# Patient Record
Sex: MALE | Race: WHITE | Employment: OTHER | URBAN - METROPOLITAN AREA
[De-identification: names, ages, dates, MRNs, and addresses within clinical notes are randomized per-mention and may not be internally consistent; named-entity substitution may affect disease eponyms.]

---

## 2017-01-12 ENCOUNTER — TELEPHONE (OUTPATIENT)
Dept: FAMILY MEDICINE | Facility: CLINIC | Age: 67
End: 2017-01-12

## 2017-01-12 DIAGNOSIS — R60.0 PERIPHERAL EDEMA: Primary | ICD-10-CM

## 2017-01-12 NOTE — TELEPHONE ENCOUNTER
Thanks for update, can he come in for kidney function test in 1 week with the increased dose furosemide?    Teetee Barton, CNP

## 2017-01-12 NOTE — TELEPHONE ENCOUNTER
Left detailed message asking patient to please schedule non-fasting lab only appt.    IMMANUEL NelsonN, RN

## 2017-01-12 NOTE — TELEPHONE ENCOUNTER
Patient called and spoke with writer.    Per patient:  1. Was taking Furosemide 20 mg BID, then instructed by PCP to only take one tablet daily  2. Itching and swelling of BLE returned, so three to four days ago went back to Furosemide 20 mg BID dosing  3. Wanted PCP to be aware of this    Routing to PCP.    Teetee-Please review.    Thank you!  IMMANUEL NelsonN, RN

## 2017-01-16 DIAGNOSIS — R60.0 PERIPHERAL EDEMA: ICD-10-CM

## 2017-01-16 LAB
ANION GAP SERPL CALCULATED.3IONS-SCNC: 6 MMOL/L (ref 3–14)
BUN SERPL-MCNC: 10 MG/DL (ref 7–30)
CALCIUM SERPL-MCNC: 9.5 MG/DL (ref 8.5–10.1)
CHLORIDE SERPL-SCNC: 103 MMOL/L (ref 94–109)
CO2 SERPL-SCNC: 31 MMOL/L (ref 20–32)
CREAT SERPL-MCNC: 0.97 MG/DL (ref 0.66–1.25)
GFR SERPL CREATININE-BSD FRML MDRD: 77 ML/MIN/1.7M2
GLUCOSE SERPL-MCNC: 111 MG/DL (ref 70–99)
POTASSIUM SERPL-SCNC: 4.5 MMOL/L (ref 3.4–5.3)
SODIUM SERPL-SCNC: 140 MMOL/L (ref 133–144)

## 2017-01-16 PROCEDURE — 36415 COLL VENOUS BLD VENIPUNCTURE: CPT | Performed by: NURSE PRACTITIONER

## 2017-01-16 PROCEDURE — 80048 BASIC METABOLIC PNL TOTAL CA: CPT | Performed by: NURSE PRACTITIONER

## 2017-01-16 NOTE — TELEPHONE ENCOUNTER
Patient must have received and understood Mayra's voice mail because he is scheduled for lab only today for BMP as ordered by Teetee.    Kita Araujo RN

## 2017-01-16 NOTE — Clinical Note
[facility]   [address] [place]   [address]  [phone]    January 17, 2017      Fermín Josue  [address] [facility] [address]              Dear Angela Josue,    Kidney function looks good on increased dose furosemide.    Results for orders placed or performed in visit on 01/16/17   Basic metabolic panel   Result Value Ref Range    Sodium 140 133 - 144 mmol/L    Potassium 4.5 3.4 - 5.3 mmol/L    Chloride 103 94 - 109 mmol/L    Carbon Dioxide 31 20 - 32 mmol/L    Anion Gap 6 3 - 14 mmol/L    Glucose 111 (H) 70 - 99 mg/dL    Urea Nitrogen 10 7 - 30 mg/dL    Creatinine 0.97 0.66 - 1.25 mg/dL    GFR Estimate 77 >60 mL/min/1.7m2    GFR Estimate If Black >90   GFR Calc   >60 mL/min/1.7m2    Calcium 9.5 8.5 - 10.1 mg/dL           Sincerely,    Teetee Barton, QUOC/nr

## 2017-01-16 NOTE — PROGRESS NOTES
Quick Note:    Please send out result letter with the following note, thanks.    Dom,    Kidney function looks good on increased dose furosemide.    -Teetee Barton, QUOC  ______

## 2017-02-13 ENCOUNTER — DOCUMENTATION ONLY (OUTPATIENT)
Dept: VASCULAR SURGERY | Facility: CLINIC | Age: 67
End: 2017-02-13

## 2017-06-13 ENCOUNTER — OFFICE VISIT (OUTPATIENT)
Dept: CARDIOLOGY | Facility: CLINIC | Age: 67
End: 2017-06-13
Attending: INTERNAL MEDICINE
Payer: COMMERCIAL

## 2017-06-13 VITALS
WEIGHT: 233 LBS | DIASTOLIC BLOOD PRESSURE: 80 MMHG | BODY MASS INDEX: 33.36 KG/M2 | SYSTOLIC BLOOD PRESSURE: 120 MMHG | HEIGHT: 70 IN | HEART RATE: 47 BPM

## 2017-06-13 DIAGNOSIS — I25.10 CORONARY ARTERY DISEASE INVOLVING NATIVE CORONARY ARTERY OF NATIVE HEART WITHOUT ANGINA PECTORIS: ICD-10-CM

## 2017-06-13 DIAGNOSIS — I10 ESSENTIAL HYPERTENSION, BENIGN: ICD-10-CM

## 2017-06-13 DIAGNOSIS — E78.5 HYPERLIPIDEMIA, UNSPECIFIED HYPERLIPIDEMIA TYPE: ICD-10-CM

## 2017-06-13 DIAGNOSIS — I10 ESSENTIAL HYPERTENSION: Primary | ICD-10-CM

## 2017-06-13 LAB
ALT SERPL W P-5'-P-CCNC: 9 U/L (ref 5–30)
ANION GAP SERPL CALCULATED.3IONS-SCNC: 12.7 MMOL/L (ref 6–17)
BUN SERPL-MCNC: 12 MG/DL (ref 7–30)
CALCIUM SERPL-MCNC: 9.4 MG/DL (ref 8.5–10.5)
CHLORIDE SERPL-SCNC: 102 MMOL/L (ref 98–107)
CHOLEST SERPL-MCNC: 153 MG/DL
CO2 SERPL-SCNC: 28 MMOL/L (ref 23–29)
CREAT SERPL-MCNC: 1.08 MG/DL (ref 0.7–1.3)
GFR SERPL CREATININE-BSD FRML MDRD: 68 ML/MIN/1.7M2
GLUCOSE SERPL-MCNC: 114 MG/DL (ref 70–105)
HDLC SERPL-MCNC: 43 MG/DL
LDLC SERPL CALC-MCNC: 83 MG/DL
NONHDLC SERPL-MCNC: 110 MG/DL
POTASSIUM SERPL-SCNC: 4.7 MMOL/L (ref 3.5–5.1)
SODIUM SERPL-SCNC: 138 MMOL/L (ref 136–145)
TRIGL SERPL-MCNC: 134 MG/DL

## 2017-06-13 PROCEDURE — 80048 BASIC METABOLIC PNL TOTAL CA: CPT | Performed by: INTERNAL MEDICINE

## 2017-06-13 PROCEDURE — 84460 ALANINE AMINO (ALT) (SGPT): CPT | Performed by: INTERNAL MEDICINE

## 2017-06-13 PROCEDURE — 36415 COLL VENOUS BLD VENIPUNCTURE: CPT | Performed by: INTERNAL MEDICINE

## 2017-06-13 PROCEDURE — 99214 OFFICE O/P EST MOD 30 MIN: CPT | Performed by: INTERNAL MEDICINE

## 2017-06-13 PROCEDURE — 80061 LIPID PANEL: CPT | Performed by: INTERNAL MEDICINE

## 2017-06-13 RX ORDER — ATORVASTATIN CALCIUM 40 MG/1
40 TABLET, FILM COATED ORAL DAILY
Qty: 90 TABLET | Refills: 3 | Status: SHIPPED | OUTPATIENT
Start: 2017-06-13 | End: 2018-07-20

## 2017-06-13 NOTE — MR AVS SNAPSHOT
After Visit Summary   6/13/2017    Fermín Josue    MRN: [id number]           Patient Information     Date Of Birth          1950        Visit Information        Provider Department      6/13/2017 9:45 AM Christiano Toro MD [facility]        Today's Diagnoses     Essential hypertension    -  1    Coronary artery disease involving native coronary artery of native heart without angina pectoris        Hyperlipidemia, unspecified hyperlipidemia type           Follow-ups after your visit        Additional Services     Follow-Up with Cardiologist                 Your next 10 appointments already scheduled     Jul 27, 2017  7:30 AM CDT   LAB with [facility]   [facility] ([facility])    [address] [place], [place] [id number]  [facility] [address] [phone]           Patient must bring picture ID.  Patient should be prepared to give a urine specimen  Please do not eat 10-12 hours before your appointment if you are coming in fasting for labs on lipids, cholesterol, or glucose (sugar).  Pregnant women should follow their Care Team instructions. Water with medications is okay. Do not drink coffee or other fluids.   If you have concerns about taking  your medications, please ask at office or if scheduling via [employer], send a message by clicking on Secure Messaging, Message Your Care Team.              Future tests that were ordered for you today     Open Future Orders        Priority Expected Expires Ordered    Basic metabolic panel Routine 6/13/2018 7/18/2018 6/13/2017    Lipid Profile Routine 6/13/2018 7/18/2018 6/13/2017    ALT Routine 6/13/2018 7/18/2018 6/13/2017    Follow-Up with Cardiologist Routine 6/13/2018 10/26/2018 6/13/2017    Lipid Profile Routine 7/27/2017 6/13/2018 6/13/2017    ALT Routine 7/27/2017 6/13/2018 6/13/2017            Who to contact     If you have questions or need follow up  "information about today's clinic visit or your schedule please contact [facility] [place] directly at [phone].  Normal or non-critical lab and imaging results will be communicated to you by MyChart, letter or phone within 4 business days after the clinic has received the results. If you do not hear from us within 7 days, please contact the clinic through [employer] or phone. If you have a critical or abnormal lab result, we will notify you by phone as soon as possible.  Submit refill requests through [employer] or call your pharmacy and they will forward the refill request to us. Please allow 3 business days for your refill to be completed.          Additional Information About Your Visit        [employer] Information     [employer] lets you send messages to your doctor, view your test results, renew your prescriptions, schedule appointments and more. To sign up, go to [email or web address] . Click on \"Log in\" on the left side of the screen, which will take you to the Welcome page. Then click on \"Sign up Now\" on the right side of the page.     You will be asked to enter the access code listed below, as well as some personal information. Please follow the directions to create your username and password.     Your access code is: [email or web address]  Expires: 2017 10:04 AM     Your access code will  in 90 days. If you need help or a new code, please call your [facility] clinic or [phone].        Care EveryWhere ID     This is your Care EveryWhere ID. This could be used by other organizations to access your [facility] medical records  [id number]        Your Vitals Were     Pulse Height BMI (Body Mass Index)             47 1.778 m (5' 10\") 33.43 kg/m2          Blood Pressure from Last 3 Encounters:   17 120/80   16 120/76   16 120/70    Weight from Last 3 Encounters:   17 105.7 kg (233 lb)   16 102.7 kg (226 lb 8 oz)   16 101.6 kg (224 lb)            "   We Performed the Following     Follow-Up with Cardiologist          Today's Medication Changes          These changes are accurate as of: 6/13/17 10:04 AM.  If you have any questions, ask your nurse or doctor.               These medicines have changed or have updated prescriptions.        Dose/Directions    atorvastatin 40 MG tablet   Commonly known as:  LIPITOR   This may have changed:    - medication strength  - how much to take   Used for:  Hyperlipidemia, unspecified hyperlipidemia type   Changed by:  Christiano Toro MD        Dose:  40 mg   Take 1 tablet (40 mg) by mouth daily   Quantity:  90 tablet   Refills:  3            Where to get your medicines      These medications were sent to [id number] [place], [place] - [address]  [address], [place] [address]     Phone:  [phone]     atorvastatin 40 MG tablet                Primary Care Provider Office Phone # Fax #    Teetee LONNY Menchaca [facility] [phone]       [facility] [address]  [facility] [address]        Thank you!     Thank you for choosing [facility] [place]  for your care. Our goal is always to provide you with excellent care. Hearing back from our patients is one way we can continue to improve our services. Please take a few minutes to complete the written survey that you may receive in the mail after your visit with us. Thank you!             Your Updated Medication List - Protect others around you: Learn how to safely use, store and throw away your medicines at www.disposemymeds.org.          This list is accurate as of: 6/13/17 10:04 AM.  Always use your most recent med list.                   Brand Name Dispense Instructions for use    amLODIPine 2.5 MG tablet    NORVASC    90 tablet    Take 1 tablet (2.5 mg) by mouth daily       ASPIRIN PO      Take 81 mg by mouth       atenolol 50 MG tablet    TENORMIN    90 tablet    Take  1 tablet (50 mg) by mouth daily       atorvastatin 40 MG tablet    LIPITOR    90 tablet    Take 1 tablet (40 mg) by mouth daily       fluticasone 50 MCG/ACT spray    FLONASE    16 g    Spray 2 sprays into both nostrils daily       furosemide 20 MG tablet    LASIX    90 tablet    Take 1 tablet (20 mg) by mouth daily       losartan 50 MG tablet    COZAAR    90 tablet    Take 1 tablet (50 mg) by mouth daily

## 2017-06-13 NOTE — PROGRESS NOTES
HPI and Plan:   See dictation    Orders Placed This Encounter   Procedures     Lipid Profile     ALT     Basic metabolic panel     Lipid Profile     ALT     Follow-Up with Cardiologist       Orders Placed This Encounter   Medications     atorvastatin (LIPITOR) 40 MG tablet     Sig: Take 1 tablet (40 mg) by mouth daily     Dispense:  90 tablet     Refill:  3       Medications Discontinued During This Encounter   Medication Reason     atorvastatin (LIPITOR) 20 MG tablet Reorder         Encounter Diagnoses   Name Primary?     Coronary artery disease involving native coronary artery of native heart without angina pectoris      Hyperlipidemia, unspecified hyperlipidemia type      Essential hypertension Yes       CURRENT MEDICATIONS:  Current Outpatient Prescriptions   Medication Sig Dispense Refill     atorvastatin (LIPITOR) 40 MG tablet Take 1 tablet (40 mg) by mouth daily 90 tablet 3     furosemide (LASIX) 20 MG tablet Take 1 tablet (20 mg) by mouth daily 90 tablet 3     atenolol (TENORMIN) 50 MG tablet Take 1 tablet (50 mg) by mouth daily 90 tablet 3     amLODIPine (NORVASC) 2.5 MG tablet Take 1 tablet (2.5 mg) by mouth daily 90 tablet 3     losartan (COZAAR) 50 MG tablet Take 1 tablet (50 mg) by mouth daily 90 tablet 3     fluticasone (FLONASE) 50 MCG/ACT nasal spray Spray 2 sprays into both nostrils daily 16 g 0     ASPIRIN PO Take 81 mg by mouth       [DISCONTINUED] atorvastatin (LIPITOR) 20 MG tablet Take 1 tablet (20 mg) by mouth daily 90 tablet 3       ALLERGIES     Allergies   Allergen Reactions     No Known Drug Allergies        PAST MEDICAL HISTORY:  Past Medical History:   Diagnosis Date     CAD (coronary artery disease) 2/5/2015    3 stents     History of angina      History of skin graft     Right lower limb     Hyperlipidaemia      Hypertension goal BP (blood pressure) < 140/90 2/5/2015       PAST SURGICAL HISTORY:  Past Surgical History:   Procedure Laterality Date     HEART CATH, ANGIOPLASTY  11/2007     "mid to prox LAD drug-eluting stent x2; Occluded RCA with Collaterals     SURGICAL HISTORY OF -   2003    kidney stone laser and ureteric stenting       FAMILY HISTORY:  Family History   Problem Relation Age of Onset     CANCER Maternal Grandfather      CANCER Maternal Uncle      lung     Blood Disease Maternal Grandmother      leukemia       SOCIAL HISTORY:  Social History     Social History     Marital status:      Spouse name: N/A     Number of children: N/A     Years of education: N/A     Social History Main Topics     Smoking status: Former Smoker     Packs/day: 1.00     Years: 5.00     Types: Cigarettes     Smokeless tobacco: None     Alcohol use Yes      Comment: beer occasionally     Drug use: No     Sexual activity: Not Asked     Other Topics Concern     Caffeine Concern Yes     1 pop daily      Special Diet Yes     fruits      Exercise Yes     walking 1 hr daily      Social History Narrative       Review of Systems:  Skin:  Negative       Eyes:  Negative      ENT:  Negative      Respiratory:  Negative       Cardiovascular:    Positive for;edema angina on occ   Gastroenterology: Negative      Genitourinary:  Negative      Musculoskeletal:  Negative      Neurologic:  Negative      Psychiatric:  Positive for excessive stress    Heme/Lymph/Imm:  Negative      Endocrine:  Negative        Physical Exam:  Vitals: /80  Pulse (!) 47  Ht 1.778 m (5' 10\")  Wt 105.7 kg (233 lb)  BMI 33.43 kg/m2    Constitutional:  cooperative, alert and oriented, well developed, well nourished, in no acute distress overweight      Skin:  warm and dry to the touch, no apparent skin lesions or masses noted        Head:  normocephalic, no masses or lesions        Eyes:  pupils equal and round, conjunctivae and lids unremarkable, sclera white, no xanthalasma, EOMS intact, no nystagmus        ENT:  no pallor or cyanosis, dentition good        Neck:  carotid pulses are full and equal bilaterally, JVP normal, no carotid " bruit, no thyromegaly        Chest:  normal breath sounds, clear to auscultation, normal A-P diameter, normal symmetry, normal respiratory excursion, no use of accessory muscles          Cardiac: regular rhythm;normal S1 and S2;apical impulse not displaced;no murmurs, gallops or rubs detected     no presence of murmur            Abdomen:  abdomen soft, non-tender, BS normoactive, no mass, no HSM, no bruits        Vascular: pulses full and equal, no bruits auscultated;pulses full and equal                                        Extremities and Back:  no deformities, clubbing, cyanosis, erythema observed   bilateral LE edema;trace          Neurological:  affect appropriate, oriented to time, person and place;no gross motor deficits              CC  Christiano Toro MD   [facility]  [address]  [place] [address]

## 2017-06-13 NOTE — LETTER
6/13/2017    LONNY Arroyo CNP  [facility]   [address]  [facility] [address]    RE: Fermín Josue       Dear Colleague,    REFERRING PHYSICIAN:  LONNY Bowden, CNP       It is my pleasure to see your patient, Fermín Josue, who is a very pleasant 67-year-old gentleman with a history of coronary artery disease.  As you know, he had stenting to the left anterior descending artery in 2007 and has an occluded but collateralized right coronary artery.  He also has a history of hypertension and hyperlipidemia.  Since I last saw him, he has been doing relatively well.  He has had no change in his symptoms.  He will occasionally get discomfort in his chest if he has an emotional upset but he is a very active man normally and he can walk 2 miles without any discomfort in his chest.  His exercise has been limited because he is essentially the sole caregiver to his 87-year-old mother who has memory issues.  He tells me that his other siblings do not take care of his mother at all.  He also helps with his grandchildren.  Consequently, he has gained a significant amount of weight going from 224 pounds last year to 233 pounds this year.  Consequently, this may also be a factor in worsening lipid profile.  His LDL cholesterol has risen from 44 to 83.  He does tell me, however, that his statins were changed.  He was switched from rosuvastatin 20 mg to atorvastatin 20 mg.  This is effectively cutting the potency of his statin in half.  Rosuvastatin is twice more powerful than atorvastatin.  This is probably one of the major reasons why his LDL cholesterol has doubled.  His blood pressure is well-controlled at 120/80.  He does have sinus bradycardia at 47 beats per minute but he has no dizziness and no lightheadedness.  Blood pressure is excellent today at 120/80.     Outpatient Encounter Prescriptions as of 6/13/2017   Medication Sig Dispense Refill     atorvastatin (LIPITOR) 40 MG  tablet Take 1 tablet (40 mg) by mouth daily 90 tablet 3     furosemide (LASIX) 20 MG tablet Take 1 tablet (20 mg) by mouth daily 90 tablet 3     atenolol (TENORMIN) 50 MG tablet Take 1 tablet (50 mg) by mouth daily 90 tablet 3     amLODIPine (NORVASC) 2.5 MG tablet Take 1 tablet (2.5 mg) by mouth daily 90 tablet 3     losartan (COZAAR) 50 MG tablet Take 1 tablet (50 mg) by mouth daily 90 tablet 3     fluticasone (FLONASE) 50 MCG/ACT nasal spray Spray 2 sprays into both nostrils daily 16 g 0     ASPIRIN PO Take 81 mg by mouth       [DISCONTINUED] atorvastatin (LIPITOR) 20 MG tablet Take 1 tablet (20 mg) by mouth daily 90 tablet 3     No facility-administered encounter medications on file as of 6/13/2017.       IMPRESSION:   1.  Coronary artery disease.  The patient has very occasional episodes of chest discomfort.  These occur usually with emotion rather than exercise and the pattern has not changed since last year.   2.  Hyperlipidemia.  His LDL cholesterol has virtually doubled.  The most likely reason for this is that he was switched from rosuvastatin 20 mg to atorvastatin 20 mg for cost reasons and as I mentioned above this is effectively halving the dose of statin.   3.  Significant weight gain going from 224 pounds to 233 pounds in 1 year.  This is most likely due to less exercise because he has now become the sole caregiver for his mother.      PLAN:  We will increase the atorvastatin from 20 mg to 40 mg and recheck his lipids in 6 weeks' time with an ALT.  I will have the patient return to see me in 1 year's time with lipids and basic metabolic profile.  I will see the patient back again in 1 year's time but as always he has been told to contact me if he has any questions or any concerns.      It has been my pleasure to be involved in the care of this very nice patient.     Sincerely,    Christiano Toro MD    [facility]

## 2017-06-13 NOTE — PROGRESS NOTES
REFERRING PHYSICIAN:  LONNY Bowden, CNP       HISTORY OF PRESENT ILLNESS:  It is my pleasure to see your patient, Fermín Josue, who is a very pleasant 67-year-old gentleman with a history of coronary artery disease.  As you know, he had stenting to the left anterior descending artery in 2007 and has an occluded but collateralized right coronary artery.  He also has a history of hypertension and hyperlipidemia.  Since I last saw him, he has been doing relatively well.  He has had no change in his symptoms.  He will occasionally get discomfort in his chest if he has an emotional upset but he is a very active man normally and he can walk 2 miles without any discomfort in his chest.  His exercise has been limited because he is essentially the sole caregiver to his 87-year-old mother who has memory issues.  He tells me that his other siblings do not take care of his mother at all.  He also helps with his grandchildren.  Consequently, he has gained a significant amount of weight going from 224 pounds last year to 233 pounds this year.  Consequently, this may also be a factor in worsening lipid profile.  His LDL cholesterol has risen from 44 to 83.  He does tell me, however, that his statins were changed.  He was switched from rosuvastatin 20 mg to atorvastatin 20 mg.  This is effectively cutting the potency of his statin in half.  Rosuvastatin is twice more powerful than atorvastatin.  This is probably one of the major reasons why his LDL cholesterol has doubled.  His blood pressure is well-controlled at 120/80.  He does have sinus bradycardia at 47 beats per minute but he has no dizziness and no lightheadedness.  Blood pressure is excellent today at 120/80.      IMPRESSION:   1.  Coronary artery disease.  The patient has very occasional episodes of chest discomfort.  These occur usually with emotion rather than exercise and the pattern has not changed since last year.   2.  Hyperlipidemia.  His LDL cholesterol  has virtually doubled.  The most likely reason for this is that he was switched from rosuvastatin 20 mg to atorvastatin 20 mg for cost reasons and as I mentioned above this is effectively halving the dose of statin.   3.  Significant weight gain going from 224 pounds to 233 pounds in 1 year.  This is most likely due to less exercise because he has now become the sole caregiver for his mother.      PLAN:  We will increase the atorvastatin from 20 mg to 40 mg and recheck his lipids in 6 weeks' time with an ALT.  I will have the patient return to see me in 1 year's time with lipids and basic metabolic profile.  I will see the patient back again in 1 year's time but as always he has been told to contact me if he has any questions or any concerns.      It has been my pleasure to be involved in the care of this very nice patient.      Christiano Gifford MD      cc:   LONNY Bowden, [place] [address] [place] [address]         CHRISTIANO GIFFORD MD, [facility]             D: 2017 10:02   T: 2017 11:41   MT: XAVIER      Name:     XU BURCH   MRN:      [id number]        Account:      [id number]   :      1950           Service Date: 2017      Document: [id number]

## 2017-06-23 DIAGNOSIS — I25.10 CAD (CORONARY ARTERY DISEASE): ICD-10-CM

## 2017-06-23 RX ORDER — LOSARTAN POTASSIUM 50 MG/1
50 TABLET ORAL DAILY
Qty: 90 TABLET | Refills: 3 | Status: SHIPPED | OUTPATIENT
Start: 2017-06-23 | End: 2018-07-05

## 2017-07-10 DIAGNOSIS — I10 ESSENTIAL HYPERTENSION: ICD-10-CM

## 2017-07-10 RX ORDER — AMLODIPINE BESYLATE 2.5 MG/1
2.5 TABLET ORAL DAILY
Qty: 90 TABLET | Refills: 3 | Status: SHIPPED | OUTPATIENT
Start: 2017-07-10 | End: 2018-07-16

## 2017-07-27 DIAGNOSIS — I10 ESSENTIAL HYPERTENSION: ICD-10-CM

## 2017-07-27 DIAGNOSIS — I25.10 CORONARY ARTERY DISEASE INVOLVING NATIVE CORONARY ARTERY OF NATIVE HEART WITHOUT ANGINA PECTORIS: ICD-10-CM

## 2017-07-27 DIAGNOSIS — E78.5 HYPERLIPIDEMIA, UNSPECIFIED HYPERLIPIDEMIA TYPE: ICD-10-CM

## 2017-07-27 LAB
ALT SERPL W P-5'-P-CCNC: 8 U/L (ref 5–30)
CHOLEST SERPL-MCNC: 133 MG/DL
HDLC SERPL-MCNC: 40 MG/DL
LDLC SERPL CALC-MCNC: 70 MG/DL
NONHDLC SERPL-MCNC: 93 MG/DL
TRIGL SERPL-MCNC: 113 MG/DL

## 2017-07-27 PROCEDURE — 80061 LIPID PANEL: CPT | Performed by: INTERNAL MEDICINE

## 2017-07-27 PROCEDURE — 84460 ALANINE AMINO (ALT) (SGPT): CPT | Performed by: INTERNAL MEDICINE

## 2017-07-27 PROCEDURE — 36415 COLL VENOUS BLD VENIPUNCTURE: CPT | Performed by: INTERNAL MEDICINE

## 2017-08-17 ENCOUNTER — TELEPHONE (OUTPATIENT)
Dept: FAMILY MEDICINE | Facility: CLINIC | Age: 67
End: 2017-08-17

## 2017-08-17 NOTE — TELEPHONE ENCOUNTER
--I called [facility] and confirmed that they do not have any furosemide or Lasix because of a supplier problem.  --I called [facility] in our pharmacy to see if we have a supply still.  --[facility] does confirm there is a problem with supply of  furosemide.  --[facility] says [facility] pharmacy has a small supply of furosemide.  [facility] says [8/17/2017 12:05 PM] fps-pc-10-4:   [facility]- the 20mg we have for now, so short term solution.  estimated availability varies on a daily basis, so hard to say when it will be easy to get again.  talking about furosemide .  --I called patient and he will call our pharmacy and ask to pull his last refill order from [facility].      Last office visit : 9/29/16     Potassium   Date Value Ref Range Status   06/13/2017 4.7 3.5 - 5.1 mmol/L Final   ]  Creatinine   Date Value Ref Range Status   06/13/2017 1.08 0.70 - 1.30 mg/dL Final   ]  BP Readings from Last 3 Encounters:   06/13/17 120/80   09/29/16 120/76   06/02/16 120/70

## 2017-08-17 NOTE — TELEPHONE ENCOUNTER
Patient needs refill of Lasix.  [facility] [address] states they can't get any of this medication.  Only has 1 pill left (also has been skipping days).  Need recommendation. Please call today. OK to leave message on voicemail.

## 2017-11-15 DIAGNOSIS — R60.0 PERIPHERAL EDEMA: ICD-10-CM

## 2017-11-17 RX ORDER — FUROSEMIDE 20 MG
TABLET ORAL
Qty: 90 TABLET | Refills: 0 | Status: SHIPPED | OUTPATIENT
Start: 2017-11-17 | End: 2017-12-04

## 2017-11-17 NOTE — TELEPHONE ENCOUNTER
Patient calling to find out status of water pill refill.  Will be out this weekend, so needs today.  Please as soon as possible.  OK to leave message on voicemail.

## 2017-11-17 NOTE — TELEPHONE ENCOUNTER
9/29/16 was last ov with pcp.  6/13/17 pt was seen by cards.  Nothing noted in ov about med.  Routing to pcp.  RAKAN Paz

## 2017-11-27 DIAGNOSIS — I10 ESSENTIAL HYPERTENSION WITH GOAL BLOOD PRESSURE LESS THAN 140/90: ICD-10-CM

## 2017-11-29 RX ORDER — ATENOLOL 50 MG/1
TABLET ORAL
Qty: 30 TABLET | Refills: 0 | Status: SHIPPED | OUTPATIENT
Start: 2017-11-29 | End: 2017-12-04

## 2017-11-29 NOTE — TELEPHONE ENCOUNTER
One month refill as patient overdue for annual office visit.    Team coordinators-Please call patient to schedule office visit.    Thank you!  PAULETTE Cardona, RN      BP Readings from Last 3 Encounters:   06/13/17 120/80   09/29/16 120/76   06/02/16 120/70

## 2017-12-01 NOTE — PROGRESS NOTES
SUBJECTIVE:   Fermín Josue is a 67 year old male who presents to clinic today for the following health issues:      Hyperlipidemia Follow-Up      Rate your low fat/cholesterol diet?: not monitoring fat    Taking statin?  Yes, no muscle aches from statin    Other lipid medications/supplements?:  none    Hypertension Follow-up      Outpatient blood pressures are not being checked.    Low Salt Diet: not monitoring salt    Pt notes he needs his Lasix sent to [facility]      Patient  BP is well controlled, no side effects reported from medications. Follows annually with cardiology, history of CAD s/p stenting 2015.  Denies  dizziness or lightheadednesss, no  chest pain recently. SOB sometimes when exercising or tying shoe lace. No SOB at rest or with walking      Weight: avoid drinking soda and  can foods     Patient stated that edema to lower extremities improved with  Lasix, he was on two tablets and cardiologist decreased to one tablet.     Patient Active Problem List   Diagnosis     CARDIOVASCULAR SCREENING; LDL GOAL LESS THAN 100     CAD (coronary artery disease)     Hypertension goal BP (blood pressure) < 140/90     Hyperlipidemia     History of angina     Advance Care Planning     Past Surgical History:   Procedure Laterality Date     HEART CATH, ANGIOPLASTY  11/2007    mid to prox LAD drug-eluting stent x2; Occluded RCA with Collaterals     SURGICAL HISTORY OF -   2003    kidney stone laser and ureteric stenting       Social History   Substance Use Topics     Smoking status: Former Smoker     Packs/day: 1.00     Years: 5.00     Types: Cigarettes     Smokeless tobacco: Never Used     Alcohol use Yes      Comment: beer occasionally     Family History   Problem Relation Age of Onset     CANCER Maternal Grandfather      CANCER Maternal Uncle      lung     Blood Disease Maternal Grandmother      leukemia         Current Outpatient Prescriptions   Medication Sig Dispense Refill     atenolol  (TENORMIN) 50 MG tablet Take 1 tablet (50 mg) by mouth daily 90 tablet 3     furosemide (LASIX) 20 MG tablet Take 1 tablet (20 mg) by mouth daily 90 tablet 3     nitroGLYcerin (NITROSTAT) 0.4 MG sublingual tablet For chest pain place 1 tablet under the tongue every 5 minutes for 3 doses. If symptoms persist 5 minutes after 1st dose call 911. 9 tablet 0     amLODIPine (NORVASC) 2.5 MG tablet Take 1 tablet (2.5 mg) by mouth daily 90 tablet 3     losartan (COZAAR) 50 MG tablet Take 1 tablet (50 mg) by mouth daily 90 tablet 3     atorvastatin (LIPITOR) 40 MG tablet Take 1 tablet (40 mg) by mouth daily 90 tablet 3     ASPIRIN PO Take 81 mg by mouth       [DISCONTINUED] atenolol (TENORMIN) 50 MG tablet TAKE ONE TABLET BY MOUTH ONE TIME DAILY  30 tablet 0     [DISCONTINUED] furosemide (LASIX) 20 MG tablet TAKE ONE TABLET BY MOUTH EVERY DAY 90 tablet 0       ROS:  C: NEGATIVE for fever, chills, change in weight  R: as above  CV: NEGATIVE for chest pain, palpitations or peripheral edema  PSYCHIATRIC: NEGATIVE for changes in mood or affect      OBJECTIVE:                                                    /68 (BP Location: Left arm, Patient Position: Chair, Cuff Size: Adult Large)  Pulse 50  Temp 98.1  F (36.7  C) (Tympanic)  Resp 12  Wt 240 lb 8 oz (109.1 kg)  SpO2 97%  BMI 34.51 kg/m2   RESP: lungs clear to auscultation - no rales, rhonchi or wheezes, SOB when exercising and tying shoe lace   CV: regular rates and rhythm, normal S1 S2, no S3 or S4 and no murmur, click or rub peripheral edema and peripheral pulses strong  PSYCH: mentation appears normal, affect normal/bright       ASSESSMENT/PLAN:                                                    (I10) Hypertension goal BP (blood pressure) < 140/90  (primary encounter diagnosis)  Comment: controlled on Atenolol and Losartan /68  Plan: continue Atenolol 50 mg  and Losartan 50 mg            (I25.10) CAD (coronary artery disease)  Comment:  No angina  Plan:  continue Lipitor 40 mg and Aspirin 81 mg, refilled nitro              (E78.5) Hyperlipidemia, unspecified hyperlipidemia type  Comment: controlled last LDL 70 6/27/17  Plan: continue Lipitor 40mg      (R60.9) Peripheral edema  Comment: suspect due to vascular insufficiency normal echo 2015, normal GFR and creatinine 6/13/2017.  No symptoms of HF today.  Edema has worsened with stopping furosemide in the past.  Plan: consider compression stockings, furosemide (LASIX) 20 MG tablet.  discussed cautious use of furosemide due to wear and tear on kidneys and need for renal monitoring.                  See Patient Instructions    Teetee Hammer, [facility]    Patient Instructions   1.  Refilled furosemide, continue to try to limit salt, consider compression stockings.  2.  Refilled atenolol  3.  teatnus shot today

## 2017-12-04 ENCOUNTER — OFFICE VISIT (OUTPATIENT)
Dept: FAMILY MEDICINE | Facility: CLINIC | Age: 67
End: 2017-12-04
Payer: COMMERCIAL

## 2017-12-04 VITALS
WEIGHT: 240.5 LBS | OXYGEN SATURATION: 97 % | BODY MASS INDEX: 34.51 KG/M2 | TEMPERATURE: 98.1 F | HEART RATE: 50 BPM | RESPIRATION RATE: 12 BRPM | DIASTOLIC BLOOD PRESSURE: 68 MMHG | SYSTOLIC BLOOD PRESSURE: 126 MMHG

## 2017-12-04 DIAGNOSIS — I10 ESSENTIAL HYPERTENSION: ICD-10-CM

## 2017-12-04 DIAGNOSIS — R60.0 PERIPHERAL EDEMA: ICD-10-CM

## 2017-12-04 DIAGNOSIS — I10 HYPERTENSION GOAL BP (BLOOD PRESSURE) < 140/90: Primary | ICD-10-CM

## 2017-12-04 DIAGNOSIS — Z23 ENCOUNTER FOR IMMUNIZATION: ICD-10-CM

## 2017-12-04 DIAGNOSIS — E78.5 HYPERLIPIDEMIA, UNSPECIFIED HYPERLIPIDEMIA TYPE: ICD-10-CM

## 2017-12-04 DIAGNOSIS — I25.10 CORONARY ARTERY DISEASE INVOLVING NATIVE CORONARY ARTERY OF NATIVE HEART WITHOUT ANGINA PECTORIS: ICD-10-CM

## 2017-12-04 PROCEDURE — G0008 ADMIN INFLUENZA VIRUS VAC: HCPCS | Performed by: NURSE PRACTITIONER

## 2017-12-04 PROCEDURE — 99214 OFFICE O/P EST MOD 30 MIN: CPT | Mod: 25 | Performed by: NURSE PRACTITIONER

## 2017-12-04 PROCEDURE — 90715 TDAP VACCINE 7 YRS/> IM: CPT | Performed by: NURSE PRACTITIONER

## 2017-12-04 RX ORDER — FUROSEMIDE 20 MG
20 TABLET ORAL DAILY
Qty: 90 TABLET | Refills: 11 | Status: CANCELLED | OUTPATIENT
Start: 2017-12-04

## 2017-12-04 RX ORDER — FUROSEMIDE 20 MG
20 TABLET ORAL DAILY
Qty: 90 TABLET | Refills: 3 | Status: SHIPPED | OUTPATIENT
Start: 2017-12-04 | End: 2019-02-20

## 2017-12-04 RX ORDER — ATORVASTATIN CALCIUM 40 MG/1
40 TABLET, FILM COATED ORAL DAILY
Qty: 90 TABLET | Refills: 3 | Status: CANCELLED | OUTPATIENT
Start: 2017-12-04

## 2017-12-04 RX ORDER — LOSARTAN POTASSIUM 50 MG/1
50 TABLET ORAL DAILY
Qty: 90 TABLET | Refills: 3 | Status: CANCELLED | OUTPATIENT
Start: 2017-12-04

## 2017-12-04 RX ORDER — NITROGLYCERIN 0.4 MG/1
TABLET SUBLINGUAL
Qty: 9 TABLET | Refills: 0 | Status: SHIPPED | OUTPATIENT
Start: 2017-12-04 | End: 2019-07-29

## 2017-12-04 RX ORDER — ATENOLOL 50 MG/1
50 TABLET ORAL DAILY
Qty: 90 TABLET | Refills: 3 | Status: SHIPPED | OUTPATIENT
Start: 2017-12-04 | End: 2019-03-06

## 2017-12-04 RX ORDER — AMLODIPINE BESYLATE 2.5 MG/1
2.5 TABLET ORAL DAILY
Qty: 90 TABLET | Refills: 3 | Status: CANCELLED | OUTPATIENT
Start: 2017-12-04

## 2017-12-04 NOTE — NURSING NOTE
"Chief Complaint   Patient presents with     Hypertension     Lipids       Initial /68 (BP Location: Left arm, Patient Position: Chair, Cuff Size: Adult Large)  Pulse 50  Temp 98.1  F (36.7  C) (Tympanic)  Resp 12  Wt 240 lb 8 oz (109.1 kg)  SpO2 97%  BMI 34.51 kg/m2 Estimated body mass index is 34.51 kg/(m^2) as calculated from the following:    Height as of 6/13/17: 5' 10\" (1.778 m).    Weight as of this encounter: 240 lb 8 oz (109.1 kg).  Medication Reconciliation: complete     Lovely Rojas, CMA      "

## 2017-12-04 NOTE — NURSING NOTE
Screening Questionnaire for Adult Immunization    Are you sick today?   No   Do you have allergies to medications, food, a vaccine component or latex?   No   Have you ever had a serious reaction after receiving a vaccination?   No   Do you have a long-term health problem with heart disease, lung disease, asthma, kidney disease, metabolic disease (e.g. diabetes), anemia, or other blood disorder?   No   Do you have cancer, leukemia, HIV/AIDS, or any other immune system problem?   No   In the past 3 months, have you taken medications that affect  your immune system, such as prednisone, other steroids, or anticancer drugs; drugs for the treatment of rheumatoid arthritis, Crohn s disease, or psoriasis; or have you had radiation treatments?   No   Have you had a seizure, or a brain or other nervous system problem?   No   During the past year, have you received a transfusion of blood or blood     products, or been given immune (gamma) globulin or antiviral drug?   No   For women: Are you pregnant or is there a chance you could become        pregnant during the next month?   No   Have you received any vaccinations in the past 4 weeks?   No     Immunization questionnaire answers were all negative.        Per orders of Dr. Hammer, injection of TDap given by Asim Cruz. Patient instructed to remain in clinic for 15 minutes afterwards, and to report any adverse reaction to me immediately.    Per orders of Dr. Hammer, injection of TDap given by Asim Cruz. Patient instructed to remain in clinic for 15 minutes afterwards, and to report any adverse reaction to me immediately.         Screening performed by Asim Cruz on 12/4/2017 at 12:08 PM.

## 2017-12-04 NOTE — MR AVS SNAPSHOT
"              After Visit Summary   12/4/2017    Fermín Josue    MRN: [id number]           Patient Information     Date Of Birth          1950        Visit Information        Provider Department      12/4/2017 8:00 AM Teetee Hammer APRN CNP [facility]        Today's Diagnoses     Hypertension goal BP (blood pressure) < 140/90    -  1    Coronary artery disease involving native coronary artery of native heart without angina pectoris        Hyperlipidemia, unspecified hyperlipidemia type        Essential hypertension with goal blood pressure less than 140/90        Peripheral edema        Essential hypertension        CAD (coronary artery disease)          Care Instructions    1.  Refilled furosemide, continue to try to limit salt, consider compression stockings.  2.  Refilled atenolol  3.  teatnus shot today            Follow-ups after your visit        Who to contact     If you have questions or need follow up information about today's clinic visit or your schedule please contact [facility] directly at [phone].  Normal or non-critical lab and imaging results will be communicated to you by [employer], letter or phone within 4 business days after the clinic has received the results. If you do not hear from us within 7 days, please contact the clinic through [employer] or phone. If you have a critical or abnormal lab result, we will notify you by phone as soon as possible.  Submit refill requests through [employer] or call your pharmacy and they will forward the refill request to us. Please allow 3 business days for your refill to be completed.          Additional Information About Your Visit        [employer] Information     [employer] lets you send messages to your doctor, view your test results, renew your prescriptions, schedule appointments and more. To sign up, go to [email or web address] . Click on \"Log in\" on the left side of the screen, which will take you to the " "Welcome page. Then click on \"Sign up Now\" on the right side of the page.     You will be asked to enter the access code listed below, as well as some personal information. Please follow the directions to create your username and password.     Your access code is: [email or web address]  Expires: 3/4/2018  8:32 AM     Your access code will  in 90 days. If you need help or a new code, please call your [place] clinic or [phone].        Care EveryWhere ID     This is your Care EveryWhere ID. This could be used by other organizations to access your [place] medical records  [id number]        Your Vitals Were     Pulse Temperature Respirations Pulse Oximetry BMI (Body Mass Index)       50 98.1  F (36.7  C) (Tympanic) 12 97% 34.51 kg/m2        Blood Pressure from Last 3 Encounters:   17 126/68   17 120/80   16 120/76    Weight from Last 3 Encounters:   17 240 lb 8 oz (109.1 kg)   17 233 lb (105.7 kg)   16 226 lb 8 oz (102.7 kg)              Today, you had the following     No orders found for display         Today's Medication Changes          These changes are accurate as of: 17  8:32 AM.  If you have any questions, ask your nurse or doctor.               Start taking these medicines.        Dose/Directions    nitroGLYcerin 0.4 MG sublingual tablet   Commonly known as:  NITROSTAT   Used for:  Coronary artery disease involving native coronary artery of native heart without angina pectoris   Started by:  Teetee Hammer APRN CNP        For chest pain place 1 tablet under the tongue every 5 minutes for 3 doses. If symptoms persist 5 minutes after 1st dose call 911.   Quantity:  9 tablet   Refills:  0         These medicines have changed or have updated prescriptions.        Dose/Directions    atenolol 50 MG tablet   Commonly known as:  TENORMIN   This may have changed:  See the new instructions.   Used for:  Essential hypertension with goal blood pressure less than 140/90 "   Changed by:  Teetee Hammer APRN CNP        Dose:  50 mg   Take 1 tablet (50 mg) by mouth daily   Quantity:  90 tablet   Refills:  3       furosemide 20 MG tablet   Commonly known as:  LASIX   This may have changed:  See the new instructions.   Used for:  Peripheral edema   Changed by:  Teetee Hammer APRN CNP        Dose:  20 mg   Take 1 tablet (20 mg) by mouth daily   Quantity:  90 tablet   Refills:  3            Where to get your medicines      These medications were sent to [id number] [place], [place] - [address]  [address], [place] [address]     Phone:  [phone]     atenolol 50 MG tablet    furosemide 20 MG tablet    nitroGLYcerin 0.4 MG sublingual tablet                Primary Care Provider Office Phone # Fax #    LONNY Arroyo [phone] [address]  [facility] [address]        Equal Access to Services     RENETTA GONZALEZ AH: Hadii sarah gray hadasho Soomaali, waaxda luqadaha, qaybta kaalmada adeegyada, waxay poliin hayjohn germain . So [facility] [phone].    ATENCIÓN: Si habla español, tiene a sainz disposición servicios gratuitos de asistencia lingüística. Llame al [phone].    We comply with applicable federal civil rights laws and [place] laws. We do not discriminate on the basis of race, color, national origin, age, disability, sex, sexual orientation, or gender identity.            Thank you!     Thank you for choosing [facility]  for your care. Our goal is always to provide you with excellent care. Hearing back from our patients is one way we can continue to improve our services. Please take a few minutes to complete the written survey that you may receive in the mail after your visit with us. Thank you!             Your Updated Medication List - Protect others around you: Learn how to safely use, store and throw away your medicines at www.disposemymeds.org.          This list is  accurate as of: 12/4/17  8:32 AM.  Always use your most recent med list.                   Brand Name Dispense Instructions for use Diagnosis    amLODIPine 2.5 MG tablet    NORVASC    90 tablet    Take 1 tablet (2.5 mg) by mouth daily    Essential hypertension       ASPIRIN PO      Take 81 mg by mouth        atenolol 50 MG tablet    TENORMIN    90 tablet    Take 1 tablet (50 mg) by mouth daily    Essential hypertension with goal blood pressure less than 140/90       atorvastatin 40 MG tablet    LIPITOR    90 tablet    Take 1 tablet (40 mg) by mouth daily    Hyperlipidemia, unspecified hyperlipidemia type       furosemide 20 MG tablet    LASIX    90 tablet    Take 1 tablet (20 mg) by mouth daily    Peripheral edema       losartan 50 MG tablet    COZAAR    90 tablet    Take 1 tablet (50 mg) by mouth daily    CAD (coronary artery disease)       nitroGLYcerin 0.4 MG sublingual tablet    NITROSTAT    9 tablet    For chest pain place 1 tablet under the tongue every 5 minutes for 3 doses. If symptoms persist 5 minutes after 1st dose call 911.    Coronary artery disease involving native coronary artery of native heart without angina pectoris

## 2017-12-04 NOTE — PATIENT INSTRUCTIONS
1.  Refilled furosemide, continue to try to limit salt, consider compression stockings.  2.  Refilled atenolol  3.  teatnus shot today

## 2018-06-25 DIAGNOSIS — I10 ESSENTIAL HYPERTENSION: Primary | ICD-10-CM

## 2018-06-25 DIAGNOSIS — E78.5 HLD (HYPERLIPIDEMIA): ICD-10-CM

## 2018-06-27 ENCOUNTER — OFFICE VISIT (OUTPATIENT)
Dept: CARDIOLOGY | Facility: CLINIC | Age: 68
End: 2018-06-27
Attending: INTERNAL MEDICINE
Payer: COMMERCIAL

## 2018-06-27 VITALS
SYSTOLIC BLOOD PRESSURE: 132 MMHG | HEART RATE: 60 BPM | DIASTOLIC BLOOD PRESSURE: 84 MMHG | HEIGHT: 71 IN | WEIGHT: 236 LBS | BODY MASS INDEX: 33.04 KG/M2

## 2018-06-27 DIAGNOSIS — E78.5 HLD (HYPERLIPIDEMIA): ICD-10-CM

## 2018-06-27 DIAGNOSIS — I25.10 CORONARY ARTERY DISEASE INVOLVING NATIVE CORONARY ARTERY OF NATIVE HEART WITHOUT ANGINA PECTORIS: ICD-10-CM

## 2018-06-27 DIAGNOSIS — E78.5 HYPERLIPIDEMIA, UNSPECIFIED HYPERLIPIDEMIA TYPE: ICD-10-CM

## 2018-06-27 DIAGNOSIS — I10 ESSENTIAL HYPERTENSION: ICD-10-CM

## 2018-06-27 LAB
ALT SERPL W P-5'-P-CCNC: 19 U/L (ref 5–30)
ANION GAP SERPL CALCULATED.3IONS-SCNC: 15 MMOL/L (ref 6–17)
BUN SERPL-MCNC: 13 MG/DL (ref 7–30)
CALCIUM SERPL-MCNC: 10 MG/DL (ref 8.5–10.5)
CHLORIDE SERPL-SCNC: 103 MMOL/L (ref 98–107)
CHOLEST SERPL-MCNC: 132 MG/DL
CO2 SERPL-SCNC: 27 MMOL/L (ref 23–29)
CREAT SERPL-MCNC: 1.08 MG/DL (ref 0.7–1.3)
GFR SERPL CREATININE-BSD FRML MDRD: 68 ML/MIN/1.7M2
GLUCOSE SERPL-MCNC: 121 MG/DL (ref 70–105)
HDLC SERPL-MCNC: 41 MG/DL
LDLC SERPL CALC-MCNC: 65 MG/DL
NONHDLC SERPL-MCNC: 91 MG/DL
POTASSIUM SERPL-SCNC: 4 MMOL/L (ref 3.5–5.1)
SODIUM SERPL-SCNC: 141 MMOL/L (ref 136–145)
TRIGL SERPL-MCNC: 132 MG/DL

## 2018-06-27 PROCEDURE — 36415 COLL VENOUS BLD VENIPUNCTURE: CPT | Performed by: INTERNAL MEDICINE

## 2018-06-27 PROCEDURE — 84460 ALANINE AMINO (ALT) (SGPT): CPT | Performed by: INTERNAL MEDICINE

## 2018-06-27 PROCEDURE — 80061 LIPID PANEL: CPT | Performed by: INTERNAL MEDICINE

## 2018-06-27 PROCEDURE — 80048 BASIC METABOLIC PNL TOTAL CA: CPT | Performed by: INTERNAL MEDICINE

## 2018-06-27 PROCEDURE — 99214 OFFICE O/P EST MOD 30 MIN: CPT | Performed by: INTERNAL MEDICINE

## 2018-06-27 NOTE — MR AVS SNAPSHOT
After Visit Summary   6/27/2018    Fermín Josue    MRN: [id number]           Patient Information     Date Of Birth          1950        Visit Information        Provider Department      6/27/2018 9:45 AM Christiano Toro MD [facility]        Today's Diagnoses     Coronary artery disease involving native coronary artery of native heart without angina pectoris        Hyperlipidemia, unspecified hyperlipidemia type        Essential hypertension           Follow-ups after your visit        Additional Services     Follow-Up with Cardiologist                 Future tests that were ordered for you today     Open Future Orders        Priority Expected Expires Ordered    Basic metabolic panel Routine 6/27/2019 6/28/2019 6/27/2018    Lipid Profile Routine 6/27/2019 6/27/2019 6/27/2018    ALT Routine 6/27/2019 6/27/2019 6/27/2018    Follow-Up with Cardiologist Routine 6/27/2019 6/28/2019 6/27/2018            Who to contact     If you have questions or need follow up information about today's clinic visit or your schedule please contact [facility] directly at [phone].  Normal or non-critical lab and imaging results will be communicated to you by [employer], letter or phone within 4 business days after the clinic has received the results. If you do not hear from us within 7 days, please contact the clinic through [employer] or phone. If you have a critical or abnormal lab result, we will notify you by phone as soon as possible.  Submit refill requests through [employer] or call your pharmacy and they will forward the refill request to us. Please allow 3 business days for your refill to be completed.          Additional Information About Your Visit        MyChart Information     [employer] lets you send messages to your doctor, view your test results, renew your prescriptions, schedule appointments and more. To sign up, go to  [email or web address] . Click on \"Log in\" on the left side of the screen, which will take you to the Welcome page. Then click on \"Sign up Now\" on the right side of the page.     You will be asked to enter the access code listed below, as well as some personal information. Please follow the directions to create your username and password.     Your access code is: [email or web address]  Expires: 2018 10:05 AM     Your access code will  in 90 days. If you need help or a new code, please call your [place] clinic or [phone].        Care EveryWhere ID     This is your Care EveryWhere ID. This could be used by other organizations to access your [place] medical records  [id number]        Your Vitals Were     Pulse Height BMI (Body Mass Index)             60 1.803 m (5' 11\") 32.92 kg/m2          Blood Pressure from Last 3 Encounters:   18 132/84   17 126/68   17 120/80    Weight from Last 3 Encounters:   18 107 kg (236 lb)   17 109.1 kg (240 lb 8 oz)   17 105.7 kg (233 lb)              We Performed the Following     Follow-Up with Cardiologist        Primary Care Provider Office Phone # Fax #    Teetee LONNY Menchaca [facility] [phone]       [address]  [facility] [address]        Equal Access to Services     GRACE GONZALEZ : Hadii aad ku hadasho Soomaali, waaxda luqadaha, qaybta kaalmada adeegyada, daniel vilchis hayjohn germain . So [facility] [phone].    ATENCIÓN: Si habla español, tiene a sainz disposición servicios gratuitos de asistencia lingüística. Llame al [phone].    We comply with applicable federal civil rights laws and [place] laws. We do not discriminate on the basis of race, color, national origin, age, disability, sex, sexual orientation, or gender identity.            Thank you!     Thank you for choosing [facility]  for your care. Our goal is always to provide you with excellent care. " Hearing back from our patients is one way we can continue to improve our services. Please take a few minutes to complete the written survey that you may receive in the mail after your visit with us. Thank you!             Your Updated Medication List - Protect others around you: Learn how to safely use, store and throw away your medicines at www.disposemymeds.org.          This list is accurate as of 6/27/18 10:05 AM.  Always use your most recent med list.                   Brand Name Dispense Instructions for use Diagnosis    amLODIPine 2.5 MG tablet    NORVASC    90 tablet    Take 1 tablet (2.5 mg) by mouth daily    Essential hypertension       ASPIRIN PO      Take 81 mg by mouth        atenolol 50 MG tablet    TENORMIN    90 tablet    Take 1 tablet (50 mg) by mouth daily    Hypertension goal BP (blood pressure) < 140/90       atorvastatin 40 MG tablet    LIPITOR    90 tablet    Take 1 tablet (40 mg) by mouth daily    Hyperlipidemia, unspecified hyperlipidemia type       furosemide 20 MG tablet    LASIX    90 tablet    Take 1 tablet (20 mg) by mouth daily    Peripheral edema       losartan 50 MG tablet    COZAAR    90 tablet    Take 1 tablet (50 mg) by mouth daily    CAD (coronary artery disease)       nitroGLYcerin 0.4 MG sublingual tablet    NITROSTAT    9 tablet    For chest pain place 1 tablet under the tongue every 5 minutes for 3 doses. If symptoms persist 5 minutes after 1st dose call 911.    Coronary artery disease involving native coronary artery of native heart without angina pectoris

## 2018-06-27 NOTE — PROGRESS NOTES
HPI and Plan:   See dictation    Orders Placed This Encounter   Procedures     Basic metabolic panel     Lipid Profile     ALT     Follow-Up with Cardiologist       No orders of the defined types were placed in this encounter.      There are no discontinued medications.      Encounter Diagnoses   Name Primary?     Coronary artery disease involving native coronary artery of native heart without angina pectoris      Hyperlipidemia, unspecified hyperlipidemia type      Essential hypertension        CURRENT MEDICATIONS:  Current Outpatient Prescriptions   Medication Sig Dispense Refill     amLODIPine (NORVASC) 2.5 MG tablet Take 1 tablet (2.5 mg) by mouth daily 90 tablet 3     ASPIRIN PO Take 81 mg by mouth       atenolol (TENORMIN) 50 MG tablet Take 1 tablet (50 mg) by mouth daily 90 tablet 3     atorvastatin (LIPITOR) 40 MG tablet Take 1 tablet (40 mg) by mouth daily 90 tablet 3     furosemide (LASIX) 20 MG tablet Take 1 tablet (20 mg) by mouth daily 90 tablet 3     losartan (COZAAR) 50 MG tablet Take 1 tablet (50 mg) by mouth daily 90 tablet 3     nitroGLYcerin (NITROSTAT) 0.4 MG sublingual tablet For chest pain place 1 tablet under the tongue every 5 minutes for 3 doses. If symptoms persist 5 minutes after 1st dose call 911. 9 tablet 0       ALLERGIES     Allergies   Allergen Reactions     No Known Drug Allergies        PAST MEDICAL HISTORY:  Past Medical History:   Diagnosis Date     CAD (coronary artery disease) 2/5/2015    3 stents     History of angina      History of skin graft     Right lower limb     Hyperlipidaemia      Hypertension goal BP (blood pressure) < 140/90 2/5/2015       PAST SURGICAL HISTORY:  Past Surgical History:   Procedure Laterality Date     HEART CATH, ANGIOPLASTY  11/2007    mid to prox LAD drug-eluting stent x2; Occluded RCA with Collaterals     SURGICAL HISTORY OF -   2003    kidney stone laser and ureteric stenting       FAMILY HISTORY:  Family History   Problem Relation Age of Onset  "    Cancer Maternal Grandfather      Cancer Maternal Uncle      lung     Blood Disease Maternal Grandmother      leukemia     Hypertension Mother      Alzheimer Disease Father        SOCIAL HISTORY:  Social History     Social History     Marital status:      Spouse name: N/A     Number of children: N/A     Years of education: N/A     Social History Main Topics     Smoking status: Former Smoker     Packs/day: 1.50     Years: 44.00     Types: Cigarettes     Start date: 6/27/1963     Quit date: 6/27/2007     Smokeless tobacco: Never Used     Alcohol use Yes      Comment: beer - 12 pack month     Drug use: No     Sexual activity: Not Asked     Other Topics Concern     Caffeine Concern Yes     1 pop daily      Special Diet Yes     fruits      Exercise Yes     walking 1 hr daily      Parent/Sibling W/ Cabg, Mi Or Angioplasty Before 65f 55m? No     Social History Narrative       Review of Systems:  Skin:  Negative       Eyes:  Negative      ENT:  Negative      Respiratory:  Positive for dyspnea on exertion     Cardiovascular:  Negative      Gastroenterology: Negative      Genitourinary:  not assessed      Musculoskeletal:  Positive for back pain    Neurologic:  Negative      Psychiatric:  Negative      Heme/Lymph/Imm:  Negative      Endocrine:  Negative        Physical Exam:  Vitals: /84  Pulse 60  Ht 1.803 m (5' 11\")  Wt 107 kg (236 lb)  BMI 32.92 kg/m2    Constitutional:  cooperative, alert and oriented, well developed, well nourished, in no acute distress overweight      Skin:  warm and dry to the touch, no apparent skin lesions or masses noted          Head:  normocephalic, no masses or lesions        Eyes:  pupils equal and round, conjunctivae and lids unremarkable, sclera white, no xanthalasma, EOMS intact, no nystagmus        Lymph:      ENT:  no pallor or cyanosis, dentition good        Neck:  carotid pulses are full and equal bilaterally, JVP normal, no carotid bruit        Respiratory:  normal " breath sounds, clear to auscultation, normal A-P diameter, normal symmetry, normal respiratory excursion, no use of accessory muscles         Cardiac: regular rhythm;normal S1 and S2;apical impulse not displaced;no murmurs, gallops or rubs detected     no presence of murmur          pulses full and equal, no bruits auscultated;pulses full and equal                                        GI:  abdomen soft, non-tender, BS normoactive, no mass, no HSM, no bruits        Extremities and Muscular Skeletal:    erythema;stasis pigmentation bilateral LE edema;trace          Neurological:  no gross motor deficits;affect appropriate        Psych:  Alert and Oriented x 3        CC  Christiano Toro MD  [address]  [place] [address]

## 2018-06-27 NOTE — LETTER
6/27/2018    Teetee Lena Hammer, APRN CNP  [address]  [facility] [address]    RE: Fermín Josue       Dear Colleague,    I had the pleasure of seeing Fermín Josue in the [facility].    HPI and Plan:   See dictation    Orders Placed This Encounter   Procedures     Basic metabolic panel     Lipid Profile     ALT     Follow-Up with Cardiologist       No orders of the defined types were placed in this encounter.      There are no discontinued medications.      Encounter Diagnoses   Name Primary?     Coronary artery disease involving native coronary artery of native heart without angina pectoris      Hyperlipidemia, unspecified hyperlipidemia type      Essential hypertension        CURRENT MEDICATIONS:  Current Outpatient Prescriptions   Medication Sig Dispense Refill     amLODIPine (NORVASC) 2.5 MG tablet Take 1 tablet (2.5 mg) by mouth daily 90 tablet 3     ASPIRIN PO Take 81 mg by mouth       atenolol (TENORMIN) 50 MG tablet Take 1 tablet (50 mg) by mouth daily 90 tablet 3     atorvastatin (LIPITOR) 40 MG tablet Take 1 tablet (40 mg) by mouth daily 90 tablet 3     furosemide (LASIX) 20 MG tablet Take 1 tablet (20 mg) by mouth daily 90 tablet 3     losartan (COZAAR) 50 MG tablet Take 1 tablet (50 mg) by mouth daily 90 tablet 3     nitroGLYcerin (NITROSTAT) 0.4 MG sublingual tablet For chest pain place 1 tablet under the tongue every 5 minutes for 3 doses. If symptoms persist 5 minutes after 1st dose call 911. 9 tablet 0       ALLERGIES     Allergies   Allergen Reactions     No Known Drug Allergies        PAST MEDICAL HISTORY:  Past Medical History:   Diagnosis Date     CAD (coronary artery disease) 2/5/2015    3 stents     History of angina      History of skin graft     Right lower limb     Hyperlipidaemia      Hypertension goal BP (blood pressure) < 140/90 2/5/2015       PAST SURGICAL HISTORY:  Past Surgical History:   Procedure Laterality Date     HEART CATH,  "ANGIOPLASTY  11/2007    mid to prox LAD drug-eluting stent x2; Occluded RCA with Collaterals     SURGICAL HISTORY OF -   2003    kidney stone laser and ureteric stenting       FAMILY HISTORY:  Family History   Problem Relation Age of Onset     Cancer Maternal Grandfather      Cancer Maternal Uncle      lung     Blood Disease Maternal Grandmother      leukemia     Hypertension Mother      Alzheimer Disease Father        SOCIAL HISTORY:  Social History     Social History     Marital status:      Spouse name: N/A     Number of children: N/A     Years of education: N/A     Social History Main Topics     Smoking status: Former Smoker     Packs/day: 1.50     Years: 44.00     Types: Cigarettes     Start date: 6/27/1963     Quit date: 6/27/2007     Smokeless tobacco: Never Used     Alcohol use Yes      Comment: beer - 12 pack month     Drug use: No     Sexual activity: Not Asked     Other Topics Concern     Caffeine Concern Yes     1 pop daily      Special Diet Yes     fruits      Exercise Yes     walking 1 hr daily      Parent/Sibling W/ Cabg, Mi Or Angioplasty Before 65f 55m? No     Social History Narrative       Review of Systems:  Skin:  Negative       Eyes:  Negative      ENT:  Negative      Respiratory:  Positive for dyspnea on exertion     Cardiovascular:  Negative      Gastroenterology: Negative      Genitourinary:  not assessed      Musculoskeletal:  Positive for back pain    Neurologic:  Negative      Psychiatric:  Negative      Heme/Lymph/Imm:  Negative      Endocrine:  Negative        Physical Exam:  Vitals: /84  Pulse 60  Ht 1.803 m (5' 11\")  Wt 107 kg (236 lb)  BMI 32.92 kg/m2    Constitutional:  cooperative, alert and oriented, well developed, well nourished, in no acute distress overweight      Skin:  warm and dry to the touch, no apparent skin lesions or masses noted          Head:  normocephalic, no masses or lesions        Eyes:  pupils equal and round, conjunctivae and lids unremarkable, " sclera white, no xanthalasma, EOMS intact, no nystagmus        Lymph:      ENT:  no pallor or cyanosis, dentition good        Neck:  carotid pulses are full and equal bilaterally, JVP normal, no carotid bruit        Respiratory:  normal breath sounds, clear to auscultation, normal A-P diameter, normal symmetry, normal respiratory excursion, no use of accessory muscles         Cardiac: regular rhythm;normal S1 and S2;apical impulse not displaced;no murmurs, gallops or rubs detected     no presence of murmur          pulses full and equal, no bruits auscultated;pulses full and equal                                        GI:  abdomen soft, non-tender, BS normoactive, no mass, no HSM, no bruits        Extremities and Muscular Skeletal:    erythema;stasis pigmentation bilateral LE edema;trace          Neurological:  no gross motor deficits;affect appropriate        Psych:  Alert and Oriented x 3        CC  Christiano Toro MD  [address]  [place] [address]                Thank you for allowing me to participate in the care of your patient.      Sincerely,     Christiano Toro MD, MD     [facility]    cc:   Christiano Toro MD  [address]  [place] [address]

## 2018-06-27 NOTE — PROGRESS NOTES
Service Date: 06/27/2018      REFERRING PROVIDER:  Teetee Hammer NP.      HISTORY OF PRESENT ILLNESS:  It is my pleasure to see your patient, Fermín Josue, who is a pleasant 68-year-old gentleman with a known history of coronary artery disease.  He had stenting of the left anterior descending artery in 2007.  Coronary angiography at that time also showed that he had an occluded but well collateralized right coronary artery.  He has a history of hypertension and a history of hyperlipidemia.  Since I last saw him, he has done well.  He has no chest pains or chest pressure.  He cannot remember the last time he had angina pectoris.  In the past, if he really pushed himself or if he became emotional about some event, he would develop some discomfort in his chest.  That has not happened in the last year that he can remember.  His lipid profile today was excellent.  LDL was 65, HDL 41 and triglycerides 132 with a total cholesterol of 132.  Liver function tests were normal with an ALT of 19.  He is on high-intensity statin therapy, being on atorvastatin 40 mg per day.  His blood pressure is well controlled at 132/84.  On his basic metabolic profile, his glucose was mildly raised at 121.  This was a fasting glucose this morning.      IMPRESSION:   1.  Coronary artery disease.  The patient is asymptomatic with respect to coronary artery disease with no symptoms of angina pectoris in the last year.   2.  Essential hypertension.  His blood pressure is well controlled at 132/84.   3.  Hyperlipidemia.  The patient is taking high-intensity statin therapy and lipids are excellent, well within aggressive secondary prevention guidelines.   4.  Mildly raised fasting glucose at 121, as described above.   5.  History of varicose veins.      PLAN:  We will continue the patient on his present medications.  I will see him back again in 1 year's time and repeat his lipids and basic metabolic profile at that stage.  Finally, I did tell  him if he continues to have problems with varicose veins that we do have cardiologists who have a special interest in that area and that option is available to him.  As always, the patient has been told to contact us if he has any questions or any concerns.  It has been my pleasure to be involved in the care of this very nice patient.      Christiano Toro MD, FACC       cc:   Teetee Barton [place] [address]         CHRISTIANO JOHNSON MD, FACC             D: 2018   T: 2018   MT: JLUIS      Name:     XU BURCH   MRN:      [id number]        Account:      [id number]   :      1950           Service Date: 2018      Document: [id number]

## 2018-06-27 NOTE — LETTER
6/27/2018      Teetee Hammer, APRN CNP  [address]  [facility] [address]      RE: Fermín Josue       Dear Colleague,    I had the pleasure of seeing Fermín Josue in the [facility].    Service Date: 06/27/2018      REFERRING PROVIDER:  Teetee Hammer NP.      HISTORY OF PRESENT ILLNESS:  It is my pleasure to see your patient, Fermín Josue, who is a pleasant 68-year-old gentleman with a known history of coronary artery disease.  He had stenting of the left anterior descending artery in 2007.  Coronary angiography at that time also showed that he had an occluded but well collateralized right coronary artery.  He has a history of hypertension and a history of hyperlipidemia.  Since I last saw him, he has done well.  He has no chest pains or chest pressure.  He cannot remember the last time he had angina pectoris.  In the past, if he really pushed himself or if he became emotional about some event, he would develop some discomfort in his chest.  That has not happened in the last year that he can remember.  His lipid profile today was excellent.  LDL was 65, HDL 41 and triglycerides 132 with a total cholesterol of 132.  Liver function tests were normal with an ALT of 19.  He is on high-intensity statin therapy, being on atorvastatin 40 mg per day.  His blood pressure is well controlled at 132/84.  On his basic metabolic profile, his glucose was mildly raised at 121.  This was a fasting glucose this morning.      IMPRESSION:   1.  Coronary artery disease.  The patient is asymptomatic with respect to coronary artery disease with no symptoms of angina pectoris in the last year.   2.  Essential hypertension.  His blood pressure is well controlled at 132/84.   3.  Hyperlipidemia.  The patient is taking high-intensity statin therapy and lipids are excellent, well within aggressive secondary prevention guidelines.   4.  Mildly raised fasting glucose at 121, as described  above.   5.  History of varicose veins.      PLAN:  We will continue the patient on his present medications.  I will see him back again in 1 year's time and repeat his lipids and basic metabolic profile at that stage.  Finally, I did tell him if he continues to have problems with varicose veins that we do have cardiologists who have a special interest in that area and that option is available to him.  As always, the patient has been told to contact us if he has any questions or any concerns.  It has been my pleasure to be involved in the care of this very nice patient.      Christiano Toro MD, FACC       cc:   LONNY Mich[place] [address]         CHRISTIANO JOHNSON MD, FACC             D: 2018   T: 2018   MT: JLUIS      Name:     XU BURCH   MRN:      [id number]        Account:      [id number]   :      1950           Service Date: 2018      Document: [id number]         Outpatient Encounter Prescriptions as of 2018   Medication Sig Dispense Refill     amLODIPine (NORVASC) 2.5 MG tablet Take 1 tablet (2.5 mg) by mouth daily 90 tablet 3     ASPIRIN PO Take 81 mg by mouth       atenolol (TENORMIN) 50 MG tablet Take 1 tablet (50 mg) by mouth daily 90 tablet 3     atorvastatin (LIPITOR) 40 MG tablet Take 1 tablet (40 mg) by mouth daily 90 tablet 3     furosemide (LASIX) 20 MG tablet Take 1 tablet (20 mg) by mouth daily 90 tablet 3     losartan (COZAAR) 50 MG tablet Take 1 tablet (50 mg) by mouth daily 90 tablet 3     nitroGLYcerin (NITROSTAT) 0.4 MG sublingual tablet For chest pain place 1 tablet under the tongue every 5 minutes for 3 doses. If symptoms persist 5 minutes after 1st dose call 911. 9 tablet 0     No facility-administered encounter medications on file as of 2018.        Again, thank you for allowing me to participate in the care of your patient.      Sincerely,    Christiano Bai  MD Muna, MD     [facility]

## 2018-07-05 DIAGNOSIS — I25.10 CAD (CORONARY ARTERY DISEASE): ICD-10-CM

## 2018-07-05 RX ORDER — LOSARTAN POTASSIUM 50 MG/1
50 TABLET ORAL DAILY
Qty: 90 TABLET | Refills: 3 | Status: SHIPPED | OUTPATIENT
Start: 2018-07-05 | End: 2019-07-29

## 2018-07-16 DIAGNOSIS — I10 ESSENTIAL HYPERTENSION: ICD-10-CM

## 2018-07-16 RX ORDER — AMLODIPINE BESYLATE 2.5 MG/1
2.5 TABLET ORAL DAILY
Qty: 90 TABLET | Refills: 3 | Status: SHIPPED | OUTPATIENT
Start: 2018-07-16 | End: 2019-05-10

## 2018-07-20 DIAGNOSIS — E78.5 HYPERLIPIDEMIA, UNSPECIFIED HYPERLIPIDEMIA TYPE: ICD-10-CM

## 2018-07-20 RX ORDER — ATORVASTATIN CALCIUM 40 MG/1
40 TABLET, FILM COATED ORAL DAILY
Qty: 90 TABLET | Refills: 3 | Status: SHIPPED | OUTPATIENT
Start: 2018-07-20 | End: 2019-07-29

## 2018-09-17 ENCOUNTER — CARE COORDINATION (OUTPATIENT)
Dept: CARDIOLOGY | Facility: CLINIC | Age: 68
End: 2018-09-17

## 2018-09-17 NOTE — PROGRESS NOTES
Received call from pt stating that he has seen on the new the reports of increased risks with low dose ASA. Discussed with Dr. Toro, recommends, study showed no benefit for daily low dose ASA for pt who do not have history of CAD or stroke. Pts with CAD should continue ASA, benefits out weight risks. Pt advised to continue ASA 81 mg daily. LP[place]field RN

## 2018-10-15 ENCOUNTER — TELEPHONE (OUTPATIENT)
Dept: CARDIOLOGY | Facility: CLINIC | Age: 68
End: 2018-10-15

## 2018-10-15 NOTE — TELEPHONE ENCOUNTER
"Patient called to report that he has had a cough for \" a long time.\" Patient reported he has had a cough for many months that has not subsided even after he has quit smoking. Patient denies any SOB, CP, light headedness or dizziness. Patient advised he wasn't sure what was causing it, but wanted to let our office know. RN advised patient that he should contact his PMD's office to discuss persistant cough as they may want to do further workup to review pulmonary etiologies. Patient verbalized understanding and was in agreement with plan. RN encouraged patient to call our clinic with any further cardiac related questions.   "

## 2018-10-15 NOTE — TELEPHONE ENCOUNTER
"Patient returned phone call to clinic and left VM advising RN to call back to discuss symptoms related to a \"medication\" (patient did not leave med name or symptoms in ). RN returned patient's call and left VM advising patient to call back to discuss further and if patient needs to leave another VM (d/t RN being unable to live answer) to leave further details about medication and symptoms in VM.   "

## 2018-10-15 NOTE — TELEPHONE ENCOUNTER
Patient called and left  advising that he was having some side effects from a medication and would like to discuss with our office. RN returned patient's call and left  advising patient to call us back to discuss further.

## 2019-01-13 DIAGNOSIS — I10 HYPERTENSION GOAL BP (BLOOD PRESSURE) < 140/90: ICD-10-CM

## 2019-01-14 NOTE — TELEPHONE ENCOUNTER
"Requested Prescriptions   Pending Prescriptions Disp Refills     atenolol (TENORMIN) 50 MG tablet [Pharmacy Med Name: Atenolol Oral Tablet 50 MG]  Last Written Prescription Date:  12/4/17  Last Fill Quantity: 90 TABLET,  # refills: 3   Last office visit: 12/4/17 with prescribing provider:  TIFFANIE   Future Office Visit:     90 tablet 2     Sig: Take 1 tablet (50 mg) by mouth daily    Beta-Blockers Protocol Failed - 1/13/2019  5:24 AM       Failed - Recent (12 mo) or future (30 days) visit within the authorizing provider's specialty    Patient had office visit in the last 12 months or has a visit in the next 30 days with authorizing provider or within the authorizing provider's specialty.  See \"Patient Info\" tab in inbasket, or \"Choose Columns\" in Meds & Orders section of the refill encounter.             Passed - Blood pressure under 140/90 in past 12 months    BP Readings from Last 3 Encounters:   06/27/18 132/84   12/04/17 126/68   06/13/17 120/80                Passed - Patient is age 6 or older       Passed - Medication is active on med list          "

## 2019-01-14 NOTE — TELEPHONE ENCOUNTER
Routing refill request to provider for review/approval because:  FDA says there is currently an atenolol shortage do you want to try to send through the 30 day supply first or providers have been offering alternatives?      Thank you,  Jonathan Mullen RN

## 2019-01-15 RX ORDER — ATENOLOL 50 MG/1
50 TABLET ORAL DAILY
Qty: 30 TABLET | Refills: 0 | OUTPATIENT
Start: 2019-01-15

## 2019-01-15 RX ORDER — METOPROLOL SUCCINATE 50 MG/1
50 TABLET, EXTENDED RELEASE ORAL DAILY
Qty: 90 TABLET | Refills: 3 | Status: SHIPPED | OUTPATIENT
Start: 2019-01-15 | End: 2019-07-29

## 2019-01-15 NOTE — TELEPHONE ENCOUNTER
Gave pt this message about new med.    Pt questions-  1- how will he find out when the shortage is over? I thought he should ask his pharmacy?  2- will you want him to stay on the metoprolol for good?    RAKAN Paz

## 2019-01-15 NOTE — TELEPHONE ENCOUNTER
Yes recommend check with pharmacy regarding shortage.  We can switch back to atenolol once shortage is over or continue with metoprolol,, they are in the same med class so doesn't really matter which he is on.    Teetee Hammer, CNP

## 2019-01-16 NOTE — TELEPHONE ENCOUNTER
Phone call to patient     Advised to check with pharmacy on atenolol shortage and when this may be in stock     Discussed med class is the same and patient decides he would just rather stay on the metoprolol than     Writer advised if side effects or concerns to let clinic know - patient agrees to this plan     Ania Joe, Registered Nurse   [facility]

## 2019-02-20 DIAGNOSIS — R60.0 PERIPHERAL EDEMA: ICD-10-CM

## 2019-02-20 NOTE — TELEPHONE ENCOUNTER
"Requested Prescriptions   Pending Prescriptions Disp Refills     furosemide (LASIX) 20 MG tablet [Pharmacy Med Name: Furosemide Oral Tablet 20 MG] 90 tablet 2     Sig: TAKE ONE TABLET BY MOUTH ONE TIME DAILY    Diuretics (Including Combos) Protocol Failed - 2/20/2019  6:38 AM       Failed - Recent (12 mo) or future (30 days) visit within the authorizing provider's specialty    Patient had office visit in the last 12 months or has a visit in the next 30 days with authorizing provider or within the authorizing provider's specialty.  See \"Patient Info\" tab in inbasket, or \"Choose Columns\" in Meds & Orders section of the refill encounter.             Passed - Blood pressure under 140/90 in past 12 months    BP Readings from Last 3 Encounters:   06/27/18 132/84   12/04/17 126/68   06/13/17 120/80                Passed - Medication is active on med list       Passed - Patient is age 18 or older       Passed - Normal serum creatinine on file in past 12 months    Recent Labs   Lab Test 06/27/18  0841   CR 1.08             Passed - Normal serum potassium on file in past 12 months    Recent Labs   Lab Test 06/27/18  0841   POTASSIUM 4.0                   Passed - Normal serum sodium on file in past 12 months    Recent Labs   Lab Test 06/27/18  0841                 furosemide (LASIX) 20 MG tablet 90 tablet 3 12/4/2017       Last Written Prescription Date:  12/4/17  Last Fill Quantity: 90,  # refills: 3   Last office visit: 12/4/2017 with prescribing provider:  Dr. Hammer   Future Office Visit:  Unknown     "

## 2019-02-21 RX ORDER — FUROSEMIDE 20 MG
TABLET ORAL
Qty: 30 TABLET | Refills: 0 | Status: SHIPPED | OUTPATIENT
Start: 2019-02-21 | End: 2019-03-06

## 2019-03-05 NOTE — PROGRESS NOTES
"  SUBJECTIVE:   Fermín Josue is a 69 year old male who presents for Preventive Visit.    Are you in the first 12 months of your Medicare Part B coverage?  No    Physical Health:    In general, how would you rate your overall physical health? good    Outside of work, how many days during the week do you exercise? none    Outside of work, approximately how many minutes a day do you exercise?not applicable    If you drink alcohol do you typically have >3 drinks per day or >7 drinks per week? No    Do you usually eat at least 4 servings of fruit and vegetables a day, include whole grains & fiber and avoid regularly eating high fat or \"junk\" foods? Yes    Do you have any problems taking medications regularly?  No    Do you have any side effects from medications? frequent urination from wtaer pill    Needs assistance for the following daily activities: no assistance needed    Which of the following safety concerns are present in your home?  none identified     Hearing impairment: No    In the past 6 months, have you been bothered by leaking of urine? no    Mental Health:    In general, how would you rate your overall mental or emotional health? excellent  PHQ-2 Score:      Do you feel safe in your environment? Yes    Do you have a Health Care Directive? No: Advance care planning reviewed with patient; information given to patient to review.    Additional concerns to address?  YES, swelling of ankles    Fall risk:  Fallen 2 or more times in the past year?: No  Any fall with injury in the past year?: No    Cognitive Screenin) Repeat 3 items (Leader, Season, Table)    2) Clock draw: NORMAL  3) 3 item recall: Recalls 2 objects   Results: NORMAL clock, 1-2 items recalled: COGNITIVE IMPAIRMENT LESS LIKELY    Mini-CogTM Copyright SHAUN Jane. Licensed by the author for use in [facility]; reprinted with permission (kenny@.[facility]). All rights reserved.      Do you have sleep apnea, excessive snoring or daytime " drowsiness?: yes    Acute concerns: ankle swelling- on furosemide 20mg daily, dose lowered from 40mg by cardiology 2017.  Last echo  with EF 55-60%.  Ankle swelling is controlled with furosemide.  Causes itching of ankles.  Resolved with using moms steroid cream.      Sleep apnea- sleeps alone, when wife was alive she said he stopped breathing in sleep.  He wakes up 4 times at night to pee.  No daytime drowsiness.    Urinary frequency - worse when he takes water pill, he takes that in the morning.  Up 4 times a night.      Notes he worries about little things all the time, he is working on breaking this thought process.  Increased stress lately.  6yo grandson is nonverbal and doesn't eat, dx with autism.      Chronic Problems:   HTN- controlled on metoprolol and amlodipine    CAD- Follows annually with cardiology, history of CAD s/p stenting .  he is on bb, statin, and asa.  Has nitro available, has never needed it.  Reports rare angina when he is really stressed out, it resolves within a few minutes.      HLD- he is on statin    Labs current 2018, did have elevated glucose of 121    Preventative screening:  [place]: done 2015, several polyps resected, advised repeat in 5 years  Flu, zoster    Function: walks and does stretching for exercise.  Lives in home with daughter and grandkids.  He functions independently, shovels, mows the lawn.      Advanced directive: on file        Reviewed and updated as needed this visit by clinical staff  Tobacco  Allergies  Meds  Med Hx  Surg Hx  Fam Hx  Soc Hx        Reviewed and updated as needed this visit by Provider        Social History     Tobacco Use     Smoking status: Former Smoker     Packs/day: 1.50     Years: 44.00     Pack years: 66.00     Types: Cigarettes     Start date: 1963     Last attempt to quit: 2007     Years since quittin.6     Smokeless tobacco: Never Used   Substance Use Topics     Alcohol use: Yes     Comment: beer - 12 pack  "month                           Current providers sharing in care for this patient include:   Patient Care Team:  Teetee Hammer APRN CNP as PCP - General (Nurse Practitioner - Family)  Teetee Hammer APRN CNP as Assigned PCP    The following health maintenance items are reviewed in Epic and correct as of today:  Health Maintenance   Topic Date Due     ZOSTER IMMUNIZATION (1 of 2) 01/10/1999     PHQ-2 Q1 YR  09/28/2016     MEDICARE ANNUAL WELLNESS VISIT  09/29/2017     FALL RISK ASSESSMENT  09/29/2017     COLONOSCOPY Q5 YR  06/23/2020     ADVANCE DIRECTIVE PLANNING Q5 YRS  12/06/2021     LIPID SCREEN Q5 YR MALE (SYSTEM ASSIGNED)  06/27/2023     DTAP/TDAP/TD IMMUNIZATION (3 - Td) 12/04/2027     INFLUENZA VACCINE  Completed     PNEUMOCOCCAL IMMUNIZATION 65+ LOW/MEDIUM RISK  Completed     AORTIC ANEURYSM SCREENING (SYSTEM ASSIGNED)  Completed     HEPATITIS C SCREENING  Completed     IPV IMMUNIZATION  Aged Out     MENINGITIS IMMUNIZATION  Aged Out     Labs reviewed in EPIC      ROS:  Constitutional, HEENT, cardiovascular, pulmonary, gi and gu systems are negative, except as otherwise noted.    OBJECTIVE:   /87 (BP Location: Left arm, Patient Position: Chair, Cuff Size: Adult Large)   Pulse 73   Temp 97.8  F (36.6  C) (Oral)   Resp 16   Ht 1.803 m (5' 11\")   Wt 108.6 kg (239 lb 8 oz)   SpO2 97%   BMI 33.40 kg/m   Estimated body mass index is 33.4 kg/m  as calculated from the following:    Height as of this encounter: 1.803 m (5' 11\").    Weight as of this encounter: 108.6 kg (239 lb 8 oz).  EXAM:   GENERAL: healthy, alert and no distress  EYES: Eyes grossly normal to inspection, PERRL and conjunctivae and sclerae normal  HENT: ear canals and TM's normal, nose and mouth without ulcers or lesions  NECK: no adenopathy, no asymmetry, masses, or scars and thyroid normal to palpation  RESP: lungs clear to auscultation - no rales, rhonchi or wheezes  CV: regular rate and rhythm, normal S1 S2, no " S3 or S4, no murmur, click or rub, no peripheral edema and peripheral pulses strong  ABDOMEN: soft, nontender, no hepatosplenomegaly, no masses and bowel sounds normal  RECTAL: prostate garde II with no nodules or irregularities, smooth and firm to palpation  MS: no gross musculoskeletal defects noted, no edema  SKIN: no suspicious lesions or rashes  NEURO: Normal strength and tone, mentation intact and speech normal  PSYCH: mentation appears normal, affect normal/bright    Diagnostic Test Results:  none     ASSESSMENT / PLAN:   (Z00.00) Medicare annual wellness visit, subsequent  (primary encounter diagnosis)  Plan: he will check with insurance regarding shingles vaccine, due for colo next year    (I25.10) Coronary artery disease involving native coronary artery of native heart without angina pectoris  Comment: reports rare angina when he is very stressed, resolves with relaxation, has not needed nitro  Plan: reviewed nitroglycerin use if chest pain does not resolve within minutes.  Cont annual follow up with cardiology.  Cont asa, bb, and statin     (I10) Hypertension goal BP (blood pressure) < 140/90  Comment: blood pressure cnontrolled today and reports he has not taken any of his meds.  It is unclear to me why he is on 2 different bb atenolol and metoprolol  Plan: stop atenolol,, cont metoprolol and amlodipine.  Follow up in 1 month for recheck    (R35.0) Urinary frequency  Comment: prostate enlargement on exam  Plan: tamsulosin (FLOMAX) 0.4 MG capsule        Discussed risks and benefits of PSA screen and elected to defer it today.  Start flomax for frequency, reviewed side effects.  Follow up in 1 month for recheck     (R60.9) Peripheral edema  Comment: furosemide is effective.  Normal renal function 6/2018  Plan: furosemide (LASIX) 20 MG tablet        refilled    (F41.9) Anxiety  Comment: notes excessive worrying about little things  Plan: discussed CBT, not interested at this time.  Reviewed Jerry Zavala as  "a resource here    (L30.9) Dermatitis  Comment: using moms steroid cream to ankles which is helpful  Plan: triamcinolone (KENALOG) 0.1 % external cream            (G47.30) Sleep apnea, unspecified type  Comment:   Plan: discussed risks of heart failure and difficulty with wt loss, declines sleep study today     (E78.5) Hyperlipidemia, unspecified hyperlipidemia type  Comment:   Plan: cont statin    (R73.01) IFG (impaired fasting glucose)  Comment:   Plan: Hemoglobin A1c        Follow up A1C today        End of Life Planning:  Patient currently has an advanced directive: Yes.  Practitioner is supportive of decision.    COUNSELING:  Reviewed preventive health counseling, as reflected in patient instructions    BP Readings from Last 1 Encounters:   03/06/19 135/87     Estimated body mass index is 33.4 kg/m  as calculated from the following:    Height as of this encounter: 1.803 m (5' 11\").    Weight as of this encounter: 108.6 kg (239 lb 8 oz).      Weight management plan: Discussed healthy diet and exercise guidelines     reports that he quit smoking about 11 years ago. His smoking use included cigarettes. He started smoking about 55 years ago. He has a 66.00 pack-year smoking history. he has never used smokeless tobacco.      Appropriate preventive services were discussed with this patient, including applicable screening as appropriate for cardiovascular disease, diabetes, osteopenia/osteoporosis, and glaucoma.  As appropriate for age/gender, discussed screening for colorectal cancer, prostate cancer, breast cancer, and cervical cancer. Checklist reviewing preventive services available has been given to the patient.    Reviewed patients plan of care and provided an AVS. The Basic Care Plan (routine screening as documented in Health Maintenance) for Fermín meets the Care Plan requirement. This Care Plan has been established and reviewed with the Patient.    Counseling Resources:  ATP IV Guidelines  Pooled Cohorts " Equation Calculator  Breast Cancer Risk Calculator  FRAX Risk Assessment  ICSI Preventive Guidelines  Dietary Guidelines for Americans, 2010  USDA's MyPlate  ASA Prophylaxis  Lung CA Screening    LONNY Arroyo CNP  [facility]

## 2019-03-05 NOTE — PATIENT INSTRUCTIONS
1.  Check with insurance about new shingles shot  2.  Diabetes and prostate tests today  3.  Stop atenolol, this works similarly to the metoprolol and OI dont think you need both  4.  For urinary frequency start flomax once a day  5.  We could consider sleep test for possible slepe apnea  6.  Consider seeing Jerry Zavala our therapist here to address anxiety  7.  See me again in 1 month to see how the flomax is working and recheck blood pressure       Preventive Health Recommendations:     See your health care provider every year to    Review health changes.     Discuss preventive care.      Review your medicines if your doctor has prescribed any.    Talk with your health care provider about whether you should have a test to screen for prostate cancer (PSA).    Every 3 years, have a diabetes test (fasting glucose). If you are at risk for diabetes, you should have this test more often.    Every 5 years, have a cholesterol test. Have this test more often if you are at risk for high cholesterol or heart disease.     Every 10 years, have a colonoscopy. Or, have a yearly FIT test (stool test). These exams will check for colon cancer.    Talk to with your health care provider about screening for Abdominal Aortic Aneurysm if you have a family history of AAA or have a history of smoking.  Shots:     Get a flu shot each year.     Get a tetanus shot every 10 years.     Talk to your doctor about your pneumonia vaccines. There are now two you should receive - Pneumovax (PPSV 23) and Prevnar (PCV 13).    Talk to your pharmacist about a shingles vaccine.     Talk to your doctor about the hepatitis B vaccine.  Nutrition:     Eat at least 5 servings of fruits and vegetables each day.     Eat whole-grain bread, whole-wheat pasta and brown rice instead of white grains and rice.     Get adequate Calcium and Vitamin D.   Lifestyle    Exercise for at least 150 minutes a week (30 minutes a day, 5 days a week). This will help you control  your weight and prevent disease.     Limit alcohol to one drink per day.     No smoking.     Wear sunscreen to prevent skin cancer.     See your dentist every six months for an exam and cleaning.     See your eye doctor every 1 to 2 years to screen for conditions such as glaucoma, macular degeneration and cataracts.    Personalized Prevention Plan  You are due for the preventive services outlined below.  Your care team is available to assist you in scheduling these services.  If you have already completed any of these items, please share that information with your care team to update in your medical record.    Health Maintenance Due   Topic Date Due     Zoster (Shingles) Vaccine (1 of 2) 01/10/1999     Depression Assessment 2 - yearly  09/28/2016     Annual Wellness Visit  09/29/2017     FALL RISK ASSESSMENT  09/29/2017     Flu Vaccine (1) 09/01/2018

## 2019-03-06 ENCOUNTER — TELEPHONE (OUTPATIENT)
Dept: FAMILY MEDICINE | Facility: CLINIC | Age: 69
End: 2019-03-06

## 2019-03-06 ENCOUNTER — OFFICE VISIT (OUTPATIENT)
Dept: FAMILY MEDICINE | Facility: CLINIC | Age: 69
End: 2019-03-06
Payer: COMMERCIAL

## 2019-03-06 VITALS
DIASTOLIC BLOOD PRESSURE: 87 MMHG | SYSTOLIC BLOOD PRESSURE: 135 MMHG | TEMPERATURE: 97.8 F | BODY MASS INDEX: 33.53 KG/M2 | HEIGHT: 71 IN | RESPIRATION RATE: 16 BRPM | HEART RATE: 73 BPM | WEIGHT: 239.5 LBS | OXYGEN SATURATION: 97 %

## 2019-03-06 DIAGNOSIS — Z00.00 MEDICARE ANNUAL WELLNESS VISIT, SUBSEQUENT: Primary | ICD-10-CM

## 2019-03-06 DIAGNOSIS — I25.10 CORONARY ARTERY DISEASE INVOLVING NATIVE CORONARY ARTERY OF NATIVE HEART WITHOUT ANGINA PECTORIS: ICD-10-CM

## 2019-03-06 DIAGNOSIS — R73.01 IFG (IMPAIRED FASTING GLUCOSE): ICD-10-CM

## 2019-03-06 DIAGNOSIS — R60.0 PERIPHERAL EDEMA: ICD-10-CM

## 2019-03-06 DIAGNOSIS — I10 HYPERTENSION GOAL BP (BLOOD PRESSURE) < 140/90: ICD-10-CM

## 2019-03-06 DIAGNOSIS — L30.9 DERMATITIS: ICD-10-CM

## 2019-03-06 DIAGNOSIS — R35.0 URINARY FREQUENCY: ICD-10-CM

## 2019-03-06 DIAGNOSIS — G47.30 SLEEP APNEA, UNSPECIFIED TYPE: ICD-10-CM

## 2019-03-06 DIAGNOSIS — E78.5 HYPERLIPIDEMIA, UNSPECIFIED HYPERLIPIDEMIA TYPE: ICD-10-CM

## 2019-03-06 DIAGNOSIS — F41.9 ANXIETY: ICD-10-CM

## 2019-03-06 LAB — HBA1C MFR BLD: 6 % (ref 0–5.6)

## 2019-03-06 PROCEDURE — 36415 COLL VENOUS BLD VENIPUNCTURE: CPT | Performed by: NURSE PRACTITIONER

## 2019-03-06 PROCEDURE — G0438 PPPS, INITIAL VISIT: HCPCS | Performed by: NURSE PRACTITIONER

## 2019-03-06 PROCEDURE — 83036 HEMOGLOBIN GLYCOSYLATED A1C: CPT | Performed by: NURSE PRACTITIONER

## 2019-03-06 PROCEDURE — 99213 OFFICE O/P EST LOW 20 MIN: CPT | Mod: 25 | Performed by: NURSE PRACTITIONER

## 2019-03-06 RX ORDER — TAMSULOSIN HYDROCHLORIDE 0.4 MG/1
0.4 CAPSULE ORAL DAILY
Qty: 30 CAPSULE | Refills: 1 | Status: SHIPPED | OUTPATIENT
Start: 2019-03-06 | End: 2019-04-10

## 2019-03-06 RX ORDER — FUROSEMIDE 20 MG
20 TABLET ORAL DAILY
Qty: 90 TABLET | Refills: 3 | Status: SHIPPED | OUTPATIENT
Start: 2019-03-06 | End: 2019-10-15

## 2019-03-06 RX ORDER — TRIAMCINOLONE ACETONIDE 1 MG/G
CREAM TOPICAL 2 TIMES DAILY
Qty: 45 G | Refills: 1 | Status: SHIPPED | OUTPATIENT
Start: 2019-03-06 | End: 2020-11-10

## 2019-03-06 ASSESSMENT — MIFFLIN-ST. JEOR: SCORE: 1868.49

## 2019-03-06 NOTE — LETTER
March 8, 2019      Fermín Josue  [address] [place] [id number]        Dear ,    We are writing to inform you of your test results.    Your diabetes was test is elevated and shows that you have diabetes.  It is slightly elevated, not to the point where we need to consider medication.  We can discuss in more detail at our follow up visit.     Resulted Orders   Hemoglobin A1c   Result Value Ref Range    Hemoglobin A1C 6.0 (H) 0 - 5.6 %      Comment:      Normal <5.7% Prediabetes 5.7-6.4%  Diabetes 6.5% or higher - adopted from ADA   consensus guidelines.         If you have any questions or concerns, please call the clinic at the number listed above.       Sincerely,        LONNY Arroyo CNP/nr

## 2019-03-06 NOTE — TELEPHONE ENCOUNTER
Pt wanted to review his current meds.  Pt was just seen in clinic today.  He feels he stopped the Atenolol a long time ago.    Med rec completed with pt and his med bottles over the phone.    Offered this reassurance.  RAKAN Paz

## 2019-04-09 NOTE — PROGRESS NOTES
SUBJECTIVE:   Fermín Josue is a 69 year old male who presents to clinic today for the following   health issues:      Hypertension Follow-up      Outpatient blood pressures are not being checked.    Low Salt Diet: no added salt      Amount of exercise or physical activity: 6-7 days/week for an average of 30-45 minutes    Problems taking medications regularly: No    Medication side effects: see below    Diet: low salt      Medication Followup of Flomax    Taking Medication as prescribed: no, has only been taking once every 3 days    Side Effects:  Unable to ejaculate    Medication Helping Symptoms:  yes     Seen for routine physical 1mo ago, pt was noted to be on both atenolol and metoprol at that time, unclear why and was advised to stop atenolol.    Started on floax for urinary frequency, prostate enlargement noted on exam, PSA was deferred after discussion.      A1C was elevated at 6%.    Update today:    Pt looked at his medications at home, no atenolol present, pt thinks he has not been taking for a year. Metoprolol continued, BP well controlled at today's visit.     Pt stated he was unable to ejaculate while taking flomax, was taking only every 2-3 days due to this side effect. Thought maybe somewhat helpful with urinary frequency, but did not continue due to this bothersome side effect.     Discussed elevated A1C and being in pre-diabetic range. Was drinking 6 pack sugar pop a day, now has cut back to 1 a day this past month. Pt eats one meal a day in the evening, otherwise snacks during day such as yogurt, fruit, or chips. Does eat convince/fast foods for meals sometimes. Drinks fruit juice. Does not eat vegetables except corn. Enjoys fish, such as salmon and white fish. Used to eat a lot of toast, stopped this. Usually walks and swims in backyard pool during the summer, doesn't have a activity he enjoys in the winter.     Patient Active Problem List   Diagnosis     CARDIOVASCULAR SCREENING; LDL GOAL  LESS THAN 100     CAD (coronary artery disease)     Hypertension goal BP (blood pressure) < 140/90     Hyperlipidemia     History of angina     Advance Care Planning     Pre-diabetes     Past Surgical History:   Procedure Laterality Date     HEART CATH, ANGIOPLASTY  2007    mid to prox LAD drug-eluting stent x2; Occluded RCA with Collaterals     SURGICAL HISTORY OF -       kidney stone laser and ureteric stenting       Social History     Tobacco Use     Smoking status: Former Smoker     Packs/day: 1.50     Years: 44.00     Pack years: 66.00     Types: Cigarettes     Start date: 1963     Last attempt to quit: 2007     Years since quittin.7     Smokeless tobacco: Never Used   Substance Use Topics     Alcohol use: Yes     Comment: beer - 12 pack month     Family History   Problem Relation Age of Onset     Cancer Maternal Grandfather      Cancer Maternal Uncle         lung     Blood Disease Maternal Grandmother         leukemia     Hypertension Mother      Alzheimer Disease Father          Current Outpatient Medications   Medication Sig Dispense Refill     amLODIPine (NORVASC) 2.5 MG tablet Take 1 tablet (2.5 mg) by mouth daily 90 tablet 3     ASPIRIN PO Take 81 mg by mouth       atorvastatin (LIPITOR) 40 MG tablet Take 1 tablet (40 mg) by mouth daily 90 tablet 3     furosemide (LASIX) 20 MG tablet Take 1 tablet (20 mg) by mouth daily 90 tablet 3     losartan (COZAAR) 50 MG tablet Take 1 tablet (50 mg) by mouth daily 90 tablet 3     metoprolol succinate ER (TOPROL-XL) 50 MG 24 hr tablet Take 1 tablet (50 mg) by mouth daily 90 tablet 3     nitroGLYcerin (NITROSTAT) 0.4 MG sublingual tablet For chest pain place 1 tablet under the tongue every 5 minutes for 3 doses. If symptoms persist 5 minutes after 1st dose call 911. 9 tablet 0     triamcinolone (KENALOG) 0.1 % external cream Apply topically 2 times daily 45 g 1       ROS:  HPI as above, otherwise negative       OBJECTIVE:                                                     /60 (BP Location: Right arm, Patient Position: Chair, Cuff Size: Adult Large)   Pulse 50   Temp 97.4  F (36.3  C) (Oral)   Resp 20   Wt 107.5 kg (237 lb)   SpO2 99%   BMI 33.05 kg/m     GENERAL APPEARANCE: healthy, alert and no distress  RESP: lungs clear to auscultation - no rales, rhonchi or wheezes  CV: regular rates and rhythm, normal S1 S2, no S3 or S4 and no murmur, click or rub  PSYCH: mentation appears normal and affect normal/bright      ASSESSMENT/PLAN:                                                    (R73.03) Pre-diabetes  (primary encounter diagnosis)  Comment: A1C 6%, placing patient in prediabetic range. Would benefit from seeing a nutritionist.  Plan:  Weight loss goal of 10 pounds            Improve diet, decrease sugar and non nutritional foods, gave [employer] handout            Exercise 30 min a day            A1C and follow up in 6 months    (N40.1,  R35.0) Benign prostatic hyperplasia with urinary frequency  Comment: Experienced side effect of ejaculatory dysfunction, stop flowmax. Could consider oxybutynin or finasteride to control urinary frequency.      Plan: Pt not interested in starting another medication, instructed to follow up if urinary frequency becomes more bothersome    (I10) Hypertension goal BP (blood pressure) < 140/90  Comment: Well controlled at visit today.   Plan: Continue metoprolol.     See Patient Instructions    Teetee Hammer, [facility]    Patient Instructions   1.  Let me know if you want to start finasteride or oxybutynin for urinary frequency    2.  Great work on cutting back on pop, continue to work on 10lb wt loss.  Let me know if you want to see a nutritionist     3.  Lets follow up in 6 months to recheck your A1C

## 2019-04-10 ENCOUNTER — OFFICE VISIT (OUTPATIENT)
Dept: FAMILY MEDICINE | Facility: CLINIC | Age: 69
End: 2019-04-10
Payer: COMMERCIAL

## 2019-04-10 VITALS
WEIGHT: 237 LBS | BODY MASS INDEX: 33.05 KG/M2 | OXYGEN SATURATION: 99 % | HEART RATE: 50 BPM | RESPIRATION RATE: 20 BRPM | SYSTOLIC BLOOD PRESSURE: 112 MMHG | DIASTOLIC BLOOD PRESSURE: 60 MMHG | TEMPERATURE: 97.4 F

## 2019-04-10 DIAGNOSIS — I10 HYPERTENSION GOAL BP (BLOOD PRESSURE) < 140/90: ICD-10-CM

## 2019-04-10 DIAGNOSIS — N40.1 BENIGN PROSTATIC HYPERPLASIA WITH URINARY FREQUENCY: ICD-10-CM

## 2019-04-10 DIAGNOSIS — R35.0 BENIGN PROSTATIC HYPERPLASIA WITH URINARY FREQUENCY: ICD-10-CM

## 2019-04-10 DIAGNOSIS — R73.03 PRE-DIABETES: Primary | ICD-10-CM

## 2019-04-10 PROCEDURE — 99213 OFFICE O/P EST LOW 20 MIN: CPT | Performed by: NURSE PRACTITIONER

## 2019-04-10 NOTE — PATIENT INSTRUCTIONS
1.  Let me know if you want to start finasteride or oxybutynin for urinary frequency    2.  Great work on cutting back on pop, continue to work on 10lb wt loss.  Let me know if you want to see a nutritionist     3.  Lets follow up in 6 months to recheck your A1C

## 2019-05-10 DIAGNOSIS — I10 ESSENTIAL HYPERTENSION: ICD-10-CM

## 2019-05-10 RX ORDER — AMLODIPINE BESYLATE 2.5 MG/1
2.5 TABLET ORAL DAILY
Qty: 90 TABLET | Refills: 0 | Status: SHIPPED | OUTPATIENT
Start: 2019-05-10 | End: 2019-07-29

## 2019-07-26 DIAGNOSIS — I10 ESSENTIAL HYPERTENSION: ICD-10-CM

## 2019-07-26 DIAGNOSIS — E78.5 HYPERLIPIDEMIA, UNSPECIFIED HYPERLIPIDEMIA TYPE: ICD-10-CM

## 2019-07-26 LAB
ALT SERPL W P-5'-P-CCNC: 9 U/L (ref 5–30)
ANION GAP SERPL CALCULATED.3IONS-SCNC: 13.5 MMOL/L (ref 6–17)
BUN SERPL-MCNC: 12 MG/DL (ref 7–30)
CALCIUM SERPL-MCNC: 10 MG/DL (ref 8.5–10.5)
CHLORIDE SERPL-SCNC: 104 MMOL/L (ref 98–107)
CHOLEST SERPL-MCNC: 126 MG/DL
CO2 SERPL-SCNC: 26 MMOL/L (ref 23–29)
CREAT SERPL-MCNC: 1.2 MG/DL (ref 0.7–1.3)
GFR SERPL CREATININE-BSD FRML MDRD: 60 ML/MIN/{1.73_M2}
GLUCOSE SERPL-MCNC: 133 MG/DL (ref 70–105)
HDLC SERPL-MCNC: 39 MG/DL
LDLC SERPL CALC-MCNC: 64 MG/DL
NONHDLC SERPL-MCNC: 87 MG/DL
POTASSIUM SERPL-SCNC: 4.5 MMOL/L (ref 3.5–5.1)
SODIUM SERPL-SCNC: 139 MMOL/L (ref 136–145)
TRIGL SERPL-MCNC: 114 MG/DL

## 2019-07-26 PROCEDURE — 80061 LIPID PANEL: CPT | Performed by: INTERNAL MEDICINE

## 2019-07-26 PROCEDURE — 36415 COLL VENOUS BLD VENIPUNCTURE: CPT | Performed by: INTERNAL MEDICINE

## 2019-07-26 PROCEDURE — 80048 BASIC METABOLIC PNL TOTAL CA: CPT | Performed by: INTERNAL MEDICINE

## 2019-07-26 PROCEDURE — 84460 ALANINE AMINO (ALT) (SGPT): CPT | Performed by: INTERNAL MEDICINE

## 2019-07-29 ENCOUNTER — OFFICE VISIT (OUTPATIENT)
Dept: CARDIOLOGY | Facility: CLINIC | Age: 69
End: 2019-07-29
Payer: COMMERCIAL

## 2019-07-29 VITALS
DIASTOLIC BLOOD PRESSURE: 80 MMHG | HEIGHT: 71 IN | HEART RATE: 54 BPM | WEIGHT: 238 LBS | SYSTOLIC BLOOD PRESSURE: 120 MMHG | BODY MASS INDEX: 33.32 KG/M2

## 2019-07-29 DIAGNOSIS — R60.0 PERIPHERAL EDEMA: ICD-10-CM

## 2019-07-29 DIAGNOSIS — I10 HYPERTENSION GOAL BP (BLOOD PRESSURE) < 140/90: Primary | ICD-10-CM

## 2019-07-29 DIAGNOSIS — E78.5 HYPERLIPIDEMIA, UNSPECIFIED HYPERLIPIDEMIA TYPE: ICD-10-CM

## 2019-07-29 DIAGNOSIS — I10 ESSENTIAL HYPERTENSION: ICD-10-CM

## 2019-07-29 DIAGNOSIS — I25.10 CORONARY ARTERY DISEASE INVOLVING NATIVE CORONARY ARTERY OF NATIVE HEART WITHOUT ANGINA PECTORIS: ICD-10-CM

## 2019-07-29 PROCEDURE — 99214 OFFICE O/P EST MOD 30 MIN: CPT | Performed by: INTERNAL MEDICINE

## 2019-07-29 RX ORDER — LOSARTAN POTASSIUM 50 MG/1
50 TABLET ORAL DAILY
Qty: 90 TABLET | Refills: 3 | Status: SHIPPED | OUTPATIENT
Start: 2019-07-29 | End: 2020-05-26

## 2019-07-29 RX ORDER — AMLODIPINE BESYLATE 2.5 MG/1
2.5 TABLET ORAL DAILY
Qty: 90 TABLET | Refills: 3 | Status: SHIPPED | OUTPATIENT
Start: 2019-07-29 | End: 2020-05-26

## 2019-07-29 RX ORDER — METOPROLOL SUCCINATE 50 MG/1
50 TABLET, EXTENDED RELEASE ORAL DAILY
Qty: 90 TABLET | Refills: 3 | Status: SHIPPED | OUTPATIENT
Start: 2019-07-29 | End: 2020-05-26

## 2019-07-29 RX ORDER — NITROGLYCERIN 0.4 MG/1
TABLET SUBLINGUAL
Qty: 9 TABLET | Refills: 3 | Status: SHIPPED | OUTPATIENT
Start: 2019-07-29 | End: 2020-07-13

## 2019-07-29 RX ORDER — ATORVASTATIN CALCIUM 40 MG/1
40 TABLET, FILM COATED ORAL DAILY
Qty: 90 TABLET | Refills: 3 | Status: SHIPPED | OUTPATIENT
Start: 2019-07-29 | End: 2020-05-26

## 2019-07-29 ASSESSMENT — MIFFLIN-ST. JEOR: SCORE: 1861.69

## 2019-07-29 NOTE — LETTER
2019      LONNY Arroyo CNP  [address] [facility] [address]      RE: Fermín Josue       Dear Colleague,    I had the pleasure of seeing Fermín Josue in the [facility].    Service Date: 2019      TeeteeLONNY Alva, CNP   [place] [id number] [place], [place] [id number]      RE: Fermín Josue    MRN: [id number]   : 1950      Dear Ms. Harman Barton:       It is my pleasure to see your patient Fermín Josue.  As you know, this is a gentleman who in  had a stent to his left anterior descending artery.  He was noted to have an occluded right coronary artery with collateralization.  He also has a history of hypertension and mixed hyperlipidemia, but he also has a diagnosis of prediabetes.      Since I last saw him, he has been doing well.  He has no chest pains or chest pressure unless he gets very upset emotionally about something.  This is the same pattern that he had last year, and he has noticed no change, so at most he has stable angina pectoris.      His blood pressure is well controlled at 120/80.  His pulse rate is 54 beats per minute.  His lipid profile is reasonably good.  The total cholesterol is 126, HDL is 39, LDL is 64, and triglycerides are 114.  Looking at his basic metabolic profile, his renal function is borderline with a BUN of 12, a creatinine of 1.20 and a GFR of 60, which is borderline.  Sodium and potassium were normal, being 139 and 4.5, respectively.  His fasting glucose was raised at 133.  I do note that his hemoglobin A1c was raised in March at 6.0.      IMPRESSION:   1.  Coronary artery disease.  He appears to have stable angina pectoris, which is infrequent, and the pattern of the angina pectoris has not changed from a year ago.   2.  Reasonably good lipid profile with borderline HDL deficiency.   3.  Borderline renal function with a GFR of 60.   4.  Hyperglycemia with a  fasting glucose of 133.   5.  Normotensive.      PLAN:  I will continue the patient on his present medications.  I will see him back again in 1 year's time.  I advised him to try and lose weight and to exercise.  I have also asked him to discuss with you the raised glucose levels.  As always, he has been told to contact me if he has any questions or any concerns.      Sincerely,      Christiano Gifford MD, [facility]         CHRISTIANO GIFFORD MD, [facility]             D: 2019   T: 2019   MT: yasmany      Name:     XU BURCH   MRN:      [id number]        Account:      [id number]   :      1950           Service Date: 2019      Document: [id number]         Outpatient Encounter Medications as of 2019   Medication Sig Dispense Refill     amLODIPine (NORVASC) 2.5 MG tablet Take 1 tablet (2.5 mg) by mouth daily 90 tablet 3     ASPIRIN PO Take 81 mg by mouth       atorvastatin (LIPITOR) 40 MG tablet Take 1 tablet (40 mg) by mouth daily 90 tablet 3     furosemide (LASIX) 20 MG tablet Take 1 tablet (20 mg) by mouth daily 90 tablet 3     losartan (COZAAR) 50 MG tablet Take 1 tablet (50 mg) by mouth daily 90 tablet 3     metoprolol succinate ER (TOPROL-XL) 50 MG 24 hr tablet Take 1 tablet (50 mg) by mouth daily 90 tablet 3     nitroGLYcerin (NITROSTAT) 0.4 MG sublingual tablet For chest pain place 1 tablet under the tongue every 5 minutes for 3 doses. If symptoms persist 5 minutes after 1st dose call 911. 9 tablet 3     triamcinolone (KENALOG) 0.1 % external cream Apply topically 2 times daily 45 g 1     [DISCONTINUED] amLODIPine (NORVASC) 2.5 MG tablet Take 1 tablet (2.5 mg) by mouth daily 90 tablet 0     [DISCONTINUED] atorvastatin (LIPITOR) 40 MG tablet Take 1 tablet (40 mg) by mouth daily 90 tablet 3     [DISCONTINUED] losartan (COZAAR) 50 MG tablet Take 1 tablet (50 mg) by mouth daily 90 tablet 3     [DISCONTINUED] metoprolol succinate ER (TOPROL-XL) 50 MG 24 hr tablet Take 1 tablet (50 mg) by  mouth daily 90 tablet 3     [DISCONTINUED] nitroGLYcerin (NITROSTAT) 0.4 MG sublingual tablet For chest pain place 1 tablet under the tongue every 5 minutes for 3 doses. If symptoms persist 5 minutes after 1st dose call 911. 9 tablet 0     No facility-administered encounter medications on file as of 7/29/2019.        Again, thank you for allowing me to participate in the care of your patient.      Sincerely,    Christiano Toro MD, MD     [facility]

## 2019-07-29 NOTE — PROGRESS NOTES
Service Date: 2019      Teetee Barton, [place] [address]      RE: Fermín Josue    MRN: [id number]   : 1950      Dear Ms. Harman Barton:       It is my pleasure to see your patient Fermín Josue.  As you know, this is a gentleman who in  had a stent to his left anterior descending artery.  He was noted to have an occluded right coronary artery with collateralization.  He also has a history of hypertension and mixed hyperlipidemia, but he also has a diagnosis of prediabetes.      Since I last saw him, he has been doing well.  He has no chest pains or chest pressure unless he gets very upset emotionally about something.  This is the same pattern that he had last year, and he has noticed no change, so at most he has stable angina pectoris.      His blood pressure is well controlled at 120/80.  His pulse rate is 54 beats per minute.  His lipid profile is reasonably good.  The total cholesterol is 126, HDL is 39, LDL is 64, and triglycerides are 114.  Looking at his basic metabolic profile, his renal function is borderline with a BUN of 12, a creatinine of 1.20 and a GFR of 60, which is borderline.  Sodium and potassium were normal, being 139 and 4.5, respectively.  His fasting glucose was raised at 133.  I do note that his hemoglobin A1c was raised in March at 6.0.      IMPRESSION:   1.  Coronary artery disease.  He appears to have stable angina pectoris, which is infrequent, and the pattern of the angina pectoris has not changed from a year ago.   2.  Reasonably good lipid profile with borderline HDL deficiency.   3.  Borderline renal function with a GFR of 60.   4.  Hyperglycemia with a fasting glucose of 133.   5.  Normotensive.      PLAN:  I will continue the patient on his present medications.  I will see him back again in 1 year's time.  I advised him to try and lose weight and to exercise.  I have also asked him to  discuss with you the raised glucose levels.  As always, he has been told to contact me if he has any questions or any concerns.      Sincerely,      Christiano Gifford MD, FACC         CHRISTIANO GIFFORD MD, FACC             D: 2019   T: 2019   MT: yasmany      Name:     UX BURCH   MRN:      [id number]        Account:      [id number]   :      1950           Service Date: 2019      Document: [id number]

## 2019-07-29 NOTE — PROGRESS NOTES
HPI and Plan:   See dictation    Orders Placed This Encounter   Procedures     Basic metabolic panel     Lipid Profile     ALT     Follow-Up with Cardiologist       Orders Placed This Encounter   Medications     amLODIPine (NORVASC) 2.5 MG tablet     Sig: Take 1 tablet (2.5 mg) by mouth daily     Dispense:  90 tablet     Refill:  3     atorvastatin (LIPITOR) 40 MG tablet     Sig: Take 1 tablet (40 mg) by mouth daily     Dispense:  90 tablet     Refill:  3     losartan (COZAAR) 50 MG tablet     Sig: Take 1 tablet (50 mg) by mouth daily     Dispense:  90 tablet     Refill:  3     metoprolol succinate ER (TOPROL-XL) 50 MG 24 hr tablet     Sig: Take 1 tablet (50 mg) by mouth daily     Dispense:  90 tablet     Refill:  3     nitroGLYcerin (NITROSTAT) 0.4 MG sublingual tablet     Sig: For chest pain place 1 tablet under the tongue every 5 minutes for 3 doses. If symptoms persist 5 minutes after 1st dose call 911.     Dispense:  9 tablet     Refill:  3       Medications Discontinued During This Encounter   Medication Reason     amLODIPine (NORVASC) 2.5 MG tablet Reorder     atorvastatin (LIPITOR) 40 MG tablet Reorder     losartan (COZAAR) 50 MG tablet Reorder     metoprolol succinate ER (TOPROL-XL) 50 MG 24 hr tablet Reorder     nitroGLYcerin (NITROSTAT) 0.4 MG sublingual tablet Reorder         Encounter Diagnoses   Name Primary?     Hypertension goal BP (blood pressure) < 140/90 Yes     Essential hypertension      Hyperlipidemia, unspecified hyperlipidemia type      Coronary artery disease involving native coronary artery of native heart without angina pectoris      Peripheral edema        CURRENT MEDICATIONS:  Current Outpatient Medications   Medication Sig Dispense Refill     amLODIPine (NORVASC) 2.5 MG tablet Take 1 tablet (2.5 mg) by mouth daily 90 tablet 3     ASPIRIN PO Take 81 mg by mouth       atorvastatin (LIPITOR) 40 MG tablet Take 1 tablet (40 mg) by mouth daily 90 tablet 3     furosemide (LASIX) 20 MG tablet  Take 1 tablet (20 mg) by mouth daily 90 tablet 3     losartan (COZAAR) 50 MG tablet Take 1 tablet (50 mg) by mouth daily 90 tablet 3     metoprolol succinate ER (TOPROL-XL) 50 MG 24 hr tablet Take 1 tablet (50 mg) by mouth daily 90 tablet 3     nitroGLYcerin (NITROSTAT) 0.4 MG sublingual tablet For chest pain place 1 tablet under the tongue every 5 minutes for 3 doses. If symptoms persist 5 minutes after 1st dose call 911. 9 tablet 3     triamcinolone (KENALOG) 0.1 % external cream Apply topically 2 times daily 45 g 1       ALLERGIES     Allergies   Allergen Reactions     No Known Drug Allergies        PAST MEDICAL HISTORY:  Past Medical History:   Diagnosis Date     CAD (coronary artery disease) 2/5/2015    3 stents     History of angina      History of skin graft     Right lower limb     Hyperlipidaemia      Hypertension goal BP (blood pressure) < 140/90 2/5/2015       PAST SURGICAL HISTORY:  Past Surgical History:   Procedure Laterality Date     HEART CATH, ANGIOPLASTY  11/2007    mid to prox LAD drug-eluting stent x2; Occluded RCA with Collaterals     SURGICAL HISTORY OF -   2003    kidney stone laser and ureteric stenting       FAMILY HISTORY:  Family History   Problem Relation Age of Onset     Cancer Maternal Grandfather      Cancer Maternal Uncle         lung     Blood Disease Maternal Grandmother         leukemia     Hypertension Mother      Alzheimer Disease Father        SOCIAL HISTORY:  Social History     Socioeconomic History     Marital status:      Spouse name: None     Number of children: None     Years of education: None     Highest education level: None   Occupational History     None   Social Needs     Financial resource strain: None     Food insecurity:     Worry: None     Inability: None     Transportation needs:     Medical: None     Non-medical: None   Tobacco Use     Smoking status: Former Smoker     Packs/day: 1.50     Years: 44.00     Pack years: 66.00     Types: Cigarettes     Start  "date: 1963     Last attempt to quit: 2007     Years since quittin.0     Smokeless tobacco: Never Used   Substance and Sexual Activity     Alcohol use: Yes     Comment: beer - 12 pack month     Drug use: No     Sexual activity: None   Lifestyle     Physical activity:     Days per week: None     Minutes per session: None     Stress: None   Relationships     Social connections:     Talks on phone: None     Gets together: None     Attends Mandaeism service: None     Active member of club or organization: None     Attends meetings of clubs or organizations: None     Relationship status: None     Intimate partner violence:     Fear of current or ex partner: None     Emotionally abused: None     Physically abused: None     Forced sexual activity: None   Other Topics Concern      Service Not Asked     Blood Transfusions Not Asked     Caffeine Concern Yes     Comment: 1 pop daily      Occupational Exposure Not Asked     Hobby Hazards Not Asked     Sleep Concern Not Asked     Stress Concern Not Asked     Weight Concern Not Asked     Special Diet Yes     Comment: fruits      Back Care Not Asked     Exercise Yes     Comment: walking 1 hr daily      Bike Helmet Not Asked     Seat Belt Not Asked     Self-Exams Not Asked     Parent/sibling w/ CABG, MI or angioplasty before 65F 55M? No   Social History Narrative     None       Review of Systems:  Skin:  Negative       Eyes:  Negative      ENT:  Negative      Respiratory:  Positive for dyspnea on exertion     Cardiovascular:    Positive for(occasional chest pain with stress.)    Gastroenterology: Negative      Genitourinary:  not assessed      Musculoskeletal:  Negative      Neurologic:  Negative      Psychiatric:  Negative      Heme/Lymph/Imm:  Positive for allergies(soy sauce)    Endocrine:  Negative        Physical Exam:  Vitals: /80   Pulse 54   Ht 1.803 m (5' 11\")   Wt 108 kg (238 lb)   BMI 33.19 kg/m      Constitutional:  cooperative, alert and " oriented, well developed, well nourished, in no acute distress overweight      Skin:  warm and dry to the touch, no apparent skin lesions or masses noted          Head:  normocephalic, no masses or lesions        Eyes:  pupils equal and round, conjunctivae and lids unremarkable, sclera white, no xanthalasma, EOMS intact, no nystagmus        Lymph:      ENT:  no pallor or cyanosis, dentition good        Neck:  carotid pulses are full and equal bilaterally, JVP normal, no carotid bruit        Respiratory:  normal breath sounds, clear to auscultation, normal A-P diameter, normal symmetry, normal respiratory excursion, no use of accessory muscles         Cardiac: regular rhythm;normal S1 and S2;apical impulse not displaced;no murmurs, gallops or rubs detected     no presence of murmur          pulses full and equal, no bruits auscultated;pulses full and equal                                        GI:  abdomen soft, non-tender, BS normoactive, no mass, no HSM, no bruits        Extremities and Muscular Skeletal:  no deformities, clubbing, cyanosis, erythema observed erythema;stasis pigmentation bilateral LE edema;trace          Neurological:  no gross motor deficits;affect appropriate        Psych:  Alert and Oriented x 3        CC  LONNY Arroyo CNP  [address] [place] [address]

## 2019-07-29 NOTE — LETTER
7/29/2019    Teetee Lena Hammer, APRN CNP  [address]  [facility] [address]    RE: Fermín Josue       Dear Colleague,    I had the pleasure of seeing Fermín Josue in the [facility].    HPI and Plan:   See dictation    Orders Placed This Encounter   Procedures     Basic metabolic panel     Lipid Profile     ALT     Follow-Up with Cardiologist       Orders Placed This Encounter   Medications     amLODIPine (NORVASC) 2.5 MG tablet     Sig: Take 1 tablet (2.5 mg) by mouth daily     Dispense:  90 tablet     Refill:  3     atorvastatin (LIPITOR) 40 MG tablet     Sig: Take 1 tablet (40 mg) by mouth daily     Dispense:  90 tablet     Refill:  3     losartan (COZAAR) 50 MG tablet     Sig: Take 1 tablet (50 mg) by mouth daily     Dispense:  90 tablet     Refill:  3     metoprolol succinate ER (TOPROL-XL) 50 MG 24 hr tablet     Sig: Take 1 tablet (50 mg) by mouth daily     Dispense:  90 tablet     Refill:  3     nitroGLYcerin (NITROSTAT) 0.4 MG sublingual tablet     Sig: For chest pain place 1 tablet under the tongue every 5 minutes for 3 doses. If symptoms persist 5 minutes after 1st dose call 911.     Dispense:  9 tablet     Refill:  3       Medications Discontinued During This Encounter   Medication Reason     amLODIPine (NORVASC) 2.5 MG tablet Reorder     atorvastatin (LIPITOR) 40 MG tablet Reorder     losartan (COZAAR) 50 MG tablet Reorder     metoprolol succinate ER (TOPROL-XL) 50 MG 24 hr tablet Reorder     nitroGLYcerin (NITROSTAT) 0.4 MG sublingual tablet Reorder         Encounter Diagnoses   Name Primary?     Hypertension goal BP (blood pressure) < 140/90 Yes     Essential hypertension      Hyperlipidemia, unspecified hyperlipidemia type      Coronary artery disease involving native coronary artery of native heart without angina pectoris      Peripheral edema        CURRENT MEDICATIONS:  Current Outpatient Medications   Medication Sig Dispense Refill     amLODIPine  (NORVASC) 2.5 MG tablet Take 1 tablet (2.5 mg) by mouth daily 90 tablet 3     ASPIRIN PO Take 81 mg by mouth       atorvastatin (LIPITOR) 40 MG tablet Take 1 tablet (40 mg) by mouth daily 90 tablet 3     furosemide (LASIX) 20 MG tablet Take 1 tablet (20 mg) by mouth daily 90 tablet 3     losartan (COZAAR) 50 MG tablet Take 1 tablet (50 mg) by mouth daily 90 tablet 3     metoprolol succinate ER (TOPROL-XL) 50 MG 24 hr tablet Take 1 tablet (50 mg) by mouth daily 90 tablet 3     nitroGLYcerin (NITROSTAT) 0.4 MG sublingual tablet For chest pain place 1 tablet under the tongue every 5 minutes for 3 doses. If symptoms persist 5 minutes after 1st dose call 911. 9 tablet 3     triamcinolone (KENALOG) 0.1 % external cream Apply topically 2 times daily 45 g 1       ALLERGIES     Allergies   Allergen Reactions     No Known Drug Allergies        PAST MEDICAL HISTORY:  Past Medical History:   Diagnosis Date     CAD (coronary artery disease) 2/5/2015    3 stents     History of angina      History of skin graft     Right lower limb     Hyperlipidaemia      Hypertension goal BP (blood pressure) < 140/90 2/5/2015       PAST SURGICAL HISTORY:  Past Surgical History:   Procedure Laterality Date     HEART CATH, ANGIOPLASTY  11/2007    mid to prox LAD drug-eluting stent x2; Occluded RCA with Collaterals     SURGICAL HISTORY OF -   2003    kidney stone laser and ureteric stenting       FAMILY HISTORY:  Family History   Problem Relation Age of Onset     Cancer Maternal Grandfather      Cancer Maternal Uncle         lung     Blood Disease Maternal Grandmother         leukemia     Hypertension Mother      Alzheimer Disease Father        SOCIAL HISTORY:  Social History     Socioeconomic History     Marital status:      Spouse name: None     Number of children: None     Years of education: None     Highest education level: None   Occupational History     None   Social Needs     Financial resource strain: None     Food insecurity:      Worry: None     Inability: None     Transportation needs:     Medical: None     Non-medical: None   Tobacco Use     Smoking status: Former Smoker     Packs/day: 1.50     Years: 44.00     Pack years: 66.00     Types: Cigarettes     Start date: 1963     Last attempt to quit: 2007     Years since quittin.0     Smokeless tobacco: Never Used   Substance and Sexual Activity     Alcohol use: Yes     Comment: beer - 12 pack month     Drug use: No     Sexual activity: None   Lifestyle     Physical activity:     Days per week: None     Minutes per session: None     Stress: None   Relationships     Social connections:     Talks on phone: None     Gets together: None     Attends Muslim service: None     Active member of club or organization: None     Attends meetings of clubs or organizations: None     Relationship status: None     Intimate partner violence:     Fear of current or ex partner: None     Emotionally abused: None     Physically abused: None     Forced sexual activity: None   Other Topics Concern      Service Not Asked     Blood Transfusions Not Asked     Caffeine Concern Yes     Comment: 1 pop daily      Occupational Exposure Not Asked     Hobby Hazards Not Asked     Sleep Concern Not Asked     Stress Concern Not Asked     Weight Concern Not Asked     Special Diet Yes     Comment: fruits      Back Care Not Asked     Exercise Yes     Comment: walking 1 hr daily      Bike Helmet Not Asked     Seat Belt Not Asked     Self-Exams Not Asked     Parent/sibling w/ CABG, MI or angioplasty before 65F 55M? No   Social History Narrative     None       Review of Systems:  Skin:  Negative       Eyes:  Negative      ENT:  Negative      Respiratory:  Positive for dyspnea on exertion     Cardiovascular:    Positive for(occasional chest pain with stress.)    Gastroenterology: Negative      Genitourinary:  not assessed      Musculoskeletal:  Negative      Neurologic:  Negative      Psychiatric:  Negative     "  Heme/Lymph/Imm:  Positive for allergies(soy sauce)    Endocrine:  Negative        Physical Exam:  Vitals: /80   Pulse 54   Ht 1.803 m (5' 11\")   Wt 108 kg (238 lb)   BMI 33.19 kg/m       Constitutional:  cooperative, alert and oriented, well developed, well nourished, in no acute distress overweight      Skin:  warm and dry to the touch, no apparent skin lesions or masses noted          Head:  normocephalic, no masses or lesions        Eyes:  pupils equal and round, conjunctivae and lids unremarkable, sclera white, no xanthalasma, EOMS intact, no nystagmus        Lymph:      ENT:  no pallor or cyanosis, dentition good        Neck:  carotid pulses are full and equal bilaterally, JVP normal, no carotid bruit        Respiratory:  normal breath sounds, clear to auscultation, normal A-P diameter, normal symmetry, normal respiratory excursion, no use of accessory muscles         Cardiac: regular rhythm;normal S1 and S2;apical impulse not displaced;no murmurs, gallops or rubs detected     no presence of murmur          pulses full and equal, no bruits auscultated;pulses full and equal                                        GI:  abdomen soft, non-tender, BS normoactive, no mass, no HSM, no bruits        Extremities and Muscular Skeletal:  no deformities, clubbing, cyanosis, erythema observed erythema;stasis pigmentation bilateral LE edema;trace          Neurological:  no gross motor deficits;affect appropriate        Psych:  Alert and Oriented x 3        CC  LONNY Arroyo CNP  [address] [place] [address]                Thank you for allowing me to participate in the care of your patient.      Sincerely,     Christiano Toro MD, MD     [facility]    cc:   LONNY Arroyo CNP  [address] [place] [address]        "

## 2019-10-13 NOTE — PROGRESS NOTES
Subjective     Fermín Josue is a 69 year old male who presents to clinic today for the following health issues:     HPI     interval history:  Last seen in April, noted to be prediabetic with A1C 6%.  Advised on wt loss.    Follows with cardiology for HTN and CAD s/p stent .  Noted to have stable angina unchanged x 12 year, triggered by emotion.  Continued on metoprolol, losartan, furosemide, amlodipine, asa, and lipitor.  He is on furosemide 20mg daily for ankle edema.  He has nitro on hand.    Update today:  Trying to cut out bread, working on pop.  He was doing 6 pack a day, cut down to a couple a day.    Needs refill of furosemide, it is effective for lower extremity edema.      hasnt needed kenalog for itchy skin to legs.    Lives with daughter and 3 grandchildren ages 5, 10, and 20.  Reports his mood is good.        Patient Active Problem List   Diagnosis     CARDIOVASCULAR SCREENING; LDL GOAL LESS THAN 100     CAD (coronary artery disease)     Hypertension goal BP (blood pressure) < 140/90     Hyperlipidemia     History of angina     Advance Care Planning     Pre-diabetes     Past Surgical History:   Procedure Laterality Date     HEART CATH, ANGIOPLASTY  2007    mid to prox LAD drug-eluting stent x2; Occluded RCA with Collaterals     SURGICAL HISTORY OF -       kidney stone laser and ureteric stenting       Social History     Tobacco Use     Smoking status: Former Smoker     Packs/day: 1.50     Years: 44.00     Pack years: 66.00     Types: Cigarettes     Start date: 1963     Last attempt to quit: 2007     Years since quittin.3     Smokeless tobacco: Never Used   Substance Use Topics     Alcohol use: Yes     Comment: beer - 12 pack month     Family History   Problem Relation Age of Onset     Cancer Maternal Grandfather      Cancer Maternal Uncle         lung     Blood Disease Maternal Grandmother         leukemia     Hypertension Mother      Alzheimer Disease Father       "    Current Outpatient Medications   Medication Sig Dispense Refill     amLODIPine (NORVASC) 2.5 MG tablet Take 1 tablet (2.5 mg) by mouth daily 90 tablet 3     ASPIRIN PO Take 81 mg by mouth       atorvastatin (LIPITOR) 40 MG tablet Take 1 tablet (40 mg) by mouth daily 90 tablet 3     furosemide (LASIX) 20 MG tablet Take 1 tablet (20 mg) by mouth daily 90 tablet 3     losartan (COZAAR) 50 MG tablet Take 1 tablet (50 mg) by mouth daily 90 tablet 3     metoprolol succinate ER (TOPROL-XL) 50 MG 24 hr tablet Take 1 tablet (50 mg) by mouth daily 90 tablet 3     nitroGLYcerin (NITROSTAT) 0.4 MG sublingual tablet For chest pain place 1 tablet under the tongue every 5 minutes for 3 doses. If symptoms persist 5 minutes after 1st dose call 911. (Patient not taking: Reported on 10/15/2019) 9 tablet 3     triamcinolone (KENALOG) 0.1 % external cream Apply topically 2 times daily (Patient not taking: Reported on 10/15/2019) 45 g 1         Reviewed and updated as needed this visit by Provider         Review of Systems   ROS COMP: Constitutional, HEENT, cardiovascular, pulmonary, gi and gu systems are negative, except as otherwise noted.      Objective    /62   Pulse 70   Temp 98.1  F (36.7  C) (Oral)   Resp 14   Ht 1.778 m (5' 10\")   Wt 107 kg (236 lb)   BMI 33.86 kg/m    Body mass index is 33.86 kg/m .  Physical Exam   GENERAL APPEARANCE: healthy, alert and no distress  EYES: Eyes grossly normal to inspection and conjunctivae and sclerae normal  RESP: lungs clear to auscultation - no rales, rhonchi or wheezes  CV: regular rates and rhythm, normal S1 S2, no S3 or S4 and no murmur, click or rub  PSYCH: mentation appears normal and affect normal/bright       Diagnostic Test Results:  Results for orders placed or performed in visit on 10/15/19 (from the past 24 hour(s))   **A1C FUTURE anytime   Result Value Ref Range    Hemoglobin A1C 5.8 (H) 0 - 5.6 %           Assessment & Plan     (R73.03) Pre-diabetes  (primary " encounter diagnosis)  Comment: A1C improved with modest wt loss  Plan: **A1C FUTURE anytime        Discussed ongoing efforts for wt loss, specifically cutting pop and reducing carbs, focusing on vegetables and fiber, lean protein    (R60.9) Peripheral edema  Comment: furosemide is effective  Plan: furosemide (LASIX) 20 MG tablet        refilled    (I10) Hypertension goal BP (blood pressure) < 140/90  Comment: controlled  Plan: cont current plan    (I25.10) Coronary artery disease involving native coronary artery of native heart without angina pectoris  Comment: asymptomatic  Plan: annual follow up with cardiology          See Patient Instructions    Return in about 5 months (around 3/1/2020).    LONNY Arroyo [facility]

## 2019-10-15 ENCOUNTER — OFFICE VISIT (OUTPATIENT)
Dept: FAMILY MEDICINE | Facility: CLINIC | Age: 69
End: 2019-10-15
Payer: COMMERCIAL

## 2019-10-15 VITALS
TEMPERATURE: 98.1 F | RESPIRATION RATE: 14 BRPM | WEIGHT: 236 LBS | DIASTOLIC BLOOD PRESSURE: 62 MMHG | HEART RATE: 70 BPM | BODY MASS INDEX: 33.79 KG/M2 | HEIGHT: 70 IN | SYSTOLIC BLOOD PRESSURE: 112 MMHG

## 2019-10-15 DIAGNOSIS — R60.0 PERIPHERAL EDEMA: ICD-10-CM

## 2019-10-15 DIAGNOSIS — I10 HYPERTENSION GOAL BP (BLOOD PRESSURE) < 140/90: ICD-10-CM

## 2019-10-15 DIAGNOSIS — I25.10 CORONARY ARTERY DISEASE INVOLVING NATIVE CORONARY ARTERY OF NATIVE HEART WITHOUT ANGINA PECTORIS: ICD-10-CM

## 2019-10-15 DIAGNOSIS — R73.03 PRE-DIABETES: Primary | ICD-10-CM

## 2019-10-15 LAB — HBA1C MFR BLD: 5.8 % (ref 0–5.6)

## 2019-10-15 PROCEDURE — 99213 OFFICE O/P EST LOW 20 MIN: CPT | Performed by: NURSE PRACTITIONER

## 2019-10-15 PROCEDURE — 36415 COLL VENOUS BLD VENIPUNCTURE: CPT | Performed by: NURSE PRACTITIONER

## 2019-10-15 PROCEDURE — 83036 HEMOGLOBIN GLYCOSYLATED A1C: CPT | Performed by: NURSE PRACTITIONER

## 2019-10-15 RX ORDER — FUROSEMIDE 20 MG
20 TABLET ORAL DAILY
Qty: 90 TABLET | Refills: 3 | Status: SHIPPED | OUTPATIENT
Start: 2019-10-15 | End: 2020-07-13

## 2019-10-15 ASSESSMENT — MIFFLIN-ST. JEOR: SCORE: 1836.74

## 2019-10-15 NOTE — PATIENT INSTRUCTIONS
1.  A1C is improved at 5.8%.  Continue to work on modest weight loss.  Reducing carbs (pop, bread, pasta, rice) and focusing on lean proteins, fruits and vegetables.  Gentle exercise like walking 30min most days a week.    2.  Refilled furosemide     3.  You would be a candidate for shingles shot    4.  Follow up in march for routine physical

## 2020-05-26 ENCOUNTER — TELEPHONE (OUTPATIENT)
Dept: FAMILY MEDICINE | Facility: CLINIC | Age: 70
End: 2020-05-26

## 2020-05-26 DIAGNOSIS — E78.5 HYPERLIPIDEMIA, UNSPECIFIED HYPERLIPIDEMIA TYPE: ICD-10-CM

## 2020-05-26 DIAGNOSIS — I10 ESSENTIAL HYPERTENSION: ICD-10-CM

## 2020-05-26 DIAGNOSIS — I25.10 CORONARY ARTERY DISEASE INVOLVING NATIVE CORONARY ARTERY OF NATIVE HEART WITHOUT ANGINA PECTORIS: ICD-10-CM

## 2020-05-26 DIAGNOSIS — Z12.11 SPECIAL SCREENING FOR MALIGNANT NEOPLASMS, COLON: Primary | ICD-10-CM

## 2020-05-26 DIAGNOSIS — I10 HYPERTENSION GOAL BP (BLOOD PRESSURE) < 140/90: ICD-10-CM

## 2020-05-26 RX ORDER — AMLODIPINE BESYLATE 2.5 MG/1
2.5 TABLET ORAL DAILY
Qty: 90 TABLET | Refills: 0 | Status: SHIPPED | OUTPATIENT
Start: 2020-05-26 | End: 2020-07-13

## 2020-05-26 RX ORDER — METOPROLOL SUCCINATE 50 MG/1
50 TABLET, EXTENDED RELEASE ORAL DAILY
Qty: 90 TABLET | Refills: 0 | Status: SHIPPED | OUTPATIENT
Start: 2020-05-26 | End: 2020-07-13

## 2020-05-26 RX ORDER — LOSARTAN POTASSIUM 50 MG/1
50 TABLET ORAL DAILY
Qty: 90 TABLET | Refills: 0 | Status: SHIPPED | OUTPATIENT
Start: 2020-05-26 | End: 2020-07-13

## 2020-05-26 RX ORDER — ATORVASTATIN CALCIUM 40 MG/1
40 TABLET, FILM COATED ORAL DAILY
Qty: 90 TABLET | Refills: 0 | Status: SHIPPED | OUTPATIENT
Start: 2020-05-26 | End: 2020-07-13

## 2020-05-26 NOTE — TELEPHONE ENCOUNTER
Reason for Call: Request for an order or referral:    Order or referral being requested: Colonoscopy    Date needed: as soon as possible    Has the patient been seen by the PCP for this problem? YES    Additional comments[place]    Phone number Patient can be reached at:  Home number on file [phone] (home)    Best Time:  ASAP    Can we leave a detailed message on this number?  YES    Call taken on 5/26/2020 at 1:32 PM by CHAYA Maria  [place]   Patient Rep

## 2020-05-27 NOTE — TELEPHONE ENCOUNTER
Team Coordinators/MA-Please review scheduling number for colonoscopy with patient:    Kumar Argueta [phone]          Per review of 6/23/15 colonoscopy report, recommended repeat colonoscopy in 3-5 years.    Referral ordered.    Thank you!  CHARLY Beach, BSN, RN

## 2020-06-16 DIAGNOSIS — Z11.59 ENCOUNTER FOR SCREENING FOR OTHER VIRAL DISEASES: Primary | ICD-10-CM

## 2020-06-18 ENCOUNTER — TELEPHONE (OUTPATIENT)
Dept: CARDIOLOGY | Facility: CLINIC | Age: 70
End: 2020-06-18

## 2020-06-18 NOTE — TELEPHONE ENCOUNTER
patient called and advised this RN that his GI MD is ok with him continuing his ASA 81mg for his upcoming colonoscopy. Patient wanted to confirm we were ok with him doing this. RN advised patient that if his GI MD (who is doing procedure) is ok with him continuing to take the ASA 81mg we are just fine with that as well. Patient will call clinic with any further questions or concerns.         7/29/19 OV Dr. Toro  IMPRESSION:   1.  Coronary artery disease.  He appears to have stable angina pectoris, which is infrequent, and the pattern of the angina pectoris has not changed from a year ago.   2.  Reasonably good lipid profile with borderline HDL deficiency.   3.  Borderline renal function with a GFR of 60.   4.  Hyperglycemia with a fasting glucose of 133.   5.  Normotensive.      PLAN:  I will continue the patient on his present medications.  I will see him back again in 1 year's time.  I advised him to try and lose weight and to exercise.  I have also asked him to discuss with you the raised glucose levels.  As always, he has been told to contact me if he has any questions or any concerns.      Sincerely,      Christiano Gifford MD, FACC         CHRISTIANO GIFFORD MD, FACC

## 2020-07-09 ENCOUNTER — TELEPHONE (OUTPATIENT)
Dept: CARDIOLOGY | Facility: CLINIC | Age: 70
End: 2020-07-09

## 2020-07-09 NOTE — TELEPHONE ENCOUNTER

## 2020-07-10 ENCOUNTER — TELEPHONE (OUTPATIENT)
Dept: CARDIOLOGY | Facility: CLINIC | Age: 70
End: 2020-07-10

## 2020-07-10 DIAGNOSIS — I25.10 CORONARY ARTERY DISEASE INVOLVING NATIVE CORONARY ARTERY OF NATIVE HEART WITHOUT ANGINA PECTORIS: ICD-10-CM

## 2020-07-10 DIAGNOSIS — I10 ESSENTIAL HYPERTENSION: ICD-10-CM

## 2020-07-10 LAB
ALT SERPL W P-5'-P-CCNC: <5 U/L (ref 5–30)
ANION GAP SERPL CALCULATED.3IONS-SCNC: 10.4 MMOL/L (ref 6–17)
BUN SERPL-MCNC: 14 MG/DL (ref 7–30)
CALCIUM SERPL-MCNC: 9.6 MG/DL (ref 8.5–10.5)
CHLORIDE SERPL-SCNC: 102 MMOL/L (ref 98–107)
CHOLEST SERPL-MCNC: 109 MG/DL
CO2 SERPL-SCNC: 32 MMOL/L (ref 23–29)
CREAT SERPL-MCNC: 1.24 MG/DL (ref 0.7–1.3)
GFR SERPL CREATININE-BSD FRML MDRD: 57 ML/MIN/{1.73_M2}
GLUCOSE SERPL-MCNC: 119 MG/DL (ref 70–105)
HDLC SERPL-MCNC: 43 MG/DL
LDLC SERPL CALC-MCNC: 47 MG/DL
NONHDLC SERPL-MCNC: 66 MG/DL
POTASSIUM SERPL-SCNC: 3.4 MMOL/L (ref 3.5–5.1)
SODIUM SERPL-SCNC: 141 MMOL/L (ref 136–145)
TRIGL SERPL-MCNC: 95 MG/DL

## 2020-07-10 PROCEDURE — 80048 BASIC METABOLIC PNL TOTAL CA: CPT | Performed by: INTERNAL MEDICINE

## 2020-07-10 PROCEDURE — 84460 ALANINE AMINO (ALT) (SGPT): CPT | Performed by: INTERNAL MEDICINE

## 2020-07-10 PROCEDURE — 80061 LIPID PANEL: CPT | Performed by: INTERNAL MEDICINE

## 2020-07-10 PROCEDURE — 36415 COLL VENOUS BLD VENIPUNCTURE: CPT | Performed by: INTERNAL MEDICINE

## 2020-07-10 NOTE — TELEPHONE ENCOUNTER
"Called pt regarding BMP - K+ =3.4  Pt states he was taking two furosemide 20mg tablets for \"a while\" but has now decreased back to one tablet daily.  His PCP had recommended this change.  Pt does not take KCL tabs.    Encouraged bananas, potatoes.  Pt doesn't like tomatoes. Encouraged prunes or raisins - he is having colonoscopy next week and has COVID testing Monday.  Pt has telephone visit with DR. VANG Monday July 13th.  Pt doesn't have smart phone or computer and didn't feel he had time to come into clinic.  Will review with Dr. Toro  "

## 2020-07-13 ENCOUNTER — VIRTUAL VISIT (OUTPATIENT)
Dept: CARDIOLOGY | Facility: CLINIC | Age: 70
End: 2020-07-13
Attending: INTERNAL MEDICINE
Payer: COMMERCIAL

## 2020-07-13 DIAGNOSIS — I10 ESSENTIAL HYPERTENSION: ICD-10-CM

## 2020-07-13 DIAGNOSIS — E78.5 HYPERLIPIDEMIA, UNSPECIFIED HYPERLIPIDEMIA TYPE: ICD-10-CM

## 2020-07-13 DIAGNOSIS — R60.0 PERIPHERAL EDEMA: ICD-10-CM

## 2020-07-13 DIAGNOSIS — I25.10 CORONARY ARTERY DISEASE INVOLVING NATIVE CORONARY ARTERY OF NATIVE HEART WITHOUT ANGINA PECTORIS: ICD-10-CM

## 2020-07-13 DIAGNOSIS — Z11.59 ENCOUNTER FOR SCREENING FOR OTHER VIRAL DISEASES: ICD-10-CM

## 2020-07-13 DIAGNOSIS — E87.6 HYPOKALEMIA: Primary | ICD-10-CM

## 2020-07-13 DIAGNOSIS — I10 HYPERTENSION GOAL BP (BLOOD PRESSURE) < 140/90: ICD-10-CM

## 2020-07-13 LAB
SARS-COV-2 RNA SPEC QL NAA+PROBE: NOT DETECTED
SPECIMEN SOURCE: NORMAL

## 2020-07-13 PROCEDURE — U0003 INFECTIOUS AGENT DETECTION BY NUCLEIC ACID (DNA OR RNA); SEVERE ACUTE RESPIRATORY SYNDROME CORONAVIRUS 2 (SARS-COV-2) (CORONAVIRUS DISEASE [COVID-19]), AMPLIFIED PROBE TECHNIQUE, MAKING USE OF HIGH THROUGHPUT TECHNOLOGIES AS DESCRIBED BY CMS-2020-01-R: HCPCS | Performed by: INTERNAL MEDICINE

## 2020-07-13 PROCEDURE — 99213 OFFICE O/P EST LOW 20 MIN: CPT | Mod: 95 | Performed by: INTERNAL MEDICINE

## 2020-07-13 PROCEDURE — 99207 ZZC NO BILLABLE SERVICE THIS VISIT: CPT

## 2020-07-13 RX ORDER — LOSARTAN POTASSIUM 50 MG/1
50 TABLET ORAL DAILY
Qty: 90 TABLET | Refills: 3 | Status: SHIPPED | OUTPATIENT
Start: 2020-07-13 | End: 2021-07-20

## 2020-07-13 RX ORDER — NITROGLYCERIN 0.4 MG/1
TABLET SUBLINGUAL
Qty: 9 TABLET | Refills: 3 | Status: SHIPPED | OUTPATIENT
Start: 2020-07-13 | End: 2021-07-20

## 2020-07-13 RX ORDER — AMLODIPINE BESYLATE 2.5 MG/1
2.5 TABLET ORAL DAILY
Qty: 90 TABLET | Refills: 3 | Status: SHIPPED | OUTPATIENT
Start: 2020-07-13 | End: 2021-07-20

## 2020-07-13 RX ORDER — ATORVASTATIN CALCIUM 40 MG/1
40 TABLET, FILM COATED ORAL DAILY
Qty: 90 TABLET | Refills: 3 | Status: SHIPPED | OUTPATIENT
Start: 2020-07-13 | End: 2021-07-20

## 2020-07-13 RX ORDER — FUROSEMIDE 20 MG
20 TABLET ORAL DAILY
Qty: 90 TABLET | Refills: 3 | Status: SHIPPED | OUTPATIENT
Start: 2020-07-13 | End: 2021-07-20

## 2020-07-13 RX ORDER — METOPROLOL SUCCINATE 50 MG/1
50 TABLET, EXTENDED RELEASE ORAL DAILY
Qty: 90 TABLET | Refills: 3 | Status: SHIPPED | OUTPATIENT
Start: 2020-07-13 | End: 2021-07-20

## 2020-07-13 NOTE — PROGRESS NOTES
"Fermín Josue is a 70 year old male who is being evaluated via a billable telephone visit.      The patient has been notified of following:     \"This telephone visit will be conducted via a call between you and your physician/provider. We have found that certain health care needs can be provided without the need for a physical exam.  This service lets us provide the care you need with a short phone conversation.  If a prescription is necessary we can send it directly to your pharmacy.  If lab work is needed we can place an order for that and you can then stop by our lab to have the test done at a later time.    Telephone visits are billed at different rates depending on your insurance coverage. During this emergency period, for some insurers they may be billed the same as an in-person visit.  Please reach out to your insurance provider with any questions.    If during the course of the call the physician/provider feels a telephone visit is not appropriate, you will not be charged for this service.\"    Patient has given verbal consent for Telephone visit?  Yes    What phone number would you like to be contacted at? [phone]    How would you like to obtain your AVS? Mail a copy  Patient unable to assess vitals.  Review Of Systems  Skin: NEGATIVE  Eyes:Ears/Nose/Throat: NEGATIVE  Respiratory: NEGATIVE  Cardiovascular:Dizziness when mowing his lawn relates it to heat   Gastrointestinal: NEGATIVE  Genitourinary:NEGATIVE   Musculoskeletal: NEGATIVE  Neurologic: NEGATIVE  Psychiatric: NEGATIVE  Hematologic/Lymphatic/Immunologic: NEGATIVE  Endocrine:  NEGATIVE  Lissett Lima LPN    Length of telephone call : 20 minutes    Christiano Toro MD, FACC FRCPI      "

## 2020-07-13 NOTE — LETTER
7/13/2020    Teetee Hammer, APRN CNP  [address]  [facility] [address]    RE: Fermín Holmoff       Dear Colleague,    I had the pleasure of seeing Fermín Josue in the [facility].    Fermín Josue is a 70 year old male who is being evaluated via a billable telephone visit.      Service Date: 07/13/2020      TELEPHONE VISIT      HISTORY OF PRESENT ILLNESS:  It is my pleasure to speak to your patient, Fermín Josue, on a telephone visit due to the COVID-19 pandemic.        As you know, Mr. Josue is a 70-year-old man with a known history of coronary artery disease.  This is a patient who in 2007 had stenting of the left anterior descending artery.  He was noted to have a chronically occluded right coronary artery with collateralization.  He has a history of hypertension, mixed hyperlipidemia and prediabetes.        Since I last saw him, he has been doing well.  He has no chest pains, no chest pressure, no unusual shortness of breath.  In the past if he pushed himself, he would get some chest discomfort, but he does not seem to have that in the last year.        Last blood pressure check was in October, which is 9 months ago.  His blood pressure at that stage was 112/62.  He has lost a significant amount of weight, so I suspect his blood pressures in the same or better.        His lipid profile was excellent 3 days ago with an LDL of 47, HDL of 43, and triglycerides of 95 with a total cholesterol of 109.  His basic metabolic profile, however, showed that his potassium was borderline low at 3.4.  His BUN is 14, creatinine 1.24 and GFR 57 with a sodium of 141.  We have advised him to increase his citrus fruit intake, which will increase his potassium.  That would include any of the citrus fruits, such as oranges or orange juices.  Tomatoes as well would work, but he likes oranges and he said he will start using oranges and orange juice.  He admits that normally he  does eat a significant amount of fruit, but because of the COVID-19 pandemic, he goes to the store far less frequently and therefore he buys less fruit because they go off.      His ALT was less than 5, indicating no hepatic toxicity from the atorvastatin medication.        On 10/15, his hemoglobin A1c was borderline raised at 5.8.      IMPRESSION:   1.  Coronary artery disease.  The patient is asymptomatic with respect to coronary artery disease.  He has known stenting of the LAD and a chronically occluded right coronary artery which is collateralized.   2.  Excellent lipid profile, well within secondary prevention guidelines on high-intensity statin therapy without evidence of hepatic toxicity.   3.  Borderline hypokalemia with a potassium of 3.4.   4.  Normotensive in October, but no blood pressure recently.      PLAN:  As I mentioned above, we have asked him to increase his intake of citrus fruits or citrus fruit juices.  We will see the patient back again in 1 year's time.  We will repeat his lipids and basic metabolic profile at that stage.  As always, the patient has been told to contact us if he has any questions or any concerns.        Thank you for allowing me to participate in the care of your patient.    Sincerely,     Christiano Toro MD, MD     [facility]

## 2020-07-13 NOTE — PROGRESS NOTES
Service Date: 07/13/2020      TELEPHONE VISIT      HISTORY OF PRESENT ILLNESS:  It is my pleasure to speak to your patient, Fermín Josue, on a telephone visit due to the COVID-19 pandemic.        As you know, Mr. Josue is a 70-year-old man with a known history of coronary artery disease.  This is a patient who in 2007 had stenting of the left anterior descending artery.  He was noted to have a chronically occluded right coronary artery with collateralization.  He has a history of hypertension, mixed hyperlipidemia and prediabetes.        Since I last saw him, he has been doing well.  He has no chest pains, no chest pressure, no unusual shortness of breath.  In the past if he pushed himself, he would get some chest discomfort, but he does not seem to have that in the last year.        Last blood pressure check was in October, which is 9 months ago.  His blood pressure at that stage was 112/62.  He has lost a significant amount of weight, so I suspect his blood pressures in the same or better.        His lipid profile was excellent 3 days ago with an LDL of 47, HDL of 43, and triglycerides of 95 with a total cholesterol of 109.  His basic metabolic profile, however, showed that his potassium was borderline low at 3.4.  His BUN is 14, creatinine 1.24 and GFR 57 with a sodium of 141.  We have advised him to increase his citrus fruit intake, which will increase his potassium.  That would include any of the citrus fruits, such as oranges or orange juices.  Tomatoes as well would work, but he likes oranges and he said he will start using oranges and orange juice.  He admits that normally he does eat a significant amount of fruit, but because of the COVID-19 pandemic, he goes to the store far less frequently and therefore he buys less fruit because they go off.      His ALT was less than 5, indicating no hepatic toxicity from the atorvastatin medication.        On 10/15, his hemoglobin A1c was borderline raised at 5.8.       IMPRESSION:   1.  Coronary artery disease.  The patient is asymptomatic with respect to coronary artery disease.  He has known stenting of the LAD and a chronically occluded right coronary artery which is collateralized.   2.  Excellent lipid profile, well within secondary prevention guidelines on high-intensity statin therapy without evidence of hepatic toxicity.   3.  Borderline hypokalemia with a potassium of 3.4.   4.  Normotensive in October, but no blood pressure recently.      PLAN:  As I mentioned above, we have asked him to increase his intake of citrus fruits or citrus fruit juices.  We will see the patient back again in 1 year's time.  We will repeat his lipids and basic metabolic profile at that stage.  As always, the patient has been told to contact us if he has any questions or any concerns.      cc:      Teetee Hammer NP   [facility]   [address] [address]         CHRISTIANO JOHNSON MD, [facility]             D: 2020   T: 2020   MT: ELISE      Name:     XU BURCH   MRN:      [id number]        Account:      [id number]   :      1950           Service Date: 2020      Document: [id number]

## 2020-07-13 NOTE — TELEPHONE ENCOUNTER
Already gave recommendations to him during phone visit to eat citrus fruit , drink citrus juices tomatoes etc. Alice

## 2020-07-16 ENCOUNTER — HOSPITAL ENCOUNTER (OUTPATIENT)
Facility: CLINIC | Age: 70
Discharge: HOME | End: 2020-07-16
Attending: INTERNAL MEDICINE | Admitting: INTERNAL MEDICINE
Payer: COMMERCIAL

## 2020-07-16 VITALS
HEART RATE: 56 BPM | BODY MASS INDEX: 31.78 KG/M2 | SYSTOLIC BLOOD PRESSURE: 108 MMHG | DIASTOLIC BLOOD PRESSURE: 64 MMHG | RESPIRATION RATE: 26 BRPM | HEIGHT: 70 IN | WEIGHT: 222 LBS | TEMPERATURE: 97.8 F | OXYGEN SATURATION: 95 %

## 2020-07-16 LAB — COLONOSCOPY: NORMAL

## 2020-07-16 PROCEDURE — G0500 MOD SEDAT ENDO SERVICE >5YRS: HCPCS | Performed by: INTERNAL MEDICINE

## 2020-07-16 PROCEDURE — 25000128 H RX IP 250 OP 636: Performed by: INTERNAL MEDICINE

## 2020-07-16 PROCEDURE — 45378 DIAGNOSTIC COLONOSCOPY: CPT | Performed by: INTERNAL MEDICINE

## 2020-07-16 PROCEDURE — 40000104 ZZH STATISTIC MODERATE SEDATION < 10 MIN: Performed by: INTERNAL MEDICINE

## 2020-07-16 PROCEDURE — G0121 COLON CA SCRN NOT HI RSK IND: HCPCS | Performed by: INTERNAL MEDICINE

## 2020-07-16 RX ORDER — ONDANSETRON 2 MG/ML
4 INJECTION INTRAMUSCULAR; INTRAVENOUS
Status: DISCONTINUED | OUTPATIENT
Start: 2020-07-16 | End: 2020-07-16 | Stop reason: HOSPADM

## 2020-07-16 RX ORDER — ONDANSETRON 2 MG/ML
4 INJECTION INTRAMUSCULAR; INTRAVENOUS EVERY 6 HOURS PRN
Status: DISCONTINUED | OUTPATIENT
Start: 2020-07-16 | End: 2020-07-16 | Stop reason: HOSPADM

## 2020-07-16 RX ORDER — LIDOCAINE 40 MG/G
CREAM TOPICAL
Status: DISCONTINUED | OUTPATIENT
Start: 2020-07-16 | End: 2020-07-16 | Stop reason: HOSPADM

## 2020-07-16 RX ORDER — FLUMAZENIL 0.1 MG/ML
0.2 INJECTION, SOLUTION INTRAVENOUS
Status: DISCONTINUED | OUTPATIENT
Start: 2020-07-16 | End: 2020-07-16 | Stop reason: HOSPADM

## 2020-07-16 RX ORDER — NALOXONE HYDROCHLORIDE 0.4 MG/ML
.1-.4 INJECTION, SOLUTION INTRAMUSCULAR; INTRAVENOUS; SUBCUTANEOUS
Status: DISCONTINUED | OUTPATIENT
Start: 2020-07-16 | End: 2020-07-16 | Stop reason: HOSPADM

## 2020-07-16 RX ORDER — ONDANSETRON 4 MG/1
4 TABLET, ORALLY DISINTEGRATING ORAL EVERY 6 HOURS PRN
Status: DISCONTINUED | OUTPATIENT
Start: 2020-07-16 | End: 2020-07-16 | Stop reason: HOSPADM

## 2020-07-16 RX ORDER — FENTANYL CITRATE 50 UG/ML
INJECTION, SOLUTION INTRAMUSCULAR; INTRAVENOUS PRN
Status: DISCONTINUED | OUTPATIENT
Start: 2020-07-16 | End: 2020-07-16 | Stop reason: HOSPADM

## 2020-07-16 ASSESSMENT — MIFFLIN-ST. JEOR: SCORE: 1768.24

## 2020-11-07 DIAGNOSIS — L30.9 DERMATITIS: ICD-10-CM

## 2020-11-07 NOTE — LETTER
November 14, 2020      Fermín Josue  [address]  [facility] [address]        oJssue Fernandes,      We received a refill request for triamcinolone (KENALOG) 0.1 % external cream. We were able to approve the request and sent it to your pharmacy; however, you are due for a visit to receive further refills. Please call us at [phone] at your earliest convenience to schedule an appointment.       We greatly appreciate the opportunity to serve you and thank you for trusting us with your health care.        Your health care team at [facility]             Sincerely,        LONNY Arroyo CNP

## 2020-11-10 RX ORDER — TRIAMCINOLONE ACETONIDE 1 MG/G
CREAM TOPICAL
Qty: 15 G | Refills: 0 | Status: SHIPPED | OUTPATIENT
Start: 2020-11-10 | End: 2021-04-21

## 2020-11-14 NOTE — TELEPHONE ENCOUNTER
LM to call back and schedule a visit. Informed pt of approved med and sent to pharmacy. Pt has no active MyChart. Sent letter.      Shelley MONTEJO  [facility]

## 2020-11-23 NOTE — PROGRESS NOTES
"Fermín Josue is a 70 year old male who is being evaluated via a billable telephone visit.      The patient has been notified of following:     \"This telephone visit will be conducted via a call between you and your physician/provider. We have found that certain health care needs can be provided without the need for a physical exam.  This service lets us provide the care you need with a short phone conversation.  If a prescription is necessary we can send it directly to your pharmacy.  If lab work is needed we can place an order for that and you can then stop by our lab to have the test done at a later time.    Telephone visits are billed at different rates depending on your insurance coverage. During this emergency period, for some insurers they may be billed the same as an in-person visit.  Please reach out to your insurance provider with any questions.    If during the course of the call the physician/provider feels a telephone visit is not appropriate, you will not be charged for this service.\"    Patient has given verbal consent for Telephone visit?  Yes    What phone number would you like to be contacted at? [phone]    How would you like to obtain your AVS? MyChart    Subjective     Fermín Josue is a 70 year old male who presents via phone visit today for the following health issues:    HPI     New Patient/Transfer of Care  Medication Followup of triamcinolone     Taking Medication as prescribed: yes    Side Effects:  None    Medication Helping Symptoms:  yes       Visit scheduled today for refill of kenalog cream.  Hx of itching to ankles related to edema.  He is on furosemide for edema.  Kenalog cream has been effective for itching, he uses it infrequently.  Walking helps with itching.  Edema is at baseline.    Notes he had colonoscopy in July.    He is walking on treadmill at home 50min twice a day.  Notes he is 203lb.  Goal wt 175.  Quit pop and juice, drinking water.  Changed to whole wheat " bread.  Maybe feeling a little better with wt loss but not going out due to pandemic.  He always wears a mask when he is out.      Son in law is working daily and lives with him.  Still living with daughter and 2 grandchildren.  Has 5 daughters.    Mom  of covid 4-5 months ago, was in nursing home.  [place] is hard for him, wife  on [place].  With kids in the house he has to like pako.    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  He is enjoying this but the work is challenging.      No chest pain.    Has been eating bananas and cereal to help with potassium.      Other review of chronic disease:  Prediabetes-  A1C 1 year ago 5.8%    CAD/HTN-  Follows with cardiology for HTN and CAD s/p stent .  Noted to have stable angina unchanged x 12 year, triggered by emotion.  Continued on metoprolol, losartan, furosemide, amlodipine, asa, and lipitor.  He is on furosemide 20mg daily for ankle edema.  He has nitro on hand.    Hypokalemia- K noted to be 3.4 at cards visit 2020.  Was advised on diet changes.    To do:  Update labs (A1C)  [place]  Annual visit      Review of Systems   Constitutional, HEENT, cardiovascular, pulmonary, gi and gu systems are negative, except as otherwise noted.       Objective          Vitals:  No vitals were obtained today due to virtual visit.    healthy, alert and no distress  PSYCH: Alert and oriented times 3; coherent speech, normal   rate and volume, able to articulate logical thoughts, able   to abstract reason, no tangential thoughts, no hallucinations   or delusions  His affect is normal  RESP: No cough, no audible wheezing, able to talk in full sentences  Remainder of exam unable to be completed due to telephone visits    Component      Latest Ref Rng & Units 10/15/2019 7/10/2020   Sodium      136 - 145 mmol/L  141   Potassium      3.5 - 5.1 mmol/L  3.4 (L)   Chloride      98 - 107 mmol/L  102   Carbon Dioxide      23 - 29 mmol/L  32 (H)   Anion  Gap      6 - 17 mmol/L  10.4   Glucose      70 - 105 mg/dL  119 (H)   Urea Nitrogen      7 - 30 mg/dL  14   Creatinine      0.70 - 1.30 mg/dL  1.24   GFR Estimate      >60 mL/min/1.73:m2  57 (L)   GFR Estimate If Black      >60 mL/min/1.73:m2  70   Calcium      8.5 - 10.5 mg/dL  9.6   Cholesterol      <200 mg/dL  109   Triglycerides      <150 mg/dL  95   HDL Cholesterol      >39 mg/dL  43   LDL Cholesterol Calculated      <100 mg/dL  47   Non HDL Cholesterol      <130 mg/dL  66   Hemoglobin A1C      0 - 5.6 % 5.8 (H)            Assessment/Plan:    Assessment & Plan     (R73.03) Pre-diabetes  (primary encounter diagnosis)  Comment: notes 20lb wt loss since july  Plan: **A1C FUTURE anytime        Plan for lab only visit at  for A1C but will delay to feb given current covid surge    (E87.6) Hypokalemia  Comment: has made diet changes  Plan: **Basic metabolic panel FUTURE anytime        Recheck labs as above     (L30.9) Dermatitis  Comment: to ankles related to edema which is stable, notes rare use of steroid cream  Plan: previously refilled, he has 2 tubes which will last him some time    (I10) Hypertension goal BP (blood pressure) < 140/90  Comment: controlled  Plan: follows with cards    (I25.10) Coronary artery disease involving native coronary artery of native heart without angina pectoris  Comment: denies angina today  Plan: follows with cards annually.           Follow up with myself annually, lab visit in feb as above.    No follow-ups on file.    LONNY Arroyo [facility]    Phone call duration:  16 minutes

## 2020-11-24 ENCOUNTER — VIRTUAL VISIT (OUTPATIENT)
Dept: FAMILY MEDICINE | Facility: CLINIC | Age: 70
End: 2020-11-24
Payer: COMMERCIAL

## 2020-11-24 DIAGNOSIS — L30.9 DERMATITIS: ICD-10-CM

## 2020-11-24 DIAGNOSIS — I25.10 CORONARY ARTERY DISEASE INVOLVING NATIVE CORONARY ARTERY OF NATIVE HEART WITHOUT ANGINA PECTORIS: ICD-10-CM

## 2020-11-24 DIAGNOSIS — I10 HYPERTENSION GOAL BP (BLOOD PRESSURE) < 140/90: ICD-10-CM

## 2020-11-24 DIAGNOSIS — E87.6 HYPOKALEMIA: ICD-10-CM

## 2020-11-24 DIAGNOSIS — R73.03 PRE-DIABETES: Primary | ICD-10-CM

## 2020-11-24 PROCEDURE — 99214 OFFICE O/P EST MOD 30 MIN: CPT | Mod: 95 | Performed by: NURSE PRACTITIONER

## 2021-02-17 ENCOUNTER — NURSE TRIAGE (OUTPATIENT)
Dept: NURSING | Facility: CLINIC | Age: 71
End: 2021-02-17

## 2021-02-17 ENCOUNTER — HOSPITAL ENCOUNTER (INPATIENT)
Facility: CLINIC | Age: 71
LOS: 2 days | Discharge: HOME | DRG: 660 | End: 2021-02-19
Attending: EMERGENCY MEDICINE | Admitting: INTERNAL MEDICINE
Payer: COMMERCIAL

## 2021-02-17 ENCOUNTER — APPOINTMENT (OUTPATIENT)
Dept: CT IMAGING | Facility: CLINIC | Age: 71
DRG: 660 | End: 2021-02-17
Attending: EMERGENCY MEDICINE
Payer: COMMERCIAL

## 2021-02-17 DIAGNOSIS — N28.89 LEFT RENAL MASS: ICD-10-CM

## 2021-02-17 DIAGNOSIS — N20.0 KIDNEY STONE: ICD-10-CM

## 2021-02-17 DIAGNOSIS — N20.0 KIDNEY STONE: Primary | ICD-10-CM

## 2021-02-17 LAB
ALBUMIN SERPL-MCNC: 4.2 G/DL (ref 3.4–5)
ALBUMIN UR-MCNC: 10 MG/DL
ALP SERPL-CCNC: 93 U/L (ref 40–150)
ALT SERPL W P-5'-P-CCNC: 28 U/L (ref 0–70)
ANION GAP SERPL CALCULATED.3IONS-SCNC: 3 MMOL/L (ref 3–14)
APPEARANCE UR: CLEAR
AST SERPL W P-5'-P-CCNC: 22 U/L (ref 0–45)
BASOPHILS # BLD AUTO: 0.1 10E9/L (ref 0–0.2)
BASOPHILS NFR BLD AUTO: 0.4 %
BILIRUB SERPL-MCNC: 2.5 MG/DL (ref 0.2–1.3)
BILIRUB UR QL STRIP: NEGATIVE
BUN SERPL-MCNC: 27 MG/DL (ref 7–30)
CALCIUM SERPL-MCNC: 9.4 MG/DL (ref 8.5–10.1)
CHLORIDE SERPL-SCNC: 105 MMOL/L (ref 94–109)
CO2 SERPL-SCNC: 30 MMOL/L (ref 20–32)
COLOR UR AUTO: YELLOW
CREAT SERPL-MCNC: 1.29 MG/DL (ref 0.66–1.25)
DIFFERENTIAL METHOD BLD: ABNORMAL
EOSINOPHIL # BLD AUTO: 0.1 10E9/L (ref 0–0.7)
EOSINOPHIL NFR BLD AUTO: 1 %
ERYTHROCYTE [DISTWIDTH] IN BLOOD BY AUTOMATED COUNT: 12.5 % (ref 10–15)
GFR SERPL CREATININE-BSD FRML MDRD: 55 ML/MIN/{1.73_M2}
GLUCOSE SERPL-MCNC: 148 MG/DL (ref 70–99)
GLUCOSE UR STRIP-MCNC: NEGATIVE MG/DL
HCT VFR BLD AUTO: 44.8 % (ref 40–53)
HGB BLD-MCNC: 15 G/DL (ref 13.3–17.7)
HGB UR QL STRIP: ABNORMAL
IMM GRANULOCYTES # BLD: 0.1 10E9/L (ref 0–0.4)
IMM GRANULOCYTES NFR BLD: 0.6 %
KETONES UR STRIP-MCNC: NEGATIVE MG/DL
LABORATORY COMMENT REPORT: NORMAL
LACTATE BLD-SCNC: 0.9 MMOL/L (ref 0.7–2)
LEUKOCYTE ESTERASE UR QL STRIP: ABNORMAL
LYMPHOCYTES # BLD AUTO: 1.6 10E9/L (ref 0.8–5.3)
LYMPHOCYTES NFR BLD AUTO: 11.9 %
MCH RBC QN AUTO: 31.8 PG (ref 26.5–33)
MCHC RBC AUTO-ENTMCNC: 33.5 G/DL (ref 31.5–36.5)
MCV RBC AUTO: 95 FL (ref 78–100)
MONOCYTES # BLD AUTO: 1.1 10E9/L (ref 0–1.3)
MONOCYTES NFR BLD AUTO: 7.8 %
MUCOUS THREADS #/AREA URNS LPF: PRESENT /LPF
NEUTROPHILS # BLD AUTO: 10.7 10E9/L (ref 1.6–8.3)
NEUTROPHILS NFR BLD AUTO: 78.3 %
NITRATE UR QL: NEGATIVE
NRBC # BLD AUTO: 0 10*3/UL
NRBC BLD AUTO-RTO: 0 /100
PH UR STRIP: 6 PH (ref 5–7)
PLATELET # BLD AUTO: 180 10E9/L (ref 150–450)
POTASSIUM SERPL-SCNC: 4.2 MMOL/L (ref 3.4–5.3)
PROT SERPL-MCNC: 8.1 G/DL (ref 6.8–8.8)
RBC # BLD AUTO: 4.72 10E12/L (ref 4.4–5.9)
RBC #/AREA URNS AUTO: >182 /HPF (ref 0–2)
SARS-COV-2 RNA RESP QL NAA+PROBE: NEGATIVE
SODIUM SERPL-SCNC: 138 MMOL/L (ref 133–144)
SOURCE: ABNORMAL
SP GR UR STRIP: 1.02 (ref 1–1.03)
SPECIMEN SOURCE: NORMAL
SQUAMOUS #/AREA URNS AUTO: 2 /HPF (ref 0–1)
UROBILINOGEN UR STRIP-MCNC: 0 MG/DL (ref 0–2)
WBC # BLD AUTO: 13.7 10E9/L (ref 4–11)
WBC #/AREA URNS AUTO: 1 /HPF (ref 0–5)

## 2021-02-17 PROCEDURE — 36415 COLL VENOUS BLD VENIPUNCTURE: CPT | Performed by: INTERNAL MEDICINE

## 2021-02-17 PROCEDURE — 85025 COMPLETE CBC W/AUTO DIFF WBC: CPT | Performed by: EMERGENCY MEDICINE

## 2021-02-17 PROCEDURE — 80053 COMPREHEN METABOLIC PANEL: CPT | Performed by: EMERGENCY MEDICINE

## 2021-02-17 PROCEDURE — 250N000013 HC RX MED GY IP 250 OP 250 PS 637: Performed by: EMERGENCY MEDICINE

## 2021-02-17 PROCEDURE — 250N000013 HC RX MED GY IP 250 OP 250 PS 637: Performed by: INTERNAL MEDICINE

## 2021-02-17 PROCEDURE — 81001 URINALYSIS AUTO W/SCOPE: CPT | Performed by: EMERGENCY MEDICINE

## 2021-02-17 PROCEDURE — 258N000003 HC RX IP 258 OP 636: Performed by: INTERNAL MEDICINE

## 2021-02-17 PROCEDURE — 99285 EMERGENCY DEPT VISIT HI MDM: CPT | Mod: 25

## 2021-02-17 PROCEDURE — 96375 TX/PRO/DX INJ NEW DRUG ADDON: CPT

## 2021-02-17 PROCEDURE — 74176 CT ABD & PELVIS W/O CONTRAST: CPT

## 2021-02-17 PROCEDURE — 99222 1ST HOSP IP/OBS MODERATE 55: CPT | Mod: AI | Performed by: INTERNAL MEDICINE

## 2021-02-17 PROCEDURE — 87635 SARS-COV-2 COVID-19 AMP PRB: CPT | Performed by: EMERGENCY MEDICINE

## 2021-02-17 PROCEDURE — 96374 THER/PROPH/DIAG INJ IV PUSH: CPT

## 2021-02-17 PROCEDURE — C9803 HOPD COVID-19 SPEC COLLECT: HCPCS

## 2021-02-17 PROCEDURE — 120N000001 HC R&B MED SURG/OB

## 2021-02-17 PROCEDURE — 250N000011 HC RX IP 250 OP 636: Performed by: EMERGENCY MEDICINE

## 2021-02-17 PROCEDURE — 83605 ASSAY OF LACTIC ACID: CPT | Performed by: INTERNAL MEDICINE

## 2021-02-17 RX ORDER — HYDROMORPHONE HYDROCHLORIDE 1 MG/ML
0.2 INJECTION, SOLUTION INTRAMUSCULAR; INTRAVENOUS; SUBCUTANEOUS
Status: DISCONTINUED | OUTPATIENT
Start: 2021-02-17 | End: 2021-02-19 | Stop reason: HOSPADM

## 2021-02-17 RX ORDER — AMOXICILLIN 250 MG
1 CAPSULE ORAL 2 TIMES DAILY
Status: DISCONTINUED | OUTPATIENT
Start: 2021-02-17 | End: 2021-02-19 | Stop reason: HOSPADM

## 2021-02-17 RX ORDER — LOSARTAN POTASSIUM 50 MG/1
50 TABLET ORAL DAILY
Status: DISCONTINUED | OUTPATIENT
Start: 2021-02-18 | End: 2021-02-19 | Stop reason: HOSPADM

## 2021-02-17 RX ORDER — BISACODYL 10 MG
10 SUPPOSITORY, RECTAL RECTAL DAILY PRN
Status: DISCONTINUED | OUTPATIENT
Start: 2021-02-17 | End: 2021-02-19 | Stop reason: HOSPADM

## 2021-02-17 RX ORDER — TAMSULOSIN HYDROCHLORIDE 0.4 MG/1
0.4 CAPSULE ORAL ONCE
Status: DISCONTINUED | OUTPATIENT
Start: 2021-02-17 | End: 2021-02-17

## 2021-02-17 RX ORDER — NALOXONE HYDROCHLORIDE 0.4 MG/ML
0.4 INJECTION, SOLUTION INTRAMUSCULAR; INTRAVENOUS; SUBCUTANEOUS
Status: DISCONTINUED | OUTPATIENT
Start: 2021-02-17 | End: 2021-02-19 | Stop reason: HOSPADM

## 2021-02-17 RX ORDER — LIDOCAINE 40 MG/G
CREAM TOPICAL
Status: DISCONTINUED | OUTPATIENT
Start: 2021-02-17 | End: 2021-02-19 | Stop reason: HOSPADM

## 2021-02-17 RX ORDER — KETOROLAC TROMETHAMINE 15 MG/ML
15 INJECTION, SOLUTION INTRAMUSCULAR; INTRAVENOUS ONCE
Status: COMPLETED | OUTPATIENT
Start: 2021-02-17 | End: 2021-02-17

## 2021-02-17 RX ORDER — NITROGLYCERIN 0.4 MG/1
0.4 TABLET SUBLINGUAL EVERY 5 MIN PRN
Status: DISCONTINUED | OUTPATIENT
Start: 2021-02-17 | End: 2021-02-19 | Stop reason: HOSPADM

## 2021-02-17 RX ORDER — NALOXONE HYDROCHLORIDE 0.4 MG/ML
0.2 INJECTION, SOLUTION INTRAMUSCULAR; INTRAVENOUS; SUBCUTANEOUS
Status: DISCONTINUED | OUTPATIENT
Start: 2021-02-17 | End: 2021-02-19 | Stop reason: HOSPADM

## 2021-02-17 RX ORDER — HYDROMORPHONE HYDROCHLORIDE 1 MG/ML
0.5 INJECTION, SOLUTION INTRAMUSCULAR; INTRAVENOUS; SUBCUTANEOUS
Status: DISCONTINUED | OUTPATIENT
Start: 2021-02-17 | End: 2021-02-17 | Stop reason: DRUGHIGH

## 2021-02-17 RX ORDER — ONDANSETRON 2 MG/ML
4 INJECTION INTRAMUSCULAR; INTRAVENOUS EVERY 6 HOURS PRN
Status: DISCONTINUED | OUTPATIENT
Start: 2021-02-17 | End: 2021-02-19 | Stop reason: HOSPADM

## 2021-02-17 RX ORDER — AMOXICILLIN 250 MG
2 CAPSULE ORAL 2 TIMES DAILY
Status: DISCONTINUED | OUTPATIENT
Start: 2021-02-17 | End: 2021-02-19 | Stop reason: HOSPADM

## 2021-02-17 RX ORDER — TAMSULOSIN HYDROCHLORIDE 0.4 MG/1
0.4 CAPSULE ORAL DAILY
Status: DISCONTINUED | OUTPATIENT
Start: 2021-02-17 | End: 2021-02-19 | Stop reason: HOSPADM

## 2021-02-17 RX ORDER — ONDANSETRON 4 MG/1
4 TABLET, ORALLY DISINTEGRATING ORAL EVERY 6 HOURS PRN
Status: DISCONTINUED | OUTPATIENT
Start: 2021-02-17 | End: 2021-02-19 | Stop reason: HOSPADM

## 2021-02-17 RX ORDER — AMOXICILLIN 250 MG
2 CAPSULE ORAL 2 TIMES DAILY PRN
Status: DISCONTINUED | OUTPATIENT
Start: 2021-02-17 | End: 2021-02-19 | Stop reason: HOSPADM

## 2021-02-17 RX ORDER — METOPROLOL SUCCINATE 25 MG/1
50 TABLET, EXTENDED RELEASE ORAL DAILY
Status: DISCONTINUED | OUTPATIENT
Start: 2021-02-18 | End: 2021-02-18

## 2021-02-17 RX ORDER — KETOROLAC TROMETHAMINE 15 MG/ML
15 INJECTION, SOLUTION INTRAMUSCULAR; INTRAVENOUS EVERY 6 HOURS PRN
Status: DISCONTINUED | OUTPATIENT
Start: 2021-02-18 | End: 2021-02-19 | Stop reason: HOSPADM

## 2021-02-17 RX ORDER — POLYETHYLENE GLYCOL 3350 17 G/17G
17 POWDER, FOR SOLUTION ORAL DAILY PRN
Status: DISCONTINUED | OUTPATIENT
Start: 2021-02-17 | End: 2021-02-19 | Stop reason: HOSPADM

## 2021-02-17 RX ORDER — ACETAMINOPHEN 325 MG/1
650 TABLET ORAL EVERY 4 HOURS PRN
Status: DISCONTINUED | OUTPATIENT
Start: 2021-02-17 | End: 2021-02-19 | Stop reason: HOSPADM

## 2021-02-17 RX ORDER — OXYCODONE HYDROCHLORIDE 5 MG/1
5-10 TABLET ORAL
Status: DISCONTINUED | OUTPATIENT
Start: 2021-02-17 | End: 2021-02-19 | Stop reason: HOSPADM

## 2021-02-17 RX ORDER — AMOXICILLIN 250 MG
1 CAPSULE ORAL 2 TIMES DAILY PRN
Status: DISCONTINUED | OUTPATIENT
Start: 2021-02-17 | End: 2021-02-19 | Stop reason: HOSPADM

## 2021-02-17 RX ORDER — SODIUM CHLORIDE 9 MG/ML
INJECTION, SOLUTION INTRAVENOUS CONTINUOUS
Status: DISCONTINUED | OUTPATIENT
Start: 2021-02-17 | End: 2021-02-19 | Stop reason: HOSPADM

## 2021-02-17 RX ORDER — ATORVASTATIN CALCIUM 40 MG/1
40 TABLET, FILM COATED ORAL DAILY
Status: DISCONTINUED | OUTPATIENT
Start: 2021-02-18 | End: 2021-02-19 | Stop reason: HOSPADM

## 2021-02-17 RX ORDER — AMLODIPINE BESYLATE 2.5 MG/1
2.5 TABLET ORAL DAILY
Status: DISCONTINUED | OUTPATIENT
Start: 2021-02-18 | End: 2021-02-19 | Stop reason: HOSPADM

## 2021-02-17 RX ADMIN — TAMSULOSIN HYDROCHLORIDE 0.4 MG: 0.4 CAPSULE ORAL at 22:39

## 2021-02-17 RX ADMIN — OXYCODONE HYDROCHLORIDE 5 MG: 5 TABLET ORAL at 22:48

## 2021-02-17 RX ADMIN — DOCUSATE SODIUM 50 MG AND SENNOSIDES 8.6 MG 1 TABLET: 8.6; 5 TABLET, FILM COATED ORAL at 22:40

## 2021-02-17 RX ADMIN — HYDROMORPHONE HYDROCHLORIDE 0.5 MG: 1 INJECTION, SOLUTION INTRAMUSCULAR; INTRAVENOUS; SUBCUTANEOUS at 20:31

## 2021-02-17 RX ADMIN — TAMSULOSIN HYDROCHLORIDE 0.4 MG: 0.4 CAPSULE ORAL at 21:05

## 2021-02-17 RX ADMIN — SODIUM CHLORIDE: 9 INJECTION, SOLUTION INTRAVENOUS at 22:42

## 2021-02-17 RX ADMIN — KETOROLAC TROMETHAMINE 15 MG: 15 INJECTION, SOLUTION INTRAMUSCULAR; INTRAVENOUS at 20:45

## 2021-02-17 ASSESSMENT — ENCOUNTER SYMPTOMS
CONSTIPATION: 0
FLANK PAIN: 1
DYSURIA: 0
FEVER: 0
DIARRHEA: 0
NAUSEA: 0
HEMATURIA: 1
VOMITING: 0

## 2021-02-17 ASSESSMENT — MIFFLIN-ST. JEOR: SCORE: 1708.8

## 2021-02-17 NOTE — TELEPHONE ENCOUNTER
Triage Call:    -Patient in the last two day patient has had blood in his urine, small amount.  -Patient today is still having blood present in his urine  -Patient states he is having back pain earlier today which subsided after applying a heating pad  -Patient has a history of kidney stones.   -Patient states he started to have abdominal pain within the last 1-2 hours.  -Pain is moderate, but patient is concerned it will turn to being severe.   -Pain is currently 6-7/10.   -Patient is on water pills so has frequency baseline.     -Denies any back or flank pain currently  -Denies any fevers, blood in stool, vomiting, nausea, testicular or scrotal pain.   -Denies pure blood or large clots in urine  -Denies any chest pain or SOB  -Denies burning with urination  -Not on any blood thinners  -Denies any recent back or abdominal injury or genital injury  -Denies bladder feeling full or inability to urinate    Per protocol recommendations are for patient to see HCP within the next 4 hours. UCC is usually recommended, but patient is concerned pain might get worse and will need to go to ED anyway. RN advised that she will need to do secondary triage with on call provider to make final decision if UCC is appropriate ot if ED is recommended.     Dr. Chano Culver on call  -Paged at 5:51pm  -2nd page at 6:02pm   Call back at: 6:08pm  Recommendations/provider advise:  ER for further evaluation. Patient should go to ED now per provider recommendation. RN called patient back and informed him that he needs to be seen in ER now. Patient will have his wife drive him to ED. RN advised patient once seen in ER to call back with any new or worsening sx. Patient verbalized understanding and agrees with plan.       Ana King RN, BSN Nurse Triage Advisor 5:58 PM 2/17/2021     Additional Information    Negative: Shock suspected (e.g., cold/pale/clammy skin, too weak to stand, low BP, rapid pulse)    Negative: Sounds like a  "life-threatening emergency to the triager    Negative: Urinary catheter, questions about    Negative: Recent back or abdominal injury    Negative: Recent genital injury    Negative: [1] Unable to urinate (or only a few drops) > 4 hours AND [2] bladder feels very full (e.g., palpable bladder or strong urge to urinate)    Negative: Passing pure blood or large blood clots (i.e., size > a dime) (Exception: wilian or small strands)    Negative: Fever > 100.5 F (38.1 C)    Negative: Patient sounds very sick or weak to the triager    Negative: Taking Coumadin (warfarin) or other strong blood thinner, or known bleeding disorder (e.g., thrombocytopenia)    Negative: Known sickle cell disease    Negative: Side (flank) or back pain present     Pain in back was present earlier today, but currently no back pain    Negative: Pain or burning with passing urine    Negative: [1] Pink or red-colored urine and likely from food (beets, rhubarb, red food dye) AND [2] lasts > 24 hours after stopping food    Blood in urine  (Exception: could be normal menstrual bleeding)    Negative: Difficult to awaken or acting confused (e.g., disoriented, slurred speech)    Negative: Shock suspected (e.g., cold/pale/clammy skin, too weak to stand, low BP, rapid pulse)    Negative: Passed out (i.e., lost consciousness, collapsed and was not responding)    Negative: Sounds like a life-threatening emergency to the triager    Negative: Chest pain    Negative: Pain is mainly in upper abdomen  (if needed ask: \"is it mainly above the belly button?\")    Negative: Followed an abdomen (stomach) injury    Negative: [1] Vomiting AND [2] contains red blood or black (\"coffee ground\") material  (Exception: few red streaks in vomit that only happened once)    Negative: Blood in bowel movements  (Exception: Blood on surface of BM with constipation)    Negative: Black or tarry bowel movements  (Exception: chronic-unchanged  black-grey bowel movements AND is taking iron " pills or Pepto-bismol)    Negative: [1] Unable to urinate (or only a few drops) > 4 hours AND [2] bladder feels very full (e.g., palpable bladder or strong urge to urinate)    Negative: [1] SEVERE pain (e.g., excruciating) AND [2] present > 1 hour    Negative: [1] SEVERE pain AND [2] age > 60    Negative: [1] Pain in the scrotum or testicle AND [2] present > 1 hour    Negative: Patient sounds very sick or weak to the triager    [1] MILD-MODERATE pain AND [2] constant AND [3] present > 2 hours    Protocols used: URINE - BLOOD IN-A-AH, ABDOMINAL PAIN - MALE-A-AH    COVID 19 Nurse Triage Plan/Patient Instructions    Please be aware that novel coronavirus (COVID-19) may be circulating in the community. If you develop symptoms such as fever, cough, or SOB or if you have concerns about the presence of another infection including coronavirus (COVID-19), please contact your health care provider or visit [email or web address].     Disposition/Instructions    ED Visit recommended. Follow protocol based instructions.     Bring Your Own Device:  Please also bring your smart device(s) (smart phones, tablets, laptops) and their charging cables for your personal use and to communicate with your care team during your visit.    Thank you for taking steps to prevent the spread of this virus.  o Limit your contact with others.  o Wear a simple mask to cover your cough.  o Wash your hands well and often.    Resources    [facility]: About COVID-19: [email or web address]    CDC: What to Do If You're Sick: www.cdc.gov/coronavirus/2019-ncov/about/steps-when-sick.html    CDC: Ending Home Isolation: www.cdc.gov/coronavirus/2019-ncov/hcp/disposition-in-home-patients.html     CDC: Caring for Someone: www.cdc.gov/coronavirus/2019-ncov/if-you-are-sick/care-for-someone.html     [facility]: Interim Guidance for Hospital Discharge to Home: www.health.[facility]/diseases/coronavirus/hcp/hospdischarge.pdf    [facility]  trials (COVID-19 research studies): [email or web address]     Below are the COVID-19 hotlines at the [place] [facility] ([facility]). Interpreters are available.   o For health questions: Call 976-027-4779 or 1-427.613.1675 (7 a.m. to 7 p.m.)  o For questions about schools and childcare: Call 451-482-6276 or 1-472.211.8328 (7 a.m. to 7 p.m.)

## 2021-02-18 ENCOUNTER — APPOINTMENT (OUTPATIENT)
Dept: MRI IMAGING | Facility: CLINIC | Age: 71
DRG: 660 | End: 2021-02-18
Attending: INTERNAL MEDICINE
Payer: COMMERCIAL

## 2021-02-18 LAB
ANION GAP SERPL CALCULATED.3IONS-SCNC: 3 MMOL/L (ref 3–14)
BUN SERPL-MCNC: 30 MG/DL (ref 7–30)
CALCIUM SERPL-MCNC: 8.6 MG/DL (ref 8.5–10.1)
CHLORIDE SERPL-SCNC: 109 MMOL/L (ref 94–109)
CO2 SERPL-SCNC: 28 MMOL/L (ref 20–32)
CREAT SERPL-MCNC: 1.33 MG/DL (ref 0.66–1.25)
ERYTHROCYTE [DISTWIDTH] IN BLOOD BY AUTOMATED COUNT: 12.6 % (ref 10–15)
GFR SERPL CREATININE-BSD FRML MDRD: 53 ML/MIN/{1.73_M2}
GLUCOSE SERPL-MCNC: 99 MG/DL (ref 70–99)
HCT VFR BLD AUTO: 40.5 % (ref 40–53)
HGB BLD-MCNC: 13.2 G/DL (ref 13.3–17.7)
MCH RBC QN AUTO: 31.7 PG (ref 26.5–33)
MCHC RBC AUTO-ENTMCNC: 32.6 G/DL (ref 31.5–36.5)
MCV RBC AUTO: 97 FL (ref 78–100)
PLATELET # BLD AUTO: 127 10E9/L (ref 150–450)
POTASSIUM SERPL-SCNC: 4.2 MMOL/L (ref 3.4–5.3)
RBC # BLD AUTO: 4.16 10E12/L (ref 4.4–5.9)
SODIUM SERPL-SCNC: 140 MMOL/L (ref 133–144)
WBC # BLD AUTO: 6.7 10E9/L (ref 4–11)

## 2021-02-18 PROCEDURE — 258N000003 HC RX IP 258 OP 636: Performed by: INTERNAL MEDICINE

## 2021-02-18 PROCEDURE — 99232 SBSQ HOSP IP/OBS MODERATE 35: CPT | Performed by: INTERNAL MEDICINE

## 2021-02-18 PROCEDURE — 80048 BASIC METABOLIC PNL TOTAL CA: CPT | Performed by: INTERNAL MEDICINE

## 2021-02-18 PROCEDURE — 120N000001 HC R&B MED SURG/OB

## 2021-02-18 PROCEDURE — A9585 GADOBUTROL INJECTION: HCPCS | Performed by: INTERNAL MEDICINE

## 2021-02-18 PROCEDURE — 36415 COLL VENOUS BLD VENIPUNCTURE: CPT | Performed by: INTERNAL MEDICINE

## 2021-02-18 PROCEDURE — 85027 COMPLETE CBC AUTOMATED: CPT | Performed by: INTERNAL MEDICINE

## 2021-02-18 PROCEDURE — 255N000002 HC RX 255 OP 636: Performed by: INTERNAL MEDICINE

## 2021-02-18 PROCEDURE — 250N000011 HC RX IP 250 OP 636: Performed by: INTERNAL MEDICINE

## 2021-02-18 PROCEDURE — 74183 MRI ABD W/O CNTR FLWD CNTR: CPT

## 2021-02-18 PROCEDURE — 250N000013 HC RX MED GY IP 250 OP 250 PS 637: Performed by: INTERNAL MEDICINE

## 2021-02-18 RX ORDER — GADOBUTROL 604.72 MG/ML
9 INJECTION INTRAVENOUS ONCE
Status: COMPLETED | OUTPATIENT
Start: 2021-02-18 | End: 2021-02-18

## 2021-02-18 RX ORDER — METOPROLOL SUCCINATE 25 MG/1
25 TABLET, EXTENDED RELEASE ORAL DAILY
Status: DISCONTINUED | OUTPATIENT
Start: 2021-02-19 | End: 2021-02-19

## 2021-02-18 RX ADMIN — OXYCODONE HYDROCHLORIDE 5 MG: 5 TABLET ORAL at 06:28

## 2021-02-18 RX ADMIN — KETOROLAC TROMETHAMINE 15 MG: 15 INJECTION, SOLUTION INTRAMUSCULAR; INTRAVENOUS at 02:23

## 2021-02-18 RX ADMIN — OXYCODONE HYDROCHLORIDE 10 MG: 5 TABLET ORAL at 19:09

## 2021-02-18 RX ADMIN — DOCUSATE SODIUM 50 MG AND SENNOSIDES 8.6 MG 2 TABLET: 8.6; 5 TABLET, FILM COATED ORAL at 20:18

## 2021-02-18 RX ADMIN — OXYCODONE HYDROCHLORIDE 5 MG: 5 TABLET ORAL at 15:59

## 2021-02-18 RX ADMIN — AMLODIPINE BESYLATE 2.5 MG: 2.5 TABLET ORAL at 09:47

## 2021-02-18 RX ADMIN — SODIUM CHLORIDE: 9 INJECTION, SOLUTION INTRAVENOUS at 23:34

## 2021-02-18 RX ADMIN — KETOROLAC TROMETHAMINE 15 MG: 15 INJECTION, SOLUTION INTRAMUSCULAR; INTRAVENOUS at 23:43

## 2021-02-18 RX ADMIN — POLYETHYLENE GLYCOL 3350 17 G: 17 POWDER, FOR SOLUTION ORAL at 16:00

## 2021-02-18 RX ADMIN — GADOBUTROL 9 ML: 604.72 INJECTION INTRAVENOUS at 05:41

## 2021-02-18 RX ADMIN — KETOROLAC TROMETHAMINE 15 MG: 15 INJECTION, SOLUTION INTRAMUSCULAR; INTRAVENOUS at 10:01

## 2021-02-18 RX ADMIN — OXYCODONE HYDROCHLORIDE 10 MG: 5 TABLET ORAL at 22:43

## 2021-02-18 RX ADMIN — OXYCODONE HYDROCHLORIDE 5 MG: 5 TABLET ORAL at 13:05

## 2021-02-18 RX ADMIN — ATORVASTATIN CALCIUM 40 MG: 40 TABLET, FILM COATED ORAL at 09:47

## 2021-02-18 RX ADMIN — KETOROLAC TROMETHAMINE 15 MG: 15 INJECTION, SOLUTION INTRAMUSCULAR; INTRAVENOUS at 15:59

## 2021-02-18 RX ADMIN — DOCUSATE SODIUM 50 MG AND SENNOSIDES 8.6 MG 2 TABLET: 8.6; 5 TABLET, FILM COATED ORAL at 09:48

## 2021-02-18 RX ADMIN — LOSARTAN POTASSIUM 50 MG: 50 TABLET, FILM COATED ORAL at 09:46

## 2021-02-18 RX ADMIN — OXYCODONE HYDROCHLORIDE 5 MG: 5 TABLET ORAL at 02:20

## 2021-02-18 RX ADMIN — SODIUM CHLORIDE: 9 INJECTION, SOLUTION INTRAVENOUS at 15:37

## 2021-02-18 RX ADMIN — OXYCODONE HYDROCHLORIDE 5 MG: 5 TABLET ORAL at 09:46

## 2021-02-18 RX ADMIN — TAMSULOSIN HYDROCHLORIDE 0.4 MG: 0.4 CAPSULE ORAL at 09:46

## 2021-02-18 ASSESSMENT — ACTIVITIES OF DAILY LIVING (ADL)
ADLS_ACUITY_SCORE: 14

## 2021-02-18 NOTE — PLAN OF CARE
A&Ox4. VSS on RA. C/o left flank pain, oxy x2 and toradol x1 effective. Up ind in room. Voiding via urinal at bedside, straining urine, slight hematuria noted but no stones. PIV infusing NS @ 125. Discharge pending stone resolution, urology saw this AM, to OR 2/19 at 0730 for stone. Will f/up as op re mass.

## 2021-02-18 NOTE — PLAN OF CARE
A&Ox4. VSS on RA. C/o left flank pain, oxy x1 and toradol x1 effective. Up ind in room. Voiding via urinal at bedside, straining urine, slight hematuria noted but no stones. PIV infusing NS @ 125. Discharge pending stone resolution, urology to see this AM. MRI completed overnight. Slept between cares.

## 2021-02-18 NOTE — PROGRESS NOTES
[facility]    Medicine Progress Note - Hospitalist Service       Date of Admission:  2/17/2021  Assessment & Plan       Art Josue is a 71-year-old with remote history of kidney stones 15 years ago, now admitted with left-sided flank pain with a CT scan showing a 5 mm proximal left UPJ stone that appears to be obstructed, but no hydronephrosis, also with the incidental possible kidney mass versus complex cyst being admitted for further treatment.     Obstructing left UPJ 5 mm kidney stone  Presented with left flank pain and found to have a renal stone.  Has a history of them and has undergone ureteroscopy, kidney stone laser treatment and stenting.    - PRN analgesics  - Flomax 0.4 mg daily   - Urology consulted and appreciate their recommendations.  NPO at midnight for planned procedure tomorrow      Renal mass concerning for renal cell  Seen on n the CT scan without contrast there was noted an incidental solid-appearing mass or hyperdense cyst in the lower pole of the left kidney that is indeterminate.   * MRI Abdomen:  1.  Lobulated 3.9 x 5.8 x 7.1 cm complex mass involving the left lower renal pole. This mass demonstrates heterogeneous early arterial enhancement on subtracted images and would be most compatible with a renal cell carcinoma until proven otherwise. This   protrudes into the left lower renal pole hilum.  2.  No other definitive enhancing renal masses. Bilateral renal cysts.  3.  No lymphadenopathy. Renal veins patent. Adrenal glands negative.  4.  Small hepatic cyst.  - Will defer management to urology.  No urgent indication for surgery.  Will follow up in outpatient clinic in 1-2 weeks    H/o CAD  HTN   No issues currently   - PTA Norvasc, lipitor, Losartan and Toprol XL   - Holding PTA ASA        Diet: Regular Diet Adult  NPO per Anesthesia Guidelines for Procedure/Surgery Except for: Meds    DVT Prophylaxis: Pneumatic Compression Devices  [place] Catheter: not  present  Code Status: Full Code           Disposition Plan   Expected discharge: 1-2 days, recommended to prior living arrangement once urologic evaluation complete.  Entered: Mark Hernandez DO 02/18/2021, 2:49 PM       The patient's care was discussed with the Bedside Nurse, Care Coordinator/ and Patient.    Mark Hernandez DO  Hospitalist Service  [facility]  Contact information available via [facility] Paging/Directory    ______________________________________________________________________    Interval History   Patient seen and examined. No acute events over night.  Flank pain is now controlled. No fevers or chills.  No chest pain or SOB.      Data reviewed today: I reviewed all medications, new labs and imaging results over the last 24 hours. I personally reviewed MRI results as below     Physical Exam   Vital Signs: Temp: 95.7  F (35.4  C) Temp src: Oral BP: 115/59 Pulse: 50   Resp: 18 SpO2: 95 % O2 Device: None (Room air)    Weight: 210 lbs 0 oz  General Appearance: Resting comfortably.  NAD  Respiratory: Clear to auscultation.  No respiratory distress  Cardiovascular: Bradycardiac.  No obvious murmurs  GI: Bowel sounds noted.  Non-tender  Skin: No obvious rashes or cyanosis  Other: No edema.  No calf tenderness      Data   Recent Labs   Lab 02/18/21  0710 02/17/21  1848   WBC 6.7 13.7*   HGB 13.2* 15.0   MCV 97 95   * 180    138   POTASSIUM 4.2 4.2   CHLORIDE 109 105   CO2 28 30   BUN 30 27   CR 1.33* 1.29*   ANIONGAP 3 3   CANDICE 8.6 9.4   GLC 99 148*   ALBUMIN  --  4.2   PROTTOTAL  --  8.1   BILITOTAL  --  2.5*   ALKPHOS  --  93   ALT  --  28   AST  --  22     Recent Results (from the past 24 hour(s))   CT Abdomen Pelvis w/o Contrast    Narrative    CT ABDOMEN AND PELVIS WITHOUT CONTRAST 2/17/2021 8:40 PM    CLINICAL HISTORY: Flank pain, kidney stone suspected.    TECHNIQUE: CT scan of the abdomen and pelvis was performed without IV  contrast. Multiplanar  reformats were obtained. Dose reduction  techniques were used.    CONTRAST: None.    COMPARISON: None.    FINDINGS:   LOWER CHEST: Normal.    HEPATOBILIARY: Normal.    PANCREAS: Normal.    SPLEEN: Normal.    ADRENAL GLANDS: Normal.    KIDNEYS/BLADDER: Nonobstructing left renal collecting system stones  are present with left hydronephrosis and an obstructing UPJ stone  measuring 0.5 cm on image 84 of series 3. No right urinary tract  calculi or hydronephrosis. Bladder is unremarkable. Upper pole left  renal cyst measures water density consistent with a simple cyst. There  is a questionable lobulated mass in the lower pole of the left kidney  measuring 6.4 x 5.6 cm on series 4, image 54, best seen on the coronal  reformatted images.    BOWEL: No bowel obstruction, diverticulitis or appendicitis.    LYMPH NODES: No enlarged abdominal or pelvic lymph nodes.    VASCULATURE: Calcified plaque abdominal aorta without aneurysm.    PELVIC ORGANS: Prostate gland is normal in size with a few scattered  calcified areas. Rectum is unremarkable. Small fat-containing inguinal  hernias are present.    OTHER: None.    MUSCULOSKELETAL: Degenerative spine changes.      Impression    IMPRESSION:   1.  Obstructing left UPJ stone measures 0.5 cm. Nonobstructing stones  left renal collecting system are also noted. No right urinary tract  calculi or hydronephrosis.    2.  Solid-appearing mass or hyperdense cyst lower pole left kidney is  indeterminate. Follow-up nonemergent MRI of the kidneys could be  performed for further assessment.    3.  Upper pole left renal cyst. No further follow-up required.    JAVED ABDALLA MD   MR Abdomen w/o & w Contrast    Narrative    EXAM: MR ABDOMEN W/O and W CONTRAST  LOCATION: [facility]  DATE/TIME: 2/18/2021 5:24 AM    INDICATION: Indeterminant suspicious left renal mass on noncontrast CT 2/17/2021, MRI recommended for further characterization.    COMPARISON: CT renal stone protocol  2/17/2021.    TECHNIQUE: Routine MRI abdomen protocol including T1 in/out phase, diffusion, multiplane T2, and dynamic T1 with IV contrast.     CONTRAST: 9 mL Gadavist.    FINDINGS:  GENITOURINARY/ADRENAL GLANDS: Lobulated 3.9 x 5.8 x 7.1 cm complex mass involving the left lower renal pole. This complex mass demonstrates heterogeneous enhancement on early arterial phase imaging, including subtracted imaging. Findings would be most   compatible with a renal cell carcinoma until proven otherwise. The superior aspect of this mass protrudes into the left renal hilum. No other definitive enhancing renal masses. Small bilateral renal cysts. Moderate left-sided hydronephrosis and   perinephric edema extending down to the level of an obstructing proximal left ureteral calculus measuring 5 mm. Adrenal glands negative.    ADDITIONAL FINDINGS: Homogeneous enhancement to the liver. Small hepatic cyst. No biliary dilatation or calcified gallstones. Pancreas and spleen unremarkable. Normal caliber abdominal aorta. No lymphadenopathy. Renal veins patent. Colonic   diverticulosis.      Impression    IMPRESSION:  1.  Lobulated 3.9 x 5.8 x 7.1 cm complex mass involving the left lower renal pole. This mass demonstrates heterogeneous early arterial enhancement on subtracted images and would be most compatible with a renal cell carcinoma until proven otherwise. This   protrudes into the left lower renal pole hilum.    2.  No other definitive enhancing renal masses. Bilateral renal cysts.    3.  No lymphadenopathy. Renal veins patent. Adrenal glands negative.    4.  Small hepatic cyst.

## 2021-02-18 NOTE — ED NOTES
"[facility]  ED Nurse Handoff Report    ED Chief complaint: Flank Pain      ED Diagnosis:   Final diagnoses:   None       Code Status: Full Code    Allergies:   Allergies   Allergen Reactions     No Known Drug Allergies        Patient Story: Pt. States he started having back pain yesterday, and blood in the urine today.  Focused Assessment:  A/Ox4, Left lower quadrant pain that comes and goes.     Treatments and/or interventions provided:  IV, labs, pain meds  Patient's response to treatments and/or interventions: Tolerated well    To be done/followed up on inpatient unit:  Monitor    Does this patient have any cognitive concerns?: None    Activity level - Baseline/Home:  Independent  Activity Level - Current:   Independent    Patient's Preferred language: English   Needed?: No    Isolation: None  Infection: Not Applicable  Patient tested for COVID 19 prior to admission: Yes  Bariatric?: No    Vital Signs:   Vitals:    02/17/21 1838 02/17/21 2009   BP: (!) 140/76 (!) 165/82   Pulse: 59 60   Resp: 20    Temp: 98.3  F (36.8  C)    TempSrc: Oral    SpO2: 96% 100%   Weight: 95.3 kg (210 lb)    Height: 1.778 m (5' 10\")        Cardiac Rhythm:     Was the PSS-3 completed:   Yes  What interventions are required if any?               Family Comments: Wife at home, home schooling 2 kids  OBS brochure/video discussed/provided to patient/family: N/A              Name of person given brochure if not patient: NA              Relationship to patient: NA    For the majority of the shift this patient's behavior was Green.   Behavioral interventions performed were None    ED NURSE PHONE NUMBER: RN [id number]         "

## 2021-02-18 NOTE — CONSULTS
[facility]  Urology Consult Note  Name: Fermín Josue    MRN: [id number]  YOB: 1950    Age: 71 year old  Date of admission: 2/17/2021  Primary care provider: Teetee Hammer     Requesting Physician:  Dr. Altamirano  Reason for consult:  Proximal ureteral stone, left renal mass           History of Present Illness:   Fermín Josue is a 71 year old male, seen at the request of Dr. Mitchell, who presents with flank pain.  PMHx of HTN, HLD, CAD (s/p 3 stents), sleep apnea, kidney stones.    Dom reports onset of left flank pain yesterday afternoon that felt similar to the pain he had with kidney stone episode 15 years ago.  Per his report, he required laser lithotripsy at that time and has not had another stone episode since then (unable to find any record of this in our clinic's system or [facility]).  He presented to [facility] for evaluation in the ED.  He was found to be afebrile and vitally stable aside from hypertension.  Labs notable for Cr 1.29, WBC 13.7, UA with >182 RBC, 1 WBC, small leuk est, negative nitrite.  CT a/p revealed 5 mm proximal left ureteral stone with hydro, upper pole left renal cyst, and a lobulated mass in the lower pole of the left kidney.  MRI was recommended for further characterization of the left lower pole mass.  MRI revealed lobulated 3.9 x 5.8 x 7.1 cm complex mass involving the left lower renal pole that protrudes into the left lower renal pole hilum.  He was admitted for further management.    Pain was initially in the flank then this morning moved more to the left mid to lower abdomen.  When evaluated by urology this morning, he is currently pain-free, but just received pain medication prior to visit.  He had hematuria on a couple occasions 2-3 days ago, but denies hematuria currently.  He also denies fever, chills, nausea, vomiting, dysuria, urinary frequency or urgency.  No FHx of bladder, kidney, or prostate cancer.              Past  Medical History:     Past Medical History:   Diagnosis Date     CAD (coronary artery disease) 2015    3 stents     History of angina      History of skin graft     Right lower limb     Hyperlipidaemia      Hypertension goal BP (blood pressure) < 140/90 2015     Sleep apnea              Past Surgical History:     Past Surgical History:   Procedure Laterality Date     COLONOSCOPY N/A 2020    Procedure: COLONOSCOPY;  Surgeon: Wenceslao Marie MD;  Location:  GI     HEART CATH, ANGIOPLASTY  2007    mid to prox LAD drug-eluting stent x2; Occluded RCA with Collaterals     SKIN GRAFT, EACH ADDN 100SQCM       SURGICAL HISTORY OF -       kidney stone laser and ureteric stenting               Social History:     Social History     Tobacco Use     Smoking status: Former Smoker     Packs/day: 1.50     Years: 44.00     Pack years: 66.00     Types: Cigarettes     Start date: 1963     Quit date: 2007     Years since quittin.6     Smokeless tobacco: Never Used   Substance Use Topics     Alcohol use: Yes     Comment: beer - 12 pack month             Family History:     Family History   Problem Relation Age of Onset     Cancer Maternal Grandfather      Cancer Maternal Uncle         lung     Blood Disease Maternal Grandmother         leukemia     Hypertension Mother      Alzheimer Disease Father              Allergies:     Allergies   Allergen Reactions     No Known Drug Allergies              Medications:       amLODIPine  2.5 mg Oral Daily     atorvastatin  40 mg Oral Daily     losartan  50 mg Oral Daily     metoprolol succinate ER  50 mg Oral Daily     senna-docusate  1 tablet Oral BID    Or     senna-docusate  2 tablet Oral BID     tamsulosin  0.4 mg Oral Daily             Review of Systems:   A comprehensive greater than 10 system review of systems was carried out.  Pertinent positives and negatives are noted above.  Otherwise negative for contributory info.            Physical Exam:    "  Blood pressure 115/59, pulse 50, temperature 95.7  F (35.4  C), temperature source Oral, resp. rate 18, height 1.778 m (5' 10\"), weight 95.3 kg (210 lb), SpO2 95 %.    Intake/Output Summary (Last 24 hours) at 2/18/2021 1008  Last data filed at 2/18/2021 0922  Gross per 24 hour   Intake --   Output 250 ml   Net -250 ml     Exam:  General - Awake alert and oriented, appears stated age  Pulm - Non-labored breathing with normal respiratory effort  CVS - reg rate and rhythm, no peripheral edema  Abd - Obese, soft, non-tender, non-distended.  No guarding, rigidity or peritoneal signs.    - no CVAT bilat  Neuro - CN II-XII grossly intact  Musculoskeletal - extremities with no clubbing, cyanosis or edema  Psych - responsive, alert, cooperative; oriented x3; appropriate mood and affect  External/skin - inspection reveals no rashes, lesions or ulcers, normal coloring           Data Reviewed:     Recent Results (from the past 24 hour(s))   CT Abdomen Pelvis w/o Contrast    Narrative    CT ABDOMEN AND PELVIS WITHOUT CONTRAST 2/17/2021 8:40 PM    CLINICAL HISTORY: Flank pain, kidney stone suspected.    TECHNIQUE: CT scan of the abdomen and pelvis was performed without IV  contrast. Multiplanar reformats were obtained. Dose reduction  techniques were used.    CONTRAST: None.    COMPARISON: None.    FINDINGS:   LOWER CHEST: Normal.    HEPATOBILIARY: Normal.    PANCREAS: Normal.    SPLEEN: Normal.    ADRENAL GLANDS: Normal.    KIDNEYS/BLADDER: Nonobstructing left renal collecting system stones  are present with left hydronephrosis and an obstructing UPJ stone  measuring 0.5 cm on image 84 of series 3. No right urinary tract  calculi or hydronephrosis. Bladder is unremarkable. Upper pole left  renal cyst measures water density consistent with a simple cyst. There  is a questionable lobulated mass in the lower pole of the left kidney  measuring 6.4 x 5.6 cm on series 4, image 54, best seen on the coronal  reformatted " images.    BOWEL: No bowel obstruction, diverticulitis or appendicitis.    LYMPH NODES: No enlarged abdominal or pelvic lymph nodes.    VASCULATURE: Calcified plaque abdominal aorta without aneurysm.    PELVIC ORGANS: Prostate gland is normal in size with a few scattered  calcified areas. Rectum is unremarkable. Small fat-containing inguinal  hernias are present.    OTHER: None.    MUSCULOSKELETAL: Degenerative spine changes.      Impression    IMPRESSION:   1.  Obstructing left UPJ stone measures 0.5 cm. Nonobstructing stones  left renal collecting system are also noted. No right urinary tract  calculi or hydronephrosis.    2.  Solid-appearing mass or hyperdense cyst lower pole left kidney is  indeterminate. Follow-up nonemergent MRI of the kidneys could be  performed for further assessment.    3.  Upper pole left renal cyst. No further follow-up required.    JAVED ABDALLA MD   MR Abdomen w/o & w Contrast    Narrative    EXAM: MR ABDOMEN W/O and W CONTRAST  LOCATION: [facility]  DATE/TIME: 2/18/2021 5:24 AM    INDICATION: Indeterminant suspicious left renal mass on noncontrast CT 2/17/2021, MRI recommended for further characterization.    COMPARISON: CT renal stone protocol 2/17/2021.    TECHNIQUE: Routine MRI abdomen protocol including T1 in/out phase, diffusion, multiplane T2, and dynamic T1 with IV contrast.     CONTRAST: 9 mL Gadavist.    FINDINGS:  GENITOURINARY/ADRENAL GLANDS: Lobulated 3.9 x 5.8 x 7.1 cm complex mass involving the left lower renal pole. This complex mass demonstrates heterogeneous enhancement on early arterial phase imaging, including subtracted imaging. Findings would be most   compatible with a renal cell carcinoma until proven otherwise. The superior aspect of this mass protrudes into the left renal hilum. No other definitive enhancing renal masses. Small bilateral renal cysts. Moderate left-sided hydronephrosis and   perinephric edema extending down to the level of an  obstructing proximal left ureteral calculus measuring 5 mm. Adrenal glands negative.    ADDITIONAL FINDINGS: Homogeneous enhancement to the liver. Small hepatic cyst. No biliary dilatation or calcified gallstones. Pancreas and spleen unremarkable. Normal caliber abdominal aorta. No lymphadenopathy. Renal veins patent. Colonic   diverticulosis.      Impression    IMPRESSION:  1.  Lobulated 3.9 x 5.8 x 7.1 cm complex mass involving the left lower renal pole. This mass demonstrates heterogeneous early arterial enhancement on subtracted images and would be most compatible with a renal cell carcinoma until proven otherwise. This   protrudes into the left lower renal pole hilum.    2.  No other definitive enhancing renal masses. Bilateral renal cysts.    3.  No lymphadenopathy. Renal veins patent. Adrenal glands negative.    4.  Small hepatic cyst.           Recent Labs   Lab 02/18/21  0710 02/17/21  1848   WBC 6.7 13.7*   HGB 13.2* 15.0   HCT 40.5 44.8   MCV 97 95   * 180     Recent Labs   Lab 02/18/21  0710 02/17/21  1848    138   POTASSIUM 4.2 4.2   CHLORIDE 109 105   CO2 28 30   ANIONGAP 3 3   GLC 99 148*   BUN 30 27   CR 1.33* 1.29*   GFRESTIMATED 53* 55*   GFRESTBLACK 61 64   CANDICE 8.6 9.4     Recent Labs   Lab 02/17/21  1841   COLOR Yellow   APPEARANCE Clear   URINEGLC Negative   URINEBILI Negative   URINEKETONE Negative   SG 1.020   UBLD Large*   URINEPH 6.0   PROTEIN 10*   NITRITE Negative   LEUKEST Small*   RBCU >182*   WBCU 1         Assessment and Plan:   Fermín Josue is a 71 year old male who presented with left flank pain.  Remote history of kidney stones many years ago that required surgery.  Incidental left lower pole renal mass, consistent with likely RCC based on imaging.    Plan:  1. Obstructing left proximal ureteral stone, additional left intrarenal stones  a. Has required surgical intervention for stones in past  b. Strain urine  c. IVF, pain control per IM  d. Trend Cr  e. OK for  regular diet today.  NPO at midnight for procedure tomorrow morning  f. Discussed the options of conservative management of stones with pain medications, hydration and medical expulsive therapy versus surgical intervention.  We discussed the risks and possible complications of ureteroscopy, laser lithotripsy, stone extraction and stent placement.  It was discussed that stents can cause pain and irritative urinary symptoms and are typically removed with cystoscopy in the office after 1-2 weeks.  We discussed that sometimes stones require staged procedures under anesthesia if there are anatomic difficulties or concern for infection.  The pros and cons of each option were reviewed and all of the patient's questions were answered.   g. Will plan for cysto, left ureteroscopy, holmium laser lithotripsy, left ureteral stent placement with Dr. Lin in the OR tomorrow 2/19 at 0730.  2. Left renal mass  a. Incidental finding on CT abd/pelv for flank pain related to obstructing ureteral stone.  Subsequent MRI concerning for RCC.  b. Reviewed imaging findings and concern for RCC with patient.  Briefly discussed surgical intervention for a mass of this size that also involves the renal hilum.  c. No urgent indication for surgery, but should follow-up with Dr. Enrique or Dr. Abbott in outpatient clinic next 1-2 weeks to discuss surgery in greater detail.  AVS updated 2/18.       This plan has been discussed with Dr. Lin.    Addm: Discussed further with Dr. Enrique this afternoon.  Will still plan to proceed with stone intervention tomorrow with Dr. Lin then outpatient follow-up with Dr. Enrique to discuss nephrectomy for renal mass-- scheduled for Thursday 2/25 at 11 AM at [facility], [facility] Blankenship.    Lorenza Jensen PA-C  [facility]  Phone: [phone]

## 2021-02-18 NOTE — PROGRESS NOTES
UROLOGY    MRI images reviewed and the mass is highly suspicious for renal cell carcinoma. It is too large for a partial nephrectomy. He already has compromised renal function, but this could partially be to the kidney stone. A laparoscopic nephrectomy is indicated in this case.     If his pain can be controlled, he could be discharged with oral pain control. Ideally, he would have a nephrectomy rather than treating the stone now and taking out the kidney later.     Tyree Enrique MD

## 2021-02-18 NOTE — H&P
Admitted:     02/17/2021      PRIMARY CARE PROVIDER:  Teetee Hammer nurse practitioner.      PRIMARY CARDIOLOGIST:  Christiano Toro MD      CHIEF COMPLAINT:  Left flank pain.      HISTORY OF PRESENT ILLNESS:  Art Josue is a 69-year-old gentleman with the remote history of kidney stones, most notably 15 years ago when he needed lasering and removal, who has not had any kidney stone problem until today when he started having left flank plain in the afternoon.  He denied any nausea, vomiting, fevers or chills, but had sharp pain rating anywhere from 6-10.  He did have some hematuria about 2 days ago.  He denies any constipation or diarrhea.  The patient came in to [facility] for further assessment.      In the Emergency Department, the patient was seen by Dr. Darci Oconnor.  The patient was afebrile, hypertensive, normal oxygen saturation.  Bloodwork revealed normal electrolytes, BUN of 27, creatinine was 1.3 with calculated GFR of 55, which is his typical baseline.  LFTs were significant for a total bilirubin of 2.5, otherwise LFTs were normal.  Normal calcium.  Normal anion gap.  CBC showed elevation in white count of 13.7, hemoglobin 15, platelets of 180 with a left shift.  Urinalysis was significant for greater than 182 red cells, 1 white cell, 10 of protein.  The patient had a CT of the abdomen and pelvis without contrast.  This revealed an obstructing left UPJ stone measuring 0.5 cm, nonobstructing stone seen in the left renal collecting system.  No right urinary tract calculi or hydronephrosis.  There was a solid-appearing mass or hyperdense cyst in the lower pole of the left kidney, indeterminate, with the recommendation of a nonemergent MRI for followup.  The patient received Toradol and IV Dilaudid with improved pain.  The patient is being admitted as inpatient for severe renal colic and obstructing left UPJ stone.      PAST MEDICAL HISTORY:   1.  ASCVD with history of stents.   The patient is followed by Dr. Christiano Toro.   2.  Hyperlipidemia.   3.  Hypertension.   4.  Sleep apnea.   5.  Prediabetes.   6.  History of kidney stones.      PAST SURGICAL HISTORY:  Left heart catheterization, skin graft, kidney stone laser and ureteral stenting in the remote past.      FAMILY HISTORY:  Possible Alzheimer's and hypertension.      SOCIAL HISTORY:  Remote history of tobacco.  Occasional alcohol.  No IV drug use.  He is retired from the [employer] business.      ALLERGIES:  NO KNOWN DRUG ALLERGIES.      CURRENT MEDICATION LIST:  Unverified, includes:   1.  Amlodipine.   2.  Atorvastatin.   3.  Lasix.   4.  Losartan.   5.  Metoprolol.   6.  Sublingual nitroglycerin.     7.  Triamcinolone cream.   8.  Aspirin.      REVIEW OF SYSTEMS:  Ten points reviewed and as dictated in the history of present illness.      PHYSICAL EXAMINATION:   VITAL SIGNS:  Temperature 98.3, heart rate 59, respirations 20, blood pressure 140/76, sats are 96% on room air.   GENERAL:  The patient is a 71-year-old  gentleman who is medium-built, nontoxic appearing, pleasant, cooperative, in no distress.   HEENT:  Pupils equal.  Sclerae are anicteric.  Mucous membranes are moist.   NECK:  Veins are not elevated.   PULMONARY:  Lungs are clear to auscultation.   CARDIOVASCULAR:  S1, S2, regular rate and rhythm.   GASTROINTESTINAL:  Abdomen is soft, normoactive bowel sounds.   BACK:  Exam shows left flank tenderness.   MUSCULOSKELETAL:  Shows no edema.   NEUROLOGIC:  He is grossly nonfocal.  Cranial nerves grossly intact.   SKIN:  Warm, dry, well perfused.   PSYCHIATRIC:  Mood and affect normal.      LABORATORY DATA:  Laboratory studies as dictated in history of present illness.      ASSESSMENT:  Art Josue is a 71-year-old with remote history of kidney stones 15 years ago, now admitted with left-sided flank pain with a CT scan showing a 5 mm proximal left UPJ stone that appears to be obstructed, but no  hydronephrosis, also with the incidental possible kidney mass versus complex cyst being admitted for further treatment.      PLAN:   1.  Left UPJ 5 mm kidney stone.  The patient will be given aggressive IV fluid hydration.  The patient will be treated with oral oxycodone or IV Dilaudid and IV Toradol.  Patient I think probably saw [facility] 15 years ago when he was sent to [facility] for a ureteroscopy, kidney stone laser treatment and stenting.  We will obtain a consultation from [facility].   2.  Atherosclerotic cardiovascular disease.  The patient has not had any anginal pain or decompensation.  We will continue the patient on all of his cardiac medications, once verified, including aspirin, Lipitor, beta blockers and nitroglycerin as needed.   3.  Hypertension.  We will continue the patient on amlodipine and metoprolol.  His blood pressure initially was elevated but now is coming down.  We will also continue the patient on his Cozaar as well.   4.  Rule out kidney mass.  On the CT scan without contrast there was noted an incidental solid-appearing mass or hyperdense cyst in the lower pole of the left kidney that is indeterminate.  We will obtain an MRI while the patient is admitted to further characterize this.   5.  Deep venous thrombosis prophylaxis.  The patient will have compression boots.      CODE STATUS:  Full.         SHERLEY DWYER MD             D: 2021   T: 2021   MT: BRIGIDO      Name:     XU BURCH   MRN:      [id number]        Account:      [id number]   :      1950        Admitted:     2021                   Document: [id number]       cc: Christiano Gifford MD, [facility]       RENETTA MORENO NP

## 2021-02-18 NOTE — PROGRESS NOTES
[id number]. A&O. Up IND. VSS ex slightly hypertensive. Regular diet. C/O some left flank pain, given PRN oxycodone. Continent, straining urine, small amounts of blood. PIV infusing. Continue to monitor.

## 2021-02-18 NOTE — PROGRESS NOTES
RECEIVING UNIT ED HANDOFF REVIEW    ED Nurse Handoff Report was reviewed by: Vaishnavi Noble RN on February 17, 2021 at 9:48 PM

## 2021-02-18 NOTE — ED PROVIDER NOTES
"  History   Chief Complaint  Flank Pain    HPI  Fermín Josue is a 71 year old male with a remote history of kidney stones, notably 15 years ago, who presents for evaluation of sharp left flank pain that started this afternoon. He notes some hematuria starting 2 days ago; however, denies any other symptoms including nausea, vomiting, dysuria, fevers, constipation, or diarrhea. This pain drastically increased over the past 10 minutes. He has not taken any medications for his discomfort.     Review of Systems   Constitutional: Negative for fever.   Gastrointestinal: Negative for constipation, diarrhea, nausea and vomiting.   Genitourinary: Positive for flank pain and hematuria. Negative for dysuria.   All other systems reviewed and are negative.    Allergies  The patient has no known allergies.     Medications  Amlodipine  Aspirin  Lipitor  Lasix  Cozaar  Toprol  Nitrostat    Past Medical History  CAD  Angina  Hyperlipidemia  Hypertension  Sleep apnea  Pre-diabetes    Past Surgical History  Heart cath angioplasty  Skin graft  Kidney stone laser and ureteric stenting    Family History  Hypertension  Alzheimer's    Social History  Presents to the ED alone.     Physical Exam     Patient Vitals for the past 24 hrs:   BP Temp Temp src Pulse Resp SpO2 Height Weight   02/17/21 2009 (!) 165/82 -- -- 60 -- 100 % -- --   02/17/21 1838 (!) 140/76 98.3  F (36.8  C) Oral 59 20 96 % 1.778 m (5' 10\") 95.3 kg (210 lb)     Physical Exam  Nursing note and vitals reviewed.  Constitutional:  Oriented to person, place, and time. Cooperative.  Appears uncomfortable.  HENT:   Nose:    Nose normal.   Mouth/Throat:   Face mask is covering. Mucous membranes are normal.   Eyes:    Conjunctivae normal and EOM are normal.      Pupils are equal, round, and reactive to light.   Neck:    Trachea normal.   Cardiovascular:  Normal rate, regular rhythm, normal heart sounds and normal pulses. No murmur heard.  Pulmonary/Chest:  Effort normal and " breath sounds normal.   Abdominal:   Soft. Normal appearance and bowel sounds are normal.      There is no tenderness to palpation at this time.      There is no rebound and no CVA tenderness.   Musculoskeletal:  Extremities atraumatic x 4.   Lymphadenopathy:  No cervical adenopathy.   Neurological:   Alert and oriented to person, place, and time. Normal strength.      No cranial nerve deficit or sensory deficit. GCS eye subscore is 4. GCS verbal subscore is 5. GCS motor subscore is 6.   Skin:    Skin is intact. No rash noted.   Psychiatric:   Normal mood and affect.    Emergency Department Course   Imaging:  CT Abdomen/Pelvis without contrast:   1.  Obstructing left UPJ stone measures 0.5 cm. Nonobstructing stones left renal collecting system are also noted. No right urinary tract calculi or hydronephrosis.   2.  Solid-appearing mass or hyperdense cyst lower pole left kidney is indeterminate. Follow-up nonemergent MRI of the kidneys could be performed for further assessment.   3.  Upper pole left renal cyst. No further follow-up required.  as per radiology.    Laboratory:  CBC: WBC: 13.7, HGB: 15.0, PLT: 180  CMP: Glucose 148 (H), Creatinine 1.29 (H), GFR estimate 55 (L), bilirubin total 2.5 (H), o/w WNL    UA with Microscopic: blood large (A), protein albumin 10 (A), leukocyte esterase small (A), RBC >182 (H), squamous epithelial 2 (H), mucous present (A), o/w WNL    Emergency Department Course:  Reviewed:  I reviewed nursing notes, vitals and past medical history    Assessments:  2030 I physically examined the patient as documented above.    2107 I rechecked the patient.     Consults:   2116 I consulted with Dr. Altamirano, on call hospitalist, regarding the patient's history and presentation here in the emergency department.    Interventions:  2031 Dilaudid 0.5 mg IV  2045 Toradol 15 mg IV  Flomax 0.4 mg PO    Disposition:  The patient was admitted to the hospital under the care of Dr. Altamirano.     Impression & Plan    Medical Decision Making:  This is a 71-year-old male who came in with left flank pain.  His history and exam are very consistent with a likely kidney stone as a cause of his symptoms.  I also considered other potential causes such as a AAA, kidney infection, or intra-abdominal pathology such as diverticulitis, colitis, or bowel obstruction.  His work-up including his CT scan confirmed the diagnosis.  I also informed him of the other findings and specifically the renal mass and the other kidney stones.  He prefers to come into the hospital for pain control and likely removal of the kidney stone, which I think is reasonable given the size of the stone and its current location.  Therefore I subsequently spoke with Dr. Altamirano, who will be admitting him.    Diagnosis:    ICD-10-CM    1. 5 mm Left UPJ Kidney stone  N20.0 Asymptomatic SARS-CoV-2 COVID-19 Virus (Coronavirus) by PCR   2. Left intra-renal kidney stones  N20.0    3. Left renal mass  N28.89      Scribe Disclosure:  I, Hola Skaggs, am serving as a scribe at 8:16 PM on 2/17/2021 to document services personally performed by Darci Oconnor MD based on my observations and the provider's statements to me.      Darci Oconnor MD  02/17/21 7157

## 2021-02-18 NOTE — PHARMACY-ADMISSION MEDICATION HISTORY
Pharmacy Medication History  Admission medication history interview status for the 2/17/2021  admission is complete. See EPIC admission navigator for prior to admission medications     Location of Interview: Patient room  Medication history sources: Patient    Significant changes made to the medication list:  Triamcinolone to PRN    In the past week, patient estimated taking medication this percent of the time: greater than 90%        Medication reconciliation completed by provider prior to medication history? Yes    Time spent in this activity: 15 minutes    Prior to Admission medications    Medication Sig Last Dose Taking? Auth Provider   amLODIPine (NORVASC) 2.5 MG tablet Take 1 tablet (2.5 mg) by mouth daily 2/17/2021 at AM Yes Christiano Toro MD   ASPIRIN PO Take 81 mg by mouth 2/17/2021 at AM Yes Reported, Patient   atorvastatin (LIPITOR) 40 MG tablet Take 1 tablet (40 mg) by mouth daily 2/17/2021 at AM Yes Christiano Toro MD   furosemide (LASIX) 20 MG tablet Take 1 tablet (20 mg) by mouth daily 2/17/2021 at AM Yes Christiano Toro MD   losartan (COZAAR) 50 MG tablet Take 1 tablet (50 mg) by mouth daily 2/17/2021 at AM Yes Christiano Toro MD   metoprolol succinate ER (TOPROL-XL) 50 MG 24 hr tablet Take 1 tablet (50 mg) by mouth daily 2/17/2021 at AM Yes Christiano Toro MD   nitroGLYcerin (NITROSTAT) 0.4 MG sublingual tablet For chest pain place 1 tablet under the tongue every 5 minutes for 3 doses. If symptoms persist 5 minutes after 1st dose call 911.  Yes Christiano Toro MD   triamcinolone (KENALOG) 0.1 % external cream Apply topically to affected area 2 times daily  Patient taking differently: Apply topically 2 times daily as needed (itching)   at PRN Yes Caden Montes MD       The information provided in this note is only as accurate as the sources available at the time of update(s)

## 2021-02-19 ENCOUNTER — ANESTHESIA (OUTPATIENT)
Dept: SURGERY | Facility: CLINIC | Age: 71
DRG: 660 | End: 2021-02-19
Payer: COMMERCIAL

## 2021-02-19 ENCOUNTER — ANESTHESIA EVENT (OUTPATIENT)
Dept: SURGERY | Facility: CLINIC | Age: 71
DRG: 660 | End: 2021-02-19
Payer: COMMERCIAL

## 2021-02-19 ENCOUNTER — APPOINTMENT (OUTPATIENT)
Dept: GENERAL RADIOLOGY | Facility: CLINIC | Age: 71
DRG: 660 | End: 2021-02-19
Attending: INTERNAL MEDICINE
Payer: COMMERCIAL

## 2021-02-19 VITALS
BODY MASS INDEX: 30.06 KG/M2 | TEMPERATURE: 97.7 F | OXYGEN SATURATION: 92 % | RESPIRATION RATE: 16 BRPM | DIASTOLIC BLOOD PRESSURE: 64 MMHG | SYSTOLIC BLOOD PRESSURE: 125 MMHG | HEART RATE: 79 BPM | HEIGHT: 70 IN | WEIGHT: 210 LBS

## 2021-02-19 LAB
PLATELET # BLD AUTO: 107 10E9/L (ref 150–450)
POTASSIUM SERPL-SCNC: 4.1 MMOL/L (ref 3.4–5.3)

## 2021-02-19 PROCEDURE — 258N000001 HC RX 258: Performed by: UROLOGY

## 2021-02-19 PROCEDURE — 36415 COLL VENOUS BLD VENIPUNCTURE: CPT | Performed by: ANESTHESIOLOGY

## 2021-02-19 PROCEDURE — 999N000179 XR SURGERY CARM FLUORO LESS THAN 5 MIN W STILLS: Mod: TC

## 2021-02-19 PROCEDURE — 250N000013 HC RX MED GY IP 250 OP 250 PS 637: Performed by: PHYSICIAN ASSISTANT

## 2021-02-19 PROCEDURE — 250N000009 HC RX 250: Performed by: NURSE ANESTHETIST, CERTIFIED REGISTERED

## 2021-02-19 PROCEDURE — 258N000003 HC RX IP 258 OP 636: Performed by: ANESTHESIOLOGY

## 2021-02-19 PROCEDURE — 710N000009 HC RECOVERY PHASE 1, LEVEL 1, PER MIN: Performed by: UROLOGY

## 2021-02-19 PROCEDURE — 99239 HOSP IP/OBS DSCHRG MGMT >30: CPT | Performed by: INTERNAL MEDICINE

## 2021-02-19 PROCEDURE — 250N000011 HC RX IP 250 OP 636: Performed by: NURSE ANESTHETIST, CERTIFIED REGISTERED

## 2021-02-19 PROCEDURE — C1769 GUIDE WIRE: HCPCS | Performed by: UROLOGY

## 2021-02-19 PROCEDURE — C2617 STENT, NON-COR, TEM W/O DEL: HCPCS | Performed by: UROLOGY

## 2021-02-19 PROCEDURE — C1758 CATHETER, URETERAL: HCPCS | Performed by: UROLOGY

## 2021-02-19 PROCEDURE — 84132 ASSAY OF SERUM POTASSIUM: CPT | Performed by: ANESTHESIOLOGY

## 2021-02-19 PROCEDURE — 999N000141 HC STATISTIC PRE-PROCEDURE NURSING ASSESSMENT: Performed by: UROLOGY

## 2021-02-19 PROCEDURE — 250N000013 HC RX MED GY IP 250 OP 250 PS 637: Performed by: INTERNAL MEDICINE

## 2021-02-19 PROCEDURE — C1894 INTRO/SHEATH, NON-LASER: HCPCS | Performed by: UROLOGY

## 2021-02-19 PROCEDURE — 85049 AUTOMATED PLATELET COUNT: CPT | Performed by: ANESTHESIOLOGY

## 2021-02-19 PROCEDURE — 250N000011 HC RX IP 250 OP 636: Performed by: PHYSICIAN ASSISTANT

## 2021-02-19 PROCEDURE — 370N000017 HC ANESTHESIA TECHNICAL FEE, PER MIN: Performed by: UROLOGY

## 2021-02-19 PROCEDURE — 258N000003 HC RX IP 258 OP 636: Performed by: NURSE ANESTHETIST, CERTIFIED REGISTERED

## 2021-02-19 PROCEDURE — 0T778DZ DILATION OF LEFT URETER WITH INTRALUMINAL DEVICE, VIA NATURAL OR ARTIFICIAL OPENING ENDOSCOPIC: ICD-10-PCS | Performed by: UROLOGY

## 2021-02-19 PROCEDURE — 93010 ELECTROCARDIOGRAM REPORT: CPT | Performed by: INTERNAL MEDICINE

## 2021-02-19 PROCEDURE — 255N000002 HC RX 255 OP 636: Performed by: UROLOGY

## 2021-02-19 PROCEDURE — 258N000003 HC RX IP 258 OP 636: Performed by: INTERNAL MEDICINE

## 2021-02-19 PROCEDURE — 360N000075 HC SURGERY LEVEL 2, PER MIN: Performed by: UROLOGY

## 2021-02-19 PROCEDURE — 93005 ELECTROCARDIOGRAM TRACING: CPT

## 2021-02-19 PROCEDURE — 250N000009 HC RX 250: Performed by: UROLOGY

## 2021-02-19 PROCEDURE — 272N000001 HC OR GENERAL SUPPLY STERILE: Performed by: UROLOGY

## 2021-02-19 PROCEDURE — 250N000025 HC SEVOFLURANE, PER MIN: Performed by: UROLOGY

## 2021-02-19 DEVICE — STENT URETERAL POLARIS ULTRA 6FRX24CM M0061921320: Type: IMPLANTABLE DEVICE | Site: URETER | Status: FUNCTIONAL

## 2021-02-19 RX ORDER — EPHEDRINE SULFATE 50 MG/ML
INJECTION, SOLUTION INTRAMUSCULAR; INTRAVENOUS; SUBCUTANEOUS PRN
Status: DISCONTINUED | OUTPATIENT
Start: 2021-02-19 | End: 2021-02-19

## 2021-02-19 RX ORDER — METOPROLOL SUCCINATE 25 MG/1
25 TABLET, EXTENDED RELEASE ORAL DAILY
Status: DISCONTINUED | OUTPATIENT
Start: 2021-02-19 | End: 2021-02-19 | Stop reason: HOSPADM

## 2021-02-19 RX ORDER — FENTANYL CITRATE 50 UG/ML
INJECTION, SOLUTION INTRAMUSCULAR; INTRAVENOUS PRN
Status: DISCONTINUED | OUTPATIENT
Start: 2021-02-19 | End: 2021-02-19

## 2021-02-19 RX ORDER — NALOXONE HYDROCHLORIDE 0.4 MG/ML
0.4 INJECTION, SOLUTION INTRAMUSCULAR; INTRAVENOUS; SUBCUTANEOUS
Status: DISCONTINUED | OUTPATIENT
Start: 2021-02-19 | End: 2021-02-19

## 2021-02-19 RX ORDER — ACETAMINOPHEN 325 MG/1
650 TABLET ORAL EVERY 4 HOURS PRN
Start: 2021-02-19 | End: 2021-03-01

## 2021-02-19 RX ORDER — MAGNESIUM HYDROXIDE 1200 MG/15ML
LIQUID ORAL PRN
Status: DISCONTINUED | OUTPATIENT
Start: 2021-02-19 | End: 2021-02-19 | Stop reason: HOSPADM

## 2021-02-19 RX ORDER — CEFAZOLIN SODIUM 2 G/100ML
2 INJECTION, SOLUTION INTRAVENOUS SEE ADMIN INSTRUCTIONS
Status: DISCONTINUED | OUTPATIENT
Start: 2021-02-19 | End: 2021-02-19 | Stop reason: HOSPADM

## 2021-02-19 RX ORDER — LIDOCAINE HYDROCHLORIDE 20 MG/ML
INJECTION, SOLUTION INFILTRATION; PERINEURAL PRN
Status: DISCONTINUED | OUTPATIENT
Start: 2021-02-19 | End: 2021-02-19

## 2021-02-19 RX ORDER — ONDANSETRON 4 MG/1
4 TABLET, ORALLY DISINTEGRATING ORAL EVERY 30 MIN PRN
Status: DISCONTINUED | OUTPATIENT
Start: 2021-02-19 | End: 2021-02-19 | Stop reason: HOSPADM

## 2021-02-19 RX ORDER — DEXAMETHASONE SODIUM PHOSPHATE 4 MG/ML
INJECTION, SOLUTION INTRA-ARTICULAR; INTRALESIONAL; INTRAMUSCULAR; INTRAVENOUS; SOFT TISSUE PRN
Status: DISCONTINUED | OUTPATIENT
Start: 2021-02-19 | End: 2021-02-19

## 2021-02-19 RX ORDER — PHENAZOPYRIDINE HYDROCHLORIDE 100 MG/1
100 TABLET, FILM COATED ORAL
Qty: 9 TABLET | Refills: 0 | Status: SHIPPED | OUTPATIENT
Start: 2021-02-19 | End: 2021-02-22

## 2021-02-19 RX ORDER — CEFAZOLIN SODIUM 2 G/100ML
2 INJECTION, SOLUTION INTRAVENOUS
Status: DISCONTINUED | OUTPATIENT
Start: 2021-02-19 | End: 2021-02-19 | Stop reason: HOSPADM

## 2021-02-19 RX ORDER — TAMSULOSIN HYDROCHLORIDE 0.4 MG/1
0.4 CAPSULE ORAL DAILY
Qty: 30 CAPSULE | Refills: 0 | Status: SHIPPED | OUTPATIENT
Start: 2021-02-20 | End: 2023-02-12

## 2021-02-19 RX ORDER — PROPOFOL 10 MG/ML
INJECTION, EMULSION INTRAVENOUS PRN
Status: DISCONTINUED | OUTPATIENT
Start: 2021-02-19 | End: 2021-02-19

## 2021-02-19 RX ORDER — ONDANSETRON 2 MG/ML
INJECTION INTRAMUSCULAR; INTRAVENOUS PRN
Status: DISCONTINUED | OUTPATIENT
Start: 2021-02-19 | End: 2021-02-19

## 2021-02-19 RX ORDER — IOPAMIDOL 612 MG/ML
INJECTION, SOLUTION INTRAVASCULAR PRN
Status: DISCONTINUED | OUTPATIENT
Start: 2021-02-19 | End: 2021-02-19 | Stop reason: HOSPADM

## 2021-02-19 RX ORDER — NALOXONE HYDROCHLORIDE 0.4 MG/ML
0.2 INJECTION, SOLUTION INTRAMUSCULAR; INTRAVENOUS; SUBCUTANEOUS
Status: DISCONTINUED | OUTPATIENT
Start: 2021-02-19 | End: 2021-02-19

## 2021-02-19 RX ORDER — SODIUM CHLORIDE, SODIUM LACTATE, POTASSIUM CHLORIDE, CALCIUM CHLORIDE 600; 310; 30; 20 MG/100ML; MG/100ML; MG/100ML; MG/100ML
INJECTION, SOLUTION INTRAVENOUS CONTINUOUS
Status: DISCONTINUED | OUTPATIENT
Start: 2021-02-19 | End: 2021-02-19 | Stop reason: HOSPADM

## 2021-02-19 RX ORDER — PHENAZOPYRIDINE HYDROCHLORIDE 100 MG/1
100 TABLET, FILM COATED ORAL
Status: DISCONTINUED | OUTPATIENT
Start: 2021-02-19 | End: 2021-02-19 | Stop reason: HOSPADM

## 2021-02-19 RX ORDER — FENTANYL CITRATE 50 UG/ML
25-50 INJECTION, SOLUTION INTRAMUSCULAR; INTRAVENOUS
Status: DISCONTINUED | OUTPATIENT
Start: 2021-02-19 | End: 2021-02-19 | Stop reason: HOSPADM

## 2021-02-19 RX ORDER — ONDANSETRON 2 MG/ML
4 INJECTION INTRAMUSCULAR; INTRAVENOUS EVERY 30 MIN PRN
Status: DISCONTINUED | OUTPATIENT
Start: 2021-02-19 | End: 2021-02-19 | Stop reason: HOSPADM

## 2021-02-19 RX ADMIN — FENTANYL CITRATE 100 MCG: 50 INJECTION, SOLUTION INTRAMUSCULAR; INTRAVENOUS at 07:36

## 2021-02-19 RX ADMIN — Medication 10 MG: at 07:42

## 2021-02-19 RX ADMIN — TAMSULOSIN HYDROCHLORIDE 0.4 MG: 0.4 CAPSULE ORAL at 12:02

## 2021-02-19 RX ADMIN — Medication 5 MG: at 07:39

## 2021-02-19 RX ADMIN — SODIUM CHLORIDE, POTASSIUM CHLORIDE, SODIUM LACTATE AND CALCIUM CHLORIDE: 600; 310; 30; 20 INJECTION, SOLUTION INTRAVENOUS at 07:08

## 2021-02-19 RX ADMIN — DEXAMETHASONE SODIUM PHOSPHATE 4 MG: 4 INJECTION, SOLUTION INTRA-ARTICULAR; INTRALESIONAL; INTRAMUSCULAR; INTRAVENOUS; SOFT TISSUE at 07:48

## 2021-02-19 RX ADMIN — DOCUSATE SODIUM 50 MG AND SENNOSIDES 8.6 MG 1 TABLET: 8.6; 5 TABLET, FILM COATED ORAL at 12:03

## 2021-02-19 RX ADMIN — ATORVASTATIN CALCIUM 40 MG: 40 TABLET, FILM COATED ORAL at 12:01

## 2021-02-19 RX ADMIN — PHENYLEPHRINE HYDROCHLORIDE 100 MCG: 10 INJECTION INTRAVENOUS at 07:51

## 2021-02-19 RX ADMIN — ONDANSETRON 4 MG: 2 INJECTION INTRAMUSCULAR; INTRAVENOUS at 07:42

## 2021-02-19 RX ADMIN — PHENYLEPHRINE HYDROCHLORIDE 100 MCG: 10 INJECTION INTRAVENOUS at 07:55

## 2021-02-19 RX ADMIN — PROPOFOL 200 MG: 10 INJECTION, EMULSION INTRAVENOUS at 07:36

## 2021-02-19 RX ADMIN — PHENYLEPHRINE HYDROCHLORIDE 0.3 MCG/KG/MIN: 10 INJECTION INTRAVENOUS at 07:57

## 2021-02-19 RX ADMIN — PHENAZOPYRIDINE HYDROCHLORIDE 100 MG: 100 TABLET ORAL at 17:19

## 2021-02-19 RX ADMIN — CEFAZOLIN SODIUM 2 G: 2 INJECTION, SOLUTION INTRAVENOUS at 07:42

## 2021-02-19 RX ADMIN — SODIUM CHLORIDE: 9 INJECTION, SOLUTION INTRAVENOUS at 09:57

## 2021-02-19 RX ADMIN — METOPROLOL SUCCINATE 25 MG: 25 TABLET, EXTENDED RELEASE ORAL at 05:02

## 2021-02-19 RX ADMIN — PHENYLEPHRINE HYDROCHLORIDE 100 MCG: 10 INJECTION INTRAVENOUS at 07:42

## 2021-02-19 RX ADMIN — OXYCODONE HYDROCHLORIDE 10 MG: 5 TABLET ORAL at 03:16

## 2021-02-19 RX ADMIN — AMLODIPINE BESYLATE 2.5 MG: 2.5 TABLET ORAL at 05:02

## 2021-02-19 RX ADMIN — LOSARTAN POTASSIUM 50 MG: 50 TABLET, FILM COATED ORAL at 12:02

## 2021-02-19 RX ADMIN — LIDOCAINE HYDROCHLORIDE 100 MG: 20 INJECTION, SOLUTION INFILTRATION; PERINEURAL at 07:36

## 2021-02-19 RX ADMIN — Medication 10 MG: at 07:48

## 2021-02-19 ASSESSMENT — LIFESTYLE VARIABLES: TOBACCO_USE: 1

## 2021-02-19 ASSESSMENT — ACTIVITIES OF DAILY LIVING (ADL)
ADLS_ACUITY_SCORE: 14

## 2021-02-19 NOTE — ANESTHESIA PREPROCEDURE EVALUATION
Anesthesia Pre-Procedure Evaluation    Patient: Fermín Josue   MRN: [id number] : 1950        Preoperative Diagnosis: Ureteral stone [N20.1]   Procedure : Procedure(s):  CYSTOSCOPY LEFT URETEROSCOPY, HOLMIUM LASER LITHOTRIPSY LEFT STENT PLACEMENT     Past Medical History:   Diagnosis Date     CAD (coronary artery disease) 2015    3 stents     History of angina      History of skin graft     Right lower limb     Hyperlipidaemia      Hypertension goal BP (blood pressure) < 140/90 2015     Sleep apnea       Past Surgical History:   Procedure Laterality Date     COLONOSCOPY N/A 2020    Procedure: COLONOSCOPY;  Surgeon: Wenceslao Marie MD;  Location:  GI     HEART CATH, ANGIOPLASTY  2007    mid to prox LAD drug-eluting stent x2; Occluded RCA with Collaterals     SKIN GRAFT, EACH ADDN 100SQCM       SURGICAL HISTORY OF -       kidney stone laser and ureteric stenting      Allergies   Allergen Reactions     No Known Drug Allergies       Social History     Tobacco Use     Smoking status: Former Smoker     Packs/day: 1.50     Years: 44.00     Pack years: 66.00     Types: Cigarettes     Start date: 1963     Quit date: 2007     Years since quittin.6     Smokeless tobacco: Never Used   Substance Use Topics     Alcohol use: Yes     Comment: beer - 12 pack month      Wt Readings from Last 1 Encounters:   21 95.3 kg (210 lb)        Prior to Admission medications    Medication Sig Start Date End Date Taking? Authorizing Provider   amLODIPine (NORVASC) 2.5 MG tablet Take 1 tablet (2.5 mg) by mouth daily 20  Yes Christiano Toro MD   ASPIRIN PO Take 81 mg by mouth   Yes Reported, Patient   atorvastatin (LIPITOR) 40 MG tablet Take 1 tablet (40 mg) by mouth daily 20  Yes Christiano Toro MD   furosemide (LASIX) 20 MG tablet Take 1 tablet (20 mg) by mouth daily 20  Yes Christiano Toro MD   losartan (COZAAR) 50 MG tablet Take 1  tablet (50 mg) by mouth daily 7/13/20  Yes Christiano Toro MD   metoprolol succinate ER (TOPROL-XL) 50 MG 24 hr tablet Take 1 tablet (50 mg) by mouth daily 7/13/20  Yes Christiano Toro MD   nitroGLYcerin (NITROSTAT) 0.4 MG sublingual tablet For chest pain place 1 tablet under the tongue every 5 minutes for 3 doses. If symptoms persist 5 minutes after 1st dose call 911. 7/13/20  Yes Christiano Toro MD   triamcinolone (KENALOG) 0.1 % external cream Apply topically to affected area 2 times daily  Patient taking differently: Apply topically 2 times daily as needed (itching)  11/10/20  Yes Caden Montes MD     Current Facility-Administered Medications Ordered in Epic   Medication Dose Route Frequency Last Rate Last Admin     [Auto Hold] acetaminophen (TYLENOL) tablet 650 mg  650 mg Oral Q4H PRN         [Auto Hold] amLODIPine (NORVASC) tablet 2.5 mg  2.5 mg Oral Daily   2.5 mg at 02/19/21 0502     [Auto Hold] atorvastatin (LIPITOR) tablet 40 mg  40 mg Oral Daily   40 mg at 02/18/21 0947     [Auto Hold] bisacodyl (DULCOLAX) Suppository 10 mg  10 mg Rectal Daily PRN         ceFAZolin (ANCEF) intermittent infusion 2 g in 100 mL dextrose PRE-MIX  2 g Intravenous Pre-Op/Pre-procedure x 1 dose         ceFAZolin (ANCEF) intermittent infusion 2 g in 100 mL dextrose PRE-MIX  2 g Intravenous See Admin Instructions         [Auto Hold] HYDROmorphone (PF) (DILAUDID) injection 0.2 mg  0.2 mg Intravenous Q2H PRN         [Auto Hold] ketorolac (TORADOL) injection 15 mg  15 mg Intravenous Q6H PRN   15 mg at 02/18/21 2343     lactated ringers infusion   Intravenous Continuous         [Auto Hold] lidocaine (LMX4) cream   Topical Q1H PRN         lidocaine 1 % 0.1-1 mL  0.1-1 mL Other Q1H PRN         [Auto Hold] lidocaine 1 % 0.1-1 mL  0.1-1 mL Other Q1H PRN         [Auto Hold] losartan (COZAAR) tablet 50 mg  50 mg Oral Daily   Stopped at 02/19/21 0900     [Auto Hold] melatonin tablet 1 mg  1 mg  Oral At Bedtime PRN         [Auto Hold] metoprolol succinate ER (TOPROL-XL) 24 hr tablet 25 mg  25 mg Oral Daily   25 mg at 02/19/21 0502     [Auto Hold] naloxone (NARCAN) injection 0.2 mg  0.2 mg Intravenous Q2 Min PRN        Or     [Auto Hold] naloxone (NARCAN) injection 0.4 mg  0.4 mg Intravenous Q2 Min PRN        Or     [Auto Hold] naloxone (NARCAN) injection 0.2 mg  0.2 mg Intramuscular Q2 Min PRN        Or     [Auto Hold] naloxone (NARCAN) injection 0.4 mg  0.4 mg Intramuscular Q2 Min PRN         [Auto Hold] nitroGLYcerin (NITROSTAT) sublingual tablet 0.4 mg  0.4 mg Sublingual Q5 Min PRN         [Auto Hold] ondansetron (ZOFRAN-ODT) ODT tab 4 mg  4 mg Oral Q6H PRN        Or     [Auto Hold] ondansetron (ZOFRAN) injection 4 mg  4 mg Intravenous Q6H PRN         [Auto Hold] oxyCODONE (ROXICODONE) tablet 5-10 mg  5-10 mg Oral Q3H PRN   10 mg at 02/19/21 0316     [Auto Hold] polyethylene glycol (MIRALAX) Packet 17 g  17 g Oral Daily PRN   17 g at 02/18/21 1600     [Auto Hold] senna-docusate (SENOKOT-S/PERICOLACE) 8.6-50 MG per tablet 1 tablet  1 tablet Oral BID PRN        Or     [Auto Hold] senna-docusate (SENOKOT-S/PERICOLACE) 8.6-50 MG per tablet 2 tablet  2 tablet Oral BID PRN         [Auto Hold] senna-docusate (SENOKOT-S/PERICOLACE) 8.6-50 MG per tablet 1 tablet  1 tablet Oral BID   1 tablet at 02/17/21 2240    Or     [Auto Hold] senna-docusate (SENOKOT-S/PERICOLACE) 8.6-50 MG per tablet 2 tablet  2 tablet Oral BID   2 tablet at 02/18/21 2018     [Auto Hold] sodium chloride (PF) 0.9% PF flush 3 mL  3 mL Intracatheter Q8H PRN         [Auto Hold] sodium chloride (PF) 0.9% PF flush 3 mL  3 mL Intracatheter q1 min prn         sodium chloride 0.9% infusion   Intravenous Continuous 125 mL/hr at 02/18/21 2334 New Bag at 02/18/21 2334     [Auto Hold] tamsulosin (FLOMAX) capsule 0.4 mg  0.4 mg Oral Daily   0.4 mg at 02/18/21 0946     No current [facility]-ordered outpatient medications on file.       lactated ringers        sodium chloride 125 mL/hr at 02/18/21 0374     No results for input(s): ABO, RH in the last 95778 hours.  No results for input(s): HCG in the last 00749 hours.  Recent Results (from the past 744 hour(s))   CT Abdomen Pelvis w/o Contrast    Narrative    CT ABDOMEN AND PELVIS WITHOUT CONTRAST 2/17/2021 8:40 PM    CLINICAL HISTORY: Flank pain, kidney stone suspected.    TECHNIQUE: CT scan of the abdomen and pelvis was performed without IV  contrast. Multiplanar reformats were obtained. Dose reduction  techniques were used.    CONTRAST: None.    COMPARISON: None.    FINDINGS:   LOWER CHEST: Normal.    HEPATOBILIARY: Normal.    PANCREAS: Normal.    SPLEEN: Normal.    ADRENAL GLANDS: Normal.    KIDNEYS/BLADDER: Nonobstructing left renal collecting system stones  are present with left hydronephrosis and an obstructing UPJ stone  measuring 0.5 cm on image 84 of series 3. No right urinary tract  calculi or hydronephrosis. Bladder is unremarkable. Upper pole left  renal cyst measures water density consistent with a simple cyst. There  is a questionable lobulated mass in the lower pole of the left kidney  measuring 6.4 x 5.6 cm on series 4, image 54, best seen on the coronal  reformatted images.    BOWEL: No bowel obstruction, diverticulitis or appendicitis.    LYMPH NODES: No enlarged abdominal or pelvic lymph nodes.    VASCULATURE: Calcified plaque abdominal aorta without aneurysm.    PELVIC ORGANS: Prostate gland is normal in size with a few scattered  calcified areas. Rectum is unremarkable. Small fat-containing inguinal  hernias are present.    OTHER: None.    MUSCULOSKELETAL: Degenerative spine changes.      Impression    IMPRESSION:   1.  Obstructing left UPJ stone measures 0.5 cm. Nonobstructing stones  left renal collecting system are also noted. No right urinary tract  calculi or hydronephrosis.    2.  Solid-appearing mass or hyperdense cyst lower pole left kidney is  indeterminate. Follow-up nonemergent MRI of the  kidneys could be  performed for further assessment.    3.  Upper pole left renal cyst. No further follow-up required.    JAVED ABDALLA MD   MR Abdomen w/o & w Contrast    Narrative    EXAM: MR ABDOMEN W/O and W CONTRAST  LOCATION: [facility]  DATE/TIME: 2/18/2021 5:24 AM    INDICATION: Indeterminant suspicious left renal mass on noncontrast CT 2/17/2021, MRI recommended for further characterization.    COMPARISON: CT renal stone protocol 2/17/2021.    TECHNIQUE: Routine MRI abdomen protocol including T1 in/out phase, diffusion, multiplane T2, and dynamic T1 with IV contrast.     CONTRAST: 9 mL Gadavist.    FINDINGS:  GENITOURINARY/ADRENAL GLANDS: Lobulated 3.9 x 5.8 x 7.1 cm complex mass involving the left lower renal pole. This complex mass demonstrates heterogeneous enhancement on early arterial phase imaging, including subtracted imaging. Findings would be most   compatible with a renal cell carcinoma until proven otherwise. The superior aspect of this mass protrudes into the left renal hilum. No other definitive enhancing renal masses. Small bilateral renal cysts. Moderate left-sided hydronephrosis and   perinephric edema extending down to the level of an obstructing proximal left ureteral calculus measuring 5 mm. Adrenal glands negative.    ADDITIONAL FINDINGS: Homogeneous enhancement to the liver. Small hepatic cyst. No biliary dilatation or calcified gallstones. Pancreas and spleen unremarkable. Normal caliber abdominal aorta. No lymphadenopathy. Renal veins patent. Colonic   diverticulosis.      Impression    IMPRESSION:  1.  Lobulated 3.9 x 5.8 x 7.1 cm complex mass involving the left lower renal pole. This mass demonstrates heterogeneous early arterial enhancement on subtracted images and would be most compatible with a renal cell carcinoma until proven otherwise. This   protrudes into the left lower renal pole hilum.    2.  No other definitive enhancing renal masses. Bilateral renal  cysts.    3.  No lymphadenopathy. Renal veins patent. Adrenal glands negative.    4.  Small hepatic cyst.       Anesthesia Evaluation   Pt has had prior anesthetic.         ROS/MED HX  ENT/Pulmonary:  - neg pulmonary ROS   (+) sleep apnea, mild, doesn't use CPAP, tobacco use, Past use,     Neurologic:       Cardiovascular:     (+) Dyslipidemia hypertension--CAD --stent-    METS/Exercise Tolerance:     Hematologic:       Musculoskeletal:       GI/Hepatic:    (-) GERD   Renal/Genitourinary:     (+) Nephrolithiasis ,     Endo:       Psychiatric/Substance Use:       Infectious Disease:       Malignancy:       Other:            Physical Exam    Airway      Comment: Full beard    Mallampati: II    Neck ROM: full     Respiratory Devices and Support         Dental  no notable dental history     (+) caps      Cardiovascular   cardiovascular exam normal          Pulmonary   pulmonary exam normal                OUTSIDE LABS:  CBC:   Lab Results   Component Value Date    WBC 6.7 02/18/2021    WBC 13.7 (H) 02/17/2021    HGB 13.2 (L) 02/18/2021    HGB 15.0 02/17/2021    HCT 40.5 02/18/2021    HCT 44.8 02/17/2021     (L) 02/19/2021     (L) 02/18/2021     BMP:   Lab Results   Component Value Date     02/18/2021     02/17/2021    POTASSIUM 4.1 02/19/2021    POTASSIUM 4.2 02/18/2021    CHLORIDE 109 02/18/2021    CHLORIDE 105 02/17/2021    CO2 28 02/18/2021    CO2 30 02/17/2021    BUN 30 02/18/2021    BUN 27 02/17/2021    CR 1.33 (H) 02/18/2021    CR 1.29 (H) 02/17/2021    GLC 99 02/18/2021     (H) 02/17/2021     COAGS:   Lab Results   Component Value Date    PTT 30 11/21/2007    INR 0.98 11/21/2007     POC: No results found for: BGM, HCG, HCGS  HEPATIC:   Lab Results   Component Value Date    ALBUMIN 4.2 02/17/2021    PROTTOTAL 8.1 02/17/2021    ALT 28 02/17/2021    AST 22 02/17/2021    ALKPHOS 93 02/17/2021    BILITOTAL 2.5 (H) 02/17/2021     OTHER:   Lab Results   Component Value Date    A1C 5.8 (H)  10/15/2019    CANDICE 8.6 02/18/2021       Anesthesia Plan    ASA Status:  2   NPO Status:  NPO Appropriate    Anesthesia Type: General.     - Airway: LMA   Induction: Intravenous.   Maintenance: Balanced.        Consents    Anesthesia Plan(s) and associated risks, benefits, and realistic alternatives discussed. Questions answered and patient/representative(s) expressed understanding.     - Discussed with:  Patient         Postoperative Care    Pain management: IV analgesics.   PONV prophylaxis: Ondansetron (or other 5HT-3)     Comments:                Kevan Ashley MD

## 2021-02-19 NOTE — PLAN OF CARE
Stable post stent placement, voiding rust colored urine, no clots. Some pain in abdomen with void otherwise comfortable. Tolerating regular diet. Discharge home later today with family

## 2021-02-19 NOTE — ANESTHESIA POSTPROCEDURE EVALUATION
Patient: Fermín Josue    Procedure(s):  CYSTOSCOPY LEFT URETEROSCOPY,  LEFT STENT PLACEMENT, LEFT RETROGRADE  Cystoscopy, dilate ureter(s), combined    Diagnosis:Ureteral stone [N20.1]  Diagnosis Additional Information: No value filed.    Anesthesia Type:  General    Note:  Disposition: Inpatient   Postop Pain Control: Uneventful            Sign Out: Well controlled pain   PONV: No   Neuro/Psych: Uneventful            Sign Out: Acceptable/Baseline neuro status   Airway/Respiratory: Uneventful            Sign Out: Acceptable/Baseline resp. status   CV/Hemodynamics: Uneventful            Sign Out: Acceptable CV status   Other NRE: NONE   DID A NON-ROUTINE EVENT OCCUR? No         Last vitals:  Vitals:    02/19/21 0920 02/19/21 1015 02/19/21 1146   BP: 113/67 122/61 115/65   Pulse: 75 73 66   Resp: 18 16 16   Temp:   35.8  C (96.5  F)   SpO2: 95% 92% 91%       Last vitals prior to Anesthesia Care Transfer:  CRNA VITALS  2/19/2021 0745 - 2/19/2021 0845      2/19/2021             Resp Rate (set):  10          Electronically Signed By: Kevan Ashley MD  February 19, 2021  2:43 PM

## 2021-02-19 NOTE — BRIEF OP NOTE
[facility] Brief Operative Note    Pre-operative diagnosis: Ureteral stone [N20.1]   Post-operative diagnosis: Same - Left proximal ureteral stricture   Procedure: Procedure(s):  CYSTOSCOPY LEFT URETEROSCOPY,  LEFT STENT PLACEMENT, LEFT RETROGRADE  Cystoscopy, dilate ureter(s), combined   Surgeon: SHERLEY LINK MD   Assistant(s): None   Anesthesia: General endotracheal anesthesia   Estimated blood loss: Minimal   Total IV fluids: (See anesthesia record)   Blood transfusion: No transfusion was given during surgery   Total urine output: Not measured   Drains: None   Specimens: None   Implants: 6 Fr x 24 cm stent in Left ureter   Findings: Narrow (4 Fr) proximal ureteral stricture   Complications: None   Condition: Stable   Comments: See dictated operative report for full details

## 2021-02-19 NOTE — ANESTHESIA PROCEDURE NOTES
Airway   Date/Time: 2/19/2021 7:38 AM   Patient location during procedure: OR  Staff -   Other Anesthesia Staff: Rufus Pineda  Performed By: SRNA    Consent for Airway   Urgency: elective    Indications and Patient Condition  Indications for airway management: millicent-procedural  Induction type:intravenousMask difficulty assessment: 0 - not attempted    Final Airway Details  Final airway type: supraglottic airway    Endotracheal Airway Details   Secured with: pink tape    Post intubation assessment   Placement verified by: capnometry, equal breath sounds and chest rise   Number of attempts at approach: 1  Number of other approaches attempted: 0  Secured with:pink tape  Ease of procedure: easy  Dentition: Intact

## 2021-02-19 NOTE — PROGRESS NOTES
[facility]    Urology Brief Note     POD#0 left stent placement for 5mm left UPJ stone. Also has left lower pole renal mass suspicious for RCC. Having increased dysuria and mild hematuria today. Discussed stent morbidity in detail as well as follow up plan regarding stone and renal mass management. Has appt with Dr. Enrique next week to review images and discuss next steps. Will Rx pyridium for dysuria. OK to discharge today.     Dirk Ellison PA-C 2/19/2021 2:34 PM  [facility]  Mon-Fri, 7am - 4pm  Office: [phone]

## 2021-02-19 NOTE — PLAN OF CARE
A&Ox4. VSS on RA ex bradycardic. C/o left flank pain, Oxy x3 and Toradol x1, moderately effective. Up ind in room. Voiding via urinal at bedside, straining urine, no sediment or stones. C/o constipation, PRN Miralax and sched Senna. No BM this evening. PIV infusing NS @ 125. NPO at midnight, to OR 2/19 at 0730 for stone removal and stent. Lap nephrectomy to be completed as outpatient.

## 2021-02-19 NOTE — PROVIDER NOTIFICATION
Paged and spoke to ASHOK Elizabeth from Urology to clarify whether pt should continue taking his PTA aspirin or not after discharge. Per Dirk, okay for pt to continue taking aspirin.

## 2021-02-19 NOTE — OP NOTE
Procedure Date: 02/19/2021      PREOPERATIVE DIAGNOSIS:  Left ureteral/renal calculi.      POSTOPERATIVE DIAGNOSIS:  Left ureteral/renal calculi - proximal ureteral stricture.      PROCEDURE:  Cystoscopy, left retrograde pyelogram, dilation of left ureter, left ureteroscopy and left ureteral stent placement.      SURGEON:  Phil Lin MD      ANESTHESIA:  General.      ESTIMATED BLOOD LOSS:  Minimal, less than 5 mL.      SPECIMENS:  None.      COMPLICATIONS:  None.      FINDINGS:  A narrow (3-4 Belarusian) stricture in proximal ureter, roughly 5-6 cm below the ureteropelvic junction.      INDICATIONS:  The patient is a 71-year-old gentleman who has a history of kidney stones in the remote past (had prior surgery 15 years ago for kidney stone).  Presented to the hospital with severe left flank pain.  A CT scan was performed which showed a 5 mm stone in the proximal left ureter.  There is also a 5 mm and 3 mm stones in the mid pole tejal.  In addition, there appears to be a mass in the lower pole of the left kidney.  An MRI was performed, which confirmed a 5.8 x 7.1 cm mass involving the lower pole of the left kidney, which extends up into the renal hilum.  He was admitted for pain control and presents now to undergo treatment for his left ureteral/kidney stones with ureteroscopy and laser lithotripsy.  The risks and benefits were discussed with the patient.  Risks include bleeding, infection and stent irritation.      PROCEDURE:  The patient was brought to the operating room, placed in a supine position.  After undergoing general anesthetic, he was placed in a low lithotomy position.  His genitalia was prepped and draped in the usual sterile fashion.  A 21-Belarusian rigid cystoscope was inserted in the bladder under direct visualization.  There were no urethral lesions or strictures.  The prostate shows 1+ lateral enlargement.  Inspection of the bladder was normal trigone.  Both ureteral orifices were normal in appearance  and location.  The bladder was inspected with 30 and 70-degree lens.  There were no tumors seen in the bladder.      A 0.035 Sensor wire was passed up the left ureter with the aid of a 6-Danish ureteral catheter.  This was advanced up into the kidney under fluoroscopic guidance.  The cystoscope was removed.  A dual-lumen catheter was advanced up into the proximal ureter over the Sensor wire under fluoroscopic guidance.  An Amplatz SuperStiff wire was then passed through the dual lumen catheter up into the proximal ureter.  Dual-lumen catheter was removed.  An 11/13 Danish ureteral sheath was advanced over the SuperStiff wire into the proximal ureter under fluoroscopic guidance.  The SuperStiff wire then removed.  The digital flexible ureteroscope was advanced up the sheath up into the proximal ureter.  Advancing the scope up, a significant stricture was encountered.  This appears to be roughly 3-4 Danish.  The ureteroscope was removed.  The ureteral sheath was removed.  The ureter was then dilated using sequential dilators up from 6-Danish to 10-Danish.  A 12-Danish dilator would not pass beyond the stricture point, which appears to be 5-6 cm below the ureteropelvic junction.  The dual lumen catheter was then again used to place an Amplatz SuperStiff guidewire in the left ureter.  The dual-lumen catheter was removed.  The 11/13 Danish ureteral sheath was advanced over the SuperStiff wire into the proximal ureter.  The cystoscope was removed.  The digital flexible ureteroscope was advanced through the sheath up into the proximal ureter.  Again, the stricture was more patent.  Unfortunately, it is still not large enough to allow the scope to pass through.  Given the severity of his stricture, the decision was made to abort the procedure at this point.  Contrast was injected through the cystoscope up into the kidney to denote the renal pelvises.  The ureteroscope was removed.  There were no stones in the distal ureter.       Cystoscope was inserted in the bladder.  A 6 Bhutanese x 24 cm Polaris stent was then placed in the left ureter.  The stent could be seen curled in the renal pelvis and in the bladder.  The patient's bladder was emptied and cystoscope was removed.  He was put back in a supine position, awoken from anesthesia and transferred back to the recovery room in good condition.      PLAN:  We will need to leave the stent in place for a good 2-3 weeks to allow the stricture to dilate in order to allow for ureteroscopy.  I will discuss with Dr. Enriuqe about whether to proceed with ureteroscopy and stone removal versus removing the upper ureter with the nephrectomy in the future to treat his renal mass.         SHERLEY LINK MD             D: 2021   T: 2021   MT: MAYELA      Name:     XU BURCH   MRN:      [id number]        Account:        [id number]   :      1950           Procedure Date: 2021      Document: [id number]       cc: RENETTA MORENO NP       [facility]

## 2021-02-19 NOTE — PLAN OF CARE
A&Ox4. VSS on RA ex melany at times. C/o 5/10 left flank pain, oxy x1 and toradol x1 effective. Voiding via urinal, straining urine. Up ind in room. NPO since MN, plan for cysto and L ureteroscopy w/ stent today, OR time set for 0730. PIV infusing NS @ 125. Discharge pending, likely to have lap nephrectomy outpatient.

## 2021-02-19 NOTE — ANESTHESIA CARE TRANSFER NOTE
Patient: Fermín Josue    Procedure(s):  CYSTOSCOPY LEFT URETEROSCOPY,  LEFT STENT PLACEMENT, LEFT RETROGRADE  Cystoscopy, dilate ureter(s), combined    Diagnosis: Ureteral stone [N20.1]  Diagnosis Additional Information: No value filed.    Anesthesia Type:   General     Note:    Oropharynx: spontaneously breathing  Level of Consciousness: drowsy  Oxygen Supplementation: face mask  Level of Supplemental Oxygen (L/min / FiO2): 6  Independent Airway: airway patency satisfactory and stable  Dentition: dentition unchanged  Vital Signs Stable: post-procedure vital signs reviewed and stable  Report to RN Given: handoff report given  Patient transferred to: PACU  Comments: At end of procedure, spontaneous respirations, adequate tidal volumes, LMA removed atraumatically, oropharynx suctioned, airway patent after LMA removal. Oxygen via facemask at 6 liters per minute to PACU. Oxygen tubing connected to wall O2 in PACU, SpO2, NiBP, and EKG monitors and alarms on and functioning, Erasto Hugger warmer connected to patient gown, report on patient's clinical status given to PACU RN, RN questions answered.  Handoff Report: Identifed the Patient, Identified the Reponsible Provider, Reviewed the pertinent medical history, Discussed the surgical course, Reviewed Intra-OP anesthesia mangement and issues during anesthesia, Set expectations for post-procedure period and Allowed opportunity for questions and acknowledgement of understanding      Vitals: (Last set prior to Anesthesia Care Transfer)  CRNA VITALS  2/19/2021 0745 - 2/19/2021 0819      2/19/2021             Resp Rate (set):  10        Electronically Signed By: LONNY Quiñonez CRNA  February 19, 2021  8:19 AM

## 2021-02-20 NOTE — PLAN OF CARE
Pt A&Ox4; VSS; on RA; no c/o pain; tolerating PO; voiding. Up independently in room. Discharge order received; instructions reviewed with pt; all questions answered. A copy of AVS and discharge meds given to pt. All belongings returned. Pt discharged from floor via w/c accompanied by NA. Friend providing transportation to home.

## 2021-02-22 ENCOUNTER — TELEPHONE (OUTPATIENT)
Dept: FAMILY MEDICINE | Facility: CLINIC | Age: 71
End: 2021-02-22

## 2021-02-22 NOTE — TELEPHONE ENCOUNTER
"Teetee-Patient wanted to make you aware he will follow up with you once renal mass has been addressed.  Patient stated his potassium was checked while in hospital.  Patient aware you are not available until next week.      Thank you!  PAULETTE Cardona, RN  [facility]/TCU/ED for chronic condition Discharge Protocol    \"Hi, my name is Mayra Beach RN, a registered nurse, and I am calling from [facility].  I am calling to follow up and see how things are going for you after your recent emergency visit/hospital/TCU stay.\"    Tell me how you are doing now that you are home?\" Doing alright.  Pain is bearable and not taking opioid pain medication because it causes constipation.  Has Urology follow up visit on 2/25/21 and at that time will discuss approach for handling kidney mass and stone.  Has not passed kidney stone and thinks they will remove entire kidney.    Denied hematuria and fever.      Discharge Instructions    \"Let's review your discharge instructions.  What is/are the follow-up recommendations?  Pt. Response: Follow up with Urology and PCP    \"Has an appointment with your primary care provider been scheduled?\"   declined PCP follow up.  Plans to focus on kidney mass treatment/plan.    \"When you see the provider, I would recommend that you bring your medications with you.\"    Medications    \"Tell me what changed about your medicines when you discharged?\"    Changes to chronic meds?    0-1    \"What questions do you have about your medications?\"    None     New diagnoses of heart failure, COPD, diabetes, or MI?    No              Post Discharge Medication Reconciliation Status: discharge medications reconciled, continue medications without change.    Was MTM referral placed (*Make sure to put transitions as reason for referral)?   No    Call Summary    \"What questions or concerns do you have about your recent visit and your follow-up care?\"     none    \"If " "you have questions or things don't continue to improve, we encourage you contact us through the main clinic number (give number).  Even if the clinic is not open, triage nurses are available 24/7 to help you.     We would like you to know that our clinic has extended hours (provide information).  We also have urgent care (provide details on closest location and hours/contact info)\"      \"Thank you for your time and take care!\"             "

## 2021-02-23 ENCOUNTER — OFFICE VISIT (OUTPATIENT)
Dept: URGENT CARE | Facility: URGENT CARE | Age: 71
End: 2021-02-23
Payer: COMMERCIAL

## 2021-02-23 VITALS
HEIGHT: 70 IN | HEART RATE: 60 BPM | BODY MASS INDEX: 30.06 KG/M2 | SYSTOLIC BLOOD PRESSURE: 118 MMHG | RESPIRATION RATE: 16 BRPM | WEIGHT: 210 LBS | TEMPERATURE: 98 F | DIASTOLIC BLOOD PRESSURE: 80 MMHG

## 2021-02-23 DIAGNOSIS — H65.03 NON-RECURRENT ACUTE SEROUS OTITIS MEDIA OF BOTH EARS: Primary | ICD-10-CM

## 2021-02-23 LAB — INTERPRETATION ECG - MUSE: NORMAL

## 2021-02-23 PROCEDURE — 99213 OFFICE O/P EST LOW 20 MIN: CPT | Performed by: PHYSICIAN ASSISTANT

## 2021-02-23 ASSESSMENT — MIFFLIN-ST. JEOR: SCORE: 1708.8

## 2021-02-23 NOTE — PATIENT INSTRUCTIONS
DDx includes eustachian tube dysfunction, hearing loss, serous otitis media, among others. Referred to ENT for further evaluation as this has been chronic. Seek emergency care if you develop severe ear pain or fever over 103.

## 2021-02-23 NOTE — PROGRESS NOTES
URGENT CARE VISIT:    SUBJECTIVE:   Fermín Josue is a 71 year old male presenting with a chief complaint of bilateral plugged ears.  Onset was 2 months ago.   He denies the following symptoms: fever, chills, stuffy nose, cough - non-productive and sore throat  Course of illness is same.    Treatment measures tried include OTC ear drops with no relief of symptoms.  Predisposing factors include None.    PMH:   Past Medical History:   Diagnosis Date     CAD (coronary artery disease) 2/5/2015    3 stents     History of angina      History of skin graft     Right lower limb     Hyperlipidaemia      Hypertension goal BP (blood pressure) < 140/90 2/5/2015     Sleep apnea      Allergies: No known drug allergies   Medications:   Current Outpatient Medications   Medication Sig Dispense Refill     amLODIPine (NORVASC) 2.5 MG tablet Take 1 tablet (2.5 mg) by mouth daily 90 tablet 3     atorvastatin (LIPITOR) 40 MG tablet Take 1 tablet (40 mg) by mouth daily 90 tablet 3     furosemide (LASIX) 20 MG tablet Take 1 tablet (20 mg) by mouth daily 90 tablet 3     losartan (COZAAR) 50 MG tablet Take 1 tablet (50 mg) by mouth daily 90 tablet 3     metoprolol succinate ER (TOPROL-XL) 50 MG 24 hr tablet Take 1 tablet (50 mg) by mouth daily 90 tablet 3     acetaminophen (TYLENOL) 325 MG tablet Take 2 tablets (650 mg) by mouth every 4 hours as needed for mild pain       ASPIRIN PO Take 81 mg by mouth       nitroGLYcerin (NITROSTAT) 0.4 MG sublingual tablet For chest pain place 1 tablet under the tongue every 5 minutes for 3 doses. If symptoms persist 5 minutes after 1st dose call 911. 9 tablet 3     tamsulosin (FLOMAX) 0.4 MG capsule Take 1 capsule (0.4 mg) by mouth daily 30 capsule 0     triamcinolone (KENALOG) 0.1 % external cream Apply topically to affected area 2 times daily (Patient taking differently: Apply topically 2 times daily as needed (itching) ) 15 g 0     Social History:   Social History     Tobacco Use     Smoking  "status: Former Smoker     Packs/day: 1.50     Years: 44.00     Pack years: 66.00     Types: Cigarettes     Start date: 1963     Quit date: 2007     Years since quittin.6     Smokeless tobacco: Never Used   Substance Use Topics     Alcohol use: Yes     Comment: beer - 12 pack month       ROS:  Review of systems negative except as stated above.    OBJECTIVE:  /80   Pulse 60   Temp 98  F (36.7  C) (Oral)   Resp 16   Ht 1.778 m (5' 10\")   Wt 95.3 kg (210 lb)   BMI 30.13 kg/m    GENERAL APPEARANCE: healthy, alert and no distress  EYES: EOMI,  PERRL, conjunctiva clear  HENT: ear canals and TM's normal.  Hardly any visible cerumen.   NECK: supple, nontender, no lymphadenopathy  RESP: lungs clear to auscultation - no rales, rhonchi or wheezes  CV: regular rates and rhythm, normal S1 S2, no murmur noted  SKIN: no suspicious lesions or rashes    ASSESSMENT:    ICD-10-CM    1. Non-recurrent acute serous otitis media of both ears  H65.03 OTOLARYNGOLOGY REFERRAL       PLAN:  Patient Instructions   DDx includes eustachian tube dysfunction, hearing loss, serous otitis media, among others. Referred to ENT for further evaluation as this has been chronic. Seek emergency care if you develop severe ear pain or fever over 103.     Patient verbalized understanding and is agreeable to plan. The patient was discharged ambulatory and in stable condition.    YASH Lee...................  2021   1:39 PM   "

## 2021-02-24 ENCOUNTER — TRANSFERRED RECORDS (OUTPATIENT)
Dept: HEALTH INFORMATION MANAGEMENT | Facility: CLINIC | Age: 71
End: 2021-02-24

## 2021-02-25 ENCOUNTER — TRANSFERRED RECORDS (OUTPATIENT)
Dept: HEALTH INFORMATION MANAGEMENT | Facility: CLINIC | Age: 71
End: 2021-02-25

## 2021-02-26 ENCOUNTER — TELEPHONE (OUTPATIENT)
Dept: CARDIOLOGY | Facility: CLINIC | Age: 71
End: 2021-02-26

## 2021-02-26 DIAGNOSIS — I25.10 CORONARY ARTERY DISEASE INVOLVING NATIVE CORONARY ARTERY OF NATIVE HEART WITHOUT ANGINA PECTORIS: Primary | ICD-10-CM

## 2021-02-26 DIAGNOSIS — N28.89 LEFT RENAL MASS: ICD-10-CM

## 2021-02-26 NOTE — TELEPHONE ENCOUNTER
Received call from pt stating that he is scheduled for surgery to remove one of his kidneys at [facility] on 3/16/21. Pt was advised to hold his ASA 2 weeks prior & wants to know if Dr. Toro is okay with him holding it that long. Will message Dr. Toro to review. Farheen BLEDSOE

## 2021-03-01 ENCOUNTER — OFFICE VISIT (OUTPATIENT)
Dept: FAMILY MEDICINE | Facility: CLINIC | Age: 71
End: 2021-03-01
Payer: COMMERCIAL

## 2021-03-01 VITALS
BODY MASS INDEX: 15.35 KG/M2 | WEIGHT: 107 LBS | OXYGEN SATURATION: 100 % | HEART RATE: 48 BPM | DIASTOLIC BLOOD PRESSURE: 75 MMHG | RESPIRATION RATE: 18 BRPM | SYSTOLIC BLOOD PRESSURE: 132 MMHG | TEMPERATURE: 98.2 F

## 2021-03-01 DIAGNOSIS — N28.89 LEFT RENAL MASS: ICD-10-CM

## 2021-03-01 DIAGNOSIS — I25.10 CORONARY ARTERY DISEASE INVOLVING NATIVE CORONARY ARTERY OF NATIVE HEART WITHOUT ANGINA PECTORIS: ICD-10-CM

## 2021-03-01 DIAGNOSIS — N18.31 STAGE 3A CHRONIC KIDNEY DISEASE (H): ICD-10-CM

## 2021-03-01 DIAGNOSIS — Z01.818 PREOP GENERAL PHYSICAL EXAM: Primary | ICD-10-CM

## 2021-03-01 PROBLEM — N18.30 CHRONIC KIDNEY DISEASE, STAGE 3 (H): Status: ACTIVE | Noted: 2021-03-01

## 2021-03-01 LAB
ERYTHROCYTE [DISTWIDTH] IN BLOOD BY AUTOMATED COUNT: 12.8 % (ref 10–15)
HCT VFR BLD AUTO: 42.7 % (ref 40–53)
HGB BLD-MCNC: 14.2 G/DL (ref 13.3–17.7)
MCH RBC QN AUTO: 31.8 PG (ref 26.5–33)
MCHC RBC AUTO-ENTMCNC: 33.3 G/DL (ref 31.5–36.5)
MCV RBC AUTO: 96 FL (ref 78–100)
PLATELET # BLD AUTO: 176 10E9/L (ref 150–450)
RBC # BLD AUTO: 4.46 10E12/L (ref 4.4–5.9)
WBC # BLD AUTO: 7.4 10E9/L (ref 4–11)

## 2021-03-01 PROCEDURE — 36415 COLL VENOUS BLD VENIPUNCTURE: CPT | Performed by: FAMILY MEDICINE

## 2021-03-01 PROCEDURE — 99215 OFFICE O/P EST HI 40 MIN: CPT | Performed by: FAMILY MEDICINE

## 2021-03-01 PROCEDURE — 80048 BASIC METABOLIC PNL TOTAL CA: CPT | Performed by: FAMILY MEDICINE

## 2021-03-01 PROCEDURE — 85027 COMPLETE CBC AUTOMATED: CPT | Performed by: FAMILY MEDICINE

## 2021-03-01 NOTE — PATIENT INSTRUCTIONS

## 2021-03-01 NOTE — TELEPHONE ENCOUNTER
Called pt with recommendations from Dr. Toro. Pt states he had pre op physical today & they are requesting cardiac clearance. Pt scheduled 3/11/21 w/ Dr. Toro & will message re: doing any testing prior. Farheen BLEDSOE

## 2021-03-01 NOTE — PROGRESS NOTES
[facility] [address] [place] [address]  Phone: [phone]  Primary Provider: Teetee Hammer  Pre-op Performing Provider: TOMMIE MARSHALL      PREOPERATIVE EVALUATION:  Today's date: 3/1/2021    Fermín Josue is a 71 year old male who presents for a preoperative evaluation.    Surgical Information:  Surgery/Procedure: Nephrectomy, left  Surgery Location: [facility]   Surgeon: Dr. Tyree Enrique  Surgery Date: 3/16/21  Time of Surgery: tba  Where patient plans to recover: Other: overnight in hospital then home  Fax number for surgical facility: [phone]    Type of Anesthesia Anticipated: to be determined    Assessment & Plan     The proposed surgical procedure is considered INTERMEDIATE risk.    Preop general physical exam  Left renal mass  Given history of CAD s/p stenting and followed by cardiology. Last stress test was 5/2013. Will have patient follow-up with cardiology for and further cardiac work-up and cardiac clearance for surgery.  - Basic metabolic panel  (Ca, Cl, CO2, Creat, Gluc, K, Na, BUN)  - CBC with platelets    Coronary artery disease involving native coronary artery of native heart without angina pectoris    Stage 3a chronic kidney disease    Risks and Recommendations:  The patient has the following additional risks and recommendations for perioperative complications:  Cardiovascular:   - Patient to follow-up with his cardiologist for any additional work-up needed prior to surgery.     Medication Instructions:   - aspirin: Discontinue aspirin 7-10 days prior to procedure to reduce bleeding risk. It should be resumed postoperatively.    - ACE/ARB: May be continued on the day of surgery.    - Beta Blockers: Continue taking on the day of surgery.   - Calcium Channel Blockers: May be continued on the day of surgery.   - Diuretics: HOLD on the day of surgery.   - Statins: Continue taking on the day of surgery.    - ibuprofen  (Advil, Motrin): HOLD 1 day before surgery.     RECOMMENDATION:  Patient referred to cardiology for evaluation before surgery. Surgery approval pending completion of consultation.      44 minutes spent on the date of the encounter doing chart review, history and exam, documentation and further activities as noted above      Subjective    Patient with recent hospitalization for kidney stone s/p stenting and recent imaging of left renal mass concerning for renal cell carcinoma. Plans for nephrectomy, per patient. History of CAD s/p stenting, HTN, HLD. Follows with cardiology.    HPI related to upcoming procedure:   1. YES, hx of CAD and stent - Have you ever had a heart attack or stroke?  2. Yes - Have you ever had surgery on your heart or blood vessels, such as a stent, coronary (heart) bypass, or surgery on an artery in the head, neck, heart, or legs?  3. No - Do you have chest pain when you are physically active?  4. Yes - Do you have a history of heart failure?  5. No - Do you currently have a cold, bronchitis, or symptoms of other respiratory (head and chest) infections?  6. No - Do you have a cough, shortness of breath, or wheezing?  7. No - Do you or anyone in your family have a history of blood clots?  8. Yes, daughter with hx of blood disorder - Do you or anyone in your family have a serious bleeding problem, such as long-lasting bleeding after surgeries or cuts?  9. No - Have you ever had anemia or been told to take iron pills?  10. No - Have you had any abnormal blood loss such as black, tarry or bloody stools, or abnormal vaginal bleeding?  11. No - Have you ever had a blood transfusion?  12. Yes - Are you willing to have a blood transfusion if it is medically needed before, during, or after your surgery?  13. No - Have you or anyone in your family ever had problems with anesthesia (sedation for surgery)?  14. Yes - Do you have sleep apnea, excessive snoring, or daytime drowsiness?   15. No - Do you have  any artifical heart valves or other implanted medical devices, such as a pacemaker, defibrillator, or continuous glucose monitor?  16. No - Do you have any artifical joints?  17. No - Are you allergic to latex?  18. No - Is there any chance that you may be pregnant?    Health Care Directive:  Patient has a Health Care Directive on file      Preoperative Review of :   reviewed - no record of controlled substances prescribed.  {    Status of Chronic Conditions:  CAD - Patient has a longstanding history of moderate-severe CAD. Patient denies recent chest pain or NTG use, denies exercise induced dyspnea or PND. Last Stress test 5/2013, EKG 2/2021.     HYPERLIPIDEMIA - Patient has a long history of significant Hyperlipidemia requiring medication for treatment with recent good control. Patient reports no problems or side effects with the medication.     HYPERTENSION - Patient has longstanding history of HTN , currently denies any symptoms referable to elevated blood pressure. Specifically denies chest pain, palpitations, dyspnea, orthopnea, PND or peripheral edema. Blood pressure readings have been in normal range. Current medication regimen is as listed below. Patient denies any side effects of medication.       Review of Systems  CONSTITUTIONAL: NEGATIVE for fever, chills, change in weight  INTEGUMENTARY/SKIN: NEGATIVE for worrisome rashes, moles or lesions  EYES: NEGATIVE for vision changes or irritation  ENT/MOUTH: NEGATIVE for ear, mouth and throat problems  RESP: NEGATIVE for significant cough or SOB  CV: NEGATIVE for chest pain, palpitations or peripheral edema  GI: NEGATIVE for nausea, abdominal pain, heartburn, or change in bowel habits  : NEGATIVE for frequency, dysuria, or hematuria  MUSCULOSKELETAL: NEGATIVE for significant arthralgias or myalgia  NEURO: NEGATIVE for weakness, dizziness or paresthesias  ENDOCRINE: NEGATIVE for temperature intolerance, skin/hair changes  HEME: NEGATIVE for bleeding  problems  PSYCHIATRIC: NEGATIVE for changes in mood or affect    Patient Active Problem List    Diagnosis Date Noted     Chronic kidney disease, stage 3 03/01/2021     Priority: Medium     Kidney stone 02/17/2021     Priority: Medium     Left renal mass 02/17/2021     Priority: Medium     Pre-diabetes 04/10/2019     Priority: Medium     Advance Care Planning 09/28/2015     Priority: Medium     Advance Care Planning 12/6/2016: Receipt of ACP document:  Received: Health Care Directive which was witnessed or notarized on 9-30-16.  Document previously scanned on 10-3-16.  Validation form completed and sent to be scanned.  Code Status needs to be updated to reflect choices in most recent ACP document.  Confirmed/documented designated decision maker(s).  Added by Elizabeth Ellison RN Advance Care Planning Liaison with [employer]             Hyperlipidemia      Priority: Medium     Diagnosis updated by automated process. Provider to review and confirm.       History of angina      Priority: Medium     CAD (coronary artery disease) 02/05/2015     Priority: Medium     Hypertension goal BP (blood pressure) < 140/90 02/05/2015     Priority: Medium     CARDIOVASCULAR SCREENING; LDL GOAL LESS THAN 100 10/31/2010     Priority: Medium      Past Medical History:   Diagnosis Date     CAD (coronary artery disease) 2/5/2015    3 stents     History of angina      History of skin graft     Right lower limb     Hyperlipidaemia      Hypertension goal BP (blood pressure) < 140/90 2/5/2015     Sleep apnea      Past Surgical History:   Procedure Laterality Date     COLONOSCOPY N/A 7/16/2020    Procedure: COLONOSCOPY;  Surgeon: Wenceslao Marie MD;  Location:  GI     COMBINED CYSTOSCOPY, RETROGRADES, URETEROSCOPY, INSERT STENT Left 2/19/2021    Procedure: CYSTOSCOPY LEFT URETEROSCOPY,  LEFT STENT PLACEMENT, LEFT RETROGRADE;  Surgeon: Phil Lin MD;  Location:  OR     CYSTOSCOPY, DILATE URETER(S), COMBINED Left 2/19/2021     Procedure: Cystoscopy, dilate ureter(s), combined;  Surgeon: Phil Lin MD;  Location: [facility] OR     HEART CATH, ANGIOPLASTY  2007    mid to prox LAD drug-eluting stent x2; Occluded RCA with Collaterals     SKIN GRAFT, EACH ADDN 100SQCM       SURGICAL HISTORY OF -       kidney stone laser and ureteric stenting     Current Outpatient Medications   Medication Sig Dispense Refill     amLODIPine (NORVASC) 2.5 MG tablet Take 1 tablet (2.5 mg) by mouth daily 90 tablet 3     atorvastatin (LIPITOR) 40 MG tablet Take 1 tablet (40 mg) by mouth daily 90 tablet 3     furosemide (LASIX) 20 MG tablet Take 1 tablet (20 mg) by mouth daily 90 tablet 3     losartan (COZAAR) 50 MG tablet Take 1 tablet (50 mg) by mouth daily 90 tablet 3     metoprolol succinate ER (TOPROL-XL) 50 MG 24 hr tablet Take 1 tablet (50 mg) by mouth daily 90 tablet 3     ASPIRIN PO Take 81 mg by mouth       nitroGLYcerin (NITROSTAT) 0.4 MG sublingual tablet For chest pain place 1 tablet under the tongue every 5 minutes for 3 doses. If symptoms persist 5 minutes after 1st dose call 911. (Patient not taking: Reported on 3/1/2021) 9 tablet 3     tamsulosin (FLOMAX) 0.4 MG capsule Take 1 capsule (0.4 mg) by mouth daily (Patient not taking: Reported on 3/1/2021) 30 capsule 0     triamcinolone (KENALOG) 0.1 % external cream Apply topically to affected area 2 times daily (Patient not taking: No sig reported) 15 g 0       Allergies   Allergen Reactions     No Known Drug Allergies         Social History     Tobacco Use     Smoking status: Former Smoker     Packs/day: 1.50     Years: 44.00     Pack years: 66.00     Types: Cigarettes     Start date: 1963     Quit date: 2007     Years since quittin.6     Smokeless tobacco: Never Used   Substance Use Topics     Alcohol use: Yes     Comment: beer - 12 pack month     History   Drug Use No         Objective     /75 (BP Location: Right arm, Patient Position: Sitting, Cuff Size: Adult  Regular)   Pulse (!) 48   Temp 98.2  F (36.8  C) (Tympanic)   Resp 18   Wt 48.5 kg (107 lb)   SpO2 100%   BMI 15.35 kg/m      Physical Exam    GENERAL APPEARANCE: healthy, alert and no distress     EYES: EOMI,  PERRL     HENT: ear canals and TM's normal and nose and mouth without ulcers or lesions     NECK: no adenopathy, no asymmetry, masses, or scars and thyroid normal to palpation     RESP: lungs clear to auscultation - no rales, rhonchi or wheezes     CV: regular rates and rhythm, normal S1 S2, no S3 or S4 and no murmur, click or rub     ABDOMEN:  soft, nontender, no HSM or masses and bowel sounds normal     MS: extremities normal- no gross deformities noted, no evidence of inflammation in joints, FROM in all extremities.     SKIN: no suspicious lesions or rashes     NEURO: Normal strength and tone, sensory exam grossly normal, mentation intact and speech normal     PSYCH: mentation appears normal. and affect normal/bright     LYMPHATICS: No cervical adenopathy    Recent Labs   Lab Test 02/19/21  0443 02/18/21  0710 02/17/21  1848 10/15/19  1018 10/15/19  1018 03/06/19  1057 03/06/19  1057   HGB  --  13.2* 15.0  --   --   --   --    * 127* 180   < >  --   --   --    NA  --  140 138   < >  --    < >  --    POTASSIUM 4.1 4.2 4.2   < >  --    < >  --    CR  --  1.33* 1.29*   < >  --    < >  --    A1C  --   --   --   --  5.8*  --  6.0*    < > = values in this interval not displayed.        Diagnostics:  Labs pending at this time.  Results will be reviewed when available.   No EKG this visit, completed in the last 90 days.    Revised Cardiac Risk Index (RCRI):  The patient has the following serious cardiovascular risks for perioperative complications:   - Coronary Artery Disease (MI, positive stress test, angina, Qs on EKG) = 1 point     RCRI Interpretation: 1 point: Class II (low risk - 0.9% complication rate)         Signed Electronically by: Ry Ho DO  Copy of this evaluation report is provided  to requesting physician.    Preop [facility] Preop Guidelines    Revised Cardiac Risk Index

## 2021-03-02 LAB
ANION GAP SERPL CALCULATED.3IONS-SCNC: 3 MMOL/L (ref 3–14)
BUN SERPL-MCNC: 19 MG/DL (ref 7–30)
CALCIUM SERPL-MCNC: 9.8 MG/DL (ref 8.5–10.1)
CHLORIDE SERPL-SCNC: 108 MMOL/L (ref 94–109)
CO2 SERPL-SCNC: 31 MMOL/L (ref 20–32)
CREAT SERPL-MCNC: 0.99 MG/DL (ref 0.66–1.25)
GFR SERPL CREATININE-BSD FRML MDRD: 76 ML/MIN/{1.73_M2}
GLUCOSE SERPL-MCNC: 84 MG/DL (ref 70–99)
POTASSIUM SERPL-SCNC: 3.7 MMOL/L (ref 3.4–5.3)
SODIUM SERPL-SCNC: 142 MMOL/L (ref 133–144)

## 2021-03-02 NOTE — TELEPHONE ENCOUNTER
Called pt with recommendation from Dr. Toro. Order in chart & pt transferred to scheduling to arrange. Farheen BLEDSOE

## 2021-03-03 ENCOUNTER — NURSE TRIAGE (OUTPATIENT)
Dept: NURSING | Facility: CLINIC | Age: 71
End: 2021-03-03

## 2021-03-03 NOTE — TELEPHONE ENCOUNTER
Surgery on the 16th to have kidney removed.  Is delayed second shot acceptable? I told her it is acceptable but make sure they set up that follow up appointment to get that second shot. They say it's better to get it late than never. She has no further questions.  Raysa Jamison RN  [facility]    Reason for Disposition    COVID-19 vaccine, Frequently Asked Questions (FAQs)    Additional Information    Negative: [1] Difficulty breathing or swallowing AND [2] starts within 2 hours after injection    Negative: Sounds like a life-threatening emergency to the triager    Negative: [1] COVID-19 exposure AND [2] no symptoms    Negative: [1] Typical COVID-19 symptoms AND [2] symptoms that are NOT expected from vaccine (e.g., cough, difficulty breathing, loss of taste or smell, runny nose, sore throat)    Negative: [1] Typical COVID-19 symptoms AND [2] started > 3 days after getting vaccine    Negative: Fever > 104 F (40 C)    Negative: Sounds like a severe, unusual reaction to the triager    Negative: [1] Redness or red streak around the injection site AND [2] started > 48 hours after getting vaccine AND [3] fever    Negative: [1] Fever > 101 F (38.3 C) AND [2] age > 60 AND [3] started > 48 hours after getting vaccine    Negative: [1] Fever > 100.0 F (37.8 C) AND [2] bedridden (e.g., nursing home patient, CVA, chronic illness, recovering from surgery) AND [3] started > 48 hours after getting vaccine    Negative: [1] Fever > 100.0 F (37.8 C) AND [2] diabetes mellitus or weak immune system (e.g., HIV positive, cancer chemo, splenectomy, organ transplant, chronic steroids) AND [3] started > 48 hours after getting vaccine    Negative: [1] Redness or red streak around the injection site AND [2] started > 48 hours after getting vaccine AND [3] no fever  (Exception: red area < 1 inch or 2.5 cm wide)    Negative: [1] Pain, tenderness, or swelling at the injection site AND [2] over 3 days (72 hours) since vaccine AND  [3] getting worse    Negative: Fever > 100.0 F (37.8 C) present > 3 days (72 hours)    Negative: [1] Fever > 100.0 F (37.8 C) AND [2] healthcare worker    Negative: [1] Pain, tenderness, or swelling at the injection site AND [2] lasts > 7 days    Negative: [1] Requesting COVID-19 vaccine AND [2] healthcare worker (e.g., EMS first responders, doctors, nurses)    Negative: [1] Requesting COVID-19 vaccine AND [2] resident of a long-term care facility (e.g., nursing home)    Negative: [1] Requesting COVID-19 vaccine AND [2] vaccine available in the community for this patient group    Negative: COVID-19 vaccine, injection site reaction (e.g., pain, redness, swelling), question about    Negative: COVID-19 vaccine, systemic reactions (e.g., fatigue, fever, muscle aches), questions about    Protocols used: CORONAVIRUS (COVID-19) VACCINE QUESTIONS AND [id number] 1.3

## 2021-03-04 ENCOUNTER — IMMUNIZATION (OUTPATIENT)
Dept: NURSING | Facility: CLINIC | Age: 71
End: 2021-03-04
Payer: COMMERCIAL

## 2021-03-04 PROCEDURE — 91300 PR COVID VAC PFIZER DIL RECON 30 MCG/0.3 ML IM: CPT

## 2021-03-04 PROCEDURE — 0001A PR COVID VAC PFIZER DIL RECON 30 MCG/0.3 ML IM: CPT

## 2021-03-09 ENCOUNTER — HOSPITAL ENCOUNTER (OUTPATIENT)
Dept: CARDIOLOGY | Facility: CLINIC | Age: 71
Setting detail: NUCLEAR MEDICINE
End: 2021-03-09
Attending: INTERNAL MEDICINE
Payer: COMMERCIAL

## 2021-03-09 ENCOUNTER — HOSPITAL ENCOUNTER (OUTPATIENT)
Dept: CARDIOLOGY | Facility: CLINIC | Age: 71
End: 2021-03-09
Attending: INTERNAL MEDICINE
Payer: COMMERCIAL

## 2021-03-09 VITALS
WEIGHT: 212.8 LBS | DIASTOLIC BLOOD PRESSURE: 72 MMHG | HEIGHT: 69 IN | SYSTOLIC BLOOD PRESSURE: 122 MMHG | HEART RATE: 51 BPM | BODY MASS INDEX: 31.52 KG/M2 | OXYGEN SATURATION: 97 %

## 2021-03-09 DIAGNOSIS — I25.10 CORONARY ARTERY DISEASE INVOLVING NATIVE CORONARY ARTERY OF NATIVE HEART WITHOUT ANGINA PECTORIS: ICD-10-CM

## 2021-03-09 DIAGNOSIS — N28.89 LEFT RENAL MASS: ICD-10-CM

## 2021-03-09 LAB
CV BLOOD PRESSURE: 54 %
CV STRESS MAX HR HE: 75
NUC STRESS EJECTION FRACTION: 54 %
RATE PRESSURE PRODUCT: 7650
STRESS ECHO BASELINE DIASTOLIC HE: 72
STRESS ECHO BASELINE HR: 51
STRESS ECHO BASELINE SYSTOLIC BP: 122
STRESS ECHO CALCULATED PERCENT HR: 51 %
STRESS ECHO LAST STRESS DIASTOLIC BP: 62
STRESS ECHO LAST STRESS SYSTOLIC BP: 102
STRESS ECHO TARGET HR: 148
STRESS/REST PERFUSION RATIO: 1.14

## 2021-03-09 PROCEDURE — 78452 HT MUSCLE IMAGE SPECT MULT: CPT | Mod: 26 | Performed by: INTERNAL MEDICINE

## 2021-03-09 PROCEDURE — 343N000001 HC RX 343: Performed by: INTERNAL MEDICINE

## 2021-03-09 PROCEDURE — A9502 TC99M TETROFOSMIN: HCPCS | Performed by: INTERNAL MEDICINE

## 2021-03-09 PROCEDURE — 78452 HT MUSCLE IMAGE SPECT MULT: CPT

## 2021-03-09 PROCEDURE — 93018 CV STRESS TEST I&R ONLY: CPT | Performed by: INTERNAL MEDICINE

## 2021-03-09 PROCEDURE — 250N000011 HC RX IP 250 OP 636: Performed by: INTERNAL MEDICINE

## 2021-03-09 PROCEDURE — 93016 CV STRESS TEST SUPVJ ONLY: CPT | Performed by: INTERNAL MEDICINE

## 2021-03-09 RX ORDER — REGADENOSON 0.08 MG/ML
0.4 INJECTION, SOLUTION INTRAVENOUS ONCE
Status: COMPLETED | OUTPATIENT
Start: 2021-03-09 | End: 2021-03-09

## 2021-03-09 RX ORDER — ACYCLOVIR 200 MG/1
0-1 CAPSULE ORAL
Status: DISCONTINUED | OUTPATIENT
Start: 2021-03-09 | End: 2021-03-10 | Stop reason: HOSPADM

## 2021-03-09 RX ORDER — CAFFEINE CITRATE 20 MG/ML
60 SOLUTION INTRAVENOUS
Status: DISCONTINUED | OUTPATIENT
Start: 2021-03-09 | End: 2021-03-10 | Stop reason: HOSPADM

## 2021-03-09 RX ORDER — ALBUTEROL SULFATE 90 UG/1
2 AEROSOL, METERED RESPIRATORY (INHALATION) EVERY 5 MIN PRN
Status: DISCONTINUED | OUTPATIENT
Start: 2021-03-09 | End: 2021-03-10 | Stop reason: HOSPADM

## 2021-03-09 RX ORDER — AMINOPHYLLINE 25 MG/ML
50-100 INJECTION, SOLUTION INTRAVENOUS
Status: DISCONTINUED | OUTPATIENT
Start: 2021-03-09 | End: 2021-03-10 | Stop reason: HOSPADM

## 2021-03-09 RX ADMIN — TETROFOSMIN 9.91 MCI.: 1.38 INJECTION, POWDER, LYOPHILIZED, FOR SOLUTION INTRAVENOUS at 10:21

## 2021-03-09 RX ADMIN — TETROFOSMIN 3.44 MCI.: 1.38 INJECTION, POWDER, LYOPHILIZED, FOR SOLUTION INTRAVENOUS at 09:08

## 2021-03-09 RX ADMIN — REGADENOSON 0.4 MG: 0.08 INJECTION, SOLUTION INTRAVENOUS at 10:17

## 2021-03-09 ASSESSMENT — MIFFLIN-ST. JEOR: SCORE: 1705.63

## 2021-03-11 ENCOUNTER — OFFICE VISIT (OUTPATIENT)
Dept: CARDIOLOGY | Facility: CLINIC | Age: 71
End: 2021-03-11
Payer: COMMERCIAL

## 2021-03-11 VITALS
SYSTOLIC BLOOD PRESSURE: 130 MMHG | WEIGHT: 213 LBS | HEART RATE: 67 BPM | BODY MASS INDEX: 30.49 KG/M2 | HEIGHT: 70 IN | DIASTOLIC BLOOD PRESSURE: 84 MMHG

## 2021-03-11 DIAGNOSIS — E78.5 HYPERLIPIDEMIA, UNSPECIFIED HYPERLIPIDEMIA TYPE: ICD-10-CM

## 2021-03-11 DIAGNOSIS — I25.10 CORONARY ARTERY DISEASE INVOLVING NATIVE CORONARY ARTERY OF NATIVE HEART WITHOUT ANGINA PECTORIS: Primary | ICD-10-CM

## 2021-03-11 DIAGNOSIS — I10 HYPERTENSION GOAL BP (BLOOD PRESSURE) < 140/90: ICD-10-CM

## 2021-03-11 DIAGNOSIS — R60.0 PERIPHERAL EDEMA: ICD-10-CM

## 2021-03-11 PROCEDURE — 99214 OFFICE O/P EST MOD 30 MIN: CPT | Performed by: INTERNAL MEDICINE

## 2021-03-11 ASSESSMENT — MIFFLIN-ST. JEOR: SCORE: 1722.41

## 2021-03-11 NOTE — PROGRESS NOTES
Service Date: 03/11/2021      HISTORY OF PRESENT ILLNESS:  It was my pleasure to see your patient, Fermín Josue.  The patient is due to have a nephrectomy performed next week, I believe.        Mr. Josue is a 71-year-old patient with a past history of stenting of the left anterior descending artery in 2007 and has a chronically occluded right coronary artery with collateralization.  He has a history of stable angina pectoris.  He also has hypertension, mixed hyperlipidemia and prediabetes mellitus.      Since I last saw him, he has done well.  He has usually no chest pains or chest pressure.  Very occasionally if he really pushes himself, he may get some chest discomfort.  However, his pattern has been stable.  This man exercises on a treadmill 50 minutes twice a day every day.  He has no chest pains or chest pressure doing that.        As part of his workup prior to having surgery, we did perform a nuclear stress test 2 days ago.  The stress test showed that the patient has a medium-sized area of transmural infarction in the inferior and inferolateral segments of the left ventricle associated with mild millicent-infarct ischemia which is to be expected given his coronary anatomy showing an occluded but collateralized right coronary artery.  He has a small area of nontransmural infarction in the mid and distal portions of the anteroseptum.  The anterior wall and the lateral walls and apex are free of ischemia.  So the nuclear stress test would be regarded as being low risk for an intraoperative cardiac event.  His ejection fraction was normal at 54% on the nuclear stress testing and he has no evidence of transient ischemic dilatation.        The patient has no symptoms of congestive heart failure such as orthopnea, PND or ankle edema.  Also, the patient has no history of malignant dysrhythmias and he also has no history of severely obstructive valvular lesions.        Based upon the fact that he has no symptoms of  unstable angina pectoris, no symptoms of decompensated congestive heart failure, no symptoms of malignant arrhythmias and no history or physical findings of severely obstructive valvular lesions, he would be regarded as being a low risk for an intraoperative cardiac event and this combined with the nuclear stress test, which should also be regarded as being low risk, he should be able to proceed ahead with his nephrectomy without any special monitoring or further cardiac investigations.        His 12-lead electrocardiogram, which was performed approximately a month ago, shows first-degree AV block and otherwise a normal EKG.      IMPRESSION:   1.  The patient would be regarded as being a low risk for an intraoperative cardiac event for the reasons that I had mentioned above.   2.  Nuclear stress test showing prior inferior myocardial infarct and mild millicent-infarct ischemia consistent with his known occluded and collateralized right coronary artery.  Small area of septal ischemia as described above, probably related to a septal .   3.  The patient does not give a history of unstable angina pectoris.  He very infrequently will have an episode of chest discomfort, but from a cardiac point of view, he has done very well and he is quite an active man without frequent symptoms.   4.  Normotensive with a blood pressure today of 130/84.   5.  Excellent lipid profile on 07/10/2020 with an LDL of 47, HDL of 43, triglycerides of 95 and total cholesterol of 109.   6.  Normal basic metabolic profile on 03/01 with a BUN of 19, creatinine 0.99 and GFR 76.  Sodium 142, potassium of 3.7.      PLAN:  The patient can proceed ahead with his nephrectomy.  He would regard to be in the low risk for an intraoperative cardiac event for the reasons I mentioned above.      The patient should take his amlodipine, metoprolol and losartan on the morning of his procedure.  The aspirin should be restarted after the operation at the  discretion of the surgeon with respect to postop bleeding.        The patient already has a followup appointment for us in 2021.        If you have any questions or concerns, please feel free to call me.      cc:   Teetee Hammer NP   [facility]   [address] [place] [address]      Tyree Enrique MD   [facility]   [address] [place] [address], [address]   [place] [address]         CHRISTIANO JOHNSON MD, [facility]             D: 2021   T: 2021   MT: ELISE      Name:     XU BURCH   MRN:      [id number]        Account:      [id number]   :      1950           Service Date: 2021      Document: [id number]

## 2021-03-11 NOTE — LETTER
3/11/2021    Teeteeharshad Hammer, APRN CNP  [address]  [facility] [address]    RE: Fermín Josue       Dear Colleague,    I had the pleasure of seeing Fermín Josue in the [facility].    HPI and Plan:   See dictation    No orders of the defined types were placed in this encounter.      No orders of the defined types were placed in this encounter.      There are no discontinued medications.      Encounter Diagnoses   Name Primary?     Coronary artery disease involving native coronary artery of native heart without angina pectoris Yes     Hypertension goal BP (blood pressure) < 140/90      Peripheral edema      Hyperlipidemia, unspecified hyperlipidemia type        CURRENT MEDICATIONS:  Current Outpatient Medications   Medication Sig Dispense Refill     amLODIPine (NORVASC) 2.5 MG tablet Take 1 tablet (2.5 mg) by mouth daily 90 tablet 3     atorvastatin (LIPITOR) 40 MG tablet Take 1 tablet (40 mg) by mouth daily 90 tablet 3     furosemide (LASIX) 20 MG tablet Take 1 tablet (20 mg) by mouth daily 90 tablet 3     losartan (COZAAR) 50 MG tablet Take 1 tablet (50 mg) by mouth daily 90 tablet 3     metoprolol succinate ER (TOPROL-XL) 50 MG 24 hr tablet Take 1 tablet (50 mg) by mouth daily 90 tablet 3     nitroGLYcerin (NITROSTAT) 0.4 MG sublingual tablet For chest pain place 1 tablet under the tongue every 5 minutes for 3 doses. If symptoms persist 5 minutes after 1st dose call 911. 9 tablet 3     tamsulosin (FLOMAX) 0.4 MG capsule Take 1 capsule (0.4 mg) by mouth daily 30 capsule 0     ASPIRIN PO Take 81 mg by mouth       triamcinolone (KENALOG) 0.1 % external cream Apply topically to affected area 2 times daily (Patient not taking: No sig reported) 15 g 0       ALLERGIES     Allergies   Allergen Reactions     No Known Drug Allergies        PAST MEDICAL HISTORY:  Past Medical History:   Diagnosis Date     CAD (coronary artery disease) 2/5/2015    3  stents     History of angina      History of skin graft     Right lower limb     Hyperlipidaemia      Hypertension goal BP (blood pressure) < 140/90 2015     Sleep apnea        PAST SURGICAL HISTORY:  Past Surgical History:   Procedure Laterality Date     COLONOSCOPY N/A 2020    Procedure: COLONOSCOPY;  Surgeon: Wenceslao Marie MD;  Location:  GI     COMBINED CYSTOSCOPY, RETROGRADES, URETEROSCOPY, INSERT STENT Left 2021    Procedure: CYSTOSCOPY LEFT URETEROSCOPY,  LEFT STENT PLACEMENT, LEFT RETROGRADE;  Surgeon: Phil Lin MD;  Location:  OR     CYSTOSCOPY, DILATE URETER(S), COMBINED Left 2021    Procedure: Cystoscopy, dilate ureter(s), combined;  Surgeon: Phil Lin MD;  Location:  OR     HEART CATH, ANGIOPLASTY  2007    mid to prox LAD drug-eluting stent x2; Occluded RCA with Collaterals     SKIN GRAFT, EACH ADDN 100SQCM       SURGICAL HISTORY OF -       kidney stone laser and ureteric stenting       FAMILY HISTORY:  Family History   Problem Relation Age of Onset     Cancer Maternal Grandfather      Cancer Maternal Uncle         lung     Blood Disease Maternal Grandmother         leukemia     Hypertension Mother      Alzheimer Disease Father        SOCIAL HISTORY:  Social History     Socioeconomic History     Marital status:      Spouse name: None     Number of children: None     Years of education: None     Highest education level: None   Occupational History     None   Social Needs     Financial resource strain: None     Food insecurity     Worry: None     Inability: None     Transportation needs     Medical: None     Non-medical: None   Tobacco Use     Smoking status: Former Smoker     Packs/day: 1.50     Years: 44.00     Pack years: 66.00     Types: Cigarettes     Start date: 1963     Quit date: 2007     Years since quittin.7     Smokeless tobacco: Never Used   Substance and Sexual Activity     Alcohol use: Yes     Comment: beer -  "12 pack month     Drug use: No     Sexual activity: None   Lifestyle     Physical activity     Days per week: None     Minutes per session: None     Stress: None   Relationships     Social connections     Talks on phone: None     Gets together: None     Attends Yazidi service: None     Active member of club or organization: None     Attends meetings of clubs or organizations: None     Relationship status: None     Intimate partner violence     Fear of current or ex partner: None     Emotionally abused: None     Physically abused: None     Forced sexual activity: None   Other Topics Concern      Service Not Asked     Blood Transfusions Not Asked     Caffeine Concern Yes     Comment: 1 pop daily      Occupational Exposure Not Asked     Hobby Hazards Not Asked     Sleep Concern Not Asked     Stress Concern Not Asked     Weight Concern Not Asked     Special Diet Yes     Comment: fruits      Back Care Not Asked     Exercise Yes     Comment: walking 1 hr daily      Bike Helmet Not Asked     Seat Belt Not Asked     Self-Exams Not Asked     Parent/sibling w/ CABG, MI or angioplasty before 65F 55M? No   Social History Narrative     None       Review of Systems:  Skin:  Negative       Eyes:  Positive for glasses    ENT:  Negative      Respiratory:  Negative       Cardiovascular:  Negative      Gastroenterology: Negative      Genitourinary:  Positive for kidney stone    Musculoskeletal:  Negative      Neurologic:  Negative      Psychiatric:  Negative      Heme/Lymph/Imm:  Negative      Endocrine:  Negative        Physical Exam:  Vitals: /84   Pulse 67   Ht 1.778 m (5' 10\")   Wt 96.6 kg (213 lb)   BMI 30.56 kg/m      Constitutional:  cooperative, alert and oriented, well developed, well nourished, in no acute distress overweight      Skin:  warm and dry to the touch, no apparent skin lesions or masses noted          Head:  normocephalic, no masses or lesions        Eyes:  pupils equal and round, " conjunctivae and lids unremarkable, sclera white, no xanthalasma, EOMS intact, no nystagmus        Lymph:      ENT:  no pallor or cyanosis, dentition good        Neck:  carotid pulses are full and equal bilaterally, JVP normal, no carotid bruit        Respiratory:  normal breath sounds, clear to auscultation, normal A-P diameter, normal symmetry, normal respiratory excursion, no use of accessory muscles         Cardiac: regular rhythm;normal S1 and S2;apical impulse not displaced;no murmurs, gallops or rubs detected     no presence of murmur          pulses full and equal, no bruits auscultated;pulses full and equal                                        GI:    obese      Extremities and Muscular Skeletal:  no deformities, clubbing, cyanosis, erythema observed;no edema erythema;stasis pigmentation            Neurological:  no gross motor deficits;affect appropriate        Psych:  Alert and Oriented x 3      Thank you for allowing me to participate in the care of your patient.      Sincerely,     Christiano Toro MD, MD     [facility]    cc:   No referring provider defined for this encounter.

## 2021-03-11 NOTE — PROGRESS NOTES
HPI and Plan:   See dictation    No orders of the defined types were placed in this encounter.      No orders of the defined types were placed in this encounter.      There are no discontinued medications.      Encounter Diagnoses   Name Primary?     Coronary artery disease involving native coronary artery of native heart without angina pectoris Yes     Hypertension goal BP (blood pressure) < 140/90      Peripheral edema      Hyperlipidemia, unspecified hyperlipidemia type        CURRENT MEDICATIONS:  Current Outpatient Medications   Medication Sig Dispense Refill     amLODIPine (NORVASC) 2.5 MG tablet Take 1 tablet (2.5 mg) by mouth daily 90 tablet 3     atorvastatin (LIPITOR) 40 MG tablet Take 1 tablet (40 mg) by mouth daily 90 tablet 3     furosemide (LASIX) 20 MG tablet Take 1 tablet (20 mg) by mouth daily 90 tablet 3     losartan (COZAAR) 50 MG tablet Take 1 tablet (50 mg) by mouth daily 90 tablet 3     metoprolol succinate ER (TOPROL-XL) 50 MG 24 hr tablet Take 1 tablet (50 mg) by mouth daily 90 tablet 3     nitroGLYcerin (NITROSTAT) 0.4 MG sublingual tablet For chest pain place 1 tablet under the tongue every 5 minutes for 3 doses. If symptoms persist 5 minutes after 1st dose call 911. 9 tablet 3     tamsulosin (FLOMAX) 0.4 MG capsule Take 1 capsule (0.4 mg) by mouth daily 30 capsule 0     ASPIRIN PO Take 81 mg by mouth       triamcinolone (KENALOG) 0.1 % external cream Apply topically to affected area 2 times daily (Patient not taking: No sig reported) 15 g 0       ALLERGIES     Allergies   Allergen Reactions     No Known Drug Allergies        PAST MEDICAL HISTORY:  Past Medical History:   Diagnosis Date     CAD (coronary artery disease) 2/5/2015    3 stents     History of angina      History of skin graft     Right lower limb     Hyperlipidaemia      Hypertension goal BP (blood pressure) < 140/90 2/5/2015     Sleep apnea        PAST SURGICAL HISTORY:  Past Surgical History:   Procedure Laterality Date      COLONOSCOPY N/A 2020    Procedure: COLONOSCOPY;  Surgeon: Wenceslao Marie MD;  Location:  GI     COMBINED CYSTOSCOPY, RETROGRADES, URETEROSCOPY, INSERT STENT Left 2021    Procedure: CYSTOSCOPY LEFT URETEROSCOPY,  LEFT STENT PLACEMENT, LEFT RETROGRADE;  Surgeon: Phil Lin MD;  Location:  OR     CYSTOSCOPY, DILATE URETER(S), COMBINED Left 2021    Procedure: Cystoscopy, dilate ureter(s), combined;  Surgeon: Phil Lin MD;  Location:  OR     HEART CATH, ANGIOPLASTY  2007    mid to prox LAD drug-eluting stent x2; Occluded RCA with Collaterals     SKIN GRAFT, EACH ADDN 100SQCM       SURGICAL HISTORY OF -       kidney stone laser and ureteric stenting       FAMILY HISTORY:  Family History   Problem Relation Age of Onset     Cancer Maternal Grandfather      Cancer Maternal Uncle         lung     Blood Disease Maternal Grandmother         leukemia     Hypertension Mother      Alzheimer Disease Father        SOCIAL HISTORY:  Social History     Socioeconomic History     Marital status:      Spouse name: None     Number of children: None     Years of education: None     Highest education level: None   Occupational History     None   Social Needs     Financial resource strain: None     Food insecurity     Worry: None     Inability: None     Transportation needs     Medical: None     Non-medical: None   Tobacco Use     Smoking status: Former Smoker     Packs/day: 1.50     Years: 44.00     Pack years: 66.00     Types: Cigarettes     Start date: 1963     Quit date: 2007     Years since quittin.7     Smokeless tobacco: Never Used   Substance and Sexual Activity     Alcohol use: Yes     Comment: beer - 12 pack month     Drug use: No     Sexual activity: None   Lifestyle     Physical activity     Days per week: None     Minutes per session: None     Stress: None   Relationships     Social connections     Talks on phone: None     Gets together: None      "Attends Lutheran service: None     Active member of club or organization: None     Attends meetings of clubs or organizations: None     Relationship status: None     Intimate partner violence     Fear of current or ex partner: None     Emotionally abused: None     Physically abused: None     Forced sexual activity: None   Other Topics Concern      Service Not Asked     Blood Transfusions Not Asked     Caffeine Concern Yes     Comment: 1 pop daily      Occupational Exposure Not Asked     Hobby Hazards Not Asked     Sleep Concern Not Asked     Stress Concern Not Asked     Weight Concern Not Asked     Special Diet Yes     Comment: fruits      Back Care Not Asked     Exercise Yes     Comment: walking 1 hr daily      Bike Helmet Not Asked     Seat Belt Not Asked     Self-Exams Not Asked     Parent/sibling w/ CABG, MI or angioplasty before 65F 55M? No   Social History Narrative     None       Review of Systems:  Skin:  Negative       Eyes:  Positive for glasses    ENT:  Negative      Respiratory:  Negative       Cardiovascular:  Negative      Gastroenterology: Negative      Genitourinary:  Positive for kidney stone    Musculoskeletal:  Negative      Neurologic:  Negative      Psychiatric:  Negative      Heme/Lymph/Imm:  Negative      Endocrine:  Negative        Physical Exam:  Vitals: /84   Pulse 67   Ht 1.778 m (5' 10\")   Wt 96.6 kg (213 lb)   BMI 30.56 kg/m      Constitutional:  cooperative, alert and oriented, well developed, well nourished, in no acute distress overweight      Skin:  warm and dry to the touch, no apparent skin lesions or masses noted          Head:  normocephalic, no masses or lesions        Eyes:  pupils equal and round, conjunctivae and lids unremarkable, sclera white, no xanthalasma, EOMS intact, no nystagmus        Lymph:      ENT:  no pallor or cyanosis, dentition good        Neck:  carotid pulses are full and equal bilaterally, JVP normal, no carotid bruit        Respiratory:  " normal breath sounds, clear to auscultation, normal A-P diameter, normal symmetry, normal respiratory excursion, no use of accessory muscles         Cardiac: regular rhythm;normal S1 and S2;apical impulse not displaced;no murmurs, gallops or rubs detected     no presence of murmur          pulses full and equal, no bruits auscultated;pulses full and equal                                        GI:    obese      Extremities and Muscular Skeletal:  no deformities, clubbing, cyanosis, erythema observed;no edema erythema;stasis pigmentation            Neurological:  no gross motor deficits;affect appropriate        Psych:  Alert and Oriented x 3        CC  No referring provider defined for this encounter.

## 2021-03-24 ENCOUNTER — NURSE TRIAGE (OUTPATIENT)
Dept: NURSING | Facility: CLINIC | Age: 71
End: 2021-03-24

## 2021-03-24 NOTE — TELEPHONE ENCOUNTER
3/4/21 Pfizer - 1st shot. Documented on immunization record.    Teetee- please advise, since pt is in the hosp now.  RAKAN Paz

## 2021-03-24 NOTE — TELEPHONE ENCOUNTER
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  Today Meliton Rosenthal is requesting to speak with Teetee Smith as to when he should get the second vaccine.  Please phone Meliton Rosenthal daughter at [phone].    COVID 19 Nurse Triage Plan/Patient Instructions    Please be aware that novel coronavirus (COVID-19) may be circulating in the community. If you develop symptoms such as fever, cough, or SOB or if you have concerns about the presence of another infection including coronavirus (COVID-19), please contact your health care provider or visit [email or web address].     Disposition/Instructions    Home care recommended. Follow home care protocol based instructions.    Thank you for taking steps to prevent the spread of this virus.  o Limit your contact with others.  o Wear a simple mask to cover your cough.  o Wash your hands well and often.    Resources    [facility]: About COVID-19: [email or web address]    CDC: What to Do If You're Sick: www.cdc.gov/coronavirus/2019-ncov/about/steps-when-sick.html    CDC: Ending Home Isolation: www.cdc.gov/coronavirus/2019-ncov/hcp/disposition-in-home-patients.html     CDC: Caring for Someone: www.cdc.gov/coronavirus/2019-ncov/if-you-are-sick/care-for-someone.html     [facility]: Interim Guidance for Hospital Discharge to Home: www.health.Counts include 234 beds at the [facility].[place]/diseases/coronavirus/hcp/hospdischarge.pdf    [facility] clinical trials (COVID-19 research studies): clinicalaffairs.[facility]-clinical-trials     Below are the COVID-19 hotlines at the [facility] ([facility]). Interpreters are available.   o For health questions: Call 589-786-7264 or 1-486.774.1337 (7 a.m. to 7 p.m.)  o For questions about schools and childcare: Call 848-604-7048 or 1-741.192.3251 (7 a.m. to 7 p.m.)                       Reason for Disposition     COVID-19 vaccine, Frequently Asked Questions (FAQs)    Additional Information    Negative: COVID-19 vaccine, systemic reactions (e.g., fatigue, fever, muscle aches), questions about    Negative: COVID-19 vaccine, injection site reaction (e.g., pain, redness, swelling), question about    Negative: [1] Requesting COVID-19 vaccine AND [2] healthcare worker (e.g., EMS first responders, doctors, nurses)    Negative: [1] Requesting COVID-19 vaccine AND [2] resident of a long-term care facility (e.g., nursing home)    Negative: [1] Requesting COVID-19 vaccine AND [2] vaccine available in the community for this patient group    Negative: [1] Pain, tenderness, or swelling at the injection site AND [2] lasts > 7 days    Negative: [1] Fever > 100.0 F (37.8 C) AND [2] healthcare worker    Negative: [1] Redness or red streak around the injection site AND [2] started > 48 hours after getting vaccine AND [3] no fever  (Exception: red area < 1 inch or 2.5 cm wide)    Negative: [1] Pain, tenderness, or swelling at the injection site AND [2] over 3 days (72 hours) since vaccine AND [3] getting worse    Negative: Fever > 100.0 F (37.8 C) present > 3 days (72 hours)    Negative: [1] Redness or red streak around the injection site AND [2] started > 48 hours after getting vaccine AND [3] fever    Negative: [1] Fever > 101 F (38.3 C) AND [2] age > 60 AND [3] started > 48 hours after getting vaccine    Negative: [1] Fever > 100.0 F (37.8 C) AND [2] bedridden (e.g., nursing home patient, CVA, chronic illness, recovering from surgery) AND [3] started > 48 hours after getting vaccine    Negative: [1] Fever > 100.0 F (37.8 C) AND [2] diabetes mellitus or weak immune system (e.g., HIV positive, cancer chemo, splenectomy, organ transplant, chronic steroids) AND [3] started > 48 hours after getting vaccine    Negative: Fever > 104 F (40 C)    Negative: Sounds like a severe, unusual reaction to the triager    Negative: [1] Difficulty breathing or  swallowing AND [2] starts within 2 hours after injection    Negative: Sounds like a life-threatening emergency to the triager    Protocols used: CORONAVIRUS (COVID-19) VACCINE QUESTIONS AND [id number] 1.3

## 2021-03-24 NOTE — TELEPHONE ENCOUNTER
Yes I do think he should get 2nd vaccine as he is increased risk of serious illness related to covid with his age, hx active cancer, cardiac history, and weight.  I would run it by his doctor at the hospital but my opinion would be to get it to protect him from infection.    Teetee Hammer, CNP

## 2021-04-05 ENCOUNTER — PATIENT OUTREACH (OUTPATIENT)
Dept: CARE COORDINATION | Facility: CLINIC | Age: 71
End: 2021-04-05

## 2021-04-05 DIAGNOSIS — N28.89 LEFT RENAL MASS: Primary | ICD-10-CM

## 2021-04-05 NOTE — PROGRESS NOTES
Clinic Care Coordination Contact  Community Health Worker Initial Outreach    CHW Initial Information Gathering:  Referral Source: Other, specify  Preferred Hospital: [facility]  [phone]  Current living arrangement:: I live in a private home with family  Type of residence:: Private home - stairs  Community Resources: None  Supplies used at home:: None  Informal Support system:: Family  Transportation means:: Family       Patient accepts CC: pt declined CCC daughter and grand kids are currently helping a lot.

## 2021-04-09 ENCOUNTER — IMMUNIZATION (OUTPATIENT)
Dept: NURSING | Facility: CLINIC | Age: 71
End: 2021-04-09
Attending: INTERNAL MEDICINE
Payer: COMMERCIAL

## 2021-04-09 PROCEDURE — 0002A PR COVID VAC PFIZER DIL RECON 30 MCG/0.3 ML IM: CPT

## 2021-04-09 PROCEDURE — 91300 PR COVID VAC PFIZER DIL RECON 30 MCG/0.3 ML IM: CPT

## 2021-05-06 ENCOUNTER — TRANSFERRED RECORDS (OUTPATIENT)
Dept: HEALTH INFORMATION MANAGEMENT | Facility: CLINIC | Age: 71
End: 2021-05-06

## 2021-07-13 ENCOUNTER — LAB (OUTPATIENT)
Dept: LAB | Facility: CLINIC | Age: 71
End: 2021-07-13
Payer: COMMERCIAL

## 2021-07-13 DIAGNOSIS — I25.10 CORONARY ARTERY DISEASE INVOLVING NATIVE CORONARY ARTERY OF NATIVE HEART WITHOUT ANGINA PECTORIS: ICD-10-CM

## 2021-07-13 DIAGNOSIS — E78.5 HYPERLIPIDEMIA, UNSPECIFIED HYPERLIPIDEMIA TYPE: ICD-10-CM

## 2021-07-13 DIAGNOSIS — I10 ESSENTIAL HYPERTENSION: Primary | ICD-10-CM

## 2021-07-13 LAB
ALT SERPL W P-5'-P-CCNC: 20 U/L (ref 0–70)
ANION GAP SERPL CALCULATED.3IONS-SCNC: 4 MMOL/L (ref 3–14)
BUN SERPL-MCNC: 23 MG/DL (ref 7–30)
CALCIUM SERPL-MCNC: 8.7 MG/DL (ref 8.5–10.1)
CHLORIDE BLD-SCNC: 108 MMOL/L (ref 94–109)
CHOLEST SERPL-MCNC: 120 MG/DL
CO2 SERPL-SCNC: 28 MMOL/L (ref 20–32)
CREAT SERPL-MCNC: 1.42 MG/DL (ref 0.66–1.25)
FASTING STATUS PATIENT QL REPORTED: YES
GFR SERPL CREATININE-BSD FRML MDRD: 49 ML/MIN/1.73M2
GLUCOSE BLD-MCNC: 111 MG/DL (ref 70–99)
HDLC SERPL-MCNC: 44 MG/DL
LDLC SERPL CALC-MCNC: 48 MG/DL
NONHDLC SERPL-MCNC: 76 MG/DL
POTASSIUM BLD-SCNC: 4.7 MMOL/L (ref 3.4–5.3)
SODIUM SERPL-SCNC: 140 MMOL/L (ref 133–144)
TRIGL SERPL-MCNC: 141 MG/DL

## 2021-07-13 PROCEDURE — 84460 ALANINE AMINO (ALT) (SGPT): CPT | Performed by: INTERNAL MEDICINE

## 2021-07-13 PROCEDURE — 80048 BASIC METABOLIC PNL TOTAL CA: CPT | Performed by: INTERNAL MEDICINE

## 2021-07-13 PROCEDURE — 80061 LIPID PANEL: CPT | Performed by: INTERNAL MEDICINE

## 2021-07-13 PROCEDURE — 36415 COLL VENOUS BLD VENIPUNCTURE: CPT | Performed by: INTERNAL MEDICINE

## 2021-07-20 ENCOUNTER — OFFICE VISIT (OUTPATIENT)
Dept: CARDIOLOGY | Facility: CLINIC | Age: 71
End: 2021-07-20
Payer: COMMERCIAL

## 2021-07-20 VITALS
SYSTOLIC BLOOD PRESSURE: 129 MMHG | HEART RATE: 44 BPM | BODY MASS INDEX: 30.49 KG/M2 | WEIGHT: 213 LBS | DIASTOLIC BLOOD PRESSURE: 75 MMHG | HEIGHT: 70 IN

## 2021-07-20 DIAGNOSIS — N28.89 LEFT RENAL MASS: ICD-10-CM

## 2021-07-20 DIAGNOSIS — I10 ESSENTIAL HYPERTENSION: ICD-10-CM

## 2021-07-20 DIAGNOSIS — E78.5 HYPERLIPIDEMIA, UNSPECIFIED HYPERLIPIDEMIA TYPE: ICD-10-CM

## 2021-07-20 DIAGNOSIS — R60.0 PERIPHERAL EDEMA: ICD-10-CM

## 2021-07-20 DIAGNOSIS — I25.10 CORONARY ARTERY DISEASE INVOLVING NATIVE CORONARY ARTERY OF NATIVE HEART WITHOUT ANGINA PECTORIS: Primary | ICD-10-CM

## 2021-07-20 DIAGNOSIS — R00.1 BRADYCARDIA: ICD-10-CM

## 2021-07-20 DIAGNOSIS — I10 HYPERTENSION GOAL BP (BLOOD PRESSURE) < 140/90: ICD-10-CM

## 2021-07-20 PROCEDURE — 99214 OFFICE O/P EST MOD 30 MIN: CPT | Performed by: INTERNAL MEDICINE

## 2021-07-20 RX ORDER — METOPROLOL SUCCINATE 25 MG/1
25 TABLET, EXTENDED RELEASE ORAL DAILY
Qty: 90 TABLET | Refills: 3 | Status: SHIPPED | OUTPATIENT
Start: 2021-07-20 | End: 2022-05-17

## 2021-07-20 RX ORDER — AMLODIPINE BESYLATE 2.5 MG/1
2.5 TABLET ORAL DAILY
Qty: 90 TABLET | Refills: 3 | Status: SHIPPED | OUTPATIENT
Start: 2021-07-20 | End: 2022-05-17

## 2021-07-20 RX ORDER — ATORVASTATIN CALCIUM 40 MG/1
40 TABLET, FILM COATED ORAL DAILY
Qty: 90 TABLET | Refills: 3 | Status: SHIPPED | OUTPATIENT
Start: 2021-07-20 | End: 2022-05-17

## 2021-07-20 RX ORDER — FUROSEMIDE 20 MG
20 TABLET ORAL DAILY
Qty: 90 TABLET | Refills: 3 | Status: SHIPPED | OUTPATIENT
Start: 2021-07-20 | End: 2021-08-20

## 2021-07-20 RX ORDER — NITROGLYCERIN 0.4 MG/1
TABLET SUBLINGUAL
Qty: 9 TABLET | Refills: 3 | Status: SHIPPED | OUTPATIENT
Start: 2021-07-20 | End: 2024-07-26

## 2021-07-20 RX ORDER — LOSARTAN POTASSIUM 50 MG/1
50 TABLET ORAL DAILY
Qty: 90 TABLET | Refills: 3 | Status: SHIPPED | OUTPATIENT
Start: 2021-07-20 | End: 2022-05-17

## 2021-07-20 ASSESSMENT — MIFFLIN-ST. JEOR: SCORE: 1722.41

## 2021-07-20 NOTE — PROGRESS NOTES
Service Date: 07/20/2021    CARDIOLOGY FOLLOWUP VISIT     REFERRING HEALTHCARE PROVIDER:  Teetee Hammer NP    HISTORY OF PRESENT ILLNESS:  It is my pleasure to see your patient Fermín Josue in followup.  This is a 71-year-old patient with a past history of stenting of the left anterior descending artery in 2007 and has a chronically occluded right coronary artery with collateralization.  He has very infrequent stable angina pectoris.  He also has a history of hypertension, mixed hyperlipidemia, and he also has a diagnosis of prediabetes.  If you remember, the patient was diagnosed with renal cell carcinoma, and he underwent a left nephrectomy at [facility] on 03/16/2021.  By all accounts, he had quite a ivan postop course and was in the hospital for a month and a half.  None of his issues were cardiac, however. At present, he has no chest pains, no chest pressure and no unusual shortness of breath.  He is gradually getting back to his previous strength.  We did notice that his heart rate was on the slower side this morning at 44, and he has not taken his beta blocker yet this morning.  His blood pressure was normal at 129/75.  His lipid profile 1 week ago was excellent with an LDL of 48, HDL of 44, triglycerides of 141 and total cholesterol of 120.  Predictably, post nephrectomy, his creatinine has risen from 0.99-1.42 with a GFR dropping from 76 down to 49.  His BUN is normal at 23.  Sodium is 140, and potassium is 4.7.  His liver function tests are also normal, indicating no hepatic toxicity.    IMPRESSION:    1.  Coronary artery disease and status post stenting of the LAD.  The patient is asymptomatic with respect to coronary artery disease and has had no angina pectoris.  2.  Status post left nephrectomy for renal cell carcinoma.  3.  Significantly bradycardic with a heart rate of 44, even before he has taken his metoprolol this morning.  4.  Excellent lipid profile, well within secondary  prevention guidelines. No evidence of hepatic toxicity with an ALT of 20.    PLAN:  We will halve the dose of metoprolol succinate going from 50 mg down to 25 mg per day.  I wrote those instructions out for him so he is very clear on which medication that he is to halve.  He already has metoprolol 50 mg pills, so he will cut those in half, but the refill will be for the 25 mg pill, which does not need to be cut in half. We will see the patient back again in 6 months' time, but as always, he has been told to contact us if he has any questions or any concerns.    cc:  [place] [address]    Christiano Gifford MD, [facility]        D: 2021   T: 2021   MT: [email or web address]    Name:     XU BURCH  MRN:      [id number]        Account:      [id number]   :      1950           Service Date: 2021       Document: [id number]

## 2021-07-20 NOTE — PROGRESS NOTES
HPI and Plan:   See dictation    Orders Placed This Encounter   Procedures     Basic metabolic panel     Lipid Profile     ALT     Follow-Up with Cardiologist       Orders Placed This Encounter   Medications     amLODIPine (NORVASC) 2.5 MG tablet     Sig: Take 1 tablet (2.5 mg) by mouth daily     Dispense:  90 tablet     Refill:  3     atorvastatin (LIPITOR) 40 MG tablet     Sig: Take 1 tablet (40 mg) by mouth daily     Dispense:  90 tablet     Refill:  3     furosemide (LASIX) 20 MG tablet     Sig: Take 1 tablet (20 mg) by mouth daily     Dispense:  90 tablet     Refill:  3     losartan (COZAAR) 50 MG tablet     Sig: Take 1 tablet (50 mg) by mouth daily     Dispense:  90 tablet     Refill:  3     metoprolol succinate ER (TOPROL-XL) 25 MG 24 hr tablet     Sig: Take 1 tablet (25 mg) by mouth daily     Dispense:  90 tablet     Refill:  3     nitroGLYcerin (NITROSTAT) 0.4 MG sublingual tablet     Sig: For chest pain place 1 tablet under the tongue every 5 minutes for 3 doses. If symptoms persist 5 minutes after 1st dose call 911.     Dispense:  9 tablet     Refill:  3       Medications Discontinued During This Encounter   Medication Reason     amLODIPine (NORVASC) 2.5 MG tablet Reorder     atorvastatin (LIPITOR) 40 MG tablet Reorder     furosemide (LASIX) 20 MG tablet Reorder     losartan (COZAAR) 50 MG tablet Reorder     metoprolol succinate ER (TOPROL-XL) 50 MG 24 hr tablet      nitroGLYcerin (NITROSTAT) 0.4 MG sublingual tablet Reorder         Encounter Diagnoses   Name Primary?     Coronary artery disease involving native coronary artery of native heart without angina pectoris Yes     Hypertension goal BP (blood pressure) < 140/90      Peripheral edema      Hyperlipidemia, unspecified hyperlipidemia type      Left renal mass      Essential hypertension      Bradycardia        CURRENT MEDICATIONS:  Current Outpatient Medications   Medication Sig Dispense Refill     amLODIPine (NORVASC) 2.5 MG tablet Take 1 tablet  (2.5 mg) by mouth daily 90 tablet 3     ASPIRIN PO Take 81 mg by mouth       atorvastatin (LIPITOR) 40 MG tablet Take 1 tablet (40 mg) by mouth daily 90 tablet 3     furosemide (LASIX) 20 MG tablet Take 1 tablet (20 mg) by mouth daily 90 tablet 3     losartan (COZAAR) 50 MG tablet Take 1 tablet (50 mg) by mouth daily 90 tablet 3     metoprolol succinate ER (TOPROL-XL) 25 MG 24 hr tablet Take 1 tablet (25 mg) by mouth daily 90 tablet 3     nitroGLYcerin (NITROSTAT) 0.4 MG sublingual tablet For chest pain place 1 tablet under the tongue every 5 minutes for 3 doses. If symptoms persist 5 minutes after 1st dose call 911. 9 tablet 3     tamsulosin (FLOMAX) 0.4 MG capsule Take 1 capsule (0.4 mg) by mouth daily 30 capsule 0     triamcinolone (KENALOG) 0.1 % external cream Apply topically to affected area 2 times daily 15 g 0       ALLERGIES     Allergies   Allergen Reactions     No Known Drug Allergies        PAST MEDICAL HISTORY:  Past Medical History:   Diagnosis Date     CAD (coronary artery disease) 2/5/2015    3 stents     History of angina      History of skin graft     Right lower limb     Hyperlipidaemia      Hypertension goal BP (blood pressure) < 140/90 2/5/2015     Sleep apnea        PAST SURGICAL HISTORY:  Past Surgical History:   Procedure Laterality Date     COLONOSCOPY N/A 7/16/2020    Procedure: COLONOSCOPY;  Surgeon: Wenceslao Marie MD;  Location:  GI     COMBINED CYSTOSCOPY, RETROGRADES, URETEROSCOPY, INSERT STENT Left 2/19/2021    Procedure: CYSTOSCOPY LEFT URETEROSCOPY,  LEFT STENT PLACEMENT, LEFT RETROGRADE;  Surgeon: Phil Lin MD;  Location:  OR     CYSTOSCOPY, DILATE URETER(S), COMBINED Left 2/19/2021    Procedure: Cystoscopy, dilate ureter(s), combined;  Surgeon: Phil Lin MD;  Location:  OR     HEART CATH, ANGIOPLASTY  11/2007    mid to prox LAD drug-eluting stent x2; Occluded RCA with Collaterals     SKIN GRAFT, EACH ADDN 100SQCM       SURGICAL HISTORY OF -    2003    kidney stone laser and ureteric stenting       FAMILY HISTORY:  Family History   Problem Relation Age of Onset     Cancer Maternal Grandfather      Cancer Maternal Uncle         lung     Blood Disease Maternal Grandmother         leukemia     Hypertension Mother      Alzheimer Disease Father        SOCIAL HISTORY:  Social History     Socioeconomic History     Marital status:      Spouse name: None     Number of children: None     Years of education: None     Highest education level: None   Occupational History     None   Tobacco Use     Smoking status: Former Smoker     Packs/day: 1.50     Years: 44.00     Pack years: 66.00     Types: Cigarettes     Start date: 1963     Quit date: 2007     Years since quittin.0     Smokeless tobacco: Never Used   Substance and Sexual Activity     Alcohol use: Yes     Comment: beer - 12 pack month     Drug use: No     Sexual activity: None   Other Topics Concern      Service Not Asked     Blood Transfusions Not Asked     Caffeine Concern Yes     Comment: 1 pop daily      Occupational Exposure Not Asked     Hobby Hazards Not Asked     Sleep Concern Not Asked     Stress Concern Not Asked     Weight Concern Not Asked     Special Diet Yes     Comment: fruits      Back Care Not Asked     Exercise Yes     Comment: walking 1 hr daily      Bike Helmet Not Asked     Seat Belt Not Asked     Self-Exams Not Asked     Parent/sibling w/ CABG, MI or angioplasty before 65F 55M? No   Social History Narrative     None     Social Determinants of Health     Financial Resource Strain:      Difficulty of Paying Living Expenses:    Food Insecurity:      Worried About Running Out of Food in the Last Year:      Ran Out of Food in the Last Year:    Transportation Needs:      Lack of Transportation (Medical):      Lack of Transportation (Non-Medical):    Physical Activity:      Days of Exercise per Week:      Minutes of Exercise per Session:    Stress:      Feeling of  "Stress :    Social Connections:      Frequency of Communication with Friends and Family:      Frequency of Social Gatherings with Friends and Family:      Attends Bahai Services:      Active Member of Clubs or Organizations:      Attends Club or Organization Meetings:      Marital Status:    Intimate Partner Violence:      Fear of Current or Ex-Partner:      Emotionally Abused:      Physically Abused:      Sexually Abused:        Review of Systems:  Skin:  Negative       Eyes:  Positive for glasses    ENT:  Negative      Respiratory:  Negative       Cardiovascular:  Negative      Gastroenterology: Negative      Genitourinary:  Positive for kidney stone    Musculoskeletal:  Negative      Neurologic:  Negative      Psychiatric:  Negative      Heme/Lymph/Imm:  Negative      Endocrine:  Negative        Physical Exam:  Vitals: /75   Pulse (!) 44   Ht 1.778 m (5' 10\")   Wt 96.6 kg (213 lb)   BMI 30.56 kg/m      Constitutional:  cooperative, alert and oriented, well developed, well nourished, in no acute distress overweight      Skin:  warm and dry to the touch, no apparent skin lesions or masses noted          Head:  normocephalic, no masses or lesions        Eyes:  pupils equal and round, conjunctivae and lids unremarkable, sclera white, no xanthalasma, EOMS intact, no nystagmus        Lymph:      ENT:  no pallor or cyanosis, dentition good        Neck:  carotid pulses are full and equal bilaterally, JVP normal, no carotid bruit        Respiratory:  normal breath sounds, clear to auscultation, normal A-P diameter, normal symmetry, normal respiratory excursion, no use of accessory muscles         Cardiac: regular rhythm;normal S1 and S2;apical impulse not displaced;no murmurs, gallops or rubs detected     no presence of murmur          pulses full and equal, no bruits auscultated;pulses full and equal                                        GI:    obese      Extremities and Muscular Skeletal:  no deformities, " clubbing, cyanosis, erythema observed;no edema erythema;stasis pigmentation bilateral LE edema;trace          Neurological:  no gross motor deficits;affect appropriate        Psych:  Alert and Oriented x 3        CC  No referring provider defined for this encounter.

## 2021-07-20 NOTE — LETTER
7/20/2021    Teeteeana cristina Hammer, APRN CNP  [address]  [facility] [address]    RE: Fermín Josue       Dear Colleague,    I had the pleasure of seeing Fermín Josue in the [facility].    HPI and Plan:   See dictation    Orders Placed This Encounter   Procedures     Basic metabolic panel     Lipid Profile     ALT     Follow-Up with Cardiologist       Orders Placed This Encounter   Medications     amLODIPine (NORVASC) 2.5 MG tablet     Sig: Take 1 tablet (2.5 mg) by mouth daily     Dispense:  90 tablet     Refill:  3     atorvastatin (LIPITOR) 40 MG tablet     Sig: Take 1 tablet (40 mg) by mouth daily     Dispense:  90 tablet     Refill:  3     furosemide (LASIX) 20 MG tablet     Sig: Take 1 tablet (20 mg) by mouth daily     Dispense:  90 tablet     Refill:  3     losartan (COZAAR) 50 MG tablet     Sig: Take 1 tablet (50 mg) by mouth daily     Dispense:  90 tablet     Refill:  3     metoprolol succinate ER (TOPROL-XL) 25 MG 24 hr tablet     Sig: Take 1 tablet (25 mg) by mouth daily     Dispense:  90 tablet     Refill:  3     nitroGLYcerin (NITROSTAT) 0.4 MG sublingual tablet     Sig: For chest pain place 1 tablet under the tongue every 5 minutes for 3 doses. If symptoms persist 5 minutes after 1st dose call 911.     Dispense:  9 tablet     Refill:  3       Medications Discontinued During This Encounter   Medication Reason     amLODIPine (NORVASC) 2.5 MG tablet Reorder     atorvastatin (LIPITOR) 40 MG tablet Reorder     furosemide (LASIX) 20 MG tablet Reorder     losartan (COZAAR) 50 MG tablet Reorder     metoprolol succinate ER (TOPROL-XL) 50 MG 24 hr tablet      nitroGLYcerin (NITROSTAT) 0.4 MG sublingual tablet Reorder         Encounter Diagnoses   Name Primary?     Coronary artery disease involving native coronary artery of native heart without angina pectoris Yes     Hypertension goal BP (blood pressure) < 140/90      Peripheral edema       Hyperlipidemia, unspecified hyperlipidemia type      Left renal mass      Essential hypertension      Bradycardia        CURRENT MEDICATIONS:  Current Outpatient Medications   Medication Sig Dispense Refill     amLODIPine (NORVASC) 2.5 MG tablet Take 1 tablet (2.5 mg) by mouth daily 90 tablet 3     ASPIRIN PO Take 81 mg by mouth       atorvastatin (LIPITOR) 40 MG tablet Take 1 tablet (40 mg) by mouth daily 90 tablet 3     furosemide (LASIX) 20 MG tablet Take 1 tablet (20 mg) by mouth daily 90 tablet 3     losartan (COZAAR) 50 MG tablet Take 1 tablet (50 mg) by mouth daily 90 tablet 3     metoprolol succinate ER (TOPROL-XL) 25 MG 24 hr tablet Take 1 tablet (25 mg) by mouth daily 90 tablet 3     nitroGLYcerin (NITROSTAT) 0.4 MG sublingual tablet For chest pain place 1 tablet under the tongue every 5 minutes for 3 doses. If symptoms persist 5 minutes after 1st dose call 911. 9 tablet 3     tamsulosin (FLOMAX) 0.4 MG capsule Take 1 capsule (0.4 mg) by mouth daily 30 capsule 0     triamcinolone (KENALOG) 0.1 % external cream Apply topically to affected area 2 times daily 15 g 0       ALLERGIES     Allergies   Allergen Reactions     No Known Drug Allergies        PAST MEDICAL HISTORY:  Past Medical History:   Diagnosis Date     CAD (coronary artery disease) 2/5/2015    3 stents     History of angina      History of skin graft     Right lower limb     Hyperlipidaemia      Hypertension goal BP (blood pressure) < 140/90 2/5/2015     Sleep apnea        PAST SURGICAL HISTORY:  Past Surgical History:   Procedure Laterality Date     COLONOSCOPY N/A 7/16/2020    Procedure: COLONOSCOPY;  Surgeon: Wenceslao Marie MD;  Location:  GI     COMBINED CYSTOSCOPY, RETROGRADES, URETEROSCOPY, INSERT STENT Left 2/19/2021    Procedure: CYSTOSCOPY LEFT URETEROSCOPY,  LEFT STENT PLACEMENT, LEFT RETROGRADE;  Surgeon: Phil Lin MD;  Location:  OR     CYSTOSCOPY, DILATE URETER(S), COMBINED Left 2/19/2021    Procedure:  Cystoscopy, dilate ureter(s), combined;  Surgeon: Phil Lin MD;  Location: [facility] OR     HEART CATH, ANGIOPLASTY  2007    mid to prox LAD drug-eluting stent x2; Occluded RCA with Collaterals     SKIN GRAFT, EACH ADDN 100SQCM       SURGICAL HISTORY OF -       kidney stone laser and ureteric stenting       FAMILY HISTORY:  Family History   Problem Relation Age of Onset     Cancer Maternal Grandfather      Cancer Maternal Uncle         lung     Blood Disease Maternal Grandmother         leukemia     Hypertension Mother      Alzheimer Disease Father        SOCIAL HISTORY:  Social History     Socioeconomic History     Marital status:      Spouse name: None     Number of children: None     Years of education: None     Highest education level: None   Occupational History     None   Tobacco Use     Smoking status: Former Smoker     Packs/day: 1.50     Years: 44.00     Pack years: 66.00     Types: Cigarettes     Start date: 1963     Quit date: 2007     Years since quittin.0     Smokeless tobacco: Never Used   Substance and Sexual Activity     Alcohol use: Yes     Comment: beer - 12 pack month     Drug use: No     Sexual activity: None   Other Topics Concern      Service Not Asked     Blood Transfusions Not Asked     Caffeine Concern Yes     Comment: 1 pop daily      Occupational Exposure Not Asked     Hobby Hazards Not Asked     Sleep Concern Not Asked     Stress Concern Not Asked     Weight Concern Not Asked     Special Diet Yes     Comment: fruits      Back Care Not Asked     Exercise Yes     Comment: walking 1 hr daily      Bike Helmet Not Asked     Seat Belt Not Asked     Self-Exams Not Asked     Parent/sibling w/ CABG, MI or angioplasty before 65F 55M? No   Social History Narrative     None     Social Determinants of Health     Financial Resource Strain:      Difficulty of Paying Living Expenses:    Food Insecurity:      Worried About Running Out of Food in the Last Year:       "Ran Out of Food in the Last Year:    Transportation Needs:      Lack of Transportation (Medical):      Lack of Transportation (Non-Medical):    Physical Activity:      Days of Exercise per Week:      Minutes of Exercise per Session:    Stress:      Feeling of Stress :    Social Connections:      Frequency of Communication with Friends and Family:      Frequency of Social Gatherings with Friends and Family:      Attends Congregation Services:      Active Member of Clubs or Organizations:      Attends Club or Organization Meetings:      Marital Status:    Intimate Partner Violence:      Fear of Current or Ex-Partner:      Emotionally Abused:      Physically Abused:      Sexually Abused:        Review of Systems:  Skin:  Negative       Eyes:  Positive for glasses    ENT:  Negative      Respiratory:  Negative       Cardiovascular:  Negative      Gastroenterology: Negative      Genitourinary:  Positive for kidney stone    Musculoskeletal:  Negative      Neurologic:  Negative      Psychiatric:  Negative      Heme/Lymph/Imm:  Negative      Endocrine:  Negative        Physical Exam:  Vitals: /75   Pulse (!) 44   Ht 1.778 m (5' 10\")   Wt 96.6 kg (213 lb)   BMI 30.56 kg/m      Constitutional:  cooperative, alert and oriented, well developed, well nourished, in no acute distress overweight      Skin:  warm and dry to the touch, no apparent skin lesions or masses noted          Head:  normocephalic, no masses or lesions        Eyes:  pupils equal and round, conjunctivae and lids unremarkable, sclera white, no xanthalasma, EOMS intact, no nystagmus        Lymph:      ENT:  no pallor or cyanosis, dentition good        Neck:  carotid pulses are full and equal bilaterally, JVP normal, no carotid bruit        Respiratory:  normal breath sounds, clear to auscultation, normal A-P diameter, normal symmetry, normal respiratory excursion, no use of accessory muscles         Cardiac: regular rhythm;normal S1 and S2;apical impulse " not displaced;no murmurs, gallops or rubs detected     no presence of murmur          pulses full and equal, no bruits auscultated;pulses full and equal                                        GI:    obese      Extremities and Muscular Skeletal:  no deformities, clubbing, cyanosis, erythema observed;no edema erythema;stasis pigmentation bilateral LE edema;trace          Neurological:  no gross motor deficits;affect appropriate        Psych:  Alert and Oriented x 3        CC  No referring provider defined for this encounter.                  Thank you for allowing me to participate in the care of your patient.      Sincerely,     Christiano Toro MD, MD     [facility]  cc:   No referring provider defined for this encounter.

## 2021-08-19 NOTE — PROGRESS NOTES
"  SUBJECTIVE:   Fermín Josue is a 71 year old male who presents for Preventive Visit.    Patient has been advised of split billing requirements and indicates understanding: Yes   Are you in the first 12 months of your Medicare coverage?  No    Healthy Habits:     In general, how would you rate your overall health?  Good    Frequency of exercise:  6-7 days/week    Duration of exercise:  45-60 minutes    Do you usually eat at least 4 servings of fruit and vegetables a day, include whole grains    & fiber and avoid regularly eating high fat or \"junk\" foods?  No    Taking medications regularly:  Yes    Medication side effects:  None    Ability to successfully perform activities of daily living:  No assistance needed    Home Safety:  No safety concerns identified    Hearing Impairment:  Need to ask people to speak up or repeat themselves and difficulty understanding soft or whispered speech    In the past 6 months, have you been bothered by leaking of urine?  No    In general, how would you rate your overall mental or emotional health?  Excellent      PHQ-2 Total Score: 0    Additional concerns today:  Yes    Do you feel safe in your environment? Yes    Have you ever done Advance Care Planning? (For example, a Health Directive, POLST, or a discussion with a medical provider or your loved ones about your wishes): Yes, advance care planning is on file.      Fall risk  Fallen 2 or more times in the past year?: No  Any fall with injury in the past year?: No    Cognitive Screening   1) Repeat 3 items (Leader, Season, Table)    2) Clock draw: NORMAL  3) 3 item recall: Recalls 2 objects   Results: NORMAL clock, 1-2 items recalled: COGNITIVE IMPAIRMENT LESS LIKELY    Mini-CogTM Copyright SHAUN Jane. Licensed by the author for use in [facility]; reprinted with permission ([email or web address]). All rights reserved.        Reviewed and updated as needed this visit by clinical staff  Tobacco  Allergies  Meds  Problems  " Med Hx  Surg Hx  Fam Hx  Soc Hx          Reviewed and updated as needed this visit by Provider   Allergies  Meds  Problems  Med Hx  Surg Hx  Fam Hx         Social History     Tobacco Use     Smoking status: Former Smoker     Packs/day: 1.50     Years: 44.00     Pack years: 66.00     Types: Cigarettes     Start date: 1963     Quit date: 2007     Years since quittin.1     Smokeless tobacco: Never Used   Substance Use Topics     Alcohol use: Yes     Comment: beer - 12 pack month       Alcohol Use 2021   Prescreen: >3 drinks/day or >7 drinks/week? No   Prescreen: >3 drinks/day or >7 drinks/week? -     Current providers sharing in care for this patient include:   Patient Care Team:  Teetee Hammer APRN CNP as PCP - General (Nurse Practitioner - Family)  Christiano Toro MD as Assigned Heart and Vascular Provider  Teetee Hammer APRN CNP as Assigned PCP    The following health maintenance items are reviewed in Epic and correct as of today:  Health Maintenance Due   Topic Date Due     ZOSTER IMMUNIZATION (1 of 2) Never done     FALL RISK ASSESSMENT  2020     A1C  10/15/2020     BP Readings from Last 3 Encounters:   21 100/50   21 129/75   21 130/84    Wt Readings from Last 3 Encounters:   21 98.4 kg (217 lb)   21 96.6 kg (213 lb)   21 96.6 kg (213 lb)                  Patient Active Problem List   Diagnosis     CARDIOVASCULAR SCREENING; LDL GOAL LESS THAN 100     CAD (coronary artery disease)     Hypertension goal BP (blood pressure) < 140/90     Hyperlipidemia     History of angina     Advance Care Planning     Pre-diabetes     Kidney stone     Left renal mass     Chronic kidney disease, stage 3     BPH (benign prostatic hyperplasia)     Malignant tumor of kidney (H)     S/p nephrectomy     Obesity (BMI 30.0-34.9)     Dermatitis     Past Surgical History:   Procedure Laterality Date     COLONOSCOPY N/A 2020     Procedure: COLONOSCOPY;  Surgeon: Wenceslao Marie MD;  Location:  GI     COMBINED CYSTOSCOPY, RETROGRADES, URETEROSCOPY, INSERT STENT Left 2021    Procedure: CYSTOSCOPY LEFT URETEROSCOPY,  LEFT STENT PLACEMENT, LEFT RETROGRADE;  Surgeon: Phil Lin MD;  Location:  OR     CYSTOSCOPY, DILATE URETER(S), COMBINED Left 2021    Procedure: Cystoscopy, dilate ureter(s), combined;  Surgeon: Phil Lin MD;  Location:  OR     HEART CATH, ANGIOPLASTY  2007    mid to prox LAD drug-eluting stent x2; Occluded RCA with Collaterals     KIDNEY STONE SURGERY      laser and ureteric stenting     NEPHRECTOMY RT/LT Left 2021    [facility]     SKIN GRAFT, EACH ADDN 100SQCM         Social History     Tobacco Use     Smoking status: Former Smoker     Packs/day: 1.50     Years: 44.00     Pack years: 66.00     Types: Cigarettes     Start date: 1963     Quit date: 2007     Years since quittin.1     Smokeless tobacco: Never Used   Substance Use Topics     Alcohol use: Yes     Comment: beer - 12 pack month     Family History   Problem Relation Age of Onset     Hypertension Mother      Pneumonia Mother 91        covid related     Dementia Mother      Alzheimer Disease Father         d age 91     Blood Disease Maternal Grandmother         leukemia     Cancer Maternal Grandfather      Cancer Maternal Uncle         lung         Current Outpatient Medications   Medication Sig Dispense Refill     acetaminophen (TYLENOL) 500 MG tablet Take 1,000 mg by mouth       amLODIPine (NORVASC) 2.5 MG tablet Take 1 tablet (2.5 mg) by mouth daily 90 tablet 3     ASPIRIN PO Take 81 mg by mouth       atorvastatin (LIPITOR) 40 MG tablet Take 1 tablet (40 mg) by mouth daily 90 tablet 3     furosemide (LASIX) 20 MG tablet Take 1 tablet (20 mg) by mouth daily 90 tablet 3     losartan (COZAAR) 50 MG tablet Take 1 tablet (50 mg) by mouth daily 90 tablet 3     metoprolol succinate ER (TOPROL-XL) 25 MG 24 hr  tablet Take 1 tablet (25 mg) by mouth daily 90 tablet 3     nitroGLYcerin (NITROSTAT) 0.4 MG sublingual tablet For chest pain place 1 tablet under the tongue every 5 minutes for 3 doses. If symptoms persist 5 minutes after 1st dose call 911. 9 tablet 3     tamsulosin (FLOMAX) 0.4 MG capsule Take 1 capsule (0.4 mg) by mouth daily 30 capsule 0     triamcinolone (KENALOG) 0.1 % external cream Apply topically 2 times daily 15 g 3     Allergies   Allergen Reactions     No Known Drug Allergies      Recent Labs   Lab Test 07/13/21  0919 03/01/21  1422 02/18/21  0710 02/17/21  1848 07/10/20  0854 10/15/19  1018 07/26/19  0716 03/06/19  1057   A1C  --   --   --   --   --  5.8*  --  6.0*   LDL 48  --   --   --  47  --  64  --    HDL 44  --   --   --  43  --  39*  --    TRIG 141  --   --   --  95  --  114  --    ALT 20  --   --  28 <5*  --  9  --    CR 1.42* 0.99 1.33* 1.29* 1.24  --  1.20  --    GFRESTIMATED 49* 76 53* 55* 57*  --  60*  --    GFRESTBLACK  --  88 61 64 70  --  72  --    POTASSIUM 4.7 3.7 4.2 4.2 3.4*  --  4.5  --       Review of Systems   Constitutional: Negative for chills and fever.   HENT: Positive for hearing loss and sore throat. Negative for congestion and ear pain.    Eyes: Negative for pain and visual disturbance.   Respiratory: Negative for cough and shortness of breath.    Cardiovascular: Positive for peripheral edema. Negative for chest pain and palpitations.   Gastrointestinal: Positive for constipation and diarrhea. Negative for abdominal pain, heartburn, hematochezia and nausea.   Genitourinary: Positive for frequency and urgency. Negative for discharge, dysuria, genital sores, hematuria and impotence.   Musculoskeletal: Positive for joint swelling. Negative for arthralgias and myalgias.   Skin: Negative for rash.   Neurological: Negative for dizziness, weakness and paresthesias.   Psychiatric/Behavioral: Negative for mood changes. The patient is not nervous/anxious.      OBJECTIVE:   /50   " Pulse 52   Temp 98.1  F (36.7  C)   Resp 16   Ht 1.74 m (5' 8.5\")   Wt 98.4 kg (217 lb)   SpO2 97%   BMI 32.51 kg/m   Estimated body mass index is 32.51 kg/m  as calculated from the following:    Height as of this encounter: 1.74 m (5' 8.5\").    Weight as of this encounter: 98.4 kg (217 lb).  Physical Exam  GENERAL: healthy, alert and no distress  EYES: Eyes grossly normal to inspection, PERRL and conjunctivae and sclerae normal  NECK: no adenopathy, no asymmetry, masses, or scars and thyroid normal to palpation  RESP: lungs clear to auscultation - no rales, rhonchi or wheezes  CV: regular rate and rhythm, normal S1 S2, no S3 or S4, no murmur, click or rub, no peripheral edema and peripheral pulses strong  ABDOMEN: soft, nontender, no hepatosplenomegaly, no masses and bowel sounds normal  MS: no gross musculoskeletal defects noted, no edema  SKIN: no suspicious lesions or rashes  NEURO: Normal strength and tone, mentation intact and speech normal  BACK: no CVA tenderness, no paralumbar tenderness  PSYCH: mentation appears normal, affect normal/bright  LYMPH: normal ant/post cervical, supraclavicular nodes      ASSESSMENT / PLAN:   1. Encounter for Medicare annual wellness exam  Routine    2. Dermatitis  Refilled today  - triamcinolone (KENALOG) 0.1 % external cream; Apply topically 2 times daily  Dispense: 15 g; Refill: 3    3. Malignant neoplasm of left kidney (H)  New diagnosis this spring, s/p left nephrectomy, healing well, kidney function preserved, follow up with specialists as scheduled  GFR Estimate   Date Value Ref Range Status   07/13/2021 49 (L) >60 mL/min/1.73m2 Final     Comment:     As of July 11, 2021, eGFR is calculated by the CKD-EPI creatinine equation, without race adjustment. eGFR can be influenced by muscle mass, exercise, and diet. The reported eGFR is an estimation only and is only applicable if the renal function is stable.   03/01/2021 76 >60 mL/min/[1.73_m2] Final     Comment:     " "Non  GFR Calc  Starting 12/18/2018, serum creatinine based estimated GFR (eGFR) will be   calculated using the Chronic Kidney Disease Epidemiology Collaboration   (CKD-EPI) equation.     02/18/2021 53 (L) >60 mL/min/[1.73_m2] Final     Comment:     Non  GFR Calc  Starting 12/18/2018, serum creatinine based estimated GFR (eGFR) will be   calculated using the Chronic Kidney Disease Epidemiology Collaboration   (CKD-EPI) equation.     02/17/2021 55 (L) >60 mL/min/[1.73_m2] Final     Comment:     Non  GFR Calc  Starting 12/18/2018, serum creatinine based estimated GFR (eGFR) will be   calculated using the Chronic Kidney Disease Epidemiology Collaboration   (CKD-EPI) equation.             Patient has been advised of split billing requirements and indicates understanding: No  COUNSELING:  Reviewed preventive health counseling, as reflected in patient instructions    Estimated body mass index is 32.51 kg/m  as calculated from the following:    Height as of this encounter: 1.74 m (5' 8.5\").    Weight as of this encounter: 98.4 kg (217 lb).    Weight management plan: Discussed healthy diet and exercise guidelines    He reports that he quit smoking about 14 years ago. His smoking use included cigarettes. He started smoking about 58 years ago. He has a 66.00 pack-year smoking history. He has never used smokeless tobacco.      Appropriate preventive services were discussed with this patient, including applicable screening as appropriate for cardiovascular disease, diabetes, osteopenia/osteoporosis, and glaucoma.  As appropriate for age/gender, discussed screening for colorectal cancer, prostate cancer, breast cancer, and cervical cancer. Checklist reviewing preventive services available has been given to the patient.    Reviewed patients plan of care and provided an AVS. The Basic Care Plan (routine screening as documented in Health Maintenance) for Fermín meets the Care Plan " requirement. This Care Plan has been established and reviewed with the Patient.    Counseling Resources:  ATP IV Guidelines  Pooled Cohorts Equation Calculator  Breast Cancer Risk Calculator  Breast Cancer: Medication to Reduce Risk  FRAX Risk Assessment  ICSI Preventive Guidelines  Dietary Guidelines for Americans, 2010  USDA's MyPlate  ASA Prophylaxis  Lung CA Screening    Vernell Bucio MD  [facility]    Identified Health Risks:

## 2021-08-20 ENCOUNTER — OFFICE VISIT (OUTPATIENT)
Dept: FAMILY MEDICINE | Facility: CLINIC | Age: 71
End: 2021-08-20
Payer: COMMERCIAL

## 2021-08-20 VITALS
RESPIRATION RATE: 16 BRPM | TEMPERATURE: 98.1 F | HEIGHT: 69 IN | OXYGEN SATURATION: 97 % | DIASTOLIC BLOOD PRESSURE: 50 MMHG | BODY MASS INDEX: 32.14 KG/M2 | WEIGHT: 217 LBS | SYSTOLIC BLOOD PRESSURE: 100 MMHG | HEART RATE: 52 BPM

## 2021-08-20 DIAGNOSIS — L30.9 DERMATITIS: ICD-10-CM

## 2021-08-20 DIAGNOSIS — Z00.00 ENCOUNTER FOR MEDICARE ANNUAL WELLNESS EXAM: Primary | ICD-10-CM

## 2021-08-20 DIAGNOSIS — R60.0 PERIPHERAL EDEMA: ICD-10-CM

## 2021-08-20 DIAGNOSIS — C64.2 MALIGNANT NEOPLASM OF LEFT KIDNEY (H): ICD-10-CM

## 2021-08-20 DIAGNOSIS — E66.811 OBESITY (BMI 30.0-34.9): ICD-10-CM

## 2021-08-20 DIAGNOSIS — Z90.5 S/P NEPHRECTOMY: ICD-10-CM

## 2021-08-20 PROBLEM — C64.9 MALIGNANT TUMOR OF KIDNEY (H): Status: ACTIVE | Noted: 2021-05-06

## 2021-08-20 PROBLEM — N40.0 BPH (BENIGN PROSTATIC HYPERPLASIA): Status: ACTIVE | Noted: 2021-03-16

## 2021-08-20 PROCEDURE — 99397 PER PM REEVAL EST PAT 65+ YR: CPT | Performed by: FAMILY MEDICINE

## 2021-08-20 RX ORDER — ACETAMINOPHEN 500 MG
1000 TABLET ORAL
COMMUNITY
Start: 2021-04-03 | End: 2023-02-12

## 2021-08-20 RX ORDER — POTASSIUM CHLORIDE 1500 MG/1
TABLET, EXTENDED RELEASE ORAL
COMMUNITY
Start: 2021-04-03 | End: 2021-08-20

## 2021-08-20 RX ORDER — POLYETHYLENE GLYCOL 3350 17 G/17G
POWDER, FOR SOLUTION ORAL
COMMUNITY
Start: 2021-04-04 | End: 2021-08-20

## 2021-08-20 RX ORDER — TRIAMCINOLONE ACETONIDE 1 MG/G
CREAM TOPICAL 2 TIMES DAILY
Qty: 15 G | Refills: 3 | Status: SHIPPED | OUTPATIENT
Start: 2021-08-20 | End: 2022-08-31

## 2021-08-20 RX ORDER — AMOXICILLIN 250 MG
1-4 CAPSULE ORAL
COMMUNITY
Start: 2021-03-17 | End: 2021-08-20

## 2021-08-20 RX ORDER — FUROSEMIDE 20 MG
20 TABLET ORAL DAILY
Qty: 90 TABLET | Refills: 3 | Status: SHIPPED | OUTPATIENT
Start: 2021-08-20 | End: 2022-05-17

## 2021-08-20 ASSESSMENT — ENCOUNTER SYMPTOMS
HEMATURIA: 0
SHORTNESS OF BREATH: 0
PARESTHESIAS: 0
FREQUENCY: 1
FEVER: 0
SORE THROAT: 1
NAUSEA: 0
ABDOMINAL PAIN: 0
MYALGIAS: 0
NERVOUS/ANXIOUS: 0
ARTHRALGIAS: 0
JOINT SWELLING: 1
CHILLS: 0
WEAKNESS: 0
DYSURIA: 0
PALPITATIONS: 0
HEMATOCHEZIA: 0
DIZZINESS: 0
DIARRHEA: 1
COUGH: 0
EYE PAIN: 0
CONSTIPATION: 1
HEARTBURN: 0

## 2021-08-20 ASSESSMENT — ACTIVITIES OF DAILY LIVING (ADL): CURRENT_FUNCTION: NO ASSISTANCE NEEDED

## 2021-08-20 ASSESSMENT — MIFFLIN-ST. JEOR: SCORE: 1716.75

## 2021-08-20 NOTE — PATIENT INSTRUCTIONS
Flu shot: August 17th  You could have shingles vaccine September 3rd, second shingles November 3rd, and covid booster mid December.    Shingles vaccine  I strongly recommend getting the shingles vaccine (Shingrix) if you are over age 50, but please check with your insurance to see how much you might have to pay for this vaccine! If you are on most insurance plans including Medicare, it's covered if you get it at a pharmacy but not in a clinic.    This shot will prevent shingles, a painful skin rash that you are at risk for getting if you have ever had chicken pox. Sometimes the pain will last the rest of your life, even after the rash has healed, and there is not much that we can do to relieve the pain. The vaccine is 98% effective at preventing shingles.    Please consider getting this vaccine! You'll need #2 vaccines 2-4 months apart to be protected.       Patient Education   Personalized Prevention Plan  You are due for the preventive services outlined below.  Your care team is available to assist you in scheduling these services.  If you have already completed any of these items, please share that information with your care team to update in your medical record.  Health Maintenance Due   Topic Date Due     Zoster (Shingles) Vaccine (1 of 2) Never done     FALL RISK ASSESSMENT  03/06/2020     Preventive Health Recommendations  See your health care provider every year to    Review health changes.     Discuss preventive care.      Review your medicines if your doctor has prescribed any.    Talk with your health care provider about whether you should have a test to screen for prostate cancer (PSA).    Every 3 years, have a diabetes test (fasting glucose). If you are at risk for diabetes, you should have this test more often.    Every 5 years, have a cholesterol test. Have this test more often if you are at risk for high cholesterol or heart disease.     Every 10 years, have a colonoscopy. Or, have a yearly FIT test  (stool test). These exams will check for colon cancer.    Talk to with your health care provider about screening for Abdominal Aortic Aneurysm if you have a family history of AAA or have a history of smoking.    Shots:     Get a flu shot each year.     Get a tetanus shot every 10 years.     Talk to your doctor about your pneumonia vaccines. There are now two you should receive - Pneumovax (PPSV 23) and Prevnar (PCV 13).    Talk to your pharmacist about a shingles vaccine.     Talk to your doctor about the hepatitis B vaccine.    Nutrition:     Eat at least 5 servings of fruits and vegetables each day.     Eat whole-grain bread, whole-wheat pasta and brown rice instead of white grains and rice.     Get adequate Calcium and Vitamin D.     Lifestyle    Exercise for at least 150 minutes a week (30 minutes a day, 5 days a week). This will help you control your weight and prevent disease.     Limit alcohol to one drink per day.     No smoking.     Wear sunscreen to prevent skin cancer.     See your dentist every six months for an exam and cleaning.     See your eye doctor every 1 to 2 years to screen for conditions such as glaucoma, macular degeneration and cataracts.    Personalized Prevention Plan  You are due for the preventive services outlined below.  Your care team is available to assist you in scheduling these services.  If you have already completed any of these items, please share that information with your care team to update in your medical record.  Health Maintenance   Topic Date Due     ZOSTER IMMUNIZATION (1 of 2) Never done     FALL RISK ASSESSMENT  03/06/2020     INFLUENZA VACCINE (1) 09/01/2021     MEDICARE ANNUAL WELLNESS VISIT  08/20/2022     ANNUAL REVIEW OF HM ORDERS  08/20/2022     COLORECTAL CANCER SCREENING  07/16/2025     ADVANCE CARE PLANNING  08/20/2026     DTAP/TDAP/TD IMMUNIZATION (3 - Td or Tdap) 12/04/2027     HEPATITIS C SCREENING  Completed     PHQ-2  Completed     Pneumococcal Vaccine:  65+ Years  Completed     AORTIC ANEURYSM SCREENING (SYSTEM ASSIGNED)  Completed     COVID-19 Vaccine  Completed     IPV IMMUNIZATION  Aged Out     MENINGITIS IMMUNIZATION  Aged Out     HEPATITIS B IMMUNIZATION  Aged Out       Understanding USDA MyPlate  The USDA has guidelines to help you make healthy food choices. These are called MyPlate. MyPlate shows the food groups that make up healthy meals using the image of a place setting. Before you eat, think about the healthiest choices for what to put on your plate or in your cup or bowl. To learn more about building a healthy plate, visit www.choosemyplate.gov.    The food groups    Fruits. Any fruit or 100% fruit juice counts as part of the Fruit Group. Fruits may be fresh, canned, frozen, or dried, and may be whole, cut-up, or pureed. Make 1/2 of your plate fruits and vegetables.    Vegetables. Any vegetable or 100% vegetable juice counts as a member of the Vegetable Group. Vegetables may be fresh, frozen, canned, or dried. They can be served raw or cooked and may be whole, cut-up, or mashed. Make 1/2 of your plate fruits and vegetables.    Grains. All foods made from grains are part of the Grains Group. These include wheat, rice, oats, cornmeal, and barley. Grains are often used to make foods such as bread, pasta, oatmeal, cereal, tortillas, and grits. Grains should be no more than 1/4 of your plate. At least half of your grains should be whole grains.    Protein. This group includes meat, poultry, seafood, beans and peas, eggs, processed soy products (such as tofu), nuts (including nut butters), and seeds. Make protein choices no more than 1/4 of your plate. Meat and poultry choices should be lean or low fat.    Dairy. The Dairy Group includes all fluid milk products and foods made from milk that contain calcium, such as yogurt and cheese. (Foods that have little calcium, such as cream, butter, and cream cheese, are not part of this group.) Most dairy choices  should be low-fat or fat-free.    Oils. Oils aren't a food group, but they do contain essential nutrients. However it's important to watch your intake of oils. These are fats that are liquid at room temperature. They include canola, corn, olive, soybean, vegetable, and sunflower oil. Foods that are mainly oil include mayonnaise, certain salad dressings, and soft margarines. You likely already get your daily oil allowance from the foods you eat.  Things to limit  Eating healthy also means limiting these things in your diet:       Salt (sodium). Many processed foods have a lot of sodium. To keep sodium intake down, eat fresh vegetables, meats, poultry, and seafood when possible. Purchase low-sodium, reduced-sodium, or no-salt-added food products at the store. And don't add salt to your meals at home. Instead, season them with herbs and spices such as dill, oregano, cumin, and paprika. Or try adding flavor with lemon or lime zest and juice.    Saturated fat. Saturated fats are most often found in animal products such as beef, pork, and chicken. They are often solid at room temperature, such as butter. To reduce your saturated fat intake, choose leaner cuts of meat and poultry. And try healthier cooking methods such as grilling, broiling, roasting, or baking. For a simple lower-fat swap, use plain nonfat yogurt instead of mayonnaise when making potato salad or macaroni salad.    Added sugars. These are sugars added to foods. They are in foods such as ice cream, candy, soda, fruit drinks, sports drinks, energy drinks, cookies, pastries, jams, and syrups. Cut down on added sugars by sharing sweet treats with a family member or friend. You can also choose fruit for dessert, and drink water or other unsweetened beverages.     [employer] last reviewed this educational content on 6/1/2020 2000-2021 [employer]. All rights reserved. This information is not intended as a substitute for professional medical care.  Always follow your healthcare professional's instructions.          Signs of Hearing Loss      Hearing much better with one ear can be a sign of hearing loss.   Hearing loss is a problem shared by many people. In fact, it is one of the most common health problems, particularly as people age. Most people age 65 and older have some hearing loss. By age 80, almost everyone does. Hearing loss often occurs slowly over the years. So you may not realize your hearing has gotten worse.  Have your hearing checked  Call your healthcare provider if you:    Have to strain to hear normal conversation    Have to watch other people s faces very carefully to follow what they re saying    Need to ask people to repeat what they ve said    Often misunderstand what people are saying    Turn the volume of the television or radio up so high that others complain    Feel that people are mumbling when they re talking to you    Find that the effort to hear leaves you feeling tired and irritated    Notice, when using the phone, that you hear better with one ear than the other  [employer] last reviewed this educational content on 1/1/2020 2000-2021 [employer]. All rights reserved. This information is not intended as a substitute for professional medical care. Always follow your healthcare professional's instructions.

## 2021-08-20 NOTE — PROGRESS NOTES
"    The patient was counseled and encouraged to consider modifying their diet and eating habits. He was provided with information on recommended healthy diet options.  The patient was provided with written information regarding signs of hearing loss.  Answers for HPI/ROS submitted by the patient on 8/20/2021  In general, how would you rate your overall physical health?: good  Frequency of exercise:: 6-7 days/week  Do you usually eat at least 4 servings of fruit and vegetables a day, include whole grains & fiber, and avoid regularly eating high fat or \"junk\" foods? : No  Taking medications regularly:: Yes  Medication side effects:: None  Activities of Daily Living: no assistance needed  Home safety: no safety concerns identified  Hearing Impairment:: need to ask people to speak up or repeat themselves, difficulty understanding soft or whispered speech  In the past 6 months, have you been bothered by leaking of urine?: No  abdominal pain: No  Blood in stool: No  Blood in urine: No  chest pain: No  chills: No  congestion: No  constipation: Yes  cough: No  diarrhea: Yes  dizziness: No  ear pain: No  eye pain: No  nervous/anxious: No  fever: No  frequency: Yes  genital sores: No  hearing loss: Yes  heartburn: No  arthralgias: No  joint swelling: Yes  peripheral edema: Yes  mood changes: No  myalgias: No  nausea: No  dysuria: No  palpitations: No  Skin sensation changes: No  sore throat: Yes  urgency: Yes  rash: No  shortness of breath: No  visual disturbance: No  weakness: No  impotence: No  penile discharge: No  In general, how would you rate your overall mental or emotional health?: excellent  Additional concerns today:: Yes  Duration of exercise:: 45-60 minutes      "

## 2022-02-23 ENCOUNTER — VIRTUAL VISIT (OUTPATIENT)
Dept: CARDIOLOGY | Facility: CLINIC | Age: 72
End: 2022-02-23
Attending: INTERNAL MEDICINE
Payer: COMMERCIAL

## 2022-02-23 DIAGNOSIS — I25.10 CORONARY ARTERY DISEASE INVOLVING NATIVE CORONARY ARTERY OF NATIVE HEART WITHOUT ANGINA PECTORIS: Primary | ICD-10-CM

## 2022-02-23 DIAGNOSIS — E78.5 HYPERLIPIDEMIA, UNSPECIFIED HYPERLIPIDEMIA TYPE: ICD-10-CM

## 2022-02-23 DIAGNOSIS — I10 ESSENTIAL HYPERTENSION: ICD-10-CM

## 2022-02-23 DIAGNOSIS — N28.89 LEFT RENAL MASS: ICD-10-CM

## 2022-02-23 DIAGNOSIS — R00.1 BRADYCARDIA: ICD-10-CM

## 2022-02-23 DIAGNOSIS — I10 HYPERTENSION GOAL BP (BLOOD PRESSURE) < 140/90: ICD-10-CM

## 2022-02-23 DIAGNOSIS — R60.0 PERIPHERAL EDEMA: ICD-10-CM

## 2022-02-23 PROCEDURE — 99213 OFFICE O/P EST LOW 20 MIN: CPT | Mod: 95 | Performed by: INTERNAL MEDICINE

## 2022-02-23 NOTE — PROGRESS NOTES
Dom is a 72 year old who is being evaluated via a billable telephone visit.      What phone number would you like to be contacted at?[phone] would you like to obtain your AVS? Mail a copy  Phone call duration: 18 minutes  Review Of Systems  Skin: NEGATIVE  Eyes:Ears/Nose/Throat: NEGATIVE  Respiratory: no issues  Cardiovascular:no issues  Gastrointestinal: NEGATIVE  Genitourinary:nephrectomy   Musculoskeletal: NEGATIVE  Neurologic: NEGATIVE  Psychiatric: NEGATIVE  Hematologic/Lymphatic/Immunologic: NEGATIVE  Endocrine:  NEGATIVE  Vitals - Patient Reported  Systolic (Patient Reported):  (n/a)  Diastolic (Patient Reported):  (n/a)  Weight (Patient Reported): 104.3 kg (230 lb)  Pulse (Patient Reported):  (n/a)    Telephone number of patient: [phone]    Peggy Perrin LPN

## 2022-02-23 NOTE — PROGRESS NOTES
Service Date: 02/23/2022    REFERRING HEALTHCARE PROVIDER:  Teetee Hammer NP    HISTORY OF PRESENT ILLNESS:  It was my pleasure to speak to your patient, Fermín Josue, by telephone call due to the COVID-19 pandemic.  Mr. Josue is a pleasant 72-year-old patient with a past history of stenting of the left anterior descending artery in 2007.  He has a chronically occluded right coronary artery with collateralization.  This patient also has a history of hypertension and mixed hyperlipidemia.  This patient also had a nephrectomy on the left last year for renal cell carcinoma and has recovered well from that.  Since I last saw him, he is feeling well.  He has no chest pains, no chest pressure, no unusual shortness of breath.  He will get occasional ankle edema, which comes and goes.  He has no orthopnea or PND.  His last lipid profile was approximately 7 months ago, and at that time it was excellent with an LDL of 48, HDL of 44, triglycerides 141 and a total cholesterol of 120 with normal liver function tests with an ALT of 20.  He has not been able to check his blood pressure recently, but in August his blood pressure was actually on the lower side at 100/50 so we did reduce his dose of metoprolol succinate to 25 mg from 50 mg per day.  He is not complaining of any dizziness or lightheadedness.  He has gained some weight since the nephrectomy, going up from 217 pounds to 230 pounds.  He is leery about going out walking because of the cold weather and the ice and snow, but I have asked him when the weather gets better to try and exercise as much as he can and to reduce portion sizes and try and lose weight.    IMPRESSION:    1.  Coronary artery disease with prior stenting as described above.  The patient is asymptomatic with respect to coronary artery disease with no chest discomfort.  In fact, the patient told me that he shoveled a large amount of snow last night after the snowstorm without difficulty.  2.  History  of essential hypertension.  The patient has not had his blood pressure checked recently.  3.  Excellent lipid profile 7 months ago with normal liver function tests.    PLAN:  We will continue the patient on his good present medications.  I will have the patient return to see me in 1 year's time and we will repeat his lipids and basic metabolic profile at that stage.    It is my pleasure to be involved in the care of this very nice patient.    Christiano Toro MD, FACC    cc:  [place] [address]    Christiano Gifford MD, FACC        D: 2022   T: 2022   MT: alexey    Name:     XU BURCH  MRN:      [id number]        Account:      [id number]   :      1950           Service Date: 2022       Document: [id number]

## 2022-02-23 NOTE — LETTER
2/23/2022    Teeteeana cristina Hammer, APRN CNP  [address]  [facility] [address]    RE: Fermín Josue       Dear Colleague,     I had the pleasure of seeing Fermín Josue in the [facility].  Dom is a 72 year old who is being evaluated via a billable telephone visit.      What phone number would you like to be contacted at?[phone] would you like to obtain your AVS? Mail a copy  Phone call duration: 18 minutes  Review Of Systems  Skin: NEGATIVE  Eyes:Ears/Nose/Throat: NEGATIVE  Respiratory: no issues  Cardiovascular:no issues  Gastrointestinal: NEGATIVE  Genitourinary:nephrectomy   Musculoskeletal: NEGATIVE  Neurologic: NEGATIVE  Psychiatric: NEGATIVE  Hematologic/Lymphatic/Immunologic: NEGATIVE  Endocrine:  NEGATIVE  Vitals - Patient Reported  Systolic (Patient Reported):  (n/a)  Diastolic (Patient Reported):  (n/a)  Weight (Patient Reported): 104.3 kg (230 lb)  Pulse (Patient Reported):  (n/a)    Telephone number of patient: [phone]    Peggy Perrin LPN      Thank you for allowing me to participate in the care of your patient.      Sincerely,     Christiano Toro MD, MD     [facility]  cc:   Christiano Toro MD  [address] [facility] [address] [place] [id number]

## 2022-03-14 ENCOUNTER — TRANSFERRED RECORDS (OUTPATIENT)
Dept: HEALTH INFORMATION MANAGEMENT | Facility: CLINIC | Age: 72
End: 2022-03-14
Payer: COMMERCIAL

## 2022-05-17 DIAGNOSIS — E78.5 HYPERLIPIDEMIA, UNSPECIFIED HYPERLIPIDEMIA TYPE: ICD-10-CM

## 2022-05-17 DIAGNOSIS — I25.10 CORONARY ARTERY DISEASE INVOLVING NATIVE CORONARY ARTERY OF NATIVE HEART WITHOUT ANGINA PECTORIS: ICD-10-CM

## 2022-05-17 DIAGNOSIS — R60.0 PERIPHERAL EDEMA: ICD-10-CM

## 2022-05-17 DIAGNOSIS — I10 HYPERTENSION GOAL BP (BLOOD PRESSURE) < 140/90: ICD-10-CM

## 2022-05-17 DIAGNOSIS — I10 ESSENTIAL HYPERTENSION: ICD-10-CM

## 2022-05-17 RX ORDER — ATORVASTATIN CALCIUM 40 MG/1
40 TABLET, FILM COATED ORAL DAILY
Qty: 90 TABLET | Refills: 2 | Status: SHIPPED | OUTPATIENT
Start: 2022-05-17 | End: 2023-01-26

## 2022-05-17 RX ORDER — FUROSEMIDE 20 MG
20 TABLET ORAL DAILY
Qty: 90 TABLET | Refills: 2 | Status: SHIPPED | OUTPATIENT
Start: 2022-05-17 | End: 2023-01-26

## 2022-05-17 RX ORDER — LOSARTAN POTASSIUM 50 MG/1
50 TABLET ORAL DAILY
Qty: 90 TABLET | Refills: 2 | Status: SHIPPED | OUTPATIENT
Start: 2022-05-17 | End: 2024-04-12

## 2022-05-17 RX ORDER — METOPROLOL SUCCINATE 25 MG/1
25 TABLET, EXTENDED RELEASE ORAL DAILY
Qty: 90 TABLET | Refills: 2 | Status: SHIPPED | OUTPATIENT
Start: 2022-05-17 | End: 2023-01-26

## 2022-05-17 RX ORDER — AMLODIPINE BESYLATE 2.5 MG/1
2.5 TABLET ORAL DAILY
Qty: 90 TABLET | Refills: 2 | Status: SHIPPED | OUTPATIENT
Start: 2022-05-17 | End: 2024-01-23

## 2022-08-10 ENCOUNTER — OFFICE VISIT (OUTPATIENT)
Dept: URGENT CARE | Facility: URGENT CARE | Age: 72
End: 2022-08-10
Payer: COMMERCIAL

## 2022-08-10 VITALS
RESPIRATION RATE: 18 BRPM | HEIGHT: 70 IN | SYSTOLIC BLOOD PRESSURE: 120 MMHG | WEIGHT: 220 LBS | OXYGEN SATURATION: 97 % | HEART RATE: 55 BPM | DIASTOLIC BLOOD PRESSURE: 72 MMHG | BODY MASS INDEX: 31.5 KG/M2 | TEMPERATURE: 97.8 F

## 2022-08-10 DIAGNOSIS — M70.22 OLECRANON BURSITIS OF LEFT ELBOW: Primary | ICD-10-CM

## 2022-08-10 PROCEDURE — 99213 OFFICE O/P EST LOW 20 MIN: CPT | Performed by: STUDENT IN AN ORGANIZED HEALTH CARE EDUCATION/TRAINING PROGRAM

## 2022-08-10 RX ORDER — ACETAMINOPHEN 500 MG
1000 TABLET ORAL EVERY 6 HOURS PRN
Qty: 90 TABLET | Refills: 3 | Status: SHIPPED | OUTPATIENT
Start: 2022-08-10 | End: 2024-05-28

## 2022-08-10 NOTE — PROGRESS NOTES
SUBJECTIVE:  Fermín Josue is an 72 year old male who presents for     Left elbow  - painful if lifting shoulder to 90 deg. Painful if flexing  - 3-4 days   - difficulty holding phone, washing hair  - didn't bump it   - worse at night  - no fever or chills      PMH:   has a past medical history of CAD (coronary artery disease) (2/5/2015), History of angina, History of skin graft, Hyperlipidaemia, Hypertension goal BP (blood pressure) < 140/90 (2/5/2015), and Sleep apnea.  Patient Active Problem List   Diagnosis     CARDIOVASCULAR SCREENING; LDL GOAL LESS THAN 100     CAD (coronary artery disease)     Hypertension goal BP (blood pressure) < 140/90     Hyperlipidemia     History of angina     Advance Care Planning     Pre-diabetes     Kidney stone     Left renal mass     Chronic kidney disease, stage 3 (H)     BPH (benign prostatic hyperplasia)     Malignant tumor of kidney (H)     S/p nephrectomy     Obesity (BMI 30.0-34.9)     Dermatitis     Social History     Socioeconomic History     Marital status:      Spouse name: None     Number of children: None     Years of education: None     Highest education level: None   Tobacco Use     Smoking status: Former Smoker     Packs/day: 1.50     Years: 44.00     Pack years: 66.00     Types: Cigarettes     Start date: 6/27/1963     Quit date: 6/27/2007     Years since quitting: 15.1     Smokeless tobacco: Never Used   Substance and Sexual Activity     Alcohol use: Yes     Comment: beer - 12 pack month     Drug use: No   Other Topics Concern     Caffeine Concern Yes     Comment: 1 pop daily      Special Diet Yes     Comment: fruits      Exercise Yes     Comment: walking 1 hr daily      Parent/sibling w/ CABG, MI or angioplasty before 65F 55M? No     Family History   Problem Relation Age of Onset     Hypertension Mother      Pneumonia Mother 91        covid related     Dementia Mother      Alzheimer Disease Father         d age 91     Blood Disease Maternal Grandmother  "        leukemia     Cancer Maternal Grandfather      Cancer Maternal Uncle         lung       ALLERGIES:  No known drug allergies    Current Outpatient Medications   Medication     acetaminophen (TYLENOL) 500 MG tablet     amLODIPine (NORVASC) 2.5 MG tablet     ASPIRIN PO     atorvastatin (LIPITOR) 40 MG tablet     furosemide (LASIX) 20 MG tablet     losartan (COZAAR) 50 MG tablet     metoprolol succinate ER (TOPROL XL) 25 MG 24 hr tablet     nitroGLYcerin (NITROSTAT) 0.4 MG sublingual tablet     tamsulosin (FLOMAX) 0.4 MG capsule     triamcinolone (KENALOG) 0.1 % external cream     No current facility-administered medications for this visit.         ROS:  ROS is done and is negative for general/constitutional, eye, ENT, Respiratory, cardiovascular, GI, , Skin, musculoskeletal except as noted elsewhere.  All other review of systems negative except as noted elsewhere.    OBJECTIVE:  /72   Pulse 55   Temp 97.8  F (36.6  C) (Temporal)   Resp 18   Ht 1.778 m (5' 10\")   Wt 99.8 kg (220 lb)   SpO2 97%   BMI 31.57 kg/m    GENERAL APPEARANCE: Alert, in no acute distress.  EYES: Conjunctivae clear.  MUSCULOSKELETAL: Left posterior olecranon ttp. Left elbow Active flexion/extension decreased, passive flexion/extension WNL  NEURO: No gross deficits, CN 2-12 grossly intact.    RESULTS  No results found for any visits on 08/10/22.  No results found for this or any previous visit (from the past 48 hour(s)).    ASSESSMENT/PLAN:    Left Olecranon Bursitis  3-4 days now, with swelling and ttp over olecranon bursa. Has been leaning on elbow more lately while playing cards. No systemic symptoms suggestive of infection. No hx of gout and no recent med changes. If worsens or develops fever, etc., advised to go to ER.  - Tylenol, rest, ice/heat, compression sleeve  - Guidance/precautions give    PPE worn: Yes    Options for treatment and follow-up care were reviewed with the patient and/or guardian. Fermín Josue " and/or guardian engaged in the decision making process and verbalized understanding of the options discussed and agreed with the final plan.    See [facility] for orders, medications, letters, patient instructions    Boris Garcia DO, MBA

## 2022-08-27 DIAGNOSIS — L30.9 DERMATITIS: ICD-10-CM

## 2022-08-31 RX ORDER — TRIAMCINOLONE ACETONIDE 1 MG/G
CREAM TOPICAL 2 TIMES DAILY
Qty: 15 G | Refills: 0 | Status: SHIPPED | OUTPATIENT
Start: 2022-08-31 | End: 2023-01-26

## 2022-08-31 NOTE — TELEPHONE ENCOUNTER
Team Coordinators-Please contact patient to schedule medicare annual wellness exam.  Patient's last appt was 8/20/21.    Prescription approved per [facility] Refill Protocol.    Thank you!  IMMANUEL CardonaN, RN  [facility]

## 2022-09-19 ENCOUNTER — HOSPITAL ENCOUNTER (OUTPATIENT)
Dept: CT IMAGING | Facility: CLINIC | Age: 72
Discharge: HOME | End: 2022-09-19
Attending: UROLOGY | Admitting: UROLOGY
Payer: COMMERCIAL

## 2022-09-19 DIAGNOSIS — C64.9 MALIGNANT NEOPLASM OF UNSPECIFIED KIDNEY, EXCEPT RENAL PELVIS (H): ICD-10-CM

## 2022-09-19 LAB
CREAT BLD-MCNC: 1.5 MG/DL (ref 0.7–1.3)
GFR SERPL CREATININE-BSD FRML MDRD: 49 ML/MIN/1.73M2

## 2022-09-19 PROCEDURE — 82565 ASSAY OF CREATININE: CPT

## 2022-09-19 PROCEDURE — 74177 CT ABD & PELVIS W/CONTRAST: CPT

## 2022-09-19 PROCEDURE — 250N000011 HC RX IP 250 OP 636: Performed by: UROLOGY

## 2022-09-19 PROCEDURE — 250N000009 HC RX 250: Performed by: UROLOGY

## 2022-09-19 RX ORDER — IOPAMIDOL 755 MG/ML
108 INJECTION, SOLUTION INTRAVASCULAR ONCE
Status: COMPLETED | OUTPATIENT
Start: 2022-09-19 | End: 2022-09-19

## 2022-09-19 RX ADMIN — IOPAMIDOL 108 ML: 755 INJECTION, SOLUTION INTRAVENOUS at 11:38

## 2022-09-19 RX ADMIN — SODIUM CHLORIDE 71 ML: 9 INJECTION, SOLUTION INTRAVENOUS at 11:38

## 2022-09-26 ENCOUNTER — TRANSFERRED RECORDS (OUTPATIENT)
Dept: HEALTH INFORMATION MANAGEMENT | Facility: CLINIC | Age: 72
End: 2022-09-26

## 2022-12-16 ENCOUNTER — OFFICE VISIT (OUTPATIENT)
Dept: FAMILY MEDICINE | Facility: CLINIC | Age: 72
End: 2022-12-16
Payer: COMMERCIAL

## 2022-12-16 VITALS
WEIGHT: 222 LBS | BODY MASS INDEX: 31.78 KG/M2 | DIASTOLIC BLOOD PRESSURE: 77 MMHG | OXYGEN SATURATION: 98 % | SYSTOLIC BLOOD PRESSURE: 140 MMHG | TEMPERATURE: 97.3 F | RESPIRATION RATE: 16 BRPM | HEART RATE: 56 BPM | HEIGHT: 70 IN

## 2022-12-16 DIAGNOSIS — Z85.528 HISTORY OF MALIGNANT NEOPLASM OF KIDNEY: ICD-10-CM

## 2022-12-16 DIAGNOSIS — Z90.5 S/P NEPHRECTOMY: ICD-10-CM

## 2022-12-16 DIAGNOSIS — Z00.00 ENCOUNTER FOR MEDICARE ANNUAL WELLNESS EXAM: Primary | ICD-10-CM

## 2022-12-16 DIAGNOSIS — I25.10 CORONARY ARTERY DISEASE INVOLVING NATIVE CORONARY ARTERY OF NATIVE HEART WITHOUT ANGINA PECTORIS: ICD-10-CM

## 2022-12-16 DIAGNOSIS — Z13.6 CARDIOVASCULAR SCREENING; LDL GOAL LESS THAN 100: ICD-10-CM

## 2022-12-16 DIAGNOSIS — N18.31 STAGE 3A CHRONIC KIDNEY DISEASE (H): ICD-10-CM

## 2022-12-16 DIAGNOSIS — R73.03 PRE-DIABETES: ICD-10-CM

## 2022-12-16 PROBLEM — N28.89 LEFT RENAL MASS: Status: RESOLVED | Noted: 2021-02-17 | Resolved: 2022-12-16

## 2022-12-16 PROBLEM — C64.9 MALIGNANT TUMOR OF KIDNEY (H): Status: RESOLVED | Noted: 2021-05-06 | Resolved: 2022-12-16

## 2022-12-16 PROBLEM — N20.0 KIDNEY STONE: Status: RESOLVED | Noted: 2021-02-17 | Resolved: 2022-12-16

## 2022-12-16 LAB
ALBUMIN SERPL BCG-MCNC: 4.2 G/DL (ref 3.5–5.2)
ALP SERPL-CCNC: 94 U/L (ref 40–129)
ALT SERPL W P-5'-P-CCNC: 16 U/L (ref 10–50)
ANION GAP SERPL CALCULATED.3IONS-SCNC: 12 MMOL/L (ref 7–15)
AST SERPL W P-5'-P-CCNC: 21 U/L (ref 10–50)
BILIRUB SERPL-MCNC: 1.5 MG/DL
BUN SERPL-MCNC: 13.4 MG/DL (ref 8–23)
CALCIUM SERPL-MCNC: 9.2 MG/DL (ref 8.8–10.2)
CHLORIDE SERPL-SCNC: 104 MMOL/L (ref 98–107)
CHOLEST SERPL-MCNC: 117 MG/DL
CREAT SERPL-MCNC: 1.27 MG/DL (ref 0.67–1.17)
CREAT UR-MCNC: 194 MG/DL
DEPRECATED HCO3 PLAS-SCNC: 25 MMOL/L (ref 22–29)
GFR SERPL CREATININE-BSD FRML MDRD: 60 ML/MIN/1.73M2
GLUCOSE SERPL-MCNC: 107 MG/DL (ref 70–99)
HBA1C MFR BLD: 5.7 % (ref 0–5.6)
HDLC SERPL-MCNC: 36 MG/DL
HGB BLD-MCNC: 14.3 G/DL (ref 13.3–17.7)
LDLC SERPL CALC-MCNC: 55 MG/DL
MICROALBUMIN UR-MCNC: 16.5 MG/L
MICROALBUMIN/CREAT UR: 8.51 MG/G CR (ref 0–17)
NONHDLC SERPL-MCNC: 81 MG/DL
POTASSIUM SERPL-SCNC: 4.1 MMOL/L (ref 3.4–5.3)
PROT SERPL-MCNC: 7.4 G/DL (ref 6.4–8.3)
SODIUM SERPL-SCNC: 141 MMOL/L (ref 136–145)
TRIGL SERPL-MCNC: 129 MG/DL

## 2022-12-16 PROCEDURE — 99213 OFFICE O/P EST LOW 20 MIN: CPT | Mod: 25 | Performed by: FAMILY MEDICINE

## 2022-12-16 PROCEDURE — 36415 COLL VENOUS BLD VENIPUNCTURE: CPT | Performed by: FAMILY MEDICINE

## 2022-12-16 PROCEDURE — 83036 HEMOGLOBIN GLYCOSYLATED A1C: CPT | Performed by: FAMILY MEDICINE

## 2022-12-16 PROCEDURE — 80061 LIPID PANEL: CPT | Performed by: FAMILY MEDICINE

## 2022-12-16 PROCEDURE — 82043 UR ALBUMIN QUANTITATIVE: CPT | Performed by: FAMILY MEDICINE

## 2022-12-16 PROCEDURE — G0439 PPPS, SUBSEQ VISIT: HCPCS | Performed by: FAMILY MEDICINE

## 2022-12-16 PROCEDURE — 80053 COMPREHEN METABOLIC PANEL: CPT | Performed by: FAMILY MEDICINE

## 2022-12-16 PROCEDURE — 85018 HEMOGLOBIN: CPT | Performed by: FAMILY MEDICINE

## 2022-12-16 ASSESSMENT — ENCOUNTER SYMPTOMS
CONSTIPATION: 0
HEMATOCHEZIA: 0
FREQUENCY: 0
EYE PAIN: 0
PARESTHESIAS: 0
DIARRHEA: 0
HEADACHES: 0
PALPITATIONS: 0
HEARTBURN: 0
DIZZINESS: 0
CHILLS: 0
JOINT SWELLING: 1
ABDOMINAL PAIN: 0
SORE THROAT: 0
NAUSEA: 0
SHORTNESS OF BREATH: 0
ARTHRALGIAS: 0
DYSURIA: 0
MYALGIAS: 0
COUGH: 1
NERVOUS/ANXIOUS: 0
FEVER: 0
WEAKNESS: 0
HEMATURIA: 0

## 2022-12-16 ASSESSMENT — PAIN SCALES - GENERAL: PAINLEVEL: NO PAIN (0)

## 2022-12-16 ASSESSMENT — ACTIVITIES OF DAILY LIVING (ADL): CURRENT_FUNCTION: NO ASSISTANCE NEEDED

## 2022-12-16 NOTE — PATIENT INSTRUCTIONS
Patient Education   Personalized Prevention Plan  You are due for the preventive services outlined below.  Your care team is available to assist you in scheduling these services.  If you have already completed any of these items, please share that information with your care team to update in your medical record.  Health Maintenance Due   Topic Date Due     Kidney Microalbumin Urine Test  Never done     A1C Lab  10/15/2020     Hemoglobin  03/01/2022     Basic Metabolic Panel  07/13/2022     Cholesterol Lab  07/13/2022     ANNUAL REVIEW OF HM ORDERS  08/20/2022     Preventive Health Recommendations  See your health care provider every year to    Review health changes.     Discuss preventive care.      Review your medicines if your doctor has prescribed any.    Talk with your health care provider about whether you should have a test to screen for prostate cancer (PSA).    Every 3 years, have a diabetes test (fasting glucose). If you are at risk for diabetes, you should have this test more often.    Every 5 years, have a cholesterol test. Have this test more often if you are at risk for high cholesterol or heart disease.     Every 10 years, have a colonoscopy. Or, have a yearly FIT test (stool test). These exams will check for colon cancer.    Talk to with your health care provider about screening for Abdominal Aortic Aneurysm if you have a family history of AAA or have a history of smoking.    Shots:     Get a flu shot each year.     Get a tetanus shot every 10 years.     Talk to your doctor about your pneumonia vaccines. There are now two you should receive - Pneumovax (PPSV 23) and Prevnar (PCV 13).    Talk to your pharmacist about a shingles vaccine.     Talk to your doctor about the hepatitis B vaccine.    Nutrition:     Eat at least 5 servings of fruits and vegetables each day.     Eat whole-grain bread, whole-wheat pasta and brown rice instead of white grains and rice.     Get adequate Calcium and Vitamin D.      Lifestyle    Exercise for at least 150 minutes a week (30 minutes a day, 5 days a week). This will help you control your weight and prevent disease.     Limit alcohol to one drink per day.     No smoking.     Wear sunscreen to prevent skin cancer.     See your dentist every six months for an exam and cleaning.     See your eye doctor every 1 to 2 years to screen for conditions such as glaucoma, macular degeneration and cataracts.    Personalized Prevention Plan  You are due for the preventive services outlined below.  Your care team is available to assist you in scheduling these services.  If you have already completed any of these items, please share that information with your care team to update in your medical record.  Health Maintenance   Topic Date Due     MICROALBUMIN  Never done     A1C  10/15/2020     HEMOGLOBIN  03/01/2022     BMP  07/13/2022     LIPID  07/13/2022     ANNUAL REVIEW OF HM ORDERS  08/20/2022     MEDICARE ANNUAL WELLNESS VISIT  12/16/2023     FALL RISK ASSESSMENT  12/16/2023     COLORECTAL CANCER SCREENING  07/16/2025     ADVANCE CARE PLANNING  12/16/2027     DTAP/TDAP/TD IMMUNIZATION (4 - Td or Tdap) 09/07/2031     HEPATITIS C SCREENING  Completed     PHQ-2 (once per calendar year)  Completed     INFLUENZA VACCINE  Completed     Pneumococcal Vaccine: 65+ Years  Completed     URINALYSIS  Completed     ZOSTER IMMUNIZATION  Completed     AORTIC ANEURYSM SCREENING (SYSTEM ASSIGNED)  Completed     COVID-19 Vaccine  Completed     IPV IMMUNIZATION  Aged Out     MENINGITIS IMMUNIZATION  Aged Out     LUNG CANCER SCREENING  Discontinued     Your Health Risk Assessment indicates you feel you are not in good health    A healthy lifestyle helps keep the body fit and the mind alert. It helps protect you from disease, helps you fight disease, and helps prevent chronic disease (disease that doesn't go away) from getting worse. This is important as you get older and begin to notice twinges in muscles and  joints and a decline in the strength and stamina you once took for granted. A healthy lifestyle includes good healthcare, good nutrition, weight control, recreation, and regular exercise. Avoid harmful substances and do what you can to keep safe. Another part of a healthy lifestyle is stay mentally active and socially involved.    Good healthcare     Have a wellness visit every year.     If you have new symptoms, let us know right away. Don't wait until the next checkup.     Take medicines exactly as prescribed and keep your medicines in a safe place. Tell us if your medicine causes problems.   Healthy diet and weight control     Eat 3 or 4 small, nutritious, low-fat, high-fiber meals a day. Include a variety of fruits, vegetables, and whole-grain foods.     Make sure you get enough calcium in your diet. Calcium, vitamin D, and exercise help prevent osteoporosis (bone thinning).     If you live alone, try eating with others when you can. That way you get a good meal and have company while you eat it.     Try to keep a healthy weight. If you eat more calories than your body uses for energy, it will be stored as fat and you will gain weight.     Recreation   Recreation is not limited to sports and team events. It includes any activity that provides relaxation, interest, enjoyment, and exercise. Recreation provides an outlet for physical, mental, and social energy. It can give a sense of worth and achievement. It can help you stay healthy.    Mental Exercise and Social Involvement  Mental and emotional health is as important as physical health. Keep in touch with friends and family. Stay as active as possible. Continue to learn and challenge yourself.   Things you can do to stay mentally active are:    Learn something new, like a foreign language or musical instrument.     Play SCRABBLE or do crossword puzzles. If you cannot find people to play these games with you at home, you can play them with others on your  computer through the Internet.     Join a games club--anything from card games to chess or checkers or lawn bowling.     Start a new hobby.     Go back to school.     Volunteer.     Read.   Keep up with world events.    Understanding USDA MyPlate  The USDA has guidelines to help you make healthy food choices. These are called MyPlate. MyPlate shows the food groups that make up healthy meals using the image of a place setting. Before you eat, think about the healthiest choices for what to put on your plate or in your cup or bowl. To learn more about building a healthy plate, visit www.choosemyplate.gov.    The food groups    Fruits. Any fruit or 100% fruit juice counts as part of the Fruit Group. Fruits may be fresh, canned, frozen, or dried, and may be whole, cut-up, or pureed. Make 1/2 of your plate fruits and vegetables.    Vegetables. Any vegetable or 100% vegetable juice counts as a member of the Vegetable Group. Vegetables may be fresh, frozen, canned, or dried. They can be served raw or cooked and may be whole, cut-up, or mashed. Make 1/2 of your plate fruits and vegetables.    Grains. All foods made from grains are part of the Grains Group. These include wheat, rice, oats, cornmeal, and barley. Grains are often used to make foods such as bread, pasta, oatmeal, cereal, tortillas, and grits. Grains should be no more than 1/4 of your plate. At least half of your grains should be whole grains.    Protein. This group includes meat, poultry, seafood, beans and peas, eggs, processed soy products (such as tofu), nuts (including nut butters), and seeds. Make protein choices no more than 1/4 of your plate. Meat and poultry choices should be lean or low fat.    Dairy. The Dairy Group includes all fluid milk products and foods made from milk that contain calcium, such as yogurt and cheese. (Foods that have little calcium, such as cream, butter, and cream cheese, are not part of this group.) Most dairy choices should be  low-fat or fat-free.    Oils. Oils aren't a food group, but they do contain essential nutrients. However it's important to watch your intake of oils. These are fats that are liquid at room temperature. They include canola, corn, olive, soybean, vegetable, and sunflower oil. Foods that are mainly oil include mayonnaise, certain salad dressings, and soft margarines. You likely already get your daily oil allowance from the foods you eat.  Things to limit  Eating healthy also means limiting these things in your diet:       Salt (sodium). Many processed foods have a lot of sodium. To keep sodium intake down, eat fresh vegetables, meats, poultry, and seafood when possible. Purchase low-sodium, reduced-sodium, or no-salt-added food products at the store. And don't add salt to your meals at home. Instead, season them with herbs and spices such as dill, oregano, cumin, and paprika. Or try adding flavor with lemon or lime zest and juice.    Saturated fat. Saturated fats are most often found in animal products such as beef, pork, and chicken. They are often solid at room temperature, such as butter. To reduce your saturated fat intake, choose leaner cuts of meat and poultry. And try healthier cooking methods such as grilling, broiling, roasting, or baking. For a simple lower-fat swap, use plain nonfat yogurt instead of mayonnaise when making potato salad or macaroni salad.    Added sugars. These are sugars added to foods. They are in foods such as ice cream, candy, soda, fruit drinks, sports drinks, energy drinks, cookies, pastries, jams, and syrups. Cut down on added sugars by sharing sweet treats with a family member or friend. You can also choose fruit for dessert, and drink water or other unsweetened beverages.     [employer] last reviewed this educational content on 6/1/2020 2000-2021 [employer]. All rights reserved. This information is not intended as a substitute for professional medical care. Always  follow your healthcare professional's instructions.

## 2022-12-16 NOTE — PROGRESS NOTES
SUBJECTIVE:   Dom is a 72 year old who presents for Preventive Visit and chronic disease management.    Vascular Disease Follow-up      How often do you take nitroglycerin? Never    Do you take an aspirin every day? Yes    Chronic Kidney Disease Follow-up    Do you take any over the counter pain medicine?: Yes  What over the counter medicine are you taking for your pain?:  aspirin     How often do you take this medicine?:  daily    Impaired fasting glucose Follow-up      Patient is checking blood sugars: not at all    Diabetic concerns: None     Symptoms of hypoglycemia (low blood sugar): none     Paresthesias (numbness or burning in feet) or sores: No      Lab Results   Component Value Date    A1C 5.8 10/15/2019    A1C 6.0 03/06/2019       Wt Readings from Last 4 Encounters:   12/16/22 100.7 kg (222 lb)   08/10/22 99.8 kg (220 lb)   08/20/21 98.4 kg (217 lb)   07/20/21 96.6 kg (213 lb)            .Patient has been advised of split billing requirements and indicates understanding: Yes  Are you in the first 12 months of your Medicare coverage?  No     GFR Estimate   Date Value Ref Range Status   07/13/2021 49 (L) >60 mL/min/1.73m2 Final     Comment:     As of July 11, 2021, eGFR is calculated by the CKD-EPI creatinine equation, without race adjustment. eGFR can be influenced by muscle mass, exercise, and diet. The reported eGFR is an estimation only and is only applicable if the renal function is stable.   03/01/2021 76 >60 mL/min/[1.73_m2] Final     Comment:     Non  GFR Calc  Starting 12/18/2018, serum creatinine based estimated GFR (eGFR) will be   calculated using the Chronic Kidney Disease Epidemiology Collaboration   (CKD-EPI) equation.     02/18/2021 53 (L) >60 mL/min/[1.73_m2] Final     Comment:     Non  GFR Calc  Starting 12/18/2018, serum creatinine based estimated GFR (eGFR) will be   calculated using the Chronic Kidney Disease Epidemiology Collaboration   (CKD-EPI)  "equation.     02/17/2021 55 (L) >60 mL/min/[1.73_m2] Final     Comment:     Non  GFR Calc  Starting 12/18/2018, serum creatinine based estimated GFR (eGFR) will be   calculated using the Chronic Kidney Disease Epidemiology Collaboration   (CKD-EPI) equation.       GFR, ESTIMATED POCT   Date Value Ref Range Status   09/19/2022 49 (L) >60 mL/min/1.73m2 Final       Healthy Habits:     In general, how would you rate your overall health?  Fair    Frequency of exercise:  4-5 days/week    Duration of exercise:  45-60 minutes    Do you usually eat at least 4 servings of fruit and vegetables a day, include whole grains    & fiber and avoid regularly eating high fat or \"junk\" foods?  No    Taking medications regularly:  Yes    Medication side effects:  None    Ability to successfully perform activities of daily living:  No assistance needed    Home Safety:  No safety concerns identified    Hearing Impairment:  No hearing concerns    In the past 6 months, have you been bothered by leaking of urine?  No    In general, how would you rate your overall mental or emotional health?  Excellent      PHQ-2 Total Score: 0    Additional concerns today:  No      Have you ever done Advance Care Planning? (For example, a Health Directive, POLST, or a discussion with a medical provider or your loved ones about your wishes): Yes, advance care planning is on file.       Fall risk  Fallen 2 or more times in the past year?: No  Any fall with injury in the past year?: No    Cognitive Screening   1) Repeat 3 items (Leader, Season, Table)    2) Clock draw: NORMAL  3) 3 item recall: Recalls 3 objects  Results: NORMAL clock, 1-2 items recalled: COGNITIVE IMPAIRMENT LESS LIKELY    Mini-CogTM Copyright S Lucinda. Licensed by the author for use in [facility]; reprinted with permission ([email or web address]@.[email or web address]). All rights reserved.      Do you have sleep apnea, excessive snoring or daytime drowsiness?: yes    Reviewed and updated as " needed this visit by clinical staff   Tobacco  Allergies  Meds              Reviewed and updated as needed this visit by Provider                 Social History     Tobacco Use     Smoking status: Former     Packs/day: 1.50     Years: 44.00     Pack years: 66.00     Types: Cigarettes     Start date: 6/27/1963     Quit date: 6/27/2007     Years since quitting: 15.4     Smokeless tobacco: Never   Substance Use Topics     Alcohol use: Yes     Comment: beer - 12 pack month         Alcohol Use 12/16/2022   Prescreen: >3 drinks/day or >7 drinks/week? Not Applicable   Prescreen: >3 drinks/day or >7 drinks/week? -         Current providers sharing in care for this patient include:   Patient Care Team:  Vernell Bucio MD as PCP - General (Family Medicine)  Christiano Toro MD as Assigned Heart and Vascular Provider  Vernell Bucio MD as Assigned PCP    The following health maintenance items are reviewed in Epic and correct as of today:  Health Maintenance   Topic Date Due     MICROALBUMIN  Never done     A1C  10/15/2020     HEMOGLOBIN  03/01/2022     COVID-19 Vaccine (5 - Booster for Pfizer series) 05/24/2022     BMP  07/13/2022     LIPID  07/13/2022     MEDICARE ANNUAL WELLNESS VISIT  08/20/2022     ANNUAL REVIEW OF HM ORDERS  08/20/2022     FALL RISK ASSESSMENT  12/16/2023     COLORECTAL CANCER SCREENING  07/16/2025     ADVANCE CARE PLANNING  08/20/2026     DTAP/TDAP/TD IMMUNIZATION (4 - Td or Tdap) 09/07/2031     HEPATITIS C SCREENING  Completed     PHQ-2 (once per calendar year)  Completed     INFLUENZA VACCINE  Completed     Pneumococcal Vaccine: 65+ Years  Completed     URINALYSIS  Completed     ZOSTER IMMUNIZATION  Completed     AORTIC ANEURYSM SCREENING (SYSTEM ASSIGNED)  Completed     IPV IMMUNIZATION  Aged Out     MENINGITIS IMMUNIZATION  Aged Out     LUNG CANCER SCREENING  Discontinued     Patient Active Problem List   Diagnosis     CARDIOVASCULAR SCREENING; LDL GOAL LESS THAN 100      CAD (coronary artery disease)     Hypertension goal BP (blood pressure) < 140/90     Hyperlipidemia     History of angina     Advance Care Planning     Pre-diabetes     Kidney stone     Left renal mass     Chronic kidney disease, stage 3 (H)     BPH (benign prostatic hyperplasia)     S/p nephrectomy     Obesity (BMI 30.0-34.9)     Dermatitis     History of malignant neoplasm of kidney     Past Surgical History:   Procedure Laterality Date     COLONOSCOPY N/A 07/16/2020    Procedure: COLONOSCOPY;  Surgeon: Wenceslao Marie MD;  Location:  GI     COMBINED CYSTOSCOPY, RETROGRADES, URETEROSCOPY, INSERT STENT Left 02/19/2021    Procedure: CYSTOSCOPY LEFT URETEROSCOPY,  LEFT STENT PLACEMENT, LEFT RETROGRADE;  Surgeon: Phil Lin MD;  Location:  OR     CYSTOSCOPY, DILATE URETER(S), COMBINED Left 02/19/2021    Procedure: Cystoscopy, dilate ureter(s), combined;  Surgeon: Phil Lin MD;  Location:  OR     HEART CATH, ANGIOPLASTY  11/2007    mid to prox LAD drug-eluting stent x2; Occluded RCA with Collaterals     KIDNEY STONE SURGERY  2003    laser and ureteric stenting     NEPHRECTOMY RT/LT Left 04/2021    [facility]     SKIN GRAFT, EACH ADDN 100SQCM         Social History     Tobacco Use     Smoking status: Former     Packs/day: 1.50     Years: 44.00     Pack years: 66.00     Types: Cigarettes     Start date: 6/27/1963     Quit date: 6/27/2007     Years since quitting: 15.4     Smokeless tobacco: Never   Substance Use Topics     Alcohol use: Yes     Comment: beer - 12 pack month     Family History   Problem Relation Age of Onset     Hypertension Mother      Pneumonia Mother 91        covid related     Dementia Mother      Alzheimer Disease Father         d age 91     Blood Disease Maternal Grandmother         leukemia     Cancer Maternal Grandfather      Cancer Maternal Uncle         lung         Current Outpatient Medications   Medication Sig Dispense Refill     acetaminophen (TYLENOL) 500 MG  tablet Take 2 tablets (1,000 mg) by mouth every 6 hours as needed for mild pain 90 tablet 3     acetaminophen (TYLENOL) 500 MG tablet Take 1,000 mg by mouth       amLODIPine (NORVASC) 2.5 MG tablet Take 1 tablet (2.5 mg) by mouth daily 90 tablet 2     ASPIRIN PO Take 81 mg by mouth       atorvastatin (LIPITOR) 40 MG tablet Take 1 tablet (40 mg) by mouth daily 90 tablet 2     furosemide (LASIX) 20 MG tablet Take 1 tablet (20 mg) by mouth daily 90 tablet 2     losartan (COZAAR) 50 MG tablet Take 1 tablet (50 mg) by mouth daily 90 tablet 2     metoprolol succinate ER (TOPROL XL) 25 MG 24 hr tablet Take 1 tablet (25 mg) by mouth daily 90 tablet 2     nitroGLYcerin (NITROSTAT) 0.4 MG sublingual tablet For chest pain place 1 tablet under the tongue every 5 minutes for 3 doses. If symptoms persist 5 minutes after 1st dose call 911. 9 tablet 3     tamsulosin (FLOMAX) 0.4 MG capsule Take 1 capsule (0.4 mg) by mouth daily 30 capsule 0     triamcinolone (KENALOG) 0.1 % external cream Apply topically 2 times daily 15 g 0     Allergies   Allergen Reactions     No Known Drug Allergies      Recent Labs   Lab Test 09/19/22  1133 07/13/21  0919 03/01/21  1422 02/19/21  0443 02/18/21  0710 02/17/21  1848 07/10/20  0854 10/15/19  1018 07/26/19  0716 03/06/19  1057   A1C  --   --   --   --   --   --   --  5.8*  --  6.0*   LDL  --  48  --   --   --   --  47  --  64  --    HDL  --  44  --   --   --   --  43  --  39*  --    TRIG  --  141  --   --   --   --  95  --  114  --    ALT  --  20  --   --   --  28 <5*  --  9  --    CR 1.5* 1.42* 0.99  --  1.33* 1.29* 1.24  --  1.20  --    GFRESTIMATED 49* 49* 76  --  53* 55* 57*  --  60*  --    GFRESTBLACK  --   --  88  --  61 64 70  --  72  --    POTASSIUM  --  4.7 3.7   < > 4.2 4.2 3.4*  --  4.5  --     < > = values in this interval not displayed.      Review of Systems   Constitutional: Negative for chills and fever.   HENT: Positive for hearing loss. Negative for congestion, ear pain and sore  "throat.    Eyes: Negative for pain and visual disturbance.   Respiratory: Positive for cough. Negative for shortness of breath.    Cardiovascular: Positive for peripheral edema. Negative for chest pain and palpitations.   Gastrointestinal: Negative for abdominal pain, constipation, diarrhea, heartburn, hematochezia and nausea.   Genitourinary: Positive for urgency. Negative for dysuria, frequency, genital sores, hematuria, impotence and penile discharge.   Musculoskeletal: Positive for joint swelling. Negative for arthralgias and myalgias.   Skin: Negative for rash.   Neurological: Negative for dizziness, weakness, headaches and paresthesias.   Psychiatric/Behavioral: Negative for mood changes. The patient is not nervous/anxious.      OBJECTIVE:   BP (!) 140/77 (BP Location: Right arm, Patient Position: Sitting, Cuff Size: Adult Large)   Pulse 56   Temp 97.3  F (36.3  C) (Tympanic)   Resp 16   Ht 1.778 m (5' 10\")   Wt 100.7 kg (222 lb)   SpO2 98%   BMI 31.85 kg/m   Estimated body mass index is 31.85 kg/m  as calculated from the following:    Height as of this encounter: 1.778 m (5' 10\").    Weight as of this encounter: 100.7 kg (222 lb).  Physical Exam  GENERAL: healthy, alert and no distress  EYES: Eyes grossly normal to inspection, PERRL and conjunctivae and sclerae normal  HENT: ear canals and TM's normal, nose and mouth without ulcers or lesions  NECK: no adenopathy, no asymmetry, masses, or scars and thyroid normal to palpation  RESP: lungs clear to auscultation - no rales, rhonchi or wheezes  CV: regular rate and rhythm, normal S1 S2, no S3 or S4, no murmur, click or rub, no peripheral edema and peripheral pulses strong  ABDOMEN: soft, nontender, no hepatosplenomegaly, no masses and bowel sounds normal  MS: no gross musculoskeletal defects noted, no edema  SKIN: no suspicious lesions or rashes  NEURO: Normal strength and tone, mentation intact and speech normal  PSYCH: mentation appears normal, affect " "normal/bright    ASSESSMENT / PLAN:   (Z00.00) Encounter for Medicare annual wellness exam  (primary encounter diagnosis)  Routine, physical for 72 year old man    (N18.31) Stage 3a chronic kidney disease (H)  Comment: s/p nephrectomy for   (Z85.528) History of malignant neoplasm of kidney  Comment: GFR has been variable, recheck today  Plan: follow up with specialist as indicated    (R73.03) Pre-diabetes  Comment: recheck today  Plan: Will fu as indicated. Encourage healthy lifetyle.    (Z13.6) CARDIOVASCULAR SCREENING; LDL GOAL LESS THAN 100  Comment:   LDL Cholesterol Calculated   Date Value Ref Range Status   07/13/2021 48 <=100 mg/dL Final     Comment:     Age 0-19 years:  Desirable: 0-110 mg/dL   Borderline high: 110-129 mg/dL   High: >= 130 mg/dL    Age 20 years and older:  Desirable: <100mg/dL  Above desirable: 100-129 mg/dL   Borderline high: 130-159 mg/dL   High: 160-189 mg/dL   Very high: >= 190 mg/dL   07/10/2020 47 <100 mg/dL Final     Comment:     Desirable:       <100 mg/dl      Plan: At goal  The current medical regimen is effective;  continue present plan and medications.      (I25.10) Coronary artery disease involving native coronary artery of native heart without angina pectoris  Comment: stable, no acute symptoms   Plan: follow up with specialist as scheduled    Patient has been advised of split billing requirements and indicates understanding: Yes      COUNSELING:  Reviewed preventive health counseling, as reflected in patient instructions      BMI:   Estimated body mass index is 31.85 kg/m  as calculated from the following:    Height as of this encounter: 1.778 m (5' 10\").    Weight as of this encounter: 100.7 kg (222 lb).   Weight management plan: Discussed healthy diet and exercise guidelines      He reports that he quit smoking about 15 years ago. His smoking use included cigarettes. He started smoking about 59 years ago. He has a 66.00 pack-year smoking history. He has never used smokeless " tobacco.      Appropriate preventive services were discussed with this patient, including applicable screening as appropriate for cardiovascular disease, diabetes, osteopenia/osteoporosis, and glaucoma.  As appropriate for age/gender, discussed screening for colorectal cancer, prostate cancer, breast cancer, and cervical cancer. Checklist reviewing preventive services available has been given to the patient.    Reviewed patients plan of care and provided an AVS. The Intermediate Care Plan ( asthma action plan, low back pain action plan, and migraine action plan) for Fermín meets the Care Plan requirement. This Care Plan has been established and reviewed with the Patient.          Vernell Bucio MD  [facility]    Identified Health Risks:    The patient was provided with suggestions to help him develop a healthy physical lifestyle.  The patient was counseled and encouraged to consider modifying their diet and eating habits. He was provided with information on recommended healthy diet options.

## 2022-12-16 NOTE — LETTER
January 5, 2023      Dom Josue  [address]  [facility] [address]        Dear ,    Thank you very much for getting labs done! Everything looks normal/stable. Good job!    Sincerely,  Dr. Vernell Bucio MD  1/5/2023     Resulted Orders   Albumin Random Urine Quantitative with Creat Ratio   Result Value Ref Range    Albumin Urine mg/L 16.5 mg/L      Comment:      The reference ranges have not been established in urine albumin. The results should be integrated into the clinical context for interpretation.    Albumin Urine mg/g Cr 8.51 0.00 - 17.00 mg/g Cr      Comment:      Microalbuminuria is defined as an albumin:creatinine ratio of 17 to 299 for males and 25 to 299 for females. A ratio of albumin:creatinine of 300 or higher is indicative of overt proteinuria.  Due to biologic variability, positive results should be confirmed by a second, first-morning random or 24-hour timed urine specimen. If there is discrepancy, a third specimen is recommended. When 2 out of 3 results are in the microalbuminuria range, this is evidence for incipient nephropathy and warrants increased efforts at glucose control, blood pressure control, and institution of therapy with an angiotensin-converting-enzyme (ACE) inhibitor (if the patient can tolerate it).      Creatinine Urine mg/dL 194.0 mg/dL      Comment:      The reference ranges have not been established in urine creatinine. The results should be integrated into the clinical context for interpretation.   HEMOGLOBIN A1C   Result Value Ref Range    Hemoglobin A1C 5.7 (H) 0.0 - 5.6 %      Comment:      Normal <5.7%   Prediabetes 5.7-6.4%    Diabetes 6.5% or higher     Note: Adopted from ADA consensus guidelines.   Hemoglobin   Result Value Ref Range    Hemoglobin 14.3 13.3 - 17.7 g/dL   Lipid panel reflex to direct LDL Fasting   Result Value Ref Range    Cholesterol 117 <200 mg/dL    Triglycerides 129 <150 mg/dL    Direct Measure HDL 36 (L) >=40 mg/dL    LDL  Cholesterol Calculated 55 <=100 mg/dL    Non HDL Cholesterol 81 <130 mg/dL    Narrative    Cholesterol  Desirable:  <200 mg/dL    Triglycerides  Normal:  Less than 150 mg/dL  Borderline High:  150-199 mg/dL  High:  200-499 mg/dL  Very High:  Greater than or equal to 500 mg/dL    Direct Measure HDL  Female:  Greater than or equal to 50 mg/dL   Male:  Greater than or equal to 40 mg/dL    LDL Cholesterol  Desirable:  <100mg/dL  Above Desirable:  100-129 mg/dL   Borderline High:  130-159 mg/dL   High:  160-189 mg/dL   Very High:  >= 190 mg/dL    Non HDL Cholesterol  Desirable:  130 mg/dL  Above Desirable:  130-159 mg/dL  Borderline High:  160-189 mg/dL  High:  190-219 mg/dL  Very High:  Greater than or equal to 220 mg/dL   Comprehensive metabolic panel   Result Value Ref Range    Sodium 141 136 - 145 mmol/L    Potassium 4.1 3.4 - 5.3 mmol/L    Chloride 104 98 - 107 mmol/L    Carbon Dioxide (CO2) 25 22 - 29 mmol/L    Anion Gap 12 7 - 15 mmol/L    Urea Nitrogen 13.4 8.0 - 23.0 mg/dL    Creatinine 1.27 (H) 0.67 - 1.17 mg/dL    Calcium 9.2 8.8 - 10.2 mg/dL    Glucose 107 (H) 70 - 99 mg/dL    Alkaline Phosphatase 94 40 - 129 U/L    AST 21 10 - 50 U/L    ALT 16 10 - 50 U/L    Protein Total 7.4 6.4 - 8.3 g/dL    Albumin 4.2 3.5 - 5.2 g/dL    Bilirubin Total 1.5 (H) <=1.2 mg/dL    GFR Estimate 60 (L) >60 mL/min/1.73m2      Comment:      Effective December 21, 2021 eGFRcr in adults is calculated using the 2021 CKD-EPI creatinine equation which includes age and gender (Jayde et al., NEJM, DOI: 10.1056/PKVRzq2220601)       If you have any questions or concerns, please call the clinic at the number listed above.       Sincerely,      Vernell Bucio MD

## 2023-01-06 NOTE — RESULT ENCOUNTER NOTE
Thank you very much for getting labs done! Everything looks normal/stable. Good job!    Sincerely,  Dr. Vernell Bucio MD  1/5/2023

## 2023-01-26 DIAGNOSIS — E78.5 HYPERLIPIDEMIA, UNSPECIFIED HYPERLIPIDEMIA TYPE: ICD-10-CM

## 2023-01-26 DIAGNOSIS — I10 HYPERTENSION GOAL BP (BLOOD PRESSURE) < 140/90: ICD-10-CM

## 2023-01-26 DIAGNOSIS — R60.0 PERIPHERAL EDEMA: ICD-10-CM

## 2023-01-26 RX ORDER — FUROSEMIDE 20 MG
20 TABLET ORAL DAILY
Qty: 90 TABLET | Refills: 0 | Status: SHIPPED | OUTPATIENT
Start: 2023-01-26 | End: 2024-03-20

## 2023-01-26 RX ORDER — METOPROLOL SUCCINATE 25 MG/1
25 TABLET, EXTENDED RELEASE ORAL DAILY
Qty: 90 TABLET | Refills: 0 | Status: SHIPPED | OUTPATIENT
Start: 2023-01-26 | End: 2023-04-26

## 2023-01-26 RX ORDER — ATORVASTATIN CALCIUM 40 MG/1
40 TABLET, FILM COATED ORAL DAILY
Qty: 90 TABLET | Refills: 0 | Status: SHIPPED | OUTPATIENT
Start: 2023-01-26 | End: 2024-07-23

## 2023-02-12 ENCOUNTER — APPOINTMENT (OUTPATIENT)
Dept: CT IMAGING | Facility: CLINIC | Age: 73
End: 2023-02-12
Attending: EMERGENCY MEDICINE
Payer: COMMERCIAL

## 2023-02-12 ENCOUNTER — HOSPITAL ENCOUNTER (EMERGENCY)
Facility: CLINIC | Age: 73
Discharge: ANOTHER HOSPITAL | End: 2023-02-12
Attending: EMERGENCY MEDICINE | Admitting: EMERGENCY MEDICINE
Payer: COMMERCIAL

## 2023-02-12 VITALS
HEART RATE: 62 BPM | BODY MASS INDEX: 31.57 KG/M2 | RESPIRATION RATE: 20 BRPM | SYSTOLIC BLOOD PRESSURE: 123 MMHG | TEMPERATURE: 98 F | OXYGEN SATURATION: 98 % | DIASTOLIC BLOOD PRESSURE: 90 MMHG | WEIGHT: 220 LBS

## 2023-02-12 DIAGNOSIS — S22.009A CLOSED FRACTURE OF TRANSVERSE PROCESS OF THORACIC VERTEBRA, INITIAL ENCOUNTER (H): ICD-10-CM

## 2023-02-12 DIAGNOSIS — S32.009A CLOSED FRACTURE OF TRANSVERSE PROCESS OF LUMBAR VERTEBRA, INITIAL ENCOUNTER (H): ICD-10-CM

## 2023-02-12 DIAGNOSIS — S22.41XA CLOSED FRACTURE OF MULTIPLE RIBS OF RIGHT SIDE, INITIAL ENCOUNTER: Primary | ICD-10-CM
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
## 2023-02-12 DIAGNOSIS — M54.6 ACUTE MIDLINE THORACIC BACK PAIN: ICD-10-CM

## 2023-02-12 LAB
ANION GAP SERPL CALCULATED.3IONS-SCNC: 10 MMOL/L (ref 7–15)
BASOPHILS # BLD AUTO: 0 10E3/UL (ref 0–0.2)
BASOPHILS NFR BLD AUTO: 0 %
BUN SERPL-MCNC: 28.7 MG/DL (ref 8–23)
CALCIUM SERPL-MCNC: 9.7 MG/DL (ref 8.8–10.2)
CHLORIDE SERPL-SCNC: 101 MMOL/L (ref 98–107)
CREAT BLD-MCNC: 1.7 MG/DL (ref 0.7–1.3)
CREAT SERPL-MCNC: 1.57 MG/DL (ref 0.67–1.17)
DEPRECATED HCO3 PLAS-SCNC: 24 MMOL/L (ref 22–29)
EOSINOPHIL # BLD AUTO: 0.1 10E3/UL (ref 0–0.7)
EOSINOPHIL NFR BLD AUTO: 1 %
ERYTHROCYTE [DISTWIDTH] IN BLOOD BY AUTOMATED COUNT: 13.2 % (ref 10–15)
GFR SERPL CREATININE-BSD FRML MDRD: 42 ML/MIN/1.73M2
GFR SERPL CREATININE-BSD FRML MDRD: 46 ML/MIN/1.73M2
GLUCOSE SERPL-MCNC: 122 MG/DL (ref 70–99)
HCT VFR BLD AUTO: 45 % (ref 40–53)
HGB BLD-MCNC: 15 G/DL (ref 13.3–17.7)
IMM GRANULOCYTES # BLD: 0 10E3/UL
IMM GRANULOCYTES NFR BLD: 0 %
LYMPHOCYTES # BLD AUTO: 1.7 10E3/UL (ref 0.8–5.3)
LYMPHOCYTES NFR BLD AUTO: 15 %
MAGNESIUM SERPL-MCNC: 2.3 MG/DL (ref 1.7–2.3)
MCH RBC QN AUTO: 31.5 PG (ref 26.5–33)
MCHC RBC AUTO-ENTMCNC: 33.3 G/DL (ref 31.5–36.5)
MCV RBC AUTO: 95 FL (ref 78–100)
MONOCYTES # BLD AUTO: 0.7 10E3/UL (ref 0–1.3)
MONOCYTES NFR BLD AUTO: 6 %
NEUTROPHILS # BLD AUTO: 8.7 10E3/UL (ref 1.6–8.3)
NEUTROPHILS NFR BLD AUTO: 78 %
NRBC # BLD AUTO: 0 10E3/UL
NRBC BLD AUTO-RTO: 0 /100
PLATELET # BLD AUTO: 164 10E3/UL (ref 150–450)
POTASSIUM SERPL-SCNC: 4.7 MMOL/L (ref 3.4–5.3)
PTH-INTACT SERPL-MCNC: 114 PG/ML (ref 15–65)
RBC # BLD AUTO: 4.76 10E6/UL (ref 4.4–5.9)
SODIUM SERPL-SCNC: 135 MMOL/L (ref 136–145)
WBC # BLD AUTO: 11.2 10E3/UL (ref 4–11)

## 2023-02-12 PROCEDURE — 80048 BASIC METABOLIC PNL TOTAL CA: CPT | Performed by: EMERGENCY MEDICINE

## 2023-02-12 PROCEDURE — 99285 EMERGENCY DEPT VISIT HI MDM: CPT | Mod: 25

## 2023-02-12 PROCEDURE — 74177 CT ABD & PELVIS W/CONTRAST: CPT

## 2023-02-12 PROCEDURE — 82565 ASSAY OF CREATININE: CPT

## 2023-02-12 PROCEDURE — 70450 CT HEAD/BRAIN W/O DYE: CPT

## 2023-02-12 PROCEDURE — 36415 COLL VENOUS BLD VENIPUNCTURE: CPT | Performed by: EMERGENCY MEDICINE

## 2023-02-12 PROCEDURE — 99204 OFFICE O/P NEW MOD 45 MIN: CPT | Performed by: NURSE PRACTITIONER

## 2023-02-12 PROCEDURE — 96376 TX/PRO/DX INJ SAME DRUG ADON: CPT

## 2023-02-12 PROCEDURE — 96375 TX/PRO/DX INJ NEW DRUG ADDON: CPT

## 2023-02-12 PROCEDURE — 72131 CT LUMBAR SPINE W/O DYE: CPT

## 2023-02-12 PROCEDURE — 250N000013 HC RX MED GY IP 250 OP 250 PS 637: Performed by: SURGERY

## 2023-02-12 PROCEDURE — 250N000009 HC RX 250: Performed by: EMERGENCY MEDICINE

## 2023-02-12 PROCEDURE — 72128 CT CHEST SPINE W/O DYE: CPT

## 2023-02-12 PROCEDURE — 99204 OFFICE O/P NEW MOD 45 MIN: CPT | Performed by: SURGERY

## 2023-02-12 PROCEDURE — 83735 ASSAY OF MAGNESIUM: CPT | Performed by: EMERGENCY MEDICINE

## 2023-02-12 PROCEDURE — 85025 COMPLETE CBC W/AUTO DIFF WBC: CPT | Performed by: EMERGENCY MEDICINE

## 2023-02-12 PROCEDURE — 250N000011 HC RX IP 250 OP 636: Performed by: EMERGENCY MEDICINE

## 2023-02-12 PROCEDURE — 96374 THER/PROPH/DIAG INJ IV PUSH: CPT | Mod: 59

## 2023-02-12 PROCEDURE — 83970 ASSAY OF PARATHORMONE: CPT | Performed by: SURGERY

## 2023-02-12 PROCEDURE — 72125 CT NECK SPINE W/O DYE: CPT

## 2023-02-12 PROCEDURE — 82306 VITAMIN D 25 HYDROXY: CPT | Performed by: SURGERY

## 2023-02-12 RX ORDER — HYDROMORPHONE HYDROCHLORIDE 1 MG/ML
0.5 INJECTION, SOLUTION INTRAMUSCULAR; INTRAVENOUS; SUBCUTANEOUS
Status: DISCONTINUED | OUTPATIENT
Start: 2023-02-12 | End: 2023-02-12 | Stop reason: HOSPADM

## 2023-02-12 RX ORDER — HYDROMORPHONE HYDROCHLORIDE 1 MG/ML
0.5 INJECTION, SOLUTION INTRAMUSCULAR; INTRAVENOUS; SUBCUTANEOUS
Status: COMPLETED | OUTPATIENT
Start: 2023-02-12 | End: 2023-02-12

## 2023-02-12 RX ORDER — POLYETHYLENE GLYCOL 3350 17 G/17G
17 POWDER, FOR SOLUTION ORAL 2 TIMES DAILY
Status: DISCONTINUED | OUTPATIENT
Start: 2023-02-12 | End: 2023-02-12 | Stop reason: HOSPADM

## 2023-02-12 RX ORDER — OXYCODONE HYDROCHLORIDE 5 MG/1
5 TABLET ORAL EVERY 4 HOURS PRN
Status: DISCONTINUED | OUTPATIENT
Start: 2023-02-12 | End: 2023-02-12 | Stop reason: HOSPADM

## 2023-02-12 RX ORDER — CYCLOBENZAPRINE HCL 10 MG
10 TABLET ORAL ONCE
Status: DISCONTINUED | OUTPATIENT
Start: 2023-02-12 | End: 2023-02-12

## 2023-02-12 RX ORDER — ACETAMINOPHEN 325 MG/1
975 TABLET ORAL EVERY 8 HOURS
Status: DISCONTINUED | OUTPATIENT
Start: 2023-02-12 | End: 2023-02-12 | Stop reason: HOSPADM

## 2023-02-12 RX ORDER — DIAZEPAM 10 MG/2ML
2.5 INJECTION, SOLUTION INTRAMUSCULAR; INTRAVENOUS EVERY 30 MIN PRN
Status: COMPLETED | OUTPATIENT
Start: 2023-02-12 | End: 2023-02-12

## 2023-02-12 RX ORDER — AMOXICILLIN 250 MG
1 CAPSULE ORAL 2 TIMES DAILY
Status: DISCONTINUED | OUTPATIENT
Start: 2023-02-12 | End: 2023-02-12 | Stop reason: HOSPADM

## 2023-02-12 RX ORDER — METHOCARBAMOL 500 MG/1
500 TABLET, FILM COATED ORAL EVERY 6 HOURS PRN
Status: DISCONTINUED | OUTPATIENT
Start: 2023-02-12 | End: 2023-02-12

## 2023-02-12 RX ORDER — LIDOCAINE 4 G/G
1 PATCH TOPICAL EVERY 24 HOURS
Status: DISCONTINUED | OUTPATIENT
Start: 2023-02-12 | End: 2023-02-12 | Stop reason: HOSPADM

## 2023-02-12 RX ORDER — GABAPENTIN 100 MG/1
100 CAPSULE ORAL 3 TIMES DAILY
Status: DISCONTINUED | OUTPATIENT
Start: 2023-02-12 | End: 2023-02-12 | Stop reason: HOSPADM

## 2023-02-12 RX ORDER — ASPIRIN 81 MG/1
81 TABLET ORAL DAILY
COMMUNITY

## 2023-02-12 RX ORDER — IOPAMIDOL 755 MG/ML
111 INJECTION, SOLUTION INTRAVASCULAR ONCE
Status: COMPLETED | OUTPATIENT
Start: 2023-02-12 | End: 2023-02-12

## 2023-02-12 RX ORDER — METHOCARBAMOL 500 MG/1
500 TABLET, FILM COATED ORAL 4 TIMES DAILY
Status: DISCONTINUED | OUTPATIENT
Start: 2023-02-12 | End: 2023-02-12 | Stop reason: HOSPADM

## 2023-02-12 RX ADMIN — HYDROMORPHONE HYDROCHLORIDE 0.5 MG: 1 INJECTION, SOLUTION INTRAMUSCULAR; INTRAVENOUS; SUBCUTANEOUS at 11:52

## 2023-02-12 RX ADMIN — ACETAMINOPHEN 975 MG: 325 TABLET, FILM COATED ORAL at 13:37

## 2023-02-12 RX ADMIN — HYDROMORPHONE HYDROCHLORIDE 0.5 MG: 1 INJECTION, SOLUTION INTRAMUSCULAR; INTRAVENOUS; SUBCUTANEOUS at 09:21

## 2023-02-12 RX ADMIN — DIAZEPAM 2.5 MG: 5 INJECTION INTRAMUSCULAR; INTRAVENOUS at 13:35

## 2023-02-12 RX ADMIN — HYDROMORPHONE HYDROCHLORIDE 0.5 MG: 1 INJECTION, SOLUTION INTRAMUSCULAR; INTRAVENOUS; SUBCUTANEOUS at 10:09

## 2023-02-12 RX ADMIN — GABAPENTIN 100 MG: 100 CAPSULE ORAL at 13:37

## 2023-02-12 RX ADMIN — LIDOCAINE 1 PATCH: 560 PATCH PERCUTANEOUS; TOPICAL; TRANSDERMAL at 13:40

## 2023-02-12 RX ADMIN — SODIUM CHLORIDE 74 ML: 900 INJECTION INTRAVENOUS at 10:01

## 2023-02-12 RX ADMIN — DIAZEPAM 2.5 MG: 5 INJECTION INTRAMUSCULAR; INTRAVENOUS at 10:37

## 2023-02-12 RX ADMIN — IOPAMIDOL 111 ML: 755 INJECTION, SOLUTION INTRAVENOUS at 10:00

## 2023-02-12 RX ADMIN — METHOCARBAMOL 500 MG: 500 TABLET ORAL at 13:45

## 2023-02-12 RX ADMIN — OXYCODONE HYDROCHLORIDE 5 MG: 5 TABLET ORAL at 13:37

## 2023-02-12 ASSESSMENT — ACTIVITIES OF DAILY LIVING (ADL)
ADLS_ACUITY_SCORE: 35

## 2023-02-12 ASSESSMENT — ENCOUNTER SYMPTOMS: BACK PAIN: 1

## 2023-02-12 NOTE — ED PROVIDER NOTES
History   Chief Complaint:  Fall and Back Pain     The history is provided by the patient.      Fermín Josue is a 73 year old male with a history of CAD who presents with his son in law for back pain after a fall. Yesterday, patient fell flat on his back and was unable to lay down all night due to his upper and lower back pain spasms. He endorses right sided rib pain as well. He took pain medication at 0430 today. Denies head trauma, elbow, shoulder, or leg pain from fall. His daughter and son in law are visiting him at his home currently. He denies the use of a wheelchair or walker.     Independent Historian:   None - Patient Only    Review of External Notes:    Reviewed primary care note from 12/16/2022.  History of hyperlipidemia, hypertension, CAD, CKD, and obesity    ROS:  Review of Systems   Musculoskeletal: Positive for back pain.        (+) Right Rib Pain  (-) elbow, shoulder, or leg pain   All other systems reviewed and are negative.    Allergies:  Patient denies having any allergies.    Medications:    amLODIPine   aspirin  atorvastatin  furosemide  losartan  metoprolol succinate  nitroGLYcerin  tamsulosin     Past Medical History:    CAD   History of angina   Hyperlipidaemia   Hypertension  Kidney stone   Sleep apnea   Kidney stones  BPH   Prediabetes    Past Surgical History:   Colonoscopy  Combined cystoscopy, retrograde, ureteroscopy, insert stent  Cystoscopy, dilate ureters, combined  Heart cath, angioplasty  Kidney stone surgery  Nephrectomy, right/left  Skin graft     Family History:    Father: Alzheimer Disease   Mother: Dementia, Hypertension, Pneumonia     Social History:  Patient presents with his son in law.  Patient is a former cigarette smoker.  PCP: Vernell Bucio     Physical Exam     Patient Vitals for the past 24 hrs:   BP Temp Temp src Pulse Resp SpO2 Weight   02/12/23 1438 -- -- -- -- -- 98 % --   02/12/23 1437 -- -- -- -- -- 99 % --   02/12/23 1436 -- -- -- -- -- 99 % --    02/12/23 1435 -- -- -- -- -- 97 % --   02/12/23 1434 (!) 123/90 -- -- 62 -- -- --   02/12/23 1146 -- -- -- -- -- 100 % --   02/12/23 1144 133/65 -- -- 54 -- 97 % --   02/12/23 1143 -- -- -- -- -- 100 % --   02/12/23 1142 -- -- -- -- -- 100 % --   02/12/23 1015 -- -- -- -- -- 94 % --   02/12/23 1014 -- -- -- -- -- 96 % --   02/12/23 1013 -- -- -- -- -- 94 % --   02/12/23 0834 (!) 148/67 -- -- -- -- -- --   02/12/23 0832 -- 98  F (36.7  C) Temporal 56 20 100 % 99.8 kg (220 lb)      Physical Exam  General: Alert, appears well-developed and well-nourished. Cooperative.     In mild distress  HEENT:  Head:  Atraumatic  Ears:  External ears are normal  Mouth/Throat:  Oropharynx is without erythema or exudate and mucous membranes are moist.   Eyes:   Conjunctivae normal and EOM are normal. No scleral icterus.  CV:  Normal rate, regular rhythm, normal heart sounds and radial pulses are 2+ and symmetric.  No murmur.  Resp:  Breath sounds are clear bilaterally    Non-labored, no retractions or accessory muscle use  GI:  Abdomen is soft, no distension, no tenderness. No rebound or guarding.  No CVA tenderness bilaterally  MS:  Normal range of motion. No edema.    There is mild chest wall tenderness to the lower right chest wall.  No step-offs noted.  No crepitus.    There is diffuse lower midline T-spine tenderness and midline L-spine tenderness.  There is no midline cervical spine tenderness.  No step-offs detected.   Skin:  Warm and dry.  No rash or lesions noted.  Neuro: Alert.  Global weakness due to pain in back and chest wall.  GCS: 15  Psych:  Normal mood and affect.    Emergency Department Course     Imaging:  CT Head w/o Contrast   Final Result   IMPRESSION:   HEAD CT:   1.  No acute intracranial process.      CERVICAL SPINE CT:   1.  No CT evidence for acute fracture or post traumatic subluxation.      CT Cervical Spine w/o Contrast   Final Result   IMPRESSION:   HEAD CT:   1.  No acute intracranial process.       CERVICAL SPINE CT:   1.  No CT evidence for acute fracture or post traumatic subluxation.      Lumbar spine CT w/o contrast   Final Result   IMPRESSION:   THORACIC SPINE CT:   1.  Acute mildly displaced fracture involving the anterior bridging osteophyte at T10-T11 and the inferior T10 vertebral body. This fracture does extend into the T10-T11 intervertebral disc space which is widens anteriorly. Mildly displaced fracture of    the right T10 transverse process and nondisplaced fracture of the T10 spinous process. Question unstable fracture given the 3 column involvement.   2.  Large anterior bridging osteophytes from T2 to L1.    3.  Nondisplaced fractures of the right T9 and T11 transverse processes.    4.  Mildly displaced fracture of the posterior right 11th rib.      LUMBAR SPINE CT:   1.  Nondisplaced fracture of the right L1 transverse process.    2.  Moderate spinal canal narrowing at L4-L5.    3.  Mild spinal canal narrowing at L1-L2 and L2-L3.   4.  Severe right and moderate severe left neuroforaminal narrowing at L4-L5. Moderate bilateral neuroforaminal narrowing at L3-L4. Moderate to severe bilateral neuroforaminal narrowing at L5-S1.         CT Thoracic Spine w/o Contrast   Final Result   IMPRESSION:   THORACIC SPINE CT:   1.  Acute mildly displaced fracture involving the anterior bridging osteophyte at T10-T11 and the inferior T10 vertebral body. This fracture does extend into the T10-T11 intervertebral disc space which is widens anteriorly. Mildly displaced fracture of    the right T10 transverse process and nondisplaced fracture of the T10 spinous process. Question unstable fracture given the 3 column involvement.   2.  Large anterior bridging osteophytes from T2 to L1.    3.  Nondisplaced fractures of the right T9 and T11 transverse processes.    4.  Mildly displaced fracture of the posterior right 11th rib.      LUMBAR SPINE CT:   1.  Nondisplaced fracture of the right L1 transverse process.    2.   Moderate spinal canal narrowing at L4-L5.    3.  Mild spinal canal narrowing at L1-L2 and L2-L3.   4.  Severe right and moderate severe left neuroforaminal narrowing at L4-L5. Moderate bilateral neuroforaminal narrowing at L3-L4. Moderate to severe bilateral neuroforaminal narrowing at L5-S1.         CT Chest/Abdomen/Pelvis w Contrast   Final Result   IMPRESSION:   1.  Right 8-11 rib fractures.   2.  A stable 0.7 cm right upper lobe groundglass nodule.         Report per radiology    Laboratory:  Labs Ordered and Resulted from Time of ED Arrival to Time of ED Departure   BASIC METABOLIC PANEL - Abnormal       Result Value    Sodium 135 (*)     Potassium 4.7      Chloride 101      Carbon Dioxide (CO2) 24      Anion Gap 10      Urea Nitrogen 28.7 (*)     Creatinine 1.57 (*)     Calcium 9.7      Glucose 122 (*)     GFR Estimate 46 (*)    CBC WITH PLATELETS AND DIFFERENTIAL - Abnormal    WBC Count 11.2 (*)     RBC Count 4.76      Hemoglobin 15.0      Hematocrit 45.0      MCV 95      MCH 31.5      MCHC 33.3      RDW 13.2      Platelet Count 164      % Neutrophils 78      % Lymphocytes 15      % Monocytes 6      % Eosinophils 1      % Basophils 0      % Immature Granulocytes 0      NRBCs per 100 WBC 0      Absolute Neutrophils 8.7 (*)     Absolute Lymphocytes 1.7      Absolute Monocytes 0.7      Absolute Eosinophils 0.1      Absolute Basophils 0.0      Absolute Immature Granulocytes 0.0      Absolute NRBCs 0.0     ISTAT CREATININE POCT - Abnormal    Creatinine POCT 1.7 (*)     GFR, ESTIMATED POCT 42 (*)    PARATHYROID HORMONE INTACT - Abnormal    Parathyroid Hormone Intact 114 (*)    MAGNESIUM - Normal    Magnesium 2.3     VITAMIN D DEFICIENCY SCREENING   ISTAT CREATININE POCT     Emergency Department Course & Assessments:     Interventions:  Medications   acetaminophen (TYLENOL) tablet 975 mg (975 mg Oral Given 2/12/23 1337)   Lidocaine (LIDOCARE) 4 % Patch 1 patch (1 patch Transdermal Patch/Med Applied 2/12/23 1340)    lidocaine patch in PLACE (has no administration in time range)   gabapentin (NEURONTIN) capsule 100 mg (100 mg Oral Given 2/12/23 1337)   oxyCODONE IR (ROXICODONE) half-tab 2.5 mg ( Oral See Alternative 2/12/23 1337)     Or   oxyCODONE (ROXICODONE) tablet 5 mg (5 mg Oral Given 2/12/23 1337)   polyethylene glycol (MIRALAX) Packet 17 g (has no administration in time range)   senna-docusate (SENOKOT-S/PERICOLACE) 8.6-50 MG per tablet 1 tablet (has no administration in time range)   HYDROmorphone (PF) (DILAUDID) injection 0.5 mg (has no administration in time range)   methocarbamol (ROBAXIN) tablet 500 mg (500 mg Oral Given 2/12/23 1345)   HYDROmorphone (PF) (DILAUDID) injection 0.5 mg (0.5 mg Intravenous Given 2/12/23 1152)   Saline Flush - CT (74 mLs Intravenous Given 2/12/23 1001)   iopamidol (ISOVUE-370) solution 111 mL (111 mLs Intravenous Given 2/12/23 1000)   diazepam (VALIUM) injection 2.5 mg (2.5 mg Intravenous Given 2/12/23 1335)      Independent Interpretation (X-rays, CTs, rhythm strip):  I reviewed the scans and agree with Radiology.     Consultations/Discussion of Management or Tests:  ED Course as of 02/12/23 1523   Sun Feb 12, 2023   1134 I consulted with Dr. EMILIO Howe from Hospitalist who will hold on admission until specialty services are consulted and final T/L spine results.   1157 I consulted with Dr. Huitron from General Surgery.   1244 I consulted with Dr. Boyer from Hospitalist who wanted me to retouch with General Surgery.   1252 I consulted with Dr. Boyer who does not feel comfortable taking patient. I recommended them to talk to General Surgery.   1253 I consulted with Natalie Torres RN with Neurosurgery.   1254 I consulted with Dr. Huitron from General Surgery to retouch with Dr. Boyer.    1330 I consulted with Dr. Boyer who would not take him due to it being a trauma related patient. General Surgery is not willing to take him due to not needing surgery.   8463 I consulted with Dr. Gotti  from [facility] Emergency Department who wanted me to call [facility] Ortho.   1348 I consulted with [facility] Trauma, Dr. Silva, who recommended him to be sent to [facility] or [facility] or [place].  If no beds available at those facilities, could go to [facility], preferred over staying at [facility].    1359 I consulted with [facility] Emergency Department, closed to everything, but PEDS trauma.   1400 I consulted with the [facility] who told me that [facility] she called [facility], but they were too busy to  the call at the time   1417 I consulted with [facility], informed that [facility] is full.   1505 I consulted with [facility] trauma team who wants me to consult with [facility] Emergency Department due to not having any beds.   1509 I consulted with [facility] Emergency Department in regards to the patient's condition for potential transfer. [facility] accepted the patient for transfer.      Social Determinants of Health affecting care:   None    Assessments:  0838 I met with patient after reviewing notes, past charts, and vitals.  1128 I rechecked the patient and presented the findings.  1355 I rechecked the patient and presented the updates on transport.  1516 I rechecked the patient and informed him that he would now be transferring to [facility].    Disposition:  The patient was transferred via EMS to Dr. Jaramillo, ED at [facility] Emergency Department.    Impression & Plan      CMS Diagnoses: None     Medical Decision Making:  Patient is a 73-year-old male with past medical history of CAD, hypertension, hyperlipidemia, CKD stage III, and obesity who presents 1 day after a slip and fall on the ice yesterday.  Patient landed directly onto his mid thoracic back and is having pain in the back as well as the right side of his chest.  He denies hitting his head or neck during the fall yesterday.  He tried Tylenol with minimal improvement in his pain.  We did obtain CT images of the chest abdomen and pelvis with recons of the  thoracic and lumbar spines.  Unfortunately patient was found to have rib fractures of the right eighth, 9, 10, and 11th ribs.  There is no associated hemopneumothorax.  Additionally the T-spine images show a acute mildly displaced fracture involving the anterior bridging osteophyte at T10-T11 in the inferior T10 vertebral body.  This fracture does extend T10-T11 intervertebral disc space which widened anteriorly.  There is also a mildly displaced fracture of the right T10 transverse process and nondisplaced fracture of the T10 spinous process.  There is also question of unstable fracture given the 3 column involvement.  There are nondisplaced fractures of the right T9 and T11 transverse process.  Additionally a nondisplaced fracture of the right L1 transverse process.  CT imaging of the cervical spine shows no acute fracture posttraumatic subluxation.  CT imaging of head shows no acute intracranial process.  Ultimately patient is having quite a bit of pain and so has required multiple doses of IV pain medication and muscle relaxant medications while in the emergency department.  He has neurologically intact and I did speak with both our trauma surgery/general surgery service as well as neurosurgery.  They did place consultation notes.  Unfortunately given the complexity of the patient's above medical history and traumatic presentation with a possible unstable fracture in the T-spine there was recommendation for the patient to transfer to a higher level of trauma care.  I spoke briefly with the [facility] given the patient's complex presentation and they had recommended potential transfer to a level 1 trauma center although no current beds are available either at [facility].  I did speak with Dr. Patel of trauma at [facility] who recommended transfer to the emergency department as no beds are readily available for their inpatient services.  I did speak with  Dr. Jaramillo of emergency medicine at [facility] emergency department who agreed to transfer.  Patient will be transferred by EMS.  While awaiting EMS transport patient care signed out to my partner Dr. Crandall    Patient updated with need for transfer to [facility] at this time and agreed.    Diagnosis:    ICD-10-CM    1. Closed fracture of multiple ribs of right side, initial encounter  S22.41XA       2. Fall, initial encounter  W19.XXXA       3. Acute midline thoracic back pain  M54.6       4. Closed fracture of transverse process of thoracic vertebra, initial encounter (H)  S22.009A       5. Closed fracture of transverse process of lumbar vertebra, initial encounter (H)  S32.009A          Scribe Disclosure:  DEMETRIUS, Clevezion Peri, am serving as a scribe at 9:35 AM on 2/12/2023 to document services personally performed by Vic Howe MD based on my observations and the provider's statements to me.     2/12/2023   Vic Howe MD White, Scott, MD  02/12/23 1524

## 2023-02-12 NOTE — CONSULTS
[facility]    Neurosurgery Consultation     Date of Admission:  2/12/2023  Date of Consult (When I saw the patient): 02/12/23    Assessment & Plan   Fermín Josue is a 73 year old male who was admitted on 2/12/2023. I was asked to see the patient for unstable thoracic fracture.    Active Problems:    Multiple thoracic and lumbar transverse process fractures    Acute thoracic fracture    Assessment: Acute mildly displaced fracture involving the anterior bridging osteophyte at T10-T11 and the inferior T10 vertebral body. This fracture does extend into the T10-T11 intervertebral disc space which is widens anteriorly. Mildly displaced fracture of   the right T10 transverse process and nondisplaced fracture of the T10 spinous process. Question unstable fracture given the 3 column involvement    Plan:   -Strict Bedrest  -Thoracic MRI for further evaluation of fracture  -Agree with admission via hospitalist, according to ED MD, hospitalist service at  not accepting of patient, trauma surgery is also not accepting of patient for admission  -Final recommendations pending workup    I have discussed the following assessment and plan with Dr. Doll who is in agreement with initial plan and will follow up with further consultation recommendations.    Shabnam Blum CNP  [facility] Neurosurgery  [place] [address]    Tel [phone]    Code Status    Prior    Reason for Consult   Reason for consult: I was asked by Dr. Howe to evaluate this patient for thoracic fracture.    Primary Care Physician   Vernell Bucio    Chief Complaint   Fall resulting in multiple fractures    History is obtained from the patient, ED MD, and EMR.    History of Present Illness   Fermín Josue is a 73 year old male who presented to the ED after a fall on ice resulting in acute fracture of anterior bridging osteophyte at T10-11 and  inferor T10 vertebral body with extension into the T10-11 inververtebral disc space with fracture of the rigth T10 transverse process and spinous process. Nondisplaced fracture of T9, T11, and right L1 transverse process. Today he was seen in the ED. He reports significant right-sided low back pain after the fall. He denies any radicular leg pain, paresthesias or weakness. No head or neck pain. No radicular arm pain, paresthesias or weakness.     Past Medical History   I have reviewed this patient's medical history and updated it with pertinent information if needed.   Past Medical History:   Diagnosis Date     CAD (coronary artery disease) 2/5/2015    3 stents     History of angina      History of skin graft     Right lower limb     Hyperlipidaemia      Hypertension goal BP (blood pressure) < 140/90 2/5/2015     Kidney stone 2/17/2021     Sleep apnea        Past Surgical History   I have reviewed this patient's surgical history and updated it with pertinent information if needed.  Past Surgical History:   Procedure Laterality Date     COLONOSCOPY N/A 07/16/2020    Procedure: COLONOSCOPY;  Surgeon: Wenceslao Marie MD;  Location:  GI     COMBINED CYSTOSCOPY, RETROGRADES, URETEROSCOPY, INSERT STENT Left 02/19/2021    Procedure: CYSTOSCOPY LEFT URETEROSCOPY,  LEFT STENT PLACEMENT, LEFT RETROGRADE;  Surgeon: Phil Lin MD;  Location:  OR     CYSTOSCOPY, DILATE URETER(S), COMBINED Left 02/19/2021    Procedure: Cystoscopy, dilate ureter(s), combined;  Surgeon: Phil Lin MD;  Location:  OR     HEART CATH, ANGIOPLASTY  11/2007    mid to prox LAD drug-eluting stent x2; Occluded RCA with Collaterals     KIDNEY STONE SURGERY  2003    laser and ureteric stenting     NEPHRECTOMY RT/LT Left 04/2021    [facility]     SKIN GRAFT, EACH ADDN 100SQCM         Prior to Admission Medications   Prior to Admission Medications   Prescriptions Last Dose Informant Patient Reported? Taking?   acetaminophen  (TYLENOL) 500 MG tablet Not Taking Self No No   Sig: Take 2 tablets (1,000 mg) by mouth every 6 hours as needed for mild pain   Patient not taking: Reported on 2/12/2023   amLODIPine (NORVASC) 2.5 MG tablet 2/11/2023 at AM Self No Yes   Sig: Take 1 tablet (2.5 mg) by mouth daily   aspirin 81 MG EC tablet 2/11/2023 at AM Self Yes Yes   Sig: Take 81 mg by mouth daily   atorvastatin (LIPITOR) 40 MG tablet 2/11/2023 at AM Self No Yes   Sig: Take 1 tablet (40 mg) by mouth daily   furosemide (LASIX) 20 MG tablet 2/11/2023 at AM Self No Yes   Sig: Take 1 tablet (20 mg) by mouth daily   losartan (COZAAR) 50 MG tablet 2/11/2023 Self No Yes   Sig: Take 1 tablet (50 mg) by mouth daily   metoprolol succinate ER (TOPROL XL) 25 MG 24 hr tablet 2/11/2023 at AM Self No Yes   Sig: Take 1 tablet (25 mg) by mouth daily   nitroGLYcerin (NITROSTAT) 0.4 MG sublingual tablet PRN Self No Yes   Sig: For chest pain place 1 tablet under the tongue every 5 minutes for 3 doses. If symptoms persist 5 minutes after 1st dose call 911.   triamcinolone (KENALOG) 0.1 % external cream PRN Self No Yes   Sig: Apply topically to affected area 2 times daily. make appointment please      Facility-Administered Medications: None     Allergies   Allergies   Allergen Reactions     No Known Drug Allergies        Social History   I have reviewed this patient's social history and updated it with pertinent information if needed. Fermín Josue  reports that he quit smoking about 15 years ago. His smoking use included cigarettes. He started smoking about 59 years ago. He has a 66.00 pack-year smoking history. He has never used smokeless tobacco. He reports current alcohol use. He reports that he does not use drugs.    Family History   I have reviewed this patient's family history and updated it with pertinent information if needed.   Family History   Problem Relation Age of Onset     Hypertension Mother      Pneumonia Mother 91        covid related     Dementia  Mother      Alzheimer Disease Father         d age 91     Blood Disease Maternal Grandmother         leukemia     Cancer Maternal Grandfather      Cancer Maternal Uncle         lung       Review of Systems   10 point ROS negative except noted above.    Physical Exam   Temp: 98  F (36.7  C) Temp src: Temporal BP: 133/65 Pulse: 54   Resp: 20 SpO2: 100 % O2 Device: None (Room air)    Vital Signs with Ranges  Temp:  [98  F (36.7  C)] 98  F (36.7  C)  Pulse:  [54-56] 54  Resp:  [20] 20  BP: (133-148)/(65-67) 133/65  SpO2:  [94 %-100 %] 100 %  220 lbs 0 oz     , Blood pressure 133/65, pulse 54, temperature 98  F (36.7  C), temperature source Temporal, resp. rate 20, weight 99.8 kg (220 lb), SpO2 100 %.  220 lbs 0 oz  HEENT:  Normocephalic, atraumatic.  PERRLA.   Heart:  No peripheral edema  Lungs:  No SOB  Skin:  Warm and dry, good capillary refill.  Extremities:  Good radial and dorsalis pedis pulses bilaterally, no edema, cyanosis or clubbing.    NEUROLOGICAL EXAMINATION:   Mental status:  Alert and Oriented x 3, speech is fluent.  Motor:  Strength is 5/5 throughout the upper and lower extremities  Shoulder Abduction:  Right:  5/5   Left:  5/5  Biceps:                      Right:  5/5   Left:  5/5  Triceps:                     Right:  5/5   Left:  5/5  Wrist Extensors:       Right:  5/5   Left:  5/5  Wrist Flexors:           Right:  5/5   Left:  5/5  interosseus :            Right:  5/5   Left:  5/5   Hip Flexor:                Right: 5/5  Left:  5/5  Hip Adductor:             Right:  5/5  Left:  5/5  Hip Abductor:             Right:  5/5  Left:  5/5  Gastroc Soleus:        Right:  5/5  Left:  5/5  Tib/Ant:                      Right:  5/5  Left:  5/5   EHL:                     Right:  5/5  Left:  5/5  Sensation:  intact  Reflexes:   Negative Babinski.  Negative Clonus.      Cervical examination reveals good range of motion.  No tenderness to palpation of the cervical spine or paraspinous muscles bilaterally.      Thoracic and lumbar examination reveals tenderness of the spine and right>left paraspinous muscles.  Hip height is symmetrical. Negative SI joint, sciatic notch or greater trochanteric tenderness to palpation bilaterally.  Straight leg raise is negative bilaterally.     Data     CBC RESULTS:   Recent Labs   Lab Test 02/12/23 0918   WBC 11.2*   RBC 4.76   HGB 15.0   HCT 45.0   MCV 95   MCH 31.5   MCHC 33.3   RDW 13.2        Basic Metabolic Panel:  Lab Results   Component Value Date     02/12/2023     03/01/2021      Lab Results   Component Value Date    POTASSIUM 4.7 02/12/2023    POTASSIUM 4.7 07/13/2021    POTASSIUM 3.7 03/01/2021     Lab Results   Component Value Date    CHLORIDE 101 02/12/2023    CHLORIDE 108 07/13/2021    CHLORIDE 108 03/01/2021     Lab Results   Component Value Date    CANDICE 9.7 02/12/2023    CANDICE 9.8 03/01/2021     Lab Results   Component Value Date    CO2 24 02/12/2023    CO2 28 07/13/2021    CO2 31 03/01/2021     Lab Results   Component Value Date    BUN 28.7 02/12/2023    BUN 23 07/13/2021    BUN 19 03/01/2021     Lab Results   Component Value Date    CR 1.7 02/12/2023    CR 1.57 02/12/2023    CR 0.99 03/01/2021     Lab Results   Component Value Date     02/12/2023     07/13/2021    GLC 84 03/01/2021     INR:  Lab Results   Component Value Date    INR 0.98 11/21/2007

## 2023-02-12 NOTE — PHARMACY-ADMISSION MEDICATION HISTORY
Pharmacy Medication History  Admission medication history interview status for the 2/12/2023 admission is complete. See EPIC admission navigator for prior to admission medications.    Location of Interview: Patient room  Medication history sources: Patient and Surescripts    Significant changes made to the medication list:  - removed tamsulosin 0.4 mg once daily - no longer taking per pt    In the past week, patient estimated taking medication this percent of the time: greater than 90%    Medication Affordability:  Not including over the counter (OTC) medications, was there a time in the past 12 months when you did not take your medications as prescribed because of cost?: No    Additional medication history information:    - pt reports he took #5 Excedrin tablets prior to coming to hospital to manage his pain   - per pt, this is not something he normally takes/does    - pt carries an outdated medication list with him in his wallet    - on that list are donepezil, sertraline, and memantine (all of which patient reports no longer taking upon interview)    Medication reconciliation completed by provider prior to medication history? No    Time spent in this activity: 20 minutes    Prior to Admission medications    Medication Sig Last Dose Taking? Auth Provider Long Term End Date   amLODIPine (NORVASC) 2.5 MG tablet Take 1 tablet (2.5 mg) by mouth daily 2/11/2023 at AM Yes Christiano Toro MD Yes    aspirin 81 MG EC tablet Take 81 mg by mouth daily 2/11/2023 at AM Yes Unknown, Entered By History     atorvastatin (LIPITOR) 40 MG tablet Take 1 tablet (40 mg) by mouth daily 2/11/2023 at AM Yes Christiano Toro MD Yes    furosemide (LASIX) 20 MG tablet Take 1 tablet (20 mg) by mouth daily 2/11/2023 at AM Yes Christiano Toro MD Yes    losartan (COZAAR) 50 MG tablet Take 1 tablet (50 mg) by mouth daily 2/11/2023 Yes Christiano Toro MD Yes    metoprolol succinate ER (TOPROL XL) 25  MG 24 hr tablet Take 1 tablet (25 mg) by mouth daily 2/11/2023 at AM Yes Christiano Toro MD Yes    nitroGLYcerin (NITROSTAT) 0.4 MG sublingual tablet For chest pain place 1 tablet under the tongue every 5 minutes for 3 doses. If symptoms persist 5 minutes after 1st dose call 911. PRN Yes Christiano Toro MD Yes    triamcinolone (KENALOG) 0.1 % external cream Apply topically to affected area 2 times daily. make appointment please PRN Yes Vernell Bucio MD     acetaminophen (TYLENOL) 500 MG tablet Take 2 tablets (1,000 mg) by mouth every 6 hours as needed for mild pain  Patient not taking: Reported on 2/12/2023 Not Taking  Luis A Garcia MD         The information provided in this note is only as accurate as the sources available at the time of update(s)

## 2023-02-12 NOTE — ED TRIAGE NOTES
Pt presents after a slip and fall yesterday on the ice and reports falling onto his back. Pt denies hitting his head and denies LOC. Pt reports lots of back spasming and worse when trying to stand. Denies hx of surgery to the back but does report recent L kidney removal due to cancer.      Triage Assessment     Row Name 02/12/23 0832       Triage Assessment (Adult)    Airway WDL WDL       Respiratory WDL    Respiratory WDL WDL       Skin Circulation/Temperature WDL    Skin Circulation/Temperature WDL WDL       Cardiac WDL    Cardiac WDL WDL       Peripheral/Neurovascular WDL    Peripheral Neurovascular WDL WDL       Cognitive/Neuro/Behavioral WDL    Cognitive/Neuro/Behavioral WDL WDL

## 2023-02-12 NOTE — CONSULTS
[facility]   Trauma Surgical Consultation           Assessment and Plan:   Assessment:   73 year old male who presents after a fall on the ice with right rib fractures 8-11. CT thoracic and lumbar spine reads are pending. No additional imaging needed after trauma exam at this time.     Acute traumatic injuries by imaging: Right rib fractures 8-11.  Incidental findings: none (stable pulmonary nodule)      Plan:   Agree with admission to hospitalist service  Rib fracture protocol orders placed  XR in am  Bowel regimen  No indication for surgical intervention regarding rib fractures  Surgery will follow along for tertiary exam in am    Addendum: CT lumbar and thoracic spine reveal   - acute fx anterior bridging osteophyte at T10-11 and inferior T10 vertebral body with extension into the T10-11 intervertebral disc space with fx of right T10 TP and SP (3 column involvement)  - nondisplaced fx of T9 and T11 TP   - fx right L1 TP    - Neurosurgery was consulted by the ER and will evaluate pt shortly. Appreciate recommendations. Keep pt flat until evaluation by neurosurgery. Defer to Neurosurgery to update activity orders and spinal precautions after evaluation.     After discussion with our hospitalist service, Dr. Boyer is not comfortable admitting patient  and refuses this admission. I discussed this patient with our trauma director and this patient meets criteria for hospitalist admission with a ground level fall and isolated spinal fractures/rib fractures and does not warrant surgical admission but surgical and neurosurgical consultation. If our hospitalist service is unwilling to admit, I would recommend transfer to a higher level of care. I discussed this with Dr. Howe in the ER who will work on facilitating transfer.                     Chief Complaint:   fall     History is obtained from the patient.         History of Present Illness:   This patient is a 73 year old  male who presented to  the ED after falling on the ice yesterday. He reports he slipped and fell flat on his back. He did not hit his head. No LOC. He had immediate back pain after the fall. He was able to get into the house and sat up all night in severe pain. His family convinced him to present to the Er for evaluation today due to his severe pain. He reports a similar fall a few years back but was not evaluated at that time. He denies any other falls. Denies feeling light headed prior to fall. He has h/o renal cell carcinoma and had nephrectomy about 2 years ago at [facility]. He takes a baby aspirin daily. No other blood thinners. H/o CAD and stenting in the past. He quit smoking about 15 years ago and does not drink alcohol.                Past Medical History:    has a past medical history of CAD (coronary artery disease) (2/5/2015), History of angina, History of skin graft, Hyperlipidaemia, Hypertension goal BP (blood pressure) < 140/90 (2/5/2015), Kidney stone (2/17/2021), and Sleep apnea.          Past Surgical History:     Past Surgical History:   Procedure Laterality Date     COLONOSCOPY N/A 07/16/2020    Procedure: COLONOSCOPY;  Surgeon: Wenceslao Marie MD;  Location:  GI     COMBINED CYSTOSCOPY, RETROGRADES, URETEROSCOPY, INSERT STENT Left 02/19/2021    Procedure: CYSTOSCOPY LEFT URETEROSCOPY,  LEFT STENT PLACEMENT, LEFT RETROGRADE;  Surgeon: Phil Lin MD;  Location:  OR     CYSTOSCOPY, DILATE URETER(S), COMBINED Left 02/19/2021    Procedure: Cystoscopy, dilate ureter(s), combined;  Surgeon: Phil Lin MD;  Location:  OR     HEART CATH, ANGIOPLASTY  11/2007    mid to prox LAD drug-eluting stent x2; Occluded RCA with Collaterals     KIDNEY STONE SURGERY  2003    laser and ureteric stenting     NEPHRECTOMY RT/LT Left 04/2021    [facility]     SKIN GRAFT, EACH ADDN 100SQCM                 Social History:     Social History     Tobacco Use     Smoking status: Former     Packs/day: 1.50     Years: 44.00      Pack years: 66.00     Types: Cigarettes     Start date: 6/27/1963     Quit date: 6/27/2007     Years since quitting: 15.6     Smokeless tobacco: Never   Substance Use Topics     Alcohol use: Yes     Comment: beer - 12 pack month             Family History:   Reviewed       Allergies:     Allergies   Allergen Reactions     No Known Drug Allergies              Medications:   No current facility-administered medications on file prior to encounter.  amLODIPine (NORVASC) 2.5 MG tablet, Take 1 tablet (2.5 mg) by mouth daily  aspirin 81 MG EC tablet, Take 81 mg by mouth daily  atorvastatin (LIPITOR) 40 MG tablet, Take 1 tablet (40 mg) by mouth daily  furosemide (LASIX) 20 MG tablet, Take 1 tablet (20 mg) by mouth daily  losartan (COZAAR) 50 MG tablet, Take 1 tablet (50 mg) by mouth daily  metoprolol succinate ER (TOPROL XL) 25 MG 24 hr tablet, Take 1 tablet (25 mg) by mouth daily  nitroGLYcerin (NITROSTAT) 0.4 MG sublingual tablet, For chest pain place 1 tablet under the tongue every 5 minutes for 3 doses. If symptoms persist 5 minutes after 1st dose call 911.  triamcinolone (KENALOG) 0.1 % external cream, Apply topically to affected area 2 times daily. make appointment please  acetaminophen (TYLENOL) 500 MG tablet, Take 2 tablets (1,000 mg) by mouth every 6 hours as needed for mild pain (Patient not taking: Reported on 2/12/2023)        acetaminophen  975 mg Oral Q8H     gabapentin  100 mg Oral TID     lidocaine  1 patch Transdermal Q24H     lidocaine   Transdermal Q8H RAMSEY            Review of Systems:   The 10 point review of systems is negative other than noted in the HPI.           Physical Exam:   /65   Pulse 54   Temp 98  F (36.7  C) (Temporal)   Resp 20   Wt 99.8 kg (220 lb)   SpO2 100%   BMI 31.57 kg/m    General appearance: well-nourished, no apparent distress  HEENT: Pupils are equal and round.  Head is normocephalic and atraumatic.  Neck is without thyromegaly, masses, swelling, ecchymosis.  Not  tender to midline palpation of the cervical spine.  Chest: mildly labored breathing.  Heart with regular rate and rhythm.  No palpable swelling, masses, ecchymosis, no crepitus, no visible deformity.   Abdomen:  Mildly distended, soft, nontender to palpation.  No masses. Well healed midline scar  Back: pt with active back spasm and too much pain to turn to side at time of my visit.   Extremities: Strength intact x4  Neurologic: nonfocal, grossly intact times four extremities, alert and oriented times three.  Cranial nerves II through XII intact grossly.  Psychiatric: mood and affect are appropriate.  Skin: without jaundice, lesions, rashes.  No skin trauma identified.            Data:   WBC -   WBC   Date Value Ref Range Status   03/01/2021 7.4 4.0 - 11.0 10e9/L Final     WBC Count   Date Value Ref Range Status   02/12/2023 11.2 (H) 4.0 - 11.0 10e3/uL Final   ], HgB -   Hemoglobin   Date Value Ref Range Status   02/12/2023 15.0 13.3 - 17.7 g/dL Final   03/01/2021 14.2 13.3 - 17.7 g/dL Final   ]   Liver Function Studies -   Recent Labs   Lab Test 12/16/22  1122   PROTTOTAL 7.4   ALBUMIN 4.2   BILITOTAL 1.5*   ALKPHOS 94   AST 21   ALT 16         IMAGING:  Results for orders placed or performed during the hospital encounter of 02/12/23   CT Chest/Abdomen/Pelvis w Contrast    Narrative    EXAM: CT CHEST/ABDOMEN/PELVIS W CONTRAST  LOCATION: [facility]  DATE/TIME: 2/12/2023 10:46 AM    INDICATION: Slip on ice, fall on back, midline T spine pain, midline L spine pain, right chest wall tenderness.   Concern for T L spine fractures, rib fractures on right and or liver laceration hematoma.  COMPARISON: 3/7/2022 and 9/10/2021  TECHNIQUE: CT scan of the chest, abdomen, and pelvis was performed following injection of IV contrast. Multiplanar reformats were obtained. Dose reduction techniques were used.   CONTRAST: 111mL ISOVUE 370    FINDINGS: Mild motion artifact.    LUNGS AND PLEURA: Mild bibasilar  atelectasis. No pleural effusion or pneumothorax. A stable 0.7 cm groundglass nodule in the right lung apex, image 47:4. Calcified granuloma.    MEDIASTINUM/AXILLAE: No aortic injury. No lymphadenopathy.    CORONARY ARTERY CALCIFICATION: Severe.    HEPATOBILIARY: A subcentimeter hypodensity in the left lobe which is too small to characterize.    PANCREAS: Normal.    SPLEEN: Normal.    ADRENAL GLANDS: Stable elongated thickened left adrenal gland with adjacent suture material. Normal right adrenal gland.    KIDNEYS/BLADDER: Left nephrectomy. A few subcentimeter right renal hypodensities which are too small to characterize.    BOWEL: Diverticulosis of the colon. No acute inflammatory change. No obstruction. Normal appendix.    LYMPH NODES: A stable 1.4 cm low-density ovoid mass anterior to the left external iliac artery is most consistent with a benign process, image 1707:6. No lymphadenopathy.    VASCULATURE: No acute vascular abnormality.    PELVIC ORGANS: Normal.    MUSCULOSKELETAL: For details on the spine, please refer to those reports, dictated separately. Acute posterior right 8, 9, and 11 rib fractures. Acute fracture of the medial head of the right 10th rib.      Impression    IMPRESSION:  1.  Right 8-11 rib fractures.  2.  A stable 0.7 cm right upper lobe groundglass nodule.       This note was created using voice recognition software. Undetected word substitutions or other errors may have occurred.     Lissett Huitron MD    Time spent with the patient, reviewing the EMR, reviewing laboratory and imaging studies, more than 50% of which was counseling and coordinating care:  30 minutes.

## 2023-02-13 LAB — DEPRECATED CALCIDIOL+CALCIFEROL SERPL-MC: 21 UG/L (ref 20–75)

## 2023-03-10 ENCOUNTER — TELEPHONE (OUTPATIENT)
Dept: FAMILY MEDICINE | Facility: CLINIC | Age: 73
End: 2023-03-10

## 2023-03-10 NOTE — TELEPHONE ENCOUNTER
Order/Referral Request    Who is requesting: Eli from [facility]    Orders being requested: physical therapy    Reason service is needed/diagnosis: Eli from [facility] is calling because patient is discharging from transitional care and needs to start home care on 3/13. They verbal order for physical therapy    When are orders needed by: before 3/13/2023    Has this been discussed with Provider: No    Does patient have a preference on a Group/Provider/Facility? no    Does patient have an appointment scheduled?: No    Where to send orders: verbal    Okay to leave a detailed message?: Yes at Other phone number:  Eli at [phone]*

## 2023-04-25 DIAGNOSIS — I10 HYPERTENSION GOAL BP (BLOOD PRESSURE) < 140/90: ICD-10-CM

## 2023-04-25 DIAGNOSIS — E78.5 HYPERLIPIDEMIA, UNSPECIFIED HYPERLIPIDEMIA TYPE: Primary | ICD-10-CM

## 2023-04-25 DIAGNOSIS — I25.10 CORONARY ARTERY DISEASE INVOLVING NATIVE CORONARY ARTERY OF NATIVE HEART WITHOUT ANGINA PECTORIS: ICD-10-CM

## 2023-04-26 ENCOUNTER — HOSPITAL ENCOUNTER (OUTPATIENT)
Dept: CARDIOLOGY | Facility: CLINIC | Age: 73
Discharge: HOME | End: 2023-04-26
Attending: INTERNAL MEDICINE | Admitting: INTERNAL MEDICINE
Payer: COMMERCIAL

## 2023-04-26 ENCOUNTER — OFFICE VISIT (OUTPATIENT)
Dept: CARDIOLOGY | Facility: CLINIC | Age: 73
End: 2023-04-26
Payer: COMMERCIAL

## 2023-04-26 ENCOUNTER — LAB (OUTPATIENT)
Dept: LAB | Facility: CLINIC | Age: 73
End: 2023-04-26
Payer: COMMERCIAL

## 2023-04-26 VITALS
BODY MASS INDEX: 31.74 KG/M2 | OXYGEN SATURATION: 96 % | HEIGHT: 70 IN | HEART RATE: 65 BPM | DIASTOLIC BLOOD PRESSURE: 72 MMHG | WEIGHT: 221.7 LBS | SYSTOLIC BLOOD PRESSURE: 136 MMHG

## 2023-04-26 DIAGNOSIS — E78.5 HYPERLIPIDEMIA, UNSPECIFIED HYPERLIPIDEMIA TYPE: Primary | ICD-10-CM

## 2023-04-26 DIAGNOSIS — I25.10 CORONARY ARTERY DISEASE INVOLVING NATIVE CORONARY ARTERY OF NATIVE HEART WITHOUT ANGINA PECTORIS: ICD-10-CM

## 2023-04-26 DIAGNOSIS — I10 HYPERTENSION GOAL BP (BLOOD PRESSURE) < 140/90: ICD-10-CM

## 2023-04-26 DIAGNOSIS — R00.1 BRADYCARDIA: ICD-10-CM

## 2023-04-26 DIAGNOSIS — R07.9 ACUTE CHEST PAIN: ICD-10-CM

## 2023-04-26 DIAGNOSIS — E78.5 HYPERLIPIDEMIA, UNSPECIFIED HYPERLIPIDEMIA TYPE: ICD-10-CM

## 2023-04-26 LAB
ALT SERPL W P-5'-P-CCNC: 14 U/L (ref 10–50)
ANION GAP SERPL CALCULATED.3IONS-SCNC: 13 MMOL/L (ref 7–15)
BUN SERPL-MCNC: 17.5 MG/DL (ref 8–23)
CALCIUM SERPL-MCNC: 9.6 MG/DL (ref 8.8–10.2)
CHLORIDE SERPL-SCNC: 101 MMOL/L (ref 98–107)
CHOLEST SERPL-MCNC: 155 MG/DL
CREAT SERPL-MCNC: 1.26 MG/DL (ref 0.67–1.17)
DEPRECATED HCO3 PLAS-SCNC: 25 MMOL/L (ref 22–29)
GFR SERPL CREATININE-BSD FRML MDRD: 60 ML/MIN/1.73M2
GLUCOSE SERPL-MCNC: 114 MG/DL (ref 70–99)
HDLC SERPL-MCNC: 48 MG/DL
LDLC SERPL CALC-MCNC: 66 MG/DL
NONHDLC SERPL-MCNC: 107 MG/DL
POTASSIUM SERPL-SCNC: 4.2 MMOL/L (ref 3.4–5.3)
SODIUM SERPL-SCNC: 139 MMOL/L (ref 136–145)
TRIGL SERPL-MCNC: 203 MG/DL

## 2023-04-26 PROCEDURE — 80061 LIPID PANEL: CPT | Performed by: INTERNAL MEDICINE

## 2023-04-26 PROCEDURE — 99214 OFFICE O/P EST MOD 30 MIN: CPT | Performed by: INTERNAL MEDICINE

## 2023-04-26 PROCEDURE — 93244 EXT ECG>48HR<7D REV&INTERPJ: CPT | Performed by: INTERNAL MEDICINE

## 2023-04-26 PROCEDURE — 80048 BASIC METABOLIC PNL TOTAL CA: CPT | Performed by: INTERNAL MEDICINE

## 2023-04-26 PROCEDURE — 36415 COLL VENOUS BLD VENIPUNCTURE: CPT | Performed by: INTERNAL MEDICINE

## 2023-04-26 PROCEDURE — 93242 EXT ECG>48HR<7D RECORDING: CPT

## 2023-04-26 PROCEDURE — 84460 ALANINE AMINO (ALT) (SGPT): CPT | Performed by: INTERNAL MEDICINE

## 2023-04-26 RX ORDER — SENNOSIDES 8.6 MG
TABLET ORAL
COMMUNITY
Start: 2023-03-31 | End: 2023-07-14

## 2023-04-26 RX ORDER — METOPROLOL SUCCINATE 25 MG/1
25 TABLET, EXTENDED RELEASE ORAL DAILY
Qty: 90 TABLET | Refills: 3 | Status: SHIPPED | OUTPATIENT
Start: 2023-04-26 | End: 2024-04-12

## 2023-04-26 RX ORDER — HYDROXYZINE PAMOATE 25 MG/1
CAPSULE ORAL
COMMUNITY
Start: 2023-03-17 | End: 2023-07-14

## 2023-04-26 RX ORDER — GABAPENTIN 400 MG/1
CAPSULE ORAL
COMMUNITY
Start: 2023-03-10 | End: 2024-05-28

## 2023-04-26 NOTE — LETTER
4/26/2023    Vernell Bucio MD  [address] [facility] [address]  [place] [address]    RE: Fermín Josue       Dear Colleague,     I had the pleasure of seeing Fermín Josue in the [facility].  HPI and Plan:   See dictation          Orders Placed This Encounter   Procedures    Follow-Up with Cardiology    Leadless EKG Monitor 3 to 7 Days       Orders Placed This Encounter   Medications    sennosides (SENOKOT) 8.6 MG tablet     Sig: Take 2 Tablets by mouth two times a day. Indications: for constipation    gabapentin (NEURONTIN) 400 MG capsule     Sig: Take 1 Capsule (400 mg) by mouth three times a day. Indications: Neuropathic Pain    hydrOXYzine (VISTARIL) 25 MG capsule     Sig: Take 1 Capsule (25 mg) by mouth every 6 hours as needed for Anxiety or Pain.    metoprolol succinate ER (TOPROL XL) 25 MG 24 hr tablet     Sig: Take 1 tablet (25 mg) by mouth daily     Dispense:  90 tablet     Refill:  3       Medications Discontinued During This Encounter   Medication Reason    metoprolol succinate ER (TOPROL XL) 25 MG 24 hr tablet Reorder (No AVS / No eCancel)         Encounter Diagnoses   Name Primary?    Hyperlipidemia, unspecified hyperlipidemia type Yes    Hypertension goal BP (blood pressure) < 140/90     Coronary artery disease involving native coronary artery of native heart without angina pectoris     Acute chest pain     Bradycardia        CURRENT MEDICATIONS:  Current Outpatient Medications   Medication Sig Dispense Refill    acetaminophen (TYLENOL) 500 MG tablet Take 2 tablets (1,000 mg) by mouth every 6 hours as needed for mild pain 90 tablet 3    amLODIPine (NORVASC) 2.5 MG tablet Take 1 tablet (2.5 mg) by mouth daily 90 tablet 2    aspirin 81 MG EC tablet Take 81 mg by mouth daily      atorvastatin (LIPITOR) 40 MG tablet Take 1 tablet (40 mg) by mouth daily 90 tablet 0    furosemide (LASIX) 20 MG tablet Take 1 tablet (20 mg) by mouth daily 90 tablet 0    gabapentin (NEURONTIN)  400 MG capsule Take 1 Capsule (400 mg) by mouth three times a day. Indications: Neuropathic Pain      hydrOXYzine (VISTARIL) 25 MG capsule Take 1 Capsule (25 mg) by mouth every 6 hours as needed for Anxiety or Pain.      losartan (COZAAR) 50 MG tablet Take 1 tablet (50 mg) by mouth daily 90 tablet 2    metoprolol succinate ER (TOPROL XL) 25 MG 24 hr tablet Take 1 tablet (25 mg) by mouth daily 90 tablet 3    nitroGLYcerin (NITROSTAT) 0.4 MG sublingual tablet For chest pain place 1 tablet under the tongue every 5 minutes for 3 doses. If symptoms persist 5 minutes after 1st dose call 911. 9 tablet 3    sennosides (SENOKOT) 8.6 MG tablet Take 2 Tablets by mouth two times a day. Indications: for constipation      triamcinolone (KENALOG) 0.1 % external cream Apply topically to affected area 2 times daily. make appointment please 15 g 3       ALLERGIES     Allergies   Allergen Reactions    No Known Drug Allergy        PAST MEDICAL HISTORY:  Past Medical History:   Diagnosis Date    CAD (coronary artery disease) 2/5/2015    3 stents    History of angina     History of skin graft     Right lower limb    Hyperlipidaemia     Hypertension goal BP (blood pressure) < 140/90 2/5/2015    Kidney stone 2/17/2021    Sleep apnea        PAST SURGICAL HISTORY:  Past Surgical History:   Procedure Laterality Date    COLONOSCOPY N/A 07/16/2020    Procedure: COLONOSCOPY;  Surgeon: Wenceslao Marie MD;  Location:  GI    COMBINED CYSTOSCOPY, RETROGRADES, URETEROSCOPY, INSERT STENT Left 02/19/2021    Procedure: CYSTOSCOPY LEFT URETEROSCOPY,  LEFT STENT PLACEMENT, LEFT RETROGRADE;  Surgeon: Phil Lin MD;  Location:  OR    CYSTOSCOPY, DILATE URETER(S), COMBINED Left 02/19/2021    Procedure: Cystoscopy, dilate ureter(s), combined;  Surgeon: Phil Lin MD;  Location:  OR    HEART CATH, ANGIOPLASTY  11/2007    mid to prox LAD drug-eluting stent x2; Occluded RCA with Collaterals    KIDNEY STONE SURGERY  2003    laser  "and ureteric stenting    NEPHRECTOMY RT/LT Left 04/2021    [facility]    SKIN GRAFT, EACH ADDN 100SQCM         FAMILY HISTORY:  Family History   Problem Relation Age of Onset    Hypertension Mother     Pneumonia Mother 91        covid related    Dementia Mother     Alzheimer Disease Father         d age 91    Blood Disease Maternal Grandmother         leukemia    Cancer Maternal Grandfather     Cancer Maternal Uncle         lung       SOCIAL HISTORY:  Social History     Socioeconomic History    Marital status:      Spouse name: None    Number of children: None    Years of education: None    Highest education level: None   Tobacco Use    Smoking status: Former     Packs/day: 1.50     Years: 44.00     Pack years: 66.00     Types: Cigarettes     Start date: 6/27/1963     Quit date: 6/27/2007     Years since quitting: 15.8    Smokeless tobacco: Never   Vaping Use    Vaping status: Never Used   Substance and Sexual Activity    Alcohol use: Yes     Comment: beer - 12 pack month in summer    Drug use: No   Other Topics Concern    Caffeine Concern Yes     Comment: 1 pop daily     Special Diet Yes     Comment: fruits     Exercise Yes     Comment: walking 1 hr daily     Parent/sibling w/ CABG, MI or angioplasty before 65F 55M? No       Review of Systems:  Skin:          Eyes:         ENT:         Respiratory:          Cardiovascular:         Gastroenterology:        Genitourinary:         Musculoskeletal:         Neurologic:         Psychiatric:         Heme/Lymph/Imm:         Endocrine:           Physical Exam:  Vitals: /72   Pulse 65   Ht 1.778 m (5' 10\")   Wt 100.6 kg (221 lb 11.2 oz)   SpO2 96%   BMI 31.81 kg/m      Constitutional:  cooperative, alert and oriented, well developed, well nourished, in no acute distress overweight      Skin:  warm and dry to the touch, no apparent skin lesions or masses noted          Head:  normocephalic, no masses or lesions        Eyes:  pupils equal and round, conjunctivae " and lids unremarkable, sclera white, no xanthalasma, EOMS intact, no nystagmus        Lymph:      ENT:  no pallor or cyanosis, dentition good        Neck:  carotid pulses are full and equal bilaterally, JVP normal, no carotid bruit        Respiratory:  normal breath sounds, clear to auscultation, normal A-P diameter, normal symmetry, normal respiratory excursion, no use of accessory muscles         Cardiac: regular rhythm;normal S1 and S2;apical impulse not displaced;no murmurs, gallops or rubs detected     no presence of murmur          pulses full and equal, no bruits auscultated;pulses full and equal                                        GI:  abdomen soft, non-tender, BS normoactive, no mass, no HSM, no bruits obese      Extremities and Muscular Skeletal:  no deformities, clubbing, cyanosis, erythema observed;no edema erythema;stasis pigmentation            Neurological:  no gross motor deficits;affect appropriate        Psych:  Alert and Oriented x 3        CC  No referring provider defined for this encounter.                Service Date: 04/26/2023    REFERRING PHYSICIAN:  Dr. Vernell Bucio.    HISTORY OF PRESENT ILLNESS:  It was my pleasure to see your patient, Fermín Josue.  This is a patient with a past history of stenting of the left anterior descending artery in 2007 and he has a chronically occluded right coronary artery with good collateralization.  When I most recently saw him on video visit in 2022, he was doing well with no chest discomfort at all.  This patient also has a history of essential hypertension.  Since I last saw him, he had a very severe injury to his spine with T9 to T11 fractures and also fractures from rib 8 to rib 11.  He underwent extensive surgery to his back at [facility] and he was in hospital for approximately 5 weeks.  They did notice that he had some evidence of bradycardia, which we have noticed in the past and which he has tolerated well, and they stopped  his beta blocker medication.  Since stopping the beta blocker medication, he has noticed some chest discomfort.  The chest discomfort usually occurs at rest and not with exertion.  It is usually relieved by him essentially hugging himself and squeezing his chest with both of his arms.  When he walks, he does not get the discomfort.  The patient's last lipid profile was reasonably good.  The HDL was reduced at 36, but the LDL was excellent at 55 and his triglycerides are normal at 129 with a total cholesterol of 111.  He has had a nephrectomy in the past for kidney cancer, but his renal function is at borderline.  Today his BUN was 17.5, creatinine 1.26 and his GFR 60.  Electrolytes are normal with a sodium of 139 and potassium of 4.2.  Blood pressure was higher than previously at 136/72, heart rate 65 beats per minute.    IMPRESSION:    1.  Chest discomfort.  There are some atypical features to this.  It occurs at rest and it is relieved by squeezing of his chest, which may indicate that he has musculoskeletal discomfort, which is not surprising given the multiple fractures in his spine and ribs and he has been lying on his back for about 5 weeks.  However, this patient could also have ischemia and it does appear that since he has stopped the metoprolol that these pains have got worse.  2.  Excellent lipid profile in December.  3.  Normotensive but at the upper limits of normal.  4.  Severe spinal injury after falling on the ice and also rib fractures with extensive spinal surgery.    PLAN:  We will start the patient back on metoprolol 25 mg per day, but I will also have the patient wear a Zio Patch monitor for 7 days to ensure that he is not developing high-grade conduction system disease.  I will have the patient follow up with me in 3 months' time to see how he is doing.  If he is continuing to have chest discomfort, I will do a Lexiscan study.  I would not like to do a Lexiscan study now because of the back  surgery and lying down might be quite painful for him.    It is my pleasure to be involved in the care of this nice patient.  As always, he has been told to contact me if he has any questions or any concerns.    Christiano Toro MD, FACC    cc:  Vernell Bucio MD  [address], [address]  [place]  [address]    Christiano Gifford MD, FACC        D: 2023   T: 2023   MT:     Name:     XU BURCH  MRN:      [id number]        Account:      [id number]   :      1950           Service Date: 2023       Document: [id number]    Thank you for allowing me to participate in the care of your patient.      Sincerely,     Christiano Toro MD, MD     [facility]  cc:   No referring provider defined for this encounter.

## 2023-04-26 NOTE — PROGRESS NOTES
Service Date: 04/26/2023    REFERRING PHYSICIAN:  Dr. Vernell Bucio.    HISTORY OF PRESENT ILLNESS:  It was my pleasure to see your patient, Fermín Josue.  This is a patient with a past history of stenting of the left anterior descending artery in 2007 and he has a chronically occluded right coronary artery with good collateralization.  When I most recently saw him on video visit in 2022, he was doing well with no chest discomfort at all.  This patient also has a history of essential hypertension.  Since I last saw him, he had a very severe injury to his spine with T9 to T11 fractures and also fractures from rib 8 to rib 11.  He underwent extensive surgery to his back at [facility] and he was in hospital for approximately 5 weeks.  They did notice that he had some evidence of bradycardia, which we have noticed in the past and which he has tolerated well, and they stopped his beta blocker medication.  Since stopping the beta blocker medication, he has noticed some chest discomfort.  The chest discomfort usually occurs at rest and not with exertion.  It is usually relieved by him essentially hugging himself and squeezing his chest with both of his arms.  When he walks, he does not get the discomfort.  The patient's last lipid profile was reasonably good.  The HDL was reduced at 36, but the LDL was excellent at 55 and his triglycerides are normal at 129 with a total cholesterol of 111.  He has had a nephrectomy in the past for kidney cancer, but his renal function is at borderline.  Today his BUN was 17.5, creatinine 1.26 and his GFR 60.  Electrolytes are normal with a sodium of 139 and potassium of 4.2.  Blood pressure was higher than previously at 136/72, heart rate 65 beats per minute.    IMPRESSION:    1.  Chest discomfort.  There are some atypical features to this.  It occurs at rest and it is relieved by squeezing of his chest, which may indicate that he has musculoskeletal discomfort, which is  not surprising given the multiple fractures in his spine and ribs and he has been lying on his back for about 5 weeks.  However, this patient could also have ischemia and it does appear that since he has stopped the metoprolol that these pains have got worse.  2.  Excellent lipid profile in December.  3.  Normotensive but at the upper limits of normal.  4.  Severe spinal injury after falling on the ice and also rib fractures with extensive spinal surgery.    PLAN:  We will start the patient back on metoprolol 25 mg per day, but I will also have the patient wear a Zio Patch monitor for 7 days to ensure that he is not developing high-grade conduction system disease.  I will have the patient follow up with me in 3 months' time to see how he is doing.  If he is continuing to have chest discomfort, I will do a Lexiscan study.  I would not like to do a Lexiscan study now because of the back surgery and lying down might be quite painful for him.    It is my pleasure to be involved in the care of this nice patient.  As always, he has been told to contact me if he has any questions or any concerns.    Christiano Toro MD, FACC    cc:  Vernell Bucio MD  [address], [address]  [place]  [address]    Christiano Gifford MD, FACC        D: 2023   T: 2023   MT: alexey    Name:     XU BURCH  MRN:      [id number]        Account:      [id number]   :      1950           Service Date: 2023       Document: [id number]

## 2023-04-26 NOTE — PROGRESS NOTES
HPI and Plan:   See dictation          Orders Placed This Encounter   Procedures     Follow-Up with Cardiology     Leadless EKG Monitor 3 to 7 Days       Orders Placed This Encounter   Medications     sennosides (SENOKOT) 8.6 MG tablet     Sig: Take 2 Tablets by mouth two times a day. Indications: for constipation     gabapentin (NEURONTIN) 400 MG capsule     Sig: Take 1 Capsule (400 mg) by mouth three times a day. Indications: Neuropathic Pain     hydrOXYzine (VISTARIL) 25 MG capsule     Sig: Take 1 Capsule (25 mg) by mouth every 6 hours as needed for Anxiety or Pain.     metoprolol succinate ER (TOPROL XL) 25 MG 24 hr tablet     Sig: Take 1 tablet (25 mg) by mouth daily     Dispense:  90 tablet     Refill:  3       Medications Discontinued During This Encounter   Medication Reason     metoprolol succinate ER (TOPROL XL) 25 MG 24 hr tablet Reorder (No AVS / No eCancel)         Encounter Diagnoses   Name Primary?     Hyperlipidemia, unspecified hyperlipidemia type Yes     Hypertension goal BP (blood pressure) < 140/90      Coronary artery disease involving native coronary artery of native heart without angina pectoris      Acute chest pain      Bradycardia        CURRENT MEDICATIONS:  Current Outpatient Medications   Medication Sig Dispense Refill     acetaminophen (TYLENOL) 500 MG tablet Take 2 tablets (1,000 mg) by mouth every 6 hours as needed for mild pain 90 tablet 3     amLODIPine (NORVASC) 2.5 MG tablet Take 1 tablet (2.5 mg) by mouth daily 90 tablet 2     aspirin 81 MG EC tablet Take 81 mg by mouth daily       atorvastatin (LIPITOR) 40 MG tablet Take 1 tablet (40 mg) by mouth daily 90 tablet 0     furosemide (LASIX) 20 MG tablet Take 1 tablet (20 mg) by mouth daily 90 tablet 0     gabapentin (NEURONTIN) 400 MG capsule Take 1 Capsule (400 mg) by mouth three times a day. Indications: Neuropathic Pain       hydrOXYzine (VISTARIL) 25 MG capsule Take 1 Capsule (25 mg) by mouth every 6 hours as needed for Anxiety  or Pain.       losartan (COZAAR) 50 MG tablet Take 1 tablet (50 mg) by mouth daily 90 tablet 2     metoprolol succinate ER (TOPROL XL) 25 MG 24 hr tablet Take 1 tablet (25 mg) by mouth daily 90 tablet 3     nitroGLYcerin (NITROSTAT) 0.4 MG sublingual tablet For chest pain place 1 tablet under the tongue every 5 minutes for 3 doses. If symptoms persist 5 minutes after 1st dose call 911. 9 tablet 3     sennosides (SENOKOT) 8.6 MG tablet Take 2 Tablets by mouth two times a day. Indications: for constipation       triamcinolone (KENALOG) 0.1 % external cream Apply topically to affected area 2 times daily. make appointment please 15 g 3       ALLERGIES     Allergies   Allergen Reactions     No Known Drug Allergy        PAST MEDICAL HISTORY:  Past Medical History:   Diagnosis Date     CAD (coronary artery disease) 2/5/2015    3 stents     History of angina      History of skin graft     Right lower limb     Hyperlipidaemia      Hypertension goal BP (blood pressure) < 140/90 2/5/2015     Kidney stone 2/17/2021     Sleep apnea        PAST SURGICAL HISTORY:  Past Surgical History:   Procedure Laterality Date     COLONOSCOPY N/A 07/16/2020    Procedure: COLONOSCOPY;  Surgeon: Wenceslao Marie MD;  Location:  GI     COMBINED CYSTOSCOPY, RETROGRADES, URETEROSCOPY, INSERT STENT Left 02/19/2021    Procedure: CYSTOSCOPY LEFT URETEROSCOPY,  LEFT STENT PLACEMENT, LEFT RETROGRADE;  Surgeon: Phil Lin MD;  Location:  OR     CYSTOSCOPY, DILATE URETER(S), COMBINED Left 02/19/2021    Procedure: Cystoscopy, dilate ureter(s), combined;  Surgeon: Phil Lin MD;  Location:  OR     HEART CATH, ANGIOPLASTY  11/2007    mid to prox LAD drug-eluting stent x2; Occluded RCA with Collaterals     KIDNEY STONE SURGERY  2003    laser and ureteric stenting     NEPHRECTOMY RT/LT Left 04/2021    [facility]     SKIN GRAFT, EACH ADDN 100SQCM         FAMILY HISTORY:  Family History   Problem Relation Age of Onset     Hypertension  "Mother      Pneumonia Mother 91        covid related     Dementia Mother      Alzheimer Disease Father         d age 91     Blood Disease Maternal Grandmother         leukemia     Cancer Maternal Grandfather      Cancer Maternal Uncle         lung       SOCIAL HISTORY:  Social History     Socioeconomic History     Marital status:      Spouse name: None     Number of children: None     Years of education: None     Highest education level: None   Tobacco Use     Smoking status: Former     Packs/day: 1.50     Years: 44.00     Pack years: 66.00     Types: Cigarettes     Start date: 6/27/1963     Quit date: 6/27/2007     Years since quitting: 15.8     Smokeless tobacco: Never   Vaping Use     Vaping status: Never Used   Substance and Sexual Activity     Alcohol use: Yes     Comment: beer - 12 pack month in summer     Drug use: No   Other Topics Concern     Caffeine Concern Yes     Comment: 1 pop daily      Special Diet Yes     Comment: fruits      Exercise Yes     Comment: walking 1 hr daily      Parent/sibling w/ CABG, MI or angioplasty before 65F 55M? No       Review of Systems:  Skin:          Eyes:         ENT:         Respiratory:          Cardiovascular:         Gastroenterology:        Genitourinary:         Musculoskeletal:         Neurologic:         Psychiatric:         Heme/Lymph/Imm:         Endocrine:           Physical Exam:  Vitals: /72   Pulse 65   Ht 1.778 m (5' 10\")   Wt 100.6 kg (221 lb 11.2 oz)   SpO2 96%   BMI 31.81 kg/m      Constitutional:  cooperative, alert and oriented, well developed, well nourished, in no acute distress overweight      Skin:  warm and dry to the touch, no apparent skin lesions or masses noted          Head:  normocephalic, no masses or lesions        Eyes:  pupils equal and round, conjunctivae and lids unremarkable, sclera white, no xanthalasma, EOMS intact, no nystagmus        Lymph:      ENT:  no pallor or cyanosis, dentition good        Neck:  carotid " pulses are full and equal bilaterally, JVP normal, no carotid bruit        Respiratory:  normal breath sounds, clear to auscultation, normal A-P diameter, normal symmetry, normal respiratory excursion, no use of accessory muscles         Cardiac: regular rhythm;normal S1 and S2;apical impulse not displaced;no murmurs, gallops or rubs detected     no presence of murmur          pulses full and equal, no bruits auscultated;pulses full and equal                                        GI:  abdomen soft, non-tender, BS normoactive, no mass, no HSM, no bruits obese      Extremities and Muscular Skeletal:  no deformities, clubbing, cyanosis, erythema observed;no edema erythema;stasis pigmentation            Neurological:  no gross motor deficits;affect appropriate        Psych:  Alert and Oriented x 3        CC  No referring provider defined for this encounter.

## 2023-04-28 ENCOUNTER — TELEPHONE (OUTPATIENT)
Dept: CARDIOLOGY | Facility: CLINIC | Age: 73
End: 2023-04-28
Payer: COMMERCIAL

## 2023-07-14 ENCOUNTER — OFFICE VISIT (OUTPATIENT)
Dept: CARDIOLOGY | Facility: CLINIC | Age: 73
End: 2023-07-14
Attending: INTERNAL MEDICINE
Payer: COMMERCIAL

## 2023-07-14 VITALS
BODY MASS INDEX: 33.39 KG/M2 | WEIGHT: 233.2 LBS | DIASTOLIC BLOOD PRESSURE: 58 MMHG | SYSTOLIC BLOOD PRESSURE: 136 MMHG | OXYGEN SATURATION: 95 % | HEIGHT: 70 IN | HEART RATE: 48 BPM

## 2023-07-14 DIAGNOSIS — I25.10 CORONARY ARTERY DISEASE INVOLVING NATIVE CORONARY ARTERY OF NATIVE HEART WITHOUT ANGINA PECTORIS: ICD-10-CM

## 2023-07-14 DIAGNOSIS — R00.1 BRADYCARDIA: Primary | ICD-10-CM

## 2023-07-14 DIAGNOSIS — R07.9 ACUTE CHEST PAIN: ICD-10-CM

## 2023-07-14 DIAGNOSIS — I10 ESSENTIAL HYPERTENSION: ICD-10-CM

## 2023-07-14 PROCEDURE — 99214 OFFICE O/P EST MOD 30 MIN: CPT | Performed by: INTERNAL MEDICINE

## 2023-07-14 PROCEDURE — 93000 ELECTROCARDIOGRAM COMPLETE: CPT | Performed by: INTERNAL MEDICINE

## 2023-07-14 NOTE — LETTER
7/14/2023    Vernell Bucio MD  [address]  [place] [address]    RE: Fermín Josue       Dear Colleague,     I had the pleasure of seeing Fermín Josue in the [facility].  HPI and Plan:   See dictation          Orders Placed This Encounter   Procedures    Basic metabolic panel    Lipid Profile    ALT    Follow-Up with Cardiology    EKG 12-lead complete w/read - Clinics (performed today)    EKG 12-lead complete w/read - Clinics (to be scheduled)       No orders of the defined types were placed in this encounter.      Medications Discontinued During This Encounter   Medication Reason    sennosides (SENOKOT) 8.6 MG tablet Therapy completed (No AVS)    hydrOXYzine (VISTARIL) 25 MG capsule Therapy completed (No AVS)         Encounter Diagnoses   Name Primary?    Acute chest pain     Bradycardia Yes    Coronary artery disease involving native coronary artery of native heart without angina pectoris     Essential hypertension        CURRENT MEDICATIONS:  Current Outpatient Medications   Medication Sig Dispense Refill    acetaminophen (TYLENOL) 500 MG tablet Take 2 tablets (1,000 mg) by mouth every 6 hours as needed for mild pain 90 tablet 3    amLODIPine (NORVASC) 2.5 MG tablet Take 1 tablet (2.5 mg) by mouth daily 90 tablet 2    aspirin 81 MG EC tablet Take 81 mg by mouth daily      atorvastatin (LIPITOR) 40 MG tablet Take 1 tablet (40 mg) by mouth daily 90 tablet 0    furosemide (LASIX) 20 MG tablet Take 1 tablet (20 mg) by mouth daily 90 tablet 0    gabapentin (NEURONTIN) 400 MG capsule Take 1 Capsule (400 mg) by mouth three times a day. Indications: Neuropathic Pain      losartan (COZAAR) 50 MG tablet Take 1 tablet (50 mg) by mouth daily 90 tablet 2    metoprolol succinate ER (TOPROL XL) 25 MG 24 hr tablet Take 1 tablet (25 mg) by mouth daily 90 tablet 3    nitroGLYcerin (NITROSTAT) 0.4 MG sublingual tablet For chest pain place 1 tablet under the tongue every 5 minutes  for 3 doses. If symptoms persist 5 minutes after 1st dose call 911. 9 tablet 3    triamcinolone (KENALOG) 0.1 % external cream Apply topically to affected area 2 times daily. make appointment please 15 g 3       ALLERGIES     Allergies   Allergen Reactions    No Known Drug Allergy        PAST MEDICAL HISTORY:  Past Medical History:   Diagnosis Date    CAD (coronary artery disease) 2/5/2015    3 stents    History of angina     History of skin graft     Right lower limb    Hyperlipidaemia     Hypertension goal BP (blood pressure) < 140/90 2/5/2015    Kidney stone 2/17/2021    Sleep apnea        PAST SURGICAL HISTORY:  Past Surgical History:   Procedure Laterality Date    COLONOSCOPY N/A 07/16/2020    Procedure: COLONOSCOPY;  Surgeon: Wenceslao Marie MD;  Location:  GI    COMBINED CYSTOSCOPY, RETROGRADES, URETEROSCOPY, INSERT STENT Left 02/19/2021    Procedure: CYSTOSCOPY LEFT URETEROSCOPY,  LEFT STENT PLACEMENT, LEFT RETROGRADE;  Surgeon: Phil Lin MD;  Location:  OR    CYSTOSCOPY, DILATE URETER(S), COMBINED Left 02/19/2021    Procedure: Cystoscopy, dilate ureter(s), combined;  Surgeon: Phil Lin MD;  Location:  OR    HEART CATH, ANGIOPLASTY  11/2007    mid to prox LAD drug-eluting stent x2; Occluded RCA with Collaterals    KIDNEY STONE SURGERY  2003    laser and ureteric stenting    NEPHRECTOMY RT/LT Left 04/2021    [facility]    SKIN GRAFT, EACH ADDN 100SQCM         FAMILY HISTORY:  Family History   Problem Relation Age of Onset    Hypertension Mother     Pneumonia Mother 91        covid related    Dementia Mother     Alzheimer Disease Father         d age 91    Blood Disease Maternal Grandmother         leukemia    Cancer Maternal Grandfather     Cancer Maternal Uncle         lung       SOCIAL HISTORY:  Social History     Socioeconomic History    Marital status:      Spouse name: None    Number of children: None    Years of education: None    Highest education level: None  "  Tobacco Use    Smoking status: Former     Packs/day: 1.50     Years: 44.00     Pack years: 66.00     Types: Cigarettes     Start date: 1963     Quit date: 2007     Years since quittin.0    Smokeless tobacco: Never   Vaping Use    Vaping Use: Never used   Substance and Sexual Activity    Alcohol use: Yes     Comment: beer - 12 pack month in summer    Drug use: No   Other Topics Concern    Caffeine Concern Yes     Comment: 1 pop daily     Special Diet Yes     Comment: fruits     Exercise Yes     Comment: walking 1 hr daily     Parent/sibling w/ CABG, MI or angioplasty before 65F 55M? No       Review of Systems:  Skin:          Eyes:         ENT:         Respiratory:          Cardiovascular:         Gastroenterology:        Genitourinary:         Musculoskeletal:         Neurologic:         Psychiatric:         Heme/Lymph/Imm:         Endocrine:           Physical Exam:  Vitals: /58   Pulse (!) 48   Ht 1.778 m (5' 10\")   Wt 105.8 kg (233 lb 3.2 oz)   SpO2 95%   BMI 33.46 kg/m      Constitutional:  cooperative, alert and oriented, well developed, well nourished, in no acute distress overweight      Skin:  warm and dry to the touch, no apparent skin lesions or masses noted          Head:  normocephalic, no masses or lesions        Eyes:  pupils equal and round, conjunctivae and lids unremarkable, sclera white, no xanthalasma, EOMS intact, no nystagmus        Lymph:      ENT:  no pallor or cyanosis, dentition good        Neck:  carotid pulses are full and equal bilaterally, JVP normal, no carotid bruit        Respiratory:  normal breath sounds, clear to auscultation, normal A-P diameter, normal symmetry, normal respiratory excursion, no use of accessory muscles         Cardiac: regular rhythm;normal S1 and S2;apical impulse not displaced;no murmurs, gallops or rubs detected     no presence of murmur          pulses full and equal, no bruits auscultated;pulses full and equal                      "                   GI:    obese      Extremities and Muscular Skeletal:  no deformities, clubbing, cyanosis, erythema observed;no edema erythema;stasis pigmentation bilateral LE edema;trace          Neurological:  no gross motor deficits;affect appropriate        Psych:  Alert and Oriented x 3        CC  Christiano Toro MD  [address]  [place] [address]                Service Date: 07/14/2023    CARDIOLOGY FOLLOWUP VISIT     REFERRING PHYSICIAN:  Vernell Bucio MD     HISTORY OF PRESENT ILLNESS:  It is my pleasure to see your patient Fermín Josue in followup.  As you know, this is a patient who in the past had stenting of the left anterior descending artery in 2007 and has a known chronically occluded right coronary artery with collateralization.  If you remember, when I last saw him about 2 months ago, he had a severe injury to his spine with T9-T11 fractures and also fractures from rib 8-11, and he had extensive surgery on his back.  They stopped the beta-blocker medication because he was becoming somewhat bradycardic, and since that time, he has noticed exertional chest discomfort.  He was even getting some episodes at rest.  The discomfort was atypical.  We started the patient back on metoprolol 25 mg, which was half the dose that he was on previously, and because of the bradycardia, we had the patient wear an event monitor, which was a [employer] patch monitor.  That monitor was present for 7 days.  He did hit the trigger button twice, but he was in sinus rhythm with rates between 52-70 beats per minute at that time.  Sometimes his heart rates can get as low as 41 beats per minute, and these are sinus bradycardia.  He can reach maximum heart rates of 102 beats per minute.  No high-grade conduction system disease was noted.  This morning, his heart rate is on the lower side, but he took his metoprolol about maybe 2 hours ago, so we are getting peak dose effect from the metoprolol medication at  this time.  His heart rate was 45 beats per minute, sinus bradycardia with a 1st degree AV block.  However, he does not feel dizzy or lightheaded.  The important thing is that his symptoms have resolved entirely.  He has no chest pains or chest pressure.  He did have a nuclear stress test performed as well because of the chest discomfort, and the nuclear stress test did show that he has a medium-sized area of a transmural infarct in the inferior and inferolateral walls, and that is consistent with his occluded right coronary artery, and there is only a mild degree of periinfarct ischemia.  It is a small area of nontransmural infarction in the mid to distal anteroseptum.  There is mild ischemia present, so this would be regarded as being a low-risk nuclear stress test.  His blood pressure is well controlled today at 136/58.  His last lipid profile at the end of April showed an LDL of 66, HDL of 48 and mild hypertriglyceridemia of 203, and it would have been expected at that time that his triglycerides would go up, because he was completely immobilized after his back surgery.  His ALT is 14, indicating no hepatic toxicity.  Previously in February, his renal function was off with a creatinine of 1.57 and a BUN of 28.7.  He has one kidney, as you know.  However, at the end of April, his renal function had significantly improved, and his creatinine had dropped down to 1.26 with a normal GFR at 60, and BUN had dropped to 17.5.    IMPRESSION:    1.  Coronary artery disease and angina pectoris.  The symptoms have been eliminated with the reintroduction of metoprolol succinate at low dose.  Nuclear stress test as described above shows infarction of the inferior wall, which is consistent with his prior occlusion of his right coronary artery, and only a small amount of ischemia in the anterior septum.  2.  Bradycardia.  This is due to the beta-blocker medication, but the beta blocker has eliminated the symptoms of angina  pectoris, and the event monitor does not show any evidence of high-grade conduction system disease.  He does tend to have a lower heart rate early in the morning time when he takes the beta-blocker medication.  He is also asymptomatic with the bradycardia with no dizziness or lightheadedness.  3.  Lipid profile was good in April, but his triglycerides were raised because of immobility, most likely.    4.  Essential hypertension.  He is normotensive.    PLAN:  I will continue the patient on his present medications, but I have warned him about the symptoms of inappropriate bradycardia, such as dizziness, lightheadedness, syncope or near syncope.  If those symptoms should start to occur, he is to immediately contact us.  I will see the patient back again in 1 year's time, but he is to call us if he has any questions or any concerns.    Christiano Gifford MD     cc:  Vernell Bucio MD   [address] [address]    Christiano Gifford MD, [facility]        D: 2023   T: 2023   MT: yasmany    Name:     XU BURCH  MRN:      [id number]        Account:      [id number]   :      1950           Service Date: 2023       Document: [id number]     Thank you for allowing me to participate in the care of your patient.      Sincerely,     Christiano Toro MD, MD     [facility]  cc:   Christiano Toro MD  [address] [place] [id number]

## 2023-07-14 NOTE — PROGRESS NOTES
HPI and Plan:   See dictation          Orders Placed This Encounter   Procedures     Basic metabolic panel     Lipid Profile     ALT     Follow-Up with Cardiology     EKG 12-lead complete w/read - Clinics (performed today)     EKG 12-lead complete w/read - Clinics (to be scheduled)       No orders of the defined types were placed in this encounter.      Medications Discontinued During This Encounter   Medication Reason     sennosides (SENOKOT) 8.6 MG tablet Therapy completed (No AVS)     hydrOXYzine (VISTARIL) 25 MG capsule Therapy completed (No AVS)         Encounter Diagnoses   Name Primary?     Acute chest pain      Bradycardia Yes     Coronary artery disease involving native coronary artery of native heart without angina pectoris      Essential hypertension        CURRENT MEDICATIONS:  Current Outpatient Medications   Medication Sig Dispense Refill     acetaminophen (TYLENOL) 500 MG tablet Take 2 tablets (1,000 mg) by mouth every 6 hours as needed for mild pain 90 tablet 3     amLODIPine (NORVASC) 2.5 MG tablet Take 1 tablet (2.5 mg) by mouth daily 90 tablet 2     aspirin 81 MG EC tablet Take 81 mg by mouth daily       atorvastatin (LIPITOR) 40 MG tablet Take 1 tablet (40 mg) by mouth daily 90 tablet 0     furosemide (LASIX) 20 MG tablet Take 1 tablet (20 mg) by mouth daily 90 tablet 0     gabapentin (NEURONTIN) 400 MG capsule Take 1 Capsule (400 mg) by mouth three times a day. Indications: Neuropathic Pain       losartan (COZAAR) 50 MG tablet Take 1 tablet (50 mg) by mouth daily 90 tablet 2     metoprolol succinate ER (TOPROL XL) 25 MG 24 hr tablet Take 1 tablet (25 mg) by mouth daily 90 tablet 3     nitroGLYcerin (NITROSTAT) 0.4 MG sublingual tablet For chest pain place 1 tablet under the tongue every 5 minutes for 3 doses. If symptoms persist 5 minutes after 1st dose call 911. 9 tablet 3     triamcinolone (KENALOG) 0.1 % external cream Apply topically to affected area 2 times daily. make appointment please 15  g 3       ALLERGIES     Allergies   Allergen Reactions     No Known Drug Allergy        PAST MEDICAL HISTORY:  Past Medical History:   Diagnosis Date     CAD (coronary artery disease) 2/5/2015    3 stents     History of angina      History of skin graft     Right lower limb     Hyperlipidaemia      Hypertension goal BP (blood pressure) < 140/90 2/5/2015     Kidney stone 2/17/2021     Sleep apnea        PAST SURGICAL HISTORY:  Past Surgical History:   Procedure Laterality Date     COLONOSCOPY N/A 07/16/2020    Procedure: COLONOSCOPY;  Surgeon: Wenceslao Marie MD;  Location:  GI     COMBINED CYSTOSCOPY, RETROGRADES, URETEROSCOPY, INSERT STENT Left 02/19/2021    Procedure: CYSTOSCOPY LEFT URETEROSCOPY,  LEFT STENT PLACEMENT, LEFT RETROGRADE;  Surgeon: Phil Lin MD;  Location:  OR     CYSTOSCOPY, DILATE URETER(S), COMBINED Left 02/19/2021    Procedure: Cystoscopy, dilate ureter(s), combined;  Surgeon: Phil Lin MD;  Location:  OR     HEART CATH, ANGIOPLASTY  11/2007    mid to prox LAD drug-eluting stent x2; Occluded RCA with Collaterals     KIDNEY STONE SURGERY  2003    laser and ureteric stenting     NEPHRECTOMY RT/LT Left 04/2021    [facility]     SKIN GRAFT, EACH ADDN 100SQCM         FAMILY HISTORY:  Family History   Problem Relation Age of Onset     Hypertension Mother      Pneumonia Mother 91        covid related     Dementia Mother      Alzheimer Disease Father         d age 91     Blood Disease Maternal Grandmother         leukemia     Cancer Maternal Grandfather      Cancer Maternal Uncle         lung       SOCIAL HISTORY:  Social History     Socioeconomic History     Marital status:      Spouse name: None     Number of children: None     Years of education: None     Highest education level: None   Tobacco Use     Smoking status: Former     Packs/day: 1.50     Years: 44.00     Pack years: 66.00     Types: Cigarettes     Start date: 6/27/1963     Quit date: 6/27/2007      "Years since quittin.0     Smokeless tobacco: Never   Vaping Use     Vaping Use: Never used   Substance and Sexual Activity     Alcohol use: Yes     Comment: beer - 12 pack month in summer     Drug use: No   Other Topics Concern     Caffeine Concern Yes     Comment: 1 pop daily      Special Diet Yes     Comment: fruits      Exercise Yes     Comment: walking 1 hr daily      Parent/sibling w/ CABG, MI or angioplasty before 65F 55M? No       Review of Systems:  Skin:          Eyes:         ENT:         Respiratory:          Cardiovascular:         Gastroenterology:        Genitourinary:         Musculoskeletal:         Neurologic:         Psychiatric:         Heme/Lymph/Imm:         Endocrine:           Physical Exam:  Vitals: /58   Pulse (!) 48   Ht 1.778 m (5' 10\")   Wt 105.8 kg (233 lb 3.2 oz)   SpO2 95%   BMI 33.46 kg/m      Constitutional:  cooperative, alert and oriented, well developed, well nourished, in no acute distress overweight      Skin:  warm and dry to the touch, no apparent skin lesions or masses noted          Head:  normocephalic, no masses or lesions        Eyes:  pupils equal and round, conjunctivae and lids unremarkable, sclera white, no xanthalasma, EOMS intact, no nystagmus        Lymph:      ENT:  no pallor or cyanosis, dentition good        Neck:  carotid pulses are full and equal bilaterally, JVP normal, no carotid bruit        Respiratory:  normal breath sounds, clear to auscultation, normal A-P diameter, normal symmetry, normal respiratory excursion, no use of accessory muscles         Cardiac: regular rhythm;normal S1 and S2;apical impulse not displaced;no murmurs, gallops or rubs detected     no presence of murmur          pulses full and equal, no bruits auscultated;pulses full and equal                                        GI:    obese      Extremities and Muscular Skeletal:  no deformities, clubbing, cyanosis, erythema observed;no edema erythema;stasis pigmentation " bilateral LE edema;trace          Neurological:  no gross motor deficits;affect appropriate        Psych:  Alert and Oriented x 3        CC  Christiano Toro MD  [address]  [place] [id number]

## 2023-07-14 NOTE — PROGRESS NOTES
Service Date: 07/14/2023    CARDIOLOGY FOLLOWUP VISIT     REFERRING PHYSICIAN:  Vernell Bucio MD     HISTORY OF PRESENT ILLNESS:  It is my pleasure to see your patient Fermín Josue in followup.  As you know, this is a patient who in the past had stenting of the left anterior descending artery in 2007 and has a known chronically occluded right coronary artery with collateralization.  If you remember, when I last saw him about 2 months ago, he had a severe injury to his spine with T9-T11 fractures and also fractures from rib 8-11, and he had extensive surgery on his back.  They stopped the beta-blocker medication because he was becoming somewhat bradycardic, and since that time, he has noticed exertional chest discomfort.  He was even getting some episodes at rest.  The discomfort was atypical.  We started the patient back on metoprolol 25 mg, which was half the dose that he was on previously, and because of the bradycardia, we had the patient wear an event monitor, which was a [employer] patch monitor.  That monitor was present for 7 days.  He did hit the trigger button twice, but he was in sinus rhythm with rates between 52-70 beats per minute at that time.  Sometimes his heart rates can get as low as 41 beats per minute, and these are sinus bradycardia.  He can reach maximum heart rates of 102 beats per minute.  No high-grade conduction system disease was noted.  This morning, his heart rate is on the lower side, but he took his metoprolol about maybe 2 hours ago, so we are getting peak dose effect from the metoprolol medication at this time.  His heart rate was 45 beats per minute, sinus bradycardia with a 1st degree AV block.  However, he does not feel dizzy or lightheaded.  The important thing is that his symptoms have resolved entirely.  He has no chest pains or chest pressure.  He did have a nuclear stress test performed as well because of the chest discomfort, and the nuclear stress test did show that he has a  medium-sized area of a transmural infarct in the inferior and inferolateral walls, and that is consistent with his occluded right coronary artery, and there is only a mild degree of periinfarct ischemia.  It is a small area of nontransmural infarction in the mid to distal anteroseptum.  There is mild ischemia present, so this would be regarded as being a low-risk nuclear stress test.  His blood pressure is well controlled today at 136/58.  His last lipid profile at the end of April showed an LDL of 66, HDL of 48 and mild hypertriglyceridemia of 203, and it would have been expected at that time that his triglycerides would go up, because he was completely immobilized after his back surgery.  His ALT is 14, indicating no hepatic toxicity.  Previously in February, his renal function was off with a creatinine of 1.57 and a BUN of 28.7.  He has one kidney, as you know.  However, at the end of April, his renal function had significantly improved, and his creatinine had dropped down to 1.26 with a normal GFR at 60, and BUN had dropped to 17.5.    IMPRESSION:    1.  Coronary artery disease and angina pectoris.  The symptoms have been eliminated with the reintroduction of metoprolol succinate at low dose.  Nuclear stress test as described above shows infarction of the inferior wall, which is consistent with his prior occlusion of his right coronary artery, and only a small amount of ischemia in the anterior septum.  2.  Bradycardia.  This is due to the beta-blocker medication, but the beta blocker has eliminated the symptoms of angina pectoris, and the event monitor does not show any evidence of high-grade conduction system disease.  He does tend to have a lower heart rate early in the morning time when he takes the beta-blocker medication.  He is also asymptomatic with the bradycardia with no dizziness or lightheadedness.  3.  Lipid profile was good in April, but his triglycerides were raised because of immobility, most  likely.    4.  Essential hypertension.  He is normotensive.    PLAN:  I will continue the patient on his present medications, but I have warned him about the symptoms of inappropriate bradycardia, such as dizziness, lightheadedness, syncope or near syncope.  If those symptoms should start to occur, he is to immediately contact us.  I will see the patient back again in 1 year's time, but he is to call us if he has any questions or any concerns.    Christiano Gifford MD     cc:  [place] [address]    Christ[facility] Gifford MD, [facility]        D: 2023   T: 2023   MT: yasmany    Name:     XU BURCH  MRN:      [id number]        Account:      [id number]   :      1950           Service Date: 2023       Document: [id number]

## 2024-01-23 ENCOUNTER — TELEPHONE (OUTPATIENT)
Dept: CARDIOLOGY | Facility: CLINIC | Age: 74
End: 2024-01-23
Payer: COMMERCIAL

## 2024-01-23 DIAGNOSIS — I10 ESSENTIAL HYPERTENSION: ICD-10-CM

## 2024-01-23 RX ORDER — AMLODIPINE BESYLATE 2.5 MG/1
2.5 TABLET ORAL DAILY
Qty: 90 TABLET | Refills: 1 | Status: SHIPPED | OUTPATIENT
Start: 2024-01-23 | End: 2024-04-12

## 2024-01-23 NOTE — TELEPHONE ENCOUNTER
Health Call Center    Phone Message    May a detailed message be left on voicemail: yes     Reason for Call: Medication Refill Request    Has the patient contacted the pharmacy for the refill? Yes   Name of medication being requested: amLODIPine (NORVASC) 2.5 MG tablet   Provider who prescribed the medication: Dr Toro  Pharmacy:      [id number] [place], [place] - [address]      Date medication is needed: ASAP pt will be out of medication tomorrow       Action Taken: Other: Cardiology    Travel Screening: Not Applicable    Thank you!  Specialty Access Center

## 2024-03-20 ENCOUNTER — TELEPHONE (OUTPATIENT)
Dept: CARDIOLOGY | Facility: CLINIC | Age: 74
End: 2024-03-20
Payer: COMMERCIAL

## 2024-03-20 DIAGNOSIS — R60.0 PERIPHERAL EDEMA: ICD-10-CM

## 2024-03-20 RX ORDER — FUROSEMIDE 20 MG
20 TABLET ORAL DAILY
Qty: 90 TABLET | Refills: 1 | Status: SHIPPED | OUTPATIENT
Start: 2024-03-20 | End: 2024-07-26

## 2024-03-20 NOTE — TELEPHONE ENCOUNTER
Health Call Center    Phone Message    May a detailed message be left on voicemail: yes     Reason for Call: Medication Refill Request    Has the patient contacted the pharmacy for the refill? Yes   Name of medication being requested:   furosemide (LASIX) 20 MG tablet    Provider who prescribed the medication: Christiano Toro MD   Pharmacy:   [id number] [place], [place] - [address]     Date medication is needed: asap     Action Taken: Other: cardio    Travel Screening: Not Applicable    Thank you!  Specialty Access Center

## 2024-04-12 DIAGNOSIS — I10 ESSENTIAL HYPERTENSION: ICD-10-CM

## 2024-04-12 DIAGNOSIS — I10 HYPERTENSION GOAL BP (BLOOD PRESSURE) < 140/90: ICD-10-CM

## 2024-04-12 DIAGNOSIS — I25.10 CORONARY ARTERY DISEASE INVOLVING NATIVE CORONARY ARTERY OF NATIVE HEART WITHOUT ANGINA PECTORIS: ICD-10-CM

## 2024-04-12 RX ORDER — METOPROLOL SUCCINATE 25 MG/1
25 TABLET, EXTENDED RELEASE ORAL DAILY
Qty: 90 TABLET | Refills: 1 | Status: SHIPPED | OUTPATIENT
Start: 2024-04-12 | End: 2024-07-26

## 2024-04-12 RX ORDER — LOSARTAN POTASSIUM 50 MG/1
50 TABLET ORAL DAILY
Qty: 90 TABLET | Refills: 1 | Status: SHIPPED | OUTPATIENT
Start: 2024-04-12 | End: 2024-07-26

## 2024-04-12 RX ORDER — AMLODIPINE BESYLATE 2.5 MG/1
2.5 TABLET ORAL DAILY
Qty: 90 TABLET | Refills: 0 | Status: SHIPPED | OUTPATIENT
Start: 2024-04-12 | End: 2024-07-23

## 2024-04-12 NOTE — TELEPHONE ENCOUNTER
[facility] Cardiology Refill Guideline reviewed.  Medication meets criteria for refill. Patient should already have refills on file for lasix.

## 2024-04-29 ENCOUNTER — TELEPHONE (OUTPATIENT)
Dept: CARDIOLOGY | Facility: CLINIC | Age: 74
End: 2024-04-29
Payer: COMMERCIAL

## 2024-04-29 ENCOUNTER — NURSE TRIAGE (OUTPATIENT)
Dept: FAMILY MEDICINE | Facility: CLINIC | Age: 74
End: 2024-04-29
Payer: COMMERCIAL

## 2024-04-29 NOTE — TELEPHONE ENCOUNTER
[facility] Call Center    Phone Message    May a detailed message be left on voicemail: yes     Reason for Call: Other: patient is calling asking for some muscle relaxers as his angina is pretty bad as of now due to stress, please advise,thank you.     Action Taken: Other: cardiology    Travel Screening: Not Applicable  ,Thank you!  Specialty Access Center

## 2024-04-29 NOTE — TELEPHONE ENCOUNTER
New Medication Request    Contacts         Type Contact Phone/Fax    04/29/2024 09:45 AM CDT Phone (Incoming) Dom Josue (Self) [phone] (H)            What medication are you requesting?: muscle relaxer, but not flexar (doesn't work for pt)    Reason for medication request: had back surgery over a year ago, but at last appt they told him he has arthritis in his back. Having a hard time falling asleep and getting up in the morning because of pain in back. Muscle relaxer's seem to help. Contacted back surgeon and they told pt to call PCP to get a prescription    Have you taken this medication before?: Yes: after surgery    Controlled Substance Agreement on file:   CSA -- Patient Level:    CSA: None found at the patient level.         Patient offered an appointment? No    Preferred Pharmacy:       [id number] [place], [place] - [address]  [address] [place] [address]  Phone: [phone] Fax: [phone]        Okay to leave a detailed message?: Yes at Cell number on file:    Telephone Information:   Mobile [phone]

## 2024-04-30 ENCOUNTER — VIRTUAL VISIT (OUTPATIENT)
Dept: FAMILY MEDICINE | Facility: CLINIC | Age: 74
End: 2024-04-30
Payer: COMMERCIAL

## 2024-04-30 DIAGNOSIS — M62.830 BACK MUSCLE SPASM: Primary | ICD-10-CM

## 2024-04-30 PROBLEM — C64.9 MALIGNANT TUMOR OF KIDNEY (H): Status: ACTIVE | Noted: 2021-05-06

## 2024-04-30 PROCEDURE — 99441 PR PHYSICIAN TELEPHONE EVALUATION 5-10 MIN: CPT | Mod: 93 | Performed by: FAMILY MEDICINE

## 2024-04-30 RX ORDER — CYCLOBENZAPRINE HCL 10 MG
10 TABLET ORAL 3 TIMES DAILY PRN
Qty: 60 TABLET | Refills: 1 | Status: SHIPPED | OUTPATIENT
Start: 2024-04-30 | End: 2024-06-13

## 2024-04-30 ASSESSMENT — PATIENT HEALTH QUESTIONNAIRE - PHQ9
SUM OF ALL RESPONSES TO PHQ QUESTIONS 1-9: 10
SUM OF ALL RESPONSES TO PHQ QUESTIONS 1-9: 10
10. IF YOU CHECKED OFF ANY PROBLEMS, HOW DIFFICULT HAVE THESE PROBLEMS MADE IT FOR YOU TO DO YOUR WORK, TAKE CARE OF THINGS AT HOME, OR GET ALONG WITH OTHER PEOPLE: VERY DIFFICULT

## 2024-04-30 ASSESSMENT — ENCOUNTER SYMPTOMS: BACK PAIN: 1

## 2024-04-30 NOTE — TELEPHONE ENCOUNTER
Patient calling to follow up on medication request below.     Hx of back surgery at [facility] about 1 year ago, unable to provide details  Was using muscle relaxants (unsure of name/dose) but no longer has refills--requesting this from PCP  Contacted surgeon but they deferred him to PCP  He has not discussed this with PCP in the past  Currently using gabapentin but this is only minimally helpful  Pain is constant in lower back   Rated 9/10 and interfering with daily activities and sleep  Denies bowel/bladder incontinence or paresthesia/weakness to lower extremities  Denies trauma    Recommended same-day evaluation--patient unwilling to be seen in clinic as he is having difficulty moving around  He was agreeable to VV--scheduled with PCP today.     Future Appointments 4/30/2024 - 10/27/2024        Date Visit Type Length Department Provider     4/30/2024  9:00 AM OFFICE VISIT 30 min [facility] Bucio, Vernell Sullivan MD    Location Instructions:     [facility] is across from the [facility]. There is free parking on the surface lot on the south side of the building, with 2 additional parking levels below the surface lot.                   Mariaelena David RN BSN  [facility]    Reason for Disposition   SEVERE back pain (e.g., excruciating, unable to do any normal activities) and not improved after pain medicine and CARE ADVICE    Additional Information   Negative: Passed out (i.e., fainted, collapsed and was not responding)   Negative: Shock suspected (e.g., cold/pale/clammy skin, too weak to stand, low BP, rapid pulse)   Negative: Sounds like a life-threatening emergency to the triager   Negative: SEVERE back pain of sudden onset and age > 60 years   Negative: SEVERE abdominal pain (e.g., excruciating)   Negative: Abdominal pain and age > 60 years   Negative: Unable to urinate (or only a few drops) and bladder feels very full   Negative: Loss of bladder  or bowel control (urine or bowel incontinence; wetting self, leaking stool) of new-onset   Negative: Numbness (loss of sensation) in groin or rectal area   Negative: Pain radiates into groin, scrotum   Negative: Blood in urine (red, pink, or tea-colored)   Negative: Vomiting and pain over lower ribs of back (i.e., flank - kidney area)   Negative: Weakness of a leg or foot (e.g., unable to bear weight, dragging foot)   Negative: Patient sounds very sick or weak to the triager   Negative: Fever > 100.4 F (38.0 C) and flank pain   Negative: Pain or burning with passing urine (urination)    Protocols used: Back Pain-A-[place]

## 2024-04-30 NOTE — PROGRESS NOTES
Dom is a 74 year old who is being evaluated via a billable telephone visit.    What phone number would you like to be contacted at? [phone]   How would you like to obtain your AVS? Mail a copy  Originating Location (pt. Location): Home    Distant Location (provider location):  Off-site    Assessment & Plan     (M62.830) Back muscle spasm  (primary encounter diagnosis)  Comment: acute muscle spasm, use medication below as needed.  Plan: cyclobenzaprine (FLEXERIL) 10 MG tablet        Reminded to schedule in person Medicare Wellness visit with me when able.    Depression Screening Follow Up        4/30/2024     8:51 AM   PHQ   PHQ-9 Total Score 10   Q9: Thoughts of better off dead/self-harm past 2 weeks Not at all         Follow Up Actions Taken  Follow up recommended: with me next available        Subjective   Dom is a 74 year old, presenting for the following health issues:  Back Pain (Pt. Complain of back pain, had surgery over a year now. Stated that was advise to see pcp for symptoms. )      4/30/2024     8:52 AM   Additional Questions   Roomed by ROSEMARY Berger   Accompanied by self     Back Pain     History of Present Illness       Reason for visit:  Back pain    He eats 0-1 servings of fruits and vegetables daily.He consumes 2 sweetened beverage(s) daily.He exercises with enough effort to increase his heart rate 9 or less minutes per day.  He exercises with enough effort to increase his heart rate 3 or less days per week.   He is taking medications regularly.     Dom reports severe pain of his lower back that has gotten worse over the past few weeks, sometime so severe he can't get out of bed. Muscle relaxants have helped. See history below, last visit with neurosurgeon 9/19/23, was doing well at that time.    h/o fall 2/2023 resulting in multiple fractures including an unstable three column T10-11 DISH fracture, T9-11 and TP fractures of T9 and L1 on 2/11/23. He is now s/p T8-L1 minimally invasive  "fusion with cement augmentation on 2/13/23.     GFR Estimate   Date Value Ref Range Status   04/26/2023 60 (L) >60 mL/min/1.73m2 Final     Comment:     eGFR calculated using 2021 CKD-EPI equation.   02/12/2023 46 (L) >60 mL/min/1.73m2 Final     Comment:     eGFR calculated using 2021 CKD-EPI equation.   12/16/2022 60 (L) >60 mL/min/1.73m2 Final     Comment:     Effective December 21, 2021 eGFRcr in adults is calculated using the 2021 CKD-EPI creatinine equation which includes age and gender (Jayde et al., NEJM, DOI: 10.1056/HNZEjj4730336)   03/01/2021 76 >60 mL/min/[1.73_m2] Final     Comment:     Non  GFR Calc  Starting 12/18/2018, serum creatinine based estimated GFR (eGFR) will be   calculated using the Chronic Kidney Disease Epidemiology Collaboration   (CKD-EPI) equation.     02/18/2021 53 (L) >60 mL/min/[1.73_m2] Final     Comment:     Non  GFR Calc  Starting 12/18/2018, serum creatinine based estimated GFR (eGFR) will be   calculated using the Chronic Kidney Disease Epidemiology Collaboration   (CKD-EPI) equation.     02/17/2021 55 (L) >60 mL/min/[1.73_m2] Final     Comment:     Non  GFR Calc  Starting 12/18/2018, serum creatinine based estimated GFR (eGFR) will be   calculated using the Chronic Kidney Disease Epidemiology Collaboration   (CKD-EPI) equation.       GFR, ESTIMATED POCT   Date Value Ref Range Status   02/12/2023 42 (L) >60 mL/min/1.73m2 Final   09/19/2022 49 (L) >60 mL/min/1.73m2 Final           Review of Systems  Constitutional, HEENT, cardiovascular, pulmonary, GI, , musculoskeletal, neuro, skin, endocrine and psych systems are negative, except as otherwise noted.      Objective    Vitals - Patient Reported  Weight (Patient Reported): 102.1 kg (225 lb)  Height (Patient Reported): 172.7 cm (5' 8\")  BMI (Based on Pt Reported Ht/Wt): 34.21  Pain Score: Worst Pain (10)  Pain Loc: Low Back        Physical Exam   General: Alert and no distress " //Respiratory: No audible wheeze, cough, or shortness of breath // Psychiatric:  Appropriate affect, tone, and pace of words          Phone call duration: 12 minutes  Signed Electronically by: Vernell Bucio MD

## 2024-05-09 RX ORDER — GABAPENTIN 400 MG/1
CAPSULE ORAL
OUTPATIENT
Start: 2024-05-09

## 2024-05-28 ENCOUNTER — VIRTUAL VISIT (OUTPATIENT)
Dept: FAMILY MEDICINE | Facility: CLINIC | Age: 74
End: 2024-05-28
Payer: COMMERCIAL

## 2024-05-28 DIAGNOSIS — M62.830 BACK MUSCLE SPASM: Primary | ICD-10-CM

## 2024-05-28 DIAGNOSIS — N18.31 STAGE 3A CHRONIC KIDNEY DISEASE (H): ICD-10-CM

## 2024-05-28 PROBLEM — C64.9 MALIGNANT TUMOR OF KIDNEY (H): Status: RESOLVED | Noted: 2021-05-06 | Resolved: 2024-05-28

## 2024-05-28 PROCEDURE — 99442 PR PHYSICIAN TELEPHONE EVALUATION 11-20 MIN: CPT | Mod: 93 | Performed by: FAMILY MEDICINE

## 2024-05-28 RX ORDER — GABAPENTIN 400 MG/1
400 CAPSULE ORAL 3 TIMES DAILY
Qty: 90 CAPSULE | Refills: 3 | Status: SHIPPED | OUTPATIENT
Start: 2024-05-28 | End: 2024-09-24

## 2024-05-28 RX ORDER — ACETAMINOPHEN 500 MG
1000 TABLET ORAL 2 TIMES DAILY PRN
Qty: 90 TABLET | Refills: 3 | Status: SHIPPED | OUTPATIENT
Start: 2024-05-28

## 2024-05-28 NOTE — PROGRESS NOTES
Dom is a 74 year old who is being evaluated via a billable telephone visit.    What phone number would you like to be contacted at? [phone]   How would you like to obtain your AVS? Mail a copy  Originating Location (pt. Location): Home    Distant Location (provider location):  Off-site    Assessment & Plan       (M62.830) Back muscle spasm  Medication management complicated by  (N18.31) Stage 3a chronic kidney disease (H)  S/p left nephrectomy for renal cancer 2021    Plan: gabapentin (NEURONTIN) 400 MG capsule        Increase to tid, and discussed max dose of over the counter medication, recommended using scheduled acetaminophen      Subjective   Dom is a 74 year old, presenting for the following health issues:  Low back pain  He has significant low back pain after fall 5 days ago, right sided buttock pain that radiates up to mid back , aggravated by movement, especially getting up from bed or from seated. Right leg can feel as if it's going to sleep sometimes, but most of the pain is lower back.  Heat has helped a little.     Prior hx of back surgery, s/p screws/bface placed one year ago  NSGY 02/13/2023:  --T8-L1 minimally invasive posterolateral instrumentation with cement augmentation     Recheck Medication      5/28/2024     8:38 AM   Additional Questions   Roomed by Ana María ZAMORA     History of Present Illness       Reason for visit:  Med check and refill          Review of Systems  Constitutional, neuro, ENT, endocrine, pulmonary, cardiac, gastrointestinal, genitourinary, musculoskeletal, integument and psychiatric systems are negative, except as otherwise noted.      Objective    Vitals - Patient Reported  Pain Score: Worst Pain (10)  Pain Loc: Low Back        Physical Exam   General: Alert and no distress //Respiratory: No audible wheeze, cough, or shortness of breath // Psychiatric:  Appropriate affect, tone, and pace of words        Phone call duration: 18 minutes  Signed Electronically by: Vernell  Laurie Bucio MD

## 2024-06-01 ENCOUNTER — APPOINTMENT (OUTPATIENT)
Dept: CT IMAGING | Facility: CLINIC | Age: 74
End: 2024-06-01
Payer: COMMERCIAL

## 2024-06-01 ENCOUNTER — HOSPITAL ENCOUNTER (EMERGENCY)
Facility: CLINIC | Age: 74
Discharge: HOME | End: 2024-06-01
Attending: SOCIAL WORKER | Admitting: SOCIAL WORKER
Payer: COMMERCIAL

## 2024-06-01 VITALS
HEIGHT: 68 IN | OXYGEN SATURATION: 96 % | BODY MASS INDEX: 34.1 KG/M2 | WEIGHT: 225 LBS | DIASTOLIC BLOOD PRESSURE: 79 MMHG | TEMPERATURE: 97.8 F | RESPIRATION RATE: 18 BRPM | HEART RATE: 68 BPM | SYSTOLIC BLOOD PRESSURE: 132 MMHG

## 2024-06-01 DIAGNOSIS — E78.2 MIXED HYPERLIPIDEMIA: ICD-10-CM

## 2024-06-01 DIAGNOSIS — R73.03 PRE-DIABETES: ICD-10-CM

## 2024-06-01 DIAGNOSIS — S39.012A LOW BACK STRAIN, INITIAL ENCOUNTER: ICD-10-CM

## 2024-06-01 DIAGNOSIS — R91.1 PULMONARY NODULE: ICD-10-CM

## 2024-06-01 DIAGNOSIS — N39.0 UTI (URINARY TRACT INFECTION) WITH PYURIA: ICD-10-CM

## 2024-06-01 LAB
ACANTHOCYTES BLD QL SMEAR: NORMAL
ALBUMIN SERPL BCG-MCNC: 4.1 G/DL (ref 3.5–5.2)
ALBUMIN UR-MCNC: 20 MG/DL
ALP SERPL-CCNC: 133 U/L (ref 40–150)
ALT SERPL W P-5'-P-CCNC: 24 U/L (ref 0–70)
ANION GAP SERPL CALCULATED.3IONS-SCNC: 9 MMOL/L (ref 7–15)
APPEARANCE UR: ABNORMAL
AST SERPL W P-5'-P-CCNC: 37 U/L (ref 0–45)
AUER BODIES BLD QL SMEAR: NORMAL
BASO STIPL BLD QL SMEAR: NORMAL
BASOPHILS # BLD AUTO: 0 10E3/UL (ref 0–0.2)
BASOPHILS NFR BLD AUTO: 0 %
BILIRUB SERPL-MCNC: 1.4 MG/DL
BILIRUB UR QL STRIP: NEGATIVE
BITE CELLS BLD QL SMEAR: NORMAL
BLISTER CELLS BLD QL SMEAR: NORMAL
BUN SERPL-MCNC: 12.9 MG/DL (ref 8–23)
BURR CELLS BLD QL SMEAR: NORMAL
CALCIUM SERPL-MCNC: 9 MG/DL (ref 8.8–10.2)
CHLORIDE SERPL-SCNC: 99 MMOL/L (ref 98–107)
COLOR UR AUTO: ABNORMAL
CREAT SERPL-MCNC: 1.32 MG/DL (ref 0.67–1.17)
DACRYOCYTES BLD QL SMEAR: NORMAL
DEPRECATED HCO3 PLAS-SCNC: 27 MMOL/L (ref 22–29)
EGFRCR SERPLBLD CKD-EPI 2021: 56 ML/MIN/1.73M2
ELLIPTOCYTES BLD QL SMEAR: NORMAL
EOSINOPHIL # BLD AUTO: 0.1 10E3/UL (ref 0–0.7)
EOSINOPHIL NFR BLD AUTO: 1 %
ERYTHROCYTE [DISTWIDTH] IN BLOOD BY AUTOMATED COUNT: 13.2 % (ref 10–15)
FRAGMENTS BLD QL SMEAR: NORMAL
GLUCOSE SERPL-MCNC: 111 MG/DL (ref 70–99)
GLUCOSE UR STRIP-MCNC: NEGATIVE MG/DL
HCT VFR BLD AUTO: 43.7 % (ref 40–53)
HGB BLD-MCNC: 14.3 G/DL (ref 13.3–17.7)
HGB C CRYSTALS: NORMAL
HGB UR QL STRIP: NEGATIVE
HOWELL-JOLLY BOD BLD QL SMEAR: NORMAL
IMM GRANULOCYTES # BLD: 0 10E3/UL
IMM GRANULOCYTES NFR BLD: 0 %
KETONES UR STRIP-MCNC: NEGATIVE MG/DL
LEUKOCYTE ESTERASE UR QL STRIP: ABNORMAL
LYMPHOCYTES # BLD AUTO: 1.1 10E3/UL (ref 0.8–5.3)
LYMPHOCYTES NFR BLD AUTO: 16 %
MCH RBC QN AUTO: 31.5 PG (ref 26.5–33)
MCHC RBC AUTO-ENTMCNC: 32.7 G/DL (ref 31.5–36.5)
MCV RBC AUTO: 96 FL (ref 78–100)
MONOCYTES # BLD AUTO: 0.3 10E3/UL (ref 0–1.3)
MONOCYTES NFR BLD AUTO: 5 %
MUCOUS THREADS #/AREA URNS LPF: PRESENT /LPF
NEUTROPHILS # BLD AUTO: 5.4 10E3/UL (ref 1.6–8.3)
NEUTROPHILS NFR BLD AUTO: 78 %
NEUTS HYPERSEG BLD QL SMEAR: NORMAL
NITRATE UR QL: NEGATIVE
NRBC # BLD AUTO: 0 10E3/UL
NRBC BLD AUTO-RTO: 0 /100
PH UR STRIP: 7 [PH] (ref 5–7)
PLAT MORPH BLD: NORMAL
PLATELET # BLD AUTO: 160 10E3/UL (ref 150–450)
POLYCHROMASIA BLD QL SMEAR: NORMAL
POTASSIUM SERPL-SCNC: 5.5 MMOL/L (ref 3.4–5.3)
PROT SERPL-MCNC: 7.5 G/DL (ref 6.4–8.3)
RBC # BLD AUTO: 4.54 10E6/UL (ref 4.4–5.9)
RBC AGGLUT BLD QL: NORMAL
RBC MORPH BLD: NORMAL
RBC URINE: 0 /HPF
ROULEAUX BLD QL SMEAR: NORMAL
SICKLE CELLS BLD QL SMEAR: NORMAL
SMUDGE CELLS BLD QL SMEAR: NORMAL
SODIUM SERPL-SCNC: 135 MMOL/L (ref 135–145)
SP GR UR STRIP: 1.02 (ref 1–1.03)
SPHEROCYTES BLD QL SMEAR: NORMAL
SQUAMOUS EPITHELIAL: 1 /HPF
STOMATOCYTES BLD QL SMEAR: NORMAL
TARGETS BLD QL SMEAR: NORMAL
TOXIC GRANULES BLD QL SMEAR: NORMAL
UROBILINOGEN UR STRIP-MCNC: NORMAL MG/DL
VARIANT LYMPHS BLD QL SMEAR: NORMAL
WBC # BLD AUTO: 6.9 10E3/UL (ref 4–11)
WBC URINE: 32 /HPF

## 2024-06-01 PROCEDURE — 87086 URINE CULTURE/COLONY COUNT: CPT

## 2024-06-01 PROCEDURE — 250N000009 HC RX 250: Performed by: SOCIAL WORKER

## 2024-06-01 PROCEDURE — 250N000013 HC RX MED GY IP 250 OP 250 PS 637

## 2024-06-01 PROCEDURE — 80053 COMPREHEN METABOLIC PANEL: CPT

## 2024-06-01 PROCEDURE — 99285 EMERGENCY DEPT VISIT HI MDM: CPT | Mod: 25

## 2024-06-01 PROCEDURE — 81001 URINALYSIS AUTO W/SCOPE: CPT

## 2024-06-01 PROCEDURE — 36415 COLL VENOUS BLD VENIPUNCTURE: CPT

## 2024-06-01 PROCEDURE — 85025 COMPLETE CBC W/AUTO DIFF WBC: CPT

## 2024-06-01 PROCEDURE — 74177 CT ABD & PELVIS W/CONTRAST: CPT

## 2024-06-01 PROCEDURE — 80061 LIPID PANEL: CPT

## 2024-06-01 PROCEDURE — 72128 CT CHEST SPINE W/O DYE: CPT

## 2024-06-01 PROCEDURE — 72131 CT LUMBAR SPINE W/O DYE: CPT

## 2024-06-01 PROCEDURE — 250N000011 HC RX IP 250 OP 636: Performed by: SOCIAL WORKER

## 2024-06-01 PROCEDURE — 83036 HEMOGLOBIN GLYCOSYLATED A1C: CPT

## 2024-06-01 RX ORDER — LIDOCAINE 4 G/G
1 PATCH TOPICAL ONCE
Status: DISCONTINUED | OUTPATIENT
Start: 2024-06-01 | End: 2024-06-01 | Stop reason: HOSPADM

## 2024-06-01 RX ORDER — LIDOCAINE 4 G/G
1 PATCH TOPICAL EVERY 24 HOURS
Qty: 5 PATCH | Refills: 0 | Status: SHIPPED | OUTPATIENT
Start: 2024-06-01 | End: 2024-06-06

## 2024-06-01 RX ORDER — METHOCARBAMOL 500 MG/1
500 TABLET, FILM COATED ORAL ONCE
Status: COMPLETED | OUTPATIENT
Start: 2024-06-01 | End: 2024-06-01

## 2024-06-01 RX ORDER — IOPAMIDOL 755 MG/ML
113 INJECTION, SOLUTION INTRAVASCULAR ONCE
Status: COMPLETED | OUTPATIENT
Start: 2024-06-01 | End: 2024-06-01

## 2024-06-01 RX ORDER — METHOCARBAMOL 500 MG/1
500 TABLET, FILM COATED ORAL 4 TIMES DAILY PRN
Qty: 20 TABLET | Refills: 0 | Status: SHIPPED | OUTPATIENT
Start: 2024-06-01 | End: 2024-06-06

## 2024-06-01 RX ORDER — ACETAMINOPHEN 325 MG/1
975 TABLET ORAL ONCE
Status: COMPLETED | OUTPATIENT
Start: 2024-06-01 | End: 2024-06-01

## 2024-06-01 RX ORDER — DIAZEPAM 5 MG
5 TABLET ORAL ONCE
Status: COMPLETED | OUTPATIENT
Start: 2024-06-01 | End: 2024-06-01

## 2024-06-01 RX ORDER — CEFADROXIL 500 MG/1
500 CAPSULE ORAL 2 TIMES DAILY
Qty: 14 CAPSULE | Refills: 0 | Status: SHIPPED | OUTPATIENT
Start: 2024-06-01 | End: 2024-06-08

## 2024-06-01 RX ADMIN — METHOCARBAMOL 500 MG: 500 TABLET ORAL at 15:19

## 2024-06-01 RX ADMIN — SODIUM CHLORIDE 72 ML: 9 INJECTION, SOLUTION INTRAVENOUS at 14:24

## 2024-06-01 RX ADMIN — IOPAMIDOL 113 ML: 755 INJECTION, SOLUTION INTRAVENOUS at 14:23

## 2024-06-01 RX ADMIN — LIDOCAINE 1 PATCH: 4 PATCH TOPICAL at 12:45

## 2024-06-01 RX ADMIN — ACETAMINOPHEN 975 MG: 325 TABLET, FILM COATED ORAL at 15:19

## 2024-06-01 RX ADMIN — DIAZEPAM 5 MG: 5 TABLET ORAL at 16:32

## 2024-06-01 ASSESSMENT — COLUMBIA-SUICIDE SEVERITY RATING SCALE - C-SSRS
2. HAVE YOU ACTUALLY HAD ANY THOUGHTS OF KILLING YOURSELF IN THE PAST MONTH?: NO
1. IN THE PAST MONTH, HAVE YOU WISHED YOU WERE DEAD OR WISHED YOU COULD GO TO SLEEP AND NOT WAKE UP?: NO
6. HAVE YOU EVER DONE ANYTHING, STARTED TO DO ANYTHING, OR PREPARED TO DO ANYTHING TO END YOUR LIFE?: NO

## 2024-06-01 ASSESSMENT — ACTIVITIES OF DAILY LIVING (ADL)
ADLS_ACUITY_SCORE: 36

## 2024-06-01 NOTE — LETTER
June 4, 2024    Dom Josue  [address]  [facility] [address]    Dear ,    Thank you very much for getting lab work done!     Your lab test to check for diabetes, HgA1C, also called glycosylated hemoglobin, which measures the level of sugar in your blood over the past few months, is slightly higher than normal but less than 6.5. This is good news, it means you don't have diabetes right now!  However, it does mean that you're at risk for developing diabetes in the future.    To lower blood sugars,  reduce carbohydrates especially highly processed foods such as sugar in your diet by reducing portion size or avoiding sweets, juice, alcohol, white bread, crackers, white pasta, white rice, potatoes and cereal.     You can also reduce your blood sugars by increasing your activity level. Exercise for at least 30 min 5 times per week, and lift weights 2-3 times per week to build muscle mass and improve your metabolism.    If you have any questions, please contact the clinic or schedule an appointment with me, thank you!    Sincerely,  Dr. Vernell Bucio MD  6/4/2024   Resulted Orders   Hemoglobin A1c   Result Value Ref Range    Hemoglobin A1C 5.8 (H) <5.7 %      Comment:      Normal <5.7%   Prediabetes 5.7-6.4%    Diabetes 6.5% or higher     Note: Adopted from ADA consensus guidelines.

## 2024-06-01 NOTE — ED PROVIDER NOTES
"ED ATTENDING PHYSICIAN NOTE:   I evaluated this patient in conjunction with Leonard Amaya PA-C  I have participated in the care of the patient and personally performed key elements of the history, exam, and medical decision making.      HPI:   Fermín Josue is a 74 year old male with a history of hypertension, CKD, CAD, left nephrectomy, prior T9-T11 fx s/p fixation, L1 fracture in 2023, who reports to the ED for evaluation of back pain. Chief concern is injury to his remaining kidney. The patient reports tripping and falling, catching himself with his hands and hitting his face 3 days ago with no loss of consciousness. States since he as had right sided lower back/flank pain. Patients states pain increases with movement, and was unable to get out of bed today due to pain this morning. Shares relief from pain when lying down, but takes time to subside. Took muscle relaxer with no relief. Reports new dysuria this morning. No fever, nausea or vomiting.  Denies urinary or bowel incontinence, numbness in underwear region, nausea or vomiting. No blood in urine, new numbness/weakness of legs. Patients shares history of kidney stones and a stent. Reports he has intermittent chest pain not different from his previous angina. Of note, he is taking routinely his regular medications.        Independent Historian:   None    Review of External Notes:   I reviewed the visit from 5/2/24: \"Low back pain  He has significant low back pain after fall 5 days ago, right sided buttock pain that radiates up to mid back , aggravated by movement, especially getting up from bed or from seated. Right leg can feel as if it's going to sleep sometimes, but most of the pain is lower back.  Heat has helped a little. \"     EXAM:   General: Overall stable and nontoxic appearing, laying in bed; painful for patient to turn onto his side   HEENT: No obvious signs of trauma over the face where patient indicated he struck his head, no tenderness to " palpation over the orbits, head grossly; extraocular movements intact  Neuro: Alert, conversant; no facial droop, DF/PF intact and equal bilaterally; sensation to light touch intact grossly over blt LE   CV: Regular rate, regular rhythm, radial and DP pulses equal  Respiratory: No signs of respiratory distress, lungs clear to auscultation bilaterally   Abdomen: Soft, without rigidity or rebound throughout    No overt CVA tenderness  MSK: No large hematoma or ecchymoses over area of tenderness; no C spine tenderness, T spine tenderness; some tenderness over L spine; painful area is right lower flank just above the ischial spine. No tenderness to palpation over wrists, hands knees bilaterally and no obvious signs of trauma over these    Independent Interpretation (X-rays, CTs, rhythm strip):  None    Consultations/Discussion of Management or Tests:  None     Social Determinants of Health affecting care:   None     MEDICAL DECISION MAKING/ASSESSMENT AND PLAN:    74-year-old male with above medical history who presents to the emergency department with a chief complaint of right-sided lower back/flank pain and new dysuria.  Stable and nontoxic on examination.  Patient has been able to ambulate today, he just has difficulty transitioning from lying down to standing.  He denies any red flag symptoms for back pain and did not feel that MRI emergently was warranted at this time.  He did have urinalysis potentially concerning for urinary tract infection and with his dysuria, we elected to treat this.  No clinical signs of pyelonephritis. No abdominal aortic aneurysm seen on the CT scan.  Patient's pain improved with lidocaine patches and analgesia here in the emergency department.  Suspect that this is musculoskeletal in nature.  He also mentions chest pain intermittently however it is not different from his previous anginal pain, doubt ACS as etiology for his back pain given the pain is worsened by movement and relieved when  he is still.  Patient also found to have an incidental pulmonary nodule which was discussed with him.  Will prescribe him analgesics and counseled pulse outpatient follow-up.  Patient following with neurosurgery for his back and can schedule follow-up with that.  Strict return precautions were discussed with patient and his daughter.  Discharged in stable condition.     DIAGNOSIS:     ICD-10-CM    1. UTI (urinary tract infection) with pyuria  N39.0       2. Low back strain, initial encounter  S39.012A       3. Pulmonary nodule  R91.1                DISPOSITION:        Scribe Disclosure:  I, Morelia Casanova, am serving as a scribe at 1:42 PM on 6/1/2024 to document services personally performed by Hailey Huitron MD based on my observations and the provider's statements to me.   6/1/2024  [facility] EMERGENCY DEPT      Hailey Huitron MD  06/01/24 0638

## 2024-06-01 NOTE — DISCHARGE INSTRUCTIONS
Thank you for coming to [facility] emergency department.  You have a UTI, please take the antibiotic cefadroxil as prescribed until it is finished.  You also have a muscle strain in your low back, I have prescribed a muscle relaxer called Robaxin.  You should also take Tylenol 1000 mg 3 times per day for the next 5 days, do not take more than 4000 mg a day.  We also found a nodule in your right lung, as we discussed you should follow-up with your primary care doctor.  Please return to the emergency department if you experience fever or chills, worsening back pain, or for any other concerns.    Discharge Instructions  Urinary Tract Infection  You or your child have been diagnosed with a urinary tract infection, or UTI. The urinary tract includes the kidneys (which make urine/pee), ureters (the tubes that carry urine/pee from the kidneys to the bladder), the bladder (which stores urine/pee), and urethra (the tube that carries urine/pee out of the bladder). Urinary tract infections occur when bacteria travel up the urethra into the bladder (bladder infection) and, in some cases, from there into the kidneys (kidney infection).  Generally, every Emergency Department visit should have a follow-up clinic visit with either a primary or a specialty clinic/provider. Please follow-up as instructed by your emergency provider today.  Return to the Emergency Department if:  You or your child have severe back pain.  You or your child are vomiting (throwing up) so that you cannot take your medicine.  You or your child have a new fever (had not previously had a fever) over 101 F.  You or your child have confusion or are very weak, or feel very ill.  Your child seems much more ill, will not wake up, will not respond right, or is crying for a long time and will not calm down.  You or your child are showing signs of dehydration. These signs may include decreased urination (pee), dry mouth/gums/tongue, or decreased  activity.    Follow-up with your provider:   Children under 24 months need to be seen by their regular provider within one week after a diagnosis of a UTI. It may be necessary to do some more tests to look at the child s kidney or bladder.  You should begin to feel better within 24 - 48 hours of starting your antibiotic; follow-up with your regular clinic/doctor/provider if this is not the case.    Treatment:   You will be treated with an antibiotic to kill the bacteria. We have to make an educated guess, based on what we know about common bacteria and antibiotics, as to which antibiotic will work for your infection. We will be correct most times but there will be some cases where the antibiotic chosen is not correct (see urine cultures below).  Take a pain medication such as acetaminophen (Tylenol ) or ibuprofen (Advil , Motrin , Nuprin ).  Phenazopyridine (Pyridium , Uristat ) is a prescription medication that numbs the bladder to reduce the burning pain of some UTIs.  The same medication is available in a non-prescription version (Azo-Standard , Urodol ). This medication will change the color of the urine and tears (usually blue or orange). If you wear contacts, do not wear them while taking this medication as they may be stained by the medication.    Urine Cultures:  If indicated, a urine culture may have been performed today. This test generally takes 24-48 hours to complete so the results are not known at this time. The results can confirm that an infection is present but also determine which antibiotic is effective for the specific bacteria that is causing the infection. If your urine culture shows that the antibiotic you were given today will not work to treat your infection, we will attempt to contact you to make arrangements to change the antibiotic. If the culture confirms that the antibiotic is effective for your infection, you will not be contacted. We often recommend follow-up with your regular  "physician/provider on the culture results regardless of this process.    Antibiotic Warning:   If you have been placed on antibiotics - watch for signs of allergic reaction.  These include rash, lip swelling, difficulty breathing, wheezing, and dizziness.  If you develop any of these symptoms, stop the antibiotic immediately and go to an emergency room or urgent care for evaluation.    Probiotics: If you have been given an antibiotic, you may want to also take a probiotic pill or eat yogurt with live cultures. Probiotics have \"good bacteria\" to help your intestines stay healthy. Studies have shown that probiotics help prevent diarrhea and other intestine problems (including C. diff infection) when you take antibiotics. You can buy these without a prescription in the pharmacy section of the store.   If you were given a prescription for medicine here today, be sure to read all of the information (including the package insert) that comes with your prescription.  This will include important information about the medicine, its side effects, and any warnings that you need to know about.  The pharmacist who fills the prescription can provide more information and answer questions you may have about the medicine.  If you have questions or concerns that the pharmacist cannot address, please call or return to the Emergency Department.   Remember that you can always come back to the Emergency Department if you are not able to see your regular provider in the amount of time listed above, if you get any new symptoms, or if there is anything that worries you.     Discharge Instructions  Back Pain  You were seen today for back pain. Back pain can have many causes, but most will get better without surgery or other specific treatment. Sometimes there is a herniated ( slipped ) disc. We do not usually do MRI scans to look for these right away, since most herniated discs will get better on their own with time.  Today, we did not find " any evidence that your back pain was caused by a serious condition. However, sometimes symptoms develop over time and cannot be found during an emergency visit, so it is very important that you follow up with your primary provider.  Generally, every Emergency Department visit should have a follow-up clinic visit with either a primary or a specialty clinic/provider. Please follow-up as instructed by your emergency provider today.    Return to the Emergency Department if:  You develop a fever with your back pain.   You have weakness or change in sensation in one or both legs.  You lose control of your bowels or bladder, or cannot empty your bladder (cannot pee).  Your pain gets much worse.     Follow-up with your provider:  Unless your pain has completely gone away, please make an appointment with your provider within one week. Most of the routine care for back pain is available in a clinic and not the Emergency Department. You may need further management of your back pain, such as more pain medication, imaging such as an X-ray or MRI, or physical therapy.    What can I do to help myself?  Remain Active -- People are often afraid that they will hurt their back further or delay recovery by remaining active, but this is one of the best things you can do for your back. In fact, staying in bed for a long time to rest is not recommended. Studies have shown that people with low back pain recover faster when they remain active. Movement helps to bring blood flow to the muscles and relieve muscle spasms as well as preventing loss of muscle strength.  Heat -- Using a heating pad can help with low back pain during the first few weeks. Do not sleep with a heating pad, as you can be burned.   Pain medications - You may take a pain medication such as Tylenol  (acetaminophen), Advil , Motrin  (ibuprofen) or Aleve  (naproxen).  If you were given a prescription for medicine here today, be sure to read all of the information  (including the package insert) that comes with your prescription.  This will include important information about the medicine, its side effects, and any warnings that you need to know about.  The pharmacist who fills the prescription can provide more information and answer questions you may have about the medicine.  If you have questions or concerns that the pharmacist cannot address, please call or return to the Emergency Department.   Remember that you can always come back to the Emergency Department if you are not able to see your regular provider in the amount of time listed above, if you get any new symptoms, or if there is anything that worries you.

## 2024-06-01 NOTE — LETTER
June 11, 2024    Dom Josue  [address]  [facility] [id number]    Dear ,    Thank you very much for getting labs done!  Your lab test to check for diabetes, HgA1C, also called glycosylated hemoglobin, which measures the level of sugar in your blood over the past few months, is slightly higher than normal but less than 6.5. This is good news, it means you don't have diabetes right now!  However, it does mean that you're at risk for developing diabetes in the future.     To lower blood sugars,  reduce carbohydrates especially highly processed foods such as sugar in your diet by reducing portion size or avoiding sweets, juice, alcohol, white bread, crackers, white pasta, white rice, potatoes and cereal.     You can also reduce your blood sugars by increasing your activity level. Exercise for at least 30 min 5 times per week, and lift weights 2-3 times per week to build muscle mass and improve your metabolism. This will also reduce your triglycerides and improve your HDL cholesterol.    Thank you for getting your cholesterol checked! Your total cholesterol  Desired or goal levels are:  CHOLESTEROL: Desirable is less than 200.  HDL (Good Cholesterol): Desirable is greater than 40 in men and greater than 50 in women.  LDL (Bad Cholesterol): Desirable is less than 130  TRIGLYCERIDES: Desirable is less than 150.    As you may know, abnormal cholesterol is one factor that increases your risk for heart disease and stroke. You can improve your cholesterol by controlling the amount and type of fat you eat and by increasing your daily activity level.  Here are some ways to improve your nutrition (adapted from the [employer] handout):  Eat less fat (especially butter, Crisco and other saturated fats)  Buy lean cuts of meat; reduce your portions of red meat or substitute poultry or fish, or avoid meat altogether.  Use skim milk and low-fat dairy products  Eat no more than 4 egg  yolks per week  Avoid fried or fast foods that are high in fat  Eat more fruits and vegetables, trying to make your plate of food at least half non-starchy vegetables.    If you have any questions, please contact the clinic or schedule an appointment with me, thank you!    Sincerely,  Dr. Vernell Bucio MD  6/11/2024      Resulted Orders   Hemoglobin A1c   Result Value Ref Range    Hemoglobin A1C 5.8 (H) <5.7 %      Comment:      Normal <5.7%   Prediabetes 5.7-6.4%    Diabetes 6.5% or higher     Note: Adopted from ADA consensus guidelines.   Lipid panel reflex to direct LDL Fasting   Result Value Ref Range    Cholesterol 123 <200 mg/dL    Triglycerides 161 (H) <150 mg/dL    Direct Measure HDL 33 (L) >=40 mg/dL    LDL Cholesterol Calculated 58 <=100 mg/dL    Non HDL Cholesterol 90 <130 mg/dL    Narrative    Cholesterol  Desirable:  <200 mg/dL    Triglycerides  Normal:  Less than 150 mg/dL  Borderline High:  150-199 mg/dL  High:  200-499 mg/dL  Very High:  Greater than or equal to 500 mg/dL    Direct Measure HDL  Female:  Greater than or equal to 50 mg/dL   Male:  Greater than or equal to 40 mg/dL    LDL Cholesterol  Desirable:  <100mg/dL  Above Desirable:  100-129 mg/dL   Borderline High:  130-159 mg/dL   High:  160-189 mg/dL   Very High:  >= 190 mg/dL    Non HDL Cholesterol  Desirable:  130 mg/dL  Above Desirable:  130-159 mg/dL  Borderline High:  160-189 mg/dL  High:  190-219 mg/dL  Very High:  Greater than or equal to 220 mg/dL

## 2024-06-01 NOTE — ED TRIAGE NOTES
Pt reports mechanical fall forward onto knees and hit head 3 days ago while ambulating outside- landed on dirt. Pt has tried muscle relaxers and gabapentin without relief. Pt denies LOC or dizziness- pt has history of back surgery with hardware and sciatic. Pt states pain is all on right side, starts above him and goes into shoulder, sciatic pain worse since fall, pt also reports intermittent chest pain since fall     Triage Assessment (Adult)       Row Name 06/01/24 1028          Triage Assessment    Airway WDL WDL        Respiratory WDL    Rhythm/Pattern, Respiratory unlabored;pattern regular;depth regular        Cardiac WDL    Cardiac WDL --  intermittent chest pain        [place] Coma Scale    Best Eye Response 4-->(E4) spontaneous     Best Motor Response 6-->(M6) obeys commands     Best Verbal Response 5-->(V5) oriented     [place] Coma Scale Score 15

## 2024-06-01 NOTE — ED NOTES
Pt reports severe lower back pain when ambulating, uses a cane and transfers from wheelchair to bed very slowly, needs help lifting his legs into the bed. He reports 6/10 back pain while lying in bed, and says the pain slowly improves while lying down and not moving. Daughter supportive at bedside. Call light in reach.

## 2024-06-01 NOTE — ED PROVIDER NOTES
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  Patient states he fell 3 days ago which preceded the onset of the pain.  States he tripped and fell while walking outside, fell onto dirt.  He describes the fall as falling forward onto his knees and then onto his hands and eventually onto his face.  He denies loss of consciousness, he was able to stand up and walk immediately after the fall.  He has been complaining of pain on the right side of his low back since the fall.  He has been seen by virtual providers which recommended scheduled acetaminophen and prescribed cyclobenzaprine and increased dose for his gabapentin.  Patient states that none of these measures have helped his pain.  Patient states he is also been having burning with urination and hesitation for the last 1 day.  He denies paresthesias, headache and dizziness, fever and chills, chest pain and shortness of breath, abdominal pain, nausea, vomiting, and diarrhea.    Independent Historian  None    Review of External Notes  5/28/2024 reviewed virtual visit note for back muscle spasms following a fall, Neurontin was increased to 400 mg 3 times daily and recommended scheduled use of acetaminophen.  4/30/2024 reviewed virtual visit notes for back muscle spasms, was prescribed cyclobenzaprine  3/31/2023 reviewed neurosurgery office visit note status post closed unstable burst fracture of 10 thoracic vertebra, fusion of spine  2/12/2023 hospital admission note for fall with closed fracture of 10th vertebra  Past Medical History   Medical History and Problem List  Past Medical History:   Diagnosis Date    CAD (coronary artery disease) 2/5/2015    History of angina     History of skin graft   "   Hyperlipidaemia     Hypertension goal BP (blood pressure) < 140/90 2/5/2015    Kidney stone 2/17/2021    Sleep apnea        Medications  cefadroxil (DURICEF) 500 MG capsule  Lidocaine (LIDOCARE) 4 % Patch  methocarbamol (ROBAXIN) 500 MG tablet  acetaminophen (TYLENOL) 500 MG tablet  amLODIPine (NORVASC) 2.5 MG tablet  aspirin 81 MG EC tablet  atorvastatin (LIPITOR) 40 MG tablet  cyclobenzaprine (FLEXERIL) 10 MG tablet  furosemide (LASIX) 20 MG tablet  gabapentin (NEURONTIN) 400 MG capsule  losartan (COZAAR) 50 MG tablet  metoprolol succinate ER (TOPROL XL) 25 MG 24 hr tablet  nitroGLYcerin (NITROSTAT) 0.4 MG sublingual tablet  triamcinolone (KENALOG) 0.1 % external cream        Surgical History   Past Surgical History:   Procedure Laterality Date    COLONOSCOPY N/A 07/16/2020    Procedure: COLONOSCOPY;  Surgeon: Wenceslao Marie MD;  Location:  GI    COMBINED CYSTOSCOPY, RETROGRADES, URETEROSCOPY, INSERT STENT Left 02/19/2021    Procedure: CYSTOSCOPY LEFT URETEROSCOPY,  LEFT STENT PLACEMENT, LEFT RETROGRADE;  Surgeon: Phil Lin MD;  Location:  OR    CYSTOSCOPY, DILATE URETER(S), COMBINED Left 02/19/2021    Procedure: Cystoscopy, dilate ureter(s), combined;  Surgeon: Phil Lin MD;  Location:  OR    HEART CATH, ANGIOPLASTY  11/2007    mid to prox LAD drug-eluting stent x2; Occluded RCA with Collaterals    KIDNEY STONE SURGERY  2003    laser and ureteric stenting    LUMBAR SPINE SURGERY N/A 02/13/2023    T8-L1 minimally invasive posterolateral instrumentation with cement augmentation    NEPHRECTOMY RT/LT Left 04/2021    [facility]    SKIN GRAFT, EACH ADDN 100SQCM       Physical Exam   Patient Vitals for the past 24 hrs:   BP Temp Pulse Resp SpO2 Height Weight   06/01/24 1519 132/79 -- 68 18 -- -- --   06/01/24 1518 -- -- -- -- 96 % -- --   06/01/24 1117 122/84 -- 71 20 95 % -- --   06/01/24 1026 126/77 97.8  F (36.6  C) 74 20 97 % 1.727 m (5' 8\") 102.1 kg (225 lb)     Physical " Exam  Physical Exam:  GENERAL: Warm, dry, alert, no increased work of breathing, appears mildly uncomfortable while laying back on gurney, not toxic appearing.  HEENT: PERRL, no scleral icterus, clear conjunctiva, oropharynx clear  NECK: No JVD, supple without lymphadenopathy.  No stiffness or restricted range of motion  HEART: Regular rate and rhythm, no murmur or rubs  LUNGS: CTAB, moving air well.  No crackles or wheezes are heard.  ABD: Soft, nontender, nondistended, no guarding, with good bowel sounds heard.  BACK: No CVAT, no obvious deformities, no rashes or lesions, no tenderness to palpation bilateral lumbar areas.  EXTREMITIES: Moves all extremities without difficulty, no calf tenderness or peripheral edema, bilateral.  5/5 strength to the DF, PF, EHL and FHL motor functions; sensation intact to the DP, SP, T, S and S distributions.  2+ DP and PT pulses bilaterally.  SKIN: Warm and dry without rash or lesions.  NEUROLOGICAL: No focal deficits.  CN II-XII intact.  PSYCH: Appropriate mood and affect.       Diagnostics   Lab Results   Labs Ordered and Resulted from Time of ED Arrival to Time of ED Departure   ROUTINE UA WITH MICROSCOPIC REFLEX TO CULTURE - Abnormal       Result Value    Color Urine Light Yellow      Appearance Urine Slightly Cloudy (*)     Glucose Urine Negative      Bilirubin Urine Negative      Ketones Urine Negative      Specific Gravity Urine 1.019      Blood Urine Negative      pH Urine 7.0      Protein Albumin Urine 20 (*)     Urobilinogen Urine Normal      Nitrite Urine Negative      Leukocyte Esterase Urine Moderate (*)     Mucus Urine Present (*)     RBC Urine 0      WBC Urine 32 (*)     Squamous Epithelials Urine 1     COMPREHENSIVE METABOLIC PANEL - Abnormal    Sodium 135      Potassium 5.5 (*)     Carbon Dioxide (CO2) 27      Anion Gap 9      Urea Nitrogen 12.9      Creatinine 1.32 (*)     GFR Estimate 56 (*)     Calcium 9.0      Chloride 99      Glucose 111 (*)     Alkaline  Phosphatase 133      AST 37      ALT 24      Protein Total 7.5      Albumin 4.1      Bilirubin Total 1.4 (*)    CBC WITH PLATELETS AND DIFFERENTIAL    WBC Count 6.9      RBC Count 4.54      Hemoglobin 14.3      Hematocrit 43.7      MCV 96      MCH 31.5      MCHC 32.7      RDW 13.2      Platelet Count 160      % Neutrophils 78      % Lymphocytes 16      % Monocytes 5      % Eosinophils 1      % Basophils 0      % Immature Granulocytes 0      NRBCs per 100 WBC 0      Absolute Neutrophils 5.4      Absolute Lymphocytes 1.1      Absolute Monocytes 0.3      Absolute Eosinophils 0.1      Absolute Basophils 0.0      Absolute Immature Granulocytes 0.0      Absolute NRBCs 0.0     RBC AND PLATELET MORPHOLOGY    Platelet Assessment        Value: Automated Count Confirmed. Platelet morphology is normal.    Acanthocytes        Amilcar Rods        Basophilic Stippling        Bite Cells        Blister Cells        Kalin Cells        Elliptocytes        Hgb C Crystals        Mtz-Jolly Bodies        Hypersegmented Neutrophils        Polychromasia        RBC agglutination        RBC Fragments        Reactive Lymphocytes        Rouleaux        Sickle Cells        Smudge Cells        Spherocytes        Stomatocytes        Target Cells        Teardrop Cells        Toxic Neutrophils        RBC Morphology Confirmed RBC Indices     URINE CULTURE       Imaging  CT Abdomen Pelvis w Contrast   Final Result   IMPRESSION:    1.  No acute process demonstrated.      CT Lumbar Spine w/o Contrast   Final Result   IMPRESSION:   THORACIC SPINE CT:   1.  No fracture or posttraumatic subluxation.   2.  No high-grade spinal canal or neural foraminal stenosis.   3.  There is 1.1 groundglass nodule at the right lung apex, increased in reported size from 2023. Consider PET/CT or biopsy.      LUMBAR SPINE CT:   1.  No fracture or posttraumatic subluxation.   2.  Moderate to severe neural foraminal stenosis at L4-5 and L5-S1. No high-grade spinal canal  stenosis.         CT Thoracic Spine w/o Contrast   Final Result   IMPRESSION:   THORACIC SPINE CT:   1.  No fracture or posttraumatic subluxation.   2.  No high-grade spinal canal or neural foraminal stenosis.   3.  There is 1.1 groundglass nodule at the right lung apex, increased in reported size from 2023. Consider PET/CT or biopsy.      LUMBAR SPINE CT:   1.  No fracture or posttraumatic subluxation.   2.  Moderate to severe neural foraminal stenosis at L4-5 and L5-S1. No high-grade spinal canal stenosis.                 Independent Interpretation  None  ED Course    Medications Administered  Medications   Lidocaine (LIDOCARE) 4 % Patch 1 patch (1 patch Transdermal $Patch/Med Applied 6/1/24 1245)   diazepam (VALIUM) tablet 5 mg (has no administration in time range)   iopamidol (ISOVUE-370) solution 113 mL (113 mLs Intravenous $Given 6/1/24 1423)   Saline (72 mLs Intravenous $Given 6/1/24 1424)   methocarbamol (ROBAXIN) tablet 500 mg (500 mg Oral $Given 6/1/24 1519)   acetaminophen (TYLENOL) tablet 975 mg (975 mg Oral $Given 6/1/24 1519)       Procedures  Procedures     Discussion of Management  Staffed with Dr. Robin Aguillon    Social Determinants of Health adding to complexity of care  None    ED Course  ED Course as of 06/01/24 1621   Sat Jun 01, 2024   1145 I evaluated and examined the patient.   1330 I obtained history and examined the patient as noted above.    1500 I reevaluated the patient, lidocaine patch provided a small amount of pain relief however pain did return.   1540 I spoke with our clinical pharmacist regarding antibiotic choice for this acute cystitis given patient's history of nephrectomy and CKD, she recommends cefadroxil 500 mg twice daily for 7 days.   1541 I spoke with the radiologist, Dr. Burns, of [facility] regarding the CT abdomen pelvis for what looks like cysts in the right kidney.  He states these are subcentimeter cysts that are not consistent with malignancy.  Also states  there is no hydronephrosis.   1550 I reevaluated the patient, he reports no relief of back pain after receiving Tylenol and Robaxin.     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  Differential includes but is not limited to acute cystitis, pyelonephritis, ureterolithiasis, musculoskeletal injury, and malignancy among many others.  Vital signs were reassuring at presentation, he was afebrile, normotensive, and not hypoxic.  Physical exam was significant for tenderness to palpation over the right flank and right low back although patient did have difficulty localizing the pain.    Although pain did occur after his fall, workup was broad given patient's history of left nephrectomy due to malignancy and prior back surgery with instrumentation..  There is no leukocytosis nor anemia patient's creatinine is elevated and GFR decreased however these are consistent with previous findings.  There was a mild hyperkalemia however the lab sample was hemolyzed and most likely not accurate otherwise there were no other metabolic derangements.  Lidocaine patch was placed on the patient's back which she states did help relieve his pain.  Also obtained a CT abdomen pelvis to assess for renal malignancy or ureterolithiasis and CT of thoracic and lumbar spine due to previous surgeries.  CTs of his spine were unremarkable for fractures, subluxations, or other lesions.  There was an incidental finding on the CT of his thoracic spine for a pulmonary nodule at the apex of the right lung.  CT of the abdomen pelvis was unremarkable for acute findings.  Patient's clinical presentation today is consistent with both an acute cystitis and a muscular strain of the low back.  Pain was difficult to control in the  ED, he initially responded well to to a lidocaine patch however pain soon returned.  Pain was once again treated with Robaxin and Tylenol with minimal relief.  Patient stated he he responds well to Valium for muscular and sciatic pain.  He was given 1 dose of Valium in the emergency department to which he responded well with pain nearly resolved.    I discussed all findings with the patient including the acute cystitis, the incidental finding of the pulmonary nodule, and his CT results of his spine.  I recommended a course of antibiotics for the acute cystitis as well as follow-up with his urology clinic, [facility], and follow-up with his PCP regarding the pulmonary nodule.  I also advised him to talk to his PCP about a redraw for his labs to reassess his potassium level.  I also recommended treatment for his back pain of Tylenol, Robaxin, and ice.  He requested Valium however I advised him long-term use of benzodiazepines are not indicated for this type of back pain.  I also advised him to discontinue the use of Flexeril while he is taking the Robaxin.  He states he understands and agrees with all the plans as described above.  The patient was discharged in stable condition in the care of his daughter.  All nurses notes and vital signs were reviewed.    Disposition  The patient was discharged.     ICD-10 Codes:    ICD-10-CM    1. UTI (urinary tract infection) with pyuria  N39.0       2. Low back strain, initial encounter  S39.012A       3. Pulmonary nodule  R91.1            Discharge Medications  New Prescriptions    CEFADROXIL (DURICEF) 500 MG CAPSULE    Take 1 capsule (500 mg) by mouth 2 times daily for 7 days    LIDOCAINE (LIDOCARE) 4 % PATCH    Place 1 patch onto the skin every 24 hours for 5 days To prevent lidocaine toxicity, patient should be patch free for 12 hrs daily.    METHOCARBAMOL (ROBAXIN) 500 MG TABLET    Take 1 tablet (500 mg) by mouth 4 times daily as needed for muscle spasms         Leonard  YASH Amaya John, PA-C  06/01/24 3928

## 2024-06-02 LAB — BACTERIA UR CULT: NORMAL

## 2024-06-03 ENCOUNTER — TELEPHONE (OUTPATIENT)
Dept: FAMILY MEDICINE | Facility: CLINIC | Age: 74
End: 2024-06-03
Payer: COMMERCIAL

## 2024-06-03 NOTE — TELEPHONE ENCOUNTER
Transitions of Care Outreach  Chief Complaint   Patient presents with    Hospital F/U     Transitions of Care Assessment    Discharge Assessment  How are your symptoms? (Red Flag symptoms escalate to triage hotline per guidelines): Worsening  Do you know how to contact your clinic care team if you have future questions or changes to your health status? : Yes  Does the patient have their discharge instructions? : Yes  Does the patient have questions regarding their discharge instructions? : No  Were you started on any new medications or were there changes to any of your previous medications? : Yes  Does the patient have all of their medications?: Yes  Do you have questions regarding any of your medications? : No  Do you have all of your needed medical supplies or equipment (DME)?  (i.e. oxygen tank, CPAP, cane, etc.):  (n/a)    Follow up Plan          Future Appointments   Date Time Provider Department Center   6/4/2024  1:00 PM Vernell Bucio MD [facility]   7/25/2024  9:00 AM [facility]   7/26/2024 11:00 AM Cheryl Puente APRN CNP [facility]   8/1/2024  9:10 AM Vernell Bucio MD [facility]       Outpatient Plan as outlined on AVS reviewed with patient.    For any urgent concerns, please contact our 24 hour nurse triage line: [phone] ([phone])       Germania Downing RN  [facility]

## 2024-06-03 NOTE — TELEPHONE ENCOUNTER
ED / Discharge Outreach Protocol    Patient Contact    Attempt # 1    Was call answered?  No.  Left message on voicemail with information to call me back.    IMMANUEL NavasN, RN (she/her)  [facility] RN

## 2024-06-04 LAB — HBA1C MFR BLD: 5.8 %

## 2024-06-04 NOTE — RESULT ENCOUNTER NOTE
Thank you very much for getting labs done!     Your lab test to check for diabetes, HgA1C, also called glycosylated hemoglobin, which measures the level of sugar in your blood over the past few months, is slightly higher than normal but less than 6.5. This is good news, it means you don't have diabetes right now!  However, it does mean that you're at risk for developing diabetes in the future.    To lower blood sugars,  reduce carbohydrates especially highly processed foods such as sugar in your diet by reducing portion size or avoiding sweets, juice, alcohol, white bread, crackers, white pasta, white rice, potatoes and cereal.     You can also reduce your blood sugars by increasing your activity level. Exercise for at least 30 min 5 times per week, and lift weights 2-3 times per week to build muscle mass and improve your metabolism.    If you have any questions, please contact the clinic or schedule an appointment with me, thank you!    Sincerely,  Dr. Vernell Bucio MD  6/4/2024

## 2024-06-05 LAB
CHOLEST SERPL-MCNC: 123 MG/DL
HDLC SERPL-MCNC: 33 MG/DL
LDLC SERPL CALC-MCNC: 58 MG/DL
NONHDLC SERPL-MCNC: 90 MG/DL
TRIGL SERPL-MCNC: 161 MG/DL

## 2024-06-11 NOTE — RESULT ENCOUNTER NOTE
Thank you very much for getting labs done!    Your lab test to check for diabetes, HgA1C, also called glycosylated hemoglobin, which measures the level of sugar in your blood over the past few months, is slightly higher than normal but less than 6.5. This is good news, it means you don't have diabetes right now!  However, it does mean that you're at risk for developing diabetes in the future.    To lower blood sugars,  reduce carbohydrates especially highly processed foods such as sugar in your diet by reducing portion size or avoiding sweets, juice, alcohol, white bread, crackers, white pasta, white rice, potatoes and cereal.    You can also reduce your blood sugars by increasing your activity level. Exercise for at least 30 min 5 times per week, and lift weights 2-3 times per week to build muscle mass and improve your metabolism. This will also reduce your triglycerides and improve your HDL cholesterol.    Thank you for getting your cholesterol checked! Your total cholesterol  Desired or goal levels are:  CHOLESTEROL: Desirable is less than 200.  HDL (Good Cholesterol): Desirable is greater than 40 in men and greater than 50 in women.  LDL (Bad Cholesterol): Desirable is less than 130  TRIGLYCERIDES: Desirable is less than 150.    As you may know, abnormal cholesterol is one factor that increases your risk for heart disease and stroke. You can improve your cholesterol by controlling the amount and type of fat you eat and by increasing your daily activity level.    Here are some ways to improve your nutrition (adapted from the [employer] handout):  Eat less fat (especially butter, Crisco and other saturated fats)  Buy lean cuts of meat; reduce your portions of red meat or substitute poultry or fish, or avoid meat altogether.  Use skim milk and low-fat dairy products  Eat no more than 4 egg yolks per week  Avoid fried or fast foods that are high in fat  Eat more fruits and vegetables, trying  to make your plate of food at least half non-starchy vegetables.    If you have any questions, please contact the clinic or schedule an appointment with me, thank you!    Sincerely,  Dr. Vernell Bucio MD  6/11/2024

## 2024-06-13 DIAGNOSIS — M62.830 BACK MUSCLE SPASM: ICD-10-CM

## 2024-06-13 RX ORDER — CYCLOBENZAPRINE HCL 10 MG
10 TABLET ORAL 3 TIMES DAILY PRN
Qty: 60 TABLET | Refills: 0 | Status: SHIPPED | OUTPATIENT
Start: 2024-06-13 | End: 2024-07-09

## 2024-07-04 DIAGNOSIS — M62.830 BACK MUSCLE SPASM: ICD-10-CM

## 2024-07-09 RX ORDER — CYCLOBENZAPRINE HCL 10 MG
10 TABLET ORAL 3 TIMES DAILY PRN
Qty: 60 TABLET | Refills: 0 | Status: SHIPPED | OUTPATIENT
Start: 2024-07-09 | End: 2024-07-30

## 2024-07-11 DIAGNOSIS — L30.9 DERMATITIS: ICD-10-CM

## 2024-07-11 RX ORDER — TRIAMCINOLONE ACETONIDE 1 MG/G
CREAM TOPICAL
Qty: 15 G | Refills: 3 | Status: SHIPPED | OUTPATIENT
Start: 2024-07-11

## 2024-07-19 DIAGNOSIS — E78.5 HYPERLIPIDEMIA WITH TARGET LDL LESS THAN 70: ICD-10-CM

## 2024-07-19 DIAGNOSIS — E78.5 HYPERLIPIDEMIA, UNSPECIFIED HYPERLIPIDEMIA TYPE: ICD-10-CM

## 2024-07-19 DIAGNOSIS — I25.10 CORONARY ARTERY DISEASE INVOLVING NATIVE CORONARY ARTERY OF NATIVE HEART WITHOUT ANGINA PECTORIS: ICD-10-CM

## 2024-07-19 DIAGNOSIS — I10 HYPERTENSION GOAL BP (BLOOD PRESSURE) < 140/90: ICD-10-CM

## 2024-07-19 DIAGNOSIS — I10 ESSENTIAL HYPERTENSION: Primary | ICD-10-CM

## 2024-07-23 DIAGNOSIS — I10 ESSENTIAL HYPERTENSION: ICD-10-CM

## 2024-07-23 DIAGNOSIS — E78.5 HYPERLIPIDEMIA, UNSPECIFIED HYPERLIPIDEMIA TYPE: ICD-10-CM

## 2024-07-23 RX ORDER — ATORVASTATIN CALCIUM 40 MG/1
40 TABLET, FILM COATED ORAL DAILY
Qty: 90 TABLET | Refills: 0 | Status: SHIPPED | OUTPATIENT
Start: 2024-07-23 | End: 2024-07-26

## 2024-07-23 RX ORDER — AMLODIPINE BESYLATE 2.5 MG/1
2.5 TABLET ORAL DAILY
Qty: 90 TABLET | Refills: 0 | Status: SHIPPED | OUTPATIENT
Start: 2024-07-23 | End: 2024-07-26

## 2024-07-25 ENCOUNTER — LAB (OUTPATIENT)
Dept: LAB | Facility: CLINIC | Age: 74
End: 2024-07-25
Payer: COMMERCIAL

## 2024-07-25 DIAGNOSIS — E78.5 HYPERLIPIDEMIA WITH TARGET LDL LESS THAN 70: ICD-10-CM

## 2024-07-25 DIAGNOSIS — I10 HYPERTENSION GOAL BP (BLOOD PRESSURE) < 140/90: ICD-10-CM

## 2024-07-25 DIAGNOSIS — I25.10 CORONARY ARTERY DISEASE INVOLVING NATIVE CORONARY ARTERY OF NATIVE HEART WITHOUT ANGINA PECTORIS: ICD-10-CM

## 2024-07-25 DIAGNOSIS — I10 ESSENTIAL HYPERTENSION: ICD-10-CM

## 2024-07-25 LAB
ALT SERPL W P-5'-P-CCNC: 22 U/L (ref 0–70)
ANION GAP SERPL CALCULATED.3IONS-SCNC: 11 MMOL/L (ref 7–15)
BUN SERPL-MCNC: 12.2 MG/DL (ref 8–23)
CALCIUM SERPL-MCNC: 9.5 MG/DL (ref 8.8–10.4)
CHLORIDE SERPL-SCNC: 99 MMOL/L (ref 98–107)
CHOLEST SERPL-MCNC: 129 MG/DL
CREAT SERPL-MCNC: 1.44 MG/DL (ref 0.67–1.17)
EGFRCR SERPLBLD CKD-EPI 2021: 51 ML/MIN/1.73M2
FASTING STATUS PATIENT QL REPORTED: YES
GLUCOSE SERPL-MCNC: 126 MG/DL (ref 70–99)
HCO3 SERPL-SCNC: 28 MMOL/L (ref 22–29)
HDLC SERPL-MCNC: 36 MG/DL
LDLC SERPL CALC-MCNC: 54 MG/DL
NONHDLC SERPL-MCNC: 93 MG/DL
POTASSIUM SERPL-SCNC: 4.3 MMOL/L (ref 3.4–5.3)
SODIUM SERPL-SCNC: 138 MMOL/L (ref 135–145)
TRIGL SERPL-MCNC: 194 MG/DL

## 2024-07-25 PROCEDURE — 80048 BASIC METABOLIC PNL TOTAL CA: CPT | Performed by: INTERNAL MEDICINE

## 2024-07-25 PROCEDURE — 84460 ALANINE AMINO (ALT) (SGPT): CPT | Performed by: INTERNAL MEDICINE

## 2024-07-25 PROCEDURE — 80061 LIPID PANEL: CPT | Performed by: INTERNAL MEDICINE

## 2024-07-25 PROCEDURE — 36415 COLL VENOUS BLD VENIPUNCTURE: CPT | Performed by: INTERNAL MEDICINE

## 2024-07-26 ENCOUNTER — OFFICE VISIT (OUTPATIENT)
Dept: CARDIOLOGY | Facility: CLINIC | Age: 74
End: 2024-07-26
Payer: COMMERCIAL

## 2024-07-26 VITALS
HEIGHT: 68 IN | BODY MASS INDEX: 34.65 KG/M2 | SYSTOLIC BLOOD PRESSURE: 132 MMHG | DIASTOLIC BLOOD PRESSURE: 80 MMHG | HEART RATE: 60 BPM | WEIGHT: 228.6 LBS

## 2024-07-26 DIAGNOSIS — I25.10 CORONARY ARTERY DISEASE INVOLVING NATIVE CORONARY ARTERY OF NATIVE HEART WITHOUT ANGINA PECTORIS: Primary | ICD-10-CM

## 2024-07-26 DIAGNOSIS — E78.5 HYPERLIPIDEMIA, UNSPECIFIED HYPERLIPIDEMIA TYPE: ICD-10-CM

## 2024-07-26 DIAGNOSIS — I10 ESSENTIAL HYPERTENSION: ICD-10-CM

## 2024-07-26 DIAGNOSIS — R60.0 PERIPHERAL EDEMA: ICD-10-CM

## 2024-07-26 DIAGNOSIS — I10 HYPERTENSION GOAL BP (BLOOD PRESSURE) < 140/90: ICD-10-CM

## 2024-07-26 PROCEDURE — 99214 OFFICE O/P EST MOD 30 MIN: CPT | Performed by: NURSE PRACTITIONER

## 2024-07-26 PROCEDURE — 93000 ELECTROCARDIOGRAM COMPLETE: CPT | Performed by: NURSE PRACTITIONER

## 2024-07-26 RX ORDER — ATORVASTATIN CALCIUM 40 MG/1
40 TABLET, FILM COATED ORAL DAILY
Qty: 90 TABLET | Refills: 4 | Status: SHIPPED | OUTPATIENT
Start: 2024-07-26

## 2024-07-26 RX ORDER — LOSARTAN POTASSIUM 50 MG/1
50 TABLET ORAL DAILY
Qty: 90 TABLET | Refills: 4 | Status: SHIPPED | OUTPATIENT
Start: 2024-07-26

## 2024-07-26 RX ORDER — METOPROLOL SUCCINATE 25 MG/1
25 TABLET, EXTENDED RELEASE ORAL DAILY
Qty: 90 TABLET | Refills: 4 | Status: SHIPPED | OUTPATIENT
Start: 2024-07-26

## 2024-07-26 RX ORDER — FUROSEMIDE 20 MG
20 TABLET ORAL DAILY
Qty: 90 TABLET | Refills: 4 | Status: SHIPPED | OUTPATIENT
Start: 2024-07-26

## 2024-07-26 RX ORDER — NITROGLYCERIN 0.4 MG/1
TABLET SUBLINGUAL
Qty: 9 TABLET | Refills: 3 | Status: SHIPPED | OUTPATIENT
Start: 2024-07-26

## 2024-07-26 RX ORDER — AMLODIPINE BESYLATE 2.5 MG/1
2.5 TABLET ORAL DAILY
Qty: 90 TABLET | Refills: 4 | Status: SHIPPED | OUTPATIENT
Start: 2024-07-26

## 2024-07-26 NOTE — PROGRESS NOTES
~Cardiology Clinic Visit~    Primary Cardiologist: Dr. Toro  Reason for visit: Annual follow up    History of Present Illness    Fermín Josue is a very pleasant 74 year old male with a past medical history notable for coronary artery disease with coronary stenting, essential hypertension, bradycardia.    In summary, patient is had past stenting of the LAD back in 2007, and also has known chronically occluded RCA with collateralization.  In May 2023, patient unfortunately had a severe spine injury with T9/T11 fractures, as well as fractures from ribs 8 through 11.  He had extensive surgery to his back in this regard.  During that time, his beta-blocker was stopped because of bradycardia.    Following surgery, patient noticed exertional chest discomfort.  He was experiencing discomfort at rest, and the pattern was somewhat atypical.  He was resumed back on metoprolol 25 mg (half of his prior dose), and recommended a Zio patch heart monitor.    Zio patch monitor capture data for 7 days.  Patient triggered events correlated with sinus rhythm with rates between 52-70.  Low heart rate noted to be 41 bpm and sinus bradycardia.  Maximum heart rate recorded at 102.  There was no evidence of high-grade AV block or pauses.  He reports no associated dizziness or lightheadedness.    A nuclear stress test was performed because of the chest discomfort showing that he had a medium size area of transmural infarct in the inferior and inferolateral walls, consistent with his occluded RCA.  There was a mild degree of millicent-infarct ischemia noted.  And a small area of nontransmural infarct in the mid to distal anteroseptum noted.    When he saw Dr. Adarsh Gifford in August 2023, his symptoms of chest discomfort have resolved entirely.  His medications were continued without any adjustments at that time.  He was counseled on signs and symptoms of inappropriate bradycardia.    Interval 07/26/24    Patient has no  "cardiac concerns today.  Feels well in that regard.  States his only issues currently are related to long standing lower back pain from prior injury and surgery.  This limits his functional abilities.  Use to be able to walk more, now only walks about half a block or so due to pain.  He is clear this is back related pain and non-cardiac.  He needs prescription refills.    /80   Pulse 60   Ht 1.727 m (5' 8\")   Wt 103.7 kg (228 lb 9.6 oz)   BMI 34.76 kg/m    __________________________________________________________________         Assessment and Impression:     1.  Coronary artery disease.  -No recurrent angina; no NTG use.  -Nuclear stress test showing infarction of the inferior wall, which is consistent with his prior occlusion of his right coronary artery, and only a small amount of ischemia in the anterior septum.  -On ASA, atorvastatin, Toprol XL    2.  Bradycardia.   -Improved on EKG.  -No dizziness or lightheadedness.  -Stable on Toprol XL 25 mg daily.    3.  Stable lipid profile, on statin.  4.  Essential hypertension, stable         Recommendations and Plan:     No changes to plan of care today.  EKG reviewed and stable compared to prior.  Refills sent to patient's preferred pharmacy.  Follow up with Dr. Toro in 1-year for annual visit.  __________________________________________________________________    Thank you for the opportunity to participate in this pleasant patient's care.    We would be happy to see this patient sooner for any concerns in the meantime.    LONNY Le, CNP   Nurse Practitioner  [facility]    Today's clinic visit entailed:  The following tests were independently interpreted by me as noted in my documentation: EKG, labs  Prescription drug management  The level of medical decision making during this visit was of moderate complexity.  Review of the result(s) of each unique test - cardiac testing, cardiac imaging, labs  Care everywhere " reviewed for additional records to facilitate comprehensive patient care.    Orders this Visit:  Orders Placed This Encounter   Procedures    Follow-Up with Cardiology    EKG 12-lead complete w/read - Clinics (performed today)     Orders Placed This Encounter   Medications    atorvastatin (LIPITOR) 40 MG tablet     Sig: Take 1 tablet (40 mg) by mouth daily     Dispense:  90 tablet     Refill:  4    furosemide (LASIX) 20 MG tablet     Sig: Take 1 tablet (20 mg) by mouth daily     Dispense:  90 tablet     Refill:  4    metoprolol succinate ER (TOPROL XL) 25 MG 24 hr tablet     Sig: Take 1 tablet (25 mg) by mouth daily     Dispense:  90 tablet     Refill:  4    nitroGLYcerin (NITROSTAT) 0.4 MG sublingual tablet     Sig: For chest pain place 1 tablet under the tongue every 5 minutes for 3 doses. If symptoms persist 5 minutes after 1st dose call 911.     Dispense:  9 tablet     Refill:  3    amLODIPine (NORVASC) 2.5 MG tablet     Sig: Take 1 tablet (2.5 mg) by mouth daily     Dispense:  90 tablet     Refill:  4    losartan (COZAAR) 50 MG tablet     Sig: Take 1 tablet (50 mg) by mouth daily     Dispense:  90 tablet     Refill:  4     Medications Discontinued During This Encounter   Medication Reason    nitroGLYcerin (NITROSTAT) 0.4 MG sublingual tablet Reorder (No AVS)    furosemide (LASIX) 20 MG tablet Reorder (No AVS)    losartan (COZAAR) 50 MG tablet Reorder (No AVS)    metoprolol succinate ER (TOPROL XL) 25 MG 24 hr tablet Reorder (No AVS)    amLODIPine (NORVASC) 2.5 MG tablet Reorder (No AVS)    atorvastatin (LIPITOR) 40 MG tablet Reorder (No AVS)     Encounter Diagnoses   Name Primary?    Hyperlipidemia, unspecified hyperlipidemia type     Peripheral edema     Hypertension goal BP (blood pressure) < 140/90     Coronary artery disease involving native coronary artery of native heart without angina pectoris Yes    Essential hypertension      CURRENT MEDICATIONS:  Current Outpatient Medications   Medication Sig  "Dispense Refill    acetaminophen (TYLENOL) 500 MG tablet Take 2 tablets (1,000 mg) by mouth 2 times daily as needed for mild pain 90 tablet 3    amLODIPine (NORVASC) 2.5 MG tablet Take 1 tablet (2.5 mg) by mouth daily 90 tablet 4    aspirin 81 MG EC tablet Take 81 mg by mouth daily      atorvastatin (LIPITOR) 40 MG tablet Take 1 tablet (40 mg) by mouth daily 90 tablet 4    cyclobenzaprine (FLEXERIL) 10 MG tablet Take 1 tablet (10 mg) by mouth 3 times daily as needed for muscle spasms 60 tablet 0    furosemide (LASIX) 20 MG tablet Take 1 tablet (20 mg) by mouth daily 90 tablet 4    gabapentin (NEURONTIN) 400 MG capsule Take 1 capsule (400 mg) by mouth 3 times daily (Patient taking differently: Take 400 mg by mouth daily) 90 capsule 3    losartan (COZAAR) 50 MG tablet Take 1 tablet (50 mg) by mouth daily 90 tablet 4    metoprolol succinate ER (TOPROL XL) 25 MG 24 hr tablet Take 1 tablet (25 mg) by mouth daily 90 tablet 4    nitroGLYcerin (NITROSTAT) 0.4 MG sublingual tablet For chest pain place 1 tablet under the tongue every 5 minutes for 3 doses. If symptoms persist 5 minutes after 1st dose call 911. 9 tablet 3    triamcinolone (KENALOG) 0.1 % external cream Apply topically to affected area 2 times daily. 15 g 3     ALLERGIES     Allergies   Allergen Reactions    Soy Allergy Swelling    No Known Drug Allergy      PAST MEDICAL, SURGICAL, FAMILY, SOCIAL HISTORY:  History was reviewed and updated as needed, see medical record.    Review of Systems:  A 10-point Review Of Systems is otherwise normal except for that which is noted in the HPI and interval summary.    Physical Exam:    Vitals: /80   Pulse 60   Ht 1.727 m (5' 8\")   Wt 103.7 kg (228 lb 9.6 oz)   BMI 34.76 kg/m    Constitutional: Appears stated age, well nourished, NAD.  Neck: Supple. Carotid pulses full and equal.   Respiratory: Non-labored. Lungs CTAB.  Cardiovascular: RRR, normal S1 and S2. No M/G/R.  No edema.  GI: Soft, semi-rounded, " non-distended, non-tender.  Skin: Warm and dry.   Musculoskeletal/Extremities: Symmetrical movement.  Neurologic: No gross focal deficits. Alert, awake.  Psychiatric: Affect appropriate. Mentation normal.    Recent Lab Results:  LIPID RESULTS:  Lab Results   Component Value Date    CHOL 129 07/25/2024    CHOL 109 07/10/2020    HDL 36 (L) 07/25/2024    HDL 43 07/10/2020    LDL 54 07/25/2024    LDL 47 07/10/2020    TRIG 194 (H) 07/25/2024    TRIG 95 07/10/2020    CHOLHDLRATIO 2.4 05/27/2015     LIVER ENZYME RESULTS:  Lab Results   Component Value Date    AST 37 06/01/2024    AST 22 02/17/2021    ALT 22 07/25/2024    ALT 28 02/17/2021     CBC RESULTS:  Lab Results   Component Value Date    WBC 6.9 06/01/2024    WBC 7.4 03/01/2021    RBC 4.54 06/01/2024    RBC 4.46 03/01/2021    HGB 14.3 06/01/2024    HGB 14.2 03/01/2021    HCT 43.7 06/01/2024    HCT 42.7 03/01/2021    MCV 96 06/01/2024    MCV 96 03/01/2021    MCH 31.5 06/01/2024    MCH 31.8 03/01/2021    MCHC 32.7 06/01/2024    MCHC 33.3 03/01/2021    RDW 13.2 06/01/2024    RDW 12.8 03/01/2021     06/01/2024     03/01/2021     BMP RESULTS:  Lab Results   Component Value Date     07/25/2024     03/01/2021    POTASSIUM 4.3 07/25/2024    POTASSIUM 4.7 07/13/2021    POTASSIUM 3.7 03/01/2021    CHLORIDE 99 07/25/2024    CHLORIDE 108 07/13/2021    CHLORIDE 108 03/01/2021    CO2 28 07/25/2024    CO2 28 07/13/2021    CO2 31 03/01/2021    ANIONGAP 11 07/25/2024    ANIONGAP 4 07/13/2021    ANIONGAP 3 03/01/2021     (H) 07/25/2024     (H) 07/13/2021    GLC 84 03/01/2021    BUN 12.2 07/25/2024    BUN 23 07/13/2021    BUN 19 03/01/2021    CR 1.44 (H) 07/25/2024    CR 0.99 03/01/2021    GFRESTIMATED 51 (L) 07/25/2024    GFRESTIMATED 42 (L) 02/12/2023    GFRESTIMATED 76 03/01/2021    GFRESTBLACK 88 03/01/2021    CANDICE 9.5 07/25/2024    CANDICE 9.8 03/01/2021      A1C RESULTS:  Lab Results   Component Value Date    A1C 5.8 (H) 06/01/2024    A1C 5.8  (H) 10/15/2019     INR RESULTS:  Lab Results   Component Value Date    INR 0.98 11/21/2007

## 2024-07-26 NOTE — LETTER
7/26/2024    Vernell Bucio MD  [address]  [place] [address]    RE: Fermín SHER Liat       Dear Colleague,     I had the pleasure of seeing Fermín Josue in the [facility].              ~Cardiology Clinic Visit~    Primary Cardiologist: Dr. Toro  Reason for visit: Annual follow up    History of Present Illness    Fermín Josue is a very pleasant 74 year old male with a past medical history notable for coronary artery disease with coronary stenting, essential hypertension, bradycardia.    In summary, patient is had past stenting of the LAD back in 2007, and also has known chronically occluded RCA with collateralization.  In May 2023, patient unfortunately had a severe spine injury with T9/T11 fractures, as well as fractures from ribs 8 through 11.  He had extensive surgery to his back in this regard.  During that time, his beta-blocker was stopped because of bradycardia.    Following surgery, patient noticed exertional chest discomfort.  He was experiencing discomfort at rest, and the pattern was somewhat atypical.  He was resumed back on metoprolol 25 mg (half of his prior dose), and recommended a Zio patch heart monitor.    Zio patch monitor capture data for 7 days.  Patient triggered events correlated with sinus rhythm with rates between 52-70.  Low heart rate noted to be 41 bpm and sinus bradycardia.  Maximum heart rate recorded at 102.  There was no evidence of high-grade AV block or pauses.  He reports no associated dizziness or lightheadedness.    A nuclear stress test was performed because of the chest discomfort showing that he had a medium size area of transmural infarct in the inferior and inferolateral walls, consistent with his occluded RCA.  There was a mild degree of millicent-infarct ischemia noted.  And a small area of nontransmural infarct in the mid to distal anteroseptum noted.    When he saw Dr. Adarsh Gifford in August 2023, his symptoms of  "chest discomfort have resolved entirely.  His medications were continued without any adjustments at that time.  He was counseled on signs and symptoms of inappropriate bradycardia.    Interval 07/26/24    Patient has no cardiac concerns today.  Feels well in that regard.  States his only issues currently are related to long standing lower back pain from prior injury and surgery.  This limits his functional abilities.  Use to be able to walk more, now only walks about half a block or so due to pain.  He is clear this is back related pain and non-cardiac.  He needs prescription refills.    /80   Pulse 60   Ht 1.727 m (5' 8\")   Wt 103.7 kg (228 lb 9.6 oz)   BMI 34.76 kg/m    __________________________________________________________________         Assessment and Impression:     1.  Coronary artery disease.  -No recurrent angina; no NTG use.  -Nuclear stress test showing infarction of the inferior wall, which is consistent with his prior occlusion of his right coronary artery, and only a small amount of ischemia in the anterior septum.  -On ASA, atorvastatin, Toprol XL    2.  Bradycardia.   -Improved on EKG.  -No dizziness or lightheadedness.  -Stable on Toprol XL 25 mg daily.    3.  Stable lipid profile, on statin.  4.  Essential hypertension, stable         Recommendations and Plan:     No changes to plan of care today.  EKG reviewed and stable compared to prior.  Refills sent to patient's preferred pharmacy.  Follow up with Dr. Toro in 1-year for annual visit.  __________________________________________________________________    Thank you for the opportunity to participate in this pleasant patient's care.    We would be happy to see this patient sooner for any concerns in the meantime.    LONNY Le, CNP   Nurse Practitioner  [facility]    Today's clinic visit entailed:  The following tests were independently interpreted by me as noted in my documentation: EKG, " labs  Prescription drug management  The level of medical decision making during this visit was of moderate complexity.  Review of the result(s) of each unique test - cardiac testing, cardiac imaging, labs  Care everywhere reviewed for additional records to facilitate comprehensive patient care.    Orders this Visit:  Orders Placed This Encounter   Procedures    Follow-Up with Cardiology    EKG 12-lead complete w/read - Clinics (performed today)     Orders Placed This Encounter   Medications    atorvastatin (LIPITOR) 40 MG tablet     Sig: Take 1 tablet (40 mg) by mouth daily     Dispense:  90 tablet     Refill:  4    furosemide (LASIX) 20 MG tablet     Sig: Take 1 tablet (20 mg) by mouth daily     Dispense:  90 tablet     Refill:  4    metoprolol succinate ER (TOPROL XL) 25 MG 24 hr tablet     Sig: Take 1 tablet (25 mg) by mouth daily     Dispense:  90 tablet     Refill:  4    nitroGLYcerin (NITROSTAT) 0.4 MG sublingual tablet     Sig: For chest pain place 1 tablet under the tongue every 5 minutes for 3 doses. If symptoms persist 5 minutes after 1st dose call 911.     Dispense:  9 tablet     Refill:  3    amLODIPine (NORVASC) 2.5 MG tablet     Sig: Take 1 tablet (2.5 mg) by mouth daily     Dispense:  90 tablet     Refill:  4    losartan (COZAAR) 50 MG tablet     Sig: Take 1 tablet (50 mg) by mouth daily     Dispense:  90 tablet     Refill:  4     Medications Discontinued During This Encounter   Medication Reason    nitroGLYcerin (NITROSTAT) 0.4 MG sublingual tablet Reorder (No AVS)    furosemide (LASIX) 20 MG tablet Reorder (No AVS)    losartan (COZAAR) 50 MG tablet Reorder (No AVS)    metoprolol succinate ER (TOPROL XL) 25 MG 24 hr tablet Reorder (No AVS)    amLODIPine (NORVASC) 2.5 MG tablet Reorder (No AVS)    atorvastatin (LIPITOR) 40 MG tablet Reorder (No AVS)     Encounter Diagnoses   Name Primary?    Hyperlipidemia, unspecified hyperlipidemia type     Peripheral edema     Hypertension goal BP (blood pressure)  "< 140/90     Coronary artery disease involving native coronary artery of native heart without angina pectoris Yes    Essential hypertension      CURRENT MEDICATIONS:  Current Outpatient Medications   Medication Sig Dispense Refill    acetaminophen (TYLENOL) 500 MG tablet Take 2 tablets (1,000 mg) by mouth 2 times daily as needed for mild pain 90 tablet 3    amLODIPine (NORVASC) 2.5 MG tablet Take 1 tablet (2.5 mg) by mouth daily 90 tablet 4    aspirin 81 MG EC tablet Take 81 mg by mouth daily      atorvastatin (LIPITOR) 40 MG tablet Take 1 tablet (40 mg) by mouth daily 90 tablet 4    cyclobenzaprine (FLEXERIL) 10 MG tablet Take 1 tablet (10 mg) by mouth 3 times daily as needed for muscle spasms 60 tablet 0    furosemide (LASIX) 20 MG tablet Take 1 tablet (20 mg) by mouth daily 90 tablet 4    gabapentin (NEURONTIN) 400 MG capsule Take 1 capsule (400 mg) by mouth 3 times daily (Patient taking differently: Take 400 mg by mouth daily) 90 capsule 3    losartan (COZAAR) 50 MG tablet Take 1 tablet (50 mg) by mouth daily 90 tablet 4    metoprolol succinate ER (TOPROL XL) 25 MG 24 hr tablet Take 1 tablet (25 mg) by mouth daily 90 tablet 4    nitroGLYcerin (NITROSTAT) 0.4 MG sublingual tablet For chest pain place 1 tablet under the tongue every 5 minutes for 3 doses. If symptoms persist 5 minutes after 1st dose call 911. 9 tablet 3    triamcinolone (KENALOG) 0.1 % external cream Apply topically to affected area 2 times daily. 15 g 3     ALLERGIES     Allergies   Allergen Reactions    Soy Allergy Swelling    No Known Drug Allergy      PAST MEDICAL, SURGICAL, FAMILY, SOCIAL HISTORY:  History was reviewed and updated as needed, see medical record.    Review of Systems:  A 10-point Review Of Systems is otherwise normal except for that which is noted in the HPI and interval summary.    Physical Exam:    Vitals: /80   Pulse 60   Ht 1.727 m (5' 8\")   Wt 103.7 kg (228 lb 9.6 oz)   BMI 34.76 kg/m    Constitutional: Appears " stated age, well nourished, NAD.  Neck: Supple. Carotid pulses full and equal.   Respiratory: Non-labored. Lungs CTAB.  Cardiovascular: RRR, normal S1 and S2. No M/G/R.  No edema.  GI: Soft, semi-rounded, non-distended, non-tender.  Skin: Warm and dry.   Musculoskeletal/Extremities: Symmetrical movement.  Neurologic: No gross focal deficits. Alert, awake.  Psychiatric: Affect appropriate. Mentation normal.    Recent Lab Results:  LIPID RESULTS:  Lab Results   Component Value Date    CHOL 129 07/25/2024    CHOL 109 07/10/2020    HDL 36 (L) 07/25/2024    HDL 43 07/10/2020    LDL 54 07/25/2024    LDL 47 07/10/2020    TRIG 194 (H) 07/25/2024    TRIG 95 07/10/2020    CHOLHDLRATIO 2.4 05/27/2015     LIVER ENZYME RESULTS:  Lab Results   Component Value Date    AST 37 06/01/2024    AST 22 02/17/2021    ALT 22 07/25/2024    ALT 28 02/17/2021     CBC RESULTS:  Lab Results   Component Value Date    WBC 6.9 06/01/2024    WBC 7.4 03/01/2021    RBC 4.54 06/01/2024    RBC 4.46 03/01/2021    HGB 14.3 06/01/2024    HGB 14.2 03/01/2021    HCT 43.7 06/01/2024    HCT 42.7 03/01/2021    MCV 96 06/01/2024    MCV 96 03/01/2021    MCH 31.5 06/01/2024    MCH 31.8 03/01/2021    MCHC 32.7 06/01/2024    MCHC 33.3 03/01/2021    RDW 13.2 06/01/2024    RDW 12.8 03/01/2021     06/01/2024     03/01/2021     BMP RESULTS:  Lab Results   Component Value Date     07/25/2024     03/01/2021    POTASSIUM 4.3 07/25/2024    POTASSIUM 4.7 07/13/2021    POTASSIUM 3.7 03/01/2021    CHLORIDE 99 07/25/2024    CHLORIDE 108 07/13/2021    CHLORIDE 108 03/01/2021    CO2 28 07/25/2024    CO2 28 07/13/2021    CO2 31 03/01/2021    ANIONGAP 11 07/25/2024    ANIONGAP 4 07/13/2021    ANIONGAP 3 03/01/2021     (H) 07/25/2024     (H) 07/13/2021    GLC 84 03/01/2021    BUN 12.2 07/25/2024    BUN 23 07/13/2021    BUN 19 03/01/2021    CR 1.44 (H) 07/25/2024    CR 0.99 03/01/2021    GFRESTIMATED 51 (L) 07/25/2024    GFRESTIMATED 42 (L)  02/12/2023    GFRESTIMATED 76 03/01/2021    GFRESTBLACK 88 03/01/2021    CANDICE 9.5 07/25/2024    CANDICE 9.8 03/01/2021      A1C RESULTS:  Lab Results   Component Value Date    A1C 5.8 (H) 06/01/2024    A1C 5.8 (H) 10/15/2019     INR RESULTS:  Lab Results   Component Value Date    INR 0.98 11/21/2007                     Thank you for allowing me to participate in the care of your patient.      Sincerely,     LONNY Le [facility]  cc:   Vernell Bucio MD  [address] [place] [address]

## 2024-07-27 DIAGNOSIS — M62.830 BACK MUSCLE SPASM: ICD-10-CM

## 2024-07-30 RX ORDER — CYCLOBENZAPRINE HCL 10 MG
10 TABLET ORAL 3 TIMES DAILY PRN
Qty: 60 TABLET | Refills: 0 | Status: SHIPPED | OUTPATIENT
Start: 2024-07-30 | End: 2024-09-26

## 2024-08-16 DIAGNOSIS — M62.830 BACK MUSCLE SPASM: ICD-10-CM

## 2024-08-16 RX ORDER — CYCLOBENZAPRINE HCL 10 MG
10 TABLET ORAL 3 TIMES DAILY PRN
Qty: 30 TABLET | Refills: 0 | OUTPATIENT
Start: 2024-08-16

## 2024-08-16 NOTE — TELEPHONE ENCOUNTER
Attempt #1. No answer. Left message on patient's voicemail to call back and speak with a triage nurse.     Of note patient was last seen in person in December 2022. Appears that they have already received bridge refills.  has already advised he needs a visit for more.  returns to office on 8/2224.    Vidhya Reed RN  [facility]

## 2024-08-16 NOTE — TELEPHONE ENCOUNTER
Needs to schedule visit per Dr. Bucio last comment in late July for medicare annual wellness to get more refills. She will review upon her return and can decide then if additional refills are appropriate.     Desmond Liu PA-C  [facility]

## 2024-08-17 ENCOUNTER — TELEPHONE (OUTPATIENT)
Dept: FAMILY MEDICINE | Facility: CLINIC | Age: 74
End: 2024-08-17
Payer: COMMERCIAL

## 2024-08-17 NOTE — TELEPHONE ENCOUNTER
General Call    Contacts       Contact Date/Time Type Contact Phone/Fax    08/17/2024 10:04 AM CDT Phone (Incoming) Dom Josue (Self) [phone] (H)          Reason for Call:  Returning call from the clinic from yesterday    What are your questions or concerns:  Returning call from the clinic about bridge refill. RAKAN Kee said pt needs an appt with provider to get bridge refill but pt is scheduled with PCP for 9/26/2024. However, states he should be good on meds until then. Says he gets most of them from his heart doctor but he should not need any refills until he is seen in clinic with Dr. Bucio.     Date of last appointment with provider: 06/04/2024    Okay to leave a detailed message?: Yes at Home number on file [phone] (home)

## 2024-09-24 DIAGNOSIS — M62.830 BACK MUSCLE SPASM: ICD-10-CM

## 2024-09-24 RX ORDER — GABAPENTIN 400 MG/1
400 CAPSULE ORAL 3 TIMES DAILY
Qty: 90 CAPSULE | Refills: 0 | Status: SHIPPED | OUTPATIENT
Start: 2024-09-24

## 2024-09-26 ENCOUNTER — OFFICE VISIT (OUTPATIENT)
Dept: FAMILY MEDICINE | Facility: CLINIC | Age: 74
End: 2024-09-26
Payer: COMMERCIAL

## 2024-09-26 VITALS
HEIGHT: 68 IN | HEART RATE: 57 BPM | BODY MASS INDEX: 34.71 KG/M2 | OXYGEN SATURATION: 97 % | SYSTOLIC BLOOD PRESSURE: 132 MMHG | TEMPERATURE: 97.2 F | RESPIRATION RATE: 20 BRPM | WEIGHT: 229 LBS | DIASTOLIC BLOOD PRESSURE: 84 MMHG

## 2024-09-26 DIAGNOSIS — Z90.5 S/P NEPHRECTOMY: ICD-10-CM

## 2024-09-26 DIAGNOSIS — Z00.00 ENCOUNTER FOR MEDICARE ANNUAL WELLNESS EXAM: Primary | ICD-10-CM

## 2024-09-26 DIAGNOSIS — Z95.5 HISTORY OF HEART ARTERY STENT: ICD-10-CM

## 2024-09-26 DIAGNOSIS — R73.03 PRE-DIABETES: ICD-10-CM

## 2024-09-26 DIAGNOSIS — Z85.528 HISTORY OF MALIGNANT NEOPLASM OF KIDNEY: ICD-10-CM

## 2024-09-26 DIAGNOSIS — I10 HYPERTENSION GOAL BP (BLOOD PRESSURE) < 140/90: ICD-10-CM

## 2024-09-26 DIAGNOSIS — N18.31 CKD STAGE 3A, GFR 45-59 ML/MIN (H): ICD-10-CM

## 2024-09-26 PROCEDURE — 90662 IIV NO PRSV INCREASED AG IM: CPT | Performed by: FAMILY MEDICINE

## 2024-09-26 PROCEDURE — 91320 SARSCV2 VAC 30MCG TRS-SUC IM: CPT | Performed by: FAMILY MEDICINE

## 2024-09-26 PROCEDURE — G0439 PPPS, SUBSEQ VISIT: HCPCS | Performed by: FAMILY MEDICINE

## 2024-09-26 PROCEDURE — 90480 ADMN SARSCOV2 VAC 1/ONLY CMP: CPT | Performed by: FAMILY MEDICINE

## 2024-09-26 PROCEDURE — G0008 ADMIN INFLUENZA VIRUS VAC: HCPCS | Performed by: FAMILY MEDICINE

## 2024-09-26 PROCEDURE — 99213 OFFICE O/P EST LOW 20 MIN: CPT | Mod: 25 | Performed by: FAMILY MEDICINE

## 2024-09-26 ASSESSMENT — PAIN SCALES - GENERAL: PAINLEVEL: NO PAIN (0)

## 2024-09-26 NOTE — PROGRESS NOTES
"Preventive Care Visit  [facility]  Vernell Bucio MD, Family Medicine  Sep 26, 2024      Assessment & Plan     (Z00.00) Encounter for Medicare annual wellness exam  (primary encounter diagnosis)      (Z85.528) History of malignant neoplasm of kidney  (Z90.5) S/p nephrectomy  (N18.31) CKD stage 3a, GFR 45-59 ml/min (H)  Comment: stable, monitor renal function annually    (R73.03) Pre-diabetes  stable    (I10) Hypertension goal BP (blood pressure) < 140/90  Comment: At goal  The current medical regimen is effective;  continue present plan and medications.      (Z95.5) History of heart artery stent  No current symptoms, follow up with cardiologist annually as scheduled    Patient has been advised of split billing requirements and indicates understanding: Yes        BMI  Estimated body mass index is 34.82 kg/m  as calculated from the following:    Height as of this encounter: 1.727 m (5' 8\").    Weight as of this encounter: 103.9 kg (229 lb).   Weight management plan: see AVS for lifestyle recommendations    Counseling  Appropriate preventive services were addressed with this patient via screening, questionnaire, or discussion as appropriate for fall prevention, nutrition, physical activity, Tobacco-use cessation, social engagement, weight loss and cognition.  Checklist reviewing preventive services available has been given to the patient.  Reviewed patient's diet, addressing concerns and/or questions.   He is at risk for lack of exercise and has been provided with information to increase physical activity for the benefit of his well-being.   The patient was provided with written information regarding signs of hearing loss.     Cheikh Fernandes is a 74 year old, presenting for the following: he is accompanied by one of his daughters Tia today.    Medicare Visit (UTI follow up - only have one kidney/Has a possible ear infection on the right, mainly have pain at night.)        " 9/26/2024    11:03 AM   Additional Questions   Roomed by Ana María ZAMORA   Accompanied by Daughter, Marilyn     Health Care Directive  Patient has a Health Care Directive on file  Advance care planning document is on file and is current.    HPI  Impaired fasting glucose Follow-up    Patient is checking blood sugars: not at all  Diabetic concerns: None   Symptoms of hypoglycemia (low blood sugar): none   Paresthesias (numbness or burning in feet) or sores: No    Lab Results   Component Value Date    A1C 5.8 06/01/2024    A1C 5.7 12/16/2022    A1C 5.8 10/15/2019    A1C 6.0 03/06/2019        Hyperlipidemia Follow-Up    Are you regularly taking any medication or supplement to lower your cholesterol?   Yes- atorvastatin qam  Are you having muscle aches or other side effects that you think could be caused by your cholesterol lowering medication?  No  He has a history of CAD with stents placed 2007, follows up with cardiologist annually    Hypertension Follow-up    Do you check your blood pressure regularly outside of the clinic? No   Are you following a low salt diet? No  Are your blood pressures ever more than 140 on the top number (systolic) OR more   than 90 on the bottom number (diastolic), for example 140/90? No    Chronic Kidney Disease Follow-up    Do you take any over the counter pain medicine?: Yes  What over the counter medicine are you taking for your pain?:  doesn't take nsaids  GFR Estimate   Date Value Ref Range Status   07/25/2024 51 (L) >60 mL/min/1.73m2 Final     Comment:     eGFR calculated using 2021 CKD-EPI equation.   03/01/2021 76 >60 mL/min/[1.73_m2] Final     Comment:     Non  GFR Calc  Starting 12/18/2018, serum creatinine based estimated GFR (eGFR) will be   calculated using the Chronic Kidney Disease Epidemiology Collaboration   (CKD-EPI) equation.       GFR, ESTIMATED POCT   Date Value Ref Range Status   02/12/2023 42 (L) >60 mL/min/1.73m2 Final            9/26/2024   General Health   How  would you rate your overall physical health? (!) POOR   Feel stress (tense, anxious, or unable to sleep) Not at all            9/26/2024   Nutrition   Diet: Regular (no restrictions)            9/26/2024   Exercise   Days per week of moderate/strenous exercise 1 day      (!) EXERCISE CONCERN      9/26/2024   Social Factors   Frequency of gathering with friends or relatives More than three times a week   Worry food won't last until get money to buy more Patient declined   Food not last or not have enough money for food? Patient declined   Do you have housing? (Housing is defined as stable permanent housing and does not include staying ouside in a car, in a tent, in an abandoned building, in an overnight shelter, or couch-surfing.) Patient declined   Are you worried about losing your housing? Patient declined   Lack of transportation? Patient declined   Unable to get utilities (heat,electricity)? Patient declined            9/26/2024   Fall Risk   Fallen 2 or more times in the past year? Yes   Trouble with walking or balance? Yes   Gait Speed Test (Document in seconds) 6.54   Gait Speed Test Interpretation Greater than 5.01 seconds - ABNORMAL             9/26/2024   Activities of Daily Living- Home Safety   Needs help with the following daily activites None of the above   Safety concerns in the home None of the above            9/26/2024   Dental   Dentist two times every year? Yes            9/26/2024   Hearing Screening   Hearing concerns? (!) TROUBLE UNDERSTANDING SOFT OR WHISPERED SPEECH.            9/26/2024   Driving Risk Screening   Patient/family members have concerns about driving No            9/26/2024   General Alertness/Fatigue Screening   Have you been more tired than usual lately? No            9/26/2024   Urinary Incontinence Screening   Bothered by leaking urine in past 6 months No            9/26/2024   TB Screening   Were you born outside of the [place]? No            Today's PHQ-2 Score:       9/26/2024     11:02 AM   PHQ-2 (  [employer])   Q1: Little interest or pleasure in doing things 0   Q2: Feeling down, depressed or hopeless 0   PHQ-2 Score 0   Q1: Little interest or pleasure in doing things Not at all   Q2: Feeling down, depressed or hopeless Not at all   PHQ-2 Score 0           2024   Substance Use   Alcohol more than 3/day or more than 7/wk No   Do you have a current opioid prescription? No   How severe/bad is pain from 1 to 10? 0/10 (No Pain)   Do you use any other substances recreationally? No        Social History     Tobacco Use    Smoking status: Former     Current packs/day: 0.00     Average packs/day: 1.5 packs/day for 44.0 years (66.0 ttl pk-yrs)     Types: Cigarettes     Start date: 1963     Quit date: 2007     Years since quittin.2    Smokeless tobacco: Never   Vaping Use    Vaping status: Never Used   Substance Use Topics    Alcohol use: Yes     Comment: rare    Drug use: No       ASCVD Risk   The ASCVD Risk score (Ubaldo DK, et al., 2019) failed to calculate for the following reasons:    The valid total cholesterol range is 130 to 320 mg/dL    Reviewed and updated as needed this visit by Provider   Tobacco     Med Hx  Surg Hx  Fam Hx  Soc Hx Sexual Activity          Past Medical History:   Diagnosis Date    CAD (coronary artery disease) 2015    3 stents    History of angina     History of skin graft     Right lower limb    Hypertension goal BP (blood pressure) < 140/90 2015    Kidney stone 2021    Malignant neoplasm of kidney excluding renal pelvis, left (H)     Sleep apnea      Past Surgical History:   Procedure Laterality Date    COLONOSCOPY N/A 2020    Procedure: COLONOSCOPY;  Surgeon: Wenceslao Marie MD;  Location:  GI    COMBINED CYSTOSCOPY, RETROGRADES, URETEROSCOPY, INSERT STENT Left 2021    Procedure: CYSTOSCOPY LEFT URETEROSCOPY,  LEFT STENT PLACEMENT, LEFT RETROGRADE;  Surgeon: Phil Lin MD;   Location: [facility] OR    CYSTOSCOPY, DILATE URETER(S), COMBINED Left 02/19/2021    Procedure: Cystoscopy, dilate ureter(s), combined;  Surgeon: Phil Lin MD;  Location: [facility] OR    HEART CATH, ANGIOPLASTY  11/2007    mid to prox LAD drug-eluting stent x2; Occluded RCA with Collaterals    KIDNEY STONE SURGERY  2003    laser and ureteric stenting    LUMBAR SPINE SURGERY N/A 02/13/2023    T8-L1 minimally invasive posterolateral instrumentation with cement augmentation    NEPHRECTOMY RT/LT Left 04/2021    [facility]    SKIN GRAFT, EACH ADDN 100SQCM       Current providers sharing in care for this patient include:  Patient Care Team:  Vernell Bucio MD as PCP - General (Family Medicine)  Christiano Toro MD as Assigned Heart and Vascular Provider  Vernell Bucio MD as Assigned PCP    The following health maintenance items are reviewed in Epic and correct as of today:  Health Maintenance   Topic Date Due    MICROALBUMIN  12/16/2023    INFLUENZA VACCINE (1) 09/01/2024    COVID-19 Vaccine (7 - 2024-25 season) 09/01/2024    A1C  06/01/2025    HEMOGLOBIN  06/01/2025    ANNUAL REVIEW OF HM ORDERS  06/04/2025    COLORECTAL CANCER SCREENING  07/16/2025    BMP  07/25/2025    LIPID  07/25/2025    MEDICARE ANNUAL WELLNESS VISIT  09/26/2025    FALL RISK ASSESSMENT  09/26/2025    GLUCOSE  07/25/2027    ADVANCE CARE PLANNING  09/26/2029    DTAP/TDAP/TD IMMUNIZATION (4 - Td or Tdap) 09/07/2031    HEPATITIS C SCREENING  Completed    PHQ-2 (once per calendar year)  Completed    Pneumococcal Vaccine: 65+ Years  Completed    URINALYSIS  Completed    ZOSTER IMMUNIZATION  Completed    RSV VACCINE  Completed    AORTIC ANEURYSM SCREENING (SYSTEM ASSIGNED)  Completed    HPV IMMUNIZATION  Aged Out    MENINGITIS IMMUNIZATION  Aged Out    RSV MONOCLONAL ANTIBODY  Aged Out    LUNG CANCER SCREENING  Discontinued         Review of Systems  Constitutional, HEENT, cardiovascular, pulmonary, gi and gu systems are negative,  "except as otherwise noted.     Objective    Exam  /84   Pulse 57   Temp 97.2  F (36.2  C) (Temporal)   Resp 20   Ht 1.727 m (5' 8\")   Wt 103.9 kg (229 lb)   SpO2 97%   BMI 34.82 kg/m     Estimated body mass index is 34.82 kg/m  as calculated from the following:    Height as of this encounter: 1.727 m (5' 8\").    Weight as of this encounter: 103.9 kg (229 lb).    Physical Exam  GENERAL: alert and no distress  NECK: no adenopathy, no asymmetry, masses, or scars  RESP: lungs clear to auscultation - no rales, rhonchi or wheezes  CV: regular rate and rhythm, normal S1 S2, no S3 or S4, no murmur, click or rub, no peripheral edema  ABDOMEN: soft, nontender, no hepatosplenomegaly, no masses and bowel sounds normal  MS: no gross musculoskeletal defects noted, no edema  SKIN: no suspicious lesions or rashes  NEURO: Normal strength and tone, mentation intact and speech normal        9/26/2024   Mini Cog   Clock Draw Score 2 Normal   3 Item Recall 2 objects recalled   Mini Cog Total Score 4                 Signed Electronically by: Vernell Bucio MD    "

## 2024-09-26 NOTE — PATIENT INSTRUCTIONS
Patient Education   Preventive Care Advice   This is general advice given by our system to help you stay healthy. However, your care team may have specific advice just for you. Please talk to your care team about your preventive care needs.  Nutrition  Eat 5 or more servings of fruits and vegetables each day.  Try wheat bread, brown rice and whole grain pasta (instead of white bread, rice, and pasta).  Get enough calcium and vitamin D. Check the label on foods and aim for 100% of the RDA (recommended daily allowance).  Lifestyle  Exercise at least 150 minutes each week  (30 minutes a day, 5 days a week).  Do muscle strengthening activities 2 days a week. These help control your weight and prevent disease.  No smoking.  Wear sunscreen to prevent skin cancer.  Have a dental exam and cleaning every 6 months.  Yearly exams  See your health care team every year to talk about:  Any changes in your health.  Any medicines your care team has prescribed.  Preventive care, family planning, and ways to prevent chronic diseases.  Shots (vaccines)   HPV shots (up to age 26), if you've never had them before.  Hepatitis B shots (up to age 59), if you've never had them before.  COVID-19 shot: Get this shot when it's due.  Flu shot: Get a flu shot every year.  Tetanus shot: Get a tetanus shot every 10 years.  Pneumococcal, hepatitis A, and RSV shots: Ask your care team if you need these based on your risk.  Shingles shot (for age 50 and up)  General health tests  Diabetes screening:  Starting at age 35, Get screened for diabetes at least every 3 years.  If you are younger than age 35, ask your care team if you should be screened for diabetes.  Cholesterol test: At age 39, start having a cholesterol test every 5 years, or more often if advised.  Bone density scan (DEXA): At age 50, ask your care team if you should have this scan for osteoporosis (brittle bones).  Hepatitis C: Get tested at least once in your life.  STIs (sexually  transmitted infections)  Before age 24: Ask your care team if you should be screened for STIs.  After age 24: Get screened for STIs if you're at risk. You are at risk for STIs (including HIV) if:  You are sexually active with more than one person.  You don't use condoms every time.  You or a partner was diagnosed with a sexually transmitted infection.  If you are at risk for HIV, ask about PrEP medicine to prevent HIV.  Get tested for HIV at least once in your life, whether you are at risk for HIV or not.  Cancer screening tests  Cervical cancer screening: If you have a cervix, begin getting regular cervical cancer screening tests starting at age 21.  Breast cancer scan (mammogram): If you've ever had breasts, begin having regular mammograms starting at age 40. This is a scan to check for breast cancer.  Colon cancer screening: It is important to start screening for colon cancer at age 45.  Have a colonoscopy test every 10 years (or more often if you're at risk) Or, ask your provider about stool tests like a FIT test every year or Cologuard test every 3 years.  To learn more about your testing options, visit:   .  For help making a decision, visit:   https://bit.ly/iu18272.  Prostate cancer screening test: If you have a prostate, ask your care team if a prostate cancer screening test (PSA) at age 55 is right for you.  Lung cancer screening: If you are a current or former smoker ages 50 to 80, ask your care team if ongoing lung cancer screenings are right for you.  For informational purposes only. Not to replace the advice of your health care provider. Copyright   2023 [facility]. All rights reserved. Clinically reviewed by the [facility]. [employer] [id number] - REV 01/24.  Preventing Falls: Care Instructions  Injuries and health problems such as trouble walking or poor eyesight can increase your risk of falling. So can some medicines. But there are things you can do to help  "prevent falls. You can exercise to get stronger. You can also arrange your home to make it safer.    Talk to your doctor about the medicines you take. Ask if any of them increase the risk of falls and whether they can be changed or stopped.   Try to exercise regularly. It can help improve your strength and balance. This can help lower your risk of falling.     Practice fall safety and prevention.    Wear low-heeled shoes that fit well and give your feet good support. Talk to your doctor if you have foot problems that make this hard.  Carry a cellphone or wear a medical alert device that you can use to call for help.  Use stepladders instead of chairs to reach high objects. Don't climb if you're at risk for falls. Ask for help, if needed.  Wear the correct eyeglasses, if you need them.    Make your home safer.    Remove rugs, cords, clutter, and furniture from walkways.  Keep your house well lit. Use night-lights in hallways and bathrooms.  Install and use sturdy handrails on stairways.  Wear nonskid footwear, even inside. Don't walk barefoot or in socks without shoes.    Be safe outside.    Use handrails, curb cuts, and ramps whenever possible.  Keep your hands free by using a shoulder bag or backpack.  Try to walk in well-lit areas. Watch out for uneven ground, changes in pavement, and debris.  Be careful in the winter. Walk on the grass or gravel when sidewalks are slippery. Use de-icer on steps and walkways. Add non-slip devices to shoes.    Put grab bars and nonskid mats in your shower or tub and near the toilet. Try to use a shower chair or bath bench when bathing.   Get into a tub or shower by putting in your weaker leg first. Get out with your strong side first. Have a phone or medical alert device in the bathroom with you.   Where can you learn more?  Go to https://www.[employer].net/patiented  Enter G117 in the search box to learn more about \"Preventing Falls: Care Instructions.\"  Current as of: July 17, " 2023               Content Version: 14.0    2558-9658 [email or web address].   Care instructions adapted under license by your healthcare professional. If you have questions about a medical condition or this instruction, always ask your healthcare professional. [email or web address] disclaims any warranty or liability for your use of this information.      Hearing Loss: Care Instructions  Overview     Hearing loss is a sudden or slow decrease in how well you hear. It can range from slight to profound. Permanent hearing loss can occur with aging. It also can happen when you are exposed long-term to loud noise. Examples include listening to loud music, riding motorcycles, or being around other loud machines.  Hearing loss can affect your work and home life. It can make you feel lonely or depressed. You may feel that you have lost your independence. But hearing aids and other devices can help you hear better and feel connected to others.  Follow-up care is a key part of your treatment and safety. Be sure to make and go to all appointments, and call your doctor if you are having problems. It's also a good idea to know your test results and keep a list of the medicines you take.  How can you care for yourself at home?  Avoid loud noises whenever possible. This helps keep your hearing from getting worse.  Always wear hearing protection around loud noises.  Wear a hearing aid as directed.  A professional can help you pick a hearing aid that will work best for you.  You can also get hearing aids over the counter for mild to moderate hearing loss.  Have hearing tests as your doctor suggests. They can show whether your hearing has changed. Your hearing aid may need to be adjusted.  Use other devices as needed. These may include:  Telephone amplifiers and hearing aids that can connect to a television, stereo, radio, or microphone.  Devices that use lights or vibrations. These alert you to the doorbell, a ringing  "telephone, or a baby monitor.  Television closed-captioning. This shows the words at the bottom of the screen. Most new TVs can do this.  TTY (text telephone). This lets you type messages back and forth on the telephone instead of talking or listening. These devices are also called TDD. When messages are typed on the keyboard, they are sent over the phone line to a receiving TTY. The message is shown on a monitor.  Use text messaging, social media, and email if it is hard for you to communicate by telephone.  Try to learn a listening technique called speechreading. It is not lipreading. You pay attention to people's gestures, expressions, posture, and tone of voice. These clues can help you understand what a person is saying. Face the person you are talking to, and have them face you. Make sure the lighting is good. You need to see the other person's face clearly.  Think about counseling if you need help to adjust to your hearing loss.  When should you call for help?  Watch closely for changes in your health, and be sure to contact your doctor if:    You think your hearing is getting worse.     You have new symptoms, such as dizziness or nausea.   Where can you learn more?  Go to https://www.[employer].net/patiented  Enter R798 in the search box to learn more about \"Hearing Loss: Care Instructions.\"  Current as of: September 27, 2023               Content Version: 14.0    2827-6452 [employer].   Care instructions adapted under license by your healthcare professional. If you have questions about a medical condition or this instruction, always ask your healthcare professional. [employer], [employer] disclaims any warranty or liability for your use of this information.         "

## 2024-10-15 ENCOUNTER — VIRTUAL VISIT (OUTPATIENT)
Dept: FAMILY MEDICINE | Facility: CLINIC | Age: 74
End: 2024-10-15
Payer: COMMERCIAL

## 2024-10-15 ENCOUNTER — TELEPHONE (OUTPATIENT)
Dept: FAMILY MEDICINE | Facility: CLINIC | Age: 74
End: 2024-10-15

## 2024-10-15 DIAGNOSIS — Z90.5 S/P NEPHRECTOMY: ICD-10-CM

## 2024-10-15 DIAGNOSIS — R35.0 BENIGN PROSTATIC HYPERPLASIA WITH URINARY FREQUENCY: ICD-10-CM

## 2024-10-15 DIAGNOSIS — N40.1 BENIGN PROSTATIC HYPERPLASIA WITH URINARY FREQUENCY: ICD-10-CM

## 2024-10-15 DIAGNOSIS — N39.0 ACUTE UTI (URINARY TRACT INFECTION): Primary | ICD-10-CM

## 2024-10-15 DIAGNOSIS — R31.0 GROSS HEMATURIA: ICD-10-CM

## 2024-10-15 PROCEDURE — 99442 PR PHYSICIAN TELEPHONE EVALUATION 11-20 MIN: CPT | Mod: 93 | Performed by: FAMILY MEDICINE

## 2024-10-15 RX ORDER — NITROFURANTOIN 25; 75 MG/1; MG/1
100 CAPSULE ORAL 2 TIMES DAILY
Qty: 10 CAPSULE | Refills: 0 | Status: SHIPPED | OUTPATIENT
Start: 2024-10-15 | End: 2024-10-20

## 2024-10-15 NOTE — PATIENT INSTRUCTIONS
Please follow up with your urologist!    Urinary Tract Infections (UTI) in Men: Care Instructions  Overview     A urinary tract infection, or UTI, is a term for an infection anywhere between the kidneys and the urethra. (The urethra is the tube that carries urine from the bladder to outside the body.) Most UTIs are bladder infections. They often cause pain or burning when you urinate.  UTIs are caused by bacteria. This means they can be cured with antibiotics. Be sure to complete your treatment so that the infection does not get worse.  Follow-up care is a key part of your treatment and safety. Be sure to make and go to all appointments, and call your doctor if you are having problems. It's also a good idea to know your test results and keep a list of the medicines you take.  How can you care for yourself at home?  Take your antibiotics as prescribed. Do not stop taking them just because you feel better. You need to take the full course of antibiotics.  Take your medicines exactly as prescribed. Your doctor may have prescribed a medicine, such as phenazopyridine (Pyridium), to help relieve pain when you urinate. This turns your urine orange. You may stop taking it when your symptoms get better. But be sure to take all of your antibiotics, which treat the infection.  Drink extra water for the next day or two. This will help make the urine less concentrated and help wash out the bacteria causing the infection. (If you have kidney, heart, or liver disease and have to limit your fluids, talk with your doctor before you increase your fluid intake.)  Avoid drinks that are carbonated or have caffeine. They can irritate the bladder.  Urinate often. Try to empty your bladder each time.  To relieve pain, take a hot bath or lay a heating pad (set on low) over your lower belly or genital area. Never go to sleep with a heating pad in place.  To help prevent UTIs  Drink plenty of fluids. If you have kidney, heart, or liver  disease and have to limit fluids, talk with your doctor before you increase the amount of fluids you drink.  Urinate when you have the urge. Do not hold your urine for a long time. Urinate before you go to sleep.  Keep your penis clean.  Catheter care  If you have a drainage tube (catheter) in place, the following steps will help you care for it.  Always wash your hands before and after touching your catheter.  Check the area around the urethra for inflammation or signs of infection. Signs of infection include irritated, swollen, red, or tender skin, or pus around the catheter.  Clean the area around the catheter with soap and water two times a day. Dry with a clean towel afterward.  Do not apply powder or lotion to the skin around the catheter.  To empty the urine collection bag   Wash your hands with soap and water.  Without touching the drain spout, remove the spout from its sleeve at the bottom of the collection bag. Open the valve on the spout.  Let the urine flow out of the bag and into the toilet or a container. Do not let the tubing or drain spout touch anything.  After you empty the bag, clean the end of the drain spout with tissue and water. Close the valve and put the drain spout back into its sleeve at the bottom of the collection bag.  Wash your hands with soap and water.  When should you call for help?   Call your doctor now or seek immediate medical care if:    Symptoms such as a fever, chills, nausea, or vomiting get worse or happen for the first time.     You have new pain in your back just below your rib cage. This is called flank pain.     There is new blood or pus in your urine.     You are not able to take or keep down your antibiotics.   Watch closely for changes in your health, and be sure to contact your doctor if:    You are not getting better after taking an antibiotic for 2 days.     Your symptoms go away but then come back.   Where can you learn more?  Go to  "https://www.[employer].net/patiented  Enter S351 in the search box to learn more about \"Urinary Tract Infections (UTI) in Men: Care Instructions.\"  Current as of: November 15, 2023  Content Version: 14.2 2024 [facility] [employer] [facility].   Care instructions adapted under license by your healthcare professional. If you have questions about a medical condition or this instruction, always ask your healthcare professional. [employer] disclaims any warranty or liability for your use of this information.    "

## 2024-10-15 NOTE — TELEPHONE ENCOUNTER
PT had an annual visit with PCP 9/26/24.  PT talked about finishing an antibiotic for a UTI recently.    PT having blood in urine again. Wants additional antibiotic.  PT has one kidney.    Antibiotic was briefly mentioned at last OV.  Rec making a virtual visit today for this concern.  PT made appt.  RAKAN Paz

## 2024-10-15 NOTE — PROGRESS NOTES
"Dom is a 74 year old who is being evaluated via a billable telephone visit.    What phone number would you like to be contacted at? [phone]   How would you like to obtain your AVS? Mail a copy  Originating Location (pt. Location): Home    Distant Location (provider location):  On-site    Assessment & Plan     (N39.0) Acute UTI (urinary tract infection)  (primary encounter diagnosis)  Complicated by history of nephrectomy for kidney cancer, hx of BPH, and history of nephrolithiasis.  I strongly recommend getting ua done today. I will start abx given hx and risk factors. Strongly recommended follow up with urologist asap to evaluate for other causes of symptoms including bladder stones or masses, he understood and agreed.    Plan: UA Macroscopic with reflex to Microscopic and         Culture, nitroFURantoin         macrocrystal-monohydrate (MACROBID) 100 MG         capsule          (R31.0) Gross hematuria  With history of  (N40.1,  R35.0) Benign prostatic hyperplasia with urinary frequency  And history of left kidney cancer  (Z90.5) S/p nephrectomy  Comment: see above      Subjective   Dom is a 74 year old, presenting for the following health issues:  No chief complaint on file.        9/26/2024    11:03 AM   Additional Questions   Roomed by Ana María ZAMORA   Accompanied by Daughter, Marilyn     History of Present Illness       Reason for visit:  UTI and blood n urine   He is taking medications regularly.     Dom is a 74 year old male with history of kidney stones, s/p left nephrectomy 2021 due to kidney cancer, recently treated for UTI with antibiotics at an outside clinic(patient believes his cardiologist Balta), with new onset hematuria and difficulty urinating today. He denies fevers, chills, nausea, vomiting, low back pain. He does't feel that this is a kidney stone. He reports that he has BPH, and takes a water pill \"that makes me pee all the time\".     I note that he takes lasix/furosemide 20 mg " daily.      Review of Systems  Constitutional, HEENT, cardiovascular, pulmonary, gi and gu systems are negative, except as otherwise noted.      Objective           Vitals:  No vitals were obtained today due to virtual visit.    Physical Exam   General: Alert and no distress //Respiratory: No audible wheeze, cough, or shortness of breath // Psychiatric:  Appropriate affect, tone, and pace of words        Phone call duration: 14 minutes  Signed Electronically by: Vernell Bucio MD

## 2024-10-24 ENCOUNTER — HOSPITAL ENCOUNTER (OUTPATIENT)
Dept: CT IMAGING | Facility: CLINIC | Age: 74
Discharge: HOME | End: 2024-10-24
Attending: UROLOGY | Admitting: UROLOGY
Payer: COMMERCIAL

## 2024-10-24 DIAGNOSIS — C64.9 MALIGNANT NEOPLASM OF KIDNEY (H): ICD-10-CM

## 2024-10-24 LAB
CREAT BLD-MCNC: 1.4 MG/DL (ref 0.7–1.3)
EGFRCR SERPLBLD CKD-EPI 2021: 52 ML/MIN/1.73M2

## 2024-10-24 PROCEDURE — 82565 ASSAY OF CREATININE: CPT

## 2024-10-24 PROCEDURE — 250N000009 HC RX 250: Performed by: UROLOGY

## 2024-10-24 PROCEDURE — 250N000011 HC RX IP 250 OP 636: Performed by: UROLOGY

## 2024-10-24 PROCEDURE — 74177 CT ABD & PELVIS W/CONTRAST: CPT

## 2024-10-24 RX ORDER — IOPAMIDOL 755 MG/ML
112 INJECTION, SOLUTION INTRAVASCULAR ONCE
Status: COMPLETED | OUTPATIENT
Start: 2024-10-24 | End: 2024-10-24

## 2024-10-24 RX ADMIN — IOPAMIDOL 112 ML: 755 INJECTION, SOLUTION INTRAVENOUS at 11:48

## 2024-10-24 RX ADMIN — SODIUM CHLORIDE 72 ML: 9 INJECTION, SOLUTION INTRAVENOUS at 11:49

## 2024-10-25 DIAGNOSIS — M62.830 BACK MUSCLE SPASM: ICD-10-CM

## 2024-10-25 RX ORDER — GABAPENTIN 400 MG/1
400 CAPSULE ORAL 3 TIMES DAILY
Qty: 90 CAPSULE | Refills: 11 | Status: SHIPPED | OUTPATIENT
Start: 2024-10-25

## 2024-11-05 NOTE — TELEPHONE ENCOUNTER
Do thyroid lab   Will message with results   For excess cortisol  -collect your urine for 24 hours: wake up in the morning and urinate in the toilet as normally done for the first urine of the day. Then all other urine for that day PLUS the first urine of the next day goes into the jug and then return to lab     Today's office note & most recent EKG faxed to [phone] as requested. Farheen BLEDSOE

## 2024-11-14 ENCOUNTER — TRANSFERRED RECORDS (OUTPATIENT)
Dept: HEALTH INFORMATION MANAGEMENT | Facility: CLINIC | Age: 74
End: 2024-11-14
Payer: COMMERCIAL

## 2024-12-02 ENCOUNTER — TELEPHONE (OUTPATIENT)
Dept: FAMILY MEDICINE | Facility: CLINIC | Age: 74
End: 2024-12-02
Payer: COMMERCIAL

## 2024-12-02 NOTE — TELEPHONE ENCOUNTER
General Call    Contacts       Contact Date/Time Type Contact Phone/Fax    12/02/2024 03:59 PM CST Phone (Incoming) Dom Josue (Self) [phone] (H)          Reason for Call:  Requesting renewal on medication used last time he had blood in urine    What are your questions or concerns:  Was advised he would need re-evaluation to obtain more medication, but patient states he was never evaluated last time.   [facility] [place]    Date of last appointment with provider: N/A    Okay to leave a detailed message?: Yes at Home number on file [phone] (home)

## 2024-12-04 NOTE — TELEPHONE ENCOUNTER
Clinic RN-Please triage.    Thank you!  IMMANUEL CardonaN, RN-[facility]

## 2024-12-04 NOTE — TELEPHONE ENCOUNTER
Writer called and left message on patient's voicemail to call back and speak with a triage nurse.    TRIAGE:   Blood in urine.     Violetta Prabhakar, IMMANUELN RN  [facility]

## 2024-12-09 NOTE — TELEPHONE ENCOUNTER
Patient called back:    - He stated he does not have any blood in his urine.   - it hurts when he first start to urinate but does not hurt once he urinates. This usually happens in the morning and goes away throughout the day.   - no fever or other symptoms.  - last time, he called and was given antibiotic over the phone and it went away.    Offered patient an appointment but patient refused. He is asking if PCP can just send him something again. If not, then he'll schedule an appointment.      Bhanu Sheehan Cem Say, BSN RN  [facility]

## 2024-12-10 NOTE — TELEPHONE ENCOUNTER
Left detailed voicemail with PCP recommendation. Will try one more outreach attempt later today to ensure patient is aware of POC advisement.    CHARLY Hunt, BSN, PHN, AMB-BC (she/her)  [facility] RN

## 2024-12-10 NOTE — TELEPHONE ENCOUNTER
"I'd done a virtual visit with him 10/15/24 and prescribed abx for symptoms, but had asked him to get a UA done, which he did not complete.    He has a significant urologic history. He sees an outside urologist, I can see the encounter but not the visit note. See below.    He needs to contact his urologist office, I recommend in person visit and labs to assess his current symptoms.     Sincerely,  Dr. Vernell Bucio MD  12/10/2024      10/17/2024 text/html HPI Notes: Follow up for kidney cancer, T3a manged with a nephrectomy in 2021. 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 He had a bad fall on the ice in 2023 and had a prolonged recovery. He had a UTI which recurred but now he's clear. This was associated with hematuria. 10/17/24: UA positive for blood. He is still taking antibiotics for his UTI. Tyree Enrique MD  [address] [place] [address],[facility] [address], [address], [facility] 10/17/2024 10:36:57     02/25/2021 text/html HPI Notes: he patient is a 71-year old gentleman who has a history of kidney stones in the remote past (had prior surgery 15 years ago for kidney stone). He had a sten place in by Dr. Plascencia on 2/18/21. MRI was done a a renal mass was found. He is having some pain from the stent. His original imaging was done for kidney stones. He has been passing some blood clots. He has a stent in place. Stone still present above a stricture. He's tolerating the stent moderately well. I did personally reviewed the CT images and the mass is quite large. Renal vein looks fine.     No components found for: \"URINE\"    "

## 2024-12-10 NOTE — TELEPHONE ENCOUNTER
Spoke with patient and relayed POC recommendations from clinician. Patient in agreement with this plan.    CHARLY Hunt BSN, PHN, AMB-BC (she/her)  [facility] RN

## 2024-12-11 ENCOUNTER — MEDICAL CORRESPONDENCE (OUTPATIENT)
Dept: HEALTH INFORMATION MANAGEMENT | Facility: CLINIC | Age: 74
End: 2024-12-11
Payer: COMMERCIAL

## 2024-12-11 ENCOUNTER — TRANSFERRED RECORDS (OUTPATIENT)
Dept: HEALTH INFORMATION MANAGEMENT | Facility: CLINIC | Age: 74
End: 2024-12-11
Payer: COMMERCIAL

## 2024-12-12 ENCOUNTER — TRANSCRIBE ORDERS (OUTPATIENT)
Dept: OTHER | Age: 74
End: 2024-12-12

## 2024-12-12 DIAGNOSIS — R91.1 LUNG NODULE, SOLITARY: Primary | ICD-10-CM

## 2025-01-06 ENCOUNTER — TRANSFERRED RECORDS (OUTPATIENT)
Dept: HEALTH INFORMATION MANAGEMENT | Facility: CLINIC | Age: 75
End: 2025-01-06
Payer: COMMERCIAL

## 2025-01-14 ENCOUNTER — OFFICE VISIT (OUTPATIENT)
Dept: FAMILY MEDICINE | Facility: CLINIC | Age: 75
End: 2025-01-14
Payer: COMMERCIAL

## 2025-01-14 VITALS
TEMPERATURE: 97 F | WEIGHT: 232.2 LBS | HEART RATE: 59 BPM | OXYGEN SATURATION: 97 % | HEIGHT: 69 IN | SYSTOLIC BLOOD PRESSURE: 140 MMHG | DIASTOLIC BLOOD PRESSURE: 70 MMHG | BODY MASS INDEX: 34.39 KG/M2

## 2025-01-14 DIAGNOSIS — Z01.818 PRE-OPERATIVE GENERAL PHYSICAL EXAMINATION: Primary | ICD-10-CM

## 2025-01-14 DIAGNOSIS — N18.31 CKD STAGE 3A, GFR 45-59 ML/MIN (H): ICD-10-CM

## 2025-01-14 DIAGNOSIS — Z95.5 HISTORY OF HEART ARTERY STENT: ICD-10-CM

## 2025-01-14 DIAGNOSIS — I10 HYPERTENSION GOAL BP (BLOOD PRESSURE) < 140/90: ICD-10-CM

## 2025-01-14 DIAGNOSIS — Z90.5 S/P NEPHRECTOMY: ICD-10-CM

## 2025-01-14 DIAGNOSIS — E66.811 OBESITY (BMI 30.0-34.9): ICD-10-CM

## 2025-01-14 DIAGNOSIS — R91.1 SOLITARY PULMONARY NODULE: ICD-10-CM

## 2025-01-14 DIAGNOSIS — I25.10 CORONARY ARTERY DISEASE INVOLVING NATIVE CORONARY ARTERY OF NATIVE HEART WITHOUT ANGINA PECTORIS: ICD-10-CM

## 2025-01-14 DIAGNOSIS — Z86.69 HISTORY OF OBSTRUCTIVE SLEEP APNEA: ICD-10-CM

## 2025-01-14 DIAGNOSIS — R73.03 PRE-DIABETES: ICD-10-CM

## 2025-01-14 DIAGNOSIS — N40.0 BENIGN PROSTATIC HYPERPLASIA, UNSPECIFIED WHETHER LOWER URINARY TRACT SYMPTOMS PRESENT: ICD-10-CM

## 2025-01-14 DIAGNOSIS — Z87.442 HISTORY OF KIDNEY STONES: ICD-10-CM

## 2025-01-14 DIAGNOSIS — E78.5 HYPERLIPIDEMIA, UNSPECIFIED HYPERLIPIDEMIA TYPE: ICD-10-CM

## 2025-01-14 DIAGNOSIS — Z85.528 HISTORY OF MALIGNANT NEOPLASM OF KIDNEY: ICD-10-CM

## 2025-01-14 LAB
ALBUMIN SERPL BCG-MCNC: 4.2 G/DL (ref 3.5–5.2)
ALP SERPL-CCNC: 121 U/L (ref 40–150)
ALT SERPL W P-5'-P-CCNC: 26 U/L (ref 0–70)
ANION GAP SERPL CALCULATED.3IONS-SCNC: 9 MMOL/L (ref 7–15)
AST SERPL W P-5'-P-CCNC: 34 U/L (ref 0–45)
BASOPHILS # BLD AUTO: 0 10E3/UL (ref 0–0.2)
BASOPHILS NFR BLD AUTO: 0 %
BILIRUB SERPL-MCNC: 1.7 MG/DL
BUN SERPL-MCNC: 15.2 MG/DL (ref 8–23)
CALCIUM SERPL-MCNC: 9.5 MG/DL (ref 8.8–10.4)
CHLORIDE SERPL-SCNC: 104 MMOL/L (ref 98–107)
CREAT SERPL-MCNC: 1.43 MG/DL (ref 0.67–1.17)
EGFRCR SERPLBLD CKD-EPI 2021: 51 ML/MIN/1.73M2
EOSINOPHIL # BLD AUTO: 0.1 10E3/UL (ref 0–0.7)
EOSINOPHIL NFR BLD AUTO: 1 %
ERYTHROCYTE [DISTWIDTH] IN BLOOD BY AUTOMATED COUNT: 13.1 % (ref 10–15)
EST. AVERAGE GLUCOSE BLD GHB EST-MCNC: 120 MG/DL
GLUCOSE SERPL-MCNC: 126 MG/DL (ref 70–99)
HBA1C MFR BLD: 5.8 % (ref 0–5.6)
HCO3 SERPL-SCNC: 28 MMOL/L (ref 22–29)
HCT VFR BLD AUTO: 48.4 % (ref 40–53)
HGB BLD-MCNC: 15.6 G/DL (ref 13.3–17.7)
IMM GRANULOCYTES # BLD: 0 10E3/UL
IMM GRANULOCYTES NFR BLD: 0 %
LYMPHOCYTES # BLD AUTO: 1.4 10E3/UL (ref 0.8–5.3)
LYMPHOCYTES NFR BLD AUTO: 18 %
MCH RBC QN AUTO: 31.5 PG (ref 26.5–33)
MCHC RBC AUTO-ENTMCNC: 32.2 G/DL (ref 31.5–36.5)
MCV RBC AUTO: 98 FL (ref 78–100)
MONOCYTES # BLD AUTO: 0.6 10E3/UL (ref 0–1.3)
MONOCYTES NFR BLD AUTO: 7 %
NEUTROPHILS # BLD AUTO: 5.8 10E3/UL (ref 1.6–8.3)
NEUTROPHILS NFR BLD AUTO: 73 %
PLATELET # BLD AUTO: 183 10E3/UL (ref 150–450)
POTASSIUM SERPL-SCNC: 4.4 MMOL/L (ref 3.4–5.3)
PROT SERPL-MCNC: 7.5 G/DL (ref 6.4–8.3)
RBC # BLD AUTO: 4.95 10E6/UL (ref 4.4–5.9)
SODIUM SERPL-SCNC: 141 MMOL/L (ref 135–145)
WBC # BLD AUTO: 7.9 10E3/UL (ref 4–11)

## 2025-01-14 ASSESSMENT — PAIN SCALES - GENERAL: PAINLEVEL_OUTOF10: NO PAIN (0)

## 2025-01-14 NOTE — LETTER
January 15, 2025      Dom Josue  [address]  [facility] [address]        Dear ,    We are writing to inform you of your test results.    Results within acceptable limits.    -Normal red blood cell (hgb) levels, normal white blood cell count and normal platelet levels.   Hemoglobin A1c indicates prediabetes with hemoglobin A1c of 5.8. Stable to prior Encouraged to lower carbohydrate intake.       Resulted Orders   Hemoglobin A1c   Result Value Ref Range    Estimated Average Glucose 120 (H) <117 mg/dL    Hemoglobin A1C 5.8 (H) 0.0 - 5.6 %      Comment:      Normal <5.7%   Prediabetes 5.7-6.4%    Diabetes 6.5% or higher     Note: Adopted from ADA consensus guidelines.   CBC with platelets and differential   Result Value Ref Range    WBC Count 7.9 4.0 - 11.0 10e3/uL    RBC Count 4.95 4.40 - 5.90 10e6/uL    Hemoglobin 15.6 13.3 - 17.7 g/dL    Hematocrit 48.4 40.0 - 53.0 %    MCV 98 78 - 100 fL    MCH 31.5 26.5 - 33.0 pg    MCHC 32.2 31.5 - 36.5 g/dL    RDW 13.1 10.0 - 15.0 %    Platelet Count 183 150 - 450 10e3/uL    % Neutrophils 73 %    % Lymphocytes 18 %    % Monocytes 7 %    % Eosinophils 1 %    % Basophils 0 %    % Immature Granulocytes 0 %    Absolute Neutrophils 5.8 1.6 - 8.3 10e3/uL    Absolute Lymphocytes 1.4 0.8 - 5.3 10e3/uL    Absolute Monocytes 0.6 0.0 - 1.3 10e3/uL    Absolute Eosinophils 0.1 0.0 - 0.7 10e3/uL    Absolute Basophils 0.0 0.0 - 0.2 10e3/uL    Absolute Immature Granulocytes 0.0 <=0.4 10e3/uL       If you have any questions or concerns, please call the clinic at the number listed above.       Sincerely,      Lorelei Villarreal MD - PC    Electronically signed

## 2025-01-14 NOTE — PATIENT INSTRUCTIONS
Labs and diagnostics today   If stable will clear for surgery otherwise may need additional work up   Take only metoprolol & amlodipine am of surgery with small sip of water   Statin after surgery   Hold aspirin, antiinflammatories like motrin aleve etc, fish oil and glucosamine preferable 5 to 7 days prior to surgery  Will fax /s end note to surgeon once completed     How to Take Your Medication Before Surgery  Preoperative Medication Instructions   Antiplatelet or Anticoagulation Medication Instructions   - aspirin: Discontinue aspirin 7 days prior to procedure to reduce bleeding risk. It should be resumed postoperatively.     Additional Medication Instructions   - Herbal medications and vitamins: DO NOT TAKE 14 days prior to surgery.   - ACE/ARB/ARNI (lisinopril, enalapril, losartan, valsartan, olmesartan, sacubritril/valsartan) : DO NOT TAKE on day of surgery (minimum 11 hours for general anesthesia).   - Beta Blockers (atenolol, metoprolol, propranolol) : Continue taking on the day of surgery.   - Calcium Channel Blockers (amlodipine, diltiazem, felodipine): May be continued on the day of surgery.   - Diuretics (furosemide, hydrochlorothiazide, chlorothalidone): DO NOT TAKE on the day of surgery.   - Statins (atorvastatin, simvastatin, pravastatin) : Continue taking on the day of surgery.        Patient Education   Preparing for Your Surgery  For Adults  Getting started  In most cases, a nurse will call to review your health history and instructions. They will give you an arrival time based on your scheduled surgery time. Please be ready to share:  Your doctor's clinic name and phone number  Your medical, surgical, and anesthesia history  A list of allergies and sensitivities  A list of medicines, including herbal treatments and over-the-counter drugs  Whether the patient has a legal guardian (ask how to send us the papers in advance)  Note: You may not receive a call if you were seen at our PAC (Preoperative  Assessment Center).  Please tell us if you're pregnant--or if there's any chance you might be pregnant. Some surgeries may injure a fetus (unborn baby), so they require a pregnancy test. Surgeries that are safe for a fetus don't always need a test, and you can choose whether to have one.   Preparing for surgery  Within 10 to 30 days of surgery: Have a pre-op exam (sometimes called an H&P, or History and Physical). This can be done at a clinic or pre-operative center.  If you're having a , you may not need this exam. Talk to your care team.  At your pre-op exam, talk to your care team about all medicines you take. (This includes CBD oil and any drugs, such as THC, marijuana, and other forms of cannabis.) If you need to stop any medicine before surgery, ask when to start taking it again.  This is for your safety. Many medicines and drugs can make you bleed too much during surgery. Some change how well surgery (anesthesia) drugs work.  Call your insurance company to let them know you're having surgery. (If you don't have insurance, call [phone].)  Call your clinic if there's any change in your health. This includes a scrape or scratch near the surgery site, or any signs of a cold (sore throat, runny nose, cough, rash, fever).  Eating and drinking guidelines  For your safety: Unless your surgeon tells you otherwise, follow the guidelines below.  Eat and drink as normal until 8 hours before you arrive for surgery. After that, no food or milk. You can spit out gum when you arrive.  Drink clear liquids until 2 hours before you arrive. These are liquids you can see through, like water, Gatorade, and Propel Water. They also include plain black coffee and tea (no cream or milk).  No alcohol for 24 hours before you arrive. The night before surgery, stop any drinks that contain THC.  If your care team tells you to take medicine on the morning of surgery, it's okay to take it with a sip of water. No other medicines  or drugs are allowed (including CBD oil)--follow your care team's instructions.  If you have questions the day of surgery, call your hospital or surgery center.   Preventing infection  Shower or bathe the night before and the morning of surgery. Follow the instructions your clinic gave you. (If no instructions, use regular soap.)  Don't shave or clip hair near your surgery site. We'll remove the hair if needed.  Don't smoke or vape the morning of surgery. No chewing tobacco for 6 hours before you arrive. A nicotine patch is okay. You may spit out nicotine gum when you arrive.  For some surgeries, the surgeon will tell you to fully quit smoking and nicotine.  We will make every effort to keep you safe from infection. We will:  Clean our hands often with soap and water (or an alcohol-based hand rub).  Clean the skin at your surgery site with a special soap that kills germs.  Give you a special gown to keep you warm. (Cold raises the risk of infection.)  Wear hair covers, masks, gowns, and gloves during surgery.  Give antibiotic medicine, if prescribed. Not all surgeries need this medicine.  What to bring on the day of surgery  Photo ID and insurance card  Copy of your health care directive, if you have one  Glasses and hearing aids (bring cases)  You can't wear contacts during surgery  Inhaler and eye drops, if you use them (tell us about these when you arrive)  CPAP machine or breathing device, if you use them  A few personal items, if spending the night  If you have . . .  A pacemaker, ICD (cardiac defibrillator), or other implant: Bring the ID card.  An implanted stimulator: Bring the remote control.  A legal guardian: Bring a copy of the certified (court-stamped) guardianship papers.  Please remove any jewelry, including body piercings. Leave jewelry and other valuables at home.  If you're going home the day of surgery  You must have a responsible adult drive you home. They should stay with you overnight as  well.  If you don't have someone to stay with you, and you aren't safe to go home alone, we may keep you overnight. Insurance often won't pay for this.  After surgery  If it's hard to control your pain or you need more pain medicine, please call your surgeon's office.  Questions?   If you have any questions for your care team, list them here:   ____________________________________________________________________________________________________________________________________________________________________________________________________________________________________________________________  For informational purposes only. Not to replace the advice of your health care provider. Copyright   2003, 2019 [facility]. All rights reserved. Clinically reviewed by Adolph Kessler MD. [employer] [id number] - REV 08/24.

## 2025-01-14 NOTE — PROGRESS NOTES
The below note was dictated using voice recognition. Although reviewed after completion, some word and grammatical error may remain .       Preoperative Evaluation  [facility]  [address]  [address]  [place] [address]  Phone: [phone]  Fax: [phone]  Primary Provider: Vernell Bucio MD  Pre-op Performing Provider: Lorelei Villarreal MD  Jan 14, 2025 1/14/2025   Surgical Information   What procedure is being done? RIGHT VIDEO ASSISTED THORACOSCOPY WEDGE RESECTION, RIGHT UPPER LOBE LUNG NODULE   Facility or Hospital where procedure/surgery will be performed: [facility]   Who is doing the procedure / surgery? Anmol Carrillo MD   Date of surgery / procedure: 1/24/2025   Time of surgery / procedure: TBA   Where do you plan to recover after surgery? at home with family     Fax number for surgical facility: Note does not need to be faxed, will be available electronically in Epic.    Assessment & Plan     The proposed surgical procedure is considered INTERMEDIATE risk.    Pre-operative general physical examination  Solitary pulmonary nodule- CBC with platelets and differential; Future  75-year-old gentleman, former smoker, obesity BMI 34, history of obstructive sleep apnea, history of CAD with TRISH stent x 2 to LAD and occluded RCA with collaterals in 2007 stable asymptomatic on beta-blocker ARB and statin not requiring nitro under care of cardiology yearly, history of hypertension on amlodipine losartan Lasix and metoprolol, hyperlipidemia on statin, prediabetes, CKD 3, BPH, history of renal cancer s/p nephrectomy in 2021, history of kidney stone s/p lithotripsy in 2003, noted as a 3 cm right UPJ junction stone on CT imaging done in the fall 2024 appears asymptomatic, resolved hematuria, history of right lower leg skin graft, prior T8-L1 microdiscectomy fusion lumbar spine in 2023, prior colonoscopy, on gabapentin, history of dermatitis on  Kenalog as needed, allergy to soy, under care of PCP Dr. Bucio, seen by PCP virtually in October 2024 for hematuria advised to see his urologist seen by [facility] in the fall CT showed no mets, right UPJ 3 cm stone and an enlarging right upper lobe lung nodule.  Was referred to [facility] seen by them in December for the 1.4 cm nodule suspicious for adenocarcinoma PET scan ordered records not available PFT's done December 27, 2024 showed mild restrictive lung disease.    Here today for preop for upcoming right lung VAT wedge resection right upper lobe nodule removal on 1/24/2025.  New to this provider.  Labs and diagnostics today   If stable will clear for surgery otherwise may need additional work up   Discussed EKG at visit normal sinus bradycardia no acute ischemic changes unchanged from prior  Had a Normal red blood cell (Hgb) levels, normal white blood cell count and normal platelet levels.  Hemoglobin A1c indicates prediabetes with hemoglobin A1c of 5.8. Stable to prior Encouraged to lower carbohydrate intake.    CMP is not back.  Nuclear stress test in 2021 showed infarction of the inferior wall, which was consistent with his prior occlusion of his right coronary artery, and only a small amount of ischemia in the anterior septum. On ASA, atorvastatin, Toprol XL beta-blocker, ARB calcium channel blocker. Seen by cards 7/2024 & reported No recurrent or recent angina; no NTG use long time.. Hx of Bradycardia, No dizziness or lightheadedness. Stable on Toprol XL 25 mg daily. Stable lipid profile, on statin.  Essential hypertension, usually stable   Currently BP high as didn't take any of his meds this morning.  Initially 158/80 recheck 140/70.  Asymptomatic. Hesitant to return to check blood pressure meds on medication.  I feel he is medically optimized for upcoming surgery.  Advised to take only metoprolol & amlodipine am of surgery with small sip of water   Statin to be resumed after surgery    Hold aspirin, antiinflammatories like motrin aleve etc, fish oil and glucosamine preferable 5 to 7 days prior to surgery stool otherwise by surgeon or cardiologist.  To be monitored closely for hypoxemia perioperatively given possibility of untreated obstructive sleep apnea  Will fax /send note to surgeon once completed   Encouraged to follow-up with PCP Dr. Bucio for routine care and continue routine care with cardiology and with [facility], [facility] and [facility] surgeon.  - CBC with platelets and differential; Future  - Comprehensive metabolic panel (BMP + Alb, Alk Phos, ALT, AST, Total. Bili, TP); Future  - Hemoglobin A1c; Future  - EKG 12-lead complete w/read - Clinics  - CBC with platelets and differential  - Comprehensive metabolic panel (BMP + Alb, Alk Phos, ALT, AST, Total. Bili, TP)  - Hemoglobin A1c    Coronary artery disease involving native coronary artery of native heart without angina pectoris  History of heart artery stent  Currently asymptomatic no history of angina or nitro use in a long time.  Medically optimized on aspirin beta-blocker calcium channel blocker ARB and statin.  - EKG 12-lead complete w/read - Clinics    Hypertension goal BP (blood pressure) < 140/90  Essential hypertension, usually stable. Currently BP high as didn't take any of his meds this morning as the Lasix makes him urinate. BP initially 158/80 recheck 140/70.  Asymptomatic. Hesitant to return to check blood pressure meds on medication. I feel he is medically optimized for upcoming surgery. Advised to take only metoprolol & amlodipine am of surgery with small sip of water   - Comprehensive metabolic panel (BMP + Alb, Alk Phos, ALT, AST, Total. Bili, TP); Future  - EKG 12-lead complete w/read - Clinics  - Comprehensive metabolic panel (BMP + Alb, Alk Phos, ALT, AST, Total. Bili, TP)    Obesity (BMI 30.0-34.9)  Complicates risk of perioperative morbidity and mortality.  - CBC with platelets and differential;  Future  - CBC with platelets and differential    Hyperlipidemia, unspecified hyperlipidemia type  Stable on a statin medication  - EKG 12-lead complete w/read - Clinics    Pre-diabetes  HBA1c unchanged at 5.8 encouraged a low-carb diet follow-up with PCP  - Comprehensive metabolic panel (BMP + Alb, Alk Phos, ALT, AST, Total. Bili, TP); Future  - Hemoglobin A1c; Future  - Comprehensive metabolic panel (BMP + Alb, Alk Phos, ALT, AST, Total. Bili, TP)  - Hemoglobin A1c    History of obstructive sleep apnea  Possible undiagnosed sleep apnea given neck circumference body habitus risk factors.    CKD stage 3a, GFR 45-59 ml/min (H)  Has only had 1 kidney s/p nephrectomy for kidney cancer.  Has a unobstructed 3 cm kidney stone noted right PU J appears asymptomatic resolved episode of hematuria in the fall may have been a passing of a stone.  Under care of [facility] and to continue care with them we will see what CMP looks like today.  If stable to prior may proceed with surgery  - Comprehensive metabolic panel (BMP + Alb, Alk Phos, ALT, AST, Total. Bili, TP); Future  - Comprehensive metabolic panel (BMP + Alb, Alk Phos, ALT, AST, Total. Bili, TP)    History of malignant neoplasm of kidney  S/p nephrectomy  History of kidney stones  Benign prostatic hyperplasia, unspecified whether lower urinary tract symptoms present  Continue care with his [facility] urologist  Monitor for perioperative urinary retention, avoid acute kidney injury by staying hydrated    Possible Sleep Apnea:        1/14/2025    12:23 PM   STOP-Bang Total Score   Total Score 6   Risk Stratification 5 - 8: High Risk for JAMEE           - No identified additional risk factors other than previously addressed    Preoperative Medication Instructions  Antiplatelet or Anticoagulation Medication Instructions   - aspirin: Discontinue aspirin 7 days prior to procedure to reduce bleeding risk. It should be resumed postoperatively.     Additional Medication  Instructions   - Herbal medications and vitamins: DO NOT TAKE 14 days prior to surgery.   - ACE/ARB/ARNI (lisinopril, enalapril, losartan, valsartan, olmesartan, sacubritril/valsartan) : DO NOT TAKE on day of surgery (minimum 11 hours for general anesthesia).   - Beta Blockers (atenolol, metoprolol, propranolol) : Continue taking on the day of surgery.   - Calcium Channel Blockers (amlodipine, diltiazem, felodipine): May be continued on the day of surgery.   - Diuretics (furosemide, hydrochlorothiazide, chlorthalidone): DO NOT TAKE on the day of surgery.   - Statins (atorvastatin, simvastatin, pravastatin) : Continue taking on the day of surgery.     Recommendation  Approval given to proceed with proposed procedure, without further diagnostic evaluation as long as CMP stable as well          Subjective   Dom is a 75 year old, presenting for the following:  Pre-Op Exam          1/14/2025     9:49 AM   Additional Questions   Roomed by Jorge Luis LOPEZ related to upcoming procedure: here for preop for surgery          1/14/2025   Pre-Op Questionnaire   Have you ever had a heart attack or stroke? (!) YES , in 2007, stents in place no symptoms lately, no angina in a while, never used nitro    Have you ever had surgery on your heart or blood vessels, such as a stent placement, a coronary artery bypass, or surgery on an artery in your head, neck, heart, or legs? (!) YES hx of TRISH x 2 LAD in 2007   Do you have chest pain with activity? Denies any chest pain with activity in a long time   Do you have a history of heart failure? (!) YES ? Hx, last echo 2015 ef 55 %, stress tests in 2021 no new findings per cards,    Do you currently have a cold, bronchitis or symptoms of other infection? No   Do you have a cough, shortness of breath, or wheezing? No   Do you or anyone in your family have previous history of blood clots? No   Do you or does anyone in your family have a serious bleeding problem such as prolonged bleeding  following surgeries or cuts? No   Have you ever had problems with anemia or been told to take iron pills? No   Have you had any abnormal blood loss such as black, tarry or bloody stools? No   Have you ever had a blood transfusion? Unknown if had one   Have you ever had a transfusion reaction? No   Are you willing to have a blood transfusion if it is medically needed before, during, or after your surgery? Yes   Have you or any of your relatives ever had problems with anesthesia? No   Do you have sleep apnea, excessive snoring or daytime drowsiness? (!) YES, dx with ? JAMEE, not using a CPAP    Do you have a CPAP machine? (!) NO    Do you have any artifical heart valves or other implanted medical devices like a pacemaker, defibrillator, or continuous glucose monitor? No   Do you have artificial joints? No   Are you allergic to latex? No     Health Care Directive  Patient has a Health Care Directive on file    Preoperative Review of    reviewed - controlled substances reflected in medication list.      75-year-old gentleman, former smoker, obesity BMI 34, history of obstructive sleep apnea, history of CAD with TRISH stent x 2 to LAD and occluded RCA with collaterals in 2007 stable asymptomatic on beta-blocker ARB and statin not requiring nitro under care of cardiology yearly, history of hypertension on amlodipine losartan Lasix and metoprolol, hyperlipidemia on statin, prediabetes, CKD 3, BPH, history of renal cancer s/p nephrectomy in 2021, history of kidney stone s/p lithotripsy in 2003, noted as a 3 cm right UPJ junction stone on CT imaging done in the fall 2024 appears asymptomatic, resolved hematuria, history of right lower leg skin graft, prior T8-L1 microdiscectomy fusion lumbar spine in 2023, prior colonoscopy, on gabapentin, history of dermatitis on Kenalog as needed, allergy to soy, under care of PCP Dr. Bucio, seen by PCP virtually in October 2024 for hematuria advised to see his urologist seen by  [facility] in the fall CT showed no mets, right UPJ 3 cm stone and an enlarging right upper lobe lung nodule.  Was referred to [facility] seen by them in December for the 1.4 cm nodule suspicious for adenocarcinoma PET scan ordered records not available PFT's done December 27, 2024 showed mild restrictive lung disease.  [facility]  notes getting gabapentin 400 mg number 90 mL last on 12/28/2024.    Here today for preop for upcoming right lung VAT wedge resection right upper lobe nodule removal on 1/24/2025.  New to this provider.    Coronary artery disease. - had past stenting of the LAD back in 2007, and also has known chronically occluded RCA with collateralization.  In May 2023, patient unfortunately had a severe spine injury with T9/T11 fractures, as well as fractures from ribs 8 through 11.  He had extensive surgery to his back in this regard in 2023.  During that time, his beta-blocker was stopped because of bradycardia.  Following surgery, patient noticed exertional chest discomfort.  He was experiencing discomfort at rest, and the pattern was somewhat atypical.  He was resumed back on metoprolol 25 mg (half of his prior dose), and recommended a Zio patch heart monitor. Zio patch monitor capture data for 7 days.  Patient triggered events correlated with sinus rhythm with rates between 52-70.  Low heart rate noted to be 41 bpm and sinus bradycardia.  Maximum heart rate recorded at 102.  There was no evidence of high-grade AV block or pauses.  He reported no associated dizziness or lightheadedness. A nuclear stress test done 2023 was performed because of the chest discomfort showing that he had a medium size area of transmural infarct in the inferior and inferolateral walls, consistent with his occluded RCA.  There was a mild degree of millicent-infarct ischemia noted.  And a small area of nontransmural infarct in the mid to distal anteroseptum noted. Nuclear stress test showing infarction of the  inferior wall, which is consistent with his prior occlusion of his right coronary artery, and only a small amount of ischemia in the anterior septum. On ASA, atorvastatin, Toprol XL. When he saw Dr. Adarsh Gifford cardiologist  in August 2023, his symptoms of chest discomfort have resolved entirely.  His medications were continued without any adjustments at that time. Seen by cards 7/2024 & reported No recurrent angina; no NTG use.. Hx of Bradycardia, No dizziness or lightheadedness. Stable on Toprol XL 25 mg daily. Stable lipid profile, on statin.  Essential hypertension, stable  & advised No changes to plan of care,  to follow up with Dr. Toro in 1-year for annual visit.   Currently BP high as didn't take any of his meds.  Initially 158/80 recheck 140/70.  Asymptomatic  Was normal at cards office few months ago. Hesitant to come back . EKG done before visit shows ns bradycardia no ischemia,     Status of Chronic Conditions:  See problem list for active medical problems.  Problems all longstanding and stable, except as noted/documented.  See ROS for pertinent symptoms related to these conditions.    Patient Active Problem List    Diagnosis Date Noted    Solitary pulmonary nodule 01/14/2025     Priority: Medium    History of heart artery stent 09/26/2024     Priority: Medium    History of malignant neoplasm of kidney 12/16/2022     Priority: Medium    S/p nephrectomy 08/20/2021     Priority: Medium    Obesity (BMI 30.0-34.9) 08/20/2021     Priority: Medium    Dermatitis 08/20/2021     Priority: Medium    BPH (benign prostatic hyperplasia) 03/16/2021     Priority: Medium    CKD stage 3a, GFR 45-59 ml/min (H) 03/01/2021     Priority: Medium    Pre-diabetes 04/10/2019     Priority: Medium    Hyperlipidemia      Priority: Medium     Diagnosis updated by automated process. Provider to review and confirm.      CAD (coronary artery disease) 02/05/2015     Priority: Medium    Hypertension goal BP (blood pressure) <  140/90 02/05/2015     Priority: Medium      Past Medical History:   Diagnosis Date    CAD (coronary artery disease) 02/05/2015    3 stents    History of angina     History of skin graft     Right lower limb    Hypertension goal BP (blood pressure) < 140/90 02/05/2015    Kidney stone 02/17/2021    Malignant neoplasm of kidney excluding renal pelvis, left (H) 2021    Sleep apnea      Past Surgical History:   Procedure Laterality Date    COLONOSCOPY N/A 07/16/2020    Procedure: COLONOSCOPY;  Surgeon: Wenceslao Marie MD;  Location:  GI    COMBINED CYSTOSCOPY, RETROGRADES, URETEROSCOPY, INSERT STENT Left 02/19/2021    Procedure: CYSTOSCOPY LEFT URETEROSCOPY,  LEFT STENT PLACEMENT, LEFT RETROGRADE;  Surgeon: Phil Lin MD;  Location:  OR    CYSTOSCOPY, DILATE URETER(S), COMBINED Left 02/19/2021    Procedure: Cystoscopy, dilate ureter(s), combined;  Surgeon: Phil Lin MD;  Location:  OR    HEART CATH, ANGIOPLASTY  11/2007    mid to prox LAD drug-eluting stent x2; Occluded RCA with Collaterals    KIDNEY STONE SURGERY  2003    laser and ureteric stenting    LUMBAR SPINE SURGERY N/A 02/13/2023    T8-L1 minimally invasive posterolateral instrumentation with cement augmentation    NEPHRECTOMY RT/LT Left 04/2021    [facility]    SKIN GRAFT, EACH ADDN [id number]       Current Outpatient Medications   Medication Sig Dispense Refill    acetaminophen (TYLENOL) 500 MG tablet Take 2 tablets (1,000 mg) by mouth 2 times daily as needed for mild pain 90 tablet 3    amLODIPine (NORVASC) 2.5 MG tablet Take 1 tablet (2.5 mg) by mouth daily 90 tablet 4    aspirin 81 MG EC tablet Take 81 mg by mouth daily      atorvastatin (LIPITOR) 40 MG tablet Take 1 tablet (40 mg) by mouth daily 90 tablet 4    furosemide (LASIX) 20 MG tablet Take 1 tablet (20 mg) by mouth daily 90 tablet 4    gabapentin (NEURONTIN) 400 MG capsule Take 1 capsule (400 mg) by mouth 3 times daily 90 capsule 11    losartan (COZAAR) 50 MG tablet Take 1  "tablet (50 mg) by mouth daily 90 tablet 4    metoprolol succinate ER (TOPROL XL) 25 MG 24 hr tablet Take 1 tablet (25 mg) by mouth daily 90 tablet 4    triamcinolone (KENALOG) 0.1 % external cream Apply topically to affected area 2 times daily. 15 g 3    nitroGLYcerin (NITROSTAT) 0.4 MG sublingual tablet For chest pain place 1 tablet under the tongue every 5 minutes for 3 doses. If symptoms persist 5 minutes after 1st dose call 911. (Patient not taking: Reported on 2025) 9 tablet 3       Allergies   Allergen Reactions    Soy Allergy (Do Not Select) Nausea and Vomiting        Social History     Tobacco Use    Smoking status: Former     Current packs/day: 0.00     Average packs/day: 1.5 packs/day for 44.0 years (66.0 ttl pk-yrs)     Types: Cigarettes     Start date: 1963     Quit date: 2007     Years since quittin.5    Smokeless tobacco: Never   Substance Use Topics    Alcohol use: Yes     Comment: rare     Family History   Problem Relation Age of Onset    Hypertension Mother     Pneumonia Mother 91        covid related    Dementia Mother     Alzheimer Disease Father         d age 91    Leukemia Maternal Grandmother     Cancer Maternal Grandfather     Cancer Maternal Uncle         lung     History   Drug Use No             Review of Systems  Constitutional, HEENT, cardiovascular, pulmonary, GI, , musculoskeletal, neuro, skin, endocrine and psych systems are negative, except as otherwise noted.    Objective    BP (!) 140/70 (BP Location: Right arm, Patient Position: Sitting)   Pulse 59   Temp 97  F (36.1  C) (Temporal)   Ht 1.74 m (5' 8.5\")   Wt 105.3 kg (232 lb 3.2 oz)   SpO2 97%   BMI 34.79 kg/m     Estimated body mass index is 34.79 kg/m  as calculated from the following:    Height as of this encounter: 1.74 m (5' 8.5\").    Weight as of this encounter: 105.3 kg (232 lb 3.2 oz).  Physical Exam  GENERAL: alert and no distress  EYES: Eyes grossly normal to inspection, PERRL and conjunctivae " and sclerae normal  HENT: ear canals and TM's normal, nose and mouth without ulcers or lesions  NECK: no adenopathy, no asymmetry, masses, or scars  RESP: lungs clear to auscultation - no rales, rhonchi or wheezes  CV: regular rate and rhythm, normal S1 S2, no S3 or S4, no murmur, click or rub, no peripheral edema  ABDOMEN: soft, nontender, no hepatosplenomegaly, no masses and bowel sounds normal, diastasis recti, anterior abdominal midline scar,  MS: no gross musculoskeletal defects noted, no edema, midline low back scar, using a cane to walk  SKIN: no suspicious lesions or rashes, pallor  NEURO: Normal strength and tone, mentation intact and speech normal  PSYCH: mentation appears normal, affect normal/bright, fatigued, judgement and insight intact, and appearance well groomed  Room smells of tobacco, reports no smoking  Recent Labs   Lab Test 10/24/24  1146 07/25/24  0845 06/01/24  1235   HGB  --   --  14.3   PLT  --   --  160   NA  --  138 135   POTASSIUM  --  4.3 5.5*   CR 1.4* 1.44* 1.32*   A1C  --   --  5.8*        Diagnostics  Recent Results (from the past 720 hours)   General PFT Lab (Please always keep checked)    Collection Time: 12/27/24  9:54 AM   Result Value Ref Range    FVC-Pred 3.46 L    FVC-Pre 2.21 L    FVC-%Pred-Pre 63 %    FEV1-Pre 1.87 L    FEV1-%Pred-Pre 71 %    FEV1FVC-Pred 76 %    FEV1FVC-Pre 85 %    FEFMax-Pred 7.28 L/sec    FEFMax-Pre 6.57 L/sec    FEFMax-%Pred-Pre 90 %    FEF2575-Pred 1.98 L/sec    FEF2575-Pre 1.92 L/sec    KXH6557-%Pred-Pre 97 %    FEF2575-Post 2.12 L/sec    VWO9117-%Pred-Post 107 %    ExpTime-Pre 2.67 sec    FIFMax-Pre 5.73 L/sec    VC-Pred 3.97 L    VC-Pre 2.26 L    VC-%Pred-Pre 56 %    IC-Pred 2.85 L    IC-Pre 2.23 L    IC-%Pred-Pre 78 %    ERV-Pred 1.12 L    ERV-Pre 0.03 L    ERV-%Pred-Pre 2 %    FEV1FEV6-Pred 77 %    FEV1FEV6-Pre 85 %    FRCPleth-Pred 3.55 L    FRCPleth-Pre 2.88 L    FRCPleth-%Pred-Pre 81 %    RVPleth-Pred 2.70 L    RVPleth-Pre 2.85 L     RVPleth-%Pred-Pre 105 %    TLCPleth-Pred 6.72 L    TLCPleth-Pre 5.11 L    TLCPleth-%Pred-Pre 76 %    DLCOunc-Pred 23.54 ml/min/mmHg    DLCOunc-Pre 20.06 ml/min/mmHg    DLCOunc-%Pred-Pre 85 %    VA-Pre 3.51 L    VA-%Pred-Pre 59 %    FEV1SVC-Pred 66 %    FEV1SVC-Pre 83 %   Hemoglobin A1c    Collection Time: 01/14/25 10:56 AM   Result Value Ref Range    Estimated Average Glucose 120 (H) <117 mg/dL    Hemoglobin A1C 5.8 (H) 0.0 - 5.6 %   CBC with platelets and differential    Collection Time: 01/14/25 10:56 AM   Result Value Ref Range    WBC Count 7.9 4.0 - 11.0 10e3/uL    RBC Count 4.95 4.40 - 5.90 10e6/uL    Hemoglobin 15.6 13.3 - 17.7 g/dL    Hematocrit 48.4 40.0 - 53.0 %    MCV 98 78 - 100 fL    MCH 31.5 26.5 - 33.0 pg    MCHC 32.2 31.5 - 36.5 g/dL    RDW 13.1 10.0 - 15.0 %    Platelet Count 183 150 - 450 10e3/uL    % Neutrophils 73 %    % Lymphocytes 18 %    % Monocytes 7 %    % Eosinophils 1 %    % Basophils 0 %    % Immature Granulocytes 0 %    Absolute Neutrophils 5.8 1.6 - 8.3 10e3/uL    Absolute Lymphocytes 1.4 0.8 - 5.3 10e3/uL    Absolute Monocytes 0.6 0.0 - 1.3 10e3/uL    Absolute Eosinophils 0.1 0.0 - 0.7 10e3/uL    Absolute Basophils 0.0 0.0 - 0.2 10e3/uL    Absolute Immature Granulocytes 0.0 <=0.4 10e3/uL      EKG: sinus bradycardia, normal axis, normal intervals, no acute ST/T changes c/w ischemia, no LVH by voltage criteria, unchanged from previous tracings    Revised Cardiac Risk Index (RCRI)  The patient has the following serious cardiovascular risks for perioperative complications:   - High risk surgery (>5% cardiac complication risk) = 1 point     RCRI Interpretation: 1 point: Class II (low risk - 0.9% complication rate)         Signed Electronically by: Lorelei Villarreal MD

## 2025-01-14 NOTE — PROGRESS NOTES
Preoperative Evaluation  [address]  [address]  [place] [address]  Phone: [phone]  Fax: [phone]  Primary Provider: Vernell Bucio MD  Pre-op Performing Provider: Lorelei Villarreal MD  Jan 14, 2025   {Provider  Link to PREOP SmartSet  REQUIRED to apply standard patient instructions and medication directions to the AVS :683422}  {ROOMER review and update patient entered surgical information if needed :274494}  { After Pre-op is completed, use lists to pull documentation into note Link to complete Pre-Op    :809470}  {Pull Surgical Information into note after flowsheet completed:163584}  Fax number for surgical facility: {:730460}    {Provider Charting Preference for Preop :698645}    Cheikh Fernandes is a 75 year old, presenting for the following:  Pre-Op Exam          1/14/2025     9:49 AM   Additional Questions   Roomed by Jorge Luis LOPEZ related to upcoming procedure: ***  {Pull Pre-Op Questionnaire into note after flowsheet completed:807926}  Health Care Directive  Patient has a Health Care Directive on file      Preoperative Review of   {Mnpmpreport:206899}  {Review MNPMP for all patients per [facility] MNPMP Profile:268009}    {Chronic problem details (Optional) :437978}    Patient Active Problem List    Diagnosis Date Noted     Solitary pulmonary nodule 01/14/2025     Priority: Medium     History of heart artery stent 09/26/2024     Priority: Medium     History of malignant neoplasm of kidney 12/16/2022     Priority: Medium     S/p nephrectomy 08/20/2021     Priority: Medium     Obesity (BMI 30.0-34.9) 08/20/2021     Priority: Medium     Dermatitis 08/20/2021     Priority: Medium     BPH (benign prostatic hyperplasia) 03/16/2021     Priority: Medium     CKD stage 3a, GFR 45-59 ml/min (H) 03/01/2021     Priority: Medium     Pre-diabetes 04/10/2019     Priority: Medium     Hyperlipidemia      Priority: Medium     Diagnosis updated by automated  process. Provider to review and confirm.       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, left (H) 2021     Sleep apnea      Past Surgical History:   Procedure Laterality Date     COLONOSCOPY N/A 07/16/2020    Procedure: COLONOSCOPY;  Surgeon: Wenceslao Marie MD;  Location:  GI     COMBINED CYSTOSCOPY, RETROGRADES, URETEROSCOPY, INSERT STENT Left 02/19/2021    Procedure: CYSTOSCOPY LEFT URETEROSCOPY,  LEFT STENT PLACEMENT, LEFT RETROGRADE;  Surgeon: Phil Lin MD;  Location:  OR     CYSTOSCOPY, DILATE URETER(S), COMBINED Left 02/19/2021    Procedure: Cystoscopy, dilate ureter(s), combined;  Surgeon: Phil Lin MD;  Location:  OR     HEART CATH, ANGIOPLASTY  11/2007    mid to prox LAD drug-eluting stent x2; Occluded RCA with Collaterals     KIDNEY STONE SURGERY  2003    laser and ureteric stenting     LUMBAR SPINE SURGERY N/A 02/13/2023    T8-L1 minimally invasive posterolateral instrumentation with cement augmentation     NEPHRECTOMY RT/LT Left 04/2021    [facility]     SKIN GRAFT, EACH ADDN 100SQCM       Current Outpatient Medications   Medication Sig Dispense Refill     acetaminophen (TYLENOL) 500 MG tablet Take 2 tablets (1,000 mg) by mouth 2 times daily as needed for mild pain 90 tablet 3     amLODIPine (NORVASC) 2.5 MG tablet Take 1 tablet (2.5 mg) by mouth daily 90 tablet 4     aspirin 81 MG EC tablet Take 81 mg by mouth daily       atorvastatin (LIPITOR) 40 MG tablet Take 1 tablet (40 mg) by mouth daily 90 tablet 4      "furosemide (LASIX) 20 MG tablet Take 1 tablet (20 mg) by mouth daily 90 tablet 4     gabapentin (NEURONTIN) 400 MG capsule Take 1 capsule (400 mg) by mouth 3 times daily 90 capsule 11     losartan (COZAAR) 50 MG tablet Take 1 tablet (50 mg) by mouth daily 90 tablet 4     metoprolol succinate ER (TOPROL XL) 25 MG 24 hr tablet Take 1 tablet (25 mg) by mouth daily 90 tablet 4     nitroGLYcerin (NITROSTAT) 0.4 MG sublingual tablet For chest pain place 1 tablet under the tongue every 5 minutes for 3 doses. If symptoms persist 5 minutes after 1st dose call 911. 9 tablet 3     triamcinolone (KENALOG) 0.1 % external cream Apply topically to affected area 2 times daily. 15 g 3       Allergies   Allergen Reactions     Soy Allergy (Do Not Select) Nausea and Vomiting        Social History     Tobacco Use     Smoking status: Former     Current packs/day: 0.00     Average packs/day: 1.5 packs/day for 44.0 years (66.0 ttl pk-yrs)     Types: Cigarettes     Start date: 1963     Quit date: 2007     Years since quittin.5     Smokeless tobacco: Never   Substance Use Topics     Alcohol use: Yes     Comment: rare     {FAMILY HISTORY (Optional):028856783}  History   Drug Use No           {ROS Picklists (Optional):248077}    Objective    There were no vitals taken for this visit.   Estimated body mass index is 34.82 kg/m  as calculated from the following:    Height as of 24: 1.727 m (5' 8\").    Weight as of 24: 103.9 kg (229 lb).  Physical Exam  {Exam List :215608}    Recent Labs   Lab Test 10/24/24  1146 24  0845 24  1235   HGB  --   --  14.3   PLT  --   --  160   NA  --  138 135   POTASSIUM  --  4.3 5.5*   CR 1.4* 1.44* 1.32*   A1C  --   --  5.8*        Diagnostics  {LABS:331428}   {EK}    Revised Cardiac Risk Index (RCRI)  The patient has the following serious cardiovascular risks for perioperative complications:  {PREOP REVISED CARDIAC RISK INDEX (RCRI) :292138}     RCRI Interpretation: " {REVISED CARDIAC RISK INTERPRETATION :536150}         Signed Electronically by: Lorelei Villarreal MD  A copy of this evaluation report is provided to the requesting physician.    {Provider Resources  [facility] Guidelines  Revised Cardiac Risk Index :455081}   {Email feedback regarding this note to [email or web address]   :076011}

## 2025-01-14 NOTE — RESULT ENCOUNTER NOTE
Results within acceptable limits.    -Normal red blood cell (hgb) levels, normal white blood cell count and normal platelet levels.  Hemoglobin A1c indicates prediabetes with hemoglobin A1c of 5.8. Stable to prior Encouraged to lower carbohydrate intake.

## 2025-01-15 ENCOUNTER — TELEPHONE (OUTPATIENT)
Dept: FAMILY MEDICINE | Facility: CLINIC | Age: 75
End: 2025-01-15
Payer: COMMERCIAL

## 2025-01-15 NOTE — TELEPHONE ENCOUNTER
Attempt #1 - Left message for patient to call back and ask to speak with an available triage nurse.    ----- Message from Romney Paul sent at 1/14/2025  6:20 PM CST -----  Kidney function stable has chronic kidney disease stage 3  Rest of electrolytes normal  Total bilirubin mildly elevated, slightly up from 7 months ago . Please discuss with your PCP and specialists about this       CHARLY Hunt, BSN, PHN, AMB-BC (she/her)  [facility] RN

## 2025-01-15 NOTE — RESULT ENCOUNTER NOTE
Kidney function stable has chronic kidney disease stage 3  Rest of electrolytes normal  Total bilirubin mildly elevated, slightly up from 7 months ago . Please discuss with your PCP and specialists about this

## 2025-01-16 NOTE — TELEPHONE ENCOUNTER
Writer called and spoke with patient. Relayed message with results.   Patient scheduled the end of March with Dr. Bucio for follow-up: diabetes, bilirubin, stage 3 kidney disease.     IMMANUEL BirchN RN  [facility]

## 2025-01-21 NOTE — PROGRESS NOTES
PTA medications updated by Medication Scribe prior to surgery via phone call with patient (last doses completed by Nurse)     Medication history sources: Patient, Surescripts, and H&P  In the past week, patient estimated taking medication this percent of the time: Greater than 90%      Significant changes made to the medication list:  None      Additional medication history information:   None    Medication reconciliation completed by provider prior to medication history? No    Time spent in this activity: 25 minutes    The information provided in this note is only as accurate as the sources available at the time of update(s)      Prior to Admission medications    Medication Sig Last Dose Taking? Auth Provider Long Term End Date   acetaminophen (TYLENOL) 500 MG tablet Take 2 tablets (1,000 mg) by mouth 2 times daily as needed for mild pain Unknown Yes Vernell Bucio MD     amLODIPine (NORVASC) 2.5 MG tablet Take 1 tablet (2.5 mg) by mouth daily Morning Yes Cheryl Puente APRN CNP Yes    aspirin 81 MG EC tablet Take 81 mg by mouth daily 1/21/2025 Morning Yes Unknown, Entered By History     atorvastatin (LIPITOR) 40 MG tablet Take 1 tablet (40 mg) by mouth daily Morning Yes Cheryl Puente APRN CNP Yes    furosemide (LASIX) 20 MG tablet Take 1 tablet (20 mg) by mouth daily Morning Yes Cheryl Puente APRN CNP Yes    gabapentin (NEURONTIN) 400 MG capsule Take 1 capsule (400 mg) by mouth 3 times daily Morning Yes Vernell Bucio MD Yes    losartan (COZAAR) 50 MG tablet Take 1 tablet (50 mg) by mouth daily Morning Yes Cheryl Puente APRN CNP Yes    metoprolol succinate ER (TOPROL XL) 25 MG 24 hr tablet Take 1 tablet (25 mg) by mouth daily Morning Yes Cheryl Puente APRN CNP Yes    nitroGLYcerin (NITROSTAT) 0.4 MG sublingual tablet For chest pain place 1 tablet under the tongue every 5 minutes for 3 doses. If symptoms persist 5 minutes after 1st dose call 911. Unknown Yes  Cheryl Puente, APRN CNP Yes    triamcinolone (KENALOG) 0.1 % external cream Apply topically to affected area 2 times daily. Unknown Yes Vernell Bucio MD         Medication history completed by: Hailey Brown LPN

## 2025-01-23 NOTE — PROGRESS NOTES
PTA medications updated by Medication Scribe prior to surgery via phone call with patient (last doses completed by Nurse)     Medication history sources: Patient, Surescripts, and H&P  In the past week, patient estimated taking medication this percent of the time: Greater than 90%      Significant changes made to the medication list:  None      Additional medication history information:   Patient was advised to bring: Kenalog    Medication reconciliation completed by provider prior to medication history? No    Time spent in this activity: 15 minutes    The information provided in this note is only as accurate as the sources available at the time of update(s)      Prior to Admission medications    Medication Sig Last Dose Taking? Auth Provider Long Term End Date   acetaminophen (TYLENOL) 500 MG tablet Take 2 tablets (1,000 mg) by mouth 2 times daily as needed for mild pain Unknown Yes Vernell Bucio MD     amLODIPine (NORVASC) 2.5 MG tablet Take 1 tablet (2.5 mg) by mouth daily Morning Yes Cheryl Puente APRN CNP Yes    aspirin 81 MG EC tablet Take 81 mg by mouth daily 1/21/2025 Morning Yes Unknown, Entered By History     atorvastatin (LIPITOR) 40 MG tablet Take 1 tablet (40 mg) by mouth daily Morning Yes Cheryl Puente APRN CNP Yes    furosemide (LASIX) 20 MG tablet Take 1 tablet (20 mg) by mouth daily Morning Yes Cheryl Puente APRN CNP Yes    gabapentin (NEURONTIN) 400 MG capsule Take 1 capsule (400 mg) by mouth 3 times daily Morning Yes Vernell Bucio MD Yes    losartan (COZAAR) 50 MG tablet Take 1 tablet (50 mg) by mouth daily Morning Yes Cheryl Puente APRN CNP Yes    metoprolol succinate ER (TOPROL XL) 25 MG 24 hr tablet Take 1 tablet (25 mg) by mouth daily Morning Yes Cheryl Puente APRN CNP Yes    nitroGLYcerin (NITROSTAT) 0.4 MG sublingual tablet For chest pain place 1 tablet under the tongue every 5 minutes for 3 doses. If symptoms persist 5 minutes after 1st  dose call 911. Unknown Yes Cheryl Puente APRN CNP Yes    triamcinolone (KENALOG) 0.1 % external cream Apply topically to affected area 2 times daily. Unknown Yes Vernell Bucio MD         Medication history completed by: Trae Baltazar

## 2025-01-27 ENCOUNTER — ANESTHESIA EVENT (OUTPATIENT)
Dept: SURGERY | Facility: CLINIC | Age: 75
DRG: 165 | End: 2025-01-27
Payer: COMMERCIAL

## 2025-01-28 ENCOUNTER — ANESTHESIA (OUTPATIENT)
Dept: SURGERY | Facility: CLINIC | Age: 75
DRG: 165 | End: 2025-01-28
Payer: COMMERCIAL

## 2025-01-28 ENCOUNTER — HOSPITAL ENCOUNTER (INPATIENT)
Facility: CLINIC | Age: 75
LOS: 1 days | Discharge: HOME | DRG: 165 | End: 2025-01-29
Attending: THORACIC SURGERY (CARDIOTHORACIC VASCULAR SURGERY) | Admitting: THORACIC SURGERY (CARDIOTHORACIC VASCULAR SURGERY)
Payer: COMMERCIAL

## 2025-01-28 ENCOUNTER — APPOINTMENT (OUTPATIENT)
Dept: GENERAL RADIOLOGY | Facility: CLINIC | Age: 75
DRG: 165 | End: 2025-01-28
Attending: THORACIC SURGERY (CARDIOTHORACIC VASCULAR SURGERY)
Payer: COMMERCIAL

## 2025-01-28 DIAGNOSIS — R91.1 PULMONARY NODULE: ICD-10-CM

## 2025-01-28 DIAGNOSIS — K59.03 DRUG-INDUCED CONSTIPATION: ICD-10-CM

## 2025-01-28 DIAGNOSIS — G89.18 ACUTE POST-OPERATIVE PAIN: ICD-10-CM

## 2025-01-28 DIAGNOSIS — R91.1 NODULE OF UPPER LOBE OF RIGHT LUNG: Primary | ICD-10-CM

## 2025-01-28 LAB
ABO + RH BLD: NORMAL
ANION GAP SERPL CALCULATED.3IONS-SCNC: 13 MMOL/L (ref 7–15)
BLD GP AB SCN SERPL QL: NEGATIVE
BUN SERPL-MCNC: 13.5 MG/DL (ref 8–23)
CALCIUM SERPL-MCNC: 9.1 MG/DL (ref 8.8–10.4)
CHLORIDE SERPL-SCNC: 101 MMOL/L (ref 98–107)
CREAT SERPL-MCNC: 1.26 MG/DL (ref 0.67–1.17)
CREAT SERPL-MCNC: 1.32 MG/DL (ref 0.67–1.17)
EGFRCR SERPLBLD CKD-EPI 2021: 56 ML/MIN/1.73M2
EGFRCR SERPLBLD CKD-EPI 2021: 59 ML/MIN/1.73M2
ERYTHROCYTE [DISTWIDTH] IN BLOOD BY AUTOMATED COUNT: 12.5 % (ref 10–15)
GLUCOSE BLDC GLUCOMTR-MCNC: 107 MG/DL (ref 70–99)
GLUCOSE SERPL-MCNC: 127 MG/DL (ref 70–99)
HCO3 SERPL-SCNC: 25 MMOL/L (ref 22–29)
HCT VFR BLD AUTO: 43 % (ref 40–53)
HGB BLD-MCNC: 15.1 G/DL (ref 13.3–17.7)
INR PPP: 1.06 (ref 0.85–1.15)
MCH RBC QN AUTO: 32.4 PG (ref 26.5–33)
MCHC RBC AUTO-ENTMCNC: 35.1 G/DL (ref 31.5–36.5)
MCV RBC AUTO: 92 FL (ref 78–100)
PLATELET # BLD AUTO: 180 10E3/UL (ref 150–450)
POTASSIUM SERPL-SCNC: 3.6 MMOL/L (ref 3.4–5.3)
RBC # BLD AUTO: 4.66 10E6/UL (ref 4.4–5.9)
SODIUM SERPL-SCNC: 139 MMOL/L (ref 135–145)
SPECIMEN EXP DATE BLD: NORMAL
WBC # BLD AUTO: 7.1 10E3/UL (ref 4–11)

## 2025-01-28 PROCEDURE — 360N000077 HC SURGERY LEVEL 4, PER MIN: Performed by: THORACIC SURGERY (CARDIOTHORACIC VASCULAR SURGERY)

## 2025-01-28 PROCEDURE — 36415 COLL VENOUS BLD VENIPUNCTURE: CPT | Performed by: THORACIC SURGERY (CARDIOTHORACIC VASCULAR SURGERY)

## 2025-01-28 PROCEDURE — 120N000001 HC R&B MED SURG/OB

## 2025-01-28 PROCEDURE — 258N000003 HC RX IP 258 OP 636: Performed by: NURSE ANESTHETIST, CERTIFIED REGISTERED

## 2025-01-28 PROCEDURE — 250N000011 HC RX IP 250 OP 636: Performed by: THORACIC SURGERY (CARDIOTHORACIC VASCULAR SURGERY)

## 2025-01-28 PROCEDURE — 88331 PATH CONSLTJ SURG 1 BLK 1SPC: CPT | Mod: TC | Performed by: THORACIC SURGERY (CARDIOTHORACIC VASCULAR SURGERY)

## 2025-01-28 PROCEDURE — 88360 TUMOR IMMUNOHISTOCHEM/MANUAL: CPT | Mod: TC | Performed by: THORACIC SURGERY (CARDIOTHORACIC VASCULAR SURGERY)

## 2025-01-28 PROCEDURE — 272N000001 HC OR GENERAL SUPPLY STERILE: Performed by: THORACIC SURGERY (CARDIOTHORACIC VASCULAR SURGERY)

## 2025-01-28 PROCEDURE — 80048 BASIC METABOLIC PNL TOTAL CA: CPT | Performed by: THORACIC SURGERY (CARDIOTHORACIC VASCULAR SURGERY)

## 2025-01-28 PROCEDURE — 85048 AUTOMATED LEUKOCYTE COUNT: CPT | Performed by: THORACIC SURGERY (CARDIOTHORACIC VASCULAR SURGERY)

## 2025-01-28 PROCEDURE — 999N000141 HC STATISTIC PRE-PROCEDURE NURSING ASSESSMENT: Performed by: THORACIC SURGERY (CARDIOTHORACIC VASCULAR SURGERY)

## 2025-01-28 PROCEDURE — 85014 HEMATOCRIT: CPT | Performed by: THORACIC SURGERY (CARDIOTHORACIC VASCULAR SURGERY)

## 2025-01-28 PROCEDURE — 82565 ASSAY OF CREATININE: CPT | Performed by: PHYSICIAN ASSISTANT

## 2025-01-28 PROCEDURE — 258N000003 HC RX IP 258 OP 636: Performed by: ANESTHESIOLOGY

## 2025-01-28 PROCEDURE — 36415 COLL VENOUS BLD VENIPUNCTURE: CPT | Performed by: PHYSICIAN ASSISTANT

## 2025-01-28 PROCEDURE — 250N000011 HC RX IP 250 OP 636: Performed by: NURSE ANESTHETIST, CERTIFIED REGISTERED

## 2025-01-28 PROCEDURE — 0BBC4ZZ EXCISION OF RIGHT UPPER LUNG LOBE, PERCUTANEOUS ENDOSCOPIC APPROACH: ICD-10-PCS | Performed by: THORACIC SURGERY (CARDIOTHORACIC VASCULAR SURGERY)

## 2025-01-28 PROCEDURE — 370N000017 HC ANESTHESIA TECHNICAL FEE, PER MIN: Performed by: THORACIC SURGERY (CARDIOTHORACIC VASCULAR SURGERY)

## 2025-01-28 PROCEDURE — 250N000009 HC RX 250: Performed by: THORACIC SURGERY (CARDIOTHORACIC VASCULAR SURGERY)

## 2025-01-28 PROCEDURE — 82565 ASSAY OF CREATININE: CPT | Performed by: THORACIC SURGERY (CARDIOTHORACIC VASCULAR SURGERY)

## 2025-01-28 PROCEDURE — 258N000003 HC RX IP 258 OP 636: Performed by: PHYSICIAN ASSISTANT

## 2025-01-28 PROCEDURE — 86900 BLOOD TYPING SEROLOGIC ABO: CPT | Performed by: THORACIC SURGERY (CARDIOTHORACIC VASCULAR SURGERY)

## 2025-01-28 PROCEDURE — 250N000013 HC RX MED GY IP 250 OP 250 PS 637: Performed by: PHYSICIAN ASSISTANT

## 2025-01-28 PROCEDURE — 0BBC4ZX EXCISION OF RIGHT UPPER LUNG LOBE, PERCUTANEOUS ENDOSCOPIC APPROACH, DIAGNOSTIC: ICD-10-PCS | Performed by: THORACIC SURGERY (CARDIOTHORACIC VASCULAR SURGERY)

## 2025-01-28 PROCEDURE — 85610 PROTHROMBIN TIME: CPT | Performed by: THORACIC SURGERY (CARDIOTHORACIC VASCULAR SURGERY)

## 2025-01-28 PROCEDURE — 250N000009 HC RX 250: Performed by: NURSE ANESTHETIST, CERTIFIED REGISTERED

## 2025-01-28 PROCEDURE — 250N000025 HC SEVOFLURANE, PER MIN: Performed by: THORACIC SURGERY (CARDIOTHORACIC VASCULAR SURGERY)

## 2025-01-28 PROCEDURE — 710N000009 HC RECOVERY PHASE 1, LEVEL 1, PER MIN: Performed by: THORACIC SURGERY (CARDIOTHORACIC VASCULAR SURGERY)

## 2025-01-28 PROCEDURE — 88360 TUMOR IMMUNOHISTOCHEM/MANUAL: CPT | Mod: 26 | Performed by: PATHOLOGY

## 2025-01-28 PROCEDURE — 88331 PATH CONSLTJ SURG 1 BLK 1SPC: CPT | Mod: 26 | Performed by: PATHOLOGY

## 2025-01-28 PROCEDURE — 250N000011 HC RX IP 250 OP 636: Performed by: ANESTHESIOLOGY

## 2025-01-28 PROCEDURE — 86850 RBC ANTIBODY SCREEN: CPT | Performed by: THORACIC SURGERY (CARDIOTHORACIC VASCULAR SURGERY)

## 2025-01-28 PROCEDURE — 999N000065 XR CHEST PORT 1 VIEW

## 2025-01-28 PROCEDURE — 88307 TISSUE EXAM BY PATHOLOGIST: CPT | Mod: 26 | Performed by: PATHOLOGY

## 2025-01-28 RX ORDER — MAGNESIUM HYDROXIDE 1200 MG/15ML
LIQUID ORAL PRN
Status: DISCONTINUED | OUTPATIENT
Start: 2025-01-28 | End: 2025-01-28 | Stop reason: HOSPADM

## 2025-01-28 RX ORDER — GINSENG 100 MG
CAPSULE ORAL
Status: DISCONTINUED | OUTPATIENT
Start: 2025-01-28 | End: 2025-01-29 | Stop reason: HOSPADM

## 2025-01-28 RX ORDER — METOPROLOL SUCCINATE 25 MG/1
25 TABLET, EXTENDED RELEASE ORAL DAILY
Status: DISCONTINUED | OUTPATIENT
Start: 2025-01-29 | End: 2025-01-29 | Stop reason: HOSPADM

## 2025-01-28 RX ORDER — FENTANYL CITRATE 0.05 MG/ML
50 INJECTION, SOLUTION INTRAMUSCULAR; INTRAVENOUS EVERY 5 MIN PRN
Status: DISCONTINUED | OUTPATIENT
Start: 2025-01-28 | End: 2025-01-28 | Stop reason: HOSPADM

## 2025-01-28 RX ORDER — NALOXONE HYDROCHLORIDE 0.4 MG/ML
0.1 INJECTION, SOLUTION INTRAMUSCULAR; INTRAVENOUS; SUBCUTANEOUS
Status: DISCONTINUED | OUTPATIENT
Start: 2025-01-28 | End: 2025-01-28 | Stop reason: HOSPADM

## 2025-01-28 RX ORDER — SODIUM CHLORIDE, SODIUM LACTATE, POTASSIUM CHLORIDE, CALCIUM CHLORIDE 600; 310; 30; 20 MG/100ML; MG/100ML; MG/100ML; MG/100ML
INJECTION, SOLUTION INTRAVENOUS CONTINUOUS
Status: DISCONTINUED | OUTPATIENT
Start: 2025-01-28 | End: 2025-01-28 | Stop reason: HOSPADM

## 2025-01-28 RX ORDER — HYDROMORPHONE HCL IN WATER/PF 6 MG/30 ML
0.2 PATIENT CONTROLLED ANALGESIA SYRINGE INTRAVENOUS EVERY 5 MIN PRN
Status: DISCONTINUED | OUTPATIENT
Start: 2025-01-28 | End: 2025-01-28 | Stop reason: HOSPADM

## 2025-01-28 RX ORDER — DIPHENHYDRAMINE HCL 12.5MG/5ML
12.5 LIQUID (ML) ORAL EVERY 6 HOURS PRN
Status: DISCONTINUED | OUTPATIENT
Start: 2025-01-28 | End: 2025-01-29 | Stop reason: HOSPADM

## 2025-01-28 RX ORDER — HYDROMORPHONE HCL IN WATER/PF 6 MG/30 ML
0.4 PATIENT CONTROLLED ANALGESIA SYRINGE INTRAVENOUS
Status: DISCONTINUED | OUTPATIENT
Start: 2025-01-28 | End: 2025-01-29 | Stop reason: HOSPADM

## 2025-01-28 RX ORDER — ATORVASTATIN CALCIUM 40 MG/1
40 TABLET, FILM COATED ORAL DAILY
Status: DISCONTINUED | OUTPATIENT
Start: 2025-01-29 | End: 2025-01-29 | Stop reason: HOSPADM

## 2025-01-28 RX ORDER — DEXAMETHASONE SODIUM PHOSPHATE 4 MG/ML
4 INJECTION, SOLUTION INTRA-ARTICULAR; INTRALESIONAL; INTRAMUSCULAR; INTRAVENOUS; SOFT TISSUE
Status: DISCONTINUED | OUTPATIENT
Start: 2025-01-28 | End: 2025-01-28 | Stop reason: HOSPADM

## 2025-01-28 RX ORDER — HYDROMORPHONE HCL IN WATER/PF 6 MG/30 ML
0.2 PATIENT CONTROLLED ANALGESIA SYRINGE INTRAVENOUS
Status: DISCONTINUED | OUTPATIENT
Start: 2025-01-28 | End: 2025-01-29 | Stop reason: HOSPADM

## 2025-01-28 RX ORDER — ENOXAPARIN SODIUM 100 MG/ML
40 INJECTION SUBCUTANEOUS EVERY 24 HOURS
Status: DISCONTINUED | OUTPATIENT
Start: 2025-01-29 | End: 2025-01-29 | Stop reason: HOSPADM

## 2025-01-28 RX ORDER — HYDROMORPHONE HYDROCHLORIDE 2 MG/1
2 TABLET ORAL EVERY 4 HOURS PRN
Status: DISCONTINUED | OUTPATIENT
Start: 2025-01-28 | End: 2025-01-29 | Stop reason: HOSPADM

## 2025-01-28 RX ORDER — NALOXONE HYDROCHLORIDE 0.4 MG/ML
0.2 INJECTION, SOLUTION INTRAMUSCULAR; INTRAVENOUS; SUBCUTANEOUS
Status: DISCONTINUED | OUTPATIENT
Start: 2025-01-28 | End: 2025-01-29 | Stop reason: HOSPADM

## 2025-01-28 RX ORDER — HYDROMORPHONE HYDROCHLORIDE 2 MG/1
4 TABLET ORAL EVERY 4 HOURS PRN
Status: DISCONTINUED | OUTPATIENT
Start: 2025-01-28 | End: 2025-01-29 | Stop reason: HOSPADM

## 2025-01-28 RX ORDER — POLYETHYLENE GLYCOL 3350 17 G/17G
17 POWDER, FOR SOLUTION ORAL DAILY
Status: DISCONTINUED | OUTPATIENT
Start: 2025-01-29 | End: 2025-01-29 | Stop reason: HOSPADM

## 2025-01-28 RX ORDER — LIDOCAINE 40 MG/G
CREAM TOPICAL
Status: DISCONTINUED | OUTPATIENT
Start: 2025-01-28 | End: 2025-01-29 | Stop reason: HOSPADM

## 2025-01-28 RX ORDER — FAMOTIDINE 20 MG/1
20 TABLET, FILM COATED ORAL DAILY
Status: DISCONTINUED | OUTPATIENT
Start: 2025-01-28 | End: 2025-01-29 | Stop reason: HOSPADM

## 2025-01-28 RX ORDER — ASPIRIN 81 MG/1
81 TABLET ORAL DAILY
Status: DISCONTINUED | OUTPATIENT
Start: 2025-01-29 | End: 2025-01-29 | Stop reason: HOSPADM

## 2025-01-28 RX ORDER — PROPOFOL 10 MG/ML
INJECTION, EMULSION INTRAVENOUS PRN
Status: DISCONTINUED | OUTPATIENT
Start: 2025-01-28 | End: 2025-01-28

## 2025-01-28 RX ORDER — NALOXONE HYDROCHLORIDE 0.4 MG/ML
0.4 INJECTION, SOLUTION INTRAMUSCULAR; INTRAVENOUS; SUBCUTANEOUS
Status: DISCONTINUED | OUTPATIENT
Start: 2025-01-28 | End: 2025-01-29 | Stop reason: HOSPADM

## 2025-01-28 RX ORDER — CEFAZOLIN SODIUM/WATER 2 G/20 ML
2 SYRINGE (ML) INTRAVENOUS SEE ADMIN INSTRUCTIONS
Status: DISCONTINUED | OUTPATIENT
Start: 2025-01-28 | End: 2025-01-28 | Stop reason: HOSPADM

## 2025-01-28 RX ORDER — AMOXICILLIN 250 MG
1 CAPSULE ORAL 2 TIMES DAILY
Status: DISCONTINUED | OUTPATIENT
Start: 2025-01-28 | End: 2025-01-29 | Stop reason: HOSPADM

## 2025-01-28 RX ORDER — ONDANSETRON 2 MG/ML
INJECTION INTRAMUSCULAR; INTRAVENOUS PRN
Status: DISCONTINUED | OUTPATIENT
Start: 2025-01-28 | End: 2025-01-28

## 2025-01-28 RX ORDER — SODIUM CHLORIDE 9 MG/ML
INJECTION, SOLUTION INTRAVENOUS CONTINUOUS
Status: DISCONTINUED | OUTPATIENT
Start: 2025-01-28 | End: 2025-01-29 | Stop reason: HOSPADM

## 2025-01-28 RX ORDER — LIDOCAINE HYDROCHLORIDE 20 MG/ML
INJECTION, SOLUTION INFILTRATION; PERINEURAL PRN
Status: DISCONTINUED | OUTPATIENT
Start: 2025-01-28 | End: 2025-01-28

## 2025-01-28 RX ORDER — CALCIUM CARBONATE 500 MG/1
500 TABLET, CHEWABLE ORAL 4 TIMES DAILY PRN
Status: DISCONTINUED | OUTPATIENT
Start: 2025-01-28 | End: 2025-01-29 | Stop reason: HOSPADM

## 2025-01-28 RX ORDER — FENTANYL CITRATE 0.05 MG/ML
25 INJECTION, SOLUTION INTRAMUSCULAR; INTRAVENOUS EVERY 5 MIN PRN
Status: DISCONTINUED | OUTPATIENT
Start: 2025-01-28 | End: 2025-01-28 | Stop reason: HOSPADM

## 2025-01-28 RX ORDER — DIPHENHYDRAMINE HYDROCHLORIDE 50 MG/ML
12.5 INJECTION INTRAMUSCULAR; INTRAVENOUS EVERY 6 HOURS PRN
Status: DISCONTINUED | OUTPATIENT
Start: 2025-01-28 | End: 2025-01-29 | Stop reason: HOSPADM

## 2025-01-28 RX ORDER — ONDANSETRON 4 MG/1
4 TABLET, ORALLY DISINTEGRATING ORAL EVERY 30 MIN PRN
Status: DISCONTINUED | OUTPATIENT
Start: 2025-01-28 | End: 2025-01-28 | Stop reason: HOSPADM

## 2025-01-28 RX ORDER — HYDROMORPHONE HCL IN WATER/PF 6 MG/30 ML
0.4 PATIENT CONTROLLED ANALGESIA SYRINGE INTRAVENOUS EVERY 5 MIN PRN
Status: DISCONTINUED | OUTPATIENT
Start: 2025-01-28 | End: 2025-01-28 | Stop reason: HOSPADM

## 2025-01-28 RX ORDER — NITROGLYCERIN 0.4 MG/1
0.4 TABLET SUBLINGUAL EVERY 5 MIN PRN
Status: DISCONTINUED | OUTPATIENT
Start: 2025-01-28 | End: 2025-01-29 | Stop reason: HOSPADM

## 2025-01-28 RX ORDER — ONDANSETRON 2 MG/ML
4 INJECTION INTRAMUSCULAR; INTRAVENOUS EVERY 30 MIN PRN
Status: DISCONTINUED | OUTPATIENT
Start: 2025-01-28 | End: 2025-01-28 | Stop reason: HOSPADM

## 2025-01-28 RX ORDER — FENTANYL CITRATE 50 UG/ML
INJECTION, SOLUTION INTRAMUSCULAR; INTRAVENOUS PRN
Status: DISCONTINUED | OUTPATIENT
Start: 2025-01-28 | End: 2025-01-28

## 2025-01-28 RX ORDER — SODIUM CHLORIDE, SODIUM LACTATE, POTASSIUM CHLORIDE, CALCIUM CHLORIDE 600; 310; 30; 20 MG/100ML; MG/100ML; MG/100ML; MG/100ML
INJECTION, SOLUTION INTRAVENOUS CONTINUOUS PRN
Status: DISCONTINUED | OUTPATIENT
Start: 2025-01-28 | End: 2025-01-28

## 2025-01-28 RX ORDER — ONDANSETRON 2 MG/ML
4 INJECTION INTRAMUSCULAR; INTRAVENOUS EVERY 6 HOURS PRN
Status: DISCONTINUED | OUTPATIENT
Start: 2025-01-28 | End: 2025-01-29 | Stop reason: HOSPADM

## 2025-01-28 RX ORDER — CEFAZOLIN SODIUM/WATER 2 G/20 ML
2 SYRINGE (ML) INTRAVENOUS
Status: COMPLETED | OUTPATIENT
Start: 2025-01-28 | End: 2025-01-28

## 2025-01-28 RX ORDER — DEXAMETHASONE SODIUM PHOSPHATE 4 MG/ML
INJECTION, SOLUTION INTRA-ARTICULAR; INTRALESIONAL; INTRAMUSCULAR; INTRAVENOUS; SOFT TISSUE PRN
Status: DISCONTINUED | OUTPATIENT
Start: 2025-01-28 | End: 2025-01-28

## 2025-01-28 RX ORDER — ACETAMINOPHEN 500 MG
1000 TABLET ORAL EVERY 8 HOURS
Status: DISCONTINUED | OUTPATIENT
Start: 2025-01-28 | End: 2025-01-29 | Stop reason: HOSPADM

## 2025-01-28 RX ORDER — ONDANSETRON 4 MG/1
4 TABLET, ORALLY DISINTEGRATING ORAL EVERY 6 HOURS PRN
Status: DISCONTINUED | OUTPATIENT
Start: 2025-01-28 | End: 2025-01-29 | Stop reason: HOSPADM

## 2025-01-28 RX ORDER — PROCHLORPERAZINE MALEATE 5 MG/1
5 TABLET ORAL EVERY 6 HOURS PRN
Status: DISCONTINUED | OUTPATIENT
Start: 2025-01-28 | End: 2025-01-29 | Stop reason: HOSPADM

## 2025-01-28 RX ADMIN — HYDROMORPHONE HYDROCHLORIDE 0.4 MG: 0.2 INJECTION, SOLUTION INTRAMUSCULAR; INTRAVENOUS; SUBCUTANEOUS at 13:53

## 2025-01-28 RX ADMIN — ACETAMINOPHEN 1000 MG: 500 TABLET, FILM COATED ORAL at 19:57

## 2025-01-28 RX ADMIN — Medication 2 G: at 11:40

## 2025-01-28 RX ADMIN — SODIUM CHLORIDE, POTASSIUM CHLORIDE, SODIUM LACTATE AND CALCIUM CHLORIDE: 600; 310; 30; 20 INJECTION, SOLUTION INTRAVENOUS at 10:38

## 2025-01-28 RX ADMIN — FENTANYL CITRATE 50 MCG: 50 INJECTION, SOLUTION INTRAMUSCULAR; INTRAVENOUS at 13:36

## 2025-01-28 RX ADMIN — PHENYLEPHRINE HYDROCHLORIDE 0.4 MCG/KG/MIN: 10 INJECTION INTRAVENOUS at 12:10

## 2025-01-28 RX ADMIN — SODIUM CHLORIDE, POTASSIUM CHLORIDE, SODIUM LACTATE AND CALCIUM CHLORIDE: 600; 310; 30; 20 INJECTION, SOLUTION INTRAVENOUS at 11:35

## 2025-01-28 RX ADMIN — Medication 200 MG: at 13:06

## 2025-01-28 RX ADMIN — LIDOCAINE HYDROCHLORIDE 100 MG: 20 INJECTION, SOLUTION INFILTRATION; PERINEURAL at 11:43

## 2025-01-28 RX ADMIN — FENTANYL CITRATE 50 MCG: 50 INJECTION INTRAMUSCULAR; INTRAVENOUS at 11:43

## 2025-01-28 RX ADMIN — FENTANYL CITRATE 50 MCG: 50 INJECTION, SOLUTION INTRAMUSCULAR; INTRAVENOUS at 13:41

## 2025-01-28 RX ADMIN — SODIUM CHLORIDE: 9 INJECTION, SOLUTION INTRAVENOUS at 17:15

## 2025-01-28 RX ADMIN — HYDROMORPHONE HYDROCHLORIDE 2 MG: 2 TABLET ORAL at 23:42

## 2025-01-28 RX ADMIN — DEXAMETHASONE SODIUM PHOSPHATE 4 MG: 4 INJECTION, SOLUTION INTRA-ARTICULAR; INTRALESIONAL; INTRAMUSCULAR; INTRAVENOUS; SOFT TISSUE at 12:11

## 2025-01-28 RX ADMIN — HYDROMORPHONE HYDROCHLORIDE 0.4 MG: 0.2 INJECTION, SOLUTION INTRAMUSCULAR; INTRAVENOUS; SUBCUTANEOUS at 13:47

## 2025-01-28 RX ADMIN — ROCURONIUM BROMIDE 50 MG: 50 INJECTION, SOLUTION INTRAVENOUS at 11:43

## 2025-01-28 RX ADMIN — PROPOFOL 200 MG: 10 INJECTION, EMULSION INTRAVENOUS at 11:43

## 2025-01-28 RX ADMIN — SENNOSIDES AND DOCUSATE SODIUM 1 TABLET: 50; 8.6 TABLET ORAL at 20:00

## 2025-01-28 RX ADMIN — HYDROMORPHONE HYDROCHLORIDE 0.2 MG: 0.2 INJECTION, SOLUTION INTRAMUSCULAR; INTRAVENOUS; SUBCUTANEOUS at 14:26

## 2025-01-28 RX ADMIN — HYDROMORPHONE HYDROCHLORIDE 0.5 MG: 1 INJECTION, SOLUTION INTRAMUSCULAR; INTRAVENOUS; SUBCUTANEOUS at 12:37

## 2025-01-28 RX ADMIN — HYDROMORPHONE HYDROCHLORIDE 0.4 MG: 0.2 INJECTION, SOLUTION INTRAMUSCULAR; INTRAVENOUS; SUBCUTANEOUS at 14:09

## 2025-01-28 RX ADMIN — FENTANYL CITRATE 50 MCG: 50 INJECTION INTRAMUSCULAR; INTRAVENOUS at 12:19

## 2025-01-28 RX ADMIN — ONDANSETRON 4 MG: 2 INJECTION INTRAMUSCULAR; INTRAVENOUS at 12:53

## 2025-01-28 RX ADMIN — HYDROMORPHONE HYDROCHLORIDE 0.4 MG: 0.2 INJECTION, SOLUTION INTRAMUSCULAR; INTRAVENOUS; SUBCUTANEOUS at 13:59

## 2025-01-28 RX ADMIN — HYDROMORPHONE HYDROCHLORIDE 0.4 MG: 0.2 INJECTION, SOLUTION INTRAMUSCULAR; INTRAVENOUS; SUBCUTANEOUS at 14:15

## 2025-01-28 RX ADMIN — FAMOTIDINE 20 MG: 20 TABLET, FILM COATED ORAL at 17:25

## 2025-01-28 ASSESSMENT — ACTIVITIES OF DAILY LIVING (ADL)
ADLS_ACUITY_SCORE: 22
ADLS_ACUITY_SCORE: 25
ADLS_ACUITY_SCORE: 19
ADLS_ACUITY_SCORE: 25
ADLS_ACUITY_SCORE: 25
ADLS_ACUITY_SCORE: 19
ADLS_ACUITY_SCORE: 19
ADLS_ACUITY_SCORE: 25
ADLS_ACUITY_SCORE: 46
ADLS_ACUITY_SCORE: 25
ADLS_ACUITY_SCORE: 19
ADLS_ACUITY_SCORE: 25
ADLS_ACUITY_SCORE: 19
ADLS_ACUITY_SCORE: 19

## 2025-01-28 ASSESSMENT — COPD QUESTIONNAIRES: COPD: 0

## 2025-01-28 ASSESSMENT — LIFESTYLE VARIABLES: TOBACCO_USE: 0

## 2025-01-28 NOTE — DISCHARGE INSTRUCTIONS
"[facility]   Discharge Instructions and Follow-Up Care for Dr. Carrillo' Thoracoscopy Patients:    You already have a post-op chest x-ray and post-op appointment scheduled as follows:  Go to [facility] ([address] [place] [address], [address], [place] [address]) on Wednesday, February 5th check in at 3:05 PM for a post-op chest x-ray.  Then go upstairs to the [facility] office in [address] at 3:45 PM the same day for your post-op appointment. You will see both Viktoriya Omalley PA-C and Dr. Carrillo for your post-op appointment.     If you need to call to schedule your post-op chest x-ray and appointment: Niharika [phone]    Patient care:  Call Dr. Carrillo' office [phone] if you experience:   *Severe chills or fever of 101 F or kurt on two occasions   *Severely increased incisional pain that cannot be relieved with rest or pain medications   *Presence of unusual incisional or chest tube site drainage that is odorous, green or yellow in color, charles bright red blood or if your incision is warm/red/swollen   *Coughing up bright red blood or greenish-yellow secretions   *Inability to urinate or have a bowel movement   *New pain or swelling in your legs    In an emergency, call 115 or have someone drive you to an Emergency Department    Pain Relief:  Typically, patients can take Tylenol up to 1000 mg every 6-8 hours. You may also have been prescribed a narcotic pain medicine. Most people get good pain relief by \"staggering\" these medications. Many patients keep a log of when they take the medications including the date, time, medication name and dose. Lastly, it is important to take these medications with food.    No driving while on narcotic pain medicines.    Activity:  No activity restrictions    Wound Care:   *Please look at your incisions daily and keep them clean while they heal   *Do not apply creams, salves such as Bacitracin, or ointments on the incisions " while they heal   *Steri strips (thin white pieces of tape) will be present on the incision(s) and will peel off as your incision heals-- otherwise, they will be removed at your post-op appointment   *Remove the dressing covering your chest tube site 48 hours after your discharge from the hospital. You may then shower. Wash the incision and chest tube sites daily with soap and water. No bathing/immersing incisions under water for two weeks or until the chest tube sites are completely healed.   *After your shower, pat the chest tube sites dry and place a dry gauze dressing (and tape) over the sites. It is normal to have some drainage from the sites for a few days. Do not be alarmed if a large amount of fluid drains (should be pink or yellow) either spontaneously or with coughing or exertion. Should this happen, just place a larger, thicker gauze dressing over the chest tube sites. In about a week, drainage should stop and a scab will form. Once drainage stops, you can stop covering the sites. Call our office if the drainage is milky or green in color or foul-smelling.    Respiratory Care:  Utilize the incentive spirometer and flutter valve/acapella (if you received one) several times in a row every few hours while awake for a few weeks after discharging home from the hospital.    Activity:  It may take a few weeks to regain your normal energy level/stamina. It is important during your recovery to get regular physical activity such as walking each day, climbing stairs as tolerated, and avoiding prolonged daytime naps    Return to Work:  Time away from work will depend on your specific situation. In general, you will need between 1-4 weeks to fully recover from surgery. Specific dates for retuning to work can be discussed at your post-op appointment.

## 2025-01-28 NOTE — ANESTHESIA PROCEDURE NOTES
Airway       Patient location during procedure: OR       Procedure Start/Stop Times: 1/28/2025 11:47 AM  Staff -        CRNA: Ally Copeland APRN CRNA       Performed By: CRNA  Consent for Airway        Urgency: elective  Indications and Patient Condition       Indications for airway management: millicent-procedural       Induction type:intravenous       Mask difficulty assessment: 3 - difficult mask (inadequate, unstable, or two providers) +/- NMBA (two providers d/t beard)    Final Airway Details       Final airway type: endotracheal airway       Successful airway: ETT - double lumen left  Endotracheal Airway Details        Cuffed: yes       Successful intubation technique: video laryngoscopy       VL Blade Size: Glidescope 4       Grade View of Cords: 1       Adjucts: stylet       Position: Center       Measured from: lips       Bite block used: None       ETT Double lumen (fr): 39    Post intubation assessment        Placement verified by: capnometry and equal breath sounds        Number of attempts at approach: 1       Number of other approaches attempted: 0       Secured with: tape       Ease of procedure: easy       Dentition: Intact and Unchanged    Medication(s) Administered   Medication Administration Time: 1/28/2025 11:47 AM

## 2025-01-28 NOTE — ANESTHESIA POSTPROCEDURE EVALUATION
Patient: Fermín Josue    Procedure: Procedure(s):  RIGHT VIDEO ASSISTED THORACOSCOPY WEDGE RESECTION, RIGHT UPPER LOBE LUNG NODULE       Anesthesia Type:  General    Note:  Disposition: Inpatient   Postop Pain Control: Uneventful            Sign Out: Well controlled pain   PONV:    Neuro/Psych: Uneventful            Sign Out: Acceptable/Baseline neuro status   Airway/Respiratory: Uneventful            Sign Out: Acceptable/Baseline resp. status   CV/Hemodynamics: Uneventful            Sign Out: Acceptable CV status   Other NRE: NONE   DID A NON-ROUTINE EVENT OCCUR? No           Last vitals:  Vitals Value Taken Time   /72 01/28/25 1515   Temp 36.2  C (97.16  F) 01/28/25 1556   Pulse 74 01/28/25 1523   Resp 33 01/28/25 1523   SpO2 97 % 01/28/25 1527   Vitals shown include unfiled device data.    Electronically Signed By: Vamshi Negrete MD  January 28, 2025  3:57 PM

## 2025-01-28 NOTE — ANESTHESIA PREPROCEDURE EVALUATION
Anesthesia Pre-Procedure Evaluation    Patient: Fermín Josue   MRN: [id number] : 1950        Procedure : Procedure(s):  RIGHT VIDEO ASSISTED THORACOSCOPY WEDGE RESECTION, RIGHT UPPER LOBE LUNG NODULE          Past Medical History:   Diagnosis Date    CAD (coronary artery disease) 2015    3 stents    History of angina     History of skin graft     Right lower limb    Hypertension goal BP (blood pressure) < 140/90 2015    Kidney stone 2021    Malignant neoplasm of kidney excluding renal pelvis, left (H)     Sleep apnea       Past Surgical History:   Procedure Laterality Date    COLONOSCOPY N/A 2020    Procedure: COLONOSCOPY;  Surgeon: Wenceslao Marie MD;  Location:  GI    COMBINED CYSTOSCOPY, RETROGRADES, URETEROSCOPY, INSERT STENT Left 2021    Procedure: CYSTOSCOPY LEFT URETEROSCOPY,  LEFT STENT PLACEMENT, LEFT RETROGRADE;  Surgeon: Phil Lin MD;  Location:  OR    CYSTOSCOPY, DILATE URETER(S), COMBINED Left 2021    Procedure: Cystoscopy, dilate ureter(s), combined;  Surgeon: Phil Lin MD;  Location:  OR    HEART CATH, ANGIOPLASTY  2007    mid to prox LAD drug-eluting stent x2; Occluded RCA with Collaterals    KIDNEY STONE SURGERY      laser and ureteric stenting    LUMBAR SPINE SURGERY N/A 2023    T8-L1 minimally invasive posterolateral instrumentation with cement augmentation    NEPHRECTOMY RT/LT Left 2021    [facility]    SKIN GRAFT, EACH ADDN 100SQCM        Allergies   Allergen Reactions    Soy Allergy (Do Not Select) Nausea and Vomiting      Social History     Tobacco Use    Smoking status: Former     Current packs/day: 0.00     Average packs/day: 1.5 packs/day for 44.0 years (66.0 ttl pk-yrs)     Types: Cigarettes     Start date: 1963     Quit date: 2007     Years since quittin.6    Smokeless tobacco: Never   Substance Use Topics    Alcohol use: Yes     Comment: rare      Wt Readings from Last 1  Encounters:   01/28/25 102.6 kg (226 lb 3.2 oz)        Anesthesia Evaluation            ROS/MED HX  ENT/Pulmonary:     (+) sleep apnea, doesn't use CPAP,                                   (-) tobacco use, asthma and COPD   Neurologic:  - neg neurologic ROS     Cardiovascular:     (+) Dyslipidemia hypertension- -  CAD -  - stent- 2 Drug Eluting Stent.                               Previous cardiac testing   Echo: Date: Results:    Stress Test:  Date: 2021 Results:  Result Text        The nuclear stress test is abnormal.     There is a medium sized area of transmural infarction in the inferior and inferolateral segments of the left ventricle associated with mild millicent-infarct ischemia.     There is a small area of nontransmural infarction in the mid to distal anteroseptal segments of the left ventricle associated with mild millicent-infarct ischemia.     LV cavity size normal.     Stress to rest cavity ratio is 1.14.  TID is absent.     Left ventricular function is normal.     The left ventricular ejection fraction at rest is 54%.  The left ventricular ejection fraction at stress is 54%.     Prior images were unavailable for comparison review.    ECG Reviewed:  Date: Results:    Cath:  Date: Results:   (-) angina and angina   METS/Exercise Tolerance:     Hematologic:       Musculoskeletal:       GI/Hepatic:       Renal/Genitourinary: Comment: H/O nephrectomy for Ca    (+) renal disease, type: CRI,            Endo: Comment: Pre DM      Psychiatric/Substance Use:  - neg psychiatric ROS     Infectious Disease:       Malignancy:   (+) Malignancy, History of Other.Other CA Kidney status post.    Other:            Physical Exam    Airway      Comment: Full beard    Mallampati: II    Neck ROM: full     Respiratory Devices and Support         Dental       (+) caps      Cardiovascular   cardiovascular exam normal          Pulmonary   pulmonary exam normal                OUTSIDE LABS:  CBC:   Lab Results   Component Value Date    WBC  "7.9 01/14/2025    WBC 6.9 06/01/2024    HGB 15.6 01/14/2025    HGB 14.3 06/01/2024    HCT 48.4 01/14/2025    HCT 43.7 06/01/2024     01/14/2025     06/01/2024     BMP:   Lab Results   Component Value Date     01/14/2025     07/25/2024    POTASSIUM 4.4 01/14/2025    POTASSIUM 4.3 07/25/2024    CHLORIDE 104 01/14/2025    CHLORIDE 99 07/25/2024    CO2 28 01/14/2025    CO2 28 07/25/2024    BUN 15.2 01/14/2025    BUN 12.2 07/25/2024    CR 1.43 (H) 01/14/2025    CR 1.4 (H) 10/24/2024     (H) 01/14/2025     (H) 07/25/2024     COAGS:   Lab Results   Component Value Date    PTT 30 11/21/2007    INR 0.98 11/21/2007     POC: No results found for: \"BGM\", \"HCG\", \"HCGS\"  HEPATIC:   Lab Results   Component Value Date    ALBUMIN 4.2 01/14/2025    PROTTOTAL 7.5 01/14/2025    ALT 26 01/14/2025    AST 34 01/14/2025    ALKPHOS 121 01/14/2025    BILITOTAL 1.7 (H) 01/14/2025     OTHER:   Lab Results   Component Value Date    A1C 5.8 (H) 01/14/2025    CANDICE 9.5 01/14/2025    MAG 2.3 02/12/2023       Anesthesia Plan    ASA Status:  3    NPO Status:  NPO Appropriate    Anesthesia Type: General.     - Airway: ETT   Induction: Intravenous, Propofol.   Maintenance: Balanced.   Techniques and Equipment:     - Airway: Double lumen ETT       Consents    Anesthesia Plan(s) and associated risks, benefits, and realistic alternatives discussed. Questions answered and patient/representative(s) expressed understanding.     - Discussed:     - Discussed with:  Patient      - Extended Intubation/Ventilatory Support Discussed: No.      - Patient is DNR/DNI Status: No     Use of blood products discussed: No .     Postoperative Care    Pain management: IV analgesics, Multi-modal analgesia.   PONV prophylaxis: Ondansetron (or other 5HT-3), Dexamethasone or Solumedrol     Comments:               Vamshi Negrete MD    I have reviewed the pertinent notes and labs in the chart from the past 30 days and (re)examined the " "patient.  Any updates or changes from those notes are reflected in this note.    Clinically Significant Risk Factors Present on Admission                 # Drug Induced Platelet Defect: home medication list includes an antiplatelet medication   # Hypertension: Noted on problem list           # Obesity: Estimated body mass index is 34.39 kg/m  as calculated from the following:    Height as of this encounter: 1.727 m (5' 8\").    Weight as of this encounter: 102.6 kg (226 lb 3.2 oz).                "

## 2025-01-28 NOTE — ANESTHESIA CARE TRANSFER NOTE
Patient: Fermín Josue    Procedure: Procedure(s):  RIGHT VIDEO ASSISTED THORACOSCOPY WEDGE RESECTION, RIGHT UPPER LOBE LUNG NODULE       Diagnosis: Nodule of upper lobe of right lung [R91.1]  Diagnosis Additional Information: No value filed.    Anesthesia Type:   General     Note:    Oropharynx: oropharynx clear of all foreign objects and spontaneously breathing  Level of Consciousness: awake  Oxygen Supplementation: face mask  Level of Supplemental Oxygen (L/min / FiO2): 6  Independent Airway: airway patency satisfactory and stable  Dentition: dentition unchanged  Vital Signs Stable: post-procedure vital signs reviewed and stable  Report to RN Given: handoff report given  Patient transferred to: PACU    Handoff Report: Identifed the Patient, Identified the Reponsible Provider, Reviewed the pertinent medical history, Discussed the surgical course, Reviewed Intra-OP anesthesia mangement and issues during anesthesia, Set expectations for post-procedure period and Allowed opportunity for questions and acknowledgement of understanding      Vitals:  Vitals Value Taken Time   /87 01/28/25 1319   Temp 35.9  C (96.62  F) 01/28/25 1322   Pulse 67 01/28/25 1323   Resp 11 01/28/25 1323   SpO2 99 % 01/28/25 1323   Vitals shown include unfiled device data.    Electronically Signed By: LONNY Marroquin CRNA  January 28, 2025  1:24 PM

## 2025-01-28 NOTE — OP NOTE
PROCEDURE DATE: 0128/2025    SURGEON: Anmol Carrillo MD    FIRST ASSISTANT: Nish Omalley PA-C     PREOPERATIVE DIAGNOSIS: Right upper lobe lung nodule    POSTOPERATIVE DIAGNOSIS: Same    PROCEDURES:  Right video-assisted thoracoscopy and wedge resection of right upper lobe lung nodule    ANESTHESIA: General with double-lumen endotracheal tube      INDICATIONS FOR PROCEDURE: 75-year-old gentleman was found to have a suspicious enlarging nodule towards the apex of the right upper lobe lung.  Options of diagnostic procedure and treatment discussed elected to proceed with surgical resection.  Pulmonary function tests are normal     DESCRIPTION OF PROCEDURE: The patient was brought to the operating room and placed in the supine position.  Under general anesthesia with a double-lumen endotracheal tube, the patient was placed in a left lateral decubitus position.  Right chest was prepared and draped in usual fashion using ChloraPrep.  Ventilation of the right lung was discontinued.  3 thoracoscopic incisions were made in usual fashion.  The lung was examined the nodule was clearly identified.  Using multiple application of Obetz 60 gold stapling device, a generous wedge resection was done with excellent margin.  The staple line was hemostatic.  The specimen was placed in a catch bag and retrieved to the anterior incision and submitted for frozen section.  Hemostasis was verified and was excellent.  Through a separate stab wound a 28 straight chest was placed and sutured skin #2 silk suture.  30 cc of Marcaine 0.5% without epinephrine was injected as intercostal blocks.  The 3 thoracoscopic incisions were closure fashion.    ESTIMATED BLOOD LOSS: Minimal    COUNTS: Needle and sponge count is correct    Viktoriya Omalley PA-C  was the first assistant during the procedure. Her role as first assistant during the procedure was essential and necessary to accomplishing the steps described in the procedure above, providing  exposure, retraction and handling of the scope.     Anmol Carrillo MD

## 2025-01-29 ENCOUNTER — APPOINTMENT (OUTPATIENT)
Dept: GENERAL RADIOLOGY | Facility: CLINIC | Age: 75
DRG: 165 | End: 2025-01-29
Attending: PHYSICIAN ASSISTANT
Payer: COMMERCIAL

## 2025-01-29 VITALS
TEMPERATURE: 98 F | OXYGEN SATURATION: 92 % | DIASTOLIC BLOOD PRESSURE: 74 MMHG | WEIGHT: 226.2 LBS | SYSTOLIC BLOOD PRESSURE: 131 MMHG | HEART RATE: 62 BPM | RESPIRATION RATE: 18 BRPM | HEIGHT: 68 IN | BODY MASS INDEX: 34.28 KG/M2

## 2025-01-29 LAB — GLUCOSE BLDC GLUCOMTR-MCNC: 120 MG/DL (ref 70–99)

## 2025-01-29 PROCEDURE — 250N000013 HC RX MED GY IP 250 OP 250 PS 637: Performed by: PHYSICIAN ASSISTANT

## 2025-01-29 PROCEDURE — 71045 X-RAY EXAM CHEST 1 VIEW: CPT

## 2025-01-29 PROCEDURE — 258N000003 HC RX IP 258 OP 636: Performed by: PHYSICIAN ASSISTANT

## 2025-01-29 RX ORDER — AMOXICILLIN 250 MG
1 CAPSULE ORAL 2 TIMES DAILY PRN
Qty: 20 TABLET | Refills: 0 | Status: SHIPPED | OUTPATIENT
Start: 2025-01-29

## 2025-01-29 RX ORDER — LOSARTAN POTASSIUM 50 MG/1
50 TABLET ORAL DAILY
Status: DISCONTINUED | OUTPATIENT
Start: 2025-01-29 | End: 2025-01-29 | Stop reason: HOSPADM

## 2025-01-29 RX ORDER — HYDROMORPHONE HYDROCHLORIDE 2 MG/1
2 TABLET ORAL EVERY 4 HOURS PRN
Qty: 15 TABLET | Refills: 0 | Status: SHIPPED | OUTPATIENT
Start: 2025-01-29

## 2025-01-29 RX ADMIN — ATORVASTATIN CALCIUM 40 MG: 40 TABLET, FILM COATED ORAL at 08:40

## 2025-01-29 RX ADMIN — GABAPENTIN 400 MG: 300 CAPSULE ORAL at 08:40

## 2025-01-29 RX ADMIN — FAMOTIDINE 20 MG: 20 TABLET, FILM COATED ORAL at 08:41

## 2025-01-29 RX ADMIN — SODIUM CHLORIDE: 9 INJECTION, SOLUTION INTRAVENOUS at 06:36

## 2025-01-29 RX ADMIN — ASPIRIN 81 MG: 81 TABLET, COATED ORAL at 08:41

## 2025-01-29 RX ADMIN — ACETAMINOPHEN 1000 MG: 500 TABLET, FILM COATED ORAL at 04:11

## 2025-01-29 RX ADMIN — LOSARTAN POTASSIUM 50 MG: 50 TABLET, FILM COATED ORAL at 08:40

## 2025-01-29 RX ADMIN — METOPROLOL SUCCINATE 25 MG: 25 TABLET, EXTENDED RELEASE ORAL at 08:44

## 2025-01-29 ASSESSMENT — ACTIVITIES OF DAILY LIVING (ADL)
ADLS_ACUITY_SCORE: 24

## 2025-01-29 NOTE — PLAN OF CARE
Goal Outcome Evaluation:    Orientations: A&O x4  Vitals: VSS, weaned to RA when awake, 1 L NC when sleeping  Pain:c/o pain r/t chest tube, scheduled tylenol and PRN dilaudid x1  Lines/Drains: PIV x1 infusing NS at 75 ml/hr  CT x1, -20 suction, no air leak or crepitus (clamped at 0000 and placed back to suction after AM CXR)  Skin/Wounds: R VATS incisions x3 with steri strips/bandaids  GI/: AUOP, No BM, passing gas, poor appetite  Ambulation/Assist: SBA  Sleep Quality: fair

## 2025-01-29 NOTE — PLAN OF CARE
Goal Outcome Evaluation:    [place] [facility] [place] around 1630    Orientations: A/O x4, forgetful at times  Vitals/Pain: VSS on 2L via NC. LS are dim. Infrequent nonproductive cough. Pt declining pain interventions other than rest  Tele: N/A  Lines/Drains: PIV x1 infusing NS @ 75ml/hr. CT 1:1 to -20 suction, no air leak or crepitus, dressing CDI.  Skin/Wounds: R VATS sites x3, steri strips and band aids in place, WDL. CT tube site, WDL.   GI/: Adequate UOP via urinal. No BM during shift.   Labs: Abnormal/Trends, Electrolyte Replacement- Chest Xray in am.   Ambulation/Assist: SBA, up in chair for dinner.   Diet: Full liquid diet, tolerating well   Plan: Plan to clamp chest tube at midnight, chest xray in am.

## 2025-01-29 NOTE — DISCHARGE SUMMARY
[facility]    Discharge Summary  [facility]  [address], [address]  [place] [address]  [phone]  [email or web address]    Date of Admission:  1/28/2025  Date of Discharge:  1/29/2025  Discharging Provider: Evelyn Omalley PA-C      Discharge Diagnoses  Right upper lobe lung nodule    Procedure/Surgery Information   Procedure: Procedure(s):  RIGHT VIDEO ASSISTED THORACOSCOPY WEDGE RESECTION, RIGHT UPPER LOBE LUNG NODULE   Surgeon(s): Surgeons and Role:     * Anmol Carrillo MD - Primary     * Evelyn Omalley PA-C - Assisting   Specimens: ID Type Source Tests Collected by Time Destination   1 : Right Upper Lung Lobe Nodule Tissue Lung, Upper Lobe, Right SURGICAL PATHOLOGY EXAM Anmol Carrillo MD 1/28/2025 12:39 PM           Hospital Course   As you are aware, we had the pleasure of caring for your patient, Fermín Josue here at [facility].  He is 75-year-old gentleman was found to have a suspicious enlarging nodule towards the apex of the right upper lobe lung. Options of diagnostic procedure and treatment discussed elected to proceed with surgical resection. Pulmonary function tests are normal     On 1/28/2025, Dr. Anmol Carrillo performed:  Right video-assisted thoracoscopy and wedge resection of right upper lobe lung nodule     Final Surgical Pathology Revealed:  ***    His post-operative course was unremarkable.    Fermín Josue has otherwise recovered sufficiently to be discharged to home today, Wednesday, January 29th, on POD#1 for further convalescence.  His incisions are healing well with no signs or symptoms of infection.  His bowels have moved sufficiently and he is tolerating diet and activity, ambulating and transferring independently.  He is currently afebrile with stable vital signs as below. BP (!) 141/74 (BP Location: Left arm)   Pulse 69   Temp 98  F (36.7  C) (Oral)   Resp 16   Ht 1.727 m  "(5' 8\")   Wt 102.6 kg (226 lb 3.2 oz)   SpO2 97%   BMI 34.39 kg/m   on room air.  Post-operative pain control: PO Tylenol 1000 mg every 6-8 hours and dilaudid 2 mg every 4-6 hours as needed  Medications discontinued or adjusted during this hospitalization : No change   Antibiotics prescribed at discharge: None prescribed   Imaging study follow up needs:   -CXR at post op as scheduled  Significant Findings: N/A    Below, you will find a full discharge medication list and instructions.  Mr. Josue has been instructed to to follow-up with us in our [facility] in 1-2 weeks with a Chest X-ray prior to that appointment.  We thank you for allowing us to participate in the care of Mr. Josue here at [facility].  Please feel free to contact our office at [phone] with any questions or concerns or if we can be of any further assistance in the care of this patient.    Sincerely,    Dr. Anmol Carrillo MD    D/C Summary Prepared by: Viktoriya Omalley PA-C    Discharge Disposition   Discharged to home   Condition at discharge: Stable    Primary Care Physician   Vernell Bucio    Consultations This Hospital Stay   None    Time Spent on this Encounter   I have spent less than 30 minutes on this discharge.    Discharge Orders      Reason for your hospital stay    You were in the hospital for lung surgery with Dr. Carrillo     Activity    Your activity upon discharge: as tolerated.     Diet    Follow this diet upon discharge: Regular diet     Hospital Follow-up with Existing Primary Care Provider (PCP)    Please see details below            Discharge Instructions:  1) Remove chest tube dressing in two days and then it is Ok to shower.  Please wash both incision and chest tube site daily with soap and water. You may cover the chest tube site daily with dry gauze and tape or a bandaid if it continues to drain. Once it stops draining please leave it open to the air to finish healing..  2) Steri-strips " can be removed in 1 week or they will fall off when they are ready.  3) Continue daily use of your Incentive Spirometer, set of 10x in a row, every 1-2 hours while you are awake during the day.  4) No driving while on narcotic pain medications.    Follow-Up Care:1) Follow up with Sofie Spencer PA-C or Viktoriya Omalley PA-C (Thoracic Surgery) in 7-10 days at the [facility] [place] office.  You will need to go to [facility] for a chest X-ray prior to that appointment. These appointments have already been arranged for you and are detailed in your discharge summary paperwork. Please call yolande Bundyr, at [phone] with any scheduling questions.    2) Follow up with Primary Care Provider, Vernell Bucio within 1 month of discharge for routine post-surgical care.  Please call [phone] to arrange this appointment.     Discharge Medications   Current Discharge Medication List        START taking these medications    Details   HYDROmorphone (DILAUDID) 2 MG tablet Take 1 tablet (2 mg) by mouth every 4 hours as needed for moderate pain.  Qty: 15 tablet, Refills: 0    Associated Diagnoses: Acute post-operative pain      senna-docusate (SENOKOT-S/PERICOLACE) 8.6-50 MG tablet Take 1 tablet by mouth 2 times daily as needed for constipation.  Qty: 20 tablet, Refills: 0    Associated Diagnoses: Drug-induced constipation           CONTINUE these medications which have NOT CHANGED    Details   acetaminophen (TYLENOL) 500 MG tablet Take 2 tablets (1,000 mg) by mouth 2 times daily as needed for mild pain  Qty: 90 tablet, Refills: 3      amLODIPine (NORVASC) 2.5 MG tablet Take 1 tablet (2.5 mg) by mouth daily  Qty: 90 tablet, Refills: 4    Associated Diagnoses: Essential hypertension      aspirin 81 MG EC tablet Take 81 mg by mouth daily      atorvastatin (LIPITOR) 40 MG tablet Take 1 tablet (40 mg) by mouth daily  Qty: 90 tablet, Refills: 4    Associated Diagnoses: Hyperlipidemia,  unspecified hyperlipidemia type      furosemide (LASIX) 20 MG tablet Take 1 tablet (20 mg) by mouth daily  Qty: 90 tablet, Refills: 4    Associated Diagnoses: Peripheral edema      gabapentin (NEURONTIN) 400 MG capsule Take 1 capsule (400 mg) by mouth 3 times daily  Qty: 90 capsule, Refills: 11    Associated Diagnoses: Back muscle spasm      losartan (COZAAR) 50 MG tablet Take 1 tablet (50 mg) by mouth daily  Qty: 90 tablet, Refills: 4    Associated Diagnoses: Hypertension goal BP (blood pressure) < 140/90; Coronary artery disease involving native coronary artery of native heart without angina pectoris      metoprolol succinate ER (TOPROL XL) 25 MG 24 hr tablet Take 1 tablet (25 mg) by mouth daily  Qty: 90 tablet, Refills: 4    Associated Diagnoses: Hypertension goal BP (blood pressure) < 140/90      nitroGLYcerin (NITROSTAT) 0.4 MG sublingual tablet For chest pain place 1 tablet under the tongue every 5 minutes for 3 doses. If symptoms persist 5 minutes after 1st dose call 911.  Qty: 9 tablet, Refills: 3    Associated Diagnoses: Coronary artery disease involving native coronary artery of native heart without angina pectoris      triamcinolone (KENALOG) 0.1 % external cream Apply topically to affected area 2 times daily.  Qty: 15 g, Refills: 3    Associated Diagnoses: Dermatitis                   Viktoriya Omalley PA-C with Dr. [facility] Thoracic Surgery  Office: [phone]  Cell: [phone]

## 2025-01-29 NOTE — PLAN OF CARE
Goal Outcome Evaluation:    [id number]    A&O x 4. VSS on RA. LS are dim. Pain managed w/ scheduled tylenol. Voiding adequately. No BM during shift. PIV removed for discharge. Independent in room.     Pt discharging to home. All discharge teaching completed w/ patient and daughters at bedside. Pt left floor via wheel chair at 1130 with all home belongings and discharge medications.

## 2025-01-29 NOTE — PROGRESS NOTES
"THORACIC SURGERY POD#1    S: Doing well. Pain from chest tube with deep breaths, but denies shortness of breath. No nausea.    O: BP (!) 141/74 (BP Location: Left arm)   Pulse 69   Temp 98  F (36.7  C) (Oral)   Resp 16   Ht 1.727 m (5' 8\")   Wt 102.6 kg (226 lb 3.2 oz)   SpO2 97%   BMI 34.39 kg/m    Gen: Sitting up in bed, alert  Resp: Removed O2 and SpO2 remained at 94% on room air.   Incisions: Removed bandaids, steri-strips in place  CT: No air leak with cough. Serous output in tubing.    CXR: No ptx    Plan:  - Removed chest tube, occlusive dressing applied.  - Discussed wound cares  - Will call with final pathology  - Post op CXR and appt on 2/5  - Home today    Viktoriya Omalley PA-C with Dr. Anmol [facility] Thoracic Surgery  Office: [phone]  Cell: [phone]    "

## 2025-01-30 ENCOUNTER — PATIENT OUTREACH (OUTPATIENT)
Dept: FAMILY MEDICINE | Facility: CLINIC | Age: 75
End: 2025-01-30

## 2025-01-30 ENCOUNTER — PATIENT OUTREACH (OUTPATIENT)
Dept: CARE COORDINATION | Facility: CLINIC | Age: 75
End: 2025-01-30
Payer: COMMERCIAL

## 2025-01-30 LAB
PATH REPORT.COMMENTS IMP SPEC: ABNORMAL
PATH REPORT.COMMENTS IMP SPEC: YES
PATH REPORT.FINAL DX SPEC: ABNORMAL
PATH REPORT.GROSS SPEC: ABNORMAL
PATH REPORT.INTRAOP OBS SPEC DOC: ABNORMAL
PATH REPORT.MICROSCOPIC SPEC OTHER STN: ABNORMAL
PATH REPORT.RELEVANT HX SPEC: ABNORMAL
PATHOLOGY SYNOPTIC REPORT: ABNORMAL
PHOTO IMAGE: ABNORMAL

## 2025-01-30 PROCEDURE — G0452 MOLECULAR PATHOLOGY INTERPR: HCPCS | Mod: 26 | Performed by: PATHOLOGY

## 2025-01-30 PROCEDURE — 81456 SO/HL 51/>GSAP RNA ALYS: CPT | Performed by: THORACIC SURGERY (CARDIOTHORACIC VASCULAR SURGERY)

## 2025-01-30 PROCEDURE — 81455 SO/HL 51/>GSAP DNA/DNA&RNA: CPT | Performed by: THORACIC SURGERY (CARDIOTHORACIC VASCULAR SURGERY)

## 2025-01-30 NOTE — TELEPHONE ENCOUNTER
Left message to call back and ask to speak with an available nurse.  CHARLY Holbrook, BSN, PHN, RN-[facility]  [phone]

## 2025-01-30 NOTE — PROGRESS NOTES
Clinical Product Navigator RN reviewed chart; patient on payer product coverage.  Review results:   CPN Initial Information Gathering  Referral Source: Health Plan    Patient identified by their health plan for RN Clinical Product Navigator review.  Patient was inpatient at [facility] 1/28/25-1/29/25 for right upper lobe lung nodule.  Patient has an open post-hospital follow-up call with his clinic nurses. Note patient is also followed by [facility].  No additional action for RN Clinical Product Navigator identified at this time.    Melissa Behl BSN, RN, PHN, [facility]  RN Clinical Product Navigator  [phone]

## 2025-02-05 ENCOUNTER — TRANSFERRED RECORDS (OUTPATIENT)
Dept: HEALTH INFORMATION MANAGEMENT | Facility: CLINIC | Age: 75
End: 2025-02-05

## 2025-02-05 ENCOUNTER — ANCILLARY PROCEDURE (OUTPATIENT)
Dept: GENERAL RADIOLOGY | Facility: CLINIC | Age: 75
End: 2025-02-05
Attending: THORACIC SURGERY (CARDIOTHORACIC VASCULAR SURGERY)
Payer: COMMERCIAL

## 2025-02-05 PROCEDURE — 71046 X-RAY EXAM CHEST 2 VIEWS: CPT | Mod: TC | Performed by: RADIOLOGY

## 2025-03-13 ENCOUNTER — APPOINTMENT (OUTPATIENT)
Dept: CARDIOLOGY | Facility: CLINIC | Age: 75
DRG: 233 | End: 2025-03-13
Attending: PHYSICIAN ASSISTANT
Payer: COMMERCIAL

## 2025-03-13 ENCOUNTER — HOSPITAL ENCOUNTER (OUTPATIENT)
Facility: CLINIC | Age: 75
Setting detail: OBSERVATION
DRG: 233 | End: 2025-03-13
Attending: EMERGENCY MEDICINE | Admitting: INTERNAL MEDICINE
Payer: COMMERCIAL

## 2025-03-13 ENCOUNTER — APPOINTMENT (OUTPATIENT)
Dept: GENERAL RADIOLOGY | Facility: CLINIC | Age: 75
DRG: 233 | End: 2025-03-13
Attending: EMERGENCY MEDICINE
Payer: COMMERCIAL

## 2025-03-13 DIAGNOSIS — R60.0 PERIPHERAL EDEMA: ICD-10-CM

## 2025-03-13 DIAGNOSIS — I25.110 CORONARY ARTERY DISEASE INVOLVING NATIVE CORONARY ARTERY OF NATIVE HEART WITH UNSTABLE ANGINA PECTORIS (H): ICD-10-CM

## 2025-03-13 DIAGNOSIS — I25.10 CORONARY ARTERY DISEASE INVOLVING NATIVE CORONARY ARTERY OF NATIVE HEART WITHOUT ANGINA PECTORIS: Primary | ICD-10-CM

## 2025-03-13 DIAGNOSIS — Z95.1 S/P CABG (CORONARY ARTERY BYPASS GRAFT): ICD-10-CM

## 2025-03-13 DIAGNOSIS — R07.9 CHEST PAIN, UNSPECIFIED TYPE: ICD-10-CM

## 2025-03-13 LAB
ALBUMIN SERPL BCG-MCNC: 3.8 G/DL (ref 3.5–5.2)
ALBUMIN UR-MCNC: 20 MG/DL
ALP SERPL-CCNC: 117 U/L (ref 40–150)
ALT SERPL W P-5'-P-CCNC: 21 U/L (ref 0–70)
ANION GAP SERPL CALCULATED.3IONS-SCNC: 12 MMOL/L (ref 7–15)
APPEARANCE UR: CLEAR
AST SERPL W P-5'-P-CCNC: 31 U/L (ref 0–45)
ATRIAL RATE - MUSE: 64 BPM
BASOPHILS # BLD AUTO: 0 10E3/UL (ref 0–0.2)
BASOPHILS NFR BLD AUTO: 0 %
BILIRUB DIRECT SERPL-MCNC: 0.38 MG/DL (ref 0–0.3)
BILIRUB SERPL-MCNC: 1.6 MG/DL
BILIRUB UR QL STRIP: NEGATIVE
BUN SERPL-MCNC: 13.7 MG/DL (ref 8–23)
CALCIUM SERPL-MCNC: 9.5 MG/DL (ref 8.8–10.4)
CHLORIDE SERPL-SCNC: 98 MMOL/L (ref 98–107)
COLOR UR AUTO: YELLOW
CREAT SERPL-MCNC: 1.48 MG/DL (ref 0.67–1.17)
CRP SERPL-MCNC: <3 MG/L
D DIMER PPP FEU-MCNC: 0.28 UG/ML FEU (ref 0–0.5)
DIASTOLIC BLOOD PRESSURE - MUSE: NORMAL MMHG
EGFRCR SERPLBLD CKD-EPI 2021: 49 ML/MIN/1.73M2
EOSINOPHIL # BLD AUTO: 0.1 10E3/UL (ref 0–0.7)
EOSINOPHIL NFR BLD AUTO: 1 %
ERYTHROCYTE [DISTWIDTH] IN BLOOD BY AUTOMATED COUNT: 12.7 % (ref 10–15)
ERYTHROCYTE [SEDIMENTATION RATE] IN BLOOD BY WESTERGREN METHOD: 27 MM/HR (ref 0–20)
FLUAV RNA SPEC QL NAA+PROBE: NEGATIVE
FLUBV RNA RESP QL NAA+PROBE: NEGATIVE
GLUCOSE BLDC GLUCOMTR-MCNC: 115 MG/DL (ref 70–99)
GLUCOSE SERPL-MCNC: 130 MG/DL (ref 70–99)
GLUCOSE UR STRIP-MCNC: NEGATIVE MG/DL
HCO3 SERPL-SCNC: 27 MMOL/L (ref 22–29)
HCT VFR BLD AUTO: 43.1 % (ref 40–53)
HGB BLD-MCNC: 15.2 G/DL (ref 13.3–17.7)
HGB UR QL STRIP: NEGATIVE
IMM GRANULOCYTES # BLD: 0 10E3/UL
IMM GRANULOCYTES NFR BLD: 0 %
INTERPRETATION ECG - MUSE: NORMAL
KETONES UR STRIP-MCNC: NEGATIVE MG/DL
LACTATE SERPL-SCNC: 1.4 MMOL/L (ref 0.7–2)
LEUKOCYTE ESTERASE UR QL STRIP: ABNORMAL
LVEF ECHO: NORMAL
LYMPHOCYTES # BLD AUTO: 1.4 10E3/UL (ref 0.8–5.3)
LYMPHOCYTES NFR BLD AUTO: 16 %
MAGNESIUM SERPL-MCNC: 1.7 MG/DL (ref 1.7–2.3)
MCH RBC QN AUTO: 31.9 PG (ref 26.5–33)
MCHC RBC AUTO-ENTMCNC: 35.3 G/DL (ref 31.5–36.5)
MCV RBC AUTO: 91 FL (ref 78–100)
MONOCYTES # BLD AUTO: 0.5 10E3/UL (ref 0–1.3)
MONOCYTES NFR BLD AUTO: 5 %
MUCOUS THREADS #/AREA URNS LPF: PRESENT /LPF
NEUTROPHILS # BLD AUTO: 6.8 10E3/UL (ref 1.6–8.3)
NEUTROPHILS NFR BLD AUTO: 78 %
NITRATE UR QL: NEGATIVE
NRBC # BLD AUTO: 0 10E3/UL
NRBC BLD AUTO-RTO: 0 /100
P AXIS - MUSE: 71 DEGREES
PH UR STRIP: 5.5 [PH] (ref 5–7)
PLATELET # BLD AUTO: 172 10E3/UL (ref 150–450)
POTASSIUM SERPL-SCNC: 3.8 MMOL/L (ref 3.4–5.3)
PR INTERVAL - MUSE: 230 MS
PROT SERPL-MCNC: 7.2 G/DL (ref 6.4–8.3)
QRS DURATION - MUSE: 84 MS
QT - MUSE: 432 MS
QTC - MUSE: 445 MS
R AXIS - MUSE: 6 DEGREES
RBC # BLD AUTO: 4.76 10E6/UL (ref 4.4–5.9)
RBC URINE: 2 /HPF
RSV RNA SPEC NAA+PROBE: NEGATIVE
SARS-COV-2 RNA RESP QL NAA+PROBE: NEGATIVE
SODIUM SERPL-SCNC: 137 MMOL/L (ref 135–145)
SP GR UR STRIP: 1.02 (ref 1–1.03)
SQUAMOUS EPITHELIAL: 1 /HPF
SYSTOLIC BLOOD PRESSURE - MUSE: NORMAL MMHG
T AXIS - MUSE: 39 DEGREES
TROPONIN T SERPL HS-MCNC: 22 NG/L
TROPONIN T SERPL HS-MCNC: 23 NG/L
TROPONIN T SERPL HS-MCNC: 24 NG/L
UROBILINOGEN UR STRIP-MCNC: NORMAL MG/DL
VENTRICULAR RATE- MUSE: 64 BPM
WBC # BLD AUTO: 8.8 10E3/UL (ref 4–11)
WBC URINE: 27 /HPF

## 2025-03-13 PROCEDURE — 93005 ELECTROCARDIOGRAM TRACING: CPT

## 2025-03-13 PROCEDURE — 85652 RBC SED RATE AUTOMATED: CPT | Performed by: PHYSICIAN ASSISTANT

## 2025-03-13 PROCEDURE — 93010 ELECTROCARDIOGRAM REPORT: CPT | Performed by: INTERNAL MEDICINE

## 2025-03-13 PROCEDURE — 84484 ASSAY OF TROPONIN QUANT: CPT | Performed by: PHYSICIAN ASSISTANT

## 2025-03-13 PROCEDURE — 83735 ASSAY OF MAGNESIUM: CPT | Performed by: EMERGENCY MEDICINE

## 2025-03-13 PROCEDURE — 36415 COLL VENOUS BLD VENIPUNCTURE: CPT | Performed by: NURSE PRACTITIONER

## 2025-03-13 PROCEDURE — 255N000002 HC RX 255 OP 636: Performed by: PHYSICIAN ASSISTANT

## 2025-03-13 PROCEDURE — 93306 TTE W/DOPPLER COMPLETE: CPT | Mod: 26 | Performed by: INTERNAL MEDICINE

## 2025-03-13 PROCEDURE — 82248 BILIRUBIN DIRECT: CPT | Performed by: NURSE PRACTITIONER

## 2025-03-13 PROCEDURE — 83605 ASSAY OF LACTIC ACID: CPT | Performed by: NURSE PRACTITIONER

## 2025-03-13 PROCEDURE — 87088 URINE BACTERIA CULTURE: CPT | Performed by: PHYSICIAN ASSISTANT

## 2025-03-13 PROCEDURE — 99291 CRITICAL CARE FIRST HOUR: CPT | Performed by: NURSE PRACTITIONER

## 2025-03-13 PROCEDURE — 87637 SARSCOV2&INF A&B&RSV AMP PRB: CPT | Performed by: EMERGENCY MEDICINE

## 2025-03-13 PROCEDURE — 99223 1ST HOSP IP/OBS HIGH 75: CPT | Mod: 25 | Performed by: PHYSICIAN ASSISTANT

## 2025-03-13 PROCEDURE — 71046 X-RAY EXAM CHEST 2 VIEWS: CPT

## 2025-03-13 PROCEDURE — 80048 BASIC METABOLIC PNL TOTAL CA: CPT | Performed by: EMERGENCY MEDICINE

## 2025-03-13 PROCEDURE — 96375 TX/PRO/DX INJ NEW DRUG ADDON: CPT

## 2025-03-13 PROCEDURE — 250N000009 HC RX 250: Performed by: EMERGENCY MEDICINE

## 2025-03-13 PROCEDURE — 999N000157 HC STATISTIC RCP TIME EA 10 MIN

## 2025-03-13 PROCEDURE — 36415 COLL VENOUS BLD VENIPUNCTURE: CPT | Performed by: PHYSICIAN ASSISTANT

## 2025-03-13 PROCEDURE — 250N000011 HC RX IP 250 OP 636: Performed by: EMERGENCY MEDICINE

## 2025-03-13 PROCEDURE — 84132 ASSAY OF SERUM POTASSIUM: CPT | Performed by: EMERGENCY MEDICINE

## 2025-03-13 PROCEDURE — G0378 HOSPITAL OBSERVATION PER HR: HCPCS

## 2025-03-13 PROCEDURE — 250N000013 HC RX MED GY IP 250 OP 250 PS 637: Performed by: EMERGENCY MEDICINE

## 2025-03-13 PROCEDURE — 82565 ASSAY OF CREATININE: CPT | Performed by: EMERGENCY MEDICINE

## 2025-03-13 PROCEDURE — 87186 SC STD MICRODIL/AGAR DIL: CPT | Performed by: PHYSICIAN ASSISTANT

## 2025-03-13 PROCEDURE — 250N000013 HC RX MED GY IP 250 OP 250 PS 637: Performed by: NURSE PRACTITIONER

## 2025-03-13 PROCEDURE — 36415 COLL VENOUS BLD VENIPUNCTURE: CPT | Performed by: EMERGENCY MEDICINE

## 2025-03-13 PROCEDURE — 85004 AUTOMATED DIFF WBC COUNT: CPT | Performed by: EMERGENCY MEDICINE

## 2025-03-13 PROCEDURE — 250N000011 HC RX IP 250 OP 636: Performed by: INTERNAL MEDICINE

## 2025-03-13 PROCEDURE — 250N000011 HC RX IP 250 OP 636: Performed by: PHYSICIAN ASSISTANT

## 2025-03-13 PROCEDURE — 999N000208 ECHOCARDIOGRAM COMPLETE

## 2025-03-13 PROCEDURE — 81001 URINALYSIS AUTO W/SCOPE: CPT | Performed by: PHYSICIAN ASSISTANT

## 2025-03-13 PROCEDURE — 82962 GLUCOSE BLOOD TEST: CPT

## 2025-03-13 PROCEDURE — 86140 C-REACTIVE PROTEIN: CPT | Performed by: PHYSICIAN ASSISTANT

## 2025-03-13 PROCEDURE — 96374 THER/PROPH/DIAG INJ IV PUSH: CPT

## 2025-03-13 PROCEDURE — 74018 RADEX ABDOMEN 1 VIEW: CPT

## 2025-03-13 PROCEDURE — 85379 FIBRIN DEGRADATION QUANT: CPT | Performed by: EMERGENCY MEDICINE

## 2025-03-13 PROCEDURE — 99291 CRITICAL CARE FIRST HOUR: CPT | Mod: 25

## 2025-03-13 PROCEDURE — 84484 ASSAY OF TROPONIN QUANT: CPT | Performed by: EMERGENCY MEDICINE

## 2025-03-13 PROCEDURE — 250N000013 HC RX MED GY IP 250 OP 250 PS 637: Performed by: PHYSICIAN ASSISTANT

## 2025-03-13 RX ORDER — LIDOCAINE HYDROCHLORIDE 20 MG/ML
10 SOLUTION OROPHARYNGEAL ONCE
Status: COMPLETED | OUTPATIENT
Start: 2025-03-13 | End: 2025-03-13

## 2025-03-13 RX ORDER — ONDANSETRON 2 MG/ML
INJECTION INTRAMUSCULAR; INTRAVENOUS
Status: COMPLETED
Start: 2025-03-13 | End: 2025-03-13

## 2025-03-13 RX ORDER — ASPIRIN 81 MG/1
243 TABLET, CHEWABLE ORAL ONCE
Status: COMPLETED | OUTPATIENT
Start: 2025-03-13 | End: 2025-03-13

## 2025-03-13 RX ORDER — AMLODIPINE BESYLATE 2.5 MG/1
2.5 TABLET ORAL DAILY
Status: DISCONTINUED | OUTPATIENT
Start: 2025-03-13 | End: 2025-03-15

## 2025-03-13 RX ORDER — FUROSEMIDE 20 MG/1
20 TABLET ORAL DAILY
Status: DISCONTINUED | OUTPATIENT
Start: 2025-03-13 | End: 2025-03-15

## 2025-03-13 RX ORDER — ACETAMINOPHEN 650 MG/1
650 SUPPOSITORY RECTAL EVERY 4 HOURS PRN
Status: DISCONTINUED | OUTPATIENT
Start: 2025-03-13 | End: 2025-03-15

## 2025-03-13 RX ORDER — ASPIRIN 81 MG/1
81 TABLET ORAL DAILY
Status: DISCONTINUED | OUTPATIENT
Start: 2025-03-14 | End: 2025-03-15

## 2025-03-13 RX ORDER — NALOXONE HYDROCHLORIDE 0.4 MG/ML
0.4 INJECTION, SOLUTION INTRAMUSCULAR; INTRAVENOUS; SUBCUTANEOUS
Status: DISCONTINUED | OUTPATIENT
Start: 2025-03-13 | End: 2025-03-15

## 2025-03-13 RX ORDER — MAGNESIUM HYDROXIDE/ALUMINUM HYDROXICE/SIMETHICONE 120; 1200; 1200 MG/30ML; MG/30ML; MG/30ML
30 SUSPENSION ORAL EVERY 4 HOURS PRN
Status: DISCONTINUED | OUTPATIENT
Start: 2025-03-13 | End: 2025-03-15

## 2025-03-13 RX ORDER — OXYCODONE HYDROCHLORIDE 5 MG/1
5 TABLET ORAL EVERY 4 HOURS PRN
Status: DISCONTINUED | OUTPATIENT
Start: 2025-03-13 | End: 2025-03-15

## 2025-03-13 RX ORDER — MORPHINE SULFATE 4 MG/ML
4 INJECTION, SOLUTION INTRAMUSCULAR; INTRAVENOUS ONCE
Status: COMPLETED | OUTPATIENT
Start: 2025-03-13 | End: 2025-03-13

## 2025-03-13 RX ORDER — METOPROLOL SUCCINATE 25 MG/1
25 TABLET, EXTENDED RELEASE ORAL DAILY
Status: DISCONTINUED | OUTPATIENT
Start: 2025-03-13 | End: 2025-03-15

## 2025-03-13 RX ORDER — ATORVASTATIN CALCIUM 40 MG/1
40 TABLET, FILM COATED ORAL DAILY
Status: DISCONTINUED | OUTPATIENT
Start: 2025-03-13 | End: 2025-03-25 | Stop reason: HOSPADM

## 2025-03-13 RX ORDER — NALOXONE HYDROCHLORIDE 0.4 MG/ML
0.2 INJECTION, SOLUTION INTRAMUSCULAR; INTRAVENOUS; SUBCUTANEOUS
Status: DISCONTINUED | OUTPATIENT
Start: 2025-03-13 | End: 2025-03-15

## 2025-03-13 RX ORDER — ONDANSETRON 4 MG/1
4 TABLET, ORALLY DISINTEGRATING ORAL EVERY 6 HOURS PRN
Status: DISCONTINUED | OUTPATIENT
Start: 2025-03-13 | End: 2025-03-15

## 2025-03-13 RX ORDER — NITROGLYCERIN 0.4 MG/1
0.4 TABLET SUBLINGUAL EVERY 5 MIN PRN
Status: DISCONTINUED | OUTPATIENT
Start: 2025-03-13 | End: 2025-03-15

## 2025-03-13 RX ORDER — AMOXICILLIN 250 MG
2 CAPSULE ORAL 2 TIMES DAILY
Status: DISCONTINUED | OUTPATIENT
Start: 2025-03-13 | End: 2025-03-15

## 2025-03-13 RX ORDER — POLYETHYLENE GLYCOL 3350 17 G/17G
17 POWDER, FOR SOLUTION ORAL 3 TIMES DAILY
Status: DISCONTINUED | OUTPATIENT
Start: 2025-03-13 | End: 2025-03-15

## 2025-03-13 RX ORDER — MAGNESIUM HYDROXIDE/ALUMINUM HYDROXICE/SIMETHICONE 120; 1200; 1200 MG/30ML; MG/30ML; MG/30ML
15 SUSPENSION ORAL ONCE
Status: COMPLETED | OUTPATIENT
Start: 2025-03-13 | End: 2025-03-13

## 2025-03-13 RX ORDER — LIDOCAINE 40 MG/G
CREAM TOPICAL
Status: DISCONTINUED | OUTPATIENT
Start: 2025-03-13 | End: 2025-03-15

## 2025-03-13 RX ORDER — ACETAMINOPHEN 325 MG/1
650 TABLET ORAL EVERY 4 HOURS PRN
Status: DISCONTINUED | OUTPATIENT
Start: 2025-03-13 | End: 2025-03-15

## 2025-03-13 RX ORDER — NITROGLYCERIN 0.4 MG/1
0.4 TABLET SUBLINGUAL EVERY 5 MIN PRN
Status: COMPLETED | OUTPATIENT
Start: 2025-03-13 | End: 2025-03-13

## 2025-03-13 RX ORDER — POLYETHYLENE GLYCOL 3350 17 G/17G
17 POWDER, FOR SOLUTION ORAL DAILY
Status: DISCONTINUED | OUTPATIENT
Start: 2025-03-13 | End: 2025-03-13

## 2025-03-13 RX ORDER — LOSARTAN POTASSIUM 50 MG/1
50 TABLET ORAL DAILY
Status: DISCONTINUED | OUTPATIENT
Start: 2025-03-13 | End: 2025-03-15

## 2025-03-13 RX ORDER — HYDROMORPHONE HCL IN WATER/PF 6 MG/30 ML
0.2 PATIENT CONTROLLED ANALGESIA SYRINGE INTRAVENOUS EVERY 4 HOURS PRN
Status: DISCONTINUED | OUTPATIENT
Start: 2025-03-13 | End: 2025-03-15

## 2025-03-13 RX ORDER — BISACODYL 10 MG
10 SUPPOSITORY, RECTAL RECTAL DAILY PRN
Status: DISCONTINUED | OUTPATIENT
Start: 2025-03-13 | End: 2025-03-15

## 2025-03-13 RX ORDER — ONDANSETRON 2 MG/ML
4 INJECTION INTRAMUSCULAR; INTRAVENOUS EVERY 6 HOURS PRN
Status: DISCONTINUED | OUTPATIENT
Start: 2025-03-13 | End: 2025-03-15

## 2025-03-13 RX ORDER — ACETAMINOPHEN 500 MG
1000 TABLET ORAL 2 TIMES DAILY PRN
Status: DISCONTINUED | OUTPATIENT
Start: 2025-03-13 | End: 2025-03-13 | Stop reason: ALTCHOICE

## 2025-03-13 RX ADMIN — SENNOSIDES AND DOCUSATE SODIUM 2 TABLET: 50; 8.6 TABLET ORAL at 19:50

## 2025-03-13 RX ADMIN — PANTOPRAZOLE SODIUM 40 MG: 40 INJECTION, POWDER, FOR SOLUTION INTRAVENOUS at 17:36

## 2025-03-13 RX ADMIN — ATORVASTATIN CALCIUM 40 MG: 40 TABLET, FILM COATED ORAL at 17:35

## 2025-03-13 RX ADMIN — ALUMINUM HYDROXIDE, MAGNESIUM HYDROXIDE, AND SIMETHICONE 30 ML: 200; 200; 20 SUSPENSION ORAL at 21:40

## 2025-03-13 RX ADMIN — FUROSEMIDE 20 MG: 20 TABLET ORAL at 17:35

## 2025-03-13 RX ADMIN — ONDANSETRON 4 MG: 2 INJECTION INTRAMUSCULAR; INTRAVENOUS at 16:20

## 2025-03-13 RX ADMIN — ONDANSETRON 4 MG: 2 INJECTION, SOLUTION INTRAMUSCULAR; INTRAVENOUS at 16:20

## 2025-03-13 RX ADMIN — ASPIRIN 243 MG: 81 TABLET, CHEWABLE ORAL at 11:07

## 2025-03-13 RX ADMIN — NITROGLYCERIN 0.4 MG: 0.4 TABLET SUBLINGUAL at 15:21

## 2025-03-13 RX ADMIN — OXYCODONE HYDROCHLORIDE 5 MG: 5 TABLET ORAL at 21:40

## 2025-03-13 RX ADMIN — PERFLUTREN 10 ML: 6.52 INJECTION, SUSPENSION INTRAVENOUS at 16:42

## 2025-03-13 RX ADMIN — HYDROMORPHONE HYDROCHLORIDE 0.2 MG: 0.2 INJECTION, SOLUTION INTRAMUSCULAR; INTRAVENOUS; SUBCUTANEOUS at 15:12

## 2025-03-13 RX ADMIN — NITROGLYCERIN 0.4 MG: 0.4 TABLET SUBLINGUAL at 15:11

## 2025-03-13 RX ADMIN — FAMOTIDINE 20 MG: 10 INJECTION, SOLUTION INTRAVENOUS at 11:37

## 2025-03-13 RX ADMIN — MORPHINE SULFATE 4 MG: 4 INJECTION, SOLUTION INTRAMUSCULAR; INTRAVENOUS at 12:15

## 2025-03-13 RX ADMIN — NITROGLYCERIN 0.4 MG: 0.4 TABLET SUBLINGUAL at 11:19

## 2025-03-13 RX ADMIN — LIDOCAINE HYDROCHLORIDE 10 ML: 20 SOLUTION ORAL at 11:34

## 2025-03-13 RX ADMIN — POLYETHYLENE GLYCOL 3350 17 G: 17 POWDER, FOR SOLUTION ORAL at 19:50

## 2025-03-13 RX ADMIN — ALUMINUM HYDROXIDE, MAGNESIUM HYDROXIDE, AND SIMETHICONE 30 ML: 200; 200; 20 SUSPENSION ORAL at 15:11

## 2025-03-13 RX ADMIN — NITROGLYCERIN 0.4 MG: 0.4 TABLET SUBLINGUAL at 15:31

## 2025-03-13 RX ADMIN — LOSARTAN POTASSIUM 50 MG: 50 TABLET, FILM COATED ORAL at 17:36

## 2025-03-13 RX ADMIN — AMLODIPINE BESYLATE 2.5 MG: 2.5 TABLET ORAL at 17:35

## 2025-03-13 RX ADMIN — NITROGLYCERIN 0.4 MG: 0.4 TABLET SUBLINGUAL at 11:05

## 2025-03-13 RX ADMIN — ALUMINUM HYDROXIDE, MAGNESIUM HYDROXIDE, AND SIMETHICONE 15 ML: 200; 200; 20 SUSPENSION ORAL at 11:34

## 2025-03-13 RX ADMIN — NITROGLYCERIN 0.4 MG: 0.4 TABLET SUBLINGUAL at 11:12

## 2025-03-13 ASSESSMENT — ACTIVITIES OF DAILY LIVING (ADL)
ADLS_ACUITY_SCORE: 49
ADLS_ACUITY_SCORE: 47
ADLS_ACUITY_SCORE: 45
ADLS_ACUITY_SCORE: 49
ADLS_ACUITY_SCORE: 47
ADLS_ACUITY_SCORE: 47
ADLS_ACUITY_SCORE: 51
ADLS_ACUITY_SCORE: 49
ADLS_ACUITY_SCORE: 45
ADLS_ACUITY_SCORE: 51
ADLS_ACUITY_SCORE: 49
ADLS_ACUITY_SCORE: 45

## 2025-03-13 ASSESSMENT — COLUMBIA-SUICIDE SEVERITY RATING SCALE - C-SSRS
1. IN THE PAST MONTH, HAVE YOU WISHED YOU WERE DEAD OR WISHED YOU COULD GO TO SLEEP AND NOT WAKE UP?: NO
1. IN THE PAST MONTH, HAVE YOU WISHED YOU WERE DEAD OR WISHED YOU COULD GO TO SLEEP AND NOT WAKE UP?: NO
6. HAVE YOU EVER DONE ANYTHING, STARTED TO DO ANYTHING, OR PREPARED TO DO ANYTHING TO END YOUR LIFE?: NO
6. HAVE YOU EVER DONE ANYTHING, STARTED TO DO ANYTHING, OR PREPARED TO DO ANYTHING TO END YOUR LIFE?: NO
2. HAVE YOU ACTUALLY HAD ANY THOUGHTS OF KILLING YOURSELF IN THE PAST MONTH?: NO
2. HAVE YOU ACTUALLY HAD ANY THOUGHTS OF KILLING YOURSELF IN THE PAST MONTH?: NO

## 2025-03-13 NOTE — PHARMACY-ADMISSION MEDICATION HISTORY
Pharmacist Admission Medication History    Admission medication history is complete. The information provided in this note is only as accurate as the sources available at the time of the update.    Information Source(s): Patient, Family member, and CareEverywhere/SureScripts via in-person    Pertinent Information:   -RN handoff note reports pt took a baby aspirin today. Patient states he did not but that staff here gave him aspirin.  -nursing notes also reports patient had/has been on antibiotics. No recent fills in Surescripts. Patient states he is not currently on antibiotics, but was previously at some point.     Changes made to PTA medication list:  Added: None  Deleted: hydromorphone  Changed: gabapentin (400 mg TID --> 400 mg TID PRN), triamcinolone (BID --> BID PRN)    Allergies reviewed with patient and updates made in EHR: no    Medication History Completed By: Ashlie Gifford RPH 3/13/2025 1:41 PM    PTA Med List   Medication Sig Last Dose/Taking    acetaminophen (TYLENOL) 500 MG tablet Take 2 tablets (1,000 mg) by mouth 2 times daily as needed for mild pain Taking As Needed    amLODIPine (NORVASC) 2.5 MG tablet Take 1 tablet (2.5 mg) by mouth daily 3/12/2025    aspirin 81 MG EC tablet Take 81 mg by mouth daily 3/12/2025    atorvastatin (LIPITOR) 40 MG tablet Take 1 tablet (40 mg) by mouth daily 3/12/2025    furosemide (LASIX) 20 MG tablet Take 1 tablet (20 mg) by mouth daily 3/12/2025    gabapentin (NEURONTIN) 400 MG capsule Take 1 capsule (400 mg) by mouth 3 times daily (Patient taking differently: Take 400 mg by mouth 3 times daily as needed.) Taking Differently    losartan (COZAAR) 50 MG tablet Take 1 tablet (50 mg) by mouth daily 3/12/2025    metoprolol succinate ER (TOPROL XL) 25 MG 24 hr tablet Take 1 tablet (25 mg) by mouth daily 3/12/2025    nitroGLYcerin (NITROSTAT) 0.4 MG sublingual tablet For chest pain place 1 tablet under the tongue every 5 minutes for 3 doses. If symptoms persist 5 minutes  after 1st dose call 911. Taking    senna-docusate (SENOKOT-S/PERICOLACE) 8.6-50 MG tablet Take 1 tablet by mouth 2 times daily as needed for constipation. Taking As Needed    triamcinolone (KENALOG) 0.1 % external cream Apply topically to affected area 2 times daily. (Patient taking differently: 2 times daily as needed. Apply topically to affected area 2 times daily.) Taking Differently

## 2025-03-13 NOTE — ED NOTES
[facility]  ED Nurse Handoff Report    ED Chief complaint: Chest Pain      ED Diagnosis:   Final diagnoses:   Chest pain, unspecified type       Code Status: Full Code    Allergies:   Allergies   Allergen Reactions    Soy Allergy (Obsolete) Nausea and Vomiting       Patient Story:  JESSICA Josue is a 75 year old male with a history of hypertension, CAD who presents with chest pain. 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 Since then he has had worsening, central chest pain along with nausea. He did take a baby aspirin today, and Tylenol. He also endorses dysuria, has been on antibiotics and was supposed to see urology today. He denies any recent travel, sick contacts, blood clots. He denies any cough, sore throat, fever, leg swelling.    Focused Assessment:    A/Ox4. Chest pain 5-10/10. RA. Dyspenic with exertion. Normotensive. HR 70-90's. Afebrile. Ambulatory. Constipated.     Labs Ordered and Resulted from Time of ED Arrival to Time of ED Departure   BASIC METABOLIC PANEL - Abnormal       Result Value    Sodium 137      Potassium 3.8      Chloride 98      Carbon Dioxide (CO2) 27      Anion Gap 12      Urea Nitrogen 13.7      Creatinine 1.48 (*)     GFR Estimate 49 (*)     Calcium 9.5      Glucose 130 (*)    TROPONIN T, HIGH SENSITIVITY - Abnormal    Troponin T, High Sensitivity 24 (*)    D DIMER QUANTITATIVE - Normal    D-Dimer Quantitative 0.28     MAGNESIUM - Normal    Magnesium 1.7     INFLUENZA A/B, RSV AND SARS-COV2 PCR - Normal    Influenza A PCR Negative      Influenza B PCR Negative      RSV PCR Negative      SARS CoV2 PCR Negative     CBC WITH PLATELETS AND DIFFERENTIAL    WBC Count 8.8      RBC Count 4.76      Hemoglobin 15.2      Hematocrit 43.1      MCV 91      MCH 31.9      MCHC 35.3      RDW 12.7      Platelet Count 172      % Neutrophils 78      % Lymphocytes 16      % Monocytes 5      % Eosinophils 1      % Basophils 0      % Immature  Granulocytes 0      NRBCs per 100 WBC 0      Absolute Neutrophils 6.8      Absolute Lymphocytes 1.4      Absolute Monocytes 0.5      Absolute Eosinophils 0.1      Absolute Basophils 0.0      Absolute Immature Granulocytes 0.0      Absolute NRBCs 0.0     TROPONIN T, HIGH SENSITIVITY       XR Chest 2 Views   Preliminary Result   IMPRESSION: No acute pulmonary abnormality.      Abdomen XR 1 vw   Final Result   IMPRESSION: No distended air-filled loops of small bowel. An air-filled loop of colon over the lower midabdomen is indeterminate. No intraperitoneal free air. There is fusion hardware in the spine.            Treatments and/or interventions provided:    Medications   aspirin (ASA) chewable tablet 243 mg (243 mg Oral $Given 3/13/25 1107)   nitroGLYcerin (NITROSTAT) sublingual tablet 0.4 mg (0.4 mg Sublingual $Given 3/13/25 1119)   famotidine (PEPCID) injection 20 mg (20 mg Intravenous $Given 3/13/25 1137)   alum & mag hydroxide-simethicone (MAALOX) suspension 15 mL (15 mLs Oral $Given 3/13/25 1134)   lidocaine (viscous) (XYLOCAINE) 2 % solution 10 mL (10 mLs Mouth/Throat $Given 3/13/25 1134)   morphine (PF) injection 4 mg (4 mg Intravenous $Given 3/13/25 1215)       Patient's response to treatments and/or interventions:  Remains stable    To be done/followed up on inpatient unit:   See any in-patient orders    Does this patient have any cognitive concerns?:  n/a    Activity level - Baseline/Home:    Independent    Activity Level - Current:    Stand with Assist    Patient's Preferred language: English     Needed?: No    Isolation: None  Infection: Not Applicable  Patient tested for COVID 19 prior to admission: NO    Bariatric?: Yes    Vital Signs:   Vitals:    03/13/25 1115 03/13/25 1120 03/13/25 1130 03/13/25 1145   BP: 116/78 116/78 124/78 135/83   Pulse: 68 68 68 54   Resp:  24     Temp:       TempSrc:       SpO2:  96%         Cardiac Rhythm:     Was the PSS-3 completed:   Yes  What interventions are  required if any?                 Family Comments: at bedside      For the majority of the shift this patient's behavior was Green.  Behavioral interventions performed were calm environment.    ED NURSE PHONE NUMBER: [phone]

## 2025-03-13 NOTE — CODE/RAPID RESPONSE
"[facility]    RRT Note  3/13/2025   Time Called: 3:39 PM    RRT called for: Chest pain    HPI:    Fermín Josue is a 75 year old male w/ PMH of recently diagnosed non-small cell lung cancer with recent nodule removal, BPH, history of CAD with prior stenting, CKD stage IIIa, hypertension, hyperlipidemia, obesity class I, prediabetes, history of nephrectomy, who was registered observation for chest pain, rule out ACS.    RRT activated for chest pain after walking from stretcher in hallway to bed in room.  Pt developed 6/10 chest tightness as well as a \"queasy stomach.\"  He reports the pain started centrally in his chest and then radiated to the right.  There is no left-sided chest pain.  There is no associated dyspnea but he is nauseous.  The pain is not reproducible no pleuritic component to it.  No associated diaphoresis.  He reports it is not as bad as what prompted his initial ED visit but it is similar.  RN staff report here received Dilaudid at 3:10 PM.  He reports that of the medications he received in the emergency department morphine was the most helpful.    On my arrival patient is lying in bed without acute distress /79, repeat 116/85, height 64, SpO2 variable 93-95% on room air at times dips down to 88 with deep breathing increases to 97% on room air.  No significant findings on exam, chest pain is not reproducible per se.  He has multiple (4) well-approximated surgical incisions in his right posterior axillary line and back.  Abdomen is soft no right upper quadrant or epigastric tenderness    I personally reviewed the radiographs the chest and abdomen, former of which showed decreased air entry in the right lower lobe though that is the surgical side    Assessment & Plan   Chest pain: similar to reason for admit though not as intense at time of ED presentation  Differential diagnosis:  Considered PE but D dimer neg; ACS/ unstable angina remains a concern in his prior " history of CAD though troponins flat thus far and pt is pending stress test; pain precipitated by exertion; considered pntx (not seen on cxr from 12pm 03/13/25).  Atelectasis also possible as it is GI process including constipation, dyspepsia and gas or biliary process given R sided pain; pneumonia/CAP considered but pt is well appearing, no leukocytosis and viral studies neg thus far.  Also considered MSK but pain is not at site of recent surgery, no significant activity following his recent lung surgery    INTERVENTIONS:  -stat EKG, no acute ischemic changes and unchanged from earlier 03/13/25   -agree w/ changing TTE to stat  -add on hepatic profile  -add on dulcolax suppository  -incentive spirometry 10x q1h while awake  -Continuous pulse oximetry  -holding off on SL NTG, per pt, prior doses not very helpful and BP only 126 systolic    Abd discomfort/ nausea could be related to primary complaint of chest discomfort versus constipation vs effects of recent pain medications  -zofran 4 mg every 6 hours as needed versus now  -increased bowel regimen as above w/ dulcolax, increased miralax to bid  -LFTs--> significant for hyperbilirubinemia albeit mild and no other elevation of aminotransferases or alkaline phosphatase  - Repeat LFTs in the a.m.  -check LA though abd exam benign      Last 24H PRN:     alum & mag hydroxide-simethicone (MAALOX) suspension 30 mL, 30 mL at 03/13/25 1511    HYDROmorphone (DILAUDID) injection 0.2 mg, 0.2 mg at 03/13/25 1512    nitroGLYcerin (NITROSTAT) sublingual tablet 0.4 mg, 0.4 mg at 03/13/25 1531      Working diagnosis: Agree with ongoing chest pain evaluation and management currently described in admitting ZAIRE's note.    At the end of the RRT TTE tech has arrived to start study    disposition new current level of care    Discussed with and defer further cares to hospitalist admitting ZAIRE Shabnam Alfredo.  Will hold off on AC for now, pending repeat troponin    Code Status: No CPR-  Do NOT Intubate    Physical Exam   Vital Signs with Ranges:  Temp:  [97.3  F (36.3  C)-97.9  F (36.6  C)] 97.9  F (36.6  C)  Pulse:  [54-68] 67  Resp:  [13-24] 13  BP: (116-157)/(77-88) 126/77  SpO2:  [96 %-100 %] 100 %  No intake/output data recorded.    Constitutional: vs as above and/or per EMR  General:  adult pt lying in bed at times uncomfortable appearing but without acute distress   GCS:   Motor 6=Obeys commands   Verbal 5=Oriented   Eye Opening 4=Spontaneous   Total: 15     Neuro: +follows commands wiggle toes and show 2 fingers bilat, face symmetric, tongue midline, speech fluent  Eyes pupils equal round 3mm briskly reactive bilat, sclera nonicteric, noninjected  Head, ENT & mouth: NC/AT,  mouth moist oral mucosa  Neck: supple  CV S1S2 murmurs, bradycardic to sinus rhythm  resp: CTAB upper  gi:normoactive bowel sounds, soft, nontender, nondisteded no pain on palpation of right upper quadrant or epigastrium  Ext: no edema or mottling  Skin: no rashes on exposed skin  Musculoskeletal no bony joint deformities      Data     EKG:  Interpreted by LONNY Ku CNP  Time reviewed: 351pm  Symptoms at time of EKG: chest pain   Rhythm: normal sinus   Rate: Normal 64  Axis: Normal  Ectopy: none  Conduction: 1st degree AV block  ST Segments/ T Waves: No acute ischemic changes  Q Waves: none  Comparison to prior: Unchanged from 03/13/25 0900     Clinical Impression: NSR, normal axis, no acute ischemic changes, first degree AV block  Not significant change from prior      IMAGING: (X-ray/CT/MRI)   Recent Results (from the past 24 hours)   Abdomen XR 1 vw    Narrative    EXAM: XR ABDOMEN 1 VIEW  LOCATION: [facility]  DATE: 3/13/2025    INDICATION: constipation  COMPARISON: 01/03/2025      Impression    IMPRESSION: No distended air-filled loops of small bowel. An air-filled loop of colon over the lower midabdomen is indeterminate. No intraperitoneal free air. There is fusion hardware in  the spine.   XR Chest 2 Views    Narrative    EXAM: XR CHEST 2 VIEWS  LOCATION: [facility]  DATE: 3/13/2025    INDICATION: Chest pain  COMPARISON: Chest x-rays, most recently 2/5/2025. CT dated 10/24/2024.    FINDINGS: The cardiomediastinal silhouette and pulmonary vasculature are within normal limits. Aortic calcification. Stable postoperative changes in the right lung with right apical suture line. The lungs are otherwise clear. No pleural effusion or   pneumothorax. Partially visualized thoracolumbar spinal fusion.      Impression    IMPRESSION: No acute pulmonary abnormality.       CBC with Diff:  Recent Labs   Lab Test 03/13/25  1106 01/28/25  1039   WBC 8.8 7.1   HGB 15.2 15.1   MCV 91 92    180   INR  --  1.06        Lactic Acid:    pending    Comprehensive Metabolic Panel:  Recent Labs   Lab 03/13/25  1106      POTASSIUM 3.8   CHLORIDE 98   CO2 27   ANIONGAP 12   *   BUN 13.7   CR 1.48*   GFRESTIMATED 49*   CANDICE 9.5   MAG 1.7       Time Spent on this Encounter   I spent 50 minutes of critical care time on the unit/floor managing the care of Fermín Josue. Upon evaluation, this patient had a high probability of imminent or life-threatening deterioration due to chest pain, which required my direct attention, intervention, and personal management including review of ekg 100% of my time was spent at the bedside counseling the patient and/or coordinating care regarding services listed in this note.    LONNY Ku [facility]  Hospitalist Service  [facility]  Securely message with [employer] (more info)  Text page via [employer]

## 2025-03-13 NOTE — H&P
[facility]    History and Physical - Hospitalist Service       Date of Admission:  3/13/2025    Assessment & Plan   Fermín Josue is a 75 year old male with past medical history significant for recently diagnosed non-small cell lung cancer with recent nodule removal, BPH, history of CAD with prior stenting, CKD stage IIIa, hypertension, hyperlipidemia, obesity class I, prediabetes, history of nephrectomy, who was admitted for chest pain rule out.     Chest pain, rule out acute coronary syndrome  Coronary artery disease status post TRISH x2 to LAD, occluded RCA with collaterals 2007  Hypertension  Hyperlipidemia  Presented with 1.5 week intermittent but recurrent midsternal throbbing chest pain, feeling like his heart is coming through his chest with associated nausea, no palpitations or vomiting.  No increase in exercise intolerance or SOB.  No trauma or falls.  Pain is positional and worse with laying down, relieved with sitting up.  Not reproducible on exam. No history of pericarditis.  EKG nonischemic. Hx CAD with TRISH x2 to LAD.  Last stress test March 2021 was abnormal but consistent with prior lesions.  Note, had a VATS and wedge resection with Dr. Carrillo in January and had recovered from that.  Workup essentially unremarkable.  Initial troponin T is 24 and delta 22. CRP neg, ESR is 27.  Respiratory panel negative. CXR without acute abnormality. Cr at/near baseline. Nitroglycerin given x 3 with minimal improvement. Given Maalox, aspirin, Pepcid, viscous lidocaine without much improvement, pain did improve with morphine.   Risk stratification: cardiac history, gender, age, smoking history, hyperlipidemia, prediabetes, obesity  DDx is broad including cardiac etiologies such as coronary artery disease, pericarditis, GERD, costochondritis, musculoskeletal, esophageal spasms, among others.  - Registered to observation   - Telemetry  - Troponins flat, no need to keep trending  - Continue  aspirin, given in the ED  - Continue PTA BB, ARB with hold parameters  - Continue PTA statin  - Repeat EKG and nitroglycerin sublingual available for recurrent chest pain  - PRN IV labetalol/hydralazine for SBP >180  - ECHO   - Nuclear stress test 3/14/25  - NPO after midnight for stress test  - Trial PPI    Constipation  Also notes constipation though had small bowel movement day of admission. AXR - No distended air-filled loops of small bowel. An air-filled loop of colon over the lower midabdomen is indeterminate. No intraperitoneal free air.  -Bowel regimen Senna S 2 tabs BID + miralax daily    Dysuria  Recent UTI   Reports follows with Urology, prior kidney stones. Recently treated for UTI, has been off abx for awhile. Now feels like he is urinating more frequently and concerned of recurrent UTI.  -Check UA/UC    Chronic stable diagnoses and other pertinent medical history: Appropriate PTA medications will be resumed. Nonessential medications will be held if patient is observation status.     Non-small cell lung cancer s/p VATS and wedge resection of RUL lung nodule 1/28/25  Following with [facility] oncology and recently had a lung nodule resected with Dr. Carrillo and tay pathology dx invasive adenocarcinoma with negative surgical margines. Plan is surveillance with CT in 3 months and then every 6 months x5 years.  -Follow up with [facility] as outpatient    Chronic kidney disease, stage   History of renal cancer s/p nephrectomy 2021  Nephrolithiasis s/p lithotripsy 2003  BPH  Baseline Cr 1.3-15. On admission, 1.48.  - Avoid nephrotoxins - contrast, NSAIDs  - Renally dose medications as able/needed  - Monitor    T8-L1 microdiscectomy fusion lumber spine 2023  Soy allergy  Obesity class I   Former smoker  Marijuana use, weekends  Possible undiagnosed obstructive sleep apnea per PCP notes  Prediabetes     Observation Goals: List all goals to be met before discharge home: , - Serial troponins and stress  test complete., - Seen and cleared by consultant if applicable, - Adequate pain control on oral analgesia, - Vital signs normal or at patient baseline, - Safe disposition plan has been identified, - Nurse to notify provider when observation goals have been met and patient is ready for discharge.  Diet: Combination Diet Low Saturated Fat Na <2400mg Diet, No Caffeine Diet  NPO for Medical/Clinical Reasons Except for: Meds, NPO p [place]  DVT Prophylaxis: Low Risk/Ambulatory with no VTE prophylaxis indicated and Ambulate every shift  [place] Catheter: Not present  Lines: None     Cardiac Monitoring: ACTIVE order. Indication: Chest pain/ ACS rule out (24 hours)  Code Status: No CPR- Do NOT IntubateDNR/DNI, d/w patient and daughter on admission, >5 minute discussion, patient is clear DNR/DNI    Clinically Significant Risk Factors Present on Admission                 # Drug Induced Platelet Defect: home medication list includes an antiplatelet medication   # Hypertension: Noted on problem list                      Disposition Plan     Medically Ready for Discharge: Anticipated Tomorrow         The patient's care was discussed with the Attending Physician, Dr. Yoon, Patient, Patient's Family, and ED Physician .    Shabnam Alfredo PA-C  Hospitalist Service  [facility]  Securely message with [employer] (more info)  Text page via [employer] Paging/Directory     ______________________________________________________________________    Chief Complaint   Chest pain    History is obtained from the patient, electronic health record, emergency department physician, and patient's daughter    History of Present Illness   Fermín Josue is a 75 year old male with past medical history significant for recently diagnosed non-small cell lung cancer with recent nodule removal, BPH, history of CAD with prior stenting, CKD stage IIIa, hypertension, hyperlipidemia, obesity class I, prediabetes, history of nephrectomy,  who presented to the emergency department with a week and a half of intermittent persistent midsternal chest pain.  Patient describes the pain as throbbing, feeling like his heart is coming through his chest.  He reports some associated nausea, no palpitations or vomiting.  No increase in exercise intolerance or shortness of breath.  No trauma or falls.  Pain is positional and worse with laying down, relieved with sitting up.  Is not producible on exam.  Also notes constipation though had small bowel movement day of admission.  Nitroglycerin given x 3 with minimal improvement.  Pain improved with morphine.  No history of pericarditis.  EKG nonischemic.  Patient with history of coronary artery disease and stenting to the LAD.  Last stress test March 2021 was abnormal but consistent with prior lesions.  Note, had a VATS and wedge resection with Dr. Carrillo in January and had recovered from that.    In the ED the patient was hemodynamically stable.  Workup in the ED essentially unremarkable.  Initial troponin T is 24 and delta is 22.  CRP negative, ESR is 27.  Respiratory panel negative.  Chest x-ray without acute abnormality.  Abdominal x-ray due to patient complaint of constipation shows air-filled loop of colon over the mid abdomen, lower is indeterminant.  No distended air-filled loops of small bowel.  Cr at/near baseline.  Given Maalox, aspirin, Pepcid, viscous lidocaine without much improvement, pain did improve with 4 mg of morphine.  Patient reports he would not be able to run on a treadmill test but is agreeable to nuclear stress test if this is recommended.      Past Medical History    Past Medical History:   Diagnosis Date    CAD (coronary artery disease) 02/05/2015    3 stents    History of angina     History of skin graft     Right lower limb    Hypertension goal BP (blood pressure) < 140/90 02/05/2015    Kidney stone 02/17/2021    Malignant neoplasm of kidney excluding renal pelvis, left (H) 2021    Sleep  apnea        Past Surgical History   Past Surgical History:   Procedure Laterality Date    COLONOSCOPY N/A 07/16/2020    Procedure: COLONOSCOPY;  Surgeon: Wenceslao Marie MD;  Location:  GI    COMBINED CYSTOSCOPY, RETROGRADES, URETEROSCOPY, INSERT STENT Left 02/19/2021    Procedure: CYSTOSCOPY LEFT URETEROSCOPY,  LEFT STENT PLACEMENT, LEFT RETROGRADE;  Surgeon: Phil Lin MD;  Location:  OR    CYSTOSCOPY, DILATE URETER(S), COMBINED Left 02/19/2021    Procedure: Cystoscopy, dilate ureter(s), combined;  Surgeon: Phil Lin MD;  Location:  OR    HEART CATH, ANGIOPLASTY  11/2007    mid to prox LAD drug-eluting stent x2; Occluded RCA with Collaterals    KIDNEY STONE SURGERY  2003    laser and ureteric stenting    LUMBAR SPINE SURGERY N/A 02/13/2023    T8-L1 minimally invasive posterolateral instrumentation with cement augmentation    NEPHRECTOMY RT/LT Left 04/2021    [facility]    SKIN GRAFT, EACH ADDN 100SQCM      THORACOSCOPIC WEDGE RESECTION LUNG Right 1/28/2025    Procedure: RIGHT VIDEO ASSISTED THORACOSCOPY WEDGE RESECTION, RIGHT UPPER LOBE LUNG NODULE;  Surgeon: Anmol Carrillo MD;  Location:  OR       Prior to Admission Medications   Prior to Admission Medications   Prescriptions Last Dose Informant Patient Reported? Taking?   acetaminophen (TYLENOL) 500 MG tablet  Self No Yes   Sig: Take 2 tablets (1,000 mg) by mouth 2 times daily as needed for mild pain   amLODIPine (NORVASC) 2.5 MG tablet 3/12/2025 Self No Yes   Sig: Take 1 tablet (2.5 mg) by mouth daily   aspirin 81 MG EC tablet 3/12/2025 Self Yes Yes   Sig: Take 81 mg by mouth daily   atorvastatin (LIPITOR) 40 MG tablet 3/12/2025 Self No Yes   Sig: Take 1 tablet (40 mg) by mouth daily   furosemide (LASIX) 20 MG tablet 3/12/2025 Self No Yes   Sig: Take 1 tablet (20 mg) by mouth daily   gabapentin (NEURONTIN) 400 MG capsule  Self No Yes   Sig: Take 1 capsule (400 mg) by mouth 3 times daily   Patient taking differently:  Take 400 mg by mouth 3 times daily as needed.   losartan (COZAAR) 50 MG tablet 3/12/2025 Self No Yes   Sig: Take 1 tablet (50 mg) by mouth daily   metoprolol succinate ER (TOPROL XL) 25 MG 24 hr tablet 3/12/2025 Self No Yes   Sig: Take 1 tablet (25 mg) by mouth daily   nitroGLYcerin (NITROSTAT) 0.4 MG sublingual tablet  Self No Yes   Sig: For chest pain place 1 tablet under the tongue every 5 minutes for 3 doses. If symptoms persist 5 minutes after 1st dose call 911.   senna-docusate (SENOKOT-S/PERICOLACE) 8.6-50 MG tablet   No Yes   Sig: Take 1 tablet by mouth 2 times daily as needed for constipation.   triamcinolone (KENALOG) 0.1 % external cream  Self No Yes   Sig: Apply topically to affected area 2 times daily.   Patient taking differently: 2 times daily as needed. Apply topically to affected area 2 times daily.      Facility-Administered Medications: None        Review of Systems    The 10 point Review of Systems is negative other than noted in the HPI or here.     Social History   I have reviewed this patient's social history and updated it with pertinent information if needed.  Social History     Tobacco Use    Smoking status: Former     Current packs/day: 0.00     Average packs/day: 1.5 packs/day for 44.0 years (66.0 ttl pk-yrs)     Types: Cigarettes     Start date: 1963     Quit date: 2007     Years since quittin.7    Smokeless tobacco: Never   Vaping Use    Vaping status: Never Used   Substance Use Topics    Alcohol use: Yes     Comment: rare    Drug use: Yes     Types: Marijuana         Allergies   Allergies   Allergen Reactions    Soy Allergy (Obsolete) Nausea and Vomiting        Physical Exam   Vital Signs: Temp: 97.9  F (36.6  C) Temp src: Oral BP: (!) 157/80 Pulse: 54   Resp: 13 SpO2: 100 % O2 Device: None (Room air)    Weight: 221 lbs 5.47 oz    Physical Exam    General: Awake, alert, very pleasant gentleman who appears stated age. Looks comfortable sitting up in bed. No acute  distress.  HEENT: Normocephalic, atraumatic. Extraocular movements intact.  Respiratory: Clear to auscultation bilaterally, no rales, wheezing, or rhonchi.  Cardiovascular: Regular rate and rhythm, +S1 and S2, no murmur auscultated. No peripheral edema.  No tenderness to palpation of area of concern of chest pain.  Gastrointestinal: Soft, non-tender, non-distended. Bowel sounds present.  Skin: Warm, dry. No obvious rashes or lesions on exposed skin. Dorsalis pedis pulses palpable bilaterally.  Musculoskeletal: No joint swelling, erythema or tenderness. Moves all extremities equally.  Neurologic: AAO x3. .  Psychiatric: Appropriate mood and affect. No obvious anxiety or depression.      Medical Decision Making       >75 MINUTES SPENT BY ME on the date of service doing chart review, history, exam, documentation & further activities per the note.      Data     I have personally reviewed the following data over the past 24 hrs:    8.8  \   15.2   / 172     137 98 13.7 /  130 (H)   3.8 27 1.48 (H) \     Trop: 22 BNP: N/A     Procal: N/A CRP: <3.00 Lactic Acid: N/A       INR:  N/A PTT:  N/A   D-dimer:  0.28 Fibrinogen:  N/A       Imaging results reviewed over the past 24 hrs:   Recent Results (from the past 24 hours)   Abdomen XR 1 vw    Narrative    EXAM: XR ABDOMEN 1 VIEW  LOCATION: [facility]  DATE: 3/13/2025    INDICATION: constipation  COMPARISON: 01/03/2025      Impression    IMPRESSION: No distended air-filled loops of small bowel. An air-filled loop of colon over the lower midabdomen is indeterminate. No intraperitoneal free air. There is fusion hardware in the spine.   XR Chest 2 Views    Narrative    EXAM: XR CHEST 2 VIEWS  LOCATION: [facility]  DATE: 3/13/2025    INDICATION: Chest pain  COMPARISON: Chest x-rays, most recently 2/5/2025. CT dated 10/24/2024.    FINDINGS: The cardiomediastinal silhouette and pulmonary vasculature are within normal limits. Aortic  calcification. Stable postoperative changes in the right lung with right apical suture line. The lungs are otherwise clear. No pleural effusion or   pneumothorax. Partially visualized thoracolumbar spinal fusion.      Impression    IMPRESSION: No acute pulmonary abnormality.

## 2025-03-13 NOTE — ED PROVIDER NOTES
Emergency Department Note      History of Present Illness     Chief Complaint   Chest Pain      HPI   Fermín Josue is a 75 year old male with a history of hypertension, CAD who presents with chest pain. 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 Since then he has had worsening, central chest pain along with nausea. He did take a baby aspirin today, and Tylenol. He also endorses dysuria, has been on antibiotics and was supposed to see urology today. He denies any recent travel, sick contacts, blood clots. He denies any cough, sore throat, fever, leg swelling.     Independent Historian   None    Review of External Notes   None    Past Medical History     Medical History and Problem List   CAD  Angina   Hypertension   Kidney stones   Malignant neoplasm of kidney excluding renal pelvis, left   JAMEE  Hyperlipidemia   BPH  History of heart artery stent   Solitary pulmonary nodule   CKD    Medications   Aspirin   Lipitor   Lasix   Neurontin   Dilaudid   Cozaar   Toprol   Nitrostat     Surgical History   Nephrectomy   Heart cath, angioplasty   Kidney stone   Lumbar spine surgery   Skin graft   Thoracoscopic wedge resection lung     Physical Exam     Patient Vitals for the past 24 hrs:   BP Temp Temp src Pulse Resp SpO2   03/13/25 1145 135/83 -- -- 54 -- --   03/13/25 1130 124/78 -- -- 68 -- --   03/13/25 1120 116/78 -- -- 68 24 96 %   03/13/25 1115 116/78 -- -- 68 -- --   03/13/25 1007 137/88 97.3  F (36.3  C) Temporal 58 16 100 %     Physical Exam  General: Alert, appears well-developed and well-nourished. Cooperative.     In mild distress  HEENT:  Head:  Atraumatic  Ears:  External ears are normal  Mouth/Throat:  Oropharynx is without erythema or exudate and mucous membranes are moist.   Eyes:   Conjunctivae normal and EOM are normal. No scleral icterus.  CV:  Normal rate, regular rhythm, normal heart sounds and radial pulses are 2+ and symmetric.   Resp:  Breath sounds are clear  bilaterally    Non-labored, no retractions or accessory muscle use  GI:  Abdomen is soft, no distension, no tenderness. No rebound or guarding.  No CVA tenderness bilaterally  MS:  Normal range of motion. No edema.    There are scars to the right posterior and mid axillary chest wall from a recent thoracic procedure for nodule removal.  Patient has no tenderness overlying the surgical scars.  No reproducible tenderness to the middle sternum.  No crepitus or step-offs.    Normal strength in all 4 extremities.     Back atraumatic.    No midline cervical, thoracic, or lumbar tenderness  Skin:  Warm and dry.  No rash or lesions noted.  Neuro:   Alert. Normal strength.  GCS: 15  Psych: Normal mood and affect.    Diagnostics     Lab Results   Labs Ordered and Resulted from Time of ED Arrival to Time of ED Departure   BASIC METABOLIC PANEL - Abnormal       Result Value    Sodium 137      Potassium 3.8      Chloride 98      Carbon Dioxide (CO2) 27      Anion Gap 12      Urea Nitrogen 13.7      Creatinine 1.48 (*)     GFR Estimate 49 (*)     Calcium 9.5      Glucose 130 (*)    TROPONIN T, HIGH SENSITIVITY - Abnormal    Troponin T, High Sensitivity 24 (*)    D DIMER QUANTITATIVE - Normal    D-Dimer Quantitative 0.28     MAGNESIUM - Normal    Magnesium 1.7     INFLUENZA A/B, RSV AND SARS-COV2 PCR - Normal    Influenza A PCR Negative      Influenza B PCR Negative      RSV PCR Negative      SARS CoV2 PCR Negative     CBC WITH PLATELETS AND DIFFERENTIAL    WBC Count 8.8      RBC Count 4.76      Hemoglobin 15.2      Hematocrit 43.1      MCV 91      MCH 31.9      MCHC 35.3      RDW 12.7      Platelet Count 172      % Neutrophils 78      % Lymphocytes 16      % Monocytes 5      % Eosinophils 1      % Basophils 0      % Immature Granulocytes 0      NRBCs per 100 WBC 0      Absolute Neutrophils 6.8      Absolute Lymphocytes 1.4      Absolute Monocytes 0.5      Absolute Eosinophils 0.1      Absolute Basophils 0.0      Absolute Immature  Granulocytes 0.0      Absolute NRBCs 0.0     CRP INFLAMMATION   ERYTHROCYTE SEDIMENTATION RATE AUTO   TROPONIN T, HIGH SENSITIVITY       Imaging   XR Chest 2 Views   Final Result   IMPRESSION: No acute pulmonary abnormality.      Abdomen XR 1 vw   Final Result   IMPRESSION: No distended air-filled loops of small bowel. An air-filled loop of colon over the lower midabdomen is indeterminate. No intraperitoneal free air. There is fusion hardware in the spine.          EKG   ECG taken at 0955, ECG read at 1040  Sinus bradycardia with 1st degree AV block   Left axis deviation   Abnormal ECG   No significant changes as compared to prior, dated 1/14/25.  Rate 56 bpm. SD interval 224 ms. QRS duration 76 ms. QT/QTc 432/416 ms. P-R-T axes 71 -37 1.    Independent Interpretation   CXR: No pneumothorax or infiltrate.    ED Course      Medications Administered   Medications   aspirin (ASA) chewable tablet 243 mg (243 mg Oral $Given 3/13/25 1107)   nitroGLYcerin (NITROSTAT) sublingual tablet 0.4 mg (0.4 mg Sublingual $Given 3/13/25 1119)   famotidine (PEPCID) injection 20 mg (20 mg Intravenous $Given 3/13/25 1137)   alum & mag hydroxide-simethicone (MAALOX) suspension 15 mL (15 mLs Oral $Given 3/13/25 1134)   lidocaine (viscous) (XYLOCAINE) 2 % solution 10 mL (10 mLs Mouth/Throat $Given 3/13/25 1134)   morphine (PF) injection 4 mg (4 mg Intravenous $Given 3/13/25 1215)       Procedures   Procedures     Discussion of Management   Admitting HospitalistCar    ED Course   ED Course as of 03/13/25 1358   u Mar 13, 2025   1035 I initially assessed the patient and obtained the above history and physical exam.    1245 I spoke with Shabnam Alfredo PA-C on behalf of Dr. Yoon who agreed to observation admission.     Additional Documentation  None    Medical Decision Making / Diagnosis     CMS Diagnoses: None    MIPS       None    [facility]   Fermín Josue is a 75 year old male who presents for evaluation of midsternal chest  pain for several days.    A broad differential was of course considered.  Certainly ischemia is in differential. I doubt pulmonary embolism, aortic dissection, pneumonia or pneumothorax.  Other etiologies of chest pain considered in this patient included chest wall source, esophageal spasm or GI source, pleuritis, referred pain, etc.      My suspicion of unstable angina at this point is very low and given risk/benefit ratio would not start heparin. Given the nature and timing of the patient's symptoms as well as risk factors and concern whether his symptoms may be angina in nature, I will admit the patient  to the hospital for further workup and treatment  The patient agrees to be admitted and all questions were answered.      He is pain free at this time after given medications while in ED.  I spoke with Padmini Alfredo PA-C on behalf of Dr. Yoon who agreed to admission.        Disposition   The patient was admitted to the hospital.     Diagnosis     ICD-10-CM    1. Chest pain, unspecified type  R07.9            Discharge Medications   New Prescriptions    No medications on file     Scribe Disclosure:  I, Jamie Rhodes, am serving as a scribe at 10:42 AM on 3/13/2025 to document services personally performed by Vic Howe MD based on my observations and the provider's statements to me.        Vic Howe MD  03/13/25 [id number]

## 2025-03-13 NOTE — Clinical Note
There were no immediate complications during the procedure. Take 1 tablet nightly until headache cycle breaks. 30 tablet 1    azelastine (ASTELIN) 0.1 % nasal spray 1-2 spray in each nostril twice daily as needed. 30 mL 12    busPIRone (BUSPAR) 5 MG tablet TAKE ONE TABLET BY MOUTH TWO TO THREE TIMES A DAY WITH FOOD OR EVERY NIGHT AT BEDTIME TO MINIMIZE DIZZINESS 60 tablet 5    escitalopram (LEXAPRO) 20 MG tablet TAKE ONE TABLET BY MOUTH DAILY 30 tablet 5    topiramate (TOPAMAX) 50 MG tablet TAKE ONE TABLET BY MOUTH TWICE A DAY 60 tablet 5    methylphenidate (CONCERTA) 36 MG extended release tablet Take 1 tablet by mouth daily for 30 days. Do not fill before 6/15/2021 30 tablet 0    ibuprofen (IBU) 800 MG tablet TAKE ONE TABLET BY MOUTH THREE TIMES A DAY AS NEEDED FOR MIGRAINE UP TO 2 DAYS PER WEEK USE 24 tablet 5    fluticasone (FLONASE) 50 MCG/ACT nasal spray 1 spray by Nasal route daily      cetirizine (ZYRTEC) 10 MG tablet Take 1 tablet by mouth daily 30 tablet 11         Review of Systems    Patient-Reported Vitals 6/15/2021   Patient-Reported Weight 315lb   Patient-Reported Height 5'8        Physical Exam  Constitutional:       Appearance: Normal appearance. Eyes:      General: No scleral icterus. Pulmonary:      Effort: Pulmonary effort is normal. No respiratory distress. Skin:     Coloration: Skin is not pale. Neurological:      General: No focal deficit present. Mental Status: She is alert and oriented to person, place, and time. Psychiatric:         Mood and Affect: Mood normal.         Behavior: Behavior normal.       She is walking to car and getting in car ready to drive to work. Alva Velez was evaluated through a synchronous (real-time) audio-video encounter. The patient (or guardian if applicable) is aware that this is a billable service. Verbal consent to proceed has been obtained within the past 12 months.  The visit was conducted pursuant to the emergency declaration under the [address] Emergencies Act, [address] [place] [employer] waiver authority and the Coronavirus Preparedness and Response Supplemental Appropriations Act. Patient identification was verified, and a caregiver was present when appropriate. The patient was located in a state where the provider was credentialed to provide care. An electronic signature was used to authenticate this note.     --Evelyn Apley, MD

## 2025-03-13 NOTE — PROGRESS NOTES
Observation goals  PRIOR TO DISCHARGE        Comments: List all goals to be met before discharge home:  - Serial troponins and stress test complete. met  - Seen and cleared by consultant if applicable N/A  - Adequate pain control on oral analgesia not met  - Vital signs normal or at patient baseline met  - Safe disposition plan has been identified met  - Nurse to notify provider when observation goals have been met and patient is ready for discharge.

## 2025-03-13 NOTE — ED TRIAGE NOTES
Pt had cancerous nodule removed from left lung 3 weeks ago and has had ongoing midsternal chest pain ever since, states pain is worse, has nausea like feeling. Pt is pale in triage, hx stents. Pt also reports symptoms of UTI, had been on abx and supposed to see urology today for refil.

## 2025-03-14 ENCOUNTER — ANESTHESIA EVENT (OUTPATIENT)
Dept: SURGERY | Facility: CLINIC | Age: 75
End: 2025-03-14
Payer: COMMERCIAL

## 2025-03-14 ENCOUNTER — APPOINTMENT (OUTPATIENT)
Dept: NUCLEAR MEDICINE | Facility: CLINIC | Age: 75
DRG: 233 | End: 2025-03-14
Attending: PHYSICIAN ASSISTANT
Payer: COMMERCIAL

## 2025-03-14 ENCOUNTER — APPOINTMENT (OUTPATIENT)
Dept: ULTRASOUND IMAGING | Facility: CLINIC | Age: 75
DRG: 233 | End: 2025-03-14
Attending: NURSE PRACTITIONER
Payer: COMMERCIAL

## 2025-03-14 ENCOUNTER — APPOINTMENT (OUTPATIENT)
Dept: ULTRASOUND IMAGING | Facility: CLINIC | Age: 75
DRG: 233 | End: 2025-03-14
Attending: PHYSICIAN ASSISTANT
Payer: COMMERCIAL

## 2025-03-14 VITALS
HEART RATE: 61 BPM | TEMPERATURE: 97.9 F | BODY MASS INDEX: 33.65 KG/M2 | DIASTOLIC BLOOD PRESSURE: 75 MMHG | RESPIRATION RATE: 20 BRPM | OXYGEN SATURATION: 93 % | WEIGHT: 221.34 LBS | SYSTOLIC BLOOD PRESSURE: 129 MMHG

## 2025-03-14 PROBLEM — I25.10 CORONARY ARTERY DISEASE INVOLVING NATIVE CORONARY ARTERY OF NATIVE HEART WITHOUT ANGINA PECTORIS: Status: ACTIVE | Noted: 2025-03-14

## 2025-03-14 LAB
ABO + RH BLD: NORMAL
ACT BLD: 166 SECONDS (ref 74–150)
ACT BLD: 215 SECONDS (ref 74–150)
ALBUMIN SERPL BCG-MCNC: 3.7 G/DL (ref 3.5–5.2)
ALP SERPL-CCNC: 112 U/L (ref 40–150)
ALT SERPL W P-5'-P-CCNC: 22 U/L (ref 0–70)
ANION GAP SERPL CALCULATED.3IONS-SCNC: 11 MMOL/L (ref 7–15)
AST SERPL W P-5'-P-CCNC: 30 U/L (ref 0–45)
ATRIAL RATE - MUSE: 50 BPM
ATRIAL RATE - MUSE: 52 BPM
ATRIAL RATE - MUSE: 53 BPM
ATRIAL RATE - MUSE: 56 BPM
BILIRUB DIRECT SERPL-MCNC: 0.36 MG/DL (ref 0–0.3)
BILIRUB SERPL-MCNC: 1.3 MG/DL
BLD GP AB SCN SERPL QL: NEGATIVE
BUN SERPL-MCNC: 15.9 MG/DL (ref 8–23)
CALCIUM SERPL-MCNC: 8.8 MG/DL (ref 8.8–10.4)
CATH EF ESTIMATED: 53 %
CHLORIDE SERPL-SCNC: 100 MMOL/L (ref 98–107)
CREAT SERPL-MCNC: 1.4 MG/DL (ref 0.67–1.17)
DIASTOLIC BLOOD PRESSURE - MUSE: NORMAL MMHG
EGFRCR SERPLBLD CKD-EPI 2021: 52 ML/MIN/1.73M2
ERYTHROCYTE [DISTWIDTH] IN BLOOD BY AUTOMATED COUNT: 12.7 % (ref 10–15)
GLUCOSE SERPL-MCNC: 115 MG/DL (ref 70–99)
HCO3 SERPL-SCNC: 26 MMOL/L (ref 22–29)
HCT VFR BLD AUTO: 41.9 % (ref 40–53)
HGB BLD-MCNC: 14.1 G/DL (ref 13.3–17.7)
INTERPRETATION ECG - MUSE: NORMAL
ISTAT ACT REQUEST ONLY: NORMAL
MCH RBC QN AUTO: 31.4 PG (ref 26.5–33)
MCHC RBC AUTO-ENTMCNC: 33.7 G/DL (ref 31.5–36.5)
MCV RBC AUTO: 93 FL (ref 78–100)
P AXIS - MUSE: 57 DEGREES
P AXIS - MUSE: 59 DEGREES
P AXIS - MUSE: 71 DEGREES
P AXIS - MUSE: 88 DEGREES
PLATELET # BLD AUTO: 138 10E3/UL (ref 150–450)
POTASSIUM SERPL-SCNC: 3.7 MMOL/L (ref 3.4–5.3)
POTASSIUM SERPL-SCNC: 3.7 MMOL/L (ref 3.4–5.3)
PR INTERVAL - MUSE: 224 MS
PR INTERVAL - MUSE: 248 MS
PR INTERVAL - MUSE: 250 MS
PR INTERVAL - MUSE: 270 MS
PROT SERPL-MCNC: 7 G/DL (ref 6.4–8.3)
QRS DURATION - MUSE: 76 MS
QRS DURATION - MUSE: 82 MS
QRS DURATION - MUSE: 86 MS
QRS DURATION - MUSE: 90 MS
QT - MUSE: 432 MS
QT - MUSE: 448 MS
QT - MUSE: 468 MS
QT - MUSE: 472 MS
QTC - MUSE: 416 MS
QTC - MUSE: 416 MS
QTC - MUSE: 426 MS
QTC - MUSE: 442 MS
R AXIS - MUSE: -37 DEGREES
R AXIS - MUSE: -37 DEGREES
R AXIS - MUSE: -4 DEGREES
R AXIS - MUSE: 0 DEGREES
RBC # BLD AUTO: 4.49 10E6/UL (ref 4.4–5.9)
SODIUM SERPL-SCNC: 137 MMOL/L (ref 135–145)
SPECIMEN EXP DATE BLD: NORMAL
SYSTOLIC BLOOD PRESSURE - MUSE: NORMAL MMHG
T AXIS - MUSE: -2 DEGREES
T AXIS - MUSE: 1 DEGREES
T AXIS - MUSE: 22 DEGREES
T AXIS - MUSE: 30 DEGREES
TROPONIN T SERPL HS-MCNC: 26 NG/L
TROPONIN T SERPL HS-MCNC: 27 NG/L
TROPONIN T SERPL HS-MCNC: 30 NG/L
VENTRICULAR RATE- MUSE: 50 BPM
VENTRICULAR RATE- MUSE: 52 BPM
VENTRICULAR RATE- MUSE: 53 BPM
VENTRICULAR RATE- MUSE: 56 BPM
WBC # BLD AUTO: 6.3 10E3/UL (ref 4–11)

## 2025-03-14 PROCEDURE — 36415 COLL VENOUS BLD VENIPUNCTURE: CPT | Performed by: NURSE PRACTITIONER

## 2025-03-14 PROCEDURE — 99152 MOD SED SAME PHYS/QHP 5/>YRS: CPT | Mod: GC | Performed by: INTERNAL MEDICINE

## 2025-03-14 PROCEDURE — 93010 ELECTROCARDIOGRAM REPORT: CPT | Performed by: INTERNAL MEDICINE

## 2025-03-14 PROCEDURE — 250N000011 HC RX IP 250 OP 636: Performed by: PHYSICIAN ASSISTANT

## 2025-03-14 PROCEDURE — 99153 MOD SED SAME PHYS/QHP EA: CPT | Performed by: INTERNAL MEDICINE

## 2025-03-14 PROCEDURE — 258N000003 HC RX IP 258 OP 636: Performed by: INTERNAL MEDICINE

## 2025-03-14 PROCEDURE — G0378 HOSPITAL OBSERVATION PER HR: HCPCS

## 2025-03-14 PROCEDURE — 93880 EXTRACRANIAL BILAT STUDY: CPT

## 2025-03-14 PROCEDURE — 85027 COMPLETE CBC AUTOMATED: CPT | Performed by: NURSE PRACTITIONER

## 2025-03-14 PROCEDURE — 250N000011 HC RX IP 250 OP 636: Performed by: INTERNAL MEDICINE

## 2025-03-14 PROCEDURE — 93971 EXTREMITY STUDY: CPT | Mod: LT

## 2025-03-14 PROCEDURE — 99207 NM MPI SINGLE REST ONLY: CPT | Mod: 26 | Performed by: INTERNAL MEDICINE

## 2025-03-14 PROCEDURE — 250N000011 HC RX IP 250 OP 636: Performed by: NURSE PRACTITIONER

## 2025-03-14 PROCEDURE — 85347 COAGULATION TIME ACTIVATED: CPT

## 2025-03-14 PROCEDURE — 78451 HT MUSCLE IMAGE SPECT SING: CPT

## 2025-03-14 PROCEDURE — 93005 ELECTROCARDIOGRAM TRACING: CPT

## 2025-03-14 PROCEDURE — 96376 TX/PRO/DX INJ SAME DRUG ADON: CPT

## 2025-03-14 PROCEDURE — 250N000013 HC RX MED GY IP 250 OP 250 PS 637: Performed by: INTERNAL MEDICINE

## 2025-03-14 PROCEDURE — 84484 ASSAY OF TROPONIN QUANT: CPT

## 2025-03-14 PROCEDURE — 93458 L HRT ARTERY/VENTRICLE ANGIO: CPT | Mod: 26 | Performed by: INTERNAL MEDICINE

## 2025-03-14 PROCEDURE — 99233 SBSQ HOSP IP/OBS HIGH 50: CPT | Performed by: NURSE PRACTITIONER

## 2025-03-14 PROCEDURE — A9500 TC99M SESTAMIBI: HCPCS | Performed by: PHYSICIAN ASSISTANT

## 2025-03-14 PROCEDURE — 84484 ASSAY OF TROPONIN QUANT: CPT | Performed by: NURSE PRACTITIONER

## 2025-03-14 PROCEDURE — 82310 ASSAY OF CALCIUM: CPT | Performed by: NURSE PRACTITIONER

## 2025-03-14 PROCEDURE — 250N000013 HC RX MED GY IP 250 OP 250 PS 637: Performed by: PHYSICIAN ASSISTANT

## 2025-03-14 PROCEDURE — B2151ZZ FLUOROSCOPY OF LEFT HEART USING LOW OSMOLAR CONTRAST: ICD-10-PCS | Performed by: INTERNAL MEDICINE

## 2025-03-14 PROCEDURE — 99291 CRITICAL CARE FIRST HOUR: CPT | Mod: 25 | Performed by: INTERNAL MEDICINE

## 2025-03-14 PROCEDURE — 86901 BLOOD TYPING SEROLOGIC RH(D): CPT | Performed by: SURGERY

## 2025-03-14 PROCEDURE — 4A023N7 MEASUREMENT OF CARDIAC SAMPLING AND PRESSURE, LEFT HEART, PERCUTANEOUS APPROACH: ICD-10-PCS | Performed by: INTERNAL MEDICINE

## 2025-03-14 PROCEDURE — B2111ZZ FLUOROSCOPY OF MULTIPLE CORONARY ARTERIES USING LOW OSMOLAR CONTRAST: ICD-10-PCS | Performed by: INTERNAL MEDICINE

## 2025-03-14 PROCEDURE — 82248 BILIRUBIN DIRECT: CPT | Performed by: NURSE PRACTITIONER

## 2025-03-14 PROCEDURE — 343N000001 HC RX 343 MED OP 636: Performed by: PHYSICIAN ASSISTANT

## 2025-03-14 PROCEDURE — 272N000001 HC OR GENERAL SUPPLY STERILE: Performed by: INTERNAL MEDICINE

## 2025-03-14 PROCEDURE — 250N000011 HC RX IP 250 OP 636: Performed by: STUDENT IN AN ORGANIZED HEALTH CARE EDUCATION/TRAINING PROGRAM

## 2025-03-14 PROCEDURE — 82374 ASSAY BLOOD CARBON DIOXIDE: CPT | Performed by: NURSE PRACTITIONER

## 2025-03-14 PROCEDURE — 210N000001 HC R&B IMCU HEART CARE

## 2025-03-14 PROCEDURE — C1769 GUIDE WIRE: HCPCS | Performed by: INTERNAL MEDICINE

## 2025-03-14 PROCEDURE — 86923 COMPATIBILITY TEST ELECTRIC: CPT | Performed by: SURGERY

## 2025-03-14 PROCEDURE — 93458 L HRT ARTERY/VENTRICLE ANGIO: CPT | Performed by: INTERNAL MEDICINE

## 2025-03-14 PROCEDURE — 99152 MOD SED SAME PHYS/QHP 5/>YRS: CPT | Performed by: INTERNAL MEDICINE

## 2025-03-14 PROCEDURE — 99418 PROLNG IP/OBS E/M EA 15 MIN: CPT | Performed by: NURSE PRACTITIONER

## 2025-03-14 PROCEDURE — 76705 ECHO EXAM OF ABDOMEN: CPT

## 2025-03-14 PROCEDURE — 80076 HEPATIC FUNCTION PANEL: CPT | Performed by: NURSE PRACTITIONER

## 2025-03-14 PROCEDURE — 99223 1ST HOSP IP/OBS HIGH 75: CPT | Mod: 57 | Performed by: PHYSICIAN ASSISTANT

## 2025-03-14 PROCEDURE — 84132 ASSAY OF SERUM POTASSIUM: CPT | Performed by: NURSE PRACTITIONER

## 2025-03-14 RX ORDER — POTASSIUM CHLORIDE 1500 MG/1
20 TABLET, EXTENDED RELEASE ORAL
Status: COMPLETED | OUTPATIENT
Start: 2025-03-14 | End: 2025-03-14

## 2025-03-14 RX ORDER — ONDANSETRON 2 MG/ML
INJECTION INTRAMUSCULAR; INTRAVENOUS
Status: DISCONTINUED | OUTPATIENT
Start: 2025-03-14 | End: 2025-03-14 | Stop reason: HOSPADM

## 2025-03-14 RX ORDER — OXYCODONE HYDROCHLORIDE 5 MG/1
10 TABLET ORAL EVERY 4 HOURS PRN
Status: DISCONTINUED | OUTPATIENT
Start: 2025-03-14 | End: 2025-03-15

## 2025-03-14 RX ORDER — ATROPINE SULFATE 0.1 MG/ML
0.5 INJECTION INTRAVENOUS
Status: ACTIVE | OUTPATIENT
Start: 2025-03-14 | End: 2025-03-14

## 2025-03-14 RX ORDER — ASPIRIN 325 MG
325 TABLET ORAL ONCE
Status: COMPLETED | OUTPATIENT
Start: 2025-03-14 | End: 2025-03-14

## 2025-03-14 RX ORDER — SODIUM CHLORIDE 9 MG/ML
75 INJECTION, SOLUTION INTRAVENOUS CONTINUOUS
Status: ACTIVE | OUTPATIENT
Start: 2025-03-14 | End: 2025-03-14

## 2025-03-14 RX ORDER — MORPHINE SULFATE 2 MG/ML
2 INJECTION, SOLUTION INTRAMUSCULAR; INTRAVENOUS
Status: DISCONTINUED | OUTPATIENT
Start: 2025-03-14 | End: 2025-03-15

## 2025-03-14 RX ORDER — CEFTRIAXONE 2 G/1
2 INJECTION, POWDER, FOR SOLUTION INTRAMUSCULAR; INTRAVENOUS EVERY 24 HOURS
Status: DISCONTINUED | OUTPATIENT
Start: 2025-03-14 | End: 2025-03-15

## 2025-03-14 RX ORDER — OXYCODONE HYDROCHLORIDE 5 MG/1
5 TABLET ORAL EVERY 4 HOURS PRN
Status: DISCONTINUED | OUTPATIENT
Start: 2025-03-14 | End: 2025-03-15

## 2025-03-14 RX ORDER — SODIUM CHLORIDE 9 MG/ML
INJECTION, SOLUTION INTRAVENOUS CONTINUOUS
Status: DISCONTINUED | OUTPATIENT
Start: 2025-03-14 | End: 2025-03-14 | Stop reason: HOSPADM

## 2025-03-14 RX ORDER — NALOXONE HYDROCHLORIDE 0.4 MG/ML
0.4 INJECTION, SOLUTION INTRAMUSCULAR; INTRAVENOUS; SUBCUTANEOUS
Status: DISCONTINUED | OUTPATIENT
Start: 2025-03-14 | End: 2025-03-14

## 2025-03-14 RX ORDER — NALOXONE HYDROCHLORIDE 0.4 MG/ML
0.2 INJECTION, SOLUTION INTRAMUSCULAR; INTRAVENOUS; SUBCUTANEOUS
Status: DISCONTINUED | OUTPATIENT
Start: 2025-03-14 | End: 2025-03-14

## 2025-03-14 RX ORDER — ACETAMINOPHEN 325 MG/1
650 TABLET ORAL EVERY 4 HOURS PRN
Status: DISCONTINUED | OUTPATIENT
Start: 2025-03-14 | End: 2025-03-14

## 2025-03-14 RX ORDER — LIDOCAINE 40 MG/G
CREAM TOPICAL
Status: CANCELLED | OUTPATIENT
Start: 2025-03-14

## 2025-03-14 RX ORDER — FENTANYL CITRATE 50 UG/ML
INJECTION, SOLUTION INTRAMUSCULAR; INTRAVENOUS
Status: DISCONTINUED | OUTPATIENT
Start: 2025-03-14 | End: 2025-03-14 | Stop reason: HOSPADM

## 2025-03-14 RX ORDER — LORAZEPAM 0.5 MG/1
0.5 TABLET ORAL
Status: CANCELLED | OUTPATIENT
Start: 2025-03-14

## 2025-03-14 RX ORDER — FENTANYL CITRATE 50 UG/ML
25 INJECTION, SOLUTION INTRAMUSCULAR; INTRAVENOUS
Status: DISCONTINUED | OUTPATIENT
Start: 2025-03-14 | End: 2025-03-15

## 2025-03-14 RX ORDER — FLUMAZENIL 0.1 MG/ML
0.2 INJECTION, SOLUTION INTRAVENOUS
Status: ACTIVE | OUTPATIENT
Start: 2025-03-14 | End: 2025-03-14

## 2025-03-14 RX ORDER — IOPAMIDOL 755 MG/ML
INJECTION, SOLUTION INTRAVASCULAR
Status: DISCONTINUED | OUTPATIENT
Start: 2025-03-14 | End: 2025-03-14 | Stop reason: HOSPADM

## 2025-03-14 RX ORDER — ASPIRIN 81 MG/1
243 TABLET, CHEWABLE ORAL ONCE
Status: COMPLETED | OUTPATIENT
Start: 2025-03-14 | End: 2025-03-14

## 2025-03-14 RX ORDER — LORAZEPAM 2 MG/ML
0.5 INJECTION INTRAMUSCULAR
Status: CANCELLED | OUTPATIENT
Start: 2025-03-14

## 2025-03-14 RX ORDER — HEPARIN SODIUM 10000 [USP'U]/100ML
0-5000 INJECTION, SOLUTION INTRAVENOUS CONTINUOUS
Status: DISCONTINUED | OUTPATIENT
Start: 2025-03-14 | End: 2025-03-15

## 2025-03-14 RX ADMIN — LOSARTAN POTASSIUM 50 MG: 50 TABLET, FILM COATED ORAL at 08:00

## 2025-03-14 RX ADMIN — ASPIRIN 81 MG: 81 TABLET, COATED ORAL at 08:00

## 2025-03-14 RX ADMIN — ACETAMINOPHEN 650 MG: 325 TABLET, FILM COATED ORAL at 08:00

## 2025-03-14 RX ADMIN — ATORVASTATIN CALCIUM 40 MG: 40 TABLET, FILM COATED ORAL at 08:00

## 2025-03-14 RX ADMIN — Medication 11.6 MILLICURIE: at 07:20

## 2025-03-14 RX ADMIN — NITROGLYCERIN 0.4 MG: 0.4 TABLET SUBLINGUAL at 18:06

## 2025-03-14 RX ADMIN — HEPARIN SODIUM 1200 UNITS/HR: 10000 INJECTION, SOLUTION INTRAVENOUS at 09:19

## 2025-03-14 RX ADMIN — MORPHINE SULFATE 2 MG: 2 INJECTION, SOLUTION INTRAMUSCULAR; INTRAVENOUS at 11:44

## 2025-03-14 RX ADMIN — ACETAMINOPHEN 650 MG: 325 TABLET, FILM COATED ORAL at 03:29

## 2025-03-14 RX ADMIN — CEFTRIAXONE SODIUM 2 G: 2 INJECTION, POWDER, FOR SOLUTION INTRAMUSCULAR; INTRAVENOUS at 03:28

## 2025-03-14 RX ADMIN — NITROGLYCERIN 0.4 MG: 0.4 TABLET SUBLINGUAL at 15:03

## 2025-03-14 RX ADMIN — SENNOSIDES AND DOCUSATE SODIUM 2 TABLET: 50; 8.6 TABLET ORAL at 21:09

## 2025-03-14 RX ADMIN — AMLODIPINE BESYLATE 2.5 MG: 2.5 TABLET ORAL at 08:00

## 2025-03-14 RX ADMIN — OXYCODONE HYDROCHLORIDE 2.5 MG: 5 TABLET ORAL at 03:29

## 2025-03-14 RX ADMIN — HYDROMORPHONE HYDROCHLORIDE 0.2 MG: 0.2 INJECTION, SOLUTION INTRAMUSCULAR; INTRAVENOUS; SUBCUTANEOUS at 22:46

## 2025-03-14 RX ADMIN — MORPHINE SULFATE 2 MG: 2 INJECTION, SOLUTION INTRAMUSCULAR; INTRAVENOUS at 08:39

## 2025-03-14 RX ADMIN — ASPIRIN 81 MG CHEWABLE TABLET 243 MG: 81 TABLET CHEWABLE at 12:35

## 2025-03-14 RX ADMIN — HYDROMORPHONE HYDROCHLORIDE 0.2 MG: 0.2 INJECTION, SOLUTION INTRAMUSCULAR; INTRAVENOUS; SUBCUTANEOUS at 18:50

## 2025-03-14 RX ADMIN — SODIUM CHLORIDE: 0.9 INJECTION, SOLUTION INTRAVENOUS at 12:33

## 2025-03-14 RX ADMIN — POTASSIUM CHLORIDE 20 MEQ: 1500 TABLET, EXTENDED RELEASE ORAL at 12:39

## 2025-03-14 RX ADMIN — MORPHINE SULFATE 2 MG: 2 INJECTION, SOLUTION INTRAMUSCULAR; INTRAVENOUS at 18:13

## 2025-03-14 RX ADMIN — FUROSEMIDE 20 MG: 20 TABLET ORAL at 08:00

## 2025-03-14 RX ADMIN — MORPHINE SULFATE 2 MG: 2 INJECTION, SOLUTION INTRAMUSCULAR; INTRAVENOUS at 21:09

## 2025-03-14 RX ADMIN — PANTOPRAZOLE SODIUM 40 MG: 40 INJECTION, POWDER, FOR SOLUTION INTRAVENOUS at 18:05

## 2025-03-14 RX ADMIN — NITROGLYCERIN 0.4 MG: 0.4 TABLET SUBLINGUAL at 18:36

## 2025-03-14 RX ADMIN — SENNOSIDES AND DOCUSATE SODIUM 2 TABLET: 50; 8.6 TABLET ORAL at 08:00

## 2025-03-14 ASSESSMENT — ACTIVITIES OF DAILY LIVING (ADL)
ADLS_ACUITY_SCORE: 47
ADLS_ACUITY_SCORE: 51
ADLS_ACUITY_SCORE: 47
ADLS_ACUITY_SCORE: 47
ADLS_ACUITY_SCORE: 51
ADLS_ACUITY_SCORE: 47
ADLS_ACUITY_SCORE: 51
ADLS_ACUITY_SCORE: 51
ADLS_ACUITY_SCORE: 47
ADLS_ACUITY_SCORE: 51
ADLS_ACUITY_SCORE: 51
ADLS_ACUITY_SCORE: 47
ADLS_ACUITY_SCORE: 51
ADLS_ACUITY_SCORE: 47
ADLS_ACUITY_SCORE: 51
ADLS_ACUITY_SCORE: 47
ADLS_ACUITY_SCORE: 51
ADLS_ACUITY_SCORE: 51

## 2025-03-14 ASSESSMENT — LIFESTYLE VARIABLES: TOBACCO_USE: 1

## 2025-03-14 NOTE — PLAN OF CARE
PRIMARY Concern: chest pain, abd pain  SAFETY RISK Concerns (fall risk, behaviors, etc.): fall risk      Aggression Tool Color: green  Isolation/Type: none  Tests/Procedures for NEXT shift: NPO 0000: stress test in AM  Consults? (Pending/following, signed-off?) none  Where is patient from? (Home, TCU, etc.): home w/ daughter  Other Important info for NEXT shift: RRT called for chest tightness without relief: see note. ECHO complete  Anticipated DC date & active delays: pending clinical workup  _____________________________________________________________________________    SUMMARY NOTE:   Orientation/Cognitive: AO  Observation Goals (Met/ Not Met): not met  Mobility Level/Assist Equipment: SBA+GB  Antibiotics & Plan (IV/po, length of tx left): none  Pain Management: PRN dilaudid, oxy  Complete Pain Reassessment: Y Due next: next shift  Tele/VS/O2: VSS on RA ex melany. Tele: SR/SB 1st deg AVB  ABNL Lab/BG: see chart  Diet: 2gNa, no caffeine; NPO 0000  Bowel/Bladder: continent  Skin Concerns: none  Drains/Devices: tele, PIV SL, cont pulse ox  Patient Stated Goal for Today: feel better              Observation goals  PRIOR TO DISCHARGE        Comments: List all goals to be met before discharge home:  - Serial troponins and stress test complete. met  - Seen and cleared by consultant if applicable N/A  - Adequate pain control on oral analgesia not met  - Vital signs normal or at patient baseline met  - Safe disposition plan has been identified met    - Nurse to notify provider when observation goals have been met and patient is ready for discharge.

## 2025-03-14 NOTE — PROVIDER NOTIFICATION
MD Notification    Notified Person: MD    Notified Person Name: Dr. Mattson    Notification Date/Time: 3/14 @ 0155    Notification Interaction: Vocera    Purpose of Notification: UA results, UC pending. Thanks      Orders Received:    Comments:

## 2025-03-14 NOTE — PLAN OF CARE
Goal Outcome Evaluation:  PRIMARY Concern: chest pain, abd pain  SAFETY RISK Concerns (fall risk, behaviors, etc.): fall risk      Aggression Tool Color: green  Isolation/Type: none  Tests/Procedures for NEXT shift: NPO 0000: stress test in AM  Consults? (Pending/following, signed-off?) none  Where is patient from? (Home, TCU, etc.): home w/ daughter  Other Important info for NEXT shift: RRT called for chest tightness without relief: see note. ECHO complete  Anticipated DC date & active delays: pending clinical workup  _____________________________________________________________________________  SUMMARY NOTE:   Orientation/Cognitive: AO  Observation Goals (Met/ Not Met): not met  Mobility Level/Assist Equipment: SBA+GB  Antibiotics & Plan (IV/po, length of tx left): none  Pain Management: PRN dilaudid, oxy  Complete Pain Reassessment: Y Due next: next shift  Tele/VS/O2: VSS on RA ex melany. Tele: SR/SB 1st deg AVB  ABNL Lab/BG: see chart  Diet: 2gNa, no caffeine; NPO 0000  Bowel/Bladder: continent  Skin Concerns: none  Drains/Devices: tele, PIV SL, cont pulse ox  Patient Stated Goal for Today: feel better

## 2025-03-14 NOTE — PLAN OF CARE
BP stable, SB in the 50's. Angio showed MVD, CABG 3/15 0730. R groin arterial sheath removed at 1735, bedrest complete at 2145. Needs carotid and LE US after bedrest. Heparin stopped in angio is to be resumed at 2100 this evening, and then held again on call to OR. Continued chest tightness 6/10 that was not pain per patient, and was not concerning for cardiology. At 1815 pt stated that the tightness had worsened into pain 8/10. No EKG changes. Treated with SL nitroglycerin without effect, morphine was given and pain started to slowly decline. Through the pain episode pt is talking and joking with family. Pt understands to call staff for worsening pain or if pain is distressing to him.

## 2025-03-14 NOTE — ANESTHESIA PREPROCEDURE EVALUATION
Anesthesia Pre-Procedure Evaluation    Patient: Fermín Josue   MRN: [id number] : 1950        Procedure : Procedure(s):  CORONARY ARTERY BYPASS GRAFT          Past Medical History:   Diagnosis Date    CAD (coronary artery disease) 2015    3 stents    History of angina     History of skin graft     Right lower limb    Hypertension goal BP (blood pressure) < 140/90 2015    Kidney stone 2021    Malignant neoplasm of kidney excluding renal pelvis, left (H)     Sleep apnea       Past Surgical History:   Procedure Laterality Date    COLONOSCOPY N/A 2020    Procedure: COLONOSCOPY;  Surgeon: Wenceslao Marie MD;  Location:  GI    COMBINED CYSTOSCOPY, RETROGRADES, URETEROSCOPY, INSERT STENT Left 2021    Procedure: CYSTOSCOPY LEFT URETEROSCOPY,  LEFT STENT PLACEMENT, LEFT RETROGRADE;  Surgeon: Phil Lin MD;  Location:  OR    CYSTOSCOPY, DILATE URETER(S), COMBINED Left 2021    Procedure: Cystoscopy, dilate ureter(s), combined;  Surgeon: Phil Lin MD;  Location:  OR    HEART CATH, ANGIOPLASTY  2007    mid to prox LAD drug-eluting stent x2; Occluded RCA with Collaterals    KIDNEY STONE SURGERY      laser and ureteric stenting    LUMBAR SPINE SURGERY N/A 2023    T8-L1 minimally invasive posterolateral instrumentation with cement augmentation    NEPHRECTOMY RT/LT Left 2021    [facility]    SKIN GRAFT, EACH ADDN 100SQCM      THORACOSCOPIC WEDGE RESECTION LUNG Right 2025    Procedure: RIGHT VIDEO ASSISTED THORACOSCOPY WEDGE RESECTION, RIGHT UPPER LOBE LUNG NODULE;  Surgeon: Anmol Carrillo MD;  Location:  OR      Allergies   Allergen Reactions    Soy Allergy (Obsolete) Nausea and Vomiting      Social History     Tobacco Use    Smoking status: Former     Current packs/day: 0.00     Average packs/day: 1.5 packs/day for 44.0 years (66.0 ttl pk-yrs)     Types: Cigarettes     Start date: 1963     Quit date: 2007      Years since quittin.7    Smokeless tobacco: Never   Substance Use Topics    Alcohol use: Yes     Comment: rare      Wt Readings from Last 1 Encounters:   25 100.4 kg (221 lb 5.5 oz)        EKG  Sinus bradycardia with 1st degree A-V block   Low voltage QRS   Borderline ECG   When compared with ECG of 14-Mar-2025 08:14,   No significant change was found     Echo  Interpretation Summary     1. Normal left ventricular size and systolic function. Estimated ejection  fraction is 60%.  2. No regional wall motion abnormality; suboptimal endocardial border  definition reduces diagnostic accuracy.  3. Grade 1 left ventricular diastolic dysfunction consistent with normal left  ventricular filling pressure.  4. Normal right ventricular size and systolic function.  5. No significant valve disease.  6. Mild aortic sinus of Valsalva dilatation (4.3 cm). Normal ascending aorta  dimension.  7. No significant change compared to prior exam from 2015.  ______________________________________________________________________________  Left Ventricle  The left ventricle is normal in size. Moderate concentrically increased left  ventricular wall thickness. The visual ejection fraction is estimated at 60%.  Grade I or early diastolic dysfunction. No regional wall motion abnormalities  noted.     Right Ventricle  The right ventricle is normal in size and function.     Atria  Normal left atrial size. Right atrial size is normal. Atrial septum not well  visualized.     Mitral Valve  The mitral valve leaflets are mildly thickened. There is trace mitral  regurgitation.     Tricuspid Valve  The tricuspid valve is not well visualized, but is grossly normal. There is  trace tricuspid regurgitation.     Aortic Valve  There is mild trileaflet aortic sclerosis. No aortic regurgitation is present.  No aortic stenosis is present.     Pulmonic Valve  The pulmonic valve is normal in structure and function.     Vessels  The aortic Sinus(es) of  Valsalva are mildly dilated. Normal size ascending  aorta.     Pericardium  There is no pericardial effusion.     Rhythm  Sinus rhythm was noted.    Cardiac cath    Comments/Patient Narrative    74yo M hx of CKD, CAD LAD stent known  in RCA presents for coronary angiogram due to chest pain UA.     Pre Procedure Diagnosis    ACS <= 24 hoursnew onset angina <= 2 months    Post Procedure Diagnosis    LM disease      Conclusion         Mid RCA to Dist RCA lesion is 100% stenosed.    Ost LM lesion is 50% stenosed.    Left ventricular filling pressures are normal.     - There are severe ostial LM disease and RCA .         Plan     Follow bedrest per protocol   Continued medical management and lifestyle modifications for cardiovascular risk factor optimizations.   Return to the primary inpatient team for further evaluation and managmenet        - CTS consult for CABG evaluation.     Recommendations    General Recommendations:  - Patient given specific instructions regarding care of arteriotomy site, activity restrictions, signs and symptoms of cardiac or vascular complications and to seek immediate medical evaluation should they occur.   - Will review study to consider further options.       Medications:  - Continue high dose statin therapy indefinitely.   - Risk factor management for atherosclerosis.       Surgical Therapy:   - Will obtain cardiovascular surgery consultation regarding coronary artery bypass grafting.     Coronary Findings    Diagnostic  Dominance: Right  Left Main   Ost LM lesion is 50% stenosed. Culprit lesion.      Left Anterior Descending   The vessel is large.      First Diagonal Branch   There is mild diffuse disease throughout the vessel.      Left Circumflex   The vessel is small.      First Obtuse Marginal Branch   There is mild diffuse disease throughout the vessel.      Right Coronary Artery   The vessel is moderate in size.   Mid RCA to Dist RCA lesion is 100% stenosed. The lesion is  chronic total occlusion.      Right Posterior Descending Artery   The vessel is large. There is mild diffuse disease throughout the vessel.   Collaterals   RPDA filled by collaterals from Dist LAD.            Intervention       Anesthesia Evaluation   Pt has had prior anesthetic.     History of anesthetic complications       ROS/MED HX  ENT/Pulmonary: Comment: 1/28/25 - s/p wedge resection for non-small cell lung cancer    (+) sleep apnea, doesn't use CPAP,              tobacco use, Past use,                    (-) recent URI   Neurologic:    (-) no seizures, no CVA and migraines   Cardiovascular: Comment: Dilated ascending aorta - 4.3 cm by TTE    Heparin gtt  SL NTG give at 1503    Chronic diuretic     Chest pain this morning    (+) Dyslipidemia hypertension- -  CAD (multivessel disease - including LM disease) angina-change in symptoms.  - stent (2 stents to LAD, one stent to RCA)- 3                                  (-) CHF, orthopnea/PND and arrhythmias   METS/Exercise Tolerance:     Hematologic: Comments: Thrombocytopenia       Musculoskeletal:  - neg musculoskeletal ROS     GI/Hepatic:  - neg GI/hepatic ROS   (+)             liver disease (fatty liver by CT scan),    (-) GERD   Renal/Genitourinary: Comment: Recent UTI  Baseline creatinine 1.3 - 1.5    (+) renal disease (s/p nephrectomy for renal cell carcinoma), type: CRI,     Nephrolithiasis , BPH,      Endo: Comment: Pre diabetic     (+)               Obesity,    (-) Type II DM and thyroid disease   Psychiatric/Substance Use:  - neg psychiatric ROS   (+)     Recreational drug usage: Cannabis.    Infectious Disease:  - neg infectious disease ROS     Malignancy:  - neg malignancy ROS     Other:            Physical Exam    Airway  airway exam normal      Mallampati: III   TM distance: > 3 FB   Neck ROM: full   Mouth opening: > 3 cm    Respiratory Devices and Support         Dental     Comment: Bottom dentures    (+) Minor Abnormalities - some fillings, tiny  "chips and Removable bridges or other hardware      Cardiovascular   cardiovascular exam normal       Rhythm and rate: regular and normal     Pulmonary   pulmonary exam normal        breath sounds clear to auscultation           OUTSIDE LABS:  CBC:   Lab Results   Component Value Date    WBC 6.3 03/14/2025    WBC 8.8 03/13/2025    HGB 14.1 03/14/2025    HGB 15.2 03/13/2025    HCT 41.9 03/14/2025    HCT 43.1 03/13/2025     (L) 03/14/2025     03/13/2025     BMP:   Lab Results   Component Value Date     03/14/2025     03/13/2025    POTASSIUM 3.7 03/14/2025    POTASSIUM 3.7 03/14/2025    CHLORIDE 100 03/14/2025    CHLORIDE 98 03/13/2025    CO2 26 03/14/2025    CO2 27 03/13/2025    BUN 15.9 03/14/2025    BUN 13.7 03/13/2025    CR 1.40 (H) 03/14/2025    CR 1.48 (H) 03/13/2025     (H) 03/14/2025     (H) 03/13/2025     COAGS:   Lab Results   Component Value Date    PTT 30 11/21/2007    INR 1.06 01/28/2025     POC: No results found for: \"BGM\", \"HCG\", \"HCGS\"  HEPATIC:   Lab Results   Component Value Date    ALBUMIN 3.7 03/14/2025    PROTTOTAL 7.0 03/14/2025    ALT 22 03/14/2025    AST 30 03/14/2025    ALKPHOS 112 03/14/2025    BILITOTAL 1.3 (H) 03/14/2025     OTHER:   Lab Results   Component Value Date    LACT 1.4 03/13/2025    A1C 5.8 (H) 01/14/2025    CANDICE 8.8 03/14/2025    MAG 1.7 03/13/2025    SED 27 (H) 03/13/2025       Anesthesia Plan    ASA Status:  4    NPO Status:  NPO Appropriate    Anesthesia Type: General.     - Airway: ETT   Induction: Propofol.   Maintenance: Balanced.   Techniques and Equipment:     - Airway: Video-Laryngoscope     - Lines/Monitors: Arterial Line, CVP, Central Line, LASHAY            LASHAY Absolute Contra-indication: NONE            LASHAY Relative Contra-indication: NONE     Consents    Anesthesia Plan(s) and associated risks, benefits, and realistic alternatives discussed. Questions answered and patient/representative(s) expressed understanding.     - " Discussed:     - Discussed with:  Patient            Postoperative Care    Pain management: IV analgesics.   PONV prophylaxis: Ondansetron (or other 5HT-3)     Comments:               Keron Contreras MD    I have reviewed the pertinent notes and labs in the chart from the past 30 days and (re)examined the patient.  Any updates or changes from those notes are reflected in this note.    Clinically Significant Risk Factors Present on Admission                 # Drug Induced Platelet Defect: home medication list includes an antiplatelet medication   # Hypertension: Noted on problem list

## 2025-03-14 NOTE — PROGRESS NOTES
[facility]    Medicine Progress Note - Hospitalist Service    Date of Admission:  3/13/2025    Assessment & Plan   Fermín Josue is a 75 year old male with past medical history significant for recently diagnosed non-small cell lung cancer with recent nodule removal, BPH, history of CAD with prior stenting, CKD stage IIIa, hypertension, hyperlipidemia, obesity class I, prediabetes, history of nephrectomy, who was admitted for chest pain rule out.     Chest pain, concern for unstable angia and ACS  Coronary artery disease status post TRISH x2 to LAD, occluded RCA with collaterals 2007  Sinus bradycardia  Hypertension  Hyperlipidemia  -Presented with 1.5 week intermittent but recurrent midsternal throbbing chest pain, feeling like his heart is coming through his chest with associated nausea, no palpitations or vomiting.    -initial workup in ER EKG at 3/13 0955 notable for mild t wave inversion in inferior leads troponin 22 with repeat 23 with echocardiogram reassuring with EF of 60%. A nuc med stress was   -since admission, patient with having central chest pain persisting since admission. House team evaluated at 0610 for chest pain episode. Trop 27 and bradycardia.   - upon evaluation at 0800 alcantar of 3/14 patient had just returned from receiving NM injection, again having substernal chest pain, radiating to the right. NM study cancelled. Heparin gtt initiated, stat EKG and Troponin  -cardiology consulted, appreciate conversation with Dr. Pollock. They are planning on cardiac cath.  - to assist with differential and noting elevated bili RUQ ultrasound ordered  -RUQ ultrasound without gallstones, negative bryant sign, and no duct dilation. Hepatic steatosis   - Telemetry  - Continue aspirin, given in the ED  - Continue PTA BB, ARB with hold parameters. BB held due to heartrate of 50  - Continue PTA statin  - Repeat EKG and nitroglycerin sublingual available for recurrent chest pain  - PRN IV  labetalol/hydralazine for SBP >180  - ECHO with EF 60% on 3/13/2025 without wall motion abnormality  - Nuclear stress test 3/14/25 Having active chest pain 0800 3/14 HOLD for now consult cards now.   -morphine 2mg x 1 IV   -heparin gtt   -cardiology consult, spoke with Dr. Pollock. Plan for cardiac cath  - NPO  -hold lasix for now, no hx of HF was prescribed in 2015 for leg swelling. He is currently NPO. Creatinine was 1.48 upon admission.   -transfer to inpatient admission     Hyperbilirubinuria   Possible epigastric pain  -LFTs normal otherwise   -bilirubin 1.3 with direct 0.36  -RUQ ultrasound relatively unremarkable notes hepatic steatosis   -may consider CT if persisting symptoms and cardiac cath normal   -continue PPI     Thrombocytopenia  -mild platelets 136  -monitor     Constipation  -some resolution, reports BM am of 3/14  -Bowel regimen Senna S 2 tabs BID + miralax daily    Dysuria  Recent UTI   Reports follows with Urology, prior kidney stones. Recently treated for UTI, has been off abx for awhile. Now feels like he is urinating more frequently and concerned of recurrent UTI.  -urinalysis with small Leuk esterase. UC is pending  -continue rocephin for now and de-escalate to UC     Chronic stable diagnoses and other pertinent medical history: Appropriate PTA medications will be resumed. Nonessential medications will be held if patient is observation status.     Non-small cell lung cancer s/p VATS and wedge resection of RUL lung nodule 1/28/25  Following with [facility] oncology and recently had a lung nodule resected with Dr. Carrillo and tay pathology dx invasive adenocarcinoma with negative surgical margines. Plan is surveillance with CT in 3 months and then every 6 months x5 years.  -Follow up with [facility] as outpatient    Chronic kidney disease, stage III with creatine elevated from baseline  History of renal cancer s/p nephrectomy 2021  Nephrolithiasis s/p lithotripsy 2003  BPH  Baseline Cr  1.3 On admission, 1.48.  - Avoid nephrotoxins - contrast, NSAIDs  - Renally dose medications as able/needed  - Monitor  -Hold lasix     T8-L1 microdiscectomy fusion lumber spine 2023  Soy allergy  Obesity class I   Former smoker  Marijuana use, weekends  Possible undiagnosed obstructive sleep apnea per PCP notes  Prediabetes          Diet: NPO for Medical/Clinical Reasons Except for: Meds    DVT Prophylaxis: heparin gtt   [place] Catheter: Not present  Lines: None     Cardiac Monitoring: ACTIVE order. Indication: Chest pain/ ACS rule out (24 hours)  Code Status: No CPR- Do NOT Intubate      Clinically Significant Risk Factors Present on Admission                 # Drug Induced Platelet Defect: home medication list includes an antiplatelet medication   # Hypertension: Noted on problem list                      Social Drivers of Health    Tobacco Use: Medium Risk (3/13/2025)    Patient History     Smoking Tobacco Use: Former     Smokeless Tobacco Use: Never   Physical Activity: Unknown (9/26/2024)    Exercise Vital Sign     Days of Exercise per Week: 1 day   Social Connections: Unknown (9/26/2024)    Social Connection and Isolation Panel [NHANES]     Frequency of Social Gatherings with Friends and Family: More than three times a week          Disposition Plan     Medically Ready for Discharge: Anticipated in 2-4 Days           The patient's care was discussed with the Attending Physician, Dr. Sharma .    LONNY [facility]  Hospitalist Service  [facility]  Securely message with [employer] (more info)  Text page via [employer] Paging/Directory   ______________________________________________________________________    Interval History   As noted patient was seen this morning as he is returning from receiving injection from [facility].  Nuc med stress test was ordered as chest pain rule out, however patient has been having active substernal chest pain with history of prior stents.  Nuc med stress  test canceled.  With current presentation of chest pain concern of perhaps unstable angina therefore heparin drip initiated and cardiology consulted.  Patient reports that he has had persistent on and off chest pain since being admitted.  He is agreeable to plan for cardiology consult and heparin drip.  He denies any nausea or vomiting.  Denies any dizziness at this time.    Physical Exam   Vital Signs: Temp: 97.8  F (36.6  C) Temp src: Oral BP: 118/68 Pulse: 53   Resp: 18 SpO2: 97 % O2 Device: Nasal cannula Oxygen Delivery: 2 LPM  Weight: 221 lbs 5.47 oz    Physical Exam  Constitutional:       Appearance: He is obese. He is ill-appearing.   HENT:      Mouth/Throat:      Mouth: Mucous membranes are moist.   Cardiovascular:      Rate and Rhythm: Bradycardia present.      Heart sounds: Normal heart sounds.   Pulmonary:      Effort: Pulmonary effort is normal.   Abdominal:      General: There is distension.      Palpations: Abdomen is soft.   Musculoskeletal:      Right lower leg: No edema.      Left lower leg: No edema.   Skin:     Coloration: Skin is pale.   Neurological:      General: No focal deficit present.      Mental Status: He is alert and oriented to person, place, and time. Mental status is at baseline.          Medical Decision Making       65 MINUTES SPENT BY ME on the date of service doing chart review, history, exam, documentation & further activities per the note.      Data     I have personally reviewed the following data over the past 24 hrs:    6.3  \   14.1   / 138 (L)     N/A N/A N/A /  115 (H)   3.7 N/A N/A \     ALT: 22 AST: 30 AP: 112 TBILI: 1.3 (H)   ALB: 3.7 TOT PROTEIN: 7.0 LIPASE: N/A     Trop: 26 (H) BNP: N/A     Procal: N/A CRP: N/A Lactic Acid: 1.4         Imaging results reviewed over the past 24 hrs:   Recent Results (from the past 24 hours)   Echocardiogram Complete   Result Value    LVEF  60%    Narrative    [id number]  [id number]  [id number]  [id number]^AMELIE^BLAZE^QUE     [place]  [facility]  Echocardiography Laboratory  [address] [place] [address]     Name: XU BURCH  MRN: [id number]  : 1950  Study Date: 2025 04:20 PM  Age: 75 yrs  Gender: Male  Patient Location: [facility]  Reason For Study: Chest Pain, Chest Pressure, Chest Tightness  Ordering Physician: BLAZE KINSEY  Referring Physician: BLAZE KINSEY  Performed By: Shon Rico     BSA: 2.1 m2  Height: 68 in  Weight: 221 lb  HR: 54  BP: 126/77 mmHg  ______________________________________________________________________________  Procedure  Limited Echocardiogram with two-dimensional, color and spectral Doppler.  Definity (NDC #02980-742) given intravenously. Technically difficult study.  ______________________________________________________________________________  Interpretation Summary     1. Normal left ventricular size and systolic function. Estimated ejection  fraction is 60%.  2. No regional wall motion abnormality; suboptimal endocardial border  definition reduces diagnostic accuracy.  3. Grade 1 left ventricular diastolic dysfunction consistent with normal left  ventricular filling pressure.  4. Normal right ventricular size and systolic function.  5. No significant valve disease.  6. Mild aortic sinus of Valsalva dilatation (4.3 cm). Normal ascending aorta  dimension.  7. No significant change compared to prior exam from 2015.  ______________________________________________________________________________  Left Ventricle  The left ventricle is normal in size. Moderate concentrically increased left  ventricular wall thickness. The visual ejection fraction is estimated at 60%.  Grade I or early diastolic dysfunction. No regional wall motion abnormalities  noted.     Right Ventricle  The right ventricle is normal in size and function.     Atria  Normal left atrial size. Right atrial size is normal. Atrial septum not well  visualized.     Mitral Valve  The  mitral valve leaflets are mildly thickened. There is trace mitral  regurgitation.     Tricuspid Valve  The tricuspid valve is not well visualized, but is grossly normal. There is  trace tricuspid regurgitation.     Aortic Valve  There is mild trileaflet aortic sclerosis. No aortic regurgitation is present.  No aortic stenosis is present.     Pulmonic Valve  The pulmonic valve is normal in structure and function.     Vessels  The aortic Sinus(es) of Valsalva are mildly dilated. Normal size ascending  aorta.     Pericardium  There is no pericardial effusion.     Rhythm  Sinus rhythm was noted.  ______________________________________________________________________________  MMode/2D Measurements & Calculations  IVSd: 1.4 cm     LVIDd: 5.0 cm  LVIDs: 3.5 cm  LVPWd: 1.6 cm  FS: 30.0 %  LV mass(C)d: 321.1 grams  LV mass(C)dI: 150.6 grams/m2  Ao root diam: 4.3 cm  LA dimension: 4.4 cm  asc Aorta Diam: 3.2 cm  LA/Ao: 1.0  LVOT diam: 2.2 cm  LVOT area: 3.8 cm2  Ao root diam index Ht(cm/m): 2.5  Ao root diam index BSA (cm/m2): 2.0  Asc Ao diam index BSA (cm/m2): 1.5  Asc Ao diam index Ht(cm/m): 1.9  LA Volume (BP): 30.1 ml     LA Volume Index (BP): 14.1 ml/m2  RV Base: 3.0 cm  RWT: 0.63  TAPSE: 2.0 cm     Doppler Measurements & Calculations  MV E max amanuel: 52.4 cm/sec  MV A max amanuel: 74.4 cm/sec  MV E/A: 0.70  MV dec time: 0.25 sec  Ao V2 max: 105.0 cm/sec  Ao max P.0 mmHg  Ao V2 mean: 70.1 cm/sec  Ao mean P.0 mmHg  Ao V2 VTI: 24.3 cm  AIDA(I,D): 3.1 cm2  AIDA(V,D): 3.0 cm2  LV V1 max P.7 mmHg  LV V1 max: 82.8 cm/sec  LV V1 VTI: 20.0 cm  SV(LVOT): 76.0 ml  SI(LVOT): 35.7 ml/m2  PA acc time: 0.07 sec  AV Amanuel Ratio (DI): 0.79  AIDA Index (cm2/m2): 1.5  E/E' av.0     Lateral E/e': 7.8  Medial E/e': 14.2  RV S Amanuel: 14.6 cm/sec     ______________________________________________________________________________  Report approved by: May Granados MD on 2025 04:58 PM         NM MPI Single Rest Only   Result Value     Target    US Abdomen Limited    Narrative    EXAM: US ABDOMEN LIMITED  LOCATION: [facility]  DATE: 3/14/2025    INDICATION: R upper quadran pain  COMPARISON: 1/3/2025 and 10/4/2024  TECHNIQUE: Limited abdominal ultrasound.    FINDINGS:    GALLBLADDER: Normal. No gallstones, wall thickening, or pericholecystic fluid. Negative sonographic Rain's sign.    BILE DUCTS: No intrahepatic biliary ductal dilatation. The common duct is not seen, obscured by bowel gas    LIVER: Limited visualization of the liver with the left lobe being obscured due to high subcostal position. Steatosis in the right hepatic lobe limits evaluation. No definite focal mass. The portal vein is patent with flow in the normal direction.    RIGHT KIDNEY: No hydronephrosis.    PANCREAS: The visualized portions are normal.      Impression    IMPRESSION:  1.  Limited visualization due to technical factors discussed above. Consider a CT for better evaluation.  2.  Hepatic steatosis.

## 2025-03-14 NOTE — SIGNIFICANT EVENT
Significant Event Note    Time of event: 2:00 AM March 14, 2025    Description of event:  UA positive, dysurea present    Plan:  ronnie Mattson MD

## 2025-03-14 NOTE — PROVIDER NOTIFICATION
MD Notification    Notified Person: MD    Notified Person Name: ZAIN Calvo    Notification Date/Time: 03/14 @ 0602    Notification Interaction: Vocera    Purpose of Notification: Pt is c/o 6/10 CP but says it is now to the right side of the chest. He declines meds and workup including RRT. He wants to wait for the Lexiscan stress test. STAT EKG ordered. Thanks    Orders Received:    Comments:

## 2025-03-14 NOTE — PROGRESS NOTES
6/10 chest tightness on arrival from cath lab. R sided, not painful but tight. EKG without ischemic changes. nitroglycerin given without much relief. Vitals remain stable. Cardiology notified. Per cards the patients known CAD should not be causing resting angina, and as long as patient is not in distress nursing does not need to treat this chest tightness. Cardiology suggests using morphine as already available PRN.

## 2025-03-14 NOTE — CONSULTS
CARDIOTHORACIC SURGERY CONSULT NOTE  3/14/2025      Reason for Consult: LM disease      ASSESSMENT/PLAN:   Fermín Josue is a 75 year old male with a past medical history of recently diagnosed non small cell lung cancer s/p VATS wedge resection of RUL lung nodule on 1/28/25 (PFTs done prior show mild restrictive lung disease), BPH, CKD stage IIIa, renal cancer s/p nephrectomy, JAMEE, HTN, HLD, obesity, prediabetes, CAD s/p TRISH x 2 LAD and occluded RCA in 2007 presented to [facility] for worsening central chest pain with associated nausea. He underwent TTE on 3/13/25 which revealed EF 60%, no significant valvular disease, mild dilatation of aortic sinus of valsalva at 4.3cm, LVH and early diastolic dysfunction. Coronary angiogram on 3/14/25 reveals CAD with LM disease documented as 50% but communicated in messaging to our team as 70% from interventional cardiologist, also mid RCA . CV surgery consulted for consideration of surgical revascularization.      Patient reports current chest tightness similar to when his previous stents were placed. This was communicated to cardiology team by Dr. John. He also has some associated nausea but no vomiting. Gets lightheaded with narcotics but other wise denies lightheadedness, shortness of breath, abdominal pain.     - Recommend CABG tomorrow am with Dr. John  - Will obtain vein mapping, bilateral carotid duplex US  - Reviewed prior CT chest with Dr. John, no need to repeat. Also reviewed abdominal ultrasound. Of note pt has had chronic elevated bilirubin but otherwise LFTs are normal. Ultrasound with hepatic steatosis.   - Other cares per primary team  - Thank you for the opportunity to participate in the care of this patient.    STS Risk Score for isolated CABG    Procedure Type: Isolated CABG  Perioperative Outcome  Estimate %  Operative Mortality  1.99%  Morbidity & Mortality  7.82%  Stroke    0.785%  Renal Failure   1.95%  Reoperation   2.23%  Prolonged  Ventilation  5.1%  Deep Sternal Wound Infection 0.131%  Long Hospital Stay (>14 days) 4.7%  Short Hospital Stay (<6 days) 39.6%      Patient and plan discussed with attending, Dr. Dimitri John.    Ellie Gifford PA-C  Cardiothoracic Surgery  Pager [phone]        ________________________________________________________________________________________________    HPI:   See above    Of note he lives with his daughter and will have support at home post operatively.     Patient currently complains of chest tightness, nausea and intermittent constipation since his wedge resection surgery.     PMH:  Past Medical History:   Diagnosis Date    CAD (coronary artery disease) 02/05/2015    3 stents    History of angina     History of skin graft     Right lower limb    Hypertension goal BP (blood pressure) < 140/90 02/05/2015    Kidney stone 02/17/2021    Malignant neoplasm of kidney excluding renal pelvis, left (H) 2021    Sleep apnea        PSH:  Past Surgical History:   Procedure Laterality Date    COLONOSCOPY N/A 07/16/2020    Procedure: COLONOSCOPY;  Surgeon: Wenceslao Marie MD;  Location:  GI    COMBINED CYSTOSCOPY, RETROGRADES, URETEROSCOPY, INSERT STENT Left 02/19/2021    Procedure: CYSTOSCOPY LEFT URETEROSCOPY,  LEFT STENT PLACEMENT, LEFT RETROGRADE;  Surgeon: Phil Lin MD;  Location:  OR    CYSTOSCOPY, DILATE URETER(S), COMBINED Left 02/19/2021    Procedure: Cystoscopy, dilate ureter(s), combined;  Surgeon: Phil Lin MD;  Location:  OR    HEART CATH, ANGIOPLASTY  11/2007    mid to prox LAD drug-eluting stent x2; Occluded RCA with Collaterals    KIDNEY STONE SURGERY  2003    laser and ureteric stenting    LUMBAR SPINE SURGERY N/A 02/13/2023    T8-L1 minimally invasive posterolateral instrumentation with cement augmentation    NEPHRECTOMY RT/LT Left 04/2021    [facility]    SKIN GRAFT, EACH ADDN 100SQCM      THORACOSCOPIC WEDGE RESECTION LUNG Right 1/28/2025    Procedure: RIGHT VIDEO  ASSISTED THORACOSCOPY WEDGE RESECTION, RIGHT UPPER LOBE LUNG NODULE;  Surgeon: Anmol Carrillo MD;  Location: [facility] OR       FH:  Family History   Problem Relation Age of Onset    Hypertension Mother     Pneumonia Mother 91        covid related    Dementia Mother     Alzheimer Disease Father         d age 91    Leukemia Maternal Grandmother     Cancer Maternal Grandfather     Cancer Maternal Uncle         lung       SH:  Social History     Socioeconomic History    Marital status:      Spouse name: None    Number of children: None    Years of education: None    Highest education level: None   Tobacco Use    Smoking status: Former     Current packs/day: 0.00     Average packs/day: 1.5 packs/day for 44.0 years (66.0 ttl pk-yrs)     Types: Cigarettes     Start date: 1963     Quit date: 2007     Years since quittin.7    Smokeless tobacco: Never   Vaping Use    Vaping status: Never Used   Substance and Sexual Activity    Alcohol use: Yes     Comment: rare    Drug use: Yes     Types: Marijuana    Sexual activity: Not Currently     Partners: Female   Other Topics Concern    Caffeine Concern Yes     Comment: 1 pop daily     Special Diet Yes     Comment: fruits     Exercise Yes     Comment: walking 1 hr daily     Parent/sibling w/ CABG, MI or angioplasty before 65F 55M? No     Social Drivers of Health     Financial Resource Strain: Low Risk  (3/13/2025)    Financial Resource Strain     Within the past 12 months, have you or your family members you live with been unable to get utilities (heat, electricity) when it was really needed?: No   Food Insecurity: Low Risk  (3/13/2025)    Food Insecurity     Within the past 12 months, did you worry that your food would run out before you got money to buy more?: No     Within the past 12 months, did the food you bought just not last and you didn t have money to get more?: No   Transportation Needs: Low Risk  (3/13/2025)    Transportation Needs     Within the  past 12 months, has lack of transportation kept you from medical appointments, getting your medicines, non-medical meetings or appointments, work, or from getting things that you need?: No   Physical Activity: Unknown (9/26/2024)    Exercise Vital Sign     Days of Exercise per Week: 1 day   Stress: No Stress Concern Present (9/26/2024)    [employer] - Occupational Stress Questionnaire     Feeling of Stress : Not at all   Social Connections: Unknown (9/26/2024)    Social Connection and Isolation Panel [NHANES]     Frequency of Social Gatherings with Friends and Family: More than three times a week   Interpersonal Safety: Low Risk  (1/28/2025)    Interpersonal Safety     Do you feel physically and emotionally safe where you currently live?: Yes     Within the past 12 months, have you been hit, slapped, kicked or otherwise physically hurt by someone?: No     Within the past 12 months, have you been humiliated or emotionally abused in other ways by your partner or ex-partner?: No   Housing Stability: Low Risk  (3/13/2025)    Housing Stability     Do you have housing? : Yes     Are you worried about losing your housing?: No       Home Meds:  Medications Prior to Admission   Medication Sig Dispense Refill Last Dose/Taking    acetaminophen (TYLENOL) 500 MG tablet Take 2 tablets (1,000 mg) by mouth 2 times daily as needed for mild pain 90 tablet 3 Taking As Needed    amLODIPine (NORVASC) 2.5 MG tablet Take 1 tablet (2.5 mg) by mouth daily 90 tablet 4 3/12/2025    aspirin 81 MG EC tablet Take 81 mg by mouth daily   3/12/2025    atorvastatin (LIPITOR) 40 MG tablet Take 1 tablet (40 mg) by mouth daily 90 tablet 4 3/12/2025    furosemide (LASIX) 20 MG tablet Take 1 tablet (20 mg) by mouth daily 90 tablet 4 3/12/2025    gabapentin (NEURONTIN) 400 MG capsule Take 1 capsule (400 mg) by mouth 3 times daily (Patient taking differently: Take 400 mg by mouth 3 times daily as needed.) 90 capsule 11 Taking  Differently    losartan (COZAAR) 50 MG tablet Take 1 tablet (50 mg) by mouth daily 90 tablet 4 3/12/2025    metoprolol succinate ER (TOPROL XL) 25 MG 24 hr tablet Take 1 tablet (25 mg) by mouth daily 90 tablet 4 3/12/2025    nitroGLYcerin (NITROSTAT) 0.4 MG sublingual tablet For chest pain place 1 tablet under the tongue every 5 minutes for 3 doses. If symptoms persist 5 minutes after 1st dose call 911. 9 tablet 3 Taking    senna-docusate (SENOKOT-S/PERICOLACE) 8.6-50 MG tablet Take 1 tablet by mouth 2 times daily as needed for constipation. 20 tablet 0 Taking As Needed    triamcinolone (KENALOG) 0.1 % external cream Apply topically to affected area 2 times daily. (Patient taking differently: 2 times daily as needed. Apply topically to affected area 2 times daily.) 15 g 3 Taking Differently     Allergies:  Allergies   Allergen Reactions    Soy Allergy (Obsolete) Nausea and Vomiting     ROS: 10 point ROS neg other than the symptoms noted above in the HPI.      Physical Exam:  Temp:  [97.4  F (36.3  C)-97.9  F (36.6  C)] 97.4  F (36.3  C)  Pulse:  [50-67] 51  Resp:  [17-20] 18  BP: (108-141)/(65-83) 108/69  SpO2:  [86 %-99 %] 99 %  Gen: NAD, resting in bed  CV: RRR, S1S2 normal, no murmurs, rubs, or gallops. JVP not elevated  Pulm: clear to auscultation bilaterally, no rhonchi or wheezes  Abd: soft, non-tender, no guarding, +BM  Ext: no lower extremity edema  Neuro: grossly normal  Psych: calm, cooperative       Labs:  ABG No lab results found in last 7 days.  CBC  Recent Labs   Lab 03/14/25  0931 03/13/25  1106   WBC 6.3 8.8   HGB 14.1 15.2   * 172     BMP  Recent Labs   Lab 03/14/25  0931 03/13/25  1545 03/13/25  1106     --  137   POTASSIUM 3.7  3.7  --  3.8   CHLORIDE 100  --  98   CO2 26  --  27   BUN 15.9  --  13.7   CR 1.40*  --  1.48*   * 115* 130*     LFT  Recent Labs   Lab 03/14/25  0615 03/13/25  1408   AST 30 31   ALT 22 21   ALKPHOS 112 117   BILITOTAL 1.3* 1.6*   ALBUMIN 3.7 3.8      PancreasNo lab results found in last 7 days.    Imaging:  Recent Results (from the past 24 hours)   Echocardiogram Complete   Result Value    LVEF  60%    [facility]    [id number]  [id number]  [id number]^AMELIE^BLAZE^QUE     [facility]  Echocardiography Laboratory  [address] [place] [id number]     Name: XU BURCH  MRN: [id number]  : 1950  Study Date: 2025 04:20 PM  Age: 75 yrs  Gender: Male  Patient Location: [facility]  Reason For Study: Chest Pain, Chest Pressure, Chest Tightness  Ordering Physician: BLAZE KINSEY  Referring Physician: BLAZE KINSEY  Performed By: Shon Rico     BSA: 2.1 m2  Height: 68 in  Weight: 221 lb  HR: 54  BP: 126/77 mmHg  ______________________________________________________________________________  Procedure  Limited Echocardiogram with two-dimensional, color and spectral Doppler.  Definity (NDC #96132-218) given intravenously. Technically difficult study.  ______________________________________________________________________________  Interpretation Summary     1. Normal left ventricular size and systolic function. Estimated ejection  fraction is 60%.  2. No regional wall motion abnormality; suboptimal endocardial border  definition reduces diagnostic accuracy.  3. Grade 1 left ventricular diastolic dysfunction consistent with normal left  ventricular filling pressure.  4. Normal right ventricular size and systolic function.  5. No significant valve disease.  6. Mild aortic sinus of Valsalva dilatation (4.3 cm). Normal ascending aorta  dimension.  7. No significant change compared to prior exam from 2015.  ______________________________________________________________________________  Left Ventricle  The left ventricle is normal in size. Moderate concentrically increased left  ventricular wall thickness. The visual ejection fraction is estimated at 60%.  Grade I or early diastolic dysfunction.  No regional wall motion abnormalities  noted.     Right Ventricle  The right ventricle is normal in size and function.     Atria  Normal left atrial size. Right atrial size is normal. Atrial septum not well  visualized.     Mitral Valve  The mitral valve leaflets are mildly thickened. There is trace mitral  regurgitation.     Tricuspid Valve  The tricuspid valve is not well visualized, but is grossly normal. There is  trace tricuspid regurgitation.     Aortic Valve  There is mild trileaflet aortic sclerosis. No aortic regurgitation is present.  No aortic stenosis is present.     Pulmonic Valve  The pulmonic valve is normal in structure and function.     Vessels  The aortic Sinus(es) of Valsalva are mildly dilated. Normal size ascending  aorta.     Pericardium  There is no pericardial effusion.     Rhythm  Sinus rhythm was noted.  ______________________________________________________________________________  MMode/2D Measurements & Calculations  IVSd: 1.4 cm     LVIDd: 5.0 cm  LVIDs: 3.5 cm  LVPWd: 1.6 cm  FS: 30.0 %  LV mass(C)d: 321.1 grams  LV mass(C)dI: 150.6 grams/m2  Ao root diam: 4.3 cm  LA dimension: 4.4 cm  asc Aorta Diam: 3.2 cm  LA/Ao: 1.0  LVOT diam: 2.2 cm  LVOT area: 3.8 cm2  Ao root diam index Ht(cm/m): 2.5  Ao root diam index BSA (cm/m2): 2.0  Asc Ao diam index BSA (cm/m2): 1.5  Asc Ao diam index Ht(cm/m): 1.9  LA Volume (BP): 30.1 ml     LA Volume Index (BP): 14.1 ml/m2  RV Base: 3.0 cm  RWT: 0.63  TAPSE: 2.0 cm     Doppler Measurements & Calculations  MV E max amanuel: 52.4 cm/sec  MV A max amanuel: 74.4 cm/sec  MV E/A: 0.70  MV dec time: 0.25 sec  Ao V2 max: 105.0 cm/sec  Ao max P.0 mmHg  Ao V2 mean: 70.1 cm/sec  Ao mean P.0 mmHg  Ao V2 VTI: 24.3 cm  AIDA(I,D): 3.1 cm2  AIDA(V,D): 3.0 cm2  LV V1 max P.7 mmHg  LV V1 max: 82.8 cm/sec  LV V1 VTI: 20.0 cm  SV(LVOT): 76.0 ml  SI(LVOT): 35.7 ml/m2  PA acc time: 0.07 sec  AV Amanuel Ratio (DI): 0.79  AIDA Index (cm2/m2): 1.5  E/E' av.0     Lateral  E/e': 7.8  Medial E/e': 14.2  RV S Amanuel: 14.6 cm/sec     ______________________________________________________________________________  Report approved by: May Granados MD on 03/13/2025 04:58 PM         NM MPI Single Rest Only   Result Value    Target    US Abdomen Limited    Narrative    EXAM: US ABDOMEN LIMITED  LOCATION: [facility]  DATE: 3/14/2025    INDICATION: R upper quadran pain  COMPARISON: 1/3/2025 and 10/4/2024  TECHNIQUE: Limited abdominal ultrasound.    FINDINGS:    GALLBLADDER: Normal. No gallstones, wall thickening, or pericholecystic fluid. Negative sonographic Rain's sign.    BILE DUCTS: No intrahepatic biliary ductal dilatation. The common duct is not seen, obscured by bowel gas    LIVER: Limited visualization of the liver with the left lobe being obscured due to high subcostal position. Steatosis in the right hepatic lobe limits evaluation. No definite focal mass. The portal vein is patent with flow in the normal direction.    RIGHT KIDNEY: No hydronephrosis.    PANCREAS: The visualized portions are normal.      Impression    IMPRESSION:  1.  Limited visualization due to technical factors discussed above. Consider a CT for better evaluation.  2.  Hepatic steatosis.       Cardiac Catheterization    Narrative      Mid RCA to Dist RCA lesion is 100% stenosed.    Ost LM lesion is 50% stenosed.    Left ventricular filling pressures are normal.    - There are severe ostial LM disease and RCA .

## 2025-03-14 NOTE — PROGRESS NOTES
Notified by RN of recurrent episode of chest pain 6/10. Patient has had previous RRT for the same with a negative ACS workup including TTE with LVEF 60%, no WMA, and normal RV function. Troponins flat, EKGs non-ischemic.    Will respect patient's wishes to avoid RRT if EKG is non-ischemic and troponin still flat. Patient stated that he just wants to get to his stress test. Patient refused analgesia as well.    Plan:  - STAT EKG  - Add-on troponin to am hepatic panel  - Offer PRN maalox and oxycodone

## 2025-03-14 NOTE — CONSULTS
[facility]    Cardiology Consultation     Date of Admission:  3/13/2025    Assessment & Plan   Fermín Josue is a 75 year old male who was admitted on 3/13/2025.    Chest pain, concerning for unstable angina.  Minor troponin elevation, abnormal initial ECG suggesting ischemia which improved with treatment.  Ongoing central chest pressure.  Coronary artery disease, LAD stent 2007, RCA  with collaterals.  Most recent CAD evaluation with Lexiscan nuclear perfusion study 3/9/2021, abnormal with inferior and anteroseptal perfusion abnormalities, including millicent-infarct ischemia.  EF 51%.  Chronic back pain, status post back surgery 2023.  Sedentary lifestyle.  Hypertension  Dyslipidemia  History of non-small cell lung cancer status post wedge resection of right upper lung nodule 1/28/2025  CKD stage IIIa, status post unilateral nephrectomy 2021 for renal cancer    Recommendations:    He has ongoing central chest pressure occurring intermittently throughout the hospitalization.  His initial ECG suggested ischemia but improved with treatment and he has very minor troponin elevation.  With ongoing chest pain and abnormal stress test at baseline, I do not think we will be able to determine whether his chest pain is cardiac noninvasively.  The ongoing episodes of chest discomfort also create urgency for this evaluation.    I have recommended coronary angiography with possible coronary intervention for more definitive evaluation of his chest pain symptoms and coronary disease status.  He is in full agreement with this recommendation.  He would rather pursue coronary angiography than stress testing.    Risks and benefits of left heart catheterization and coronary angiogram were discussed with the patient in detail. Risk estimated at 0.1-0.3% for diagnostic angio and 1-2% for PCI, including risk of stroke, MI, death, emergent bypass, contrast induced allergic reaction, renal dysfunction, and  vascular complications (including bleeding and transfusion) were discussed. Patient understands and wishes to proceed.       Critical Care: Total critical care time spent: 55 minutes    SVEN ESTRELLA MD, MD    Primary Care Physician   Vernell Bucio    Reason for Consult   Reason for consult: I was asked by hospitalist team to evaluate this patient for chest pain.    History of Present Illness   Fermín Josue is a 75 year old male with a history of coronary artery disease and stenting who presents with 2 weeks of stuttering chest pain symptoms and more severe, more persistent chest pain beginning yesterday.  He describes a severe lower retrosternal chest pressure radiating through to his back.  He does not have radiation to the left arm neck jaw back or shoulder.  He denies diaphoresis, nausea, shortness of breath, lightheadedness.  He states that his chest pain does not seem to be worse with exertion and seems to be worse when he is lying down in bed, but admits that he is not very active because of chronic back pain issues.  He underwent extensive back surgery in 2023.  In addition, he recently had right upper lobectomy for lung cancer in January 2025.    I reviewed his ECGs.  His initial ECG showed ST segment depression and T wave inversion involving the lateral precordial leads.  On his subsequent 2 ECGs, those abnormalities had resolved completely.  His troponin janet slightly during his hospitalization from 20 to up to 27.  He continues to have mild ongoing chest pain at this time.    His coronary disease history includes stenting of the LAD back in 2011.  He was discovered to have RCA  with collaterals at that time.    His last stress test was a Lexiscan nuclear perfusion imaging study in 2021 which showed nontransmural inferior infarction with millicent-infarct ischemia and anteroseptal nontransmural infarction with millicent-infarct ischemia.  His ejection fraction was 51%.    He had an  echocardiogram here yesterday which demonstrated normal left ventricular size and function.  No wall motion abnormalities were seen on that study with ejection fraction estimated at 60%, but endocardial definition was difficult to see despite contrast use.  The aortic root was mildly dilated but there were no valvular abnormalities.    Past Medical History   Past Medical History:   Diagnosis Date    CAD (coronary artery disease) 02/05/2015    3 stents    History of angina     History of skin graft     Right lower limb    Hypertension goal BP (blood pressure) < 140/90 02/05/2015    Kidney stone 02/17/2021    Malignant neoplasm of kidney excluding renal pelvis, left (H) 2021    Sleep apnea          Past Surgical History   Past Surgical History:   Procedure Laterality Date    COLONOSCOPY N/A 07/16/2020    Procedure: COLONOSCOPY;  Surgeon: Wenceslao Marie MD;  Location:  GI    COMBINED CYSTOSCOPY, RETROGRADES, URETEROSCOPY, INSERT STENT Left 02/19/2021    Procedure: CYSTOSCOPY LEFT URETEROSCOPY,  LEFT STENT PLACEMENT, LEFT RETROGRADE;  Surgeon: Phil Lin MD;  Location:  OR    CYSTOSCOPY, DILATE URETER(S), COMBINED Left 02/19/2021    Procedure: Cystoscopy, dilate ureter(s), combined;  Surgeon: Phil Lin MD;  Location:  OR    HEART CATH, ANGIOPLASTY  11/2007    mid to prox LAD drug-eluting stent x2; Occluded RCA with Collaterals    KIDNEY STONE SURGERY  2003    laser and ureteric stenting    LUMBAR SPINE SURGERY N/A 02/13/2023    T8-L1 minimally invasive posterolateral instrumentation with cement augmentation    NEPHRECTOMY RT/LT Left 04/2021    [facility]    SKIN GRAFT, EACH ADDN 100SQCM      THORACOSCOPIC WEDGE RESECTION LUNG Right 1/28/2025    Procedure: RIGHT VIDEO ASSISTED THORACOSCOPY WEDGE RESECTION, RIGHT UPPER LOBE LUNG NODULE;  Surgeon: Anmol Carrillo MD;  Location:  OR         Prior to Admission Medications   Prior to Admission Medications   Prescriptions Last Dose  Informant Patient Reported? Taking?   acetaminophen (TYLENOL) 500 MG tablet  Self No Yes   Sig: Take 2 tablets (1,000 mg) by mouth 2 times daily as needed for mild pain   amLODIPine (NORVASC) 2.5 MG tablet 3/12/2025 Self No Yes   Sig: Take 1 tablet (2.5 mg) by mouth daily   aspirin 81 MG EC tablet 3/12/2025 Self Yes Yes   Sig: Take 81 mg by mouth daily   atorvastatin (LIPITOR) 40 MG tablet 3/12/2025 Self No Yes   Sig: Take 1 tablet (40 mg) by mouth daily   furosemide (LASIX) 20 MG tablet 3/12/2025 Self No Yes   Sig: Take 1 tablet (20 mg) by mouth daily   gabapentin (NEURONTIN) 400 MG capsule  Self No Yes   Sig: Take 1 capsule (400 mg) by mouth 3 times daily   Patient taking differently: Take 400 mg by mouth 3 times daily as needed.   losartan (COZAAR) 50 MG tablet 3/12/2025 Self No Yes   Sig: Take 1 tablet (50 mg) by mouth daily   metoprolol succinate ER (TOPROL XL) 25 MG 24 hr tablet 3/12/2025 Self No Yes   Sig: Take 1 tablet (25 mg) by mouth daily   nitroGLYcerin (NITROSTAT) 0.4 MG sublingual tablet  Self No Yes   Sig: For chest pain place 1 tablet under the tongue every 5 minutes for 3 doses. If symptoms persist 5 minutes after 1st dose call 911.   senna-docusate (SENOKOT-S/PERICOLACE) 8.6-50 MG tablet   No Yes   Sig: Take 1 tablet by mouth 2 times daily as needed for constipation.   triamcinolone (KENALOG) 0.1 % external cream  Self No Yes   Sig: Apply topically to affected area 2 times daily.   Patient taking differently: 2 times daily as needed. Apply topically to affected area 2 times daily.      Facility-Administered Medications: None     Current Facility-Administered Medications   Medication Dose Route Frequency Provider Last Rate Last Admin    acetaminophen (TYLENOL) tablet 650 mg  650 mg Oral Q4H PRN Shabnam Alfredo PA-C   650 mg at 03/14/25 0800    Or    acetaminophen (TYLENOL) Suppository 650 mg  650 mg Rectal Q4H PRN Shabnam Alfredo PA-C        alum & mag hydroxide-simethicone  (MAALOX) suspension 30 mL  30 mL Oral Q4H PRN Shabnam Alfredo PA-C   30 mL at 03/13/25 2140    amLODIPine (NORVASC) tablet 2.5 mg  2.5 mg Oral Daily Shabnam Alfredo PA-C   2.5 mg at 03/14/25 0800    aspirin EC tablet 81 mg  81 mg Oral Daily Shabnam Alfredo PA-C   81 mg at 03/14/25 0800    atorvastatin (LIPITOR) tablet 40 mg  40 mg Oral Daily Shabnam Alfredo PA-C   40 mg at 03/14/25 0800    bisacodyl (DULCOLAX) suppository 10 mg  10 mg Rectal Daily PRN Eda Calvert APRN CNP        cefTRIAXone (ROCEPHIN) 2 g vial to attach to  ml bag for ADULTS or NS 50 ml bag for PEDS  2 g Intravenous Q24H Dudley Mattson MD   2 g at 03/14/25 0328    furosemide (LASIX) tablet 20 mg  20 mg Oral Daily Shabnam Alfredo PA-C   20 mg at 03/14/25 0800    gabapentin (NEURONTIN) capsule 400 mg  400 mg Oral TID PRN Shabnam Alfredo PA-C        heparin 25,000 units in 0.45% NaCl 250 mL ANTICOAGULANT infusion  0-5,000 Units/hr Intravenous Continuous Faye Castro APRN CNP        heparin ANTICOAGULANT loading dose for  LOW INTENSITY TREATMENT* Give BEFORE starting heparin infusion  6,000 Units Intravenous Once Faye Castro APRN CNP        HOLD: Caffeine containing medications 12 hours prior to the procedure   Does not apply HOLD Shabnam Alfredo PA-C        HOLD: dipyridamole (PERSANTINE) or aspirin/dipyridamole (AGGRENOX) 48 hours prior to the procedure   Does not apply HOLD Shabnam Alfredo PA-C        HOLD: theophylline or aminophylline 12 hours prior to the procedure   Does not apply HOLD Shabnam Alfredo PA-C        HYDROmorphone (DILAUDID) injection 0.2 mg  0.2 mg Intravenous Q4H PRN Shabnam Alfredo PA-C   0.2 mg at 03/13/25 1512    IF patient diabetic - HOLD: ALL ORAL HYPOGLYCEMICS and include: glipizide, glyburide, glimepiride, gliclazide, metformin, any metformin containing medication, on day of the procedure   Does not  apply HOLD Shabnam Alfredo PA-C        lidocaine (LMX4) cream   Topical Q1H PRN Shabnam Alfredo PA-C        lidocaine 1 % 0.1-1 mL  0.1-1 mL Other Q1H PRN Shabnam Alfredo PA-C        losartan (COZAAR) tablet 50 mg  50 mg Oral Daily Shabnam Alfredo PA-C   50 mg at 03/14/25 0800    metoprolol succinate ER (TOPROL XL) 24 hr tablet 25 mg  25 mg Oral Daily Shabnam Alfredo PA-C        morphine (PF) injection 2 mg  2 mg Intravenous Q2H PRN Faye Castro APRN CNP   2 mg at 03/14/25 0839    naloxone (NARCAN) injection 0.2 mg  0.2 mg Intravenous Q2 Min PRN Aj Yoon MD        Or    naloxone (NARCAN) injection 0.4 mg  0.4 mg Intravenous Q2 Min PRN Aj Yoon MD        Or    naloxone (NARCAN) injection 0.2 mg  0.2 mg Intramuscular Q2 Min PRN Aj Yoon MD        Or    naloxone (NARCAN) injection 0.4 mg  0.4 mg Intramuscular Q2 Min PRN Aj Yoon MD        nitroGLYcerin (NITROSTAT) sublingual tablet 0.4 mg  0.4 mg Sublingual Q5 Min PRN Shabnam Alfredo PA-C   0.4 mg at 03/13/25 1531    ondansetron (ZOFRAN) injection 4 mg  4 mg Intravenous Q6H PRN Eda Calvert APRN CNP   4 mg at 03/13/25 1620    Or    ondansetron (ZOFRAN ODT) ODT tab 4 mg  4 mg Oral Q6H PRN Eda Calvert APRN CNP        oxyCODONE (ROXICODONE) tablet 5 mg  5 mg Oral Q4H PRN Shabnam Alfredo PA-C   5 mg at 03/13/25 2140    oxyCODONE IR (ROXICODONE) half-tab 2.5 mg  2.5 mg Oral Q4H PRN Shabnam Alfredo PA-C   2.5 mg at 03/14/25 0329    pantoprazole (PROTONIX) IV push injection 40 mg  40 mg Intravenous Q24H Shabnam Alfredo PA-C   40 mg at 03/13/25 1736    polyethylene glycol (MIRALAX) Packet 17 g  17 g Oral TID Eda Calvert APRN CNP   17 g at 03/13/25 1950    senna-docusate (SENOKOT-S/PERICOLACE) 8.6-50 MG per tablet 2 tablet  2 tablet Oral BID Shabnam Alfredo PA-C   2 tablet at 03/14/25 0800    sodium chloride (PF) 0.9% PF  flush 1-10 mL  1-10 mL Intravenous Q10 Min PRN Shabnam Alfredo PA-C        sodium chloride (PF) 0.9% PF flush 3 mL  3 mL Intracatheter Q8H Shabnam Alfredo PA-C   3 mL at 03/14/25 0328    sodium chloride (PF) 0.9% PF flush 3 mL  3 mL Intracatheter q1 min prn Shabnam Alfredo PA-C        technetium sestamibi 2 UD per study (Tc99m MiBi) radioisotope injection 3-42 millicurie  3-42 millicurie Intravenous Q2H Shabnam Alfredo PA-C   11.6 millicurie at 03/14/25 0720     Current Facility-Administered Medications   Medication Dose Route Frequency Provider Last Rate Last Admin    acetaminophen (TYLENOL) tablet 650 mg  650 mg Oral Q4H PRN Shabnam Alfredo PA-C   650 mg at 03/14/25 0800    Or    acetaminophen (TYLENOL) Suppository 650 mg  650 mg Rectal Q4H PRN Shabnam Alfredo PA-C        alum & mag hydroxide-simethicone (MAALOX) suspension 30 mL  30 mL Oral Q4H PRN Shabnam Alfredo PA-C   30 mL at 03/13/25 2140    amLODIPine (NORVASC) tablet 2.5 mg  2.5 mg Oral Daily Shabnam Alfredo PA-C   2.5 mg at 03/14/25 0800    aspirin EC tablet 81 mg  81 mg Oral Daily Shabnam Alfredo PA-C   81 mg at 03/14/25 0800    atorvastatin (LIPITOR) tablet 40 mg  40 mg Oral Daily Shabnam Alfredo PA-C   40 mg at 03/14/25 0800    bisacodyl (DULCOLAX) suppository 10 mg  10 mg Rectal Daily PRN Eda Calvert APRN CNP        cefTRIAXone (ROCEPHIN) 2 g vial to attach to  ml bag for ADULTS or NS 50 ml bag for PEDS  2 g Intravenous Q24H Dudley Mattson MD   2 g at 03/14/25 0328    furosemide (LASIX) tablet 20 mg  20 mg Oral Daily Shabnam Alfredo PA-C   20 mg at 03/14/25 0800    gabapentin (NEURONTIN) capsule 400 mg  400 mg Oral TID PRN Shabnam Alfredo PA-C        heparin 25,000 units in 0.45% NaCl 250 mL ANTICOAGULANT infusion  0-5,000 Units/hr Intravenous Continuous Faye Castro APRN CNP        heparin ANTICOAGULANT loading  dose for  LOW INTENSITY TREATMENT* Give BEFORE starting heparin infusion  6,000 Units Intravenous Once Faye Castro APRN CNP        HOLD: Caffeine containing medications 12 hours prior to the procedure   Does not apply HOLD Shabnam Alfredo PA-C        HOLD: dipyridamole (PERSANTINE) or aspirin/dipyridamole (AGGRENOX) 48 hours prior to the procedure   Does not apply HOLD Shabnam Alfredo PA-C        HOLD: theophylline or aminophylline 12 hours prior to the procedure   Does not apply HOLD Shabnam Alfredo PA-C        HYDROmorphone (DILAUDID) injection 0.2 mg  0.2 mg Intravenous Q4H PRN Shabnam Alfredo PA-C   0.2 mg at 03/13/25 1512    IF patient diabetic - HOLD: ALL ORAL HYPOGLYCEMICS and include: glipizide, glyburide, glimepiride, gliclazide, metformin, any metformin containing medication, on day of the procedure   Does not apply HOLD Shabnam Alfredo PA-C        lidocaine (LMX4) cream   Topical Q1H PRN Shabnam Alfredo PA-C        lidocaine 1 % 0.1-1 mL  0.1-1 mL Other Q1H PRN Shabnam Alfredo PA-C        losartan (COZAAR) tablet 50 mg  50 mg Oral Daily Shabnam Alfredo PA-C   50 mg at 03/14/25 0800    metoprolol succinate ER (TOPROL XL) 24 hr tablet 25 mg  25 mg Oral Daily Shabnam Alfredo PA-C        morphine (PF) injection 2 mg  2 mg Intravenous Q2H PRN Faye Castro APRN CNP   2 mg at 03/14/25 0839    naloxone (NARCAN) injection 0.2 mg  0.2 mg Intravenous Q2 Min PRN Aj Yoon MD        Or    naloxone (NARCAN) injection 0.4 mg  0.4 mg Intravenous Q2 Min PRN Aj Yoon MD        Or    naloxone (NARCAN) injection 0.2 mg  0.2 mg Intramuscular Q2 Min PRN Aj Yoon MD        Or    naloxone (NARCAN) injection 0.4 mg  0.4 mg Intramuscular Q2 Min PRN Aj Yoon MD        nitroGLYcerin (NITROSTAT) sublingual tablet 0.4 mg  0.4 mg Sublingual Q5 Min PRN Shabnam Alfredo PA-C   0.4 mg at  03/13/25 1531    ondansetron (ZOFRAN) injection 4 mg  4 mg Intravenous Q6H PRN Eda Calvert APRN CNP   4 mg at 03/13/25 1620    Or    ondansetron (ZOFRAN ODT) ODT tab 4 mg  4 mg Oral Q6H PRN Eda Calvert APRN CNP        oxyCODONE (ROXICODONE) tablet 5 mg  5 mg Oral Q4H PRN Shabnam Alfredo PA-C   5 mg at 03/13/25 2140    oxyCODONE IR (ROXICODONE) half-tab 2.5 mg  2.5 mg Oral Q4H PRN Shabnam Alfredo PA-C   2.5 mg at 03/14/25 0329    pantoprazole (PROTONIX) IV push injection 40 mg  40 mg Intravenous Q24H Shabnam Alfredo PA-C   40 mg at 03/13/25 1736    polyethylene glycol (MIRALAX) Packet 17 g  17 g Oral TID Eda Calvert APRN CNP   17 g at 03/13/25 1950    senna-docusate (SENOKOT-S/PERICOLACE) 8.6-50 MG per tablet 2 tablet  2 tablet Oral BID Shabnam Alfredo PA-C   2 tablet at 03/14/25 0800    sodium chloride (PF) 0.9% PF flush 1-10 mL  1-10 mL Intravenous Q10 Min PRN Shabnam Alfredo PA-C        sodium chloride (PF) 0.9% PF flush 3 mL  3 mL Intracatheter Q8H Shabnam Alfredo PA-C   3 mL at 03/14/25 0328    sodium chloride (PF) 0.9% PF flush 3 mL  3 mL Intracatheter q1 min prn Shabnam Alfredo PA-C        technetium sestamibi 2 UD per study (Tc99m MiBi) radioisotope injection 3-42 millicurie  3-42 millicurie Intravenous Q2H Shabnam Alfredo PA-C   11.6 millicurie at 03/14/25 0720     Allergies   Allergies   Allergen Reactions    Soy Allergy (Obsolete) Nausea and Vomiting       Social History    reports that he quit smoking about 17 years ago. His smoking use included cigarettes. He started smoking about 61 years ago. He has a 66 pack-year smoking history. He has never used smokeless tobacco. He reports current alcohol use. He reports current drug use. Drug: Marijuana.      Family History   I have reviewed this patient's family history and updated it with pertinent information if needed.  Family History   Problem Relation Age of Onset  "   Hypertension Mother     Pneumonia Mother 91        covid related    Dementia Mother     Alzheimer Disease Father         d age 91    Leukemia Maternal Grandmother     Cancer Maternal Grandfather     Cancer Maternal Uncle         lung          Review of Systems   A comprehensive review of system was performed and is negative other than that noted in the HPI or here.     Physical Exam   Vital Signs with Ranges  Temp:  [97.3  F (36.3  C)-97.9  F (36.6  C)] 97.7  F (36.5  C)  Pulse:  [50-68] 50  Resp:  [13-24] 18  BP: (116-157)/(68-88) 135/69  SpO2:  [86 %-100 %] 98 %  Wt Readings from Last 4 Encounters:   03/13/25 100.4 kg (221 lb 5.5 oz)   01/28/25 102.6 kg (226 lb 3.2 oz)   01/14/25 105.3 kg (232 lb 3.2 oz)   09/26/24 103.9 kg (229 lb)     No intake/output data recorded.      Vitals: /69 (BP Location: Left arm)   Pulse 50   Temp 97.7  F (36.5  C) (Oral)   Resp 18   Wt 100.4 kg (221 lb 5.5 oz)   SpO2 98%   BMI 33.65 kg/m      Physical Exam:   General - Alert and oriented to time place and person in no acute distress  Eyes - No scleral icterus  HEENT - Neck supple, moist mucous membranes  Cardiovascular -regular rhythm without murmur  Extremities - There is no lower extremity edema  Respiratory -clear to auscultation  Skin - No pallor or cyanosis  Gastrointestinal - Non tender and non distended without rebound or guarding  Psych - Appropriate affect   Neurological - No gross motor neurological focal deficits    No lab results found in last 7 days.    Invalid input(s): \"TROPONINIES\"    Recent Labs   Lab 03/14/25  0615 03/13/25  1545 03/13/25  1408 03/13/25  1106   WBC  --   --   --  8.8   HGB  --   --   --  15.2   MCV  --   --   --  91   PLT  --   --   --  172   NA  --   --   --  137   POTASSIUM  --   --   --  3.8   CHLORIDE  --   --   --  98   CO2  --   --   --  27   BUN  --   --   --  13.7   CR  --   --   --  1.48*   GFRESTIMATED  --   --   --  49*   ANIONGAP  --   --   --  12   CANDICE  --   --   --  9.5 " "  GLC  --  115*  --  130*   ALBUMIN 3.7  --  3.8  --    PROTTOTAL 7.0  --  7.2  --    BILITOTAL 1.3*  --  1.6*  --    ALKPHOS 112  --  117  --    ALT 22  --  21  --    AST 30  --  31  --      Recent Labs   Lab Test 07/25/24  0845 06/01/24  1235   CHOL 129 123   HDL 36* 33*   LDL 54 58   TRIG 194* 161*     Recent Labs   Lab 03/13/25  1106   WBC 8.8   HGB 15.2   HCT 43.1   MCV 91        No results for input(s): \"PH\", \"PHV\", \"PO2\", \"PO2V\", \"SAT\", \"PCO2\", \"PCO2V\", \"HCO3\", \"HCO3V\" in the last 168 hours.  No results for input(s): \"NTBNPI\", \"NTBNP\" in the last 168 hours.  Recent Labs   Lab 03/13/25  1106   DD 0.28     Recent Labs   Lab 03/13/25  1106   SED 27*     Recent Labs   Lab 03/13/25  1106        No results for input(s): \"TSH\" in the last 168 hours.  Recent Labs   Lab 03/13/25  1828   COLOR Yellow   APPEARANCE Clear   URINEGLC Negative   URINEBILI Negative   URINEKETONE Negative   SG 1.022   UBLD Negative   URINEPH 5.5   PROTEIN 20*   NITRITE Negative   LEUKEST Small*   RBCU 2   WBCU 27*       Imaging:  Recent Results (from the past 48 hours)   Abdomen XR 1 vw    Narrative    EXAM: XR ABDOMEN 1 VIEW  LOCATION: [facility]  DATE: 3/13/2025    INDICATION: constipation  COMPARISON: 01/03/2025      Impression    IMPRESSION: No distended air-filled loops of small bowel. An air-filled loop of colon over the lower midabdomen is indeterminate. No intraperitoneal free air. There is fusion hardware in the spine.   XR Chest 2 Views    Narrative    EXAM: XR CHEST 2 VIEWS  LOCATION: [facility]  DATE: 3/13/2025    INDICATION: Chest pain  COMPARISON: Chest x-rays, most recently 2/5/2025. CT dated 10/24/2024.    FINDINGS: The cardiomediastinal silhouette and pulmonary vasculature are within normal limits. Aortic calcification. Stable postoperative changes in the right lung with right apical suture line. The lungs are otherwise clear. No pleural effusion or "   pneumothorax. Partially visualized thoracolumbar spinal fusion.      Impression    IMPRESSION: No acute pulmonary abnormality.   Echocardiogram Complete   Result Value    LVEF  60%    [facility]    [id number]  [id number]  [id number]^AMELIE^BLAZE^QUE     [facility]  Echocardiography Laboratory  [address] [place] [id number]     Name: XU BURCH  MRN: [id number]  : 1950  Study Date: 2025 04:20 PM  Age: 75 yrs  Gender: Male  Patient Location: [facility]  Reason For Study: Chest Pain, Chest Pressure, Chest Tightness  Ordering Physician: BLAZE KINSEY  Referring Physician: BLAZE KINSEY  Performed By: Shon Rico     BSA: 2.1 m2  Height: 68 in  Weight: 221 lb  HR: 54  BP: 126/77 mmHg  ______________________________________________________________________________  Procedure  Limited Echocardiogram with two-dimensional, color and spectral Doppler.  Definity (NDC #50622-123) given intravenously. Technically difficult study.  ______________________________________________________________________________  Interpretation Summary     1. Normal left ventricular size and systolic function. Estimated ejection  fraction is 60%.  2. No regional wall motion abnormality; suboptimal endocardial border  definition reduces diagnostic accuracy.  3. Grade 1 left ventricular diastolic dysfunction consistent with normal left  ventricular filling pressure.  4. Normal right ventricular size and systolic function.  5. No significant valve disease.  6. Mild aortic sinus of Valsalva dilatation (4.3 cm). Normal ascending aorta  dimension.  7. No significant change compared to prior exam from 2015.  ______________________________________________________________________________  Left Ventricle  The left ventricle is normal in size. Moderate concentrically increased left  ventricular wall thickness. The visual ejection fraction is estimated at 60%.  Grade I or  early diastolic dysfunction. No regional wall motion abnormalities  noted.     Right Ventricle  The right ventricle is normal in size and function.     Atria  Normal left atrial size. Right atrial size is normal. Atrial septum not well  visualized.     Mitral Valve  The mitral valve leaflets are mildly thickened. There is trace mitral  regurgitation.     Tricuspid Valve  The tricuspid valve is not well visualized, but is grossly normal. There is  trace tricuspid regurgitation.     Aortic Valve  There is mild trileaflet aortic sclerosis. No aortic regurgitation is present.  No aortic stenosis is present.     Pulmonic Valve  The pulmonic valve is normal in structure and function.     Vessels  The aortic Sinus(es) of Valsalva are mildly dilated. Normal size ascending  aorta.     Pericardium  There is no pericardial effusion.     Rhythm  Sinus rhythm was noted.  ______________________________________________________________________________  MMode/2D Measurements & Calculations  IVSd: 1.4 cm     LVIDd: 5.0 cm  LVIDs: 3.5 cm  LVPWd: 1.6 cm  FS: 30.0 %  LV mass(C)d: 321.1 grams  LV mass(C)dI: 150.6 grams/m2  Ao root diam: 4.3 cm  LA dimension: 4.4 cm  asc Aorta Diam: 3.2 cm  LA/Ao: 1.0  LVOT diam: 2.2 cm  LVOT area: 3.8 cm2  Ao root diam index Ht(cm/m): 2.5  Ao root diam index BSA (cm/m2): 2.0  Asc Ao diam index BSA (cm/m2): 1.5  Asc Ao diam index Ht(cm/m): 1.9  LA Volume (BP): 30.1 ml     LA Volume Index (BP): 14.1 ml/m2  RV Base: 3.0 cm  RWT: 0.63  TAPSE: 2.0 cm     Doppler Measurements & Calculations  MV E max amanuel: 52.4 cm/sec  MV A max amanuel: 74.4 cm/sec  MV E/A: 0.70  MV dec time: 0.25 sec  Ao V2 max: 105.0 cm/sec  Ao max P.0 mmHg  Ao V2 mean: 70.1 cm/sec  Ao mean P.0 mmHg  Ao V2 VTI: 24.3 cm  AIDA(I,D): 3.1 cm2  AIDA(V,D): 3.0 cm2  LV V1 max P.7 mmHg  LV V1 max: 82.8 cm/sec  LV V1 VTI: 20.0 cm  SV(LVOT): 76.0 ml  SI(LVOT): 35.7 ml/m2  PA acc time: 0.07 sec  AV Amanuel Ratio (DI): 0.79  AIDA Index (cm2/m2):  1.5  E/E' av.0     Lateral E/e': 7.8  Medial E/e': 14.2  RV S Amanuel: 14.6 cm/sec     ______________________________________________________________________________  Report approved by: May Granados MD on 2025 04:58 PM             Echo:  No results found for this or any previous visit (from the past 4320 hours).    Clinically Significant Risk Factors Present on Admission                 # Drug Induced Platelet Defect: home medication list includes an antiplatelet medication   # Hypertension: Noted on problem list                     Native vessel CAD                    CKD POA List: Stage 3a (GFR 45-59)

## 2025-03-15 ENCOUNTER — ANESTHESIA (OUTPATIENT)
Dept: SURGERY | Facility: CLINIC | Age: 75
End: 2025-03-15
Payer: COMMERCIAL

## 2025-03-15 ENCOUNTER — APPOINTMENT (OUTPATIENT)
Dept: GENERAL RADIOLOGY | Facility: CLINIC | Age: 75
DRG: 233 | End: 2025-03-15
Attending: PHYSICIAN ASSISTANT
Payer: COMMERCIAL

## 2025-03-15 LAB
ALBUMIN SERPL BCG-MCNC: 3.5 G/DL (ref 3.5–5.2)
ALLEN'S TEST: ABNORMAL
ALLEN'S TEST: NO
ALP SERPL-CCNC: 72 U/L (ref 40–150)
ALT SERPL W P-5'-P-CCNC: 16 U/L (ref 0–70)
ANION GAP SERPL CALCULATED.3IONS-SCNC: 11 MMOL/L (ref 7–15)
ANION GAP SERPL CALCULATED.3IONS-SCNC: 21 MMOL/L (ref 7–15)
ANION GAP SERPL CALCULATED.3IONS-SCNC: 21 MMOL/L (ref 7–15)
APTT PPP: 69 SECONDS (ref 22–38)
APTT PPP: 72 SECONDS (ref 22–38)
AST SERPL W P-5'-P-CCNC: 37 U/L (ref 0–45)
BASE EXCESS BLDA CALC-SCNC: -0.2 MMOL/L (ref -3–3)
BASE EXCESS BLDA CALC-SCNC: -1.8 MMOL/L (ref -3–3)
BASE EXCESS BLDA CALC-SCNC: -12.1 MMOL/L (ref -3–3)
BASE EXCESS BLDA CALC-SCNC: -3.4 MMOL/L (ref -3–3)
BASE EXCESS BLDA CALC-SCNC: -9.5 MMOL/L (ref -3–3)
BASE EXCESS BLDA CALC-SCNC: 1 MMOL/L (ref -3–3)
BASE EXCESS BLDA CALC-SCNC: 2.3 MMOL/L (ref -3–3)
BASE EXCESS BLDA CALC-SCNC: 2.5 MMOL/L (ref -3–3)
BASE EXCESS BLDA CALC-SCNC: 3.1 MMOL/L (ref -3–3)
BASE EXCESS BLDV CALC-SCNC: -11.6 MMOL/L (ref -3–3)
BASE EXCESS BLDV CALC-SCNC: -3 MMOL/L (ref -3–3)
BASE EXCESS BLDV CALC-SCNC: 3.7 MMOL/L (ref -3–3)
BILIRUB SERPL-MCNC: 0.9 MG/DL
BLD PROD TYP BPU: NORMAL
BLD PROD TYP BPU: NORMAL
BLOOD COMPONENT TYPE: NORMAL
BLOOD COMPONENT TYPE: NORMAL
BUN SERPL-MCNC: 10 MG/DL (ref 8–23)
BUN SERPL-MCNC: 12.2 MG/DL (ref 8–23)
BUN SERPL-MCNC: 13 MG/DL (ref 8–23)
CA-I BLD-MCNC: 3.8 MG/DL (ref 4.4–5.2)
CA-I BLD-MCNC: 3.9 MG/DL (ref 4.4–5.2)
CA-I BLD-MCNC: 3.9 MG/DL (ref 4.4–5.2)
CA-I BLD-MCNC: 4.5 MG/DL (ref 4.4–5.2)
CA-I BLD-MCNC: 4.5 MG/DL (ref 4.4–5.2)
CA-I BLD-MCNC: 4.8 MG/DL (ref 4.4–5.2)
CA-I BLD-MCNC: 5.1 MG/DL (ref 4.4–5.2)
CALCIUM SERPL-MCNC: 8.1 MG/DL (ref 8.8–10.4)
CALCIUM SERPL-MCNC: 8.4 MG/DL (ref 8.8–10.4)
CALCIUM SERPL-MCNC: 9.6 MG/DL (ref 8.8–10.4)
CHLORIDE SERPL-SCNC: 104 MMOL/L (ref 98–107)
CHLORIDE SERPL-SCNC: 105 MMOL/L (ref 98–107)
CHLORIDE SERPL-SCNC: 107 MMOL/L (ref 98–107)
CLOT INIT KAOL IND TO POST HEP NEUT TRTO: 1 {RATIO}
CLOT INIT KAOL IND TO POST HEP NEUT TRTO: 3 {RATIO}
CLOT INIT KAOLIN IND BLD US: 169 SEC (ref 113–166)
CLOT INIT KAOLIN IND BLD US: 456 SEC (ref 113–166)
CLOT INIT KAOLIN IND P HEP NEUT BLD US: 154 SEC (ref 103–153)
CLOT INIT KAOLIN IND P HEP NEUT BLD US: 166 SEC (ref 103–153)
CLOT STIFF PLT CONT BLD CALC: 20.3 HPA (ref 11.9–29.8)
CLOT STIFF PLT CONT BLD CALC: 20.7 HPA (ref 11.9–29.8)
CLOT STIFF TF IND P HEP NEUT BLD US: 23.3 HPA (ref 13–33.2)
CLOT STIFF TF IND P HEP NEUT BLD US: 24 HPA (ref 13–33.2)
CLOT STIFF TF IND+IIB-IIIA INH P HEP NEU: 2.6 HPA (ref 1–3.7)
CLOT STIFF TF IND+IIB-IIIA INH P HEP NEU: 3.7 HPA (ref 1–3.7)
CODING SYSTEM: NORMAL
CODING SYSTEM: NORMAL
CREAT SERPL-MCNC: 1.27 MG/DL (ref 0.67–1.17)
CREAT SERPL-MCNC: 1.3 MG/DL (ref 0.67–1.17)
CREAT SERPL-MCNC: 1.32 MG/DL (ref 0.67–1.17)
CROSSMATCH: NORMAL
CROSSMATCH: NORMAL
EGFRCR SERPLBLD CKD-EPI 2021: 56 ML/MIN/1.73M2
EGFRCR SERPLBLD CKD-EPI 2021: 57 ML/MIN/1.73M2
EGFRCR SERPLBLD CKD-EPI 2021: 59 ML/MIN/1.73M2
ERYTHROCYTE [DISTWIDTH] IN BLOOD BY AUTOMATED COUNT: 12.7 % (ref 10–15)
ERYTHROCYTE [DISTWIDTH] IN BLOOD BY AUTOMATED COUNT: 12.7 % (ref 10–15)
ERYTHROCYTE [DISTWIDTH] IN BLOOD BY AUTOMATED COUNT: 12.8 % (ref 10–15)
ERYTHROCYTE [DISTWIDTH] IN BLOOD BY AUTOMATED COUNT: 12.8 % (ref 10–15)
FIBRINOGEN PPP-MCNC: 275 MG/DL (ref 170–510)
GLUCOSE BLD-MCNC: 124 MG/DL (ref 70–99)
GLUCOSE BLD-MCNC: 128 MG/DL (ref 70–99)
GLUCOSE BLD-MCNC: 129 MG/DL (ref 70–99)
GLUCOSE BLD-MCNC: 131 MG/DL (ref 70–99)
GLUCOSE BLD-MCNC: 136 MG/DL (ref 70–99)
GLUCOSE BLD-MCNC: 160 MG/DL (ref 70–99)
GLUCOSE BLDC GLUCOMTR-MCNC: 114 MG/DL (ref 70–99)
GLUCOSE BLDC GLUCOMTR-MCNC: 151 MG/DL (ref 70–99)
GLUCOSE BLDC GLUCOMTR-MCNC: 158 MG/DL (ref 70–99)
GLUCOSE BLDC GLUCOMTR-MCNC: 159 MG/DL (ref 70–99)
GLUCOSE BLDC GLUCOMTR-MCNC: 171 MG/DL (ref 70–99)
GLUCOSE SERPL-MCNC: 167 MG/DL (ref 70–99)
GLUCOSE SERPL-MCNC: 177 MG/DL (ref 70–99)
GLUCOSE SERPL-MCNC: 198 MG/DL (ref 70–99)
HCO3 BLD-SCNC: 16 MMOL/L (ref 21–28)
HCO3 BLD-SCNC: 18 MMOL/L (ref 21–28)
HCO3 BLD-SCNC: 21 MMOL/L (ref 21–28)
HCO3 BLD-SCNC: 23 MMOL/L (ref 21–28)
HCO3 BLDA-SCNC: 24 MMOL/L (ref 21–28)
HCO3 BLDA-SCNC: 26 MMOL/L (ref 21–28)
HCO3 BLDA-SCNC: 27 MMOL/L (ref 21–28)
HCO3 BLDV-SCNC: 17 MMOL/L (ref 21–28)
HCO3 BLDV-SCNC: 22 MMOL/L (ref 21–28)
HCO3 BLDV-SCNC: 28 MMOL/L (ref 21–28)
HCO3 SERPL-SCNC: 17 MMOL/L (ref 22–29)
HCO3 SERPL-SCNC: 21 MMOL/L (ref 22–29)
HCO3 SERPL-SCNC: 24 MMOL/L (ref 22–29)
HCT VFR BLD AUTO: 28.9 % (ref 40–53)
HCT VFR BLD AUTO: 29.1 % (ref 40–53)
HCT VFR BLD AUTO: 32.3 % (ref 40–53)
HCT VFR BLD AUTO: 33.1 % (ref 40–53)
HGB BLD-MCNC: 10 G/DL (ref 13.3–17.7)
HGB BLD-MCNC: 10.3 G/DL (ref 13.3–17.7)
HGB BLD-MCNC: 10.6 G/DL (ref 13.3–17.7)
HGB BLD-MCNC: 10.7 G/DL (ref 13.3–17.7)
HGB BLD-MCNC: 12.7 G/DL (ref 13.3–17.7)
HGB BLD-MCNC: 13 G/DL (ref 13.3–17.7)
HGB BLD-MCNC: 8.6 G/DL (ref 13.3–17.7)
HGB BLD-MCNC: 8.8 G/DL (ref 13.3–17.7)
HGB BLD-MCNC: 8.8 G/DL (ref 13.3–17.7)
HGB BLD-MCNC: 9.6 G/DL (ref 13.3–17.7)
HOLD SPECIMEN: NORMAL
INR PPP: 1.48 (ref 0.85–1.15)
INR PPP: 1.57 (ref 0.85–1.15)
ISSUE DATE AND TIME: NORMAL
ISSUE DATE AND TIME: NORMAL
LACTATE BLD-SCNC: 1.3 MMOL/L (ref 0.7–2)
LACTATE BLD-SCNC: 1.5 MMOL/L (ref 0.7–2)
LACTATE BLD-SCNC: 1.6 MMOL/L (ref 0.7–2)
LACTATE BLD-SCNC: 1.7 MMOL/L (ref 0.7–2)
LACTATE BLD-SCNC: 1.9 MMOL/L (ref 0.7–2)
LACTATE BLD-SCNC: 3.8 MMOL/L (ref 0.7–2)
LACTATE SERPL-SCNC: 11.9 MMOL/L (ref 0.7–2)
LACTATE SERPL-SCNC: 12 MMOL/L (ref 0.7–2)
LACTATE SERPL-SCNC: 4 MMOL/L (ref 0.7–2)
LACTATE SERPL-SCNC: 6.3 MMOL/L (ref 0.7–2)
LACTATE SERPL-SCNC: 9.2 MMOL/L (ref 0.7–2)
LACTATE SERPL-SCNC: 9.3 MMOL/L (ref 0.7–2)
MAGNESIUM SERPL-MCNC: 2.5 MG/DL (ref 1.7–2.3)
MCH RBC QN AUTO: 31 PG (ref 26.5–33)
MCH RBC QN AUTO: 31.4 PG (ref 26.5–33)
MCH RBC QN AUTO: 31.7 PG (ref 26.5–33)
MCH RBC QN AUTO: 32.5 PG (ref 26.5–33)
MCHC RBC AUTO-ENTMCNC: 32 G/DL (ref 31.5–36.5)
MCHC RBC AUTO-ENTMCNC: 33.1 G/DL (ref 31.5–36.5)
MCHC RBC AUTO-ENTMCNC: 33.2 G/DL (ref 31.5–36.5)
MCHC RBC AUTO-ENTMCNC: 35.4 G/DL (ref 31.5–36.5)
MCV RBC AUTO: 92 FL (ref 78–100)
MCV RBC AUTO: 94 FL (ref 78–100)
MCV RBC AUTO: 96 FL (ref 78–100)
MCV RBC AUTO: 97 FL (ref 78–100)
O2/TOTAL GAS SETTING VFR VENT: 30 %
O2/TOTAL GAS SETTING VFR VENT: 50 %
O2/TOTAL GAS SETTING VFR VENT: 70 %
O2/TOTAL GAS SETTING VFR VENT: 80 %
OXYHGB MFR BLDA: 94 % (ref 92–100)
OXYHGB MFR BLDA: 96 % (ref 92–100)
OXYHGB MFR BLDA: 97 % (ref 92–100)
OXYHGB MFR BLDA: 97 % (ref 92–100)
OXYHGB MFR BLDA: 98 % (ref 92–100)
OXYHGB MFR BLDA: 99 % (ref 92–100)
OXYHGB MFR BLDV: 75 % (ref 70–75)
OXYHGB MFR BLDV: 80 % (ref 70–75)
OXYHGB MFR BLDV: 80 % (ref 70–75)
PCO2 BLD: 33 MM HG (ref 35–45)
PCO2 BLD: 35 MM HG (ref 35–45)
PCO2 BLD: 46 MM HG (ref 35–45)
PCO2 BLD: 46 MM HG (ref 35–45)
PCO2 BLDA: 38 MM HG (ref 35–45)
PCO2 BLDA: 39 MM HG (ref 35–45)
PCO2 BLDA: 39 MM HG (ref 35–45)
PCO2 BLDA: 41 MM HG (ref 35–45)
PCO2 BLDA: 46 MM HG (ref 35–45)
PCO2 BLDV: 40 MM HG (ref 40–50)
PCO2 BLDV: 43 MM HG (ref 40–50)
PCO2 BLDV: 51 MM HG (ref 40–50)
PEEP: 5 CM H2O
PH BLD: 7.16 [PH] (ref 7.35–7.45)
PH BLD: 7.21 [PH] (ref 7.35–7.45)
PH BLD: 7.39 [PH] (ref 7.35–7.45)
PH BLD: 7.46 [PH] (ref 7.35–7.45)
PH BLDA: 7.33 [PH] (ref 7.35–7.45)
PH BLDA: 7.42 [PH] (ref 7.35–7.45)
PH BLDA: 7.43 [PH] (ref 7.35–7.45)
PH BLDA: 7.45 [PH] (ref 7.35–7.45)
PH BLDA: 7.46 [PH] (ref 7.35–7.45)
PH BLDV: 7.14 [PH] (ref 7.32–7.43)
PH BLDV: 7.35 [PH] (ref 7.32–7.43)
PH BLDV: 7.43 [PH] (ref 7.32–7.43)
PHOSPHATE SERPL-MCNC: 2.8 MG/DL (ref 2.5–4.5)
PLATELET # BLD AUTO: 142 10E3/UL (ref 150–450)
PLATELET # BLD AUTO: 160 10E3/UL (ref 150–450)
PLATELET # BLD AUTO: 188 10E3/UL (ref 150–450)
PLATELET # BLD AUTO: 201 10E3/UL (ref 150–450)
PO2 BLD: 107 MM HG (ref 80–105)
PO2 BLD: 79 MM HG (ref 80–105)
PO2 BLD: 85 MM HG (ref 80–105)
PO2 BLD: 86 MM HG (ref 80–105)
PO2 BLDA: 131 MM HG (ref 80–105)
PO2 BLDA: 167 MM HG (ref 80–105)
PO2 BLDA: 180 MM HG (ref 80–105)
PO2 BLDA: 278 MM HG (ref 80–105)
PO2 BLDA: 386 MM HG (ref 80–105)
PO2 BLDV: 39 MM HG (ref 25–47)
PO2 BLDV: 40 MM HG (ref 25–47)
PO2 BLDV: 53 MM HG (ref 25–47)
POTASSIUM BLD-SCNC: 3.4 MMOL/L (ref 3.4–5.3)
POTASSIUM BLD-SCNC: 3.6 MMOL/L (ref 3.4–5.3)
POTASSIUM BLD-SCNC: 3.9 MMOL/L (ref 3.4–5.3)
POTASSIUM BLD-SCNC: 4.4 MMOL/L (ref 3.4–5.3)
POTASSIUM SERPL-SCNC: 3.1 MMOL/L (ref 3.4–5.3)
POTASSIUM SERPL-SCNC: 3.5 MMOL/L (ref 3.4–5.3)
POTASSIUM SERPL-SCNC: 4.1 MMOL/L (ref 3.4–5.3)
POTASSIUM SERPL-SCNC: 4.1 MMOL/L (ref 3.4–5.3)
PROT SERPL-MCNC: 5.5 G/DL (ref 6.4–8.3)
RBC # BLD AUTO: 3.06 10E6/UL (ref 4.4–5.9)
RBC # BLD AUTO: 3.17 10E6/UL (ref 4.4–5.9)
RBC # BLD AUTO: 3.38 10E6/UL (ref 4.4–5.9)
RBC # BLD AUTO: 3.42 10E6/UL (ref 4.4–5.9)
SAO2 % BLDA: 100 % (ref 95–96)
SAO2 % BLDA: 95.6 % (ref 95–96)
SAO2 % BLDA: 97.3 % (ref 95–96)
SAO2 % BLDA: 98.2 % (ref 95–96)
SAO2 % BLDA: 98.4 % (ref 95–96)
SAO2 % BLDA: 99 % (ref 95–96)
SAO2 % BLDA: >100 % (ref 95–96)
SAO2 % BLDV: 76.2 % (ref 70–75)
SAO2 % BLDV: 81 % (ref 70–75)
SAO2 % BLDV: 81 % (ref 70–75)
SODIUM BLD-SCNC: 138 MMOL/L (ref 135–145)
SODIUM BLD-SCNC: 138 MMOL/L (ref 135–145)
SODIUM BLD-SCNC: 139 MMOL/L (ref 135–145)
SODIUM BLD-SCNC: 140 MMOL/L (ref 135–145)
SODIUM BLD-SCNC: 141 MMOL/L (ref 135–145)
SODIUM BLD-SCNC: 141 MMOL/L (ref 135–145)
SODIUM SERPL-SCNC: 142 MMOL/L (ref 135–145)
SODIUM SERPL-SCNC: 143 MMOL/L (ref 135–145)
SODIUM SERPL-SCNC: 146 MMOL/L (ref 135–145)
UNIT ABO/RH: NORMAL
UNIT ABO/RH: NORMAL
UNIT NUMBER: NORMAL
UNIT NUMBER: NORMAL
UNIT STATUS: NORMAL
UNIT STATUS: NORMAL
UNIT TYPE ISBT: 5100
UNIT TYPE ISBT: 5100
WBC # BLD AUTO: 25.1 10E3/UL (ref 4–11)
WBC # BLD AUTO: 25.7 10E3/UL (ref 4–11)
WBC # BLD AUTO: 26.3 10E3/UL (ref 4–11)
WBC # BLD AUTO: 31.8 10E3/UL (ref 4–11)

## 2025-03-15 PROCEDURE — 250N000011 HC RX IP 250 OP 636: Performed by: NURSE PRACTITIONER

## 2025-03-15 PROCEDURE — 250N000011 HC RX IP 250 OP 636: Performed by: STUDENT IN AN ORGANIZED HEALTH CARE EDUCATION/TRAINING PROGRAM

## 2025-03-15 PROCEDURE — 02100Z9 BYPASS CORONARY ARTERY, ONE ARTERY FROM LEFT INTERNAL MAMMARY, OPEN APPROACH: ICD-10-PCS | Performed by: SURGERY

## 2025-03-15 PROCEDURE — 021009W BYPASS CORONARY ARTERY, ONE ARTERY FROM AORTA WITH AUTOLOGOUS VENOUS TISSUE, OPEN APPROACH: ICD-10-PCS | Performed by: SURGERY

## 2025-03-15 PROCEDURE — 250N000009 HC RX 250: Performed by: SURGERY

## 2025-03-15 PROCEDURE — 272N000001 HC OR GENERAL SUPPLY STERILE: Performed by: SURGERY

## 2025-03-15 PROCEDURE — 200N000001 HC R&B ICU

## 2025-03-15 PROCEDURE — 82330 ASSAY OF CALCIUM: CPT | Performed by: PHYSICIAN ASSISTANT

## 2025-03-15 PROCEDURE — P9045 ALBUMIN (HUMAN), 5%, 250 ML: HCPCS | Performed by: ANESTHESIOLOGY

## 2025-03-15 PROCEDURE — 85730 THROMBOPLASTIN TIME PARTIAL: CPT | Performed by: SURGERY

## 2025-03-15 PROCEDURE — 999N000141 HC STATISTIC PRE-PROCEDURE NURSING ASSESSMENT: Performed by: SURGERY

## 2025-03-15 PROCEDURE — 93005 ELECTROCARDIOGRAM TRACING: CPT

## 2025-03-15 PROCEDURE — 83735 ASSAY OF MAGNESIUM: CPT | Performed by: PHYSICIAN ASSISTANT

## 2025-03-15 PROCEDURE — 250N000011 HC RX IP 250 OP 636: Performed by: PHYSICIAN ASSISTANT

## 2025-03-15 PROCEDURE — 250N000011 HC RX IP 250 OP 636: Performed by: ANESTHESIOLOGY

## 2025-03-15 PROCEDURE — 82805 BLOOD GASES W/O2 SATURATION: CPT | Performed by: PHYSICIAN ASSISTANT

## 2025-03-15 PROCEDURE — 258N000003 HC RX IP 258 OP 636: Performed by: ANESTHESIOLOGY

## 2025-03-15 PROCEDURE — 410N000005 HC PER-PERFUSION, SH ONLY, 1ST 30 MIN: Performed by: SURGERY

## 2025-03-15 PROCEDURE — 258N000003 HC RX IP 258 OP 636: Performed by: STUDENT IN AN ORGANIZED HEALTH CARE EDUCATION/TRAINING PROGRAM

## 2025-03-15 PROCEDURE — 250N000013 HC RX MED GY IP 250 OP 250 PS 637: Performed by: SURGERY

## 2025-03-15 PROCEDURE — 85027 COMPLETE CBC AUTOMATED: CPT | Performed by: STUDENT IN AN ORGANIZED HEALTH CARE EDUCATION/TRAINING PROGRAM

## 2025-03-15 PROCEDURE — 85610 PROTHROMBIN TIME: CPT | Performed by: SURGERY

## 2025-03-15 PROCEDURE — 85730 THROMBOPLASTIN TIME PARTIAL: CPT | Performed by: PHYSICIAN ASSISTANT

## 2025-03-15 PROCEDURE — 250N000024 HC ISOFLURANE, PER MIN: Performed by: SURGERY

## 2025-03-15 PROCEDURE — 99291 CRITICAL CARE FIRST HOUR: CPT | Mod: 24 | Performed by: INTERNAL MEDICINE

## 2025-03-15 PROCEDURE — 85014 HEMATOCRIT: CPT | Performed by: SURGERY

## 2025-03-15 PROCEDURE — 250N000009 HC RX 250: Performed by: ANESTHESIOLOGY

## 2025-03-15 PROCEDURE — 258N000003 HC RX IP 258 OP 636: Performed by: PHYSICIAN ASSISTANT

## 2025-03-15 PROCEDURE — 5A1221Z PERFORMANCE OF CARDIAC OUTPUT, CONTINUOUS: ICD-10-PCS | Performed by: SURGERY

## 2025-03-15 PROCEDURE — C1898 LEAD, PMKR, OTHER THAN TRANS: HCPCS | Performed by: SURGERY

## 2025-03-15 PROCEDURE — 85610 PROTHROMBIN TIME: CPT | Performed by: PHYSICIAN ASSISTANT

## 2025-03-15 PROCEDURE — 84295 ASSAY OF SERUM SODIUM: CPT | Performed by: NURSE PRACTITIONER

## 2025-03-15 PROCEDURE — 250N000011 HC RX IP 250 OP 636: Performed by: SURGERY

## 2025-03-15 PROCEDURE — 258N000003 HC RX IP 258 OP 636: Performed by: NURSE PRACTITIONER

## 2025-03-15 PROCEDURE — 82805 BLOOD GASES W/O2 SATURATION: CPT | Performed by: STUDENT IN AN ORGANIZED HEALTH CARE EDUCATION/TRAINING PROGRAM

## 2025-03-15 PROCEDURE — 999N000065 XR CHEST PORT 1 VIEW

## 2025-03-15 PROCEDURE — 99292 CRITICAL CARE ADDL 30 MIN: CPT | Mod: 24 | Performed by: INTERNAL MEDICINE

## 2025-03-15 PROCEDURE — 370N000017 HC ANESTHESIA TECHNICAL FEE, PER MIN: Performed by: SURGERY

## 2025-03-15 PROCEDURE — 33533 CABG ARTERIAL SINGLE: CPT | Performed by: SURGERY

## 2025-03-15 PROCEDURE — 94003 VENT MGMT INPAT SUBQ DAY: CPT

## 2025-03-15 PROCEDURE — 250N000011 HC RX IP 250 OP 636: Mod: JZ | Performed by: NURSE PRACTITIONER

## 2025-03-15 PROCEDURE — 83605 ASSAY OF LACTIC ACID: CPT | Performed by: PHYSICIAN ASSISTANT

## 2025-03-15 PROCEDURE — 06BQ4ZZ EXCISION OF LEFT SAPHENOUS VEIN, PERCUTANEOUS ENDOSCOPIC APPROACH: ICD-10-PCS | Performed by: SURGERY

## 2025-03-15 PROCEDURE — 82330 ASSAY OF CALCIUM: CPT

## 2025-03-15 PROCEDURE — 410N000006: Performed by: SURGERY

## 2025-03-15 PROCEDURE — P9016 RBC LEUKOCYTES REDUCED: HCPCS | Performed by: SURGERY

## 2025-03-15 PROCEDURE — 250N000009 HC RX 250: Performed by: NURSE PRACTITIONER

## 2025-03-15 PROCEDURE — 82805 BLOOD GASES W/O2 SATURATION: CPT

## 2025-03-15 PROCEDURE — 360N000079 HC SURGERY LEVEL 6, PER MIN: Performed by: SURGERY

## 2025-03-15 PROCEDURE — 250N000009 HC RX 250: Performed by: PHYSICIAN ASSISTANT

## 2025-03-15 PROCEDURE — 85027 COMPLETE CBC AUTOMATED: CPT | Performed by: NURSE PRACTITIONER

## 2025-03-15 PROCEDURE — 82805 BLOOD GASES W/O2 SATURATION: CPT | Performed by: NURSE PRACTITIONER

## 2025-03-15 PROCEDURE — 85396 CLOTTING ASSAY WHOLE BLOOD: CPT

## 2025-03-15 PROCEDURE — 85384 FIBRINOGEN ACTIVITY: CPT | Performed by: SURGERY

## 2025-03-15 PROCEDURE — 84295 ASSAY OF SERUM SODIUM: CPT | Performed by: PHYSICIAN ASSISTANT

## 2025-03-15 PROCEDURE — 84100 ASSAY OF PHOSPHORUS: CPT | Performed by: PHYSICIAN ASSISTANT

## 2025-03-15 PROCEDURE — 250N000012 HC RX MED GY IP 250 OP 636 PS 637: Performed by: SURGERY

## 2025-03-15 PROCEDURE — 85018 HEMOGLOBIN: CPT | Performed by: PHYSICIAN ASSISTANT

## 2025-03-15 PROCEDURE — 82310 ASSAY OF CALCIUM: CPT | Performed by: SURGERY

## 2025-03-15 PROCEDURE — 33508 ENDOSCOPIC VEIN HARVEST: CPT | Mod: XU | Performed by: SURGERY

## 2025-03-15 PROCEDURE — 33517 CABG ARTERY-VEIN SINGLE: CPT | Performed by: SURGERY

## 2025-03-15 PROCEDURE — 250N000013 HC RX MED GY IP 250 OP 250 PS 637: Performed by: PHYSICIAN ASSISTANT

## 2025-03-15 PROCEDURE — P9045 ALBUMIN (HUMAN), 5%, 250 ML: HCPCS | Performed by: NURSE PRACTITIONER

## 2025-03-15 PROCEDURE — 99207 PR NO BILLABLE SERVICE THIS VISIT: CPT | Performed by: STUDENT IN AN ORGANIZED HEALTH CARE EDUCATION/TRAINING PROGRAM

## 2025-03-15 PROCEDURE — 999N000157 HC STATISTIC RCP TIME EA 10 MIN

## 2025-03-15 RX ORDER — FENTANYL CITRATE 0.05 MG/ML
50 INJECTION, SOLUTION INTRAMUSCULAR; INTRAVENOUS
Status: DISCONTINUED | OUTPATIENT
Start: 2025-03-15 | End: 2025-03-15 | Stop reason: HOSPADM

## 2025-03-15 RX ORDER — HYDROXYZINE HYDROCHLORIDE 10 MG/1
10 TABLET, FILM COATED ORAL EVERY 6 HOURS PRN
Status: DISCONTINUED | OUTPATIENT
Start: 2025-03-15 | End: 2025-03-19

## 2025-03-15 RX ORDER — PHENYLEPHRINE HCL IN 0.9% NACL 50MG/250ML
PLASTIC BAG, INJECTION (ML) INTRAVENOUS CONTINUOUS PRN
Status: DISCONTINUED | OUTPATIENT
Start: 2025-03-15 | End: 2025-03-15

## 2025-03-15 RX ORDER — ASPIRIN 81 MG/1
81 TABLET, CHEWABLE ORAL
Status: COMPLETED | OUTPATIENT
Start: 2025-03-15 | End: 2025-03-15

## 2025-03-15 RX ORDER — CEFAZOLIN SODIUM/WATER 2 G/20 ML
2 SYRINGE (ML) INTRAVENOUS SEE ADMIN INSTRUCTIONS
Status: DISCONTINUED | OUTPATIENT
Start: 2025-03-15 | End: 2025-03-15 | Stop reason: HOSPADM

## 2025-03-15 RX ORDER — ONDANSETRON 2 MG/ML
4 INJECTION INTRAMUSCULAR; INTRAVENOUS EVERY 6 HOURS PRN
Status: DISCONTINUED | OUTPATIENT
Start: 2025-03-15 | End: 2025-03-25 | Stop reason: HOSPADM

## 2025-03-15 RX ORDER — PROPOFOL 10 MG/ML
INJECTION, EMULSION INTRAVENOUS PRN
Status: DISCONTINUED | OUTPATIENT
Start: 2025-03-15 | End: 2025-03-15

## 2025-03-15 RX ORDER — FAMOTIDINE 20 MG/1
20 TABLET, FILM COATED ORAL
Status: COMPLETED | OUTPATIENT
Start: 2025-03-15 | End: 2025-03-15

## 2025-03-15 RX ORDER — AMOXICILLIN 250 MG
1 CAPSULE ORAL 2 TIMES DAILY
Status: DISCONTINUED | OUTPATIENT
Start: 2025-03-15 | End: 2025-03-19

## 2025-03-15 RX ORDER — PROPOFOL 10 MG/ML
INJECTION, EMULSION INTRAVENOUS CONTINUOUS PRN
Status: DISCONTINUED | OUTPATIENT
Start: 2025-03-15 | End: 2025-03-15

## 2025-03-15 RX ORDER — BISACODYL 10 MG
10 SUPPOSITORY, RECTAL RECTAL DAILY PRN
Status: DISCONTINUED | OUTPATIENT
Start: 2025-03-18 | End: 2025-03-25 | Stop reason: HOSPADM

## 2025-03-15 RX ORDER — METHOCARBAMOL 500 MG/1
250 TABLET ORAL EVERY 6 HOURS PRN
Status: DISCONTINUED | OUTPATIENT
Start: 2025-03-15 | End: 2025-03-25 | Stop reason: HOSPADM

## 2025-03-15 RX ORDER — CEFAZOLIN SODIUM 2 G/50ML
2 SOLUTION INTRAVENOUS EVERY 8 HOURS
Status: DISCONTINUED | OUTPATIENT
Start: 2025-03-15 | End: 2025-03-16

## 2025-03-15 RX ORDER — VECURONIUM BROMIDE 1 MG/ML
INJECTION, POWDER, LYOPHILIZED, FOR SOLUTION INTRAVENOUS PRN
Status: DISCONTINUED | OUTPATIENT
Start: 2025-03-15 | End: 2025-03-15

## 2025-03-15 RX ORDER — SODIUM CHLORIDE 9 MG/ML
INJECTION, SOLUTION INTRAVENOUS CONTINUOUS PRN
Status: DISCONTINUED | OUTPATIENT
Start: 2025-03-15 | End: 2025-03-15

## 2025-03-15 RX ORDER — NITROGLYCERIN 10 MG/100ML
INJECTION INTRAVENOUS PRN
Status: DISCONTINUED | OUTPATIENT
Start: 2025-03-15 | End: 2025-03-15

## 2025-03-15 RX ORDER — ONDANSETRON 4 MG/1
4 TABLET, ORALLY DISINTEGRATING ORAL EVERY 6 HOURS PRN
Status: DISCONTINUED | OUTPATIENT
Start: 2025-03-15 | End: 2025-03-25 | Stop reason: HOSPADM

## 2025-03-15 RX ORDER — HEPARIN SOD,PORCINE/0.9 % NACL 30K/1000ML
INTRAVENOUS SOLUTION INTRAVENOUS
Status: DISCONTINUED | OUTPATIENT
Start: 2025-03-15 | End: 2025-03-15 | Stop reason: HOSPADM

## 2025-03-15 RX ORDER — CALCIUM GLUCONATE 20 MG/ML
2 INJECTION, SOLUTION INTRAVENOUS EVERY 6 HOURS PRN
Status: DISCONTINUED | OUTPATIENT
Start: 2025-03-15 | End: 2025-03-19

## 2025-03-15 RX ORDER — MAGNESIUM HYDROXIDE 1200 MG/15ML
LIQUID ORAL PRN
Status: DISCONTINUED | OUTPATIENT
Start: 2025-03-15 | End: 2025-03-15 | Stop reason: HOSPADM

## 2025-03-15 RX ORDER — HYDRALAZINE HYDROCHLORIDE 20 MG/ML
10 INJECTION INTRAMUSCULAR; INTRAVENOUS EVERY 30 MIN PRN
Status: DISCONTINUED | OUTPATIENT
Start: 2025-03-15 | End: 2025-03-25 | Stop reason: HOSPADM

## 2025-03-15 RX ORDER — CEFAZOLIN SODIUM/WATER 2 G/20 ML
2 SYRINGE (ML) INTRAVENOUS
Status: COMPLETED | OUTPATIENT
Start: 2025-03-15 | End: 2025-03-15

## 2025-03-15 RX ORDER — ASPIRIN 81 MG/1
162 TABLET, CHEWABLE ORAL
Status: COMPLETED | OUTPATIENT
Start: 2025-03-15 | End: 2025-03-15

## 2025-03-15 RX ORDER — OXYCODONE HYDROCHLORIDE 5 MG/1
5 TABLET ORAL EVERY 4 HOURS PRN
Status: DISCONTINUED | OUTPATIENT
Start: 2025-03-15 | End: 2025-03-25 | Stop reason: HOSPADM

## 2025-03-15 RX ORDER — HYDROMORPHONE HCL IN WATER/PF 6 MG/30 ML
0.2 PATIENT CONTROLLED ANALGESIA SYRINGE INTRAVENOUS
Status: DISCONTINUED | OUTPATIENT
Start: 2025-03-15 | End: 2025-03-25 | Stop reason: HOSPADM

## 2025-03-15 RX ORDER — CALCIUM CHLORIDE 100 MG/ML
INJECTION INTRAVENOUS; INTRAVENTRICULAR PRN
Status: DISCONTINUED | OUTPATIENT
Start: 2025-03-15 | End: 2025-03-15

## 2025-03-15 RX ORDER — PROPOFOL 10 MG/ML
5-75 INJECTION, EMULSION INTRAVENOUS CONTINUOUS
Status: DISCONTINUED | OUTPATIENT
Start: 2025-03-15 | End: 2025-03-16

## 2025-03-15 RX ORDER — SODIUM CHLORIDE, SODIUM LACTATE, POTASSIUM CHLORIDE, CALCIUM CHLORIDE 600; 310; 30; 20 MG/100ML; MG/100ML; MG/100ML; MG/100ML
INJECTION, SOLUTION INTRAVENOUS CONTINUOUS
Status: DISCONTINUED | OUTPATIENT
Start: 2025-03-15 | End: 2025-03-19

## 2025-03-15 RX ORDER — DEXMEDETOMIDINE HYDROCHLORIDE 4 UG/ML
.2-.7 INJECTION, SOLUTION INTRAVENOUS CONTINUOUS
Status: DISCONTINUED | OUTPATIENT
Start: 2025-03-15 | End: 2025-03-16

## 2025-03-15 RX ORDER — NOREPINEPHRINE BITARTRATE 0.02 MG/ML
.01-.15 INJECTION, SOLUTION INTRAVENOUS CONTINUOUS PRN
Status: DISCONTINUED | OUTPATIENT
Start: 2025-03-15 | End: 2025-03-19

## 2025-03-15 RX ORDER — CALCIUM CARBONATE 500 MG/1
500 TABLET, CHEWABLE ORAL 4 TIMES DAILY PRN
Status: DISCONTINUED | OUTPATIENT
Start: 2025-03-15 | End: 2025-03-25 | Stop reason: HOSPADM

## 2025-03-15 RX ORDER — PROCHLORPERAZINE MALEATE 5 MG/1
5 TABLET ORAL EVERY 6 HOURS PRN
Status: DISCONTINUED | OUTPATIENT
Start: 2025-03-15 | End: 2025-03-25 | Stop reason: HOSPADM

## 2025-03-15 RX ORDER — NALOXONE HYDROCHLORIDE 0.4 MG/ML
0.4 INJECTION, SOLUTION INTRAMUSCULAR; INTRAVENOUS; SUBCUTANEOUS
Status: DISCONTINUED | OUTPATIENT
Start: 2025-03-15 | End: 2025-03-25 | Stop reason: HOSPADM

## 2025-03-15 RX ORDER — PROTAMINE SULFATE 10 MG/ML
INJECTION, SOLUTION INTRAVENOUS PRN
Status: DISCONTINUED | OUTPATIENT
Start: 2025-03-15 | End: 2025-03-15

## 2025-03-15 RX ORDER — SODIUM CHLORIDE, SODIUM LACTATE, POTASSIUM CHLORIDE, CALCIUM CHLORIDE 600; 310; 30; 20 MG/100ML; MG/100ML; MG/100ML; MG/100ML
INJECTION, SOLUTION INTRAVENOUS CONTINUOUS PRN
Status: DISCONTINUED | OUTPATIENT
Start: 2025-03-15 | End: 2025-03-15

## 2025-03-15 RX ORDER — CEFAZOLIN SODIUM 2 G/50ML
2 SOLUTION INTRAVENOUS EVERY 8 HOURS
Status: DISCONTINUED | OUTPATIENT
Start: 2025-03-15 | End: 2025-03-15

## 2025-03-15 RX ORDER — DEXTROSE MONOHYDRATE 25 G/50ML
25-50 INJECTION, SOLUTION INTRAVENOUS
Status: DISCONTINUED | OUTPATIENT
Start: 2025-03-15 | End: 2025-03-25 | Stop reason: HOSPADM

## 2025-03-15 RX ORDER — LIDOCAINE HYDROCHLORIDE 20 MG/ML
INJECTION, SOLUTION INFILTRATION; PERINEURAL PRN
Status: DISCONTINUED | OUTPATIENT
Start: 2025-03-15 | End: 2025-03-15

## 2025-03-15 RX ORDER — NALOXONE HYDROCHLORIDE 0.4 MG/ML
0.2 INJECTION, SOLUTION INTRAMUSCULAR; INTRAVENOUS; SUBCUTANEOUS
Status: DISCONTINUED | OUTPATIENT
Start: 2025-03-15 | End: 2025-03-25 | Stop reason: HOSPADM

## 2025-03-15 RX ORDER — POTASSIUM CHLORIDE 29.8 MG/ML
20 INJECTION INTRAVENOUS ONCE
Status: COMPLETED | OUTPATIENT
Start: 2025-03-15 | End: 2025-03-15

## 2025-03-15 RX ORDER — CALCIUM GLUCONATE 20 MG/ML
1 INJECTION, SOLUTION INTRAVENOUS EVERY 6 HOURS PRN
Status: DISCONTINUED | OUTPATIENT
Start: 2025-03-15 | End: 2025-03-19

## 2025-03-15 RX ORDER — LIDOCAINE 40 MG/G
CREAM TOPICAL
Status: DISCONTINUED | OUTPATIENT
Start: 2025-03-15 | End: 2025-03-25 | Stop reason: HOSPADM

## 2025-03-15 RX ORDER — PAPAVERINE HYDROCHLORIDE 30 MG/ML
INJECTION INTRAMUSCULAR; INTRAVENOUS PRN
Status: DISCONTINUED | OUTPATIENT
Start: 2025-03-15 | End: 2025-03-15 | Stop reason: HOSPADM

## 2025-03-15 RX ORDER — CALCIUM CARBONATE 500 MG/1
500 TABLET, CHEWABLE ORAL 4 TIMES DAILY PRN
Status: DISCONTINUED | OUTPATIENT
Start: 2025-03-15 | End: 2025-03-15

## 2025-03-15 RX ORDER — HEPARIN SODIUM 5000 [USP'U]/.5ML
5000 INJECTION, SOLUTION INTRAVENOUS; SUBCUTANEOUS EVERY 8 HOURS
Status: DISCONTINUED | OUTPATIENT
Start: 2025-03-16 | End: 2025-03-25 | Stop reason: HOSPADM

## 2025-03-15 RX ORDER — EPINEPHRINE IN 0.9 % SOD CHLOR 5 MG/250ML
.01-.1 PLASTIC BAG, INJECTION (ML) INTRAVENOUS CONTINUOUS PRN
Status: DISCONTINUED | OUTPATIENT
Start: 2025-03-15 | End: 2025-03-16

## 2025-03-15 RX ORDER — FENTANYL CITRATE 50 UG/ML
INJECTION, SOLUTION INTRAMUSCULAR; INTRAVENOUS PRN
Status: DISCONTINUED | OUTPATIENT
Start: 2025-03-15 | End: 2025-03-15

## 2025-03-15 RX ORDER — FENTANYL CITRATE 0.05 MG/ML
25 INJECTION, SOLUTION INTRAMUSCULAR; INTRAVENOUS EVERY 5 MIN PRN
Status: DISCONTINUED | OUTPATIENT
Start: 2025-03-15 | End: 2025-03-15 | Stop reason: HOSPADM

## 2025-03-15 RX ORDER — INDOMETHACIN 25 MG/1
CAPSULE ORAL
Status: COMPLETED
Start: 2025-03-15 | End: 2025-03-15

## 2025-03-15 RX ORDER — INDOMETHACIN 25 MG/1
100 CAPSULE ORAL ONCE
Status: COMPLETED | OUTPATIENT
Start: 2025-03-15 | End: 2025-03-15

## 2025-03-15 RX ORDER — INDOMETHACIN 25 MG/1
50 CAPSULE ORAL ONCE
Status: COMPLETED | OUTPATIENT
Start: 2025-03-15 | End: 2025-03-15

## 2025-03-15 RX ORDER — ACETAMINOPHEN 325 MG/1
975 TABLET ORAL ONCE
Status: COMPLETED | OUTPATIENT
Start: 2025-03-15 | End: 2025-03-15

## 2025-03-15 RX ORDER — LIDOCAINE 40 MG/G
CREAM TOPICAL
Status: DISCONTINUED | OUTPATIENT
Start: 2025-03-15 | End: 2025-03-15 | Stop reason: HOSPADM

## 2025-03-15 RX ORDER — ACETAMINOPHEN 325 MG/1
975 TABLET ORAL EVERY 8 HOURS
Status: DISCONTINUED | OUTPATIENT
Start: 2025-03-15 | End: 2025-03-25 | Stop reason: HOSPADM

## 2025-03-15 RX ORDER — HYDROMORPHONE HCL IN WATER/PF 6 MG/30 ML
0.4 PATIENT CONTROLLED ANALGESIA SYRINGE INTRAVENOUS
Status: DISCONTINUED | OUTPATIENT
Start: 2025-03-15 | End: 2025-03-25 | Stop reason: HOSPADM

## 2025-03-15 RX ORDER — NICOTINE POLACRILEX 4 MG
15-30 LOZENGE BUCCAL
Status: DISCONTINUED | OUTPATIENT
Start: 2025-03-15 | End: 2025-03-25 | Stop reason: HOSPADM

## 2025-03-15 RX ORDER — ONDANSETRON 2 MG/ML
4 INJECTION INTRAMUSCULAR; INTRAVENOUS EVERY 6 HOURS PRN
Status: DISCONTINUED | OUTPATIENT
Start: 2025-03-15 | End: 2025-03-15 | Stop reason: HOSPADM

## 2025-03-15 RX ORDER — OXYCODONE HYDROCHLORIDE 5 MG/1
10 TABLET ORAL EVERY 4 HOURS PRN
Status: DISCONTINUED | OUTPATIENT
Start: 2025-03-15 | End: 2025-03-25 | Stop reason: HOSPADM

## 2025-03-15 RX ORDER — PANTOPRAZOLE SODIUM 40 MG/1
40 TABLET, DELAYED RELEASE ORAL DAILY
Status: DISCONTINUED | OUTPATIENT
Start: 2025-03-15 | End: 2025-03-25 | Stop reason: HOSPADM

## 2025-03-15 RX ORDER — SODIUM CHLORIDE, SODIUM LACTATE, POTASSIUM CHLORIDE, CALCIUM CHLORIDE 600; 310; 30; 20 MG/100ML; MG/100ML; MG/100ML; MG/100ML
INJECTION, SOLUTION INTRAVENOUS CONTINUOUS
Status: DISCONTINUED | OUTPATIENT
Start: 2025-03-15 | End: 2025-03-15 | Stop reason: HOSPADM

## 2025-03-15 RX ORDER — POTASSIUM CHLORIDE 1.5 G/1.58G
40 POWDER, FOR SOLUTION ORAL ONCE
Status: COMPLETED | OUTPATIENT
Start: 2025-03-15 | End: 2025-03-15

## 2025-03-15 RX ORDER — POLYETHYLENE GLYCOL 3350 17 G/17G
17 POWDER, FOR SOLUTION ORAL DAILY
Status: DISCONTINUED | OUTPATIENT
Start: 2025-03-16 | End: 2025-03-19

## 2025-03-15 RX ORDER — CHLORHEXIDINE GLUCONATE ORAL RINSE 1.2 MG/ML
10 SOLUTION DENTAL ONCE
Status: COMPLETED | OUTPATIENT
Start: 2025-03-15 | End: 2025-03-15

## 2025-03-15 RX ORDER — ASPIRIN 81 MG/1
162 TABLET, CHEWABLE ORAL DAILY
Status: DISCONTINUED | OUTPATIENT
Start: 2025-03-15 | End: 2025-03-25 | Stop reason: HOSPADM

## 2025-03-15 RX ORDER — LIDOCAINE 4 G/G
1 PATCH TOPICAL EVERY 24 HOURS
Status: DISCONTINUED | OUTPATIENT
Start: 2025-03-15 | End: 2025-03-25 | Stop reason: HOSPADM

## 2025-03-15 RX ORDER — HEPARIN SODIUM 1000 [USP'U]/ML
INJECTION, SOLUTION INTRAVENOUS; SUBCUTANEOUS PRN
Status: DISCONTINUED | OUTPATIENT
Start: 2025-03-15 | End: 2025-03-15

## 2025-03-15 RX ORDER — MORPHINE SULFATE 4 MG/ML
4 INJECTION, SOLUTION INTRAMUSCULAR; INTRAVENOUS ONCE
Status: DISCONTINUED | OUTPATIENT
Start: 2025-03-15 | End: 2025-03-15

## 2025-03-15 RX ADMIN — FENTANYL CITRATE 100 MCG: 50 INJECTION INTRAMUSCULAR; INTRAVENOUS at 10:36

## 2025-03-15 RX ADMIN — PROPOFOL 30 MG: 10 INJECTION, EMULSION INTRAVENOUS at 08:34

## 2025-03-15 RX ADMIN — LIDOCAINE 1 PATCH: 560 PATCH PERCUTANEOUS; TOPICAL; TRANSDERMAL at 13:23

## 2025-03-15 RX ADMIN — PROPOFOL 40 MCG/KG/MIN: 10 INJECTION, EMULSION INTRAVENOUS at 17:31

## 2025-03-15 RX ADMIN — VASOPRESSIN 2.4 UNITS/HR: 20 INJECTION, SOLUTION INTRAVENOUS at 13:41

## 2025-03-15 RX ADMIN — FENTANYL CITRATE 150 MCG: 50 INJECTION INTRAMUSCULAR; INTRAVENOUS at 08:25

## 2025-03-15 RX ADMIN — EPINEPHRINE 0.03 MCG/KG/MIN: 1 INJECTION INTRAMUSCULAR; INTRAVENOUS; SUBCUTANEOUS at 10:19

## 2025-03-15 RX ADMIN — PHENYLEPHRINE HYDROCHLORIDE 100 MCG: 10 INJECTION INTRAVENOUS at 07:52

## 2025-03-15 RX ADMIN — PHENYLEPHRINE HYDROCHLORIDE 200 MCG: 10 INJECTION INTRAVENOUS at 11:16

## 2025-03-15 RX ADMIN — AMINOCAPROIC ACID 5 G: 250 INJECTION, SOLUTION INTRAVENOUS at 08:05

## 2025-03-15 RX ADMIN — Medication 25 MCG/HR: at 21:37

## 2025-03-15 RX ADMIN — SODIUM CHLORIDE, POTASSIUM CHLORIDE, SODIUM LACTATE AND CALCIUM CHLORIDE 1000 ML: 600; 310; 30; 20 INJECTION, SOLUTION INTRAVENOUS at 15:07

## 2025-03-15 RX ADMIN — ACETAMINOPHEN 975 MG: 325 TABLET, FILM COATED ORAL at 13:23

## 2025-03-15 RX ADMIN — AMINOCAPROIC ACID 9.96 MG/KG/HR: 250 INJECTION, SOLUTION INTRAVENOUS at 09:02

## 2025-03-15 RX ADMIN — PHENYLEPHRINE HYDROCHLORIDE 100 MCG: 10 INJECTION INTRAVENOUS at 08:23

## 2025-03-15 RX ADMIN — PHENYLEPHRINE HYDROCHLORIDE 50 MCG: 10 INJECTION INTRAVENOUS at 09:14

## 2025-03-15 RX ADMIN — PHENYLEPHRINE HYDROCHLORIDE 100 MCG: 10 INJECTION INTRAVENOUS at 10:29

## 2025-03-15 RX ADMIN — ALBUMIN (HUMAN): 12.5 SOLUTION INTRAVENOUS at 10:42

## 2025-03-15 RX ADMIN — SODIUM BICARBONATE: 84 INJECTION, SOLUTION INTRAVENOUS at 16:57

## 2025-03-15 RX ADMIN — PHENYLEPHRINE HYDROCHLORIDE 100 MCG: 10 INJECTION INTRAVENOUS at 07:46

## 2025-03-15 RX ADMIN — SODIUM CHLORIDE: 9 INJECTION, SOLUTION INTRAVENOUS at 08:00

## 2025-03-15 RX ADMIN — NOREPINEPHRINE BITARTRATE 6.4 MCG: 1 INJECTION, SOLUTION, CONCENTRATE INTRAVENOUS at 10:30

## 2025-03-15 RX ADMIN — VECURONIUM BROMIDE 3 MG: 1 INJECTION, POWDER, LYOPHILIZED, FOR SOLUTION INTRAVENOUS at 10:16

## 2025-03-15 RX ADMIN — VASOPRESSIN 4 UNITS/HR: 20 INJECTION, SOLUTION INTRAVENOUS at 23:40

## 2025-03-15 RX ADMIN — CALCIUM CHLORIDE INJECTION 200 MG: 100 INJECTION, SOLUTION INTRAVENOUS at 11:20

## 2025-03-15 RX ADMIN — INSULIN HUMAN 1.5 UNITS/HR: 1 INJECTION, SOLUTION INTRAVENOUS at 13:29

## 2025-03-15 RX ADMIN — NOREPINEPHRINE BITARTRATE 6.4 MCG: 1 INJECTION, SOLUTION, CONCENTRATE INTRAVENOUS at 07:58

## 2025-03-15 RX ADMIN — FENTANYL CITRATE 150 MCG: 50 INJECTION INTRAMUSCULAR; INTRAVENOUS at 08:36

## 2025-03-15 RX ADMIN — VASOPRESSIN 4 UNITS/HR: 20 INJECTION, SOLUTION INTRAVENOUS at 18:42

## 2025-03-15 RX ADMIN — VECURONIUM BROMIDE 3 MG: 1 INJECTION, POWDER, LYOPHILIZED, FOR SOLUTION INTRAVENOUS at 09:09

## 2025-03-15 RX ADMIN — PHENYLEPHRINE HYDROCHLORIDE 50 MCG: 10 INJECTION INTRAVENOUS at 09:22

## 2025-03-15 RX ADMIN — SODIUM BICARBONATE 50 MEQ: 84 INJECTION, SOLUTION INTRAVENOUS at 13:25

## 2025-03-15 RX ADMIN — PROPOFOL 50 MCG/KG/MIN: 10 INJECTION, EMULSION INTRAVENOUS at 13:46

## 2025-03-15 RX ADMIN — NOREPINEPHRINE BITARTRATE 6.4 MCG: 1 INJECTION, SOLUTION, CONCENTRATE INTRAVENOUS at 10:49

## 2025-03-15 RX ADMIN — FENTANYL CITRATE 50 MCG: 50 INJECTION INTRAMUSCULAR; INTRAVENOUS at 09:23

## 2025-03-15 RX ADMIN — Medication 2 G: at 07:43

## 2025-03-15 RX ADMIN — NOREPINEPHRINE BITARTRATE 6.4 MCG: 1 INJECTION, SOLUTION, CONCENTRATE INTRAVENOUS at 07:45

## 2025-03-15 RX ADMIN — VECURONIUM BROMIDE 3 MG: 1 INJECTION, POWDER, LYOPHILIZED, FOR SOLUTION INTRAVENOUS at 08:47

## 2025-03-15 RX ADMIN — SENNOSIDES AND DOCUSATE SODIUM 1 TABLET: 50; 8.6 TABLET ORAL at 20:36

## 2025-03-15 RX ADMIN — SODIUM BICARBONATE 100 MEQ: 84 INJECTION, SOLUTION INTRAVENOUS at 16:34

## 2025-03-15 RX ADMIN — MIDAZOLAM HYDROCHLORIDE 2 MG: 1 INJECTION, SOLUTION INTRAMUSCULAR; INTRAVENOUS at 07:28

## 2025-03-15 RX ADMIN — NITROGLYCERIN 30 MCG: 10 INJECTION INTRAVENOUS at 08:44

## 2025-03-15 RX ADMIN — ACETAMINOPHEN 975 MG: 325 TABLET, FILM COATED ORAL at 05:50

## 2025-03-15 RX ADMIN — CEFAZOLIN SODIUM 2 G: 2 SOLUTION INTRAVENOUS at 14:48

## 2025-03-15 RX ADMIN — PHENYLEPHRINE HYDROCHLORIDE 100 MCG: 10 INJECTION INTRAVENOUS at 07:49

## 2025-03-15 RX ADMIN — CALCIUM CHLORIDE INJECTION 200 MG: 100 INJECTION, SOLUTION INTRAVENOUS at 11:16

## 2025-03-15 RX ADMIN — CALCIUM CHLORIDE INJECTION 200 MG: 100 INJECTION, SOLUTION INTRAVENOUS at 11:32

## 2025-03-15 RX ADMIN — PROPOFOL 30 MG: 10 INJECTION, EMULSION INTRAVENOUS at 10:37

## 2025-03-15 RX ADMIN — MORPHINE SULFATE 4 MG: 4 INJECTION, SOLUTION INTRAMUSCULAR; INTRAVENOUS at 06:34

## 2025-03-15 RX ADMIN — ONDANSETRON 4 MG: 2 INJECTION, SOLUTION INTRAMUSCULAR; INTRAVENOUS at 06:31

## 2025-03-15 RX ADMIN — NITROGLYCERIN 30 MCG: 10 INJECTION INTRAVENOUS at 10:35

## 2025-03-15 RX ADMIN — HEPARIN SODIUM 30000 UNITS: 1000 INJECTION, SOLUTION INTRAVENOUS; SUBCUTANEOUS at 09:13

## 2025-03-15 RX ADMIN — NOREPINEPHRINE BITARTRATE 0.15 MCG/KG/MIN: 0.02 INJECTION, SOLUTION INTRAVENOUS at 18:22

## 2025-03-15 RX ADMIN — PROPOFOL 50 MCG/KG/MIN: 10 INJECTION, EMULSION INTRAVENOUS at 11:00

## 2025-03-15 RX ADMIN — Medication 40 MG: at 13:23

## 2025-03-15 RX ADMIN — NITROGLYCERIN 20 MCG: 10 INJECTION INTRAVENOUS at 09:32

## 2025-03-15 RX ADMIN — VECURONIUM BROMIDE 3 MG: 1 INJECTION, POWDER, LYOPHILIZED, FOR SOLUTION INTRAVENOUS at 07:43

## 2025-03-15 RX ADMIN — PHENYLEPHRINE HYDROCHLORIDE 200 MCG: 10 INJECTION INTRAVENOUS at 07:33

## 2025-03-15 RX ADMIN — DEXMEDETOMIDINE HYDROCHLORIDE 0.2 MCG/KG/HR: 400 INJECTION INTRAVENOUS at 20:47

## 2025-03-15 RX ADMIN — FENTANYL CITRATE 200 MCG: 50 INJECTION INTRAMUSCULAR; INTRAVENOUS at 07:32

## 2025-03-15 RX ADMIN — CALCIUM CHLORIDE INJECTION 200 MG: 100 INJECTION, SOLUTION INTRAVENOUS at 11:27

## 2025-03-15 RX ADMIN — ROCURONIUM BROMIDE 50 MG: 50 INJECTION, SOLUTION INTRAVENOUS at 07:35

## 2025-03-15 RX ADMIN — OXYCODONE HYDROCHLORIDE 10 MG: 5 TABLET ORAL at 20:56

## 2025-03-15 RX ADMIN — FENTANYL CITRATE 200 MCG: 50 INJECTION INTRAMUSCULAR; INTRAVENOUS at 08:46

## 2025-03-15 RX ADMIN — PHENYLEPHRINE HYDROCHLORIDE 50 MCG: 10 INJECTION INTRAVENOUS at 09:20

## 2025-03-15 RX ADMIN — PROPOFOL 20 MG: 10 INJECTION, EMULSION INTRAVENOUS at 08:36

## 2025-03-15 RX ADMIN — PHENYLEPHRINE HYDROCHLORIDE 200 MCG: 10 INJECTION INTRAVENOUS at 07:38

## 2025-03-15 RX ADMIN — POTASSIUM CHLORIDE 20 MEQ: 29.8 INJECTION, SOLUTION INTRAVENOUS at 16:50

## 2025-03-15 RX ADMIN — NOREPINEPHRINE BITARTRATE 6.4 MCG: 1 INJECTION, SOLUTION, CONCENTRATE INTRAVENOUS at 07:42

## 2025-03-15 RX ADMIN — ASPIRIN 81 MG CHEWABLE TABLET 162 MG: 81 TABLET CHEWABLE at 16:50

## 2025-03-15 RX ADMIN — NOREPINEPHRINE BITARTRATE 6.4 MCG: 1 INJECTION, SOLUTION, CONCENTRATE INTRAVENOUS at 09:34

## 2025-03-15 RX ADMIN — FENTANYL CITRATE 150 MCG: 50 INJECTION INTRAMUSCULAR; INTRAVENOUS at 08:32

## 2025-03-15 RX ADMIN — CEFTRIAXONE SODIUM 2 G: 2 INJECTION, POWDER, FOR SOLUTION INTRAMUSCULAR; INTRAVENOUS at 03:21

## 2025-03-15 RX ADMIN — SODIUM CHLORIDE, SODIUM LACTATE, POTASSIUM CHLORIDE, AND CALCIUM CHLORIDE: .6; .31; .03; .02 INJECTION, SOLUTION INTRAVENOUS at 07:40

## 2025-03-15 RX ADMIN — NITROGLYCERIN 30 MCG: 10 INJECTION INTRAVENOUS at 08:42

## 2025-03-15 RX ADMIN — ASPIRIN 162 MG: 81 TABLET, CHEWABLE ORAL at 05:50

## 2025-03-15 RX ADMIN — PHENYLEPHRINE HYDROCHLORIDE 200 MCG: 10 INJECTION INTRAVENOUS at 11:32

## 2025-03-15 RX ADMIN — LIDOCAINE HYDROCHLORIDE 100 MG: 20 INJECTION, SOLUTION INFILTRATION; PERINEURAL at 07:32

## 2025-03-15 RX ADMIN — PROPOFOL 20 MG: 10 INJECTION, EMULSION INTRAVENOUS at 10:54

## 2025-03-15 RX ADMIN — SODIUM BICARBONATE 50 MEQ: 84 INJECTION, SOLUTION INTRAVENOUS at 12:59

## 2025-03-15 RX ADMIN — CALCIUM CHLORIDE INJECTION 200 MG: 100 INJECTION, SOLUTION INTRAVENOUS at 11:38

## 2025-03-15 RX ADMIN — FAMOTIDINE 20 MG: 20 TABLET, FILM COATED ORAL at 05:50

## 2025-03-15 RX ADMIN — SODIUM CHLORIDE, SODIUM LACTATE, POTASSIUM CHLORIDE, AND CALCIUM CHLORIDE 500 ML: .6; .31; .03; .02 INJECTION, SOLUTION INTRAVENOUS at 12:55

## 2025-03-15 RX ADMIN — NOREPINEPHRINE BITARTRATE 6.4 MCG: 1 INJECTION, SOLUTION, CONCENTRATE INTRAVENOUS at 08:31

## 2025-03-15 RX ADMIN — PROTAMINE SULFATE 30 MG: 10 INJECTION, SOLUTION INTRAVENOUS at 11:16

## 2025-03-15 RX ADMIN — PROPOFOL 50 MCG/KG/MIN: 10 INJECTION, EMULSION INTRAVENOUS at 13:43

## 2025-03-15 RX ADMIN — POTASSIUM CHLORIDE 40 MEQ: 1.5 POWDER, FOR SOLUTION ORAL at 13:28

## 2025-03-15 RX ADMIN — SODIUM CHLORIDE, SODIUM LACTATE, POTASSIUM CHLORIDE, AND CALCIUM CHLORIDE: .6; .31; .03; .02 INJECTION, SOLUTION INTRAVENOUS at 08:00

## 2025-03-15 RX ADMIN — NITROGLYCERIN 20 MCG: 10 INJECTION INTRAVENOUS at 09:24

## 2025-03-15 RX ADMIN — PHENYLEPHRINE HYDROCHLORIDE 200 MCG: 10 INJECTION INTRAVENOUS at 09:36

## 2025-03-15 RX ADMIN — PHENYLEPHRINE HYDROCHLORIDE 200 MCG: 10 INJECTION INTRAVENOUS at 07:59

## 2025-03-15 RX ADMIN — NITROGLYCERIN 30 MCG: 10 INJECTION INTRAVENOUS at 09:28

## 2025-03-15 RX ADMIN — PHENYLEPHRINE HYDROCHLORIDE 100 MCG: 10 INJECTION INTRAVENOUS at 11:11

## 2025-03-15 RX ADMIN — ALBUMIN HUMAN 12.5 G: 0.05 INJECTION, SOLUTION INTRAVENOUS at 17:14

## 2025-03-15 RX ADMIN — NITROGLYCERIN 30 MCG: 10 INJECTION INTRAVENOUS at 09:25

## 2025-03-15 RX ADMIN — NOREPINEPHRINE BITARTRATE 6.4 MCG: 1 INJECTION, SOLUTION, CONCENTRATE INTRAVENOUS at 07:37

## 2025-03-15 RX ADMIN — NITROGLYCERIN 20 MCG: 10 INJECTION INTRAVENOUS at 08:49

## 2025-03-15 RX ADMIN — PROPOFOL 50 MG: 10 INJECTION, EMULSION INTRAVENOUS at 07:32

## 2025-03-15 RX ADMIN — MIDAZOLAM HYDROCHLORIDE 3 MG: 1 INJECTION, SOLUTION INTRAMUSCULAR; INTRAVENOUS at 10:13

## 2025-03-15 RX ADMIN — PROTAMINE SULFATE 300 MG: 10 INJECTION, SOLUTION INTRAVENOUS at 10:35

## 2025-03-15 RX ADMIN — PHENYLEPHRINE HYDROCHLORIDE 100 MCG: 10 INJECTION INTRAVENOUS at 11:06

## 2025-03-15 RX ADMIN — PROPOFOL 40 MCG/KG/MIN: 10 INJECTION, EMULSION INTRAVENOUS at 17:44

## 2025-03-15 RX ADMIN — NOREPINEPHRINE BITARTRATE 6.4 MCG: 1 INJECTION, SOLUTION, CONCENTRATE INTRAVENOUS at 11:42

## 2025-03-15 RX ADMIN — ALBUMIN (HUMAN): 12.5 SOLUTION INTRAVENOUS at 11:10

## 2025-03-15 RX ADMIN — Medication 200 MG: at 11:47

## 2025-03-15 RX ADMIN — NOREPINEPHRINE BITARTRATE 6.4 MCG: 1 INJECTION, SOLUTION, CONCENTRATE INTRAVENOUS at 11:12

## 2025-03-15 RX ADMIN — NOREPINEPHRINE BITARTRATE 0.02 MCG/KG/MIN: 1 INJECTION, SOLUTION, CONCENTRATE INTRAVENOUS at 08:05

## 2025-03-15 RX ADMIN — PHENYLEPHRINE HYDROCHLORIDE 100 MCG: 10 INJECTION INTRAVENOUS at 10:48

## 2025-03-15 RX ADMIN — PHENYLEPHRINE HYDROCHLORIDE 100 MCG: 10 INJECTION INTRAVENOUS at 11:01

## 2025-03-15 RX ADMIN — NITROGLYCERIN 20 MCG: 10 INJECTION INTRAVENOUS at 08:38

## 2025-03-15 RX ADMIN — ACETAMINOPHEN 975 MG: 325 TABLET, FILM COATED ORAL at 20:36

## 2025-03-15 RX ADMIN — PHENYLEPHRINE HYDROCHLORIDE 100 MCG: 10 INJECTION INTRAVENOUS at 10:58

## 2025-03-15 ASSESSMENT — ACTIVITIES OF DAILY LIVING (ADL)
ADLS_ACUITY_SCORE: 51
ADLS_ACUITY_SCORE: 56
ADLS_ACUITY_SCORE: 55
ADLS_ACUITY_SCORE: 51
ADLS_ACUITY_SCORE: 56
ADLS_ACUITY_SCORE: 56
ADLS_ACUITY_SCORE: 51

## 2025-03-15 ASSESSMENT — ENCOUNTER SYMPTOMS
ORTHOPNEA: 0
DYSRHYTHMIAS: 0
SEIZURES: 0

## 2025-03-15 NOTE — H&P
ICU H&P  Date of Service: 03/15/25    Assessment and Plan:  Fermín Josue is a 75 year old male with past medical history significant for recently diagnosed non-small cell lung cancer with recent nodule removal (01/2025), BPH, history of CAD with prior stenting x3, CKD stage IIIa with prior nephrectomy for RCC (2021), hypertension, hyperlipidemia, obesity class I, and prediabetes who was admitted with chest pain.  He underwent TTE on 3/13/25 which revealed EF 60%, no significant valvular disease, mild dilatation of aortic sinus of valsalva at 4.3cm, LVH and early diastolic dysfunction. Coronary angiogram on 3/14/25 reveals CAD with LM disease and mid RCA . He continued to have unstable angina and underwent emergent CABG x2 with Dr. John on 3/15/25. The patient was admitted to the ICU post-operatively intubated and sedated on vasopressors.    Neuro:  Post-operative pain management  Mechanical ventilation  - Propofol to transition to Precedex for extubation once lactate is improving  - Multimodal pain control    CV:  CAD s/p CABG x2  Unstable angina  Hypertension  Hyperlipidemia  - MAP goal > 65, currently on epinephrine and levophed  - Chest tube management per CVTS  - EKG and CXR on admission, repeat in tomorrow AM  - Heparin prophylaxis, ASA ordered  - Trend lactate every 2 hours  - ASA and statin ordered    Pulm:  Post-operative mechanical ventilation  Patient-reported JAMEE, no home CPAP  History of wedge resection for non-small cell lung cancer (Jan 2025)  - Wean to extubation after resuscitation  - Consider positive pressure ventilation following extubation given habitus and likely JAMEE history    GI:  Constipation  - Advance diet as tolerated once extubated  - Bowel regimen ordered  - PPI prophylaxis    Renal:  CKD stage IIIa with prior nephrectomy for RCC (Baseline Cr 1.3-1.5)  Urinary tract infection  History of kidney stones with prior lithotripsy  - Cefazolin can continue for UTI  - Monitor UOP, Cr,  "I/O  - Daily BMP    ID:  Annalee-operative prophylaxis  UTI  - Cefazolin and vancomycin ordered, will continue cefazolin for 5 day course for UTI past prophylaxis  - Monitor WBC and fever trend    Heme:  Acute blood loss anemia  Thrombocytopenia  - CBC daily  - Transfuse hemoglobin <7    Endo:  Risk for stress hyperglycemia  Pre-diabetes  - Insulin drip  - Goal glucose <180  - Transition to ISS once appropriate    PPx:  1. DVT: heparin   2. VAP: HOB 30 degrees, chlorhexidine rinse  3. Stress Ulcer: PPI  4. Restraints: Nonviolent soft two point restraints required and necessary for patient safety and continued cares and good effect as patient continues to pull at necessary lines, tubes despite education and distraction. Will readdress daily.   5. Wound care - per unit routine   6. Feeding - NPO  7. Family to be updated at bedside    Clinically Significant Risk Factors           # Hypocalcemia: Lowest Ca = 8.4 mg/dL in last 2 days, will monitor and replace as appropriate      # Coagulation Defect: INR = 1.57 (Ref range: 0.85 - 1.15) and/or PTT = 72 Seconds (Ref range: 22 - 38 Seconds), will monitor for bleeding    # Hypertension: Noted on problem list            # Obesity: Estimated body mass index is 33.65 kg/m  as calculated from the following:    Height as of this encounter: 1.727 m (5' 8\").    Weight as of this encounter: 100.4 kg (221 lb 5.5 oz)., PRESENT ON ADMISSION                 Dispo: ICU    HPI: Fermín Josue is a 75 year old male with past medical history significant for recently diagnosed non-small cell lung cancer with recent nodule removal (01/2025), BPH, history of CAD with prior stenting x3, CKD stage IIIa with prior nephrectomy for RCC (2021), hypertension, hyperlipidemia, obesity class I, and prediabetes who was admitted with chest pain.  He underwent TTE on 3/13/25 which revealed EF 60%, no significant valvular disease, mild dilatation of aortic sinus of valsalva at 4.3cm, LVH and early diastolic " "dysfunction. Coronary angiogram on 3/14/25 reveals CAD with LM disease and mid RCA . He continued to have unstable angina and underwent emergent CABG x2 with Dr. John on 3/15/25.    Intra-operatively, he received cell saver, but no other blood products. He has two mediastinal chest tubes and V wires. EBL was 300mL. He arrives on epinephrine and insulin drip, as well as propofol. He had normal cardiac function on echocardiogram.    Unable to obtain ROS due to intubation.    BP (!) 140/78   Pulse 63   Temp 98  F (36.7  C) (Temporal)   Resp 16   Ht 1.727 m (5' 8\")   Wt 100.4 kg (221 lb 5.5 oz)   SpO2 99%   BMI 33.65 kg/m      Resp: 16    Intake/Output Summary (Last 24 hours) at 3/15/2025 1159  Last data filed at 3/15/2025 1100  Gross per 24 hour   Intake 2000 ml   Output 1500 ml   Net 500 ml       Physical Exam:  Constitutional: healthy and no distress  Head: Normocephalic. No masses, lesions, tenderness or abnormalities  Neck: Neck supple. No adenopathy.   ENT: No neck nodes or sinus tenderness  Cardiovascular: RRR, no murmur. No JVD. No edema.  Respiratory: Percussion normal. Good diaphragmatic excursion. Lungs clear bilaterally.  Gastrointestinal: Abdomen soft, non-tender. BS normal. No masses, organomegaly  : deferred  Musculoskeletal: extremities normal - no gross deformities noted and normal muscle tone. Ace wraps in place.  Skin: sternotomy with clean, dry, intact dressing and chest tubes taped inferiorly  Neurologic: sedated  Psychiatric: unable to assess    Labs: reviewed. Significant for anemia, thrombocytopenia, creatinine at baseline, hypokalemia, and elevated lactate.    Imaging: reviewed. Significant for expected devices in place.    Past Medical History  Past Medical History:   Diagnosis Date    CAD (coronary artery disease) 02/05/2015    3 stents    History of angina     History of skin graft     Right lower limb    Hypertension goal BP (blood pressure) < 140/90 02/05/2015    Kidney stone " 2021    Malignant neoplasm of kidney excluding renal pelvis, left (H)     Sleep apnea       Surgical History   Past Surgical History:   Procedure Laterality Date    COLONOSCOPY N/A 2020    Procedure: COLONOSCOPY;  Surgeon: Wenceslao Marie MD;  Location:  GI    COMBINED CYSTOSCOPY, RETROGRADES, URETEROSCOPY, INSERT STENT Left 2021    Procedure: CYSTOSCOPY LEFT URETEROSCOPY,  LEFT STENT PLACEMENT, LEFT RETROGRADE;  Surgeon: Phil Lin MD;  Location:  OR    CYSTOSCOPY, DILATE URETER(S), COMBINED Left 2021    Procedure: Cystoscopy, dilate ureter(s), combined;  Surgeon: Phil Lin MD;  Location:  OR    HEART CATH, ANGIOPLASTY  2007    mid to prox LAD drug-eluting stent x2; Occluded RCA with Collaterals    KIDNEY STONE SURGERY      laser and ureteric stenting    LUMBAR SPINE SURGERY N/A 2023    T8-L1 minimally invasive posterolateral instrumentation with cement augmentation    NEPHRECTOMY RT/LT Left 2021    [facility]    SKIN GRAFT, EACH ADDN 100SQCM      THORACOSCOPIC WEDGE RESECTION LUNG Right 2025    Procedure: RIGHT VIDEO ASSISTED THORACOSCOPY WEDGE RESECTION, RIGHT UPPER LOBE LUNG NODULE;  Surgeon: Anmol Carrillo MD;  Location:  OR      Family History   I have reviewed this patient's family history and updated it with pertinent information if needed.  Family History   Problem Relation Age of Onset    Hypertension Mother     Pneumonia Mother 91        covid related    Dementia Mother     Alzheimer Disease Father         d age 91    Leukemia Maternal Grandmother     Cancer Maternal Grandfather     Cancer Maternal Uncle         lung       Social History   Social History     Tobacco Use    Smoking status: Former     Current packs/day: 0.00     Average packs/day: 1.5 packs/day for 44.0 years (66.0 ttl pk-yrs)     Types: Cigarettes     Start date: 1963     Quit date: 2007     Years since quittin.7    Smokeless tobacco:  Never   Vaping Use    Vaping status: Never Used   Substance Use Topics    Alcohol use: Yes     Comment: rare    Drug use: Yes     Types: Marijuana       Discussed with Dr. Waller.    Marifer Mckenzie MD  Surgical Critical Care Fellow

## 2025-03-15 NOTE — PROVIDER NOTIFICATION
Multiple updates with [facility] team and ICU providers by bedside. Started bicarb gtt, plan to give albumin and repeat labs.

## 2025-03-15 NOTE — PLAN OF CARE
"A&OX4, 3 L NC, VSS, complains of chest pain, morphine and dilaudid given. CHG bath done, heparin gtt infusing at 1200 units/hr. Assist of 1 with cares. NPO since midnight.   Upon giving report to PACU RN, writer instructed to turn off heparin gtt and administer meds prior to surgery. Heparin gtt off at 0545.   PACU RN arrived to take the pt for surgery and told writer, \" Pt need to be on heparin gtt per anesthesiologist request.\"  Writer instructed not to administer morphine for chest pain, pt need to be able to sign  consent. 2 mg morphine IV not given.                       "

## 2025-03-15 NOTE — ANESTHESIA PROCEDURE NOTES
Arterial Line Procedure Note    Pre-Procedure   Staff -        Anesthesiologist:  Keron Contreras MD       Performed By: Anesthesiologist       Location: pre-op       Pre-Anesthestic Checklist: patient identified, IV checked, site marked, risks and benefits discussed, informed consent, monitors and equipment checked and pre-op evaluation  Timeout:       Correct Patient: Yes        Correct Procedure: Yes        Correct Site: Yes        Correct Position: Yes   Line Placement:   This line was placed Pre Induction starting at 3/15/2025 6:30 AM and ending at 3/15/2025 6:45 AM  Procedure   Procedure: arterial line and new line       Laterality: right       Insertion Site: radial (radial).  Sterile Prep        Skin prep: Chloraprep  Insertion/Injection        Technique: ultrasound guided and Seldinger Technique        1. Ultrasound was used to evaluate the access site.       2. Artery evaluated via ultrasound for patency/adequacy.       3. Using real-time ultrasound the needle/catheter was observed entering the artery/vein.       5. The visualized structures were anatomically normal.       6. There were no apparent abnormal pathologic findings.       Catheter Type/Size: 20 gauge, 1.75 in/4.5 cm quick cath (integral wire)  Narrative         Secured by: suture       Tegaderm dressing used.       Complications: Hematoma,        Arterial waveform: Yes        IBP within 10% of NIBP: Yes   Comments:  Ultrasound Interpretation arterial catheter    1. Ultrasound guidance was used to evaluate potential access sites.  2. Ultrasound was also used to verify the patency of the vessel specified above.   3. Ultrasound was used to visualize the needle entering the vessel.   4. The visualized structures were anatomically normal.  5. There were no apparent abnormal pathological findings.  6. A permanent ultrasound image was saved in the patient's record.    Patient tolerated procedure well   No complications    Patient on  anticoagulation  Small calcified vessel  Wire passed easily on second attempt

## 2025-03-15 NOTE — ANESTHESIA PROCEDURE NOTES
Central Line/PA Catheter Placement    Pre-Procedure   Staff -        Anesthesiologist:  Keron Contreras MD       Performed By: Anesthesiologist       Location: OR       Pre-Anesthestic Checklist: patient identified, IV checked, site marked, risks and benefits discussed, informed consent, monitors and equipment checked and pre-op evaluation  Timeout:       Correct Patient: Yes        Correct Procedure: Yes        Correct Site: Yes        Correct Position: Yes        Correct Laterality: N/A   Line Placement:   This line was placed Post Induction    Procedure   Procedure: central line and new line       Laterality: right       Insertion Site: internal jugular.       Patient Position: Trendelenburg  Sterile Prep        All elements of maximal sterile barrier technique followed       Patient Prep/Sterile Barriers: draped, hand hygiene, gloves , hat , mask , draped, gown, sterile gel and probe cover       Skin prep: Chloraprep  Insertion/Injection        Local skin infiltrated with 3 mL of 1% lidocaine.        Technique: Seldinger Technique and ultrasound guided        1. Ultrasound was used to evaluate the access site.       2. Vein evaluated via ultrasound for patency/adequacy.       3. Using real-time ultrasound the needle/catheter was observed entering the artery/vein.       4. Permanent image was captured and entered into the patient's record.       5. The visualized structures were anatomically normal.       6. There were no apparent abnormal pathologic findings.       Introducer Type: 9 Fr, 10 cm        Type: PA/CVC with Introducer       Catheter Size: 7 Fr       Number of Lumens: triple lumen  Narrative         Secured by: suture       Biopatch and Tegaderm dressing used.       Complications: None apparent,        blood aspirated from all lumens,        All lumens flushed: Yes       Verification method: Placement to be verified post-op and X-ray   Comments:  Ultrasound Interpretation, central venous  catheter    1. Ultrasound guidance was used to evaluate potential access sites.  2. Ultrasound was also used to verify the patency of the vessel specified above.   3. Ultrasound was used to visualize the needle entering the vessel.   4. The visualized structures were anatomically normal.  5. There were no apparent abnormal pathological findings.  6. A permanent ultrasound image was saved in the patient's record.    Line placed post induction in operating room

## 2025-03-15 NOTE — PROGRESS NOTES
Patient not seen by hospitalist service today as went for CABG.    Will go to ICU after.  Continue cares as per [facility] team and ICU team today

## 2025-03-15 NOTE — PROGRESS NOTES
[facility]  Cardiovascular and Thoracic Surgery Daily Note      Assessment and Plan  Fermín Josue is a 75 year old male with a PMH of recently diagnosed non small cell lung cancer s/p VATS wedge resection of RUL lung nodule on 1/28/25 (PFTs done prior show mild restrictive lung disease), BPH, CKD stage IIIa, renal cancer s/p nephrectomy, JAMEE, hypertension, dyslipidemia, obesity, prediabetes, CAD s/p RTISH x 2 LAD and occluded RCA in 2007 who was admitted to [facility] 03/13 with 1-2 week history of chest pain. TTE 3/13/25 with preserved biventricular systolic function and early diastolic dysfunction. Coronary angiogram on 3/14/25 revealed multivessel CAD with 70% LMCA stenosis and mid RCA . CV surgery consulted for consideration of surgical revascularization. Underwent CABG x 2 (LIMA to LAD, SVG to RCA) on 03/15 with Dr. John.     POD # 0 CABG x 2 (LIMA to LAD, SVG to RCA) with Dr. Dimitri John    - CVS:   Pre-op TTE with preserved biventricular systolic function and early diastolic dysfunction.   Profound postop vasoplegic shock with lactic acidosis. Vasopressin, NE to MAP goal 65, wean as able. Epi to CI greater than 2.0. Sodium bicarb infusion and aggressive volume resuscitation.  Aspirin 162 mg daily, atorvastatin 40 mg daily.   Chest tubes: trend output, no air leak. TPW: back up rate 60     - Resp:   Postoperative mechanical ventilation. Keep intubated pending improvement in hemodynamic stability and lactic acidosis, ok to wean tonight if continues to improve on next gas check.  Recently diagnosed non small cell lung cancer s/p VATS wedge resection of RUL lung nodule on 1/28/25 (PFTs done prior show mild restrictive lung disease). Stage 1A1. Recent oncology note reports no further treatment indicated.   JAMEE    - Neuro: Propofol for sedation while intubated    - Renal: CKD stage 3, history of left nephrectomy 04/2021 for RCC. Baseline Cr ~ 1.2-1.5. Cr stable within baseline. Trend BMP/UOP.  "Volume management as above.  Recent Labs   Lab 03/15/25  1217 03/15/25  1046 03/14/25  0931   CR 1.32* 1.30* 1.40*       - GI: -BM, -flatus, continue bowel regimen    - : Whitmore in place, continue today    - Endo: Pre-diabetes. Postop stress hyperglycemia. Insulin infusion.   Hemoglobin A1C   Date Value Ref Range Status   01/14/2025 5.8 (H) 0.0 - 5.6 % Final     Comment:     Normal <5.7%   Prediabetes 5.7-6.4%    Diabetes 6.5% or higher     Note: Adopted from ADA consensus guidelines.   10/15/2019 5.8 (H) 0 - 5.6 % Final     Comment:     Normal <5.7% Prediabetes 5.7-6.4%  Diabetes 6.5% or higher - adopted from ADA   consensus guidelines.          - FEN: Replace electrolytes as needed. ADAT post extubation.     - ID: Postop leukocytosis, likely reactive from surgery. Tmax 99.5. WBC 31.8. Periop abx prophylaxis in process. Trend CBC and fever curve.   Recent Labs   Lab 03/15/25  1455 03/15/25  1217 03/15/25  1046   WBC 31.8* 26.3* 25.7*       - Heme: Acute blood loss anemia and thrombocytopenia due to surgery. Hgb and PLT 10.6/201. Trend CBC, transfuse PRN.   Recent Labs   Lab 03/15/25  1455 03/15/25  1217 03/15/25  1046   HGB 10.6* 10.7* 9.6*  10.0*    160 142*       - Proph: SCD, subcutaneous heparin, PPI    - Other:  Clinically Significant Risk Factors        # Hypokalemia: Lowest K = 3.1 mmol/L in last 2 days, will replace as needed    # Hypocalcemia: Lowest Ca = 8.4 mg/dL in last 2 days, will monitor and replace as appropriate    # Anion Gap Metabolic Acidosis: Highest Anion Gap = 21 mmol/L in last 2 days, will monitor and treat as appropriate   # Coagulation Defect: INR = 1.48 (Ref range: 0.85 - 1.15) and/or PTT = 69 Seconds (Ref range: 22 - 38 Seconds), will monitor for bleeding    # Hypertension: Noted on problem list            # Obesity: Estimated body mass index is 33.65 kg/m  as calculated from the following:    Height as of this encounter: 1.727 m (5' 8\").    Weight as of this encounter: 100.4 kg " (221 lb 5.5 oz)., PRESENT ON ADMISSION            - Dispo: ICU    Interval History  OR today for CABG. Profound postop vasoplegic shock      Medications  Current Facility-Administered Medications   Medication Dose Route Frequency Provider Last Rate Last Admin    acetaminophen (TYLENOL) tablet 975 mg  975 mg Oral Q8H Jeanne Hunter PA-C   975 mg at 03/15/25 1323    aspirin (ASA) chewable tablet 162 mg  162 mg Oral or NG Tube Daily HunterJeanne handy PA-C   162 mg at 03/15/25 1650    atorvastatin (LIPITOR) tablet 40 mg  40 mg Oral Daily HunterJeanne handy PA-C   40 mg at 03/14/25 0800    ceFAZolin (ANCEF) 2 g in dextrose 50 mL intermittent infusion  2 g Intravenous Q8H Marifer Mckenzie MD   2 g at 03/15/25 1448    [START ON 3/16/2025] heparin ANTICOAGULANT injection 5,000 Units  5,000 Units Subcutaneous Q8H Jeanne Hunter PA-C        lactated ringers BOLUS 250 mL  250 mL Intravenous Once Jeanne Hunter PA-C   Rate Verify at 03/15/25 1352    Lidocaine (LIDOCARE) 4 % Patch 1 patch  1 patch Transdermal Q24H Jeanne Hunter PA-C   1 patch at 03/15/25 1323    pantoprazole (PROTONIX) 2 mg/mL suspension 40 mg  40 mg Oral or NG Tube Daily Jeanne Hunter PA-C   40 mg at 03/15/25 1323    Or    pantoprazole (PROTONIX) EC tablet 40 mg  40 mg Oral Daily Jeanne Hunter PA-C        [START ON 3/16/2025] polyethylene glycol (MIRALAX) Packet 17 g  17 g Oral Daily Jeanne Hunter PA-C        senna-docusate (SENOKOT-S/PERICOLACE) 8.6-50 MG per tablet 1 tablet  1 tablet Oral BID Jeanne Hunter PA-C        sodium chloride (PF) 0.9% PF flush 3 mL  3 mL Intracatheter Q8H Jeanne Hunter PA-C         Current Facility-Administered Medications   Medication Dose Route Frequency Provider Last Rate Last Admin    [START ON 3/18/2025] bisacodyl (DULCOLAX) suppository 10 mg  10 mg Rectal Daily PRN Jeanne Hunter PA-C        calcium carbonate (TUMS) chewable tablet 500 mg  500 mg Oral 4x Daily PRN Jeanne Hunter PA-C        calcium  gluconate 1 g in 50 mL in sodium chloride intermittent infusion  1 g Intravenous Q6H PRN Hunter, Jeanne BERNAL, PA-C        calcium gluconate 2 g in  mL intermittent infusion  2 g Intravenous Q6H PRN Hunter, Jeanne BERNAL, PA-C        calcium gluconate 3 g in sodium chloride 0.9 % 100 mL intermittent infusion  3 g Intravenous Q6H PRN Hunter, Jeanne BERNAL, PA-C        glucose gel 15-30 g  15-30 g Oral Q15 Min PRN Hunter, Jeanne BERNAL PA-C        Or    dextrose 50 % injection 25-50 mL  25-50 mL Intravenous Q15 Min PRN Hunter, Jeanne BERNAL, PA-C        Or    glucagon injection 1 mg  1 mg Subcutaneous Q15 Min PRN Hunter, Jeanne BERNAL, PA-C        EPINEPHrine (ADRENALIN) 5 mg in  mL infusion  0.01-0.1 mcg/kg/min Intravenous Continuous PRN Hunter, Jeanne BERNAL PA-C   Stopped at 03/15/25 1620    hydrALAZINE (APRESOLINE) injection 10 mg  10 mg Intravenous Q30 Min PRN Hunter, Jeanne BERNAL, PA-C        HYDROmorphone (DILAUDID) injection 0.2 mg  0.2 mg Intravenous Q2H PRN Hunter, Jeanne BERNAL PA-C        Or    HYDROmorphone (DILAUDID) injection 0.4 mg  0.4 mg Intravenous Q2H PRN Hunter, Jeanne BERNAL, PA-C        hydrOXYzine HCl (ATARAX) tablet 10 mg  10 mg Oral Q6H PRN Hunter, Jeanne BERNAL, PA-C        lactated ringers BOLUS 250 mL  250 mL Intravenous Q10 Min PRN Hunter, Jeanne BERNAL PA-C        lidocaine (LMX4) cream   Topical Q1H PRN Hunter, Jeanne BERNAL, PA-C        lidocaine 1 % 0.1-1 mL  0.1-1 mL Other Q1H PRN Hunter, Jeanne BERNAL PA-C        [START ON 3/17/2025] magnesium hydroxide (MILK OF MAGNESIA) suspension 30 mL  30 mL Oral Daily PRN Hunter, Jeanne BERNAL, PA-C        propofol (DIPRIVAN) bolus from bag or syringe pump  10 mg Intravenous Q15 Min PRN Marifer Mckenzie MD        And    Medication Instruction   Does not apply Continuous PRN Marifer Mckenzie MD        methocarbamol (ROBAXIN) half-tab 250 mg  250 mg Oral Q6H PRN Jeanne Hunter PA-C        naloxone (NARCAN) injection 0.2 mg  0.2 mg Intravenous Q2 Min PRN Dimitri John MD        Or    naloxone (NARCAN)  "injection 0.4 mg  0.4 mg Intravenous Q2 Min PRN Dimitri John MD        Or    naloxone (NARCAN) injection 0.2 mg  0.2 mg Intramuscular Q2 Min PRN Dimitri John MD        Or    naloxone (NARCAN) injection 0.4 mg  0.4 mg Intramuscular Q2 Min PRN Dimitri John MD        norepinephrine (LEVOPHED) 4 mg in  mL infusion PREMIX  0.01-0.15 mcg/kg/min Intravenous Continuous PRN HunterJeanne, PA-C 56.5 mL/hr at 03/15/25 1537 0.15 mcg/kg/min at 03/15/25 1537    ondansetron (ZOFRAN ODT) ODT tab 4 mg  4 mg Oral Q6H PRN Hunter, Jeanne BERNAL PA-C        Or    ondansetron (ZOFRAN) injection 4 mg  4 mg Intravenous Q6H PRN Hunter, Jeanne BERNAL, PA-C        oxyCODONE (ROXICODONE) tablet 5 mg  5 mg Oral Q4H PRN Hunter, Jeanne C, PA-C        Or    oxyCODONE (ROXICODONE) tablet 10 mg  10 mg Oral Q4H PRN Hunter, Jeanne C, PA-C        prochlorperazine (COMPAZINE) injection 5 mg  5 mg Intravenous Q6H PRN Hunter, Jeanne C, PA-C        Or    prochlorperazine (COMPAZINE) tablet 5 mg  5 mg Oral Q6H PRN Hunter, Jeanne C, PA-C        Reason beta blocker order not selected   Does not apply DOES NOT GO TO MAR Jeanne Hunter PA-C        sodium chloride (PF) 0.9% PF flush 3 mL  3 mL Intracatheter q1 min prn Hunter, Jeanne C, PA-C             Physical Exam  Vitals were reviewed  Blood pressure 111/59, pulse 94, temperature 98.6  F (37  C), resp. rate 20, height 1.727 m (5' 8\"), weight 100.4 kg (221 lb 5.5 oz), SpO2 96%.  Rhythm: NSR    Lungs: diminished bases    Cardiovascular: rrr, no m/r/g    Abdomen: soft, NT, ND, +BS    Extremeties: cool, no LE edema    Incision: CDI    CT: serosang output 110 mL, no air leak    Weight:   Vitals:    03/13/25 1434 03/15/25 0431   Weight: 100.4 kg (221 lb 5.5 oz) 100.4 kg (221 lb 5.5 oz)         Data  Recent Labs   Lab 03/15/25  1648 03/15/25  1455 03/15/25  1357 03/15/25  1217 03/15/25  1046 03/15/25  0812 03/14/25  0931 03/14/25  0615   WBC  --  31.8*  --  26.3* 25.7*  --  6.3  --  "   HGB  --  10.6*  --  10.7* 9.6*  10.0*   < > 14.1  --    MCV  --  97  --  96 94  --  93  --    PLT  --  201  --  160 142*  --  138*  --    INR  --   --   --  1.48* 1.57*  --   --   --    NA  --   --   --  143 142  141   < > 137  --    POTASSIUM  --   --   --  3.1* 3.4  3.5   < > 3.7  3.7  --    CHLORIDE  --   --   --  105 107  --  100  --    CO2  --   --   --  17* 24  --  26  --    BUN  --   --   --  12.2 13.0  --  15.9  --    CR  --   --   --  1.32* 1.30*  --  1.40*  --    ANIONGAP  --   --   --  21* 11  --  11  --    CANDICE  --   --   --  9.6 8.4*  --  8.8  --    * 158* 171* 198* 160*  167*   < > 115*  --    ALBUMIN  --   --   --  3.5  --   --   --  3.7   PROTTOTAL  --   --   --  5.5*  --   --   --  7.0   BILITOTAL  --   --   --  0.9  --   --   --  1.3*   ALKPHOS  --   --   --  72  --   --   --  112   ALT  --   --   --  16  --   --   --  22   AST  --   --   --  37  --   --   --  30    < > = values in this interval not displayed.       Imaging:  Recent Results (from the past 24 hours)   US Lower Extremity Venous Mapping Left    Narrative    EXAM: US LOWER EXTREMITY VENOUS MAPPING LEFT  LOCATION: [facility]  DATE: 3/14/2025    INDICATION: pre op CABG. Saphenous vein mapping prior to major cardiovascular surgery.  COMPARISON: None.  TECHNIQUE: Ultrasound examination of the left lower extremity veins was performed, including gray-scale and compression imaging.     LOWER  EXTREMITY FINDINGS:       VENOUS DIAMETERS  LEFT GREAT SAPHENOUS VEIN  SFJ: 5.3 mm  Upper Thigh: 4.1 mm  Mid Thigh: 4.0 mm  Lower Thigh: 3.9 mm  Knee: 3.3 mm  Upper Calf: 3.4 mm  Mid Calf: 2.8 mm  Lower Calf: 2.7 mm      Impression    IMPRESSION:  Left lower extremity saphenous vein mapping for preoperative planning purposes, with measurements as above.   US Carotid Bilateral    Narrative    EXAM: US CAROTID BILATERAL  LOCATION: [facility]  DATE: 3/14/2025    INDICATION: pre op  CABG  COMPARISON: None.  TECHNIQUE: Duplex exam performed utilizing 2D gray-scale imaging, Doppler interrogation with color-flow and spectral waveform analysis. The percent diameter stenosis is determined using Updated Recommendations for Carotid Stenosis Interpretation Criteria   from [employer].    FINDINGS:    RIGHT: Mild plaque at the bifurcation. The peak systolic velocity in the ICA is less than 180 cm/sec, consistent with less than 50% stenosis. Normal velocities in the ECA. Antegrade flow within the vertebral artery.     LEFT: Mild plaque at the bifurcation. The peak systolic velocity in the ICA is less than 180 cm/sec, consistent with less than 50% stenosis. Normal velocities in the ECA. Antegrade flow within the vertebral artery.    VELOCITY CHART:  CCA   Right: 76 cm/s   Left: 114 cm/s  ICA   Right: 88 cm/s   Left: 94 cm/s  ECA   Right: 109 cm/s   Left: 95 cm/s  ICA/CCA PSV Ratio   Right: 1.2   Left: 0.8      Impression    IMPRESSION:  1.  Mild plaque formation, velocities consistent with less than 50% stenosis in the right internal carotid artery.  2.  Mild plaque formation, velocities consistent with less than 50% stenosis in the left internal carotid artery.  3.  Flow within the vertebral arteries is antegrade.     XR Chest Port 1 View    Narrative    EXAM: XR CHEST PORT 1 VIEW  LOCATION: [facility]  DATE: 3/15/2025    INDICATION: Post Op CVTS Surgery  COMPARISON: 03/12/2025      Impression    IMPRESSION: Sternotomy. Enteric tube tip within the proximal stomach, the sidehole is not visualized due to extensive hardware. Sternotomy. Mediastinal drain and left chest tube. Endotracheal tube approximately 4.6 cm above the brittany. Right IJ central   venous catheter tip within the mid SVC. Heart normal in size. Low lung volumes. Atelectasis noted at the left lung base. No appreciable pneumothorax.         Patient seen and discussed with Dr. Alvaro Hunter,  YASH  Cardiothoracic Surgery  Available for paging [phone] (personal pager or CV Surgery Rounding Pager)  Personal Pager: [phone]  CV Surgery Rounding Pager: [phone]  After hours please page surgeon on-call

## 2025-03-15 NOTE — PROGRESS NOTES
[facility] ICU RESPIRATORY NOTE        Date of Admission: 3/13/2025    Date of Intubation (most recent):  3/15//25    Reason for Mechanical Ventilation: Surgery    Number of Days on Mechanical Ventilation: 1    Met Criteria for Spontaneous Breathing Trial: No    Reason for No Spontaneous Breathing Trial: Per MD    Significant Events Today: None    ABG Results:   Recent Labs   Lab 03/15/25  1800 03/15/25  1555 03/15/25  1535 03/15/25  1216 03/15/25  1046   PH 7.39  --  7.16* 7.21* 7.33*   PCO2 35  --  46* 46* 46*   PO2 86  --  85 107* 131*   HCO3 21  --  16* 18* 24   O2PER 30  30 30 30 50 70.0         Current Vent Settings: FiO2 (%): 30 %, Resp: 20, Vent Mode: CMV/AC, Resp Rate (Set): 16 breaths/min, Tidal Volume (Set, mL): 550 mL, PEEP (cm H2O): 5 cmH2O, Resp Rate (Set): 16 breaths/min, Tidal Volume (Set, mL): 550 mL, PEEP (cm H2O): 5 cmH2O    Skin Assessment:  Intact    Plan: Pt to remain on full vent support overnight    Trae Moralez, RT

## 2025-03-15 NOTE — ANESTHESIA POSTPROCEDURE EVALUATION
Patient: Fermín Josue    Procedure: Procedure(s):  CORONARY ARTERY BYPASS GRAFT x 2 (LEFT INTERNAL MAMMARY ARTERY - LEFT ANTERIOR DESCENDING ARTERY; SAPHENOUS VEIN - POSTERIOR DESCENDING ARTERY) WITH ENDOSCOPIC SAPHENOUS VEIN HARVEST ON THE RIGHT LOWER EXTREMITY, AND ON CARDIOPULMONARY PUMP OXYGENATOR  (INTRAOPERATIVE TRANSESOPHAGEAL ECHOCARDIOGRAM BY ANESTHESIOLOGIST)       Anesthesia Type:  General    Note:     Postop Pain Control: Uneventful            Sign Out: Well controlled pain   PONV: No   Neuro/Psych: Uneventful            Sign Out: Acceptable/Baseline neuro status   Airway/Respiratory: Uneventful            Sign Out: AIRWAY IN SITU/Resp. Support               Airway in situ/Resp. Support: ETT                 Reason: Planned Pre-op   CV/Hemodynamics: Uneventful            Sign Out: Acceptable CV status; No obvious hypovolemia; No obvious fluid overload   Other NRE: NONE   DID A NON-ROUTINE EVENT OCCUR? No    Event details/Postop Comments:  Patient transported to ICU intubated/ sedated and placed on mechanical ventilation           Last vitals:  Vitals:    03/15/25 1430 03/15/25 1445 03/15/25 1500   BP: 110/64  102/63   Pulse: 96 96 100   Resp: 16 16 16   Temp: (!) 35.6  C (96.1  F) (!) 35.7  C (96.3  F) (!) 35.8  C (96.4  F)   SpO2: 100% 100% 100%       Electronically Signed By: Keron Contreras MD  March 15, 2025  3:16 PM

## 2025-03-15 NOTE — ANESTHESIA CARE TRANSFER NOTE
Patient: Fermín Josue    Procedure: Procedure(s):  CORONARY ARTERY BYPASS GRAFT x 2 (LEFT INTERNAL MAMMARY ARTERY - LEFT ANTERIOR DESCENDING ARTERY; SAPHENOUS VEIN - POSTERIOR DESCENDING ARTERY) WITH ENDOSCOPIC SAPHENOUS VEIN HARVEST ON THE RIGHT LOWER EXTREMITY, AND ON CARDIOPULMONARY PUMP OXYGENATOR  (INTRAOPERATIVE TRANSESOPHAGEAL ECHOCARDIOGRAM BY ANESTHESIOLOGIST)       Diagnosis: Chest pain, unspecified type [R07.9]  Coronary artery disease involving native coronary artery of native heart without angina pectoris [I25.10]  Diagnosis Additional Information: No value filed.    Anesthesia Type:   General     Note:    Oropharynx: ventilatory support and endotracheal tube in place  Level of Consciousness: iatrogenic sedation  Patient oxygen source: Ventilator.    Independent Airway: airway patency not satisfactory and stable  Dentition: dentition unchanged  Vital Signs Stable: post-procedure vital signs reviewed and stable  Report to RN Given: handoff report given  Patient transferred to: ICU    ICU Handoff: Call for PAUSE to initiate/utilize ICU HANDOFF, Identified Patient, Identified Responsible Provider, Reviewed the Pertinent Medical History, Discussed Surgical Course, Reviewed Intra-OP Anesthesia Management and Issues during Anesthesia, Set Expectations for Post Procedure Period and Allowed Opportunity for Questions and Acknowledgement of Understanding      Vitals:  Vitals Value Taken Time   /58    Temp     Pulse 92 03/15/25 1202   Resp 7 03/15/25 1202   SpO2 100 % 03/15/25 1202   Vitals shown include unfiled device data.    Electronically Signed By: LONNY Zavala CRNA  March 15, 2025  12:03 PM

## 2025-03-15 NOTE — OP NOTE
Date of Service: March 15th, 2025    Referring Cardiologist: Andres Alexander MD    Preoperative Diagnosis: severe multivessel coronary artery disease    Postoperative Diagnosis: severe multivessel coronary artery disease    Surgeon: Dimitri John MD    Assistant: Perla Snow CST    Name of Operation: coronary artery bypass grafting x 2 with left internal mammary artery (LIMA) to the left anterior descending (LAD), reverse saphenous vein graft to the posterior descending artery (PDA), endoscopic vein harvest from the left lower extremity, intraoperative LASHAY    Anesthesia: general orotracheal    Indications for Procedure: Mr. Josue is a very pleasant 75-year-old gentleman who was admitted to the hospital with non-ST elevation MI.  He was found to have significant left main disease with chronic total occlusion of the RCA.  His LV function was preserved.  He was taken to the operative today for urgent coronary artery bypass grafting for persistent chest pain.    Operative Findings: Patient had an overall normal LV systolic size and function.  His left internal mammary artery was a good quality conduit with good flow measuring 2.5 mm in diameter.  His left greater saphenous vein was a good quality conduit measuring 3 to 4 mm in diameter.  His posterior descending artery had mild disease and the probe size was 1.5 mm.  His mid to distal LAD had mild disease and the probe size was 1.5 mm as well.  His circumflex coronary artery was quite small and he had OM branches that were too small for bypass grafting.    Description of the Procedure: After informed consent was obtained, the patient was brought down to the operating room and was placed on the OR table in the supine position.  Intravenous and intra-arterial lines were begun.  While monitoring his blood pressure and EKG tracing, he was anesthetized and intubated using a single-lumen endotracheal tube.  His entire chest, abdomen, both groins and legs were prepped down  to the toes using multiple layers of DuraPrep.  He was draped in a sterile field.  Median sternotomy was performed and the left internal mammary artery was taken down.  The left greater saphenous vein was harvested endoscopically as well.  Prior to clipping the LIMA distally, the patient was fully heparinized.  The sternal edges were retracted laterally and the pericardium was opened to suspend the heart in a pericardial cradle.  The ascending aorta and the right atrial appendage were cannulated.  A retrograde cardioplegia catheter was placed into the coronary sinus without difficulty.  An antegrade needle/aortic root vent was placed in the ascending aorta as well.  After appropriate ACT level was achieved, cardiopulmonary bypass was established and the patient was kept normothermic during the entire operation.  The aorta was then crossclamped and antegrade cold blood cardioplegia was given to fully arrest the heart.  The patient went into good diastolic arrest without any LV distention.  Following this, intermittent retrograde cardioplegia doses were given on average every 15 minutes for myocardial protection while the aorta was crossclamped.    The first coronary vessel grafted was the posterior descending artery.  Conduit used with a saphenous vein.  This anastomosis was performed in an end-to-side fashion using running 7-0 Prolene.  Next, the LIMA to LAD anastomosis was performed.  This was done in an end-to-side fashion as well using running 7-0 Prolene.  This anastomosis was protected by tacking the LIMA pedicle down to the epicardium using interrupted six 6-0 Prolene.  Retrograde hotshot was given and the aortic cross-clamp was removed.  Aortic cross-clamp time was 37 minutes.  A partial clamp was placed on the mid ascending aorta.  A 4 mm aortotomy was made to perform the proximal vein anastomosis in an end-to-side fashion using running 6-0 Prolene.  The partial clamp was removed.  The patient was then  weaned of cardiopulmonary bypass with minimal inotropic and vasopressor support.  Total cardiopulmonary bypass time was 53 minutes.  LV function was good.  Once the patient remained stable off bypass, the venous cannula was removed and protamine was given.  The aortic cannula was subsequently removed as well.  Temporary ventricular pacing wire was placed in the RV muscle.  32 Swiss straight chest tubes were placed in the mediastinum x 2 as well.  These were all brought out percutaneously below the sternotomy incision and secured to the skin using 2-0 Ethibond.  The right pleural space was slightly opened.  The mediastinum was irrigated with antibiotic saline and hemostasis was achieved.  The sternum was reapproximated using multiple interrupted single and double wires.  The incision was closed in layers of running Vicryl suture.  The skin was closed using 3-0 Vicryl and was sealed using Dermabond.    There are no intraoperative complications and the patient tolerated the operation well.  No blood products were given intraoperative.  All sponge counts, needle counts, and instrument counts were correct x 2 at the end of the operation.  EBL: 300 cc.  Specimen removed: none.  The patient was brought to the ICU in hemodynamically stable condition and remained intubated    Dimitri John MD

## 2025-03-15 NOTE — ANESTHESIA PROCEDURE NOTES
Airway       Patient location during procedure: OR       Procedure Start/Stop Times: 3/15/2025 7:38 AM  Staff -        Anesthesiologist:  Keron Contreras MD       CRNA: oYlanda Fox APRN CRNA       Performed By: CRNA  Consent for Airway        Urgency: elective  Indications and Patient Condition       Indications for airway management: millicent-procedural       Induction type:intravenous       Mask difficulty assessment: 2 - vent by mask + OA or adjuvant +/- NMBA    Final Airway Details       Final airway type: endotracheal airway       Successful airway: ETT - single and Oral  Endotracheal Airway Details        ETT size (mm): 8.0       Cuffed: yes       Successful intubation technique: video laryngoscopy       VL Blade Size: Benítez 4       Grade View of Cords: 1       Adjucts: stylet       Position: Right       Measured from: gums/teeth       Secured at (cm): 22       Bite block used: None    Post intubation assessment        Placement verified by: capnometry, equal breath sounds and chest rise        Number of attempts at approach: 1       Secured with: tape       Ease of procedure: easy       Dentition: Intact and Unchanged    Medication(s) Administered   Medication Administration Time: 3/15/2025 7:38 AM

## 2025-03-16 ENCOUNTER — APPOINTMENT (OUTPATIENT)
Dept: GENERAL RADIOLOGY | Facility: CLINIC | Age: 75
DRG: 233 | End: 2025-03-16
Attending: PHYSICIAN ASSISTANT
Payer: COMMERCIAL

## 2025-03-16 LAB
ALBUMIN SERPL BCG-MCNC: 3.6 G/DL (ref 3.5–5.2)
ALLEN'S TEST: ABNORMAL
ALP SERPL-CCNC: 57 U/L (ref 40–150)
ALT SERPL W P-5'-P-CCNC: 11 U/L (ref 0–70)
ANION GAP SERPL CALCULATED.3IONS-SCNC: 10 MMOL/L (ref 7–15)
AST SERPL W P-5'-P-CCNC: 34 U/L (ref 0–45)
ATRIAL RATE - MUSE: 52 BPM
ATRIAL RATE - MUSE: 96 BPM
BACTERIA UR CULT: ABNORMAL
BASE EXCESS BLDA CALC-SCNC: -2.8 MMOL/L (ref -3–3)
BASE EXCESS BLDA CALC-SCNC: 3.2 MMOL/L (ref -3–3)
BASE EXCESS BLDA CALC-SCNC: 4.1 MMOL/L (ref -3–3)
BASE EXCESS BLDA CALC-SCNC: 4.5 MMOL/L (ref -3–3)
BILIRUB SERPL-MCNC: 0.7 MG/DL
BUN SERPL-MCNC: 10.2 MG/DL (ref 8–23)
CA-I BLD-MCNC: 4.6 MG/DL (ref 4.4–5.2)
CALCIUM SERPL-MCNC: 8.5 MG/DL (ref 8.8–10.4)
CHLORIDE SERPL-SCNC: 109 MMOL/L (ref 98–107)
CREAT SERPL-MCNC: 1.43 MG/DL (ref 0.67–1.17)
DIASTOLIC BLOOD PRESSURE - MUSE: NORMAL MMHG
DIASTOLIC BLOOD PRESSURE - MUSE: NORMAL MMHG
EGFRCR SERPLBLD CKD-EPI 2021: 51 ML/MIN/1.73M2
ERYTHROCYTE [DISTWIDTH] IN BLOOD BY AUTOMATED COUNT: 12.8 % (ref 10–15)
GLUCOSE BLDC GLUCOMTR-MCNC: 104 MG/DL (ref 70–99)
GLUCOSE BLDC GLUCOMTR-MCNC: 113 MG/DL (ref 70–99)
GLUCOSE BLDC GLUCOMTR-MCNC: 114 MG/DL (ref 70–99)
GLUCOSE BLDC GLUCOMTR-MCNC: 118 MG/DL (ref 70–99)
GLUCOSE BLDC GLUCOMTR-MCNC: 129 MG/DL (ref 70–99)
GLUCOSE BLDC GLUCOMTR-MCNC: 85 MG/DL (ref 70–99)
GLUCOSE SERPL-MCNC: 104 MG/DL (ref 70–99)
HCO3 BLD-SCNC: 24 MMOL/L (ref 21–28)
HCO3 BLD-SCNC: 27 MMOL/L (ref 21–28)
HCO3 BLD-SCNC: 30 MMOL/L (ref 21–28)
HCO3 BLD-SCNC: 31 MMOL/L (ref 21–28)
HCO3 SERPL-SCNC: 27 MMOL/L (ref 22–29)
HCT VFR BLD AUTO: 24.4 % (ref 40–53)
HGB BLD-MCNC: 8.4 G/DL (ref 13.3–17.7)
INTERPRETATION ECG - MUSE: NORMAL
INTERPRETATION ECG - MUSE: NORMAL
LACTATE SERPL-SCNC: 0.7 MMOL/L (ref 0.7–2)
LACTATE SERPL-SCNC: 1.7 MMOL/L (ref 0.7–2)
LACTATE SERPL-SCNC: 6.8 MMOL/L (ref 0.7–2)
MAGNESIUM SERPL-MCNC: 2.1 MG/DL (ref 1.7–2.3)
MCH RBC QN AUTO: 31.2 PG (ref 26.5–33)
MCHC RBC AUTO-ENTMCNC: 34.4 G/DL (ref 31.5–36.5)
MCV RBC AUTO: 91 FL (ref 78–100)
O2/TOTAL GAS SETTING VFR VENT: 27 %
O2/TOTAL GAS SETTING VFR VENT: 30 %
O2/TOTAL GAS SETTING VFR VENT: 30 %
O2/TOTAL GAS SETTING VFR VENT: 37 %
OXYHGB MFR BLDA: 96 % (ref 92–100)
OXYHGB MFR BLDA: 97 % (ref 92–100)
P AXIS - MUSE: 76 DEGREES
P AXIS - MUSE: NORMAL DEGREES
PCO2 BLD: 33 MM HG (ref 35–45)
PCO2 BLD: 47 MM HG (ref 35–45)
PCO2 BLD: 54 MM HG (ref 35–45)
PCO2 BLD: 54 MM HG (ref 35–45)
PH BLD: 7.31 [PH] (ref 7.35–7.45)
PH BLD: 7.35 [PH] (ref 7.35–7.45)
PH BLD: 7.36 [PH] (ref 7.35–7.45)
PH BLD: 7.53 [PH] (ref 7.35–7.45)
PHOSPHATE SERPL-MCNC: 0.5 MG/DL (ref 2.5–4.5)
PHOSPHATE SERPL-MCNC: 2.7 MG/DL (ref 2.5–4.5)
PLAT MORPH BLD: NORMAL
PLATELET # BLD AUTO: 101 10E3/UL (ref 150–450)
PO2 BLD: 100 MM HG (ref 80–105)
PO2 BLD: 102 MM HG (ref 80–105)
PO2 BLD: 85 MM HG (ref 80–105)
PO2 BLD: 94 MM HG (ref 80–105)
POTASSIUM SERPL-SCNC: 4.6 MMOL/L (ref 3.4–5.3)
PR INTERVAL - MUSE: 272 MS
PR INTERVAL - MUSE: NORMAL MS
PROT SERPL-MCNC: 5.4 G/DL (ref 6.4–8.3)
QRS DURATION - MUSE: 84 MS
QRS DURATION - MUSE: 84 MS
QT - MUSE: 386 MS
QT - MUSE: 434 MS
QTC - MUSE: 403 MS
QTC - MUSE: 485 MS
R AXIS - MUSE: -13 DEGREES
R AXIS - MUSE: 0 DEGREES
RBC # BLD AUTO: 2.69 10E6/UL (ref 4.4–5.9)
RBC MORPH BLD: NORMAL
SAO2 % BLDA: 98 % (ref 95–96)
SAO2 % BLDA: 98.4 % (ref 95–96)
SAO2 % BLDA: 98.7 % (ref 95–96)
SAO2 % BLDA: 98.7 % (ref 95–96)
SODIUM SERPL-SCNC: 146 MMOL/L (ref 135–145)
SYSTOLIC BLOOD PRESSURE - MUSE: NORMAL MMHG
SYSTOLIC BLOOD PRESSURE - MUSE: NORMAL MMHG
T AXIS - MUSE: 19 DEGREES
T AXIS - MUSE: 9 DEGREES
VENTRICULAR RATE- MUSE: 52 BPM
VENTRICULAR RATE- MUSE: 95 BPM
WBC # BLD AUTO: 8.6 10E3/UL (ref 4–11)

## 2025-03-16 PROCEDURE — 82330 ASSAY OF CALCIUM: CPT | Performed by: PHYSICIAN ASSISTANT

## 2025-03-16 PROCEDURE — 85027 COMPLETE CBC AUTOMATED: CPT | Performed by: PHYSICIAN ASSISTANT

## 2025-03-16 PROCEDURE — 258N000003 HC RX IP 258 OP 636: Performed by: SURGERY

## 2025-03-16 PROCEDURE — 99291 CRITICAL CARE FIRST HOUR: CPT | Mod: 24 | Performed by: INTERNAL MEDICINE

## 2025-03-16 PROCEDURE — 82805 BLOOD GASES W/O2 SATURATION: CPT | Performed by: PHYSICIAN ASSISTANT

## 2025-03-16 PROCEDURE — 250N000013 HC RX MED GY IP 250 OP 250 PS 637: Performed by: SURGERY

## 2025-03-16 PROCEDURE — 82565 ASSAY OF CREATININE: CPT | Performed by: PHYSICIAN ASSISTANT

## 2025-03-16 PROCEDURE — 94003 VENT MGMT INPAT SUBQ DAY: CPT

## 2025-03-16 PROCEDURE — 84450 TRANSFERASE (AST) (SGOT): CPT | Performed by: PHYSICIAN ASSISTANT

## 2025-03-16 PROCEDURE — 250N000011 HC RX IP 250 OP 636: Performed by: STUDENT IN AN ORGANIZED HEALTH CARE EDUCATION/TRAINING PROGRAM

## 2025-03-16 PROCEDURE — 83605 ASSAY OF LACTIC ACID: CPT | Performed by: PHYSICIAN ASSISTANT

## 2025-03-16 PROCEDURE — 200N000001 HC R&B ICU

## 2025-03-16 PROCEDURE — 83735 ASSAY OF MAGNESIUM: CPT | Performed by: PHYSICIAN ASSISTANT

## 2025-03-16 PROCEDURE — 250N000009 HC RX 250: Performed by: SURGERY

## 2025-03-16 PROCEDURE — 250N000011 HC RX IP 250 OP 636: Performed by: INTERNAL MEDICINE

## 2025-03-16 PROCEDURE — 258N000003 HC RX IP 258 OP 636: Performed by: NURSE PRACTITIONER

## 2025-03-16 PROCEDURE — 99207 PR NO BILLABLE SERVICE THIS VISIT: CPT | Performed by: HOSPITALIST

## 2025-03-16 PROCEDURE — 84100 ASSAY OF PHOSPHORUS: CPT | Performed by: SURGERY

## 2025-03-16 PROCEDURE — 3E043XZ INTRODUCTION OF VASOPRESSOR INTO CENTRAL VEIN, PERCUTANEOUS APPROACH: ICD-10-PCS | Performed by: INTERNAL MEDICINE

## 2025-03-16 PROCEDURE — 71045 X-RAY EXAM CHEST 1 VIEW: CPT

## 2025-03-16 PROCEDURE — 272N000054 HC CANNULA HIGH FLOW, ADULT

## 2025-03-16 PROCEDURE — 999N000253 HC STATISTIC WEANING TRIALS

## 2025-03-16 PROCEDURE — 250N000009 HC RX 250: Performed by: PHYSICIAN ASSISTANT

## 2025-03-16 PROCEDURE — 250N000011 HC RX IP 250 OP 636: Mod: JZ | Performed by: NURSE PRACTITIONER

## 2025-03-16 PROCEDURE — P9045 ALBUMIN (HUMAN), 5%, 250 ML: HCPCS | Performed by: SURGERY

## 2025-03-16 PROCEDURE — 250N000011 HC RX IP 250 OP 636: Mod: JZ | Performed by: PHYSICIAN ASSISTANT

## 2025-03-16 PROCEDURE — 999N000157 HC STATISTIC RCP TIME EA 10 MIN

## 2025-03-16 PROCEDURE — 250N000013 HC RX MED GY IP 250 OP 250 PS 637: Performed by: PHYSICIAN ASSISTANT

## 2025-03-16 PROCEDURE — 250N000011 HC RX IP 250 OP 636: Performed by: SURGERY

## 2025-03-16 PROCEDURE — 93005 ELECTROCARDIOGRAM TRACING: CPT

## 2025-03-16 PROCEDURE — 84155 ASSAY OF PROTEIN SERUM: CPT | Performed by: PHYSICIAN ASSISTANT

## 2025-03-16 PROCEDURE — 84100 ASSAY OF PHOSPHORUS: CPT | Performed by: PHYSICIAN ASSISTANT

## 2025-03-16 RX ORDER — WATER 10 ML/10ML
INJECTION INTRAMUSCULAR; INTRAVENOUS; SUBCUTANEOUS
Status: COMPLETED
Start: 2025-03-16 | End: 2025-03-16

## 2025-03-16 RX ORDER — OLANZAPINE 10 MG/1
5 INJECTION, POWDER, LYOPHILIZED, FOR SOLUTION INTRAMUSCULAR ONCE
Status: COMPLETED | OUTPATIENT
Start: 2025-03-16 | End: 2025-03-16

## 2025-03-16 RX ORDER — NITROFURANTOIN MACROCRYSTALS 50 MG/1
100 CAPSULE ORAL EVERY 6 HOURS
Status: DISCONTINUED | OUTPATIENT
Start: 2025-03-16 | End: 2025-03-18

## 2025-03-16 RX ORDER — NITROFURANTOIN 25 MG/5ML
100 SUSPENSION ORAL EVERY 6 HOURS
Status: DISCONTINUED | OUTPATIENT
Start: 2025-03-16 | End: 2025-03-16

## 2025-03-16 RX ORDER — CHLORHEXIDINE GLUCONATE ORAL RINSE 1.2 MG/ML
15 SOLUTION DENTAL EVERY 12 HOURS
Status: DISCONTINUED | OUTPATIENT
Start: 2025-03-16 | End: 2025-03-17

## 2025-03-16 RX ORDER — CEFAZOLIN SODIUM 2 G/50ML
2 SOLUTION INTRAVENOUS EVERY 8 HOURS
Status: ACTIVE | OUTPATIENT
Start: 2025-03-16 | End: 2025-03-16

## 2025-03-16 RX ADMIN — SODIUM PHOSPHATE, MONOBASIC, MONOHYDRATE AND SODIUM PHOSPHATE, DIBASIC, ANHYDROUS 15 MMOL: 142; 276 INJECTION, SOLUTION INTRAVENOUS at 05:36

## 2025-03-16 RX ADMIN — WATER 2.1 ML: 1 INJECTION INTRAMUSCULAR; INTRAVENOUS; SUBCUTANEOUS at 09:19

## 2025-03-16 RX ADMIN — POLYETHYLENE GLYCOL 3350 17 G: 17 POWDER, FOR SOLUTION ORAL at 08:27

## 2025-03-16 RX ADMIN — Medication 40 MG: at 08:11

## 2025-03-16 RX ADMIN — VASOPRESSIN 4 UNITS/HR: 20 INJECTION, SOLUTION INTRAVENOUS at 03:50

## 2025-03-16 RX ADMIN — NOREPINEPHRINE BITARTRATE 0.11 MCG/KG/MIN: 0.02 INJECTION, SOLUTION INTRAVENOUS at 12:44

## 2025-03-16 RX ADMIN — HEPARIN SODIUM 5000 UNITS: 5000 INJECTION, SOLUTION INTRAVENOUS; SUBCUTANEOUS at 12:03

## 2025-03-16 RX ADMIN — PROPOFOL 30 MCG/KG/MIN: 10 INJECTION, EMULSION INTRAVENOUS at 06:40

## 2025-03-16 RX ADMIN — POTASSIUM & SODIUM PHOSPHATES POWDER PACK 280-160-250 MG 1 PACKET: 280-160-250 PACK at 15:43

## 2025-03-16 RX ADMIN — CEFAZOLIN SODIUM 2 G: 2 SOLUTION INTRAVENOUS at 00:38

## 2025-03-16 RX ADMIN — ATORVASTATIN CALCIUM 40 MG: 40 TABLET, FILM COATED ORAL at 08:27

## 2025-03-16 RX ADMIN — CEFAZOLIN SODIUM 2 G: 2 SOLUTION INTRAVENOUS at 08:07

## 2025-03-16 RX ADMIN — OXYCODONE HYDROCHLORIDE 10 MG: 5 TABLET ORAL at 19:56

## 2025-03-16 RX ADMIN — OXYCODONE HYDROCHLORIDE 10 MG: 5 TABLET ORAL at 14:00

## 2025-03-16 RX ADMIN — PROPOFOL 10 MCG/KG/MIN: 10 INJECTION, EMULSION INTRAVENOUS at 02:18

## 2025-03-16 RX ADMIN — NOREPINEPHRINE BITARTRATE 0.07 MCG/KG/MIN: 0.02 INJECTION, SOLUTION INTRAVENOUS at 19:52

## 2025-03-16 RX ADMIN — NITROFURANTOIN MACROCRYSTALS 100 MG: 50 CAPSULE ORAL at 22:58

## 2025-03-16 RX ADMIN — ASPIRIN 81 MG CHEWABLE TABLET 162 MG: 81 TABLET CHEWABLE at 08:27

## 2025-03-16 RX ADMIN — CHLORHEXIDINE GLUCONATE 15 ML: 1.2 SOLUTION ORAL at 08:11

## 2025-03-16 RX ADMIN — ACETAMINOPHEN 975 MG: 325 TABLET, FILM COATED ORAL at 13:58

## 2025-03-16 RX ADMIN — NITROFURANTOIN MACROCRYSTALS 100 MG: 50 CAPSULE ORAL at 09:43

## 2025-03-16 RX ADMIN — HYDROMORPHONE HYDROCHLORIDE 0.4 MG: 0.2 INJECTION, SOLUTION INTRAMUSCULAR; INTRAVENOUS; SUBCUTANEOUS at 10:54

## 2025-03-16 RX ADMIN — ACETAMINOPHEN 975 MG: 325 TABLET, FILM COATED ORAL at 04:22

## 2025-03-16 RX ADMIN — SODIUM PHOSPHATE, MONOBASIC, MONOHYDRATE AND SODIUM PHOSPHATE, DIBASIC, ANHYDROUS 15 MMOL: 142; 276 INJECTION, SOLUTION INTRAVENOUS at 08:45

## 2025-03-16 RX ADMIN — NOREPINEPHRINE BITARTRATE 0.08 MCG/KG/MIN: 0.02 INJECTION, SOLUTION INTRAVENOUS at 00:37

## 2025-03-16 RX ADMIN — SENNOSIDES AND DOCUSATE SODIUM 1 TABLET: 50; 8.6 TABLET ORAL at 19:52

## 2025-03-16 RX ADMIN — LIDOCAINE 1 PATCH: 560 PATCH PERCUTANEOUS; TOPICAL; TRANSDERMAL at 14:37

## 2025-03-16 RX ADMIN — OXYCODONE HYDROCHLORIDE 10 MG: 5 TABLET ORAL at 09:13

## 2025-03-16 RX ADMIN — ACETAMINOPHEN 975 MG: 325 TABLET, FILM COATED ORAL at 22:58

## 2025-03-16 RX ADMIN — HEPARIN SODIUM 5000 UNITS: 5000 INJECTION, SOLUTION INTRAVENOUS; SUBCUTANEOUS at 19:52

## 2025-03-16 RX ADMIN — HYDROMORPHONE HYDROCHLORIDE 0.4 MG: 0.2 INJECTION, SOLUTION INTRAMUSCULAR; INTRAVENOUS; SUBCUTANEOUS at 22:58

## 2025-03-16 RX ADMIN — POTASSIUM & SODIUM PHOSPHATES POWDER PACK 280-160-250 MG 1 PACKET: 280-160-250 PACK at 19:52

## 2025-03-16 RX ADMIN — ALBUMIN HUMAN 25 G: 0.05 INJECTION, SOLUTION INTRAVENOUS at 03:03

## 2025-03-16 RX ADMIN — OLANZAPINE 5 MG: 10 INJECTION, POWDER, FOR SOLUTION INTRAMUSCULAR at 09:20

## 2025-03-16 RX ADMIN — NITROFURANTOIN MACROCRYSTALS 100 MG: 50 CAPSULE ORAL at 15:43

## 2025-03-16 ASSESSMENT — ACTIVITIES OF DAILY LIVING (ADL)
ADLS_ACUITY_SCORE: 59
ADLS_ACUITY_SCORE: 56
ADLS_ACUITY_SCORE: 59
ADLS_ACUITY_SCORE: 56
ADLS_ACUITY_SCORE: 59
ADLS_ACUITY_SCORE: 56
ADLS_ACUITY_SCORE: 59
ADLS_ACUITY_SCORE: 56
ADLS_ACUITY_SCORE: 59

## 2025-03-16 NOTE — PROVIDER NOTIFICATION
Updated Dr. Pedersen re: severe agitation.  At 0200 this writer attempted to turn off precedex in setting of bradycardia and to assess pt mental status off sedation.  Pt RASS janet to +4, attempting to grab side rails and pull himself out of bed, kicking, thrashing and attempting to reach lines and ETT.  Required six staff members to restrain pt.  At this time Dr Pedersen requests propofol restarted to let the pt rest and protect lines/airway/sternal incision.      Bradycardia continues despite precedex gtt stopped.  This writer turned the pacer back on and pt now 100% v-paced with improvements in BP requiring less pressor.

## 2025-03-16 NOTE — PROGRESS NOTES
Cardiology progress note    Cardiology following peripherally over the weekend following CABG. He is extubated and recovering, continues to be a bit vasoplegic requiring pressor support this AM. Once he is weaning support and ready to move out of the ICU, we are happy to come back around formally to help with ongoing CAD/risk factor management and discharge planning

## 2025-03-16 NOTE — PROGRESS NOTES
[facility] ICU RESPIRATORY NOTE        Date of Admission: 3/13/2025    Date of Intubation (most recent): 3/15/2025    Reason for Mechanical Ventilation: aw protection    Number of Days on Mechanical Ventilation: 2    Met Criteria for Spontaneous Breathing Trial: Yes - 5/5 30% for 30 min    Bite Block: No    Significant Events Today: none overnight    ABG Results:   Recent Labs   Lab 03/16/25  0230 03/15/25  1938 03/15/25  1800 03/15/25  1555 03/15/25  1535   PH 7.31* 7.46* 7.39  --  7.16*   PCO2 47* 33* 35  --  46*   PO2 100 79* 86  --  85   HCO3 24 23 21  --  16*   O2PER 30 30 30  30 30 30         Current Vent Settings: FiO2 (%): 30 %, Resp: 18, Vent Mode: CMV/AC, Resp Rate (Set): 18 breaths/min, Tidal Volume (Set, mL): 550 mL, PEEP (cm H2O): 5 cmH2O, Resp Rate (Set): 18 breaths/min, Tidal Volume (Set, mL): 550 mL, PEEP (cm H2O): 5 cmH2O    Plan: continue vent support and wean to extubation    RT Kip on 3/16/2025 at 5:13 AM

## 2025-03-16 NOTE — PROGRESS NOTES
[facility]  Cardiovascular and Thoracic Surgery Daily Note      Assessment and Plan  Fermín Josue is a 75 year old male with a PMH of recently diagnosed non small cell lung cancer s/p VATS wedge resection of RUL lung nodule on 1/28/25 (PFTs done prior show mild restrictive lung disease), BPH, CKD stage IIIa, renal cancer s/p nephrectomy, JAMEE, hypertension, dyslipidemia, obesity, prediabetes, CAD s/p TRISH x 2 LAD and occluded RCA in 2007 who was admitted to [facility] 03/13 with 1-2 week history of chest pain. TTE 3/13/25 with preserved biventricular systolic function and early diastolic dysfunction. Coronary angiogram on 3/14/25 revealed multivessel CAD with 70% LMCA stenosis and mid RCA . CV surgery consulted for consideration of surgical revascularization. Underwent CABG x 2 (LIMA to LAD, SVG to RCA) on 03/15 with Dr. John.     POD # 1 CABG x 2 (LIMA to LAD, SVG to RCA) with Dr. Dimitri John    - CVS:   Pre-op TTE with preserved biventricular systolic function and early diastolic dysfunction.   Profound postop vasoplegic shock with lactic acidosis, improved. Vasopressin and NE to MAP goal 65, wean as able. CI robust off epi.   Accelerated junctional rhythm  Volume resuscitate PRN   Aspirin 162 mg daily, atorvastatin 40 mg daily.   Chest tubes: output 275, no air leak, keep today. TPW: back up 45    - Resp:   Postoperative mechanical ventilation, resolved. Extubated POD1 AM (agitated and non-redirectable with sedation wean POD0)  Recently diagnosed non small cell lung cancer s/p VATS wedge resection of RUL lung nodule on 1/28/25 (PFTs done prior show mild restrictive lung disease). Stage 1A1. Recent oncology note reports no further treatment indicated.   JAMEE    - Neuro: Neuro grossly intact. Pain controlled. Agitated overnight when sedation weaned, given Zyprexa x1 to facilitate extubation.     - Renal: CKD stage 3, history of left nephrectomy 04/2021 for RCC. Baseline Cr ~ 1.2-1.5. Cr stable  within baseline. Trend BMP/UOP. Volume management as above.  Recent Labs   Lab 03/16/25  0411 03/15/25  1644 03/15/25  1217   CR 1.43* 1.27* 1.32*       - GI: -BM, +flatus, continue bowel regimen    - : Whitmore in place, continue today    - Endo: Pre-diabetes. Postop stress hyperglycemia, improved. Insulin infusion transitioned to sliding scale insulin.   Hemoglobin A1C   Date Value Ref Range Status   01/14/2025 5.8 (H) 0.0 - 5.6 % Final     Comment:     Normal <5.7%   Prediabetes 5.7-6.4%    Diabetes 6.5% or higher     Note: Adopted from ADA consensus guidelines.   10/15/2019 5.8 (H) 0 - 5.6 % Final     Comment:     Normal <5.7% Prediabetes 5.7-6.4%  Diabetes 6.5% or higher - adopted from ADA   consensus guidelines.          - FEN: Replace electrolytes as needed. ADAT post extubation.     - ID: Postop leukocytosis, likely reactive from surgery. Tmax 101.5. WBC normalized. Periop abx prophylaxis complete. Staph epidermidis UTI noted on preop UC, started on Macrobid. Trend CBC and fever curve.   Recent Labs   Lab 03/16/25  0411 03/15/25  1800 03/15/25  1455   WBC 8.6 25.1* 31.8*       - Heme: Acute blood loss anemia and thrombocytopenia due to surgery. Hgb and PLT 8.4/101. Trend CBC, transfuse PRN.   Recent Labs   Lab 03/16/25  0411 03/15/25  1800 03/15/25  1455   HGB 8.4* 10.3* 10.6*   * 188 201       - Proph: SCD, subcutaneous heparin, PPI    - Other:  Clinically Significant Risk Factors        # Hypokalemia: Lowest K = 3.1 mmol/L in last 2 days, will replace as needed  # Hypernatremia: Highest Na = 146 mmol/L in last 2 days, will monitor as appropriate  # Hyperchloremia: Highest Cl = 109 mmol/L in last 2 days, will monitor as appropriate      # Hypocalcemia: Lowest Ca = 8.1 mg/dL in last 2 days, will monitor and replace as appropriate    # Anion Gap Metabolic Acidosis: Highest Anion Gap = 21 mmol/L in last 2 days, will monitor and treat as appropriate     # Coagulation Defect: INR = 1.48 (Ref range: 0.85 -  "1.15) and/or PTT = 69 Seconds (Ref range: 22 - 38 Seconds), will monitor for bleeding  # Thrombocytopenia: Lowest platelets = 101 in last 2 days, will monitor for bleeding   # Hypertension: Noted on problem list            # Obesity: Estimated body mass index is 35.53 kg/m  as calculated from the following:    Height as of this encounter: 1.727 m (5' 8\").    Weight as of this encounter: 106 kg (233 lb 11 oz)., PRESENT ON ADMISSION            - Dispo: ICU    Interval History  Extubated this AM. Groggy. Denies pain. Up to chair.       Medications  Current Facility-Administered Medications   Medication Dose Route Frequency Provider Last Rate Last Admin    acetaminophen (TYLENOL) tablet 975 mg  975 mg Oral Q8H Jeanne Hunter PA-C   975 mg at 03/16/25 0422    aspirin (ASA) chewable tablet 162 mg  162 mg Oral or NG Tube Daily HunterJeanne handy PA-C   162 mg at 03/15/25 1650    atorvastatin (LIPITOR) tablet 40 mg  40 mg Oral Daily HunterJeanne PA-C   40 mg at 03/14/25 0800    ceFAZolin (ANCEF) 2 g in dextrose 50 mL intermittent infusion  2 g Intravenous Q8H Marifer Mckenzie MD   2 g at 03/16/25 0038    chlorhexidine (PERIDEX) 0.12 % solution 15 mL  15 mL Mouth/Throat Q12H Leonard Pedersen DO        heparin ANTICOAGULANT injection 5,000 Units  5,000 Units Subcutaneous Q8H Jeanne Hunter PA-C        lactated ringers BOLUS 250 mL  250 mL Intravenous Once Jeanne Hunter PA-C   Rate Verify at 03/15/25 1352    Lidocaine (LIDOCARE) 4 % Patch 1 patch  1 patch Transdermal Q24H HuntreJeanne handy PA-C   1 patch at 03/15/25 1323    pantoprazole (PROTONIX) 2 mg/mL suspension 40 mg  40 mg Oral or NG Tube Daily HunterJeanne handy PA-C   40 mg at 03/15/25 1323    Or    pantoprazole (PROTONIX) EC tablet 40 mg  40 mg Oral Daily HunterJeanne handy PA-C        polyethylene glycol (MIRALAX) Packet 17 g  17 g Oral Daily HunterJeanne handy PA-C        senna-docusate (SENOKOT-S/PERICOLACE) 8.6-50 MG per tablet 1 tablet  1 tablet " Oral BID Jeanne Hunter PA-C   1 tablet at 03/15/25 2036    sodium chloride (PF) 0.9% PF flush 3 mL  3 mL Intracatheter Q8H Jeanne Hunter PA-C   3 mL at 03/16/25 0422    sodium phosphate 15 mmol in NS 250mL intermittent infusion  15 mmol Intravenous Q2H Leonard Pedersen DO   15 mmol at 03/16/25 0536     Current Facility-Administered Medications   Medication Dose Route Frequency Provider Last Rate Last Admin    [START ON 3/18/2025] bisacodyl (DULCOLAX) suppository 10 mg  10 mg Rectal Daily PRN HunterJeanne PA-C        calcium carbonate (TUMS) chewable tablet 500 mg  500 mg Oral 4x Daily PRN HunterJeanne PA-C        calcium gluconate 1 g in 50 mL in sodium chloride intermittent infusion  1 g Intravenous Q6H PRN HunterJeanne PA-C        calcium gluconate 2 g in  mL intermittent infusion  2 g Intravenous Q6H PRN HunterJeanne PA-C        calcium gluconate 3 g in sodium chloride 0.9 % 100 mL intermittent infusion  3 g Intravenous Q6H PRN Hunter, Jeanne BERNAL PA-C        glucose gel 15-30 g  15-30 g Oral Q15 Min PRN HunterJeanne PA-C        Or    dextrose 50 % injection 25-50 mL  25-50 mL Intravenous Q15 Min PRN HunterJeanne handy PA-C        Or    glucagon injection 1 mg  1 mg Subcutaneous Q15 Min PRN HunterJeanne PA-C        EPINEPHrine (ADRENALIN) 5 mg in  mL infusion  0.01-0.1 mcg/kg/min Intravenous Continuous PRN HunterJeanne PA-C   Stopped at 03/15/25 1620    fentaNYL (SUBLIMAZE) 50 mcg/mL bolus from pump  25 mcg Intravenous Q1H PRN Slick Mederos DO        hydrALAZINE (APRESOLINE) injection 10 mg  10 mg Intravenous Q30 Min PRN HunterJeanne PA-C        HYDROmorphone (DILAUDID) injection 0.2 mg  0.2 mg Intravenous Q2H PRN HunterJeanne PA-C        Or    HYDROmorphone (DILAUDID) injection 0.4 mg  0.4 mg Intravenous Q2H PRN Jeanne Hunter PA-C        hydrOXYzine HCl (ATARAX) tablet 10 mg  10 mg Oral Q6H PRN Jeanne Hunter PA-C        lactated ringers  BOLUS 250 mL  250 mL Intravenous Q10 Min PRN Jeanne Hunter PA-C   Rate Verify at 03/15/25 1700    lidocaine (LMX4) cream   Topical Q1H PRN Jeanne Hunter PA-C        lidocaine 1 % 0.1-1 mL  0.1-1 mL Other Q1H PRN HunterJeanne PA-C        [START ON 3/17/2025] magnesium hydroxide (MILK OF MAGNESIA) suspension 30 mL  30 mL Oral Daily PRN Jeanne Hunter PA-C        propofol (DIPRIVAN) bolus from bag or syringe pump  10 mg Intravenous Q15 Min PRN Marifer Mckenzie MD        And    Medication Instruction   Does not apply Continuous PRN Marifer Mckenzie MD        methocarbamol (ROBAXIN) half-tab 250 mg  250 mg Oral Q6H PRN Jeanne Hunter PA-C        naloxone (NARCAN) injection 0.2 mg  0.2 mg Intravenous Q2 Min PRN Dimitri John MD        Or    naloxone (NARCAN) injection 0.4 mg  0.4 mg Intravenous Q2 Min PRN Dimitri John MD        Or    naloxone (NARCAN) injection 0.2 mg  0.2 mg Intramuscular Q2 Min PRN Dimitri John MD        Or    naloxone (NARCAN) injection 0.4 mg  0.4 mg Intramuscular Q2 Min PRN Dimitri John MD        norepinephrine (LEVOPHED) 4 mg in  mL infusion PREMIX  0.01-0.15 mcg/kg/min Intravenous Continuous PRN Jeanne Hunter PA-C 11.3 mL/hr at 03/16/25 0738 0.03 mcg/kg/min at 03/16/25 0738    ondansetron (ZOFRAN ODT) ODT tab 4 mg  4 mg Oral Q6H PRN Jeanne Hunter PA-C        Or    ondansetron (ZOFRAN) injection 4 mg  4 mg Intravenous Q6H PRN Jeanne Hunter PA-C        oxyCODONE (ROXICODONE) tablet 5 mg  5 mg Oral Q4H PRN Jeanne Hunter PA-C        Or    oxyCODONE (ROXICODONE) tablet 10 mg  10 mg Oral Q4H PRN HunterJeanne PA-C   10 mg at 03/15/25 2056    prochlorperazine (COMPAZINE) injection 5 mg  5 mg Intravenous Q6H PRN Jeanne Hunter PA-C        Or    prochlorperazine (COMPAZINE) tablet 5 mg  5 mg Oral Q6H PRN Jeanne Hunter PA-C        Reason beta blocker order not selected   Does not apply DOES NOT GO TO Jeanne Acosta  "YASH        sodium chloride (PF) 0.9% PF flush 3 mL  3 mL Intracatheter q1 min prn Jeanne Hunter PA-C             Physical Exam  Vitals were reviewed  Blood pressure 119/75, pulse 80, temperature 98.8  F (37.1  C), resp. rate 12, height 1.727 m (5' 8\"), weight 106 kg (233 lb 11 oz), SpO2 99%.  Rhythm: NSR    Lungs: diminished bases    Cardiovascular: rrr, no m/r/g    Abdomen: soft, NT, ND, +BS    Extremeties: warm, 1+ LE edema    Incision: CDI    CT: serosang output 275 mL, no air leak    Weight:   Vitals:    03/13/25 1434 03/15/25 0431 03/16/25 0619   Weight: 100.4 kg (221 lb 5.5 oz) 100.4 kg (221 lb 5.5 oz) 106 kg (233 lb 11 oz)         Data  Recent Labs   Lab 03/16/25  0559 03/16/25  0411 03/16/25  0230 03/15/25  1806 03/15/25  1800 03/15/25  1648 03/15/25  1644 03/15/25  1455 03/15/25  1357 03/15/25  1217 03/15/25  1046   WBC  --  8.6  --   --  25.1*  --   --  31.8*  --  26.3* 25.7*   HGB  --  8.4*  --   --  10.3*  --   --  10.6*  --  10.7* 9.6*  10.0*   MCV  --  91  --   --  92  --   --  97  --  96 94   PLT  --  101*  --   --  188  --   --  201  --  160 142*   INR  --   --   --   --   --   --   --   --   --  1.48* 1.57*   NA  --  146*  --   --   --   --  146*  --   --  143 142  141   POTASSIUM  --  4.6  --   --   --   --  4.1  4.1  --   --  3.1* 3.4  3.5   CHLORIDE  --  109*  --   --   --   --  104  --   --  105 107   CO2  --  27  --   --   --   --  21*  --   --  17* 24   BUN  --  10.2  --   --   --   --  10.0  --   --  12.2 13.0   CR  --  1.43*  --   --   --   --  1.27*  --   --  1.32* 1.30*   ANIONGAP  --  10  --   --   --   --  21*  --   --  21* 11   CANDICE  --  8.5*  --   --   --   --  8.1*  --   --  9.6 8.4*   GLC 85 104* 104*   < >  --    < > 177* 158*   < > 198* 160*  167*   ALBUMIN  --  3.6  --   --   --   --   --   --   --  3.5  --    PROTTOTAL  --  5.4*  --   --   --   --   --   --   --  5.5*  --    BILITOTAL  --  0.7  --   --   --   --   --   --   --  0.9  --    ALKPHOS  --  57  --   --   --   " --   --   --   --  72  --    ALT  --  11  --   --   --   --   --   --   --  16  --    AST  --  34  --   --   --   --   --   --   --  37  --     < > = values in this interval not displayed.       Imaging:  Recent Results (from the past 24 hours)   XR Chest Port 1 View    Narrative    EXAM: XR CHEST PORT 1 VIEW  LOCATION: [facility]  DATE: 3/15/2025    INDICATION: Post Op CVTS Surgery  COMPARISON: 03/12/2025      Impression    IMPRESSION: Sternotomy. Enteric tube tip within the proximal stomach, the sidehole is not visualized due to extensive hardware. Sternotomy. Mediastinal drain and left chest tube. Endotracheal tube approximately 4.6 cm above the brittany. Right IJ central   venous catheter tip within the mid SVC. Heart normal in size. Low lung volumes. Atelectasis noted at the left lung base. No appreciable pneumothorax.   XR Chest Port 1 View    Narrative    EXAM: XR CHEST PORTABLE 1 VIEW  LOCATION: [facility]  DATE: 03/16/2025    INDICATION: Postop CVTS surgery.  COMPARISON: Portable chest single view 03/15/2025 at 1241 hours.      Impression    IMPRESSION: Endotracheal tube tip 4.7 cm above the brittany. Enteric tube tip and side-port in the stomach. Right IJ catheter tip in the SVC. Sternotomy, CABG, mediastinal drain and left chest tube. Elevation of the right hemidiaphragm. Strands of   platelike atelectasis in the left lower lung with associated small effusion, relatively unchanged. No appreciable effusion on the right. Cardiac size approaches upper limits of normal with normal pulmonary vascularity. Coronary artery stent. Degenerative   changes both shoulders and the spine. Thoracolumbar spinal hardware, partially visualized. Surgical clips in the abdomen.           Patient seen and discussed with Dr. Alvaro Hunter PAMihirC  Cardiothoracic Surgery  Available for paging [phone] (personal pager or CV Surgery Rounding Pager)  Personal Pager:  [phone]  CV Surgery Rounding Pager: [phone]  After hours please page surgeon on-call

## 2025-03-16 NOTE — PROGRESS NOTES
74yo male pmhx of CAD with prior stenting x3, CKD who presented with unstable angina. Underwent CABG x 2 with Dr John on 3/15/25 and admitted to the ICU post op. During the day patient was found to have a lactic acid level of 12 at its peak with a pH of 7.12. He was resuscitated with improvement in his lactic acid to 9.2 and then 6.3. This evening sedation was stopped. Patient was awake, however, agitated. I started a precedex drip for his agitation, however, he continued to be agitated. I ordered a fentanyl drip to provide him with some analgesia. Currently on a pressure support trial. I don't think he is ready to be extubated right now, until he is calm and able to follow commands. Would flip his vent back to full support for now. I reviewed his ABG from 1938 which showed mild alkalosis, 7.46/33. I instructed his RN to drop his RR from 20 to 18.     Critical care time independent of procedures 32 min.   Diagnosis: Respiratory failure.

## 2025-03-16 NOTE — PROGRESS NOTES
CARDIAC SURGERY NUTRITION CONSULT     Received standing order to assess and educate patient.  Will follow and complete assessment once patient is extubated and/or is transferred to medical unit.     Patient will receive nutrition education during the Outpatient Cardiac Rehab Program (nutrition classes/dietitian counseling).    Lovely Cloud RD, LD, CNSC   Available on [employer]

## 2025-03-16 NOTE — PROGRESS NOTES
ICU Progress Note    Date of Service: 03/16/25    Interval events:  - Increasing pressor requirements post-op, but now coming down and lactate cleared  - Agitation with pressure support trial overnight, kept intubated    Today's plan summary:  - Wean to extubate with Zyprexa  - Replace a-line given ongoing pressors and other not working    A/P:  Fermín Josue is a 75 year old male with past medical history significant for recently diagnosed non-small cell lung cancer with recent nodule removal (01/2025), BPH, history of CAD with prior stenting x3, CKD stage IIIa with prior nephrectomy for RCC (2021), hypertension, hyperlipidemia, obesity class I, and prediabetes who was admitted with chest pain.  He underwent TTE on 3/13/25 which revealed EF 60%, no significant valvular disease, mild dilatation of aortic sinus of valsalva at 4.3cm, LVH and early diastolic dysfunction. Coronary angiogram on 3/14/25 reveals CAD with LM disease and mid RCA . He continued to have unstable angina and underwent emergent CABG x2 with Dr. John on 3/15/25. The patient was admitted to the ICU post-operatively intubated and sedated on vasopressors.     Neuro:  Post-operative pain management  Mechanical ventilation  - Propofol to transition to Precedex for extubation   - Zyprexa for agitation  - Multimodal pain control     CV:  CAD s/p CABG x2  Unstable angina  Hypertension  Hyperlipidemia  - MAP goal > 65, requiring vasopressors  - Chest tube management per CVTS  - Heparin prophylaxis  - ASA and statin ordered     Pulm:  Post-operative mechanical ventilation  Patient-reported JAMEE, no home CPAP  History of wedge resection for non-small cell lung cancer (Jan 2025)  - Wean to extubation   - Consider positive pressure ventilation following extubation given habitus and likely JAMEE history     GI:  Constipation  - Advance diet as tolerated once extubated  - Bowel regimen ordered  - PPI prophylaxis     Renal:  CKD stage IIIa with prior  "nephrectomy for RCC (Baseline Cr 1.3-1.5)  Urinary tract infection  History of kidney stones with prior lithotripsy  - Cefazolin continued for UTI, macrobid once tolerating PO  - Monitor UOP, Cr, I/O  - Daily BMP     ID:  Annalee-operative prophylaxis  UTI  - Cefazolin and vancomycin completed  - Macrobid for UTI  - Monitor WBC and fever trend     Heme:  Acute blood loss anemia  Thrombocytopenia  - CBC daily  - Transfuse hemoglobin <7     Endo:  Risk for stress hyperglycemia  Pre-diabetes  - Insulin sliding scale  - Goal glucose <180    Clinically Significant Risk Factors        # Hypokalemia: Lowest K = 3.1 mmol/L in last 2 days, will replace as needed  # Hypernatremia: Highest Na = 146 mmol/L in last 2 days, will monitor as appropriate  # Hyperchloremia: Highest Cl = 109 mmol/L in last 2 days, will monitor as appropriate      # Hypocalcemia: Lowest Ca = 8.1 mg/dL in last 2 days, will monitor and replace as appropriate    # Anion Gap Metabolic Acidosis: Highest Anion Gap = 21 mmol/L in last 2 days, will monitor and treat as appropriate   # Coagulation Defect: INR = 1.48 (Ref range: 0.85 - 1.15) and/or PTT = 69 Seconds (Ref range: 22 - 38 Seconds), will monitor for bleeding  # Thrombocytopenia: Lowest platelets = 101 in last 2 days, will monitor for bleeding   # Hypertension: Noted on problem list            # Obesity: Estimated body mass index is 35.53 kg/m  as calculated from the following:    Height as of this encounter: 1.727 m (5' 8\").    Weight as of this encounter: 106 kg (233 lb 11 oz)., PRESENT ON ADMISSION                   PPX  1. DVT: heparin subq   2. VAP: HOB 30 degrees, chlorhexidine rinse  3. Stress Ulcer: PPI  4. Restraints: Nonviolent soft two point restraints required and necessary for patient safety and continued cares and good effect as patient continues to pull at necessary lines, tubes despite education and distraction. Will readdress daily.   5. Wound care - per unit routine   6. Feeding - " "Advance diet as tolerated once extubated  7. Family updated at bedside.    Unable to obtain ROS 2/2 sedation/intubation.     /68   Pulse 80   Temp 98.6  F (37  C)   Resp 14   Ht 1.727 m (5' 8\")   Wt 106 kg (233 lb 11 oz)   SpO2 97%   BMI 35.53 kg/m    Gen: supine, NAD  Neuro: sedated, pupils equal  HEENT: anicteric  Card: RRR  Pulm: clear b/l  Abd: soft, non-distended  MSK: no edema, no acute joint abnormality  Skin: no obvious rash    FiO2 (%): 30 %, Resp: 14, Vent Mode: PS, Resp Rate (Set): 16 breaths/min, Tidal Volume (Set, mL): 500 mL, PEEP (cm H2O): 5 cmH2O, Pressure Support (cm H2O): 5 cmH2O, Resp Rate (Set): 16 breaths/min, Tidal Volume (Set, mL): 500 mL, PEEP (cm H2O): 5 cmH2O      Intake/Output Summary (Last 24 hours) at 3/16/2025 1623  Last data filed at 3/16/2025 1000  Gross per 24 hour   Intake 1265.24 ml   Output 1680 ml   Net -414.76 ml       Labs: reviewed    Imaging: reviewed    Discussed with staff, Dr. Samuels.    Marifer Mckenzie MD  Surgical Critical Care Fellow  "

## 2025-03-16 NOTE — PROCEDURES
[facility]    Arterial line placement    Date/Time: 3/16/2025 1:56 PM    Performed by: Marifer Mckenzie MD  Authorized by: Bud Paulson MD      UNIVERSAL PROTOCOL   Site Marked: No  Prior Images Obtained and Reviewed:  NA  Required items: Required blood products, implants, devices and special equipment available    Patient identity confirmed:  Verbally with patient, arm band and provided demographic data  NA - No sedation, light sedation, or local anesthesia  Confirmation Checklist:  Patient's identity using two indicators, relevant allergies, procedure was appropriate and matched the consent or emergent situation and correct equipment/implants were available  Time out: Immediately prior to the procedure a time out was called    Universal Protocol: the Joint Commission Universal Protocol was followed    Preparation: Patient was prepped and draped in usual sterile fashion    ESBL (mL):  5  Indication:  hemodynamic monitoring  Location:  Left radial       ANESTHESIA    Anesthesia:  Local infiltration  Local Anesthetic:  Lidocaine 1% without epinephrine  Anesthetic Total (mL):  5      SEDATION    Patient Sedated: No      PROCEDURE DETAILS  Giles's Test Normal?: Giles's test not abnormal  Needle Gauge:  20  Seldinger technique: Seldinger technique used    Number of Attempts:  3  Post-procedure:  Line sutured and dressing applied  CMS: normal    PROCEDURE  Describe Procedure: Right radial a-line no longer functioning and the patient remains on vasopressors, thus it was replaced on the left side.     The left wrist was positioned, prepped, and draped in the usual fashion. 5mL of lidocaine was injected. The needle was introduced into the artery under ultrasound guidance. There was pulsatile blood flow, but the wire would not pass. The same happened on the next try. On the third attempt, the wire passed and the a-line was able to be placed. It was sutured in place and a sterile dressing was  applied.  Patient Tolerance:  Patient tolerated the procedure well with no immediate complications  Length of time physician/provider present for 1:1 monitoring during sedation: 0    I was available but not present for the procedure

## 2025-03-16 NOTE — PROGRESS NOTES
Pt extubated to 2L NC per MD order.  LS are diminished t/o and no stidor present.  Will continue to follow  Trae Moralez, RT  3/16/2025

## 2025-03-16 NOTE — PLAN OF CARE
CV  Pressors (which pressors and any increase/decrease in pressor needs): Levo, vaso  HR range: 50-80  Chest tube output: 70cc    Neuro  Orientation: Unable to assess, extremely agitated when sedation lightened, combative  Delirium present?(y/n): Y  Sleep: Sedated  Pain: Fent gtt ordered per ICU     GI/  BM? (y/n): N, flatus +  Urine output: Adequate, 125/h avg      Lines:    Problem: Delirium  Goal: Optimal Coping  Outcome: Not Progressing  Intervention: Optimize Psychosocial Adjustment to Delirium  Recent Flowsheet Documentation  Taken 3/16/2025 0400 by Britany Arbeu RN  Family/Support System Care: caregiver stress acknowledged  Taken 3/16/2025 0000 by Britany Abreu RN  Family/Support System Care: caregiver stress acknowledged  Taken 3/15/2025 2000 by Britany Abreu RN  Family/Support System Care: caregiver stress acknowledged  Goal: Improved Behavioral Control  Outcome: Not Progressing  Intervention: Minimize Safety Risk  Recent Flowsheet Documentation  Taken 3/16/2025 0400 by Britany Abreu RN  Trust Relationship/Rapport:   care explained   emotional support provided   empathic listening provided   questions answered   questions encouraged  Taken 3/16/2025 0000 by Britany Abreu RN  Trust Relationship/Rapport:   care explained   emotional support provided   empathic listening provided   questions answered   questions encouraged  Taken 3/15/2025 2000 by Britany Abreu RN  Trust Relationship/Rapport:   care explained   emotional support provided   empathic listening provided   questions answered   questions encouraged  Goal: Improved Attention and Thought Clarity  Outcome: Not Progressing  Goal: Improved Sleep  Outcome: Not Progressing     Problem: Cardiovascular Surgery  Goal: Improved Activity Tolerance  Outcome: Not Progressing  Goal: Optimal Coping with Heart Surgery  Outcome: Not Progressing  Intervention: Support Psychosocial Response to Surgery  Recent Flowsheet Documentation  Taken 3/16/2025 0400  by Horn, Britany, RN  Family/Support System Care: caregiver stress acknowledged  Taken 3/16/2025 0000 by Britany Abreu RN  Family/Support System Care: caregiver stress acknowledged  Taken 3/15/2025 2000 by Britany Abreu RN  Family/Support System Care: caregiver stress acknowledged  Goal: Effective Cardiac Function  Outcome: Not Progressing  Goal: Blood Glucose Level Within Targeted Range  Outcome: Not Progressing  Goal: Anesthesia/Sedation Recovery  Outcome: Not Progressing  Intervention: Optimize Anesthesia Recovery  Recent Flowsheet Documentation  Taken 3/16/2025 0400 by Britany Abreu RN  Safety Promotion/Fall Prevention:   activity supervised   assistive device/personal items within reach   nonskid shoes/slippers when out of bed   safety round/check completed   clutter free environment maintained  Taken 3/16/2025 0000 by Britany Abreu RN  Safety Promotion/Fall Prevention:   activity supervised   assistive device/personal items within reach   nonskid shoes/slippers when out of bed   safety round/check completed   clutter free environment maintained  Taken 3/15/2025 2000 by Britany Abreu RN  Safety Promotion/Fall Prevention:   activity supervised   assistive device/personal items within reach   nonskid shoes/slippers when out of bed   safety round/check completed   clutter free environment maintained  Goal: Acceptable Pain Control  Outcome: Not Progressing     Problem: Cardiovascular Surgery  Goal: Absence of Bleeding  Outcome: Progressing  Goal: Effective Bowel Elimination  Outcome: Progressing  Goal: Optimal Cerebral Tissue Perfusion  Outcome: Progressing  Intervention: Protect and Optimize Cerebral Perfusion  Recent Flowsheet Documentation  Taken 3/16/2025 0400 by Britany Abreu RN  Head of Bed (HOB) Positioning: HOB at 30 degrees  Taken 3/16/2025 0200 by Britany Abreu RN  Head of Bed (HOB) Positioning: HOB at 30 degrees  Taken 3/16/2025 0000 by Britany Abreu RN  Head of Bed (HOB) Positioning:  HOB at 30 degrees  Taken 3/15/2025 2000 by Britany Abreu, RN  Head of Bed (HOB) Positioning: HOB at 30 degrees  Goal: Absence of Infection Signs and Symptoms  Outcome: Progressing  Goal: Nausea and Vomiting Relief  Outcome: Progressing   Goal Outcome Evaluation:

## 2025-03-16 NOTE — PROGRESS NOTES
Chart reviewed from overnight.  Patient underwent CABG on 3/15 and currently remains intubated, on ventilator support.  Will follow peripherally for next 24 hours-hospitalist service will see patient when extubated and ready to transfer out of ICU.

## 2025-03-16 NOTE — PLAN OF CARE
Pt arrived ~1145 to ICU. Pressor need increased post op, awaiting LA recheck/determine if should wean sedation further/SBT.  Pressors (which pressors and any increase/decrease in pressor needs): epi turned off, vaso at 4 added, levo 0.15 added. 2 L bolus given in ICU, albumin x1. 3 amp bicarb and bicarb gtt, stopped later. LA 9.2-12, recheck at 1930 6.3.  HR range: 70--110.   Chest tube output: 200 ml total since surgery. PMW, no air leak.     Neuro  Orientation: sedated  Delirium present?(y/n):   Sleep:   Pain: tylenol, lido patch    GI/  BM? (y/n):   Urine output: good urine output      Lines: a-line, internal jugular/cordis, piv  Family by bedside, supportive, all questions answered.     Problem: Cardiovascular Surgery  Goal: Optimal Cerebral Tissue Perfusion  Intervention: Protect and Optimize Cerebral Perfusion  Recent Flowsheet Documentation  Taken 3/15/2025 1800 by Anil Harris RN  Head of Bed (HOB) Positioning: HOB at 30 degrees  Taken 3/15/2025 1600 by Anil Harris RN  Head of Bed (HOB) Positioning: HOB at 30 degrees  Taken 3/15/2025 1400 by Anil Harris RN  Head of Bed (HOB) Positioning: HOB at 30 degrees  Goal: Fluid and Electrolyte Balance  Outcome: Progressing     Problem: Cardiovascular Surgery  Goal: Fluid and Electrolyte Balance  Outcome: Progressing     Problem: Adult Inpatient Plan of Care  Goal: Absence of Hospital-Acquired Illness or Injury  Intervention: Prevent Skin Injury  Recent Flowsheet Documentation  Taken 3/15/2025 1800 by Anil Harris RN  Body Position: turned  Taken 3/15/2025 1600 by Anil Harris RN  Body Position: turned  Taken 3/15/2025 1400 by Anil Harris RN  Body Position: turned   Goal Outcome Evaluation:

## 2025-03-17 ENCOUNTER — APPOINTMENT (OUTPATIENT)
Dept: OCCUPATIONAL THERAPY | Facility: CLINIC | Age: 75
DRG: 233 | End: 2025-03-17
Attending: PHYSICIAN ASSISTANT
Payer: COMMERCIAL

## 2025-03-17 DIAGNOSIS — Z95.1 S/P CABG (CORONARY ARTERY BYPASS GRAFT): Primary | ICD-10-CM

## 2025-03-17 LAB
ALLEN'S TEST: ABNORMAL
ANION GAP SERPL CALCULATED.3IONS-SCNC: 6 MMOL/L (ref 7–15)
BASE EXCESS BLDA CALC-SCNC: 4.2 MMOL/L (ref -3–3)
BUN SERPL-MCNC: 10.1 MG/DL (ref 8–23)
CA-I BLD-MCNC: 4.3 MG/DL (ref 4.4–5.2)
CA-I BLD-MCNC: 4.4 MG/DL (ref 4.4–5.2)
CALCIUM SERPL-MCNC: 8 MG/DL (ref 8.8–10.4)
CHLORIDE SERPL-SCNC: 110 MMOL/L (ref 98–107)
CREAT SERPL-MCNC: 1.25 MG/DL (ref 0.67–1.17)
EGFRCR SERPLBLD CKD-EPI 2021: 60 ML/MIN/1.73M2
ERYTHROCYTE [DISTWIDTH] IN BLOOD BY AUTOMATED COUNT: 13.3 % (ref 10–15)
GLUCOSE BLDC GLUCOMTR-MCNC: 100 MG/DL (ref 70–99)
GLUCOSE BLDC GLUCOMTR-MCNC: 112 MG/DL (ref 70–99)
GLUCOSE BLDC GLUCOMTR-MCNC: 112 MG/DL (ref 70–99)
GLUCOSE BLDC GLUCOMTR-MCNC: 132 MG/DL (ref 70–99)
GLUCOSE SERPL-MCNC: 131 MG/DL (ref 70–99)
HCO3 BLD-SCNC: 30 MMOL/L (ref 21–28)
HCO3 SERPL-SCNC: 29 MMOL/L (ref 22–29)
HCT VFR BLD AUTO: 23 % (ref 40–53)
HGB BLD-MCNC: 7.4 G/DL (ref 13.3–17.7)
HGB BLD-MCNC: 7.8 G/DL (ref 13.3–17.7)
MAGNESIUM SERPL-MCNC: 2 MG/DL (ref 1.7–2.3)
MCH RBC QN AUTO: 31 PG (ref 26.5–33)
MCHC RBC AUTO-ENTMCNC: 32.2 G/DL (ref 31.5–36.5)
MCV RBC AUTO: 96 FL (ref 78–100)
O2/TOTAL GAS SETTING VFR VENT: 37 %
OXYHGB MFR BLDA: 97 % (ref 92–100)
PCO2 BLD: 52 MM HG (ref 35–45)
PH BLD: 7.37 [PH] (ref 7.35–7.45)
PHOSPHATE SERPL-MCNC: 2.4 MG/DL (ref 2.5–4.5)
PLATELET # BLD AUTO: 78 10E3/UL (ref 150–450)
PO2 BLD: 105 MM HG (ref 80–105)
POTASSIUM SERPL-SCNC: 3.5 MMOL/L (ref 3.4–5.3)
POTASSIUM SERPL-SCNC: 4 MMOL/L (ref 3.4–5.3)
RBC # BLD AUTO: 2.39 10E6/UL (ref 4.4–5.9)
SAO2 % BLDA: 98.8 % (ref 95–96)
SODIUM SERPL-SCNC: 145 MMOL/L (ref 135–145)
STRESS ECHO TARGET HR: 144
WBC # BLD AUTO: 8.9 10E3/UL (ref 4–11)

## 2025-03-17 PROCEDURE — 94640 AIRWAY INHALATION TREATMENT: CPT | Mod: 76

## 2025-03-17 PROCEDURE — 250N000013 HC RX MED GY IP 250 OP 250 PS 637: Performed by: PHYSICIAN ASSISTANT

## 2025-03-17 PROCEDURE — 97110 THERAPEUTIC EXERCISES: CPT | Mod: GO

## 2025-03-17 PROCEDURE — 999N000157 HC STATISTIC RCP TIME EA 10 MIN

## 2025-03-17 PROCEDURE — 36415 COLL VENOUS BLD VENIPUNCTURE: CPT | Performed by: SURGERY

## 2025-03-17 PROCEDURE — 85018 HEMOGLOBIN: CPT | Performed by: SURGERY

## 2025-03-17 PROCEDURE — 82330 ASSAY OF CALCIUM: CPT | Performed by: SURGERY

## 2025-03-17 PROCEDURE — 250N000009 HC RX 250: Performed by: PHYSICIAN ASSISTANT

## 2025-03-17 PROCEDURE — 84132 ASSAY OF SERUM POTASSIUM: CPT | Performed by: SURGERY

## 2025-03-17 PROCEDURE — 99291 CRITICAL CARE FIRST HOUR: CPT | Mod: 24 | Performed by: INTERNAL MEDICINE

## 2025-03-17 PROCEDURE — 250N000013 HC RX MED GY IP 250 OP 250 PS 637: Performed by: SURGERY

## 2025-03-17 PROCEDURE — 82330 ASSAY OF CALCIUM: CPT | Performed by: PHYSICIAN ASSISTANT

## 2025-03-17 PROCEDURE — 97535 SELF CARE MNGMENT TRAINING: CPT | Mod: GO

## 2025-03-17 PROCEDURE — 85014 HEMATOCRIT: CPT | Performed by: PHYSICIAN ASSISTANT

## 2025-03-17 PROCEDURE — 97166 OT EVAL MOD COMPLEX 45 MIN: CPT | Mod: GO

## 2025-03-17 PROCEDURE — 84100 ASSAY OF PHOSPHORUS: CPT | Performed by: SURGERY

## 2025-03-17 PROCEDURE — 94640 AIRWAY INHALATION TREATMENT: CPT

## 2025-03-17 PROCEDURE — 83735 ASSAY OF MAGNESIUM: CPT | Performed by: SURGERY

## 2025-03-17 PROCEDURE — 85048 AUTOMATED LEUKOCYTE COUNT: CPT | Performed by: PHYSICIAN ASSISTANT

## 2025-03-17 PROCEDURE — 80048 BASIC METABOLIC PNL TOTAL CA: CPT | Performed by: PHYSICIAN ASSISTANT

## 2025-03-17 PROCEDURE — 250N000009 HC RX 250: Performed by: INTERNAL MEDICINE

## 2025-03-17 PROCEDURE — 82805 BLOOD GASES W/O2 SATURATION: CPT | Performed by: INTERNAL MEDICINE

## 2025-03-17 PROCEDURE — 93005 ELECTROCARDIOGRAM TRACING: CPT

## 2025-03-17 PROCEDURE — 93010 ELECTROCARDIOGRAM REPORT: CPT | Performed by: INTERNAL MEDICINE

## 2025-03-17 PROCEDURE — 250N000011 HC RX IP 250 OP 636: Performed by: PHYSICIAN ASSISTANT

## 2025-03-17 PROCEDURE — 99207 PR NO BILLABLE SERVICE THIS VISIT: CPT | Performed by: HOSPITALIST

## 2025-03-17 PROCEDURE — 250N000013 HC RX MED GY IP 250 OP 250 PS 637: Performed by: INTERNAL MEDICINE

## 2025-03-17 PROCEDURE — 200N000001 HC R&B ICU

## 2025-03-17 RX ORDER — ACETYLCYSTEINE 200 MG/ML
2 SOLUTION ORAL; RESPIRATORY (INHALATION)
Status: DISCONTINUED | OUTPATIENT
Start: 2025-03-17 | End: 2025-03-19

## 2025-03-17 RX ORDER — QUETIAPINE FUMARATE 25 MG/1
25 TABLET, FILM COATED ORAL EVERY EVENING
Status: DISCONTINUED | OUTPATIENT
Start: 2025-03-17 | End: 2025-03-18

## 2025-03-17 RX ORDER — HYDRALAZINE HYDROCHLORIDE 10 MG/1
10 TABLET, FILM COATED ORAL 4 TIMES DAILY
Status: DISCONTINUED | OUTPATIENT
Start: 2025-03-17 | End: 2025-03-18

## 2025-03-17 RX ORDER — OLANZAPINE 5 MG/1
5 TABLET ORAL
Status: DISCONTINUED | OUTPATIENT
Start: 2025-03-17 | End: 2025-03-18

## 2025-03-17 RX ORDER — FUROSEMIDE 10 MG/ML
40 INJECTION INTRAMUSCULAR; INTRAVENOUS ONCE
Status: COMPLETED | OUTPATIENT
Start: 2025-03-17 | End: 2025-03-17

## 2025-03-17 RX ORDER — IPRATROPIUM BROMIDE AND ALBUTEROL SULFATE 2.5; .5 MG/3ML; MG/3ML
3 SOLUTION RESPIRATORY (INHALATION)
Status: DISCONTINUED | OUTPATIENT
Start: 2025-03-17 | End: 2025-03-19

## 2025-03-17 RX ORDER — DEXMEDETOMIDINE HYDROCHLORIDE 4 UG/ML
.2-.7 INJECTION, SOLUTION INTRAVENOUS CONTINUOUS
Status: DISCONTINUED | OUTPATIENT
Start: 2025-03-17 | End: 2025-03-17

## 2025-03-17 RX ADMIN — HEPARIN SODIUM 5000 UNITS: 5000 INJECTION, SOLUTION INTRAVENOUS; SUBCUTANEOUS at 21:05

## 2025-03-17 RX ADMIN — IPRATROPIUM BROMIDE AND ALBUTEROL SULFATE 3 ML: .5; 3 SOLUTION RESPIRATORY (INHALATION) at 15:16

## 2025-03-17 RX ADMIN — OXYCODONE HYDROCHLORIDE 10 MG: 5 TABLET ORAL at 22:51

## 2025-03-17 RX ADMIN — NITROFURANTOIN MACROCRYSTALS 100 MG: 50 CAPSULE ORAL at 15:52

## 2025-03-17 RX ADMIN — FUROSEMIDE 40 MG: 10 INJECTION, SOLUTION INTRAMUSCULAR; INTRAVENOUS at 10:29

## 2025-03-17 RX ADMIN — POTASSIUM & SODIUM PHOSPHATES POWDER PACK 280-160-250 MG 1 PACKET: 280-160-250 PACK at 15:52

## 2025-03-17 RX ADMIN — QUETIAPINE FUMARATE 25 MG: 25 TABLET ORAL at 21:06

## 2025-03-17 RX ADMIN — METHOCARBAMOL 250 MG: 500 TABLET ORAL at 09:47

## 2025-03-17 RX ADMIN — POLYETHYLENE GLYCOL 3350 17 G: 17 POWDER, FOR SOLUTION ORAL at 08:08

## 2025-03-17 RX ADMIN — SENNOSIDES AND DOCUSATE SODIUM 1 TABLET: 50; 8.6 TABLET ORAL at 21:06

## 2025-03-17 RX ADMIN — ACETAMINOPHEN 975 MG: 325 TABLET, FILM COATED ORAL at 21:06

## 2025-03-17 RX ADMIN — CALCIUM GLUCONATE 1 G: 20 INJECTION, SOLUTION INTRAVENOUS at 07:20

## 2025-03-17 RX ADMIN — HYDROXYZINE HYDROCHLORIDE 10 MG: 10 TABLET ORAL at 00:48

## 2025-03-17 RX ADMIN — ACETAMINOPHEN 975 MG: 325 TABLET, FILM COATED ORAL at 05:57

## 2025-03-17 RX ADMIN — ATORVASTATIN CALCIUM 40 MG: 40 TABLET, FILM COATED ORAL at 07:49

## 2025-03-17 RX ADMIN — IPRATROPIUM BROMIDE AND ALBUTEROL SULFATE 3 ML: .5; 3 SOLUTION RESPIRATORY (INHALATION) at 20:48

## 2025-03-17 RX ADMIN — POTASSIUM & SODIUM PHOSPHATES POWDER PACK 280-160-250 MG 1 PACKET: 280-160-250 PACK at 07:49

## 2025-03-17 RX ADMIN — HEPARIN SODIUM 5000 UNITS: 5000 INJECTION, SOLUTION INTRAVENOUS; SUBCUTANEOUS at 13:56

## 2025-03-17 RX ADMIN — PANTOPRAZOLE SODIUM 40 MG: 40 TABLET, DELAYED RELEASE ORAL at 08:08

## 2025-03-17 RX ADMIN — DEXMEDETOMIDINE HYDROCHLORIDE 0.2 MCG/KG/HR: 400 INJECTION INTRAVENOUS at 03:32

## 2025-03-17 RX ADMIN — ACETYLCYSTEINE 2 ML: 200 SOLUTION ORAL; RESPIRATORY (INHALATION) at 11:23

## 2025-03-17 RX ADMIN — NITROFURANTOIN MACROCRYSTALS 100 MG: 50 CAPSULE ORAL at 05:57

## 2025-03-17 RX ADMIN — ACETYLCYSTEINE 2 ML: 200 SOLUTION ORAL; RESPIRATORY (INHALATION) at 20:48

## 2025-03-17 RX ADMIN — LIDOCAINE 1 PATCH: 560 PATCH PERCUTANEOUS; TOPICAL; TRANSDERMAL at 13:56

## 2025-03-17 RX ADMIN — METHOCARBAMOL 250 MG: 500 TABLET ORAL at 16:26

## 2025-03-17 RX ADMIN — IPRATROPIUM BROMIDE AND ALBUTEROL SULFATE 3 ML: .5; 3 SOLUTION RESPIRATORY (INHALATION) at 11:23

## 2025-03-17 RX ADMIN — POTASSIUM & SODIUM PHOSPHATES POWDER PACK 280-160-250 MG 1 PACKET: 280-160-250 PACK at 12:27

## 2025-03-17 RX ADMIN — HEPARIN SODIUM 5000 UNITS: 5000 INJECTION, SOLUTION INTRAVENOUS; SUBCUTANEOUS at 05:58

## 2025-03-17 RX ADMIN — NITROFURANTOIN MACROCRYSTALS 100 MG: 50 CAPSULE ORAL at 09:45

## 2025-03-17 RX ADMIN — OXYCODONE HYDROCHLORIDE 10 MG: 5 TABLET ORAL at 05:57

## 2025-03-17 RX ADMIN — NITROFURANTOIN MACROCRYSTALS 100 MG: 50 CAPSULE ORAL at 21:05

## 2025-03-17 RX ADMIN — OXYCODONE HYDROCHLORIDE 10 MG: 5 TABLET ORAL at 00:48

## 2025-03-17 RX ADMIN — OXYCODONE HYDROCHLORIDE 5 MG: 5 TABLET ORAL at 14:46

## 2025-03-17 RX ADMIN — ACETAMINOPHEN 975 MG: 325 TABLET, FILM COATED ORAL at 13:57

## 2025-03-17 RX ADMIN — ACETYLCYSTEINE 2 ML: 200 SOLUTION ORAL; RESPIRATORY (INHALATION) at 15:16

## 2025-03-17 RX ADMIN — ASPIRIN 81 MG CHEWABLE TABLET 162 MG: 81 TABLET CHEWABLE at 09:44

## 2025-03-17 RX ADMIN — ONDANSETRON 4 MG: 2 INJECTION, SOLUTION INTRAMUSCULAR; INTRAVENOUS at 20:11

## 2025-03-17 ASSESSMENT — ACTIVITIES OF DAILY LIVING (ADL)
ADLS_ACUITY_SCORE: 59
ADLS_ACUITY_SCORE: 57
ADLS_ACUITY_SCORE: 59
ADLS_ACUITY_SCORE: 58
ADLS_ACUITY_SCORE: 59
ADLS_ACUITY_SCORE: 57
ADLS_ACUITY_SCORE: 59
ADLS_ACUITY_SCORE: 57
ADLS_ACUITY_SCORE: 59
ADLS_ACUITY_SCORE: 57
ADLS_ACUITY_SCORE: 59
ADLS_ACUITY_SCORE: 57
ADLS_ACUITY_SCORE: 59

## 2025-03-17 NOTE — PLAN OF CARE
CV  Pressors (which pressors and any increase/decrease in pressor needs): Levo gtt infusing, 0.03  HR range: 60-85  Chest tube output: 330cc      Neuro  Orientation: Ox4  Delirium present?(y/n): N, Cam negative however remains agitated, combative, refusing cares throughout the shift.  Refusing to get to chair in AM.  When attempting to stand pt he gets verbally aggressive, swearing at staff, pushing staff away.  This writer reconfirmed orientation status, no confusion noted.  Continues to refuse.  Sleep: Good  Pain: Controlled    GI/  BM? (y/n): No  Urine output: 100-125/h      Lines:    Problem: Delirium  Goal: Optimal Coping  Outcome: Not Progressing  Intervention: Optimize Psychosocial Adjustment to Delirium  Recent Flowsheet Documentation  Taken 3/17/2025 0400 by Britany Abreu RN  Family/Support System Care: caregiver stress acknowledged  Taken 3/17/2025 0000 by Britany Abreu RN  Family/Support System Care: caregiver stress acknowledged  Taken 3/16/2025 2000 by Britany Abreu RN  Family/Support System Care: caregiver stress acknowledged  Goal: Improved Behavioral Control  Outcome: Not Progressing  Intervention: Minimize Safety Risk  Recent Flowsheet Documentation  Taken 3/17/2025 0400 by Britany Abreu RN  Trust Relationship/Rapport:   care explained   emotional support provided   empathic listening provided   questions answered   questions encouraged  Taken 3/17/2025 0000 by Britany Abreu RN  Trust Relationship/Rapport:   care explained   emotional support provided   empathic listening provided   questions answered   questions encouraged  Taken 3/16/2025 2000 by Britany Abreu RN  Trust Relationship/Rapport:   care explained   emotional support provided   empathic listening provided   questions answered   questions encouraged     Problem: Cardiovascular Surgery  Goal: Improved Activity Tolerance  Outcome: Not Progressing  Goal: Optimal Coping with Heart Surgery  Outcome: Not  Progressing  Intervention: Support Psychosocial Response to Surgery  Recent Flowsheet Documentation  Taken 3/17/2025 0400 by Britany Abreu, RN  Family/Support System Care: caregiver stress acknowledged  Taken 3/17/2025 0000 by Britany Abreu, RN  Family/Support System Care: caregiver stress acknowledged  Taken 3/16/2025 2000 by Britany Abreu, RN  Family/Support System Care: caregiver stress acknowledged  Goal: Effective Bowel Elimination  Outcome: Not Progressing  Goal: Fluid and Electrolyte Balance  Outcome: Not Progressing   Goal Outcome Evaluation:

## 2025-03-17 NOTE — PLAN OF CARE
Hgb and K+ recheck ordered for 2000.  Pt rec lasix and had brisk response.  200cc CT output after walking w/PT. CT output still dark red. 1800 Hydralazine held; /55.

## 2025-03-17 NOTE — PROVIDER NOTIFICATION
This writer called to bedside by attendant, pt refusing to keep hfnc on.  Sats decreasing to 70s.  Initially, illogical, garbled responses to questions, but once more stimulated and awake Ox4, no confusion noted.  Remained agitated, swearing at staff, refusing to keep nasal cannula in place.  Per Dr. Suazo, restarted precedex gtt., BIPAP placed, will obtain ABG in 1hr.

## 2025-03-17 NOTE — PLAN OF CARE
Goal Outcome Evaluation:      Plan of Care Reviewed With: patient, child, caregiver (Daughters)          Outcome Evaluation: pending ongoing recommendations and plan of care

## 2025-03-17 NOTE — PLAN OF CARE
"Goal Outcome Evaluation:      Plan of Care Reviewed With: patient, family    Overall Patient Progress: improvingOverall Patient Progress: improving    Outcome Evaluation: Dex and levo off this morning. Pt calm, cooperative, A&O; bedside sitter discontinued.  Lasix x 1; 2,000cc UO this shift. Lines & mario removed.  Pt up to chair w/asst of 2 for line/tube mgmt. HFNC 37%, 45L.  Pulmonary toilet; RT seeing.  Bipap BID & prn as able for lung expansion.  Pt able to cough up phlegm. Pt walked franco with PT; did dump 200cc dk red blood after; 320cc total for 12 hrs.  Appetite fair; wanting to drink Coke. Family at bedside most of day; updated.  Plan for IMC tomorrow.      Problem: Adult Inpatient Plan of Care  Goal: Plan of Care Review  Description: The Plan of Care Review/Shift note should be completed every shift.  The Outcome Evaluation is a brief statement about your assessment that the patient is improving, declining, or no change.  This information will be displayed automatically on your shift  note.  Outcome: Progressing  Flowsheets (Taken 3/17/2025 1800)  Outcome Evaluation:   Dex and levo off this morning. Pt calm, cooperative, A&O   bedside sitter discontinued.  Lasix x 1   2,000cc UO this shift. Lines & mario removed.  Pt up to chair w/asst of 2 for line/tube mgmt. HFNC 37%, 45L.  Pulmonary toilet   RT seeing.  Bipap BID & prn as able for lung expansion.  Pt able to cough up phlegm. Pt walked franco with PT   did dump 200cc dk red blood after   320cc total for 12 hrs.  Appetite fair   wanting to drink Coke. Family at bedside most of day   updated.  Plan for IMC tomorrow.  Plan of Care Reviewed With:   patient   family  Overall Patient Progress: improving  Goal: Patient-Specific Goal (Individualized)  Description: You can add care plan individualizations to a care plan. Examples of Individualization might be:  \"Parent requests to be called daily at 9am for status\", \"I have a hard time hearing out of my right ear\", " "or \"Do not touch me to wake me up as it startles  me\".  Outcome: Progressing  Flowsheets (Taken 3/17/2025 1600)  Individualized Care Needs:   likes to watch HUGH David  Goal: Absence of Hospital-Acquired Illness or Injury  Outcome: Progressing  Intervention: Identify and Manage Fall Risk  Recent Flowsheet Documentation  Taken 3/17/2025 1600 by Shabnam Johnson RN  Safety Promotion/Fall Prevention:   activity supervised   assistive device/personal items within reach   nonskid shoes/slippers when out of bed   safety round/check completed   clutter free environment maintained  Taken 3/17/2025 0800 by Shabnam Johnson RN  Safety Promotion/Fall Prevention:   activity supervised   assistive device/personal items within reach   nonskid shoes/slippers when out of bed   safety round/check completed   clutter free environment maintained  Intervention: Prevent Skin Injury  Recent Flowsheet Documentation  Taken 3/17/2025 1728 by Shabnam Johnson RN  Body Position:   turned   left  Taken 3/17/2025 1100 by Shabnam Johnson RN  Body Position:   turned   right  Intervention: Prevent and Manage VTE (Venous Thromboembolism) Risk  Recent Flowsheet Documentation  Taken 3/17/2025 1600 by Shabnam Johnson RN  VTE Prevention/Management: SCDs on (sequential compression devices)  Taken 3/17/2025 0800 by Shabnam Johnson RN  VTE Prevention/Management: SCDs on (sequential compression devices)  Goal: Optimal Comfort and Wellbeing  Outcome: Progressing  Intervention: Monitor Pain and Promote Comfort  Recent Flowsheet Documentation  Taken 3/17/2025 0827 by Shabnam Johnson RN  Pain Management Interventions: repositioned  Intervention: Provide Person-Centered Care  Recent Flowsheet Documentation  Taken 3/17/2025 1600 by Shabnam Jhonson RN  Trust Relationship/Rapport:   care explained   emotional support provided   questions encouraged   reassurance provided  Taken 3/17/2025 0800 by Shabnam Johnson RN  Trust " Relationship/Rapport:   care explained   emotional support provided   questions encouraged   reassurance provided  Goal: Readiness for Transition of Care  Outcome: Progressing     Problem: Delirium  Goal: Optimal Coping  Outcome: Progressing  Intervention: Optimize Psychosocial Adjustment to Delirium  Recent Flowsheet Documentation  Taken 3/17/2025 1600 by Shabnam Johnson RN  Family/Support System Care:   caregiver stress acknowledged   involvement promoted   presence promoted  Taken 3/17/2025 0800 by Shabnam Johnson RN  Family/Support System Care:   caregiver stress acknowledged   involvement promoted   presence promoted  Goal: Improved Behavioral Control  Outcome: Progressing  Intervention: Minimize Safety Risk  Recent Flowsheet Documentation  Taken 3/17/2025 1600 by Shabnam Johnson RN  Trust Relationship/Rapport:   care explained   emotional support provided   questions encouraged   reassurance provided  Taken 3/17/2025 0800 by Shabnam Johnson RN  Trust Relationship/Rapport:   care explained   emotional support provided   questions encouraged   reassurance provided  Goal: Improved Attention and Thought Clarity  Outcome: Progressing  Goal: Improved Sleep  Outcome: Progressing     Problem: Cardiovascular Surgery  Goal: Improved Activity Tolerance  Outcome: Progressing  Goal: Optimal Coping with Heart Surgery  Outcome: Progressing  Intervention: Support Psychosocial Response to Surgery  Recent Flowsheet Documentation  Taken 3/17/2025 1600 by Shabnam Johnson RN  Family/Support System Care:   caregiver stress acknowledged   involvement promoted   presence promoted  Taken 3/17/2025 0800 by Shabnam Johnson RN  Family/Support System Care:   caregiver stress acknowledged   involvement promoted   presence promoted  Goal: Absence of Bleeding  Outcome: Progressing  Goal: Effective Bowel Elimination  Outcome: Progressing  Intervention: Enhance Bowel Motility and Elimination  Recent Flowsheet  Documentation  Taken 3/17/2025 1600 by Shabnam Johnson RN  Bowel Motility Enhancement:   ambulation promoted   fluid intake encouraged  Taken 3/17/2025 0800 by Shabnam Johnson RN  Bowel Motility Enhancement:   ambulation promoted   fluid intake encouraged  Goal: Effective Cardiac Function  Outcome: Progressing  Goal: Optimal Cerebral Tissue Perfusion  Outcome: Progressing  Intervention: Protect and Optimize Cerebral Perfusion  Recent Flowsheet Documentation  Taken 3/17/2025 1728 by Shabnam Johnson RN  Head of Bed (HOB) Positioning: HOB at 30-45 degrees  Taken 3/17/2025 1100 by hSabnam Johnson RN  Head of Bed (HOB) Positioning: HOB at 20-30 degrees  Goal: Fluid and Electrolyte Balance  Outcome: Progressing  Goal: Blood Glucose Level Within Targeted Range  Outcome: Progressing  Goal: Absence of Infection Signs and Symptoms  Outcome: Progressing  Goal: Anesthesia/Sedation Recovery  Outcome: Progressing  Intervention: Optimize Anesthesia Recovery  Recent Flowsheet Documentation  Taken 3/17/2025 1600 by Shabnam Johnson RN  Safety Promotion/Fall Prevention:   activity supervised   assistive device/personal items within reach   nonskid shoes/slippers when out of bed   safety round/check completed   clutter free environment maintained  Administration (IS):   instruction provided, follow-up   proper technique demonstrated  Taken 3/17/2025 0800 by Shabnam Johnson RN  Safety Promotion/Fall Prevention:   activity supervised   assistive device/personal items within reach   nonskid shoes/slippers when out of bed   safety round/check completed   clutter free environment maintained  Administration (IS):   instruction provided, follow-up   proper technique demonstrated  Level Incentive Spirometer (mL): 500  Number of Repetitions (IS): 5  Goal: Acceptable Pain Control  Outcome: Progressing  Intervention: Prevent or Manage Pain  Recent Flowsheet Documentation  Taken 3/17/2025 0827 by Shabnam Johnson  RN  Pain Management Interventions: repositioned  Goal: Nausea and Vomiting Relief  Outcome: Progressing  Goal: Effective Urinary Elimination  Outcome: Progressing  Goal: Effective Oxygenation and Ventilation  Outcome: Progressing  Intervention: Promote Airway Secretion Clearance  Recent Flowsheet Documentation  Taken 3/17/2025 1600 by Shabnam Johnson RN  Administration (IS):   instruction provided, follow-up   proper technique demonstrated  Taken 3/17/2025 0800 by Shabnam Johnson RN  Administration (IS):   instruction provided, follow-up   proper technique demonstrated  Level Incentive Spirometer (mL): 500  Number of Repetitions (IS): 5

## 2025-03-17 NOTE — CONSULTS
Care Management Initial Consult    General Information  Assessment completed with: Patient, Family, Patient and Daughters Meliton Bueno Ania  Type of CM/SW Visit: CM Role Introduction    Primary Care Provider verified and updated as needed: Yes   Readmission within the last 30 days: no previous admission in last 30 days      Reason for Consult: discharge planning  Advance Care Planning: Advance Care Planning Reviewed: present on chart          Communication Assessment  Patient's communication style: spoken language (English or Bilingual)    Hearing Difficulty or Deaf: no        Cognitive  Cognitive/Neuro/Behavioral: WDL  Level of Consciousness: lethargic  Arousal Level: arouses to voice  Orientation: oriented x 4  Mood/Behavior: calm, behavior appropriate to situation  Best Language: 0 - No aphasia  Speech: hoarse, spontaneous, logical    Living Environment:   People in home: child(julio), adult (Meliton Rosenthal)  Meliton Rosenthal  Current living Arrangements: house      Able to return to prior arrangements: other (see comments) (pending therapies)       Family/Social Support:  Care provided by: child(julio)  Provides care for: no one  Marital Status:   Support system: Children          Description of Support System: Supportive, Involved    Support Assessment: Adequate family and caregiver support, Adequate social supports    Current Resources:   Patient receiving home care services: No        Community Resources: None  Equipment currently used at home: grab bar, toilet, grab bar, tub/shower, shower chair, walker, standard, commode chair  Supplies currently used at home:      Employment/Financial:  Employment Status: retired        Financial Concerns: none           Does the patient's insurance plan have a 3 day qualifying hospital stay waiver?  Yes     Which insurance plan 3 day waiver is available? Alternative insurance waiver    Will the waiver be used for post-acute placement? Undetermined at this time    Lifestyle &  Psychosocial Needs:  Social Drivers of Health     Food Insecurity: Low Risk  (3/13/2025)    Food Insecurity     Within the past 12 months, did you worry that your food would run out before you got money to buy more?: No     Within the past 12 months, did the food you bought just not last and you didn t have money to get more?: No   Depression: Not at risk (1/14/2025)    PHQ-2     PHQ-2 Score: 0   Housing Stability: Low Risk  (3/13/2025)    Housing Stability     Do you have housing? : Yes     Are you worried about losing your housing?: No   Tobacco Use: Medium Risk (3/13/2025)    Patient History     Smoking Tobacco Use: Former     Smokeless Tobacco Use: Never     Passive Exposure: Not on file   Financial Resource Strain: Low Risk  (3/13/2025)    Financial Resource Strain     Within the past 12 months, have you or your family members you live with been unable to get utilities (heat, electricity) when it was really needed?: No   Alcohol Use: Not on file   Transportation Needs: Low Risk  (3/13/2025)    Transportation Needs     Within the past 12 months, has lack of transportation kept you from medical appointments, getting your medicines, non-medical meetings or appointments, work, or from getting things that you need?: No   Physical Activity: Unknown (9/26/2024)    Exercise Vital Sign     Days of Exercise per Week: 1 day     Minutes of Exercise per Session: Not on file   Interpersonal Safety: Low Risk  (1/28/2025)    Interpersonal Safety     Do you feel physically and emotionally safe where you currently live?: Yes     Within the past 12 months, have you been hit, slapped, kicked or otherwise physically hurt by someone?: No     Within the past 12 months, have you been humiliated or emotionally abused in other ways by your partner or ex-partner?: No   Stress: No Stress Concern Present (9/26/2024)    [employer] [place] [employer] - Occupational Stress Questionnaire     Feeling of Stress : Not at all   Social  Connections: Unknown (9/26/2024)    Social Connection and Isolation Panel [NHANES]     Frequency of Communication with Friends and Family: Not on file     Frequency of Social Gatherings with Friends and Family: More than three times a week     Attends Moravian Services: Not on file     Active Member of Clubs or Organizations: Not on file     Attends Club or Organization Meetings: Not on file     Marital Status: Not on file   Health Literacy: Not on file       Functional Status:  Prior to admission patient needed assistance:   Dependent ADLs:: Independent, Ambulation-walker          Mental Health Status:  Mental Health Status: No Current Concerns       Chemical Dependency Status:  Chemical Dependency Status: No Current Concerns             Values/Beliefs:  Spiritual, Cultural Beliefs, Moravian Practices, Values that affect care: no               Discussed  Partnership in Safe Discharge Planning  document with patient/family: No    Additional Information:  Writer met with patient and Daughter's at the bedside. Explained role and scope of discharge planning. Patient deferred majority of consult to Daughters and Daughter Meliton Rosenthal noted that she resides at home with the patient. She stated that she is hopeful if the plan is for home that they are able to get a hospital bed. She noted she has a commode, walker, shower chair etc.  Patient currently on HFNC and has pending PT/OT consults. Writer briefly went over possible plans for discharge to include home with assist and possible homecare vs TCU.  Writer explained TCU 7-10 days with a focus on getting stronger to return to the home. Daughter Meliton Rosenthal noted that she would like all follow up to be called or emailed to her (she noted that [facility] emails her currently). Other Daughter Ania prefers MyChart and would like to see follow up's. Writer will work on getting this information updated and notify Ania.  Patient and family aware that we will continue to follow the  patient for discharge planning and needs as identified.        Kita Pillai RN, BSN, ACM   Care Transitions Specialist  [facility]  Care Transitions Specialist  [address] [place] [address]  [email or web address]  Office: [phone] Fax: [phone]  [facility]

## 2025-03-17 NOTE — PROGRESS NOTES
03/17/25 1600   Appointment Info   Signing Clinician's Name / Credentials (OT) Marine Charles OTr/L   Living Environment   People in Home child(julio), adult  (adult daughter)   Current Living Arrangements house   Home Accessibility stairs to enter home   Number of Stairs, Main Entrance 3   Stair Railings, Main Entrance railings on both sides of stairs;railings safe and in good condition   Transportation Anticipated family or friend will provide   Living Environment Comments Pt lives w/ daughter in house, 3 ADY, shower chair, owns a walks but was not using it PTA.   Self-Care   Usual Activity Tolerance good   Current Activity Tolerance moderate   Regular Exercise No   Equipment Currently Used at Home grab bar, toilet;grab bar, tub/shower;shower chair;walker, standard;commode chair   Fall history within last six months no   Activity/Exercise/Self-Care Comment Recently had lung nodule removed and has not been very active since. Prior to admission pt was IND w/ all basic self cares. Owns shower chair from back surgery but was not using recently.   Instrumental Activities of Daily Living (IADL)   IADL Comments Pt does not drive, however pt does often go with daughter to grocery store, shopping. Pt usually manages own medications but daughter helps as needed and oversees to make sure that he is taking accurately.   General Information   Onset of Illness/Injury or Date of Surgery 03/15/25   Referring Physician Jeanne Hunter, PA-C   Patient/Family Therapy Goal Statement (OT) return to home   Additional Occupational Profile Info/Pertinent History of Current Problem Fermín Josue is a 75 year old male with past medical history significant for recently diagnosed non-small cell lung cancer with recent nodule removal (01/2025), BPH, history of CAD with prior stenting x3, CKD stage IIIa with prior nephrectomy for RCC (2021), hypertension, hyperlipidemia, obesity class I, and prediabetes who was admitted with chest pain.   He underwent TTE on 3/13/25 which revealed EF 60%, no significant valvular disease, mild dilatation of aortic sinus of valsalva at 4.3cm, LVH and early diastolic dysfunction. Coronary angiogram on 3/14/25 reveals CAD with LM disease and mid RCA . He continued to have unstable angina and underwent emergent CABG x2 with Dr. John on 3/15/25. The patient was admitted to the ICU post-operatively intubated and sedated on vasopressors.   Existing Precautions/Restrictions cardiac;sternal   Cognitive Status Examination   Orientation Status orientation to person, place and time   Cognitive Status Comments pt is as expected slightly disoriented to day of week although still in ICU.   Visual Perception   Impact of Vision Impairment on Function (Vision) no clear deficits at this time   Sensory   Sensory Comments reports pain at sternal incision   Pain Assessment   Patient Currently in Pain Yes, see Vital Sign flowsheet  (6/10)   Range of Motion Comprehensive   Comment, General Range of Motion NT 2/2 sternal precautions   Strength Comprehensive (MMT)   Comment, General Manual Muscle Testing (MMT) Assessment deconditioning 2/2 mutliple medical complexities including recent lung surgery as well.   Bed Mobility   Comment (Bed Mobility) Mod Ax2   Transfers   Transfer Comments Mod A to scoot hips forward on bed, CGA-Min A STS   Balance   Balance Comments CGA - Min A first time ambulating, use of FWW for stability   Activities of Daily Living   BADL Assessment/Intervention toileting;grooming   Grooming Assessment/Training   Comment, (Grooming) set up A w/ wash cloth and tissues   Toileting   Comment, (Toileting) mario, expect Ax1-2 pivot to commode 2/2 lines   Clinical Impression   Criteria for Skilled Therapeutic Interventions Met (OT) Yes, treatment indicated   OT Diagnosis impaired ADL/IADL IND   OT Problem List-Impairments impacting ADL problems related to;activity tolerance impaired;balance;mobility;strength;post-surgical  precautions   Assessment of Occupational Performance 5 or more Performance Deficits   Identified Performance Deficits dressing, bathing, functional mobility, home mgmt, activity tolerance   Planned Therapy Interventions (OT) ADL retraining;IADL retraining;strengthening;transfer training;home program guidelines;progressive activity/exercise;risk factor education   Clinical Decision Making Complexity (OT) detailed assessment/moderate complexity   Risk & Benefits of therapy have been explained evaluation/treatment results reviewed;care plan/treatment goals reviewed;risks/benefits reviewed;current/potential barriers reviewed;participants voiced agreement with care plan;participants included;patient;daughter   OT Total Evaluation Time   OT Eval, Moderate Complexity Minutes (54453) 11   OT Goals   Therapy Frequency (OT) 2 times/day   OT Predicted Duration/Target Date for Goal Attainment 03/27/25   OT Goals Hygiene/Grooming;Lower Body Dressing;Toilet Transfer/Toileting;Cardiac Phase 1   OT: Hygiene/Grooming supervision/stand-by assist;within precautions;while standing   OT: Lower Body Dressing Modified independent;within precautions   OT: Toilet Transfer/Toileting Modified independent;cleaning and garment management;toilet transfer;within precautions   OT: Understanding of cardiac education to maximize quality of life, condition management, and health outcomes Patient;Caregiver;Demonstrate   OT: Perform aerobic activity with stable cardiovascular response continuous;10 minutes;ambulation   OT: Functional/aerobic ambulation tolerance with stable cardiovascular response in order to return to home and community environment Supervision/SBA;Greater than 300 feet   OT: Navigation of stairs simulating home set up with stable cardiovascular response in order to return to home and community environment Supervision/SBA;3 stairs;Rail on both sides   Interventions   Interventions Quick Adds Cardiac Rehab   Self-Care/Home Management    Self-Care/Home Mgmt/ADL, Compensatory, Meal Prep Minutes (14942) 10   Treatment Detail/Skilled Intervention OT: Additional time at start of session for pt to wash face, use tissues. Mod Ax2 supine > sit EOB, able to perform SLR however needing Max A to scoot hips forward in bed for BLEs to be touching floor. Initiated CR education for precautions, did not provide handouts.   Therapeutic Procedures/Exercise   Therapeutic Procedure: strength, endurance, ROM, flexibillity minutes (96512) 24   Treatment Detail/Skilled Intervention OT: Additional time for line mgmt and room set up to switch over monitors etc. Min A STS to FWW, pt initially confused asking to use bathroom however has mario and does not need to have BM, CGA-Min A progressing to CGA ambulation w/ FWW and close chair follow RN present and rehab aide also available w/ chair follow. See additional details below  (collaborated w/ RN to switch from HiFl to NC (4L) during activity then returning on HiFl after activity, per RN cardiology wants pt on HiFl for lung expansion 2/2 recent lung procedure)   Treatment Time Includes (CR Only) See specific exercise details intervention group(s);Monitoring of vital signs (see vital signs flowsheet for details);Extra time managing multiple lines/tubes;Extra time changing tele monitor   Ambulation   Workload 50'x2 w/ FWW   Effort Scale 6   Symptoms Incisional pain;Fatigue   Cardiovascular Response Normal   Vital Signs Details 4L O2 NC, >90% SpO2   Cardiac Education   Education Provided Precautions   Education Packet Given to Patient No   All Patient Education Handouts Reviewed with Patient and/or Family No   Cardiac Rehab Phase II Plan   Phase II Order Received Yes   Phase II Appointment Status Scheduled   Date/Time 3/28/25 @ 2PM   Location [facility]   OT Discharge Planning   OT Plan bring [employer] handouts, ambulation in hallway, timed amb, 3 stairs w/ 2 railings, bed mobility   OT Discharge Recommendation (DC Rec) home with  outpatient cardiac rehab;home with assist   OT Rationale for DC Rec Pt functioning below baseline however expect continued progression and tolerance for pt to return home w/ assist from daughter(s) and OP CR. Pt does not drive however daughter available to drive.   OT Brief overview of current status Goals of therapy will be to address safe mobility and make recs for d/c to next level of care. Pt and RN will continue to follow all falls risk precautions as documented by RN staff while hospitalized  (Mod A x2 bed mob)   OT Total Distance Amb During Session (feet) 100   Total Session Time   Timed Code Treatment Minutes 34   Total Session Time (sum of timed and untimed services) 45   Psychosocial Support   Trust Relationship/Rapport care explained;emotional support provided;questions encouraged;reassurance provided   Family/Support System Care caregiver stress acknowledged;involvement promoted;presence promoted

## 2025-03-17 NOTE — PROGRESS NOTES
[facility]  Cardiovascular and Thoracic Surgery Daily Note        Assessment and Plan  Fermín Josue is a 75 year old male with a PMH of recently diagnosed non small cell lung cancer s/p VATS wedge resection of RUL lung nodule on 1/28/25 (PFTs done prior show mild restrictive lung disease), BPH, CKD stage IIIa, renal cancer s/p nephrectomy, JAMEE, hypertension, dyslipidemia, obesity, prediabetes, CAD s/p TRISH x 2 LAD and occluded RCA in 2007 who was admitted to [facility] 03/13 with 1-2 week history of chest pain. TTE 3/13/25 with preserved biventricular systolic function and early diastolic dysfunction. Coronary angiogram on 3/14/25 revealed multivessel CAD with 70% LMCA stenosis and mid RCA . CV surgery consulted for consideration of surgical revascularization. Underwent CABG x 2 (LIMA to LAD, SVG to RCA) on 03/15 with Dr. John.      POD # 2 CABG x 2 (LIMA to LAD, SVG to RCA) with Dr. Dimitri John     - CVS:   Pre-op TTE with preserved biventricular systolic function and early diastolic dysfunction.   Profound postop vasoplegic shock with lactic acidosis, resolved. NE weaned off this AM. CI robust off epi.   Hypertensive at times. Start PO hydralazine 10 mg QID. Defer BB at present with accelerated junctional rhythm.  Hypervolemic, Lasix 40 mg IV x1  Aspirin 162 mg daily, atorvastatin 40 mg daily.   Chest tubes: output 275, no air leak, keep today. TPW: back up 45     - Resp:   Postoperative mechanical ventilation, resolved. Extubated POD1 AM (agitated and non-redirectable with sedation wean POD0)  Recently diagnosed non small cell lung cancer s/p VATS wedge resection of RUL lung nodule on 1/28/25 (PFTs done prior show mild restrictive lung disease). Stage 1A1. Recent oncology note reports no further treatment indicated.      - Neuro: Neuro grossly intact. Pain controlled. POD0 overnight when sedation weaned, given Zyprexa x1 to facilitate extubation. Recurrent agitation POD1 at night requiring  resumption of Precedex, trial Seroquel 25 mg at bedtime.      - Renal: CKD stage 3, history of left nephrectomy 04/2021 for RCC. Baseline Cr ~ 1.2-1.5. Cr stable within baseline. Trend BMP/UOP. Volume management as above.        Recent Labs   Lab 03/17/25  0435 03/16/25  0411 03/15/25  1644   CR 1.25* 1.43* 1.27*         - GI: -BM, +flatus, continue bowel regimen     - : Whitmore in place, remove today     - Endo: Pre-diabetes. Postop stress hyperglycemia, improved. Insulin infusion transitioned to sliding scale insulin.           Hemoglobin A1C   Date Value Ref Range Status   01/14/2025 5.8 (H) 0.0 - 5.6 % Final       Comment:       Normal <5.7%   Prediabetes 5.7-6.4%    Diabetes 6.5% or higher     Note: Adopted from ADA consensus guidelines.   10/15/2019 5.8 (H) 0 - 5.6 % Final       Comment:       Normal <5.7% Prediabetes 5.7-6.4%  Diabetes 6.5% or higher - adopted from ADA   consensus guidelines.            - FEN: Replace electrolytes as needed. ADAT post extubation.      - ID: Postop leukocytosis, likely reactive from surgery. Afebrile. WBC normalized. Periop abx prophylaxis complete. Staph epidermidis UTI noted on preop UC, started on Macrobid. Trend CBC and fever curve.         Recent Labs   Lab 03/17/25  0435 03/16/25  0411 03/15/25  1800   WBC 8.9 8.6 25.1*         - Heme: Acute blood loss anemia and thrombocytopenia due to surgery. Hgb and PLT 8.4/101. Trend CBC, transfuse PRN.         Recent Labs   Lab 03/17/25  0435 03/16/25  0411 03/15/25  1800   HGB 7.4* 8.4* 10.3*   PLT 78* 101* 188         - Proph: SCD, subcutaneous heparin, PPI     - Other:    Clinically Significant Risk Factors         # Hypokalemia: Lowest K = 3.1 mmol/L in last 2 days, will replace as needed  # Hypernatremia: Highest Na = 146 mmol/L in last 2 days, will monitor as appropriate  # Hyperchloremia: Highest Cl = 110 mmol/L in last 2 days, will monitor as appropriate        # Hypocalcemia: Lowest Ca = 8 mg/dL in last 2 days, will  "monitor and replace as appropriate    # Anion Gap Metabolic Acidosis: Highest Anion Gap = 21 mmol/L in last 2 days, will monitor and treat as appropriate      # Coagulation Defect: INR = 1.48 (Ref range: 0.85 - 1.15) and/or PTT = 69 Seconds (Ref range: 22 - 38 Seconds), will monitor for bleeding  # Thrombocytopenia: Lowest platelets = 78 in last 2 days, will monitor for bleeding   # Hypertension: Noted on problem list     # Acute Hypercapnic Respiratory Failure: based on arterial blood gas results.  Continue supplemental oxygen and ventilatory support as indicated.          # Obesity: Estimated body mass index is 35.53 kg/m  as calculated from the following:    Height as of this encounter: 1.727 m (5' 8\").    Weight as of this encounter: 106 kg (233 lb 11 oz)., PRESENT ON ADMISSION                - Dispo: ICU, ok to remove lines/mario. If remains calm overnight on Seroquel without needed to restart Precedex, can transfer out in the AM.     Interval History  Agitated and non-redirectable overnight, required resumption of Precedex infusion. Oriented this morning and participating in cares.         Medications  Inpatient Administered Meds             Current Facility-Administered Medications   Medication Dose Route Frequency Provider Last Rate Last Admin    acetaminophen (TYLENOL) tablet 975 mg  975 mg Oral Q8H Jeanne Hunter PA-C   975 mg at 03/17/25 0557    aspirin (ASA) chewable tablet 162 mg  162 mg Oral or NG Tube Daily Jeanne Hunter PA-C   162 mg at 03/16/25 0827    atorvastatin (LIPITOR) tablet 40 mg  40 mg Oral Daily Jeanne Hunter PA-C   40 mg at 03/16/25 0827    chlorhexidine (PERIDEX) 0.12 % solution 15 mL  15 mL Mouth/Throat Q12H Leonard Pedersen DO   15 mL at 03/16/25 0811    heparin ANTICOAGULANT injection 5,000 Units  5,000 Units Subcutaneous Q8H Jeanne Hunter PA-C   5,000 Units at 03/17/25 0558    insulin aspart (NovoLOG) injection (RAPID ACTING)  1-7 Units Subcutaneous TID AC " Jeanne Hunter PA-C        insulin aspart (NovoLOG) injection (RAPID ACTING)  1-5 Units Subcutaneous At Bedtime Jeanne Hunter PA-C        lactated ringers BOLUS 250 mL  250 mL Intravenous Once Jeanne Hunter PA-C   Rate Verify at 03/15/25 1352    Lidocaine (LIDOCARE) 4 % Patch 1 patch  1 patch Transdermal Q24H Jeanne Hunter PA-C   1 patch at 03/16/25 1437    nitroFURantoin macrocrystal (MACRODANTIN) capsule 100 mg  100 mg Oral or Feeding Tube Q6H Dimitri John MD   100 mg at 03/17/25 0557    pantoprazole (PROTONIX) 2 mg/mL suspension 40 mg  40 mg Oral or NG Tube Daily Jeanne Hunter PA-C   40 mg at 03/16/25 0811     Or    pantoprazole (PROTONIX) EC tablet 40 mg  40 mg Oral Daily Jeanne Hunter PA-C        polyethylene glycol (MIRALAX) Packet 17 g  17 g Oral Daily Jeanne Hunter PA-C   17 g at 03/16/25 0827    potassium & sodium phosphates (NEUTRA-PHOS) Packet 1 packet  1 packet Oral or Feeding Tube Q4H Kmear Suazo MD        senna-docusate (SENOKOT-S/PERICOLACE) 8.6-50 MG per tablet 1 tablet  1 tablet Oral BID Jeanne Hunter PA-C   1 tablet at 03/16/25 1952    sodium chloride (PF) 0.9% PF flush 3 mL  3 mL Intracatheter Q8H Jeanne Hunter PA-C   3 mL at 03/17/25 0558         Inpatient Meds PRN             Current Facility-Administered Medications   Medication Dose Route Frequency Provider Last Rate Last Admin    [START ON 3/18/2025] bisacodyl (DULCOLAX) suppository 10 mg  10 mg Rectal Daily PRN Jeanne Hunter PA-C        calcium carbonate (TUMS) chewable tablet 500 mg  500 mg Oral 4x Daily PRN Jeanne Hunter PA-C        calcium gluconate 1 g in 50 mL in sodium chloride intermittent infusion  1 g Intravenous Q6H PRN Jeanne Hunter PA-C        calcium gluconate 2 g in  mL intermittent infusion  2 g Intravenous Q6H PRN Jeanne Hunter PA-C        calcium gluconate 3 g in sodium chloride 0.9 % 100 mL intermittent infusion  3 g Intravenous Q6H PRN Jeanne Hunter PA-C         glucose gel 15-30 g  15-30 g Oral Q15 Min PRN Hunter, Jeanne C, PA-C         Or    dextrose 50 % injection 25-50 mL  25-50 mL Intravenous Q15 Min PRN Hunter, Jeanne C, PA-C         Or    glucagon injection 1 mg  1 mg Subcutaneous Q15 Min PRN Hunter, Jeanne C, PA-C        hydrALAZINE (APRESOLINE) injection 10 mg  10 mg Intravenous Q30 Min PRN Hunter, Jeanne C, PA-C        HYDROmorphone (DILAUDID) injection 0.2 mg  0.2 mg Intravenous Q2H PRN Hunter, Jeanne C, PA-C         Or    HYDROmorphone (DILAUDID) injection 0.4 mg  0.4 mg Intravenous Q2H PRN Hunter, Jeanne C, PA-C   0.4 mg at 03/16/25 2258    hydrOXYzine HCl (ATARAX) tablet 10 mg  10 mg Oral Q6H PRN Hunter, Jeanne C, PA-C   10 mg at 03/17/25 0048    lactated ringers BOLUS 250 mL  250 mL Intravenous Q10 Min PRN Hunter, Jeanne C, PA-C   Rate Verify at 03/15/25 1700    lidocaine (LMX4) cream   Topical Q1H PRN Hunter, Jeanne C, PA-C        lidocaine 1 % 0.1-1 mL  0.1-1 mL Other Q1H PRN Hunter, Jeanne C, PA-C        magnesium hydroxide (MILK OF MAGNESIA) suspension 30 mL  30 mL Oral Daily PRN Hunter, Jeanne C, PA-C        methocarbamol (ROBAXIN) half-tab 250 mg  250 mg Oral Q6H PRN Hunter, Jeanne C, PA-C        naloxone (NARCAN) injection 0.2 mg  0.2 mg Intravenous Q2 Min PRN Dimitri John MD         Or    naloxone (NARCAN) injection 0.4 mg  0.4 mg Intravenous Q2 Min PRN Dimitri John MD         Or    naloxone (NARCAN) injection 0.2 mg  0.2 mg Intramuscular Q2 Min PRN Dimitri John MD         Or    naloxone (NARCAN) injection 0.4 mg  0.4 mg Intramuscular Q2 Min PRN Dimitri John MD        norepinephrine (LEVOPHED) 4 mg in  mL infusion PREMIX  0.01-0.15 mcg/kg/min Intravenous Continuous PRN Jeanne Hunter PA-C 26.4 mL/hr at 03/16/25 1952 0.07 mcg/kg/min at 03/16/25 1952    ondansetron (ZOFRAN ODT) ODT tab 4 mg  4 mg Oral Q6H PRN Jeanne Hunter PA-C         Or    ondansetron (ZOFRAN) injection 4 mg  4 mg Intravenous Q6H PRN  "Jeanne Hunter PA-C        oxyCODONE (ROXICODONE) tablet 5 mg  5 mg Oral Q4H PRN Jeanne Hunter PA-C         Or    oxyCODONE (ROXICODONE) tablet 10 mg  10 mg Oral Q4H PRN Jeanne Hunter PA-C   10 mg at 03/17/25 0557    prochlorperazine (COMPAZINE) injection 5 mg  5 mg Intravenous Q6H PRN Jeanne Hunter PA-C         Or    prochlorperazine (COMPAZINE) tablet 5 mg  5 mg Oral Q6H PRN Jeanne Hunter PA-C        Reason beta blocker order not selected   Does not apply DOES NOT GO TO MAR Jeanne Hunter PA-C        sodium chloride (PF) 0.9% PF flush 3 mL  3 mL Intracatheter q1 min prn Jeanne Hunter PA-C                   Physical Exam  Vitals were reviewed  Blood pressure 119/56, pulse 59, temperature 99.1  F (37.3  C), resp. rate 13, height 1.727 m (5' 8\"), weight 106 kg (233 lb 11 oz), SpO2 97%.  Rhythm: NSR     Lungs: diminished bases     Cardiovascular: rrr, no m/r/g     Abdomen: soft, NT, ND, +BS     Extremeties: warm, 1+ LE edema     Incision: CDI     CT: serosang output 275 mL, no air leak     Weight:         Vitals:     03/13/25 1434 03/15/25 0431 03/16/25 0619   Weight: 100.4 kg (221 lb 5.5 oz) 100.4 kg (221 lb 5.5 oz) 106 kg (233 lb 11 oz)            Data                 Recent Labs   Lab 03/17/25  0435 03/16/25  2302 03/16/25  1656 03/16/25  0559 03/16/25  0411 03/15/25  1806 03/15/25  1800 03/15/25  1648 03/15/25  1644 03/15/25  1357 03/15/25  1217 03/15/25  1046   WBC 8.9  --   --   --  8.6  --  25.1*  --   --    < > 26.3* 25.7*   HGB 7.4*  --   --   --  8.4*  --  10.3*  --   --    < > 10.7* 9.6*  10.0*   MCV 96  --   --   --  91  --  92  --   --    < > 96 94   PLT 78*  --   --   --  101*  --  188  --   --    < > 160 142*   INR  --   --   --   --   --   --   --   --   --   --  1.48* 1.57*     --   --   --  146*  --   --   --  146*  --  143 142  141   POTASSIUM 4.0  --   --   --  4.6  --   --   --  4.1  4.1  --  3.1* 3.4  3.5   CHLORIDE 110*  --   --   --  109*  --   --   --  104  --  105 " 107   CO2 29  --   --   --  27  --   --   --  21*  --  17* 24   BUN 10.1  --   --   --  10.2  --   --   --  10.0  --  12.2 13.0   CR 1.25*  --   --   --  1.43*  --   --   --  1.27*  --  1.32* 1.30*   ANIONGAP 6*  --   --   --  10  --   --   --  21*  --  21* 11   CANDICE 8.0*  --   --   --  8.5*  --   --   --  8.1*  --  9.6 8.4*   * 113* 114*   < > 104*   < >  --    < > 177*   < > 198* 160*  167*   ALBUMIN  --   --   --   --  3.6  --   --   --   --   --  3.5  --    PROTTOTAL  --   --   --   --  5.4*  --   --   --   --   --  5.5*  --    BILITOTAL  --   --   --   --  0.7  --   --   --   --   --  0.9  --    ALKPHOS  --   --   --   --  57  --   --   --   --   --  72  --    ALT  --   --   --   --  11  --   --   --   --   --  16  --    AST  --   --   --   --  34  --   --   --   --   --  37  --     < > = values in this interval not displayed.         Imaging:  No results found for this or any previous visit (from the past 24 hours).           Patient seen and discussed with Dr. Alvaro Hunter PAMihirC  Cardiothoracic Surgery  Available for paging [phone] (personal pager or CV Surgery Rounding Pager)  Personal Pager: [phone]  CV Surgery Rounding Pager: [phone]  After hours please page surgeon on-call

## 2025-03-17 NOTE — PROGRESS NOTES
Chart reviewed from overnight.  Patient was still on pressors this morning but is being weaned off.  Discussed with CV surgery.  They will reconsult hospitalist service when needed.  Hospitalist service will sign off.

## 2025-03-17 NOTE — PLAN OF CARE
"Goal Outcome Evaluation:    Pt. POD 1 after CAB X2, extubated this AM after zyprexa dose, propofol and fentanyl drips turned off, low dose precidex used unitl after extubation and then turned off, pt. Was alert although confused to time and forgetful after being reminded of urine catheter as he experienced mario discomfort,overall somewhat restful , ABG checked and pt.placed on HFNC with repeat ABG ordered, CV: paced although back rate successfully turned down to 45 and native rate in 60s with adequate BP, vasopressin turned off and pt. Remains on levophed (will resume vasopressin if high dose levophed approached), good urine output, moderate to small chest tube output, pt. Up to chair X1 without BP drop, pt's daughters at bedside and were updated and supportive, dilaudid given X1, and oxycodone X1 with good result.      Problem: Adult Inpatient Plan of Care  Goal: Plan of Care Review  Description: The Plan of Care Review/Shift note should be completed every shift.  The Outcome Evaluation is a brief statement about your assessment that the patient is improving, declining, or no change.  This information will be displayed automatically on your shift  note.  Outcome: Progressing  Goal: Patient-Specific Goal (Individualized)  Description: You can add care plan individualizations to a care plan. Examples of Individualization might be:  \"Parent requests to be called daily at 9am for status\", \"I have a hard time hearing out of my right ear\", or \"Do not touch me to wake me up as it startles  me\".  Outcome: Progressing  Goal: Absence of Hospital-Acquired Illness or Injury  Outcome: Progressing  Intervention: Identify and Manage Fall Risk  Recent Flowsheet Documentation  Taken 3/16/2025 1200 by Musa Hillman, RN  Safety Promotion/Fall Prevention:   activity supervised   assistive device/personal items within reach   nonskid shoes/slippers when out of bed   safety round/check completed   clutter free environment " maintained  Taken 3/16/2025 0800 by Musa Hillman RN  Safety Promotion/Fall Prevention:   activity supervised   assistive device/personal items within reach   nonskid shoes/slippers when out of bed   safety round/check completed   clutter free environment maintained  Intervention: Prevent Skin Injury  Recent Flowsheet Documentation  Taken 3/16/2025 1800 by Musa Hillman RN  Body Position:   turned   left   heels elevated   legs elevated   lower extremity elevated   side-lying 30 degrees   upper extremity elevated  Taken 3/16/2025 1400 by Musa Hillman RN  Body Position:   turned   right   heels elevated   legs elevated   lower extremity elevated   side-lying 30 degrees   upper extremity elevated  Taken 3/16/2025 1200 by Musa Hillman RN  Body Position:   turned   left   heels elevated   legs elevated   lower extremity elevated   side-lying 30 degrees   upper extremity elevated  Taken 3/16/2025 1000 by Musa Hillman RN  Body Position:   turned   right   heels elevated   legs elevated   lower extremity elevated   side-lying 30 degrees   upper extremity elevated  Taken 3/16/2025 0800 by Musa Hillman RN  Body Position:   turned   left   heels elevated   legs elevated   lower extremity elevated   side-lying 30 degrees   upper extremity elevated  Intervention: Prevent and Manage VTE (Venous Thromboembolism) Risk  Recent Flowsheet Documentation  Taken 3/16/2025 1200 by Musa Hillman RN  VTE Prevention/Management: SCDs on (sequential compression devices)  Taken 3/16/2025 0800 by Musa Hillman RN  VTE Prevention/Management: SCDs on (sequential compression devices)  Goal: Optimal Comfort and Wellbeing  Outcome: Progressing  Intervention: Provide Person-Centered Care  Recent Flowsheet Documentation  Taken 3/16/2025 1200 by Musa Hillman RN  Trust Relationship/Rapport:   care explained   emotional support provided   empathic listening provided   questions answered    questions encouraged  Taken 3/16/2025 0800 by Musa Hillman, RN  Trust Relationship/Rapport:   care explained   emotional support provided   empathic listening provided   questions answered   questions encouraged  Goal: Readiness for Transition of Care  Outcome: Progressing

## 2025-03-17 NOTE — PROGRESS NOTES
ICU Progress Note    Date of Service: 03/17/25    Interval events:  - Pressor needs decreasing  - Overnight was agitated but no clear evidence of delirium or pain. Cooperative this morning.   -Says he has no questions.  Breathing comfortably on BiPAP    Today's plan summary:  - Pulmonary hygiene.    A/P:  Fermín Josue is a 75 year old male with past medical history significant for recently diagnosed non-small cell lung cancer with recent nodule removal (01/2025), BPH, history of CAD with prior stenting x3, CKD stage IIIa with prior nephrectomy for RCC (2021), hypertension, hyperlipidemia, obesity class I, and prediabetes who was admitted with chest pain.  He underwent TTE on 3/13/25 which revealed EF 60%, no significant valvular disease, mild dilatation of aortic sinus of valsalva at 4.3cm, LVH and early diastolic dysfunction. Coronary angiogram on 3/14/25 reveals CAD with LM disease and mid RCA . He continued to have unstable angina and underwent emergent CABG x2 with Dr. John on 3/15/25. The patient was admitted to the ICU post-operatively intubated and sedated on vasopressors. Extubated pm 3/16t. Mental and hemodynamic status have both improved.      Neuro:  Post-operative pain management  Agitation improved. Off dexmedetomidine.  - Multimodal pain control  - PRN Olanzapine as ordered for bedtime     CV:  CAD s/p CABG x2  Unstable angina  Hypertension  Hyperlipidemia  Vasoplegic shock: imrpoving  - MAP goal > 65, requiring vasopressors but weaning off or resting.  - Heparin prophylaxis  - ASA and statin ordered     Pulm:  Post-operative mechanical ventilation  Patient-reported JAMEE, no home CPAP but tolerating noninvasive.  History of wedge resection for non-small cell lung cancer (Jan 2025)  - NIV when sleeping and resting  - pulmonary toilet     GI:  Constipation  - Advance diet as tolerated    Renal:  CKD stage IIIa with prior nephrectomy for RCC (Baseline Cr 1.3-1.5). Creatinine and UOP  "stable.  Urinary tract infection  History of kidney stones with prior lithotripsy  - Cefazolin continued for UTI, macrobid once tolerating PO  - Monitor UOP, Cr, I/O  - Daily BMP  - RN will discuss mario removal     ID:  Annalee-operative prophylaxis  UTI  - Cefazolin and vancomycin completed  - Macrobid for UTI  - Monitor WBC and fever trend     Heme:  Acute blood loss anemia   Thrombocytopenia  - CBC daily  - Transfuse hemoglobin <7     Endo:  Improved stress hyperglycemia  Pre-diabetes  - Insulin sliding scale  - Goal glucose <180    Clinically Significant Risk Factors        # Hypokalemia: Lowest K = 3.1 mmol/L in last 2 days, will replace as needed  # Hypernatremia: Highest Na = 146 mmol/L in last 2 days, will monitor as appropriate  # Hyperchloremia: Highest Cl = 110 mmol/L in last 2 days, will monitor as appropriate      # Hypocalcemia: Lowest Ca = 8 mg/dL in last 2 days, will monitor and replace as appropriate    # Anion Gap Metabolic Acidosis: Highest Anion Gap = 21 mmol/L in last 2 days, will monitor and treat as appropriate     # Coagulation Defect: INR = 1.48 (Ref range: 0.85 - 1.15) and/or PTT = 69 Seconds (Ref range: 22 - 38 Seconds), will monitor for bleeding  # Thrombocytopenia: Lowest platelets = 78 in last 2 days, will monitor for bleeding   # Hypertension: Noted on problem list     # Acute Hypercapnic Respiratory Failure: based on arterial blood gas results.  Continue supplemental oxygen and ventilatory support as indicated.         # Obesity: Estimated body mass index is 35.53 kg/m  as calculated from the following:    Height as of this encounter: 1.727 m (5' 8\").    Weight as of this encounter: 106 kg (233 lb 11 oz)., PRESENT ON ADMISSION                   /62   Pulse 67   Temp 99.1  F (37.3  C)   Resp 16   Ht 1.727 m (5' 8\")   Wt 106 kg (233 lb 11 oz)   SpO2 99%   BMI 35.53 kg/m    Gen: supine, NAD  Neuro: Calm and comfortable  HEENT: anicteric, BiPAP mask in place  Card: RRR, " friction rub  Pulm: clear b/l  Abd: soft, non-distended  MSK: no edema, no acute joint abnormality  Skin: no obvious rash          Intake/Output Summary (Last 24 hours) at 3/17/2025 1147  Last data filed at 3/17/2025 1000  Gross per 24 hour   Intake 1457.96 ml   Output 1285 ml   Net 172.96 ml     Net IO Since Admission: 426.33 mL [03/17/25 1147]      Labs: reviewed    Imaging: reviewed  Aide Robert MD

## 2025-03-18 ENCOUNTER — APPOINTMENT (OUTPATIENT)
Dept: OCCUPATIONAL THERAPY | Facility: CLINIC | Age: 75
DRG: 233 | End: 2025-03-18
Payer: COMMERCIAL

## 2025-03-18 LAB
ANION GAP SERPL CALCULATED.3IONS-SCNC: 8 MMOL/L (ref 7–15)
ATRIAL RATE - MUSE: 0 BPM
ATRIAL RATE - MUSE: 80 BPM
BUN SERPL-MCNC: 11.3 MG/DL (ref 8–23)
CA-I BLD-MCNC: 4.3 MG/DL (ref 4.4–5.2)
CALCIUM SERPL-MCNC: 8.1 MG/DL (ref 8.8–10.4)
CHLORIDE SERPL-SCNC: 104 MMOL/L (ref 98–107)
CREAT SERPL-MCNC: 1.15 MG/DL (ref 0.67–1.17)
DIASTOLIC BLOOD PRESSURE - MUSE: NORMAL MMHG
DIASTOLIC BLOOD PRESSURE - MUSE: NORMAL MMHG
EGFRCR SERPLBLD CKD-EPI 2021: 66 ML/MIN/1.73M2
ERYTHROCYTE [DISTWIDTH] IN BLOOD BY AUTOMATED COUNT: 13.4 % (ref 10–15)
GLUCOSE BLDC GLUCOMTR-MCNC: 100 MG/DL (ref 70–99)
GLUCOSE SERPL-MCNC: 109 MG/DL (ref 70–99)
HCO3 SERPL-SCNC: 30 MMOL/L (ref 22–29)
HCT VFR BLD AUTO: 23 % (ref 40–53)
HGB BLD-MCNC: 7.6 G/DL (ref 13.3–17.7)
INTERPRETATION ECG - MUSE: NORMAL
INTERPRETATION ECG - MUSE: NORMAL
MAGNESIUM SERPL-MCNC: 2.1 MG/DL (ref 1.7–2.3)
MCH RBC QN AUTO: 32.1 PG (ref 26.5–33)
MCHC RBC AUTO-ENTMCNC: 33 G/DL (ref 31.5–36.5)
MCV RBC AUTO: 97 FL (ref 78–100)
P AXIS - MUSE: 10 DEGREES
P AXIS - MUSE: NORMAL DEGREES
PHOSPHATE SERPL-MCNC: 2.2 MG/DL (ref 2.5–4.5)
PHOSPHATE SERPL-MCNC: 2.3 MG/DL (ref 2.5–4.5)
PLATELET # BLD AUTO: 86 10E3/UL (ref 150–450)
POTASSIUM SERPL-SCNC: 3.4 MMOL/L (ref 3.4–5.3)
POTASSIUM SERPL-SCNC: 4.1 MMOL/L (ref 3.4–5.3)
PR INTERVAL - MUSE: 164 MS
PR INTERVAL - MUSE: NORMAL MS
QRS DURATION - MUSE: 60 MS
QRS DURATION - MUSE: 82 MS
QT - MUSE: 362 MS
QT - MUSE: 420 MS
QTC - MUSE: 417 MS
QTC - MUSE: 429 MS
R AXIS - MUSE: -2 DEGREES
R AXIS - MUSE: -20 DEGREES
RBC # BLD AUTO: 2.37 10E6/UL (ref 4.4–5.9)
SODIUM SERPL-SCNC: 142 MMOL/L (ref 135–145)
SYSTOLIC BLOOD PRESSURE - MUSE: NORMAL MMHG
SYSTOLIC BLOOD PRESSURE - MUSE: NORMAL MMHG
T AXIS - MUSE: -17 DEGREES
T AXIS - MUSE: 29 DEGREES
VENTRICULAR RATE- MUSE: 63 BPM
VENTRICULAR RATE- MUSE: 80 BPM
WBC # BLD AUTO: 4.7 10E3/UL (ref 4–11)

## 2025-03-18 PROCEDURE — 120N000013 HC R&B IMCU

## 2025-03-18 PROCEDURE — 250N000013 HC RX MED GY IP 250 OP 250 PS 637: Performed by: PHYSICIAN ASSISTANT

## 2025-03-18 PROCEDURE — 94640 AIRWAY INHALATION TREATMENT: CPT

## 2025-03-18 PROCEDURE — 93005 ELECTROCARDIOGRAM TRACING: CPT

## 2025-03-18 PROCEDURE — 80048 BASIC METABOLIC PNL TOTAL CA: CPT | Performed by: PHYSICIAN ASSISTANT

## 2025-03-18 PROCEDURE — 84132 ASSAY OF SERUM POTASSIUM: CPT | Performed by: SURGERY

## 2025-03-18 PROCEDURE — 94640 AIRWAY INHALATION TREATMENT: CPT | Mod: 76

## 2025-03-18 PROCEDURE — 250N000013 HC RX MED GY IP 250 OP 250 PS 637: Performed by: INTERNAL MEDICINE

## 2025-03-18 PROCEDURE — 250N000013 HC RX MED GY IP 250 OP 250 PS 637: Performed by: SURGERY

## 2025-03-18 PROCEDURE — 82330 ASSAY OF CALCIUM: CPT | Performed by: PHYSICIAN ASSISTANT

## 2025-03-18 PROCEDURE — 36415 COLL VENOUS BLD VENIPUNCTURE: CPT | Performed by: PHYSICIAN ASSISTANT

## 2025-03-18 PROCEDURE — 84100 ASSAY OF PHOSPHORUS: CPT | Performed by: SURGERY

## 2025-03-18 PROCEDURE — 97530 THERAPEUTIC ACTIVITIES: CPT | Mod: GO

## 2025-03-18 PROCEDURE — 85027 COMPLETE CBC AUTOMATED: CPT | Performed by: PHYSICIAN ASSISTANT

## 2025-03-18 PROCEDURE — 97535 SELF CARE MNGMENT TRAINING: CPT | Mod: GO

## 2025-03-18 PROCEDURE — 84100 ASSAY OF PHOSPHORUS: CPT | Performed by: INTERNAL MEDICINE

## 2025-03-18 PROCEDURE — 250N000009 HC RX 250: Performed by: PHYSICIAN ASSISTANT

## 2025-03-18 PROCEDURE — 83735 ASSAY OF MAGNESIUM: CPT | Performed by: SURGERY

## 2025-03-18 PROCEDURE — 36415 COLL VENOUS BLD VENIPUNCTURE: CPT | Performed by: SURGERY

## 2025-03-18 PROCEDURE — 250N000011 HC RX IP 250 OP 636: Performed by: PHYSICIAN ASSISTANT

## 2025-03-18 RX ORDER — METOPROLOL TARTRATE 25 MG/1
25 TABLET, FILM COATED ORAL 2 TIMES DAILY
Status: DISCONTINUED | OUTPATIENT
Start: 2025-03-18 | End: 2025-03-19

## 2025-03-18 RX ORDER — POTASSIUM CHLORIDE 20MEQ/15ML
40 LIQUID (ML) ORAL ONCE
Status: COMPLETED | OUTPATIENT
Start: 2025-03-18 | End: 2025-03-18

## 2025-03-18 RX ORDER — FUROSEMIDE 10 MG/ML
40 INJECTION INTRAMUSCULAR; INTRAVENOUS ONCE
Status: COMPLETED | OUTPATIENT
Start: 2025-03-18 | End: 2025-03-18

## 2025-03-18 RX ORDER — NITROFURANTOIN 25; 75 MG/1; MG/1
100 CAPSULE ORAL EVERY 12 HOURS SCHEDULED
Status: COMPLETED | OUTPATIENT
Start: 2025-03-18 | End: 2025-03-23

## 2025-03-18 RX ORDER — CARBOXYMETHYLCELLULOSE SODIUM 5 MG/ML
1 SOLUTION/ DROPS OPHTHALMIC
Status: DISCONTINUED | OUTPATIENT
Start: 2025-03-18 | End: 2025-03-25 | Stop reason: HOSPADM

## 2025-03-18 RX ADMIN — POTASSIUM & SODIUM PHOSPHATES POWDER PACK 280-160-250 MG 1 PACKET: 280-160-250 PACK at 08:48

## 2025-03-18 RX ADMIN — HYDRALAZINE HYDROCHLORIDE 10 MG: 10 TABLET ORAL at 09:05

## 2025-03-18 RX ADMIN — OXYCODONE HYDROCHLORIDE 10 MG: 5 TABLET ORAL at 09:24

## 2025-03-18 RX ADMIN — ACETYLCYSTEINE 2 ML: 200 SOLUTION ORAL; RESPIRATORY (INHALATION) at 08:24

## 2025-03-18 RX ADMIN — NITROFURANTOIN MACROCRYSTALS 100 MG: 50 CAPSULE ORAL at 05:03

## 2025-03-18 RX ADMIN — METOPROLOL TARTRATE 25 MG: 25 TABLET, FILM COATED ORAL at 20:40

## 2025-03-18 RX ADMIN — ACETYLCYSTEINE 2 ML: 200 SOLUTION ORAL; RESPIRATORY (INHALATION) at 16:12

## 2025-03-18 RX ADMIN — FUROSEMIDE 40 MG: 10 INJECTION, SOLUTION INTRAMUSCULAR; INTRAVENOUS at 15:59

## 2025-03-18 RX ADMIN — ACETAMINOPHEN 975 MG: 325 TABLET, FILM COATED ORAL at 05:03

## 2025-03-18 RX ADMIN — POTASSIUM CHLORIDE 40 MEQ: 20 SOLUTION ORAL at 08:48

## 2025-03-18 RX ADMIN — ACETAMINOPHEN 975 MG: 325 TABLET, FILM COATED ORAL at 22:35

## 2025-03-18 RX ADMIN — IPRATROPIUM BROMIDE AND ALBUTEROL SULFATE 3 ML: .5; 3 SOLUTION RESPIRATORY (INHALATION) at 08:20

## 2025-03-18 RX ADMIN — IPRATROPIUM BROMIDE AND ALBUTEROL SULFATE 3 ML: .5; 3 SOLUTION RESPIRATORY (INHALATION) at 10:49

## 2025-03-18 RX ADMIN — POTASSIUM & SODIUM PHOSPHATES POWDER PACK 280-160-250 MG 1 PACKET: 280-160-250 PACK at 14:41

## 2025-03-18 RX ADMIN — METHOCARBAMOL 250 MG: 500 TABLET ORAL at 15:59

## 2025-03-18 RX ADMIN — HYDRALAZINE HYDROCHLORIDE 10 MG: 10 TABLET ORAL at 12:07

## 2025-03-18 RX ADMIN — ASPIRIN 81 MG CHEWABLE TABLET 162 MG: 81 TABLET CHEWABLE at 08:48

## 2025-03-18 RX ADMIN — HEPARIN SODIUM 5000 UNITS: 5000 INJECTION, SOLUTION INTRAVENOUS; SUBCUTANEOUS at 22:35

## 2025-03-18 RX ADMIN — POTASSIUM & SODIUM PHOSPHATES POWDER PACK 280-160-250 MG 1 PACKET: 280-160-250 PACK at 11:44

## 2025-03-18 RX ADMIN — ONDANSETRON 4 MG: 2 INJECTION, SOLUTION INTRAMUSCULAR; INTRAVENOUS at 09:44

## 2025-03-18 RX ADMIN — HEPARIN SODIUM 5000 UNITS: 5000 INJECTION, SOLUTION INTRAVENOUS; SUBCUTANEOUS at 05:03

## 2025-03-18 RX ADMIN — ACETAMINOPHEN 975 MG: 325 TABLET, FILM COATED ORAL at 14:41

## 2025-03-18 RX ADMIN — IPRATROPIUM BROMIDE AND ALBUTEROL SULFATE 3 ML: .5; 3 SOLUTION RESPIRATORY (INHALATION) at 16:12

## 2025-03-18 RX ADMIN — NITROFURANTOIN MACROCRYSTALS 100 MG: 50 CAPSULE ORAL at 09:05

## 2025-03-18 RX ADMIN — NITROFURANTOIN MONOHYDRATE/MACROCRYSTALLINE 100 MG: 25; 75 CAPSULE ORAL at 20:40

## 2025-03-18 RX ADMIN — PANTOPRAZOLE SODIUM 40 MG: 40 TABLET, DELAYED RELEASE ORAL at 08:48

## 2025-03-18 RX ADMIN — ACETYLCYSTEINE 2 ML: 200 SOLUTION ORAL; RESPIRATORY (INHALATION) at 10:49

## 2025-03-18 RX ADMIN — QUETIAPINE 12.5 MG: 25 TABLET, FILM COATED ORAL at 20:46

## 2025-03-18 RX ADMIN — POLYETHYLENE GLYCOL 3350 17 G: 17 POWDER, FOR SOLUTION ORAL at 08:48

## 2025-03-18 RX ADMIN — LIDOCAINE 1 PATCH: 560 PATCH PERCUTANEOUS; TOPICAL; TRANSDERMAL at 14:40

## 2025-03-18 RX ADMIN — HEPARIN SODIUM 5000 UNITS: 5000 INJECTION, SOLUTION INTRAVENOUS; SUBCUTANEOUS at 14:40

## 2025-03-18 RX ADMIN — ATORVASTATIN CALCIUM 40 MG: 40 TABLET, FILM COATED ORAL at 08:48

## 2025-03-18 ASSESSMENT — ACTIVITIES OF DAILY LIVING (ADL)
ADLS_ACUITY_SCORE: 58
ADLS_ACUITY_SCORE: 57
ADLS_ACUITY_SCORE: 57
ADLS_ACUITY_SCORE: 59
ADLS_ACUITY_SCORE: 57
ADLS_ACUITY_SCORE: 59
ADLS_ACUITY_SCORE: 59
ADLS_ACUITY_SCORE: 57
ADLS_ACUITY_SCORE: 59
ADLS_ACUITY_SCORE: 59
ADLS_ACUITY_SCORE: 57
ADLS_ACUITY_SCORE: 59
ADLS_ACUITY_SCORE: 59
ADLS_ACUITY_SCORE: 58
ADLS_ACUITY_SCORE: 59
ADLS_ACUITY_SCORE: 57
ADLS_ACUITY_SCORE: 59
ADLS_ACUITY_SCORE: 57
ADLS_ACUITY_SCORE: 59

## 2025-03-18 NOTE — PLAN OF CARE
"Goal Outcome Evaluation:      Plan of Care Reviewed With: patient    Overall Patient Progress: improvingOverall Patient Progress: improving         Pt A + O x 4, Continues on HFNC. Able to cough and productive able to self suction with yankeur done frequently overnight  Problem: Adult Inpatient Plan of Care  Goal: Plan of Care Review  Description: The Plan of Care Review/Shift note should be completed every shift.  The Outcome Evaluation is a brief statement about your assessment that the patient is improving, declining, or no change.  This information will be displayed automatically on your shift  note.  Outcome: Progressing  Flowsheets (Taken 3/18/2025 0612)  Plan of Care Reviewed With: patient  Overall Patient Progress: improving  Goal: Patient-Specific Goal (Individualized)  Description: You can add care plan individualizations to a care plan. Examples of Individualization might be:  \"Parent requests to be called daily at 9am for status\", \"I have a hard time hearing out of my right ear\", or \"Do not touch me to wake me up as it startles  me\".  Outcome: Progressing  Goal: Absence of Hospital-Acquired Illness or Injury  Outcome: Progressing  Intervention: Identify and Manage Fall Risk  Recent Flowsheet Documentation  Taken 3/18/2025 0400 by Robert Andrade, RN  Safety Promotion/Fall Prevention:   activity supervised   assistive device/personal items within reach   nonskid shoes/slippers when out of bed   safety round/check completed   clutter free environment maintained  Taken 3/18/2025 0000 by Robert Andrade, RN  Safety Promotion/Fall Prevention:   activity supervised   assistive device/personal items within reach   nonskid shoes/slippers when out of bed   safety round/check completed   clutter free environment maintained  Taken 3/17/2025 2000 by Robert Andrade, RN  Safety Promotion/Fall Prevention:   activity supervised   assistive device/personal items within reach   nonskid shoes/slippers when out of bed   safety " round/check completed   clutter free environment maintained  Intervention: Prevent Skin Injury  Recent Flowsheet Documentation  Taken 3/18/2025 0600 by Robert Andrade RN  Body Position:   left   turned  Taken 3/18/2025 0400 by Robert Andrade RN  Body Position:   right   turned  Taken 3/18/2025 0200 by Robert Andrade RN  Body Position:   left   turned  Taken 3/18/2025 0000 by Robert Andrade RN  Body Position:   turned   right  Taken 3/17/2025 2200 by Robert Andrade RN  Body Position:   left   turned  Taken 3/17/2025 2000 by Robert Andrade RN  Body Position:   right   turned  Intervention: Prevent and Manage VTE (Venous Thromboembolism) Risk  Recent Flowsheet Documentation  Taken 3/18/2025 0400 by Robert Andrade RN  VTE Prevention/Management: SCDs on (sequential compression devices)  Taken 3/18/2025 0000 by Robert Andrade RN  VTE Prevention/Management: SCDs on (sequential compression devices)  Taken 3/17/2025 2000 by Robert Andrade RN  VTE Prevention/Management: SCDs on (sequential compression devices)  Goal: Optimal Comfort and Wellbeing  Outcome: Progressing  Intervention: Provide Person-Centered Care  Recent Flowsheet Documentation  Taken 3/18/2025 0400 by Robert Andrade RN  Trust Relationship/Rapport:   care explained   emotional support provided   questions encouraged   reassurance provided  Taken 3/18/2025 0000 by Robert Andrade RN  Trust Relationship/Rapport:   care explained   emotional support provided   questions encouraged   reassurance provided  Taken 3/17/2025 2000 by Robert Andrade RN  Trust Relationship/Rapport:   care explained   emotional support provided   questions encouraged   reassurance provided  Goal: Readiness for Transition of Care  Outcome: Progressing   .  VS stable, tele- SR noted. Pacing wires capped. Minimal drainage noted from Chest tubes. Passed good urine overnight via male external cath. Bowels opened. Pt reports slept well overnight.

## 2025-03-18 NOTE — CONSULTS
"NUTRITION ASSESSMENT      REASON FOR ASSESSMENT:  Cardiac Surgery Nutrition Consult    CURRENT DIET / INTAKE:  Regular diet    Intake has been decreased  Lunch today was a cookie and ice cream    Dinner last night was penne pasta and apple juice     ANTHROPOMETRICS:   Ht: 5'8\"  Wt: 100.4 kg (221#)(3/13)  BMI: 33.6 kg/m^2  IBW: 70 kg   Weight Status: Obesity Grade I BMI 30-34.9  %IBW: 143%    MALNUTRITION:  Patient does not meet two of the following criteria necessary for diagnosing malnutrition:  significant weight loss, reduced intake, subcutaneous fat loss, muscle loss or fluid retention. Nutrition Focused Physical Assessment (NFPA) not appropriate at this time.    NUTRITION DIAGNOSIS:   Inadequate oral intake R/t reduced appetite post-op AEB need for oral nutritional supplements     INTERVENTIONS:    Nutrition Prescription:  Diet as tolerated  Jes Garrett BID between meals     Implementation:  Nutrition Education (Content):  Discussed the importance of adequate nutrition post-op for healing and energy, emphasizing protein foods, and encouraged patient to order a protein food at each meal.    Goals:  Patient to consume ~75% at meals in the next 3 - 5 days    Follow Up/Monitoring (InPatient):  Food and Fluid intake -  Monitor for adequacy    Follow Up/Monitoring (OutPatient):  Patient will participate in out-patient cardiac rehab and attend nutrition classes during the program    Tami Acosta RD, LD, CNSC   Clinical Dietitian - [facility]       "

## 2025-03-19 ENCOUNTER — APPOINTMENT (OUTPATIENT)
Dept: OCCUPATIONAL THERAPY | Facility: CLINIC | Age: 75
DRG: 233 | End: 2025-03-19
Payer: COMMERCIAL

## 2025-03-19 ENCOUNTER — APPOINTMENT (OUTPATIENT)
Dept: GENERAL RADIOLOGY | Facility: CLINIC | Age: 75
DRG: 233 | End: 2025-03-19
Attending: PHYSICIAN ASSISTANT
Payer: COMMERCIAL

## 2025-03-19 LAB
ANION GAP SERPL CALCULATED.3IONS-SCNC: 6 MMOL/L (ref 7–15)
ATRIAL RATE - MUSE: 59 BPM
BUN SERPL-MCNC: 11.2 MG/DL (ref 8–23)
CA-I BLD-MCNC: 4.3 MG/DL (ref 4.4–5.2)
CALCIUM SERPL-MCNC: 8.2 MG/DL (ref 8.8–10.4)
CHLORIDE SERPL-SCNC: 104 MMOL/L (ref 98–107)
CREAT SERPL-MCNC: 1.12 MG/DL (ref 0.67–1.17)
DIASTOLIC BLOOD PRESSURE - MUSE: NORMAL MMHG
EGFRCR SERPLBLD CKD-EPI 2021: 68 ML/MIN/1.73M2
ERYTHROCYTE [DISTWIDTH] IN BLOOD BY AUTOMATED COUNT: 13.6 % (ref 10–15)
GLUCOSE BLDC GLUCOMTR-MCNC: 119 MG/DL (ref 70–99)
GLUCOSE BLDC GLUCOMTR-MCNC: 125 MG/DL (ref 70–99)
GLUCOSE SERPL-MCNC: 113 MG/DL (ref 70–99)
HCO3 SERPL-SCNC: 31 MMOL/L (ref 22–29)
HCT VFR BLD AUTO: 26.2 % (ref 40–53)
HGB BLD-MCNC: 8.5 G/DL (ref 13.3–17.7)
INTERPRETATION ECG - MUSE: NORMAL
MAGNESIUM SERPL-MCNC: 2 MG/DL (ref 1.7–2.3)
MCH RBC QN AUTO: 31.4 PG (ref 26.5–33)
MCHC RBC AUTO-ENTMCNC: 32.4 G/DL (ref 31.5–36.5)
MCV RBC AUTO: 97 FL (ref 78–100)
P AXIS - MUSE: NORMAL DEGREES
PHOSPHATE SERPL-MCNC: 2.1 MG/DL (ref 2.5–4.5)
PLATELET # BLD AUTO: 124 10E3/UL (ref 150–450)
POTASSIUM SERPL-SCNC: 3.8 MMOL/L (ref 3.4–5.3)
PR INTERVAL - MUSE: NORMAL MS
QRS DURATION - MUSE: 76 MS
QT - MUSE: 414 MS
QTC - MUSE: 409 MS
R AXIS - MUSE: 20 DEGREES
RBC # BLD AUTO: 2.71 10E6/UL (ref 4.4–5.9)
SODIUM SERPL-SCNC: 141 MMOL/L (ref 135–145)
SYSTOLIC BLOOD PRESSURE - MUSE: NORMAL MMHG
T AXIS - MUSE: -30 DEGREES
VENTRICULAR RATE- MUSE: 59 BPM
WBC # BLD AUTO: 5.9 10E3/UL (ref 4–11)

## 2025-03-19 PROCEDURE — 36415 COLL VENOUS BLD VENIPUNCTURE: CPT | Performed by: PHYSICIAN ASSISTANT

## 2025-03-19 PROCEDURE — 82565 ASSAY OF CREATININE: CPT | Performed by: PHYSICIAN ASSISTANT

## 2025-03-19 PROCEDURE — 250N000011 HC RX IP 250 OP 636: Performed by: PHYSICIAN ASSISTANT

## 2025-03-19 PROCEDURE — 84100 ASSAY OF PHOSPHORUS: CPT | Performed by: INTERNAL MEDICINE

## 2025-03-19 PROCEDURE — 250N000013 HC RX MED GY IP 250 OP 250 PS 637: Performed by: PHYSICIAN ASSISTANT

## 2025-03-19 PROCEDURE — 97110 THERAPEUTIC EXERCISES: CPT | Mod: GO | Performed by: OCCUPATIONAL THERAPIST

## 2025-03-19 PROCEDURE — 250N000009 HC RX 250: Performed by: PHYSICIAN ASSISTANT

## 2025-03-19 PROCEDURE — 250N000013 HC RX MED GY IP 250 OP 250 PS 637: Performed by: INTERNAL MEDICINE

## 2025-03-19 PROCEDURE — 97535 SELF CARE MNGMENT TRAINING: CPT | Mod: GO | Performed by: OCCUPATIONAL THERAPIST

## 2025-03-19 PROCEDURE — 999N000157 HC STATISTIC RCP TIME EA 10 MIN

## 2025-03-19 PROCEDURE — 85027 COMPLETE CBC AUTOMATED: CPT | Performed by: PHYSICIAN ASSISTANT

## 2025-03-19 PROCEDURE — 80048 BASIC METABOLIC PNL TOTAL CA: CPT | Performed by: PHYSICIAN ASSISTANT

## 2025-03-19 PROCEDURE — 120N000001 HC R&B MED SURG/OB

## 2025-03-19 PROCEDURE — 71045 X-RAY EXAM CHEST 1 VIEW: CPT

## 2025-03-19 PROCEDURE — 250N000013 HC RX MED GY IP 250 OP 250 PS 637: Performed by: SURGERY

## 2025-03-19 PROCEDURE — 94640 AIRWAY INHALATION TREATMENT: CPT

## 2025-03-19 PROCEDURE — 94640 AIRWAY INHALATION TREATMENT: CPT | Mod: 76

## 2025-03-19 PROCEDURE — 83735 ASSAY OF MAGNESIUM: CPT | Performed by: INTERNAL MEDICINE

## 2025-03-19 PROCEDURE — 258N000003 HC RX IP 258 OP 636: Performed by: PHYSICIAN ASSISTANT

## 2025-03-19 PROCEDURE — 82330 ASSAY OF CALCIUM: CPT | Performed by: PHYSICIAN ASSISTANT

## 2025-03-19 PROCEDURE — 999N000156 HC STATISTIC RCP CONSULT EA 30 MIN

## 2025-03-19 RX ORDER — FUROSEMIDE 10 MG/ML
40 INJECTION INTRAMUSCULAR; INTRAVENOUS ONCE
Status: COMPLETED | OUTPATIENT
Start: 2025-03-19 | End: 2025-03-19

## 2025-03-19 RX ORDER — LOSARTAN POTASSIUM 25 MG/1
25 TABLET ORAL DAILY
Status: DISCONTINUED | OUTPATIENT
Start: 2025-03-19 | End: 2025-03-25 | Stop reason: HOSPADM

## 2025-03-19 RX ORDER — MAGNESIUM SULFATE HEPTAHYDRATE 40 MG/ML
2 INJECTION, SOLUTION INTRAVENOUS ONCE
Status: COMPLETED | OUTPATIENT
Start: 2025-03-19 | End: 2025-03-19

## 2025-03-19 RX ORDER — HYDROXYZINE HYDROCHLORIDE 25 MG/1
25 TABLET, FILM COATED ORAL EVERY 6 HOURS PRN
Status: DISCONTINUED | OUTPATIENT
Start: 2025-03-19 | End: 2025-03-25 | Stop reason: HOSPADM

## 2025-03-19 RX ORDER — AMOXICILLIN 250 MG
1 CAPSULE ORAL
Status: DISCONTINUED | OUTPATIENT
Start: 2025-03-19 | End: 2025-03-25 | Stop reason: HOSPADM

## 2025-03-19 RX ORDER — IPRATROPIUM BROMIDE AND ALBUTEROL SULFATE 2.5; .5 MG/3ML; MG/3ML
3 SOLUTION RESPIRATORY (INHALATION)
Status: COMPLETED | OUTPATIENT
Start: 2025-03-19 | End: 2025-03-20

## 2025-03-19 RX ORDER — POTASSIUM CHLORIDE 1500 MG/1
20 TABLET, EXTENDED RELEASE ORAL ONCE
Status: COMPLETED | OUTPATIENT
Start: 2025-03-19 | End: 2025-03-19

## 2025-03-19 RX ORDER — SIMETHICONE 80 MG
80 TABLET,CHEWABLE ORAL 4 TIMES DAILY
Status: COMPLETED | OUTPATIENT
Start: 2025-03-19 | End: 2025-03-21

## 2025-03-19 RX ADMIN — POTASSIUM CHLORIDE 20 MEQ: 1500 TABLET, EXTENDED RELEASE ORAL at 08:23

## 2025-03-19 RX ADMIN — METHOCARBAMOL 250 MG: 500 TABLET ORAL at 20:50

## 2025-03-19 RX ADMIN — METOPROLOL TARTRATE 12.5 MG: 25 TABLET, FILM COATED ORAL at 20:50

## 2025-03-19 RX ADMIN — NITROFURANTOIN MONOHYDRATE/MACROCRYSTALLINE 100 MG: 25; 75 CAPSULE ORAL at 08:23

## 2025-03-19 RX ADMIN — OXYCODONE HYDROCHLORIDE 10 MG: 5 TABLET ORAL at 18:01

## 2025-03-19 RX ADMIN — ACETAMINOPHEN 975 MG: 325 TABLET, FILM COATED ORAL at 14:59

## 2025-03-19 RX ADMIN — HEPARIN SODIUM 5000 UNITS: 5000 INJECTION, SOLUTION INTRAVENOUS; SUBCUTANEOUS at 15:00

## 2025-03-19 RX ADMIN — ATORVASTATIN CALCIUM 40 MG: 40 TABLET, FILM COATED ORAL at 08:23

## 2025-03-19 RX ADMIN — IPRATROPIUM BROMIDE AND ALBUTEROL SULFATE 3 ML: .5; 3 SOLUTION RESPIRATORY (INHALATION) at 16:29

## 2025-03-19 RX ADMIN — ONDANSETRON 4 MG: 2 INJECTION, SOLUTION INTRAMUSCULAR; INTRAVENOUS at 05:39

## 2025-03-19 RX ADMIN — SIMETHICONE 80 MG: 80 TABLET, CHEWABLE ORAL at 18:02

## 2025-03-19 RX ADMIN — FUROSEMIDE 40 MG: 10 INJECTION, SOLUTION INTRAMUSCULAR; INTRAVENOUS at 09:32

## 2025-03-19 RX ADMIN — IPRATROPIUM BROMIDE AND ALBUTEROL SULFATE 3 ML: .5; 3 SOLUTION RESPIRATORY (INHALATION) at 06:52

## 2025-03-19 RX ADMIN — HYDROXYZINE HYDROCHLORIDE 25 MG: 25 TABLET, FILM COATED ORAL at 20:49

## 2025-03-19 RX ADMIN — PANTOPRAZOLE SODIUM 40 MG: 40 TABLET, DELAYED RELEASE ORAL at 08:23

## 2025-03-19 RX ADMIN — HYDROXYZINE HYDROCHLORIDE 25 MG: 25 TABLET, FILM COATED ORAL at 12:18

## 2025-03-19 RX ADMIN — METHOCARBAMOL 250 MG: 500 TABLET ORAL at 10:42

## 2025-03-19 RX ADMIN — SIMETHICONE 80 MG: 80 TABLET, CHEWABLE ORAL at 21:43

## 2025-03-19 RX ADMIN — GABAPENTIN 400 MG: 300 CAPSULE ORAL at 21:43

## 2025-03-19 RX ADMIN — ACETAMINOPHEN 975 MG: 325 TABLET, FILM COATED ORAL at 06:18

## 2025-03-19 RX ADMIN — HEPARIN SODIUM 5000 UNITS: 5000 INJECTION, SOLUTION INTRAVENOUS; SUBCUTANEOUS at 21:43

## 2025-03-19 RX ADMIN — ASPIRIN 81 MG CHEWABLE TABLET 162 MG: 81 TABLET CHEWABLE at 08:23

## 2025-03-19 RX ADMIN — CALCIUM GLUCONATE 1 G: 20 INJECTION, SOLUTION INTRAVENOUS at 06:35

## 2025-03-19 RX ADMIN — MAGNESIUM SULFATE HEPTAHYDRATE 2 G: 40 INJECTION, SOLUTION INTRAVENOUS at 08:23

## 2025-03-19 RX ADMIN — LOSARTAN POTASSIUM 25 MG: 25 TABLET, FILM COATED ORAL at 10:12

## 2025-03-19 RX ADMIN — ONDANSETRON 4 MG: 2 INJECTION, SOLUTION INTRAMUSCULAR; INTRAVENOUS at 11:48

## 2025-03-19 RX ADMIN — SODIUM PHOSPHATE, MONOBASIC, MONOHYDRATE AND SODIUM PHOSPHATE, DIBASIC, ANHYDROUS 15 MMOL: 142; 276 INJECTION, SOLUTION INTRAVENOUS at 09:21

## 2025-03-19 RX ADMIN — SIMETHICONE 80 MG: 80 TABLET, CHEWABLE ORAL at 12:18

## 2025-03-19 RX ADMIN — GABAPENTIN 400 MG: 300 CAPSULE ORAL at 16:14

## 2025-03-19 RX ADMIN — ACETAMINOPHEN 975 MG: 325 TABLET, FILM COATED ORAL at 21:44

## 2025-03-19 RX ADMIN — ACETYLCYSTEINE 2 ML: 200 SOLUTION ORAL; RESPIRATORY (INHALATION) at 06:53

## 2025-03-19 RX ADMIN — NITROFURANTOIN MONOHYDRATE/MACROCRYSTALLINE 100 MG: 25; 75 CAPSULE ORAL at 20:49

## 2025-03-19 RX ADMIN — IPRATROPIUM BROMIDE AND ALBUTEROL SULFATE 3 ML: .5; 3 SOLUTION RESPIRATORY (INHALATION) at 19:42

## 2025-03-19 RX ADMIN — HEPARIN SODIUM 5000 UNITS: 5000 INJECTION, SOLUTION INTRAVENOUS; SUBCUTANEOUS at 06:17

## 2025-03-19 ASSESSMENT — ACTIVITIES OF DAILY LIVING (ADL)
ADLS_ACUITY_SCORE: 65
ADLS_ACUITY_SCORE: 64
ADLS_ACUITY_SCORE: 64
ADLS_ACUITY_SCORE: 63
ADLS_ACUITY_SCORE: 65
ADLS_ACUITY_SCORE: 65
ADLS_ACUITY_SCORE: 58
ADLS_ACUITY_SCORE: 68
ADLS_ACUITY_SCORE: 64
ADLS_ACUITY_SCORE: 62
ADLS_ACUITY_SCORE: 64
ADLS_ACUITY_SCORE: 68
ADLS_ACUITY_SCORE: 68
ADLS_ACUITY_SCORE: 66
ADLS_ACUITY_SCORE: 58
ADLS_ACUITY_SCORE: 68
ADLS_ACUITY_SCORE: 68
ADLS_ACUITY_SCORE: 66
ADLS_ACUITY_SCORE: 65
ADLS_ACUITY_SCORE: 62
ADLS_ACUITY_SCORE: 64

## 2025-03-19 NOTE — PLAN OF CARE
Problem: Adult Inpatient Plan of Care  Goal: Absence of Hospital-Acquired Illness or Injury  Intervention: Identify and Manage Fall Risk  Recent Flowsheet Documentation  Taken 3/18/2025 1600 by Anil Harris RN  Safety Promotion/Fall Prevention:   activity supervised   assistive device/personal items within reach   nonskid shoes/slippers when out of bed   safety round/check completed   clutter free environment maintained  Taken 3/18/2025 1200 by Anil Harris RN  Safety Promotion/Fall Prevention:   activity supervised   assistive device/personal items within reach   nonskid shoes/slippers when out of bed   safety round/check completed   clutter free environment maintained  Taken 3/18/2025 0800 by Anil Harris RN  Safety Promotion/Fall Prevention:   activity supervised   assistive device/personal items within reach   nonskid shoes/slippers when out of bed   safety round/check completed   clutter free environment maintained  Intervention: Prevent Skin Injury  Recent Flowsheet Documentation  Taken 3/18/2025 1600 by Anil Harris RN  Body Position: turned  Taken 3/18/2025 1400 by Anil Harris RN  Body Position: weight shifting  Taken 3/18/2025 1200 by Anil Harris RN  Body Position: turned  Taken 3/18/2025 0800 by Anil Harris RN  Body Position:   right   turned  Intervention: Prevent and Manage VTE (Venous Thromboembolism) Risk  Recent Flowsheet Documentation  Taken 3/18/2025 1600 by Anil Harris RN  VTE Prevention/Management: SCDs on (sequential compression devices)  Taken 3/18/2025 1200 by Anil Harris RN  VTE Prevention/Management: SCDs on (sequential compression devices)  Taken 3/18/2025 0800 by Anil Harris RN  VTE Prevention/Management: SCDs on (sequential compression devices)   Goal Outcome Evaluation:  Pt up in chair, pulmonary hygiene encouraged. HFNC 40/40, briefly on bipap per [facility] order, 4 L  NC now. Pain controlled with meds. Chest tube removed. Good urine output, lasix given. Family by bedside, supportive, plan to tx.

## 2025-03-19 NOTE — PLAN OF CARE
"Patient Name: Keerthi  MRN: [id number]  Date of Admission: 3/13/2025  Reason for Admission: Chest pain.  Level of Care: Heart.     Vitals:   BP Readings from Last 1 Encounters:   03/19/25 117/57     Pulse Readings from Last 1 Encounters:   03/19/25 84     Wt Readings from Last 1 Encounters:   03/19/25 105 kg (231 lb 7.7 oz)     Ht Readings from Last 1 Encounters:   03/19/25 1.727 m (5' 8\")     Estimated body mass index is 35.2 kg/m  as calculated from the following:    Height as of this encounter: 1.727 m (5' 8\").    Weight as of this encounter: 105 kg (231 lb 7.7 oz).  Temp Readings from Last 1 Encounters:   03/19/25 98  F (36.7  C) (Oral)     Pt arrived to unit from ICU around 1100.     Pain: Pain goal 0. Pain Rating highest 7/10. Effective pain medication/regimen yes, sched gabapentin and tylenol, PRN atarax x1, PRN oxy x1.     CV Surgery Patient: Yes Post Op Day #: 4    General: Afebrile yes  Rhythm: SR w/ 1* AVB.   Blood Pressure Medications given/held: Amio not given by writer. Beta blocker not given by writer.  Resp: Oxygen Status: NC  Patient slept last night Yes Approx hours slept 5  Incentive Spirometry Q 1-2 hour when awake: yes Volume: self administered, follow-up education provided.   Neuro: Alert yes Orientation: self, person, time, and place  GI/:          Bowel Activity: yes if yes indicate when: BM x3 this shift,           Bowel Medications: no          Urinary Catheter: no  Skin:          Incision: Incision status: covered and healing well          Epicardial Pacing Wires: no  Chest Tubes   Pleural: no Draining: not applicable               Suction: not applicable              Mediastinal: no Draining: not applicable               Suction: not applicable   Dressing Change Daily: no If no, why? RYAN.    Resp: LS dim. On 2-3 L O2 NC. C/o int SOB w/ activity, SORIA. Denies chest pain.   Telemetry: SR w/ 1* AVB. AVSS.   Neuro: A&O x4.   GI/: Incontinent b/b. Adequate urine output via urinal. " Loose and mucous-like BM x3, passing flatus. C/o int nausea, zofran given x1. Regular diet, poor appetite.   Skin/Wounds: Midline sternal incision RYAN, WDL. R groin and knee harvest sites RYAN, WDL. Abrasion on lower abdomen. Old surgical scars on R upper flank. Peeling to bilat heels. Petechiae on chest. Dual RN skin check complete. Did shower today.  Lines/Drains: PIV SL.   Activity: Ax1 gb/w. Up in chair and walking in halls throughout the day.   Abnormal Labs: K/Mg/Phos protocols, rechecks tomorrow AM. C. Diff r/o, still need sample. Blood sugar 125.     Aggression Stop Light: Green          Patient Care Plan: Encourage activity and pulmonary toilet as tolerated. Collect stool sample. Pain management as needed.

## 2025-03-19 NOTE — PROGRESS NOTES
[facility]  Cardiovascular and Thoracic Surgery Daily Note      Assessment and Plan  Fermín Josue is a 75 year old male with a PMH of recently diagnosed non small cell lung cancer s/p VATS wedge resection of RUL lung nodule on 1/28/25 (PFTs done prior show mild restrictive lung disease), BPH, CKD stage IIIa, renal cancer s/p nephrectomy, JAMEE, hypertension, dyslipidemia, obesity, prediabetes, CAD s/p TRISH x 2 LAD and occluded RCA in 2007 who was admitted to [facility] 03/13 with 1-2 week history of chest pain. TTE 3/13/25 with preserved biventricular systolic function and early diastolic dysfunction. Coronary angiogram on 3/14/25 revealed multivessel CAD with 70% LMCA stenosis and mid RCA . CV surgery consulted for consideration of surgical revascularization. Underwent CABG x 2 (LIMA to LAD, SVG to RCA) on 03/15 with Dr. John.      POD #4 CABG x 2 (LIMA to LAD, SVG to RCA) with Dr. Dimitri John on 3/15/2025     - CV[facility]:   Pre-op TTE with preserved biventricular systolic function and early diastolic dysfunction.   Profound postop vasoplegic shock with lactic acidosis, resolved. Pressors off POD2. CI robust off epi.   Hypertensive at times. Pt continues to have borderline HR. Will decrease lopressor to 12.5 mg BID with parameters. Had some accelerated junctional rhythm which has resolved. Start losartan 25 mg PO daily.  Hypervolemic, repeat lasix 40 mg IV x 1  Aspirin 162 mg daily, atorvastatin 40 mg daily.   Chest tubes/TPWs removed on 3/18     - Resp:   Postoperative mechanical ventilation, resolved. Extubated POD1 AM (agitated and non-redirectable with sedation wean POD0)  Recently diagnosed non small cell lung cancer s/p VATS wedge resection of RUL lung nodule on 1/28/25 (PFTs done prior show mild restrictive lung disease). Stage 1A1. Recent oncology note reports no further treatment indicated.   Poor effort pulmonary toilet. Scheduled nebs x 4 doses, saturating well on NC     - Neuro: Neuro  grossly intact. Pain controlled. POD0 overnight when sedation weaned, given Zyprexa x1 to facilitate extubation. Recurrent agitation POD1 at night requiring resumption of Precedex. POD2 with better sleep and no agitation on Seroquel 12.5 mg qPM, discontinue seroquel, restart pt's gabapentin 400 mg PO tid (PTA)     - Renal: CKD stage 3, history of left nephrectomy 04/2021 for RCC. Baseline Cr ~ 1.2-1.5. Cr stable within baseline. Trend BMP/UOP. Volume management as above.  Recent Labs   Lab 03/19/25 0557 03/18/25 0434 03/17/25 0435   CR 1.12 1.15 1.25*       - GI: +BM, continue bowel regimen     - : voiding without issues     - Endo: Pre-diabetes. sliding scale insulin.     Hemoglobin A1C   Date Value Ref Range Status   01/14/2025 5.8 (H) 0.0 - 5.6 % Final     Comment:     Normal <5.7%   Prediabetes 5.7-6.4%    Diabetes 6.5% or higher     Note: Adopted from ADA consensus guidelines.   10/15/2019 5.8 (H) 0 - 5.6 % Final     Comment:     Normal <5.7% Prediabetes 5.7-6.4%  Diabetes 6.5% or higher - adopted from ADA   consensus guidelines.          - FEN: Replace electrolytes as needed. Regular diet      - ID: Postop leukocytosis, likely reactive from surgery. Afebrile. WBC normalized. Periop abx prophylaxis complete. Staph epidermidis UTI noted on preop UC, started on Macrobid for 5 days. Trend CBC and fever curve.   Recent Labs   Lab 03/19/25 0557 03/18/25 0434 03/17/25 0435   WBC 5.9 4.7 8.9       - Heme: Acute blood loss anemia and thrombocytopenia due to surgery. Trend CBC, transfuse PRN.   Recent Labs   Lab 03/19/25 0557 03/18/25 0434 03/17/25 2035 03/17/25 0435   HGB 8.5* 7.6* 7.8* 7.4*   * 86*  --  78*       - Proph: SCD, subcutaneous heparin, PPI    - Other:  Clinically Significant Risk Factors           # Hypocalcemia: Lowest iCa = 4.3 mg/dL in last 2 days, will monitor and replace as appropriate       # Thrombocytopenia: Lowest platelets = 86 in last 2 days, will monitor for bleeding   #  "Hypertension: Noted on problem list     # Acute Hypercapnic Respiratory Failure: based on arterial blood gas results.  Continue supplemental oxygen and ventilatory support as indicated.         # Obesity: Estimated body mass index is 35.47 kg/m  as calculated from the following:    Height as of this encounter: 1.727 m (5' 8\").    Weight as of this encounter: 105.8 kg (233 lb 4 oz).        # Financial/Environmental Concerns: none   # History of CABG: noted on surgical history       - Dispo: Encouraged IS/TCDB/amb. Sternal precautions. Chest tubes and TPWs removed on 3/18. CXR today. Continue diuresis. Start losartan 25 mg PO daily. Decrease lopressor to 12.5 mg PO bid. May need to start amlodipine if HR remains low. Wean O2 as tolerated. Discontinue seroquel and restart pt's gabapentin. Duonebs x 4 doses. Ok to transfer and awaiting bed. Looking at likely discharge in 2 days. Continue to monitor.     Interval History  States that pain is being controlled. Denies any hallucinations. Breathing is ok. Working with IS. Appetite is decreased. Has had some nausea. +BMs, denies any popping/clicking in his breast bone. Did not ambulate yesterday. Was able to get some sleep.     Medications  Current Facility-Administered Medications   Medication Dose Route Frequency Provider Last Rate Last Admin    acetaminophen (TYLENOL) tablet 975 mg  975 mg Oral Q8H Jeanne Hunter PA-C   975 mg at 03/19/25 0618    acetylcysteine (MUCOMYST) 20 % nebulizer solution 2 mL  2 mL Nebulization 4x daily Jeanne Hunter PA-C   2 mL at 03/19/25 0653    aspirin (ASA) chewable tablet 162 mg  162 mg Oral or NG Tube Daily Jeanne Hunter PA-C   162 mg at 03/19/25 0823    atorvastatin (LIPITOR) tablet 40 mg  40 mg Oral Daily Jeanne Hunter PA-C   40 mg at 03/19/25 0823    heparin ANTICOAGULANT injection 5,000 Units  5,000 Units Subcutaneous Q8H Jeanne Hunter PA-C   5,000 Units at 03/19/25 0617    ipratropium - albuterol 0.5 mg/2.5 mg/3 mL " (DUONEB) neb solution 3 mL  3 mL Nebulization 4x daily Jeanne Hnuter PA-C   3 mL at 03/19/25 0652    Lidocaine (LIDOCARE) 4 % Patch 1 patch  1 patch Transdermal Q24H Jeanne Hunter PA-C   1 patch at 03/18/25 1440    magnesium sulfate 2 g in 50 mL sterile water intermittent infusion  2 g Intravenous Once Jeanne Hunter PA-C 50 mL/hr at 03/19/25 0823 2 g at 03/19/25 0823    metoprolol tartrate (LOPRESSOR) tablet 25 mg  25 mg Oral BID HunterJeanne handy PA-C   25 mg at 03/18/25 2040    nitroFURantoin macrocrystal-monohydrate (MACROBID) capsule 100 mg  100 mg Oral Q12H [facility] (08/20) Aide Robert MD   100 mg at 03/19/25 0823    pantoprazole (PROTONIX) EC tablet 40 mg  40 mg Oral Daily Jeanne Hunter PA-C   40 mg at 03/19/25 0823    QUEtiapine (SEROquel) half-tab 12.5 mg  12.5 mg Oral QPM HunterJeanne handy PA-C   12.5 mg at 03/18/25 2046    senna-docusate (SENOKOT-S/PERICOLACE) 8.6-50 MG per tablet 1 tablet  1 tablet Oral BID Jeanne Hunter PA-C   1 tablet at 03/17/25 2106    sodium chloride (PF) 0.9% PF flush 3 mL  3 mL Intracatheter Q8H Jeanne Hunter PA-C   3 mL at 03/19/25 0618    sodium phosphate 15 mmol in NS 250mL intermittent infusion  15 mmol Intravenous Once Jeanne Hunter PA-C         Current Facility-Administered Medications   Medication Dose Route Frequency Provider Last Rate Last Admin    bisacodyl (DULCOLAX) suppository 10 mg  10 mg Rectal Daily PRN Jeanne Hunter PA-C        calcium carbonate (TUMS) chewable tablet 500 mg  500 mg Oral 4x Daily PRN Jeanne Hunter PA-C        calcium gluconate 1 g in 50 mL in sodium chloride intermittent infusion  1 g Intravenous Q6H PRN Jeanne Hunter PA-C   1 g at 03/19/25 0635    calcium gluconate 2 g in  mL intermittent infusion  2 g Intravenous Q6H PRN Jeanne Hunter PA-C        calcium gluconate 3 g in sodium chloride 0.9 % 100 mL intermittent infusion  3 g Intravenous Q6H PRN Jeanne Hunter PA-C        carboxymethylcellulose PF  (REFRESH PLUS) 0.5 % ophthalmic solution 1 drop  1 drop Both Eyes Q1H PRN Hunter, Jeanne C, PA-C        glucose gel 15-30 g  15-30 g Oral Q15 Min PRN Hunter, Jeanne C, PA-C        Or    dextrose 50 % injection 25-50 mL  25-50 mL Intravenous Q15 Min PRN Hunter, Jeanne C, PA-C        Or    glucagon injection 1 mg  1 mg Subcutaneous Q15 Min PRN Hunter, Jeanne C, PA-C        hydrALAZINE (APRESOLINE) injection 10 mg  10 mg Intravenous Q30 Min PRN Hunter, Jeanne C, PA-C        HYDROmorphone (DILAUDID) injection 0.2 mg  0.2 mg Intravenous Q2H PRN Hunter, Jeanne C, PA-C        Or    HYDROmorphone (DILAUDID) injection 0.4 mg  0.4 mg Intravenous Q2H PRN Hunter, Jeanne C, PA-C   0.4 mg at 03/16/25 2258    hydrOXYzine HCl (ATARAX) tablet 10 mg  10 mg Oral Q6H PRN Hunter, Jeanne C, PA-C   10 mg at 03/17/25 0048    lactated ringers BOLUS 250 mL  250 mL Intravenous Q10 Min PRN Hunter, Jeanne C, PA-C   Rate Verify at 03/15/25 1700    lidocaine (LMX4) cream   Topical Q1H PRN Hunter, Jeanne C, PA-C        lidocaine 1 % 0.1-1 mL  0.1-1 mL Other Q1H PRN Hunter, Jeanne C, PA-C        magnesium hydroxide (MILK OF MAGNESIA) suspension 30 mL  30 mL Oral Daily PRN Hunter, Jeanne C, PA-C        methocarbamol (ROBAXIN) half-tab 250 mg  250 mg Oral Q6H PRN Hunter, Jeanne C, PA-C   250 mg at 03/18/25 1559    naloxone (NARCAN) injection 0.2 mg  0.2 mg Intravenous Q2 Min PRN Dimitri John MD        Or    naloxone (NARCAN) injection 0.4 mg  0.4 mg Intravenous Q2 Min PRN Dimitri John MD        Or    naloxone (NARCAN) injection 0.2 mg  0.2 mg Intramuscular Q2 Min PRN Dimitri John MD        Or    naloxone (NARCAN) injection 0.4 mg  0.4 mg Intramuscular Q2 Min PRN Dimitri John MD        No lozenges or gum should be given while patient on BIPAP/AVAPS/AVAPS AE   Does not apply Continuous PRN Jeanne Hunter PA-C        norepinephrine (LEVOPHED) 4 mg in  mL infusion PREMIX  0.01-0.15 mcg/kg/min Intravenous  "Continuous PRN HunterJeanne PA-C   Stopped at 03/17/25 1000    ondansetron (ZOFRAN ODT) ODT tab 4 mg  4 mg Oral Q6H PRN Jeanne Hunter PA-C        Or    ondansetron (ZOFRAN) injection 4 mg  4 mg Intravenous Q6H PRN Hunter, Jeanne BERNAL PA-C   4 mg at 03/19/25 0539    oxyCODONE (ROXICODONE) tablet 5 mg  5 mg Oral Q4H PRN HunterJeanne PA-C   5 mg at 03/17/25 1446    Or    oxyCODONE (ROXICODONE) tablet 10 mg  10 mg Oral Q4H PRN HunterJeanne PA-C   10 mg at 03/18/25 0924    Patient may continue current oral medications   Does not apply Continuous PRN HunterJeanne PA-C        prochlorperazine (COMPAZINE) injection 5 mg  5 mg Intravenous Q6H PRN HunterJeanne PA-C        Or    prochlorperazine (COMPAZINE) tablet 5 mg  5 mg Oral Q6H PRN HunterJeanne PA-C        QUEtiapine (SEROquel) half-tab 12.5 mg  12.5 mg Oral At Bedtime PRN HunterJeanne PA-C        Reason beta blocker order not selected   Does not apply DOES NOT GO TO MAR Jeanne Hunter PA-C        sodium chloride (PF) 0.9% PF flush 3 mL  3 mL Intracatheter q1 min prn Jeanne Hunter PA-C             Physical Exam  Vitals were reviewed  Blood pressure (!) 143/78, pulse 60, temperature 98.3  F (36.8  C), temperature source Oral, resp. rate (!) 8, height 1.727 m (5' 8\"), weight 105.8 kg (233 lb 4 oz), SpO2 97%.  Gen: up in chair, comfortable, +O2     Lungs: diminished bases, -1000 ml     Cardiovascular: RRR, telemetry SR 80s, +edema LE     Abdomen: BS+, soft, mildly distended     Derm: sternal incision D/I  R LE incisions D/I     CT: removed    Weight:   Vitals:    03/13/25 1434 03/15/25 0431 03/16/25 0619 03/18/25 0600   Weight: 100.4 kg (221 lb 5.5 oz) 100.4 kg (221 lb 5.5 oz) 106 kg (233 lb 11 oz) 108 kg (238 lb 1.6 oz)    03/19/25 0400   Weight: 105.8 kg (233 lb 4 oz)         Data  Recent Labs   Lab 03/19/25  0811 03/19/25  0557 03/18/25  1545 03/18/25  1152 03/18/25  0434 03/17/25  2109 03/17/25  2035 03/17/25  0804 03/17/25  0435 " 03/16/25  0559 03/16/25  0411 03/15/25  1357 03/15/25  1217 03/15/25  1046   WBC  --  5.9  --   --  4.7  --   --   --  8.9  --  8.6   < > 26.3* 25.7*   HGB  --  8.5*  --   --  7.6*  --  7.8*  --  7.4*  --  8.4*   < > 10.7* 9.6*  10.0*   MCV  --  97  --   --  97  --   --   --  96  --  91   < > 96 94   PLT  --  124*  --   --  86*  --   --   --  78*  --  101*   < > 160 142*   INR  --   --   --   --   --   --   --   --   --   --   --   --  1.48* 1.57*   NA  --  141  --   --  142  --   --   --  145  --  146*   < > 143 142  141   POTASSIUM  --  3.8 4.1  --  3.4  --  3.5  --  4.0  --  4.6   < > 3.1* 3.4  3.5   CHLORIDE  --  104  --   --  104  --   --   --  110*  --  109*   < > 105 107   CO2  --  31*  --   --  30*  --   --   --  29  --  27   < > 17* 24   BUN  --  11.2  --   --  11.3  --   --   --  10.1  --  10.2   < > 12.2 13.0   CR  --  1.12  --   --  1.15  --   --   --  1.25*  --  1.43*   < > 1.32* 1.30*   ANIONGAP  --  6*  --   --  8  --   --   --  6*  --  10   < > 21* 11   CANDICE  --  8.2*  --   --  8.1*  --   --   --  8.0*  --  8.5*   < > 9.6 8.4*   * 113*  --  100* 109*   < >  --    < > 131*   < > 104*   < > 198* 160*  167*   ALBUMIN  --   --   --   --   --   --   --   --   --   --  3.6  --  3.5  --    PROTTOTAL  --   --   --   --   --   --   --   --   --   --  5.4*  --  5.5*  --    BILITOTAL  --   --   --   --   --   --   --   --   --   --  0.7  --  0.9  --    ALKPHOS  --   --   --   --   --   --   --   --   --   --  57  --  72  --    ALT  --   --   --   --   --   --   --   --   --   --  11  --  16  --    AST  --   --   --   --   --   --   --   --   --   --  34  --  37  --     < > = values in this interval not displayed.       Imaging:  No results found for this or any previous visit (from the past 24 hours).      Patient seen and discussed with [place] Dustin Carda, PA-C  Cardiothoracic Surgery

## 2025-03-19 NOTE — PLAN OF CARE
"Goal Outcome Evaluation:      Plan of Care Reviewed With: patient    Overall Patient Progress: improvingOverall Patient Progress: improving    Outcome Evaluation: Pt follows commands, neuros intact. Sternal incision RYAN w/ no drainage. 4L NC overnight. Large BM overnight. CT sites intact. Plan to tx when bed available.      Problem: Adult Inpatient Plan of Care  Goal: Plan of Care Review  Description: The Plan of Care Review/Shift note should be completed every shift.  The Outcome Evaluation is a brief statement about your assessment that the patient is improving, declining, or no change.  This information will be displayed automatically on your shift  note.  Outcome: Progressing  Flowsheets (Taken 3/19/2025 0502)  Outcome Evaluation: Pt follows commands, neuros intact. Sternal incision RYAN w/ no drainage. 4L NC overnight. Large BM overnight. CT sites intact. Plan to tx when bed available.  Plan of Care Reviewed With: patient  Overall Patient Progress: improving  Goal: Patient-Specific Goal (Individualized)  Description: You can add care plan individualizations to a care plan. Examples of Individualization might be:  \"Parent requests to be called daily at 9am for status\", \"I have a hard time hearing out of my right ear\", or \"Do not touch me to wake me up as it startles  me\".  Outcome: Progressing  Goal: Absence of Hospital-Acquired Illness or Injury  Outcome: Progressing  Intervention: Identify and Manage Fall Risk  Recent Flowsheet Documentation  Taken 3/19/2025 0400 by Taya Knutson, RN  Safety Promotion/Fall Prevention:   activity supervised   assistive device/personal items within reach   nonskid shoes/slippers when out of bed   safety round/check completed   clutter free environment maintained  Taken 3/19/2025 0000 by Taya Knutson, RN  Safety Promotion/Fall Prevention:   activity supervised   assistive device/personal items within reach   nonskid shoes/slippers when out of bed   safety round/check " completed   clutter free environment maintained  Taken 3/18/2025 2000 by Taya Knutson RN  Safety Promotion/Fall Prevention:   activity supervised   assistive device/personal items within reach   nonskid shoes/slippers when out of bed   safety round/check completed   clutter free environment maintained  Intervention: Prevent Skin Injury  Recent Flowsheet Documentation  Taken 3/19/2025 0400 by Taya Knutson RN  Body Position:   refuses positioning   weight shifting  Taken 3/19/2025 0200 by Taya Knutson RN  Body Position:   refuses positioning   weight shifting  Taken 3/19/2025 0000 by Taya Knutson RN  Body Position:   refuses positioning   weight shifting  Taken 3/18/2025 2200 by Taya Knutson RN  Body Position:   refuses positioning   weight shifting  Taken 3/18/2025 2000 by Taya Knutson RN  Body Position: refuses positioning  Intervention: Prevent and Manage VTE (Venous Thromboembolism) Risk  Recent Flowsheet Documentation  Taken 3/19/2025 0400 by Taya Knutson RN  VTE Prevention/Management: SCDs off (sequential compression devices)  Taken 3/19/2025 0000 by Taya Knutson RN  VTE Prevention/Management: SCDs off (sequential compression devices)  Taken 3/18/2025 2000 by Taya Knutson RN  VTE Prevention/Management: SCDs off (sequential compression devices)  Intervention: Prevent Infection  Recent Flowsheet Documentation  Taken 3/19/2025 0400 by Taya Knutson RN  Infection Prevention:   single patient room provided   rest/sleep promoted  Taken 3/19/2025 0000 by Taya Knutson RN  Infection Prevention:   single patient room provided   rest/sleep promoted  Taken 3/18/2025 2000 by Taya Knutson RN  Infection Prevention:   single patient room provided   rest/sleep promoted  Goal: Optimal Comfort and Wellbeing  Outcome: Progressing  Intervention: Monitor Pain and Promote Comfort  Recent Flowsheet Documentation  Taken 3/19/2025 0400 by Taya Knutson RN  Pain Management Interventions:  rest  Taken 3/19/2025 0000 by Taya Knutson RN  Pain Management Interventions:   rest   repositioned  Taken 3/18/2025 2235 by Taya Knutson RN  Pain Management Interventions:   medication (see MAR)   rest  Taken 3/18/2025 2000 by Taya Knutson RN  Pain Management Interventions:   rest   repositioned  Intervention: Provide Person-Centered Care  Recent Flowsheet Documentation  Taken 3/19/2025 0400 by Taya Knutson RN  Trust Relationship/Rapport:   care explained   emotional support provided   questions encouraged   reassurance provided  Taken 3/19/2025 0000 by Taya Knutson RN  Trust Relationship/Rapport:   care explained   emotional support provided   questions encouraged   reassurance provided  Taken 3/18/2025 2000 by Taya Knutson RN  Trust Relationship/Rapport:   care explained   emotional support provided   questions encouraged   reassurance provided  Goal: Readiness for Transition of Care  Outcome: Progressing     Problem: Delirium  Goal: Optimal Coping  Outcome: Progressing  Intervention: Optimize Psychosocial Adjustment to Delirium  Recent Flowsheet Documentation  Taken 3/19/2025 0400 by Taya Knutson RN  Supportive Measures:   active listening utilized   positive reinforcement provided   problem-solving facilitated   relaxation techniques promoted  Taken 3/19/2025 0000 by Taya Knutson RN  Supportive Measures:   active listening utilized   positive reinforcement provided   problem-solving facilitated   relaxation techniques promoted  Taken 3/18/2025 2000 by Taya Knutson RN  Supportive Measures:   active listening utilized   positive reinforcement provided   problem-solving facilitated   relaxation techniques promoted  Goal: Improved Behavioral Control  Outcome: Progressing  Intervention: Prevent and Manage Agitation  Recent Flowsheet Documentation  Taken 3/19/2025 0400 by Taya Knutson RN  Environment Familiarity/Consistency: daily routine followed  Taken 3/19/2025 0000 by  Taya Knutson RN  Environment Familiarity/Consistency: daily routine followed  Taken 3/18/2025 2000 by Taya Knutson RN  Environment Familiarity/Consistency: daily routine followed  Intervention: Minimize Safety Risk  Recent Flowsheet Documentation  Taken 3/19/2025 0400 by Taya Knutson RN  Enhanced Safety Measures: review medications for side effects with activity  Trust Relationship/Rapport:   care explained   emotional support provided   questions encouraged   reassurance provided  Taken 3/19/2025 0000 by Taya Knutson RN  Enhanced Safety Measures: review medications for side effects with activity  Trust Relationship/Rapport:   care explained   emotional support provided   questions encouraged   reassurance provided  Taken 3/18/2025 2000 by Taya Knutson RN  Enhanced Safety Measures: review medications for side effects with activity  Trust Relationship/Rapport:   care explained   emotional support provided   questions encouraged   reassurance provided  Goal: Improved Attention and Thought Clarity  Outcome: Progressing  Intervention: Maximize Cognitive Function  Recent Flowsheet Documentation  Taken 3/19/2025 0400 by Taya Knutson RN  Sensory Stimulation Regulation:   care clustered   television on  Reorientation Measures:   calendar in view   clock in view   reorientation provided  Taken 3/19/2025 0000 by Taya Knutson RN  Sensory Stimulation Regulation:   care clustered   television on  Reorientation Measures:   calendar in view   clock in view   reorientation provided  Taken 3/18/2025 2000 by Taya Knutson RN  Sensory Stimulation Regulation:   care clustered   television on  Reorientation Measures:   calendar in view   clock in view   reorientation provided  Goal: Improved Sleep  Outcome: Progressing  Intervention: Promote Sleep  Recent Flowsheet Documentation  Taken 3/19/2025 0400 by Taya Knutson RN  Sleep/Rest Enhancement: awakenings minimized  Taken 3/19/2025 0000 by Taya Knutson  R, RN  Sleep/Rest Enhancement: awakenings minimized  Taken 3/18/2025 2000 by Taya Knutson RN  Sleep/Rest Enhancement: awakenings minimized     Problem: Cardiovascular Surgery  Goal: Improved Activity Tolerance  Outcome: Progressing  Intervention: Optimize Tolerance for Activity  Recent Flowsheet Documentation  Taken 3/19/2025 0400 by Taya Knutson RN  Environmental Support: calm environment promoted  Taken 3/19/2025 0000 by Taya Knutson RN  Environmental Support: calm environment promoted  Taken 3/18/2025 2000 by Taya Knutson RN  Environmental Support: calm environment promoted  Goal: Optimal Coping with Heart Surgery  Outcome: Progressing  Intervention: Support Psychosocial Response to Surgery  Recent Flowsheet Documentation  Taken 3/19/2025 0400 by Taya Knutson RN  Supportive Measures:   active listening utilized   positive reinforcement provided   problem-solving facilitated   relaxation techniques promoted  Taken 3/19/2025 0000 by Taya Knutson RN  Supportive Measures:   active listening utilized   positive reinforcement provided   problem-solving facilitated   relaxation techniques promoted  Taken 3/18/2025 2000 by Taya Knutson RN  Supportive Measures:   active listening utilized   positive reinforcement provided   problem-solving facilitated   relaxation techniques promoted  Goal: Absence of Bleeding  Outcome: Progressing  Intervention: Monitor and Manage Bleeding  Recent Flowsheet Documentation  Taken 3/19/2025 0400 by Taya Knutson RN  Bleeding Management: dressing monitored  Taken 3/19/2025 0000 by Taay Knutson RN  Bleeding Management: dressing monitored  Taken 3/18/2025 2000 by Taya Knutson RN  Bleeding Management: dressing monitored  Goal: Effective Bowel Elimination  Outcome: Progressing  Goal: Effective Cardiac Function  Outcome: Progressing  Goal: Optimal Cerebral Tissue Perfusion  Outcome: Progressing  Intervention: Protect and Optimize Cerebral Perfusion  Recent  Flowsheet Documentation  Taken 3/19/2025 0400 by Taya Knutson RN  Sensory Stimulation Regulation:   care clustered   television on  Glycemic Management: blood glucose monitored  Head of Bed (HOB) Positioning: HOB at 30 degrees  Taken 3/19/2025 0200 by Taya Knutson RN  Head of Bed (HOB) Positioning: HOB at 30 degrees  Taken 3/19/2025 0000 by Taya Knutson RN  Sensory Stimulation Regulation:   care clustered   television on  Glycemic Management: blood glucose monitored  Head of Bed (HOB) Positioning: HOB at 30 degrees  Taken 3/18/2025 2200 by Taya Knutson RN  Head of Bed (HOB) Positioning: HOB at 30 degrees  Taken 3/18/2025 2000 by Taya Knutson RN  Sensory Stimulation Regulation:   care clustered   television on  Glycemic Management: blood glucose monitored  Head of Bed (HOB) Positioning: HOB at 30 degrees  Goal: Fluid and Electrolyte Balance  Outcome: Progressing  Intervention: Monitor and Manage Fluid and Electrolyte Balance  Recent Flowsheet Documentation  Taken 3/19/2025 0400 by Taya Knutson RN  Fluid/Electrolyte Management: fluids provided  Taken 3/19/2025 0000 by Taya Knutson RN  Fluid/Electrolyte Management: fluids provided  Taken 3/18/2025 2000 by Taya Knutson RN  Fluid/Electrolyte Management: fluids provided  Goal: Blood Glucose Level Within Targeted Range  Outcome: Progressing  Intervention: Optimize Glycemic Control  Recent Flowsheet Documentation  Taken 3/19/2025 0400 by Taya Knutson RN  Glycemic Management: blood glucose monitored  Taken 3/19/2025 0000 by Taya Knutson RN  Glycemic Management: blood glucose monitored  Taken 3/18/2025 2000 by Taya Knutson RN  Glycemic Management: blood glucose monitored  Goal: Absence of Infection Signs and Symptoms  Outcome: Progressing  Intervention: Prevent or Manage Infection  Recent Flowsheet Documentation  Taken 3/19/2025 0400 by Taya Knutson RN  Infection Prevention:   single patient room provided   rest/sleep  promoted  Taken 3/19/2025 0000 by Taya Knutson RN  Infection Prevention:   single patient room provided   rest/sleep promoted  Taken 3/18/2025 2000 by Taya Knutson RN  Infection Prevention:   single patient room provided   rest/sleep promoted  Goal: Anesthesia/Sedation Recovery  Outcome: Progressing  Intervention: Optimize Anesthesia Recovery  Recent Flowsheet Documentation  Taken 3/19/2025 0400 by Taya Knutson RN  Safety Promotion/Fall Prevention:   activity supervised   assistive device/personal items within reach   nonskid shoes/slippers when out of bed   safety round/check completed   clutter free environment maintained  Reorientation Measures:   calendar in view   clock in view   reorientation provided  Taken 3/19/2025 0000 by Taya Knutson RN  Safety Promotion/Fall Prevention:   activity supervised   assistive device/personal items within reach   nonskid shoes/slippers when out of bed   safety round/check completed   clutter free environment maintained  Reorientation Measures:   calendar in view   clock in view   reorientation provided  Taken 3/18/2025 2000 by Taya Knutson RN  Safety Promotion/Fall Prevention:   activity supervised   assistive device/personal items within reach   nonskid shoes/slippers when out of bed   safety round/check completed   clutter free environment maintained  Reorientation Measures:   calendar in view   clock in view   reorientation provided  Goal: Acceptable Pain Control  Outcome: Progressing  Intervention: Prevent or Manage Pain  Recent Flowsheet Documentation  Taken 3/19/2025 0400 by Taya Knutson RN  Pain Management Interventions: rest  Taken 3/19/2025 0000 by Taya Knutson RN  Pain Management Interventions:   rest   repositioned  Taken 3/18/2025 2235 by Taya Knutson RN  Pain Management Interventions:   medication (see MAR)   rest  Taken 3/18/2025 2000 by Taya Knutson RN  Pain Management Interventions:   rest   repositioned  Goal: Nausea and  Vomiting Relief  Outcome: Progressing  Goal: Effective Urinary Elimination  Outcome: Progressing  Goal: Effective Oxygenation and Ventilation  Outcome: Progressing  Intervention: Promote Airway Secretion Clearance  Recent Flowsheet Documentation  Taken 3/19/2025 0400 by Taya Knutson RN  Cough And Deep Breathing: done with encouragement  Taken 3/19/2025 0000 by Taya Knutson, RN  Cough And Deep Breathing: done with encouragement  Taken 3/18/2025 2000 by Taya Knutson RN  Cough And Deep Breathing: done with encouragement

## 2025-03-19 NOTE — PLAN OF CARE
.CV  Pressors (which pressors and any increase/decrease in pressor needs):   HR range: 50-60s  Chest tube output: N/A    Neuro  Orientation: A&O, making all needs known. Used call light to request commode.   Delirium present?(y/n): no  Sleep: good  Pain: controlled    GI/  BM? (y/n): yes, loose brown.  Urine output: good urine output, lasix given      Lines: PIV x1  Pt tx to 305, all belongings send to pt and daughter.     Problem: Adult Inpatient Plan of Care  Goal: Absence of Hospital-Acquired Illness or Injury  Intervention: Identify and Manage Fall Risk  Recent Flowsheet Documentation  Taken 3/19/2025 0800 by Anil Harris RN  Safety Promotion/Fall Prevention:   activity supervised   assistive device/personal items within reach   nonskid shoes/slippers when out of bed   safety round/check completed   clutter free environment maintained  Intervention: Prevent Skin Injury  Recent Flowsheet Documentation  Taken 3/19/2025 0800 by Anil Harris RN  Body Position: weight shifting  Intervention: Prevent and Manage VTE (Venous Thromboembolism) Risk  Recent Flowsheet Documentation  Taken 3/19/2025 0800 by Anil Harris RN  VTE Prevention/Management: SCDs off (sequential compression devices)  Intervention: Prevent Infection  Recent Flowsheet Documentation  Taken 3/19/2025 0800 by Anil Harris RN  Infection Prevention:   single patient room provided   rest/sleep promoted   Goal Outcome Evaluation:

## 2025-03-19 NOTE — PROGRESS NOTES
[facility]  Cardiovascular and Thoracic Surgery Daily Note      Assessment and Plan  Fermín Josue is a 75 year old male with a PMH of recently diagnosed non small cell lung cancer s/p VATS wedge resection of RUL lung nodule on 1/28/25 (PFTs done prior show mild restrictive lung disease), BPH, CKD stage IIIa, renal cancer s/p nephrectomy, JAMEE, hypertension, dyslipidemia, obesity, prediabetes, CAD s/p TRISH x 2 LAD and occluded RCA in 2007 who was admitted to [facility] 03/13 with 1-2 week history of chest pain. TTE 3/13/25 with preserved biventricular systolic function and early diastolic dysfunction. Coronary angiogram on 3/14/25 revealed multivessel CAD with 70% LMCA stenosis and mid RCA . CV surgery consulted for consideration of surgical revascularization. Underwent CABG x 2 (LIMA to LAD, SVG to RCA) on 03/15 with Dr. John.      POD # 3 CABG x 2 (LIMA to LAD, SVG to RCA) with Dr. Dimitri John     - CVS:   Pre-op TTE with preserved biventricular systolic function and early diastolic dysfunction.   Profound postop vasoplegic shock with lactic acidosis, resolved. Pressors off POD2. CI robust off epi.   Hypertensive at times. Change PO hydralazine to Lopressor 25 mg BID now that accelerated junctional rhythm has resolved.  Hypervolemic, Lasix 40 mg IV x1  Aspirin 162 mg daily, atorvastatin 40 mg daily.   Chest tubes: remove today. TPW: remove today     - Resp:   Postoperative mechanical ventilation, resolved. Extubated POD1 AM (agitated and non-redirectable with sedation wean POD0)  Recently diagnosed non small cell lung cancer s/p VATS wedge resection of RUL lung nodule on 1/28/25 (PFTs done prior show mild restrictive lung disease). Stage 1A1. Recent oncology note reports no further treatment indicated.   Poor effort pulmonary toilet. Scheduled nebs/mucomyst, intermittent BiPAP      - Neuro: Neuro grossly intact. Pain controlled. POD0 overnight when sedation weaned, given Zyprexa x1 to  facilitate extubation. Recurrent agitation POD1 at night requiring resumption of Precedex. POD2 with better sleep and no agitation on Seroquel 25 mg qPM, will decrease to 12.5 scheduled qPM with additional dose PRN for agitation.      - Renal: CKD stage 3, history of left nephrectomy 04/2021 for RCC. Baseline Cr ~ 1.2-1.5. Cr stable within baseline. Trend BMP/UOP. Volume management as above.  Recent Labs   Lab 03/18/25 0434 03/17/25 0435 03/16/25 0411   CR 1.15 1.25* 1.43*       - GI: +BM, +flatus, continue bowel regimen     - : Whitmore removed, voiding without issues     - Endo: Pre-diabetes. Postop stress hyperglycemia, improved. Insulin infusion transitioned to sliding scale insulin.     Hemoglobin A1C   Date Value Ref Range Status   01/14/2025 5.8 (H) 0.0 - 5.6 % Final     Comment:     Normal <5.7%   Prediabetes 5.7-6.4%    Diabetes 6.5% or higher     Note: Adopted from ADA consensus guidelines.   10/15/2019 5.8 (H) 0 - 5.6 % Final     Comment:     Normal <5.7% Prediabetes 5.7-6.4%  Diabetes 6.5% or higher - adopted from ADA   consensus guidelines.          - FEN: Replace electrolytes as needed. Regular diet      - ID: Postop leukocytosis, likely reactive from surgery. Afebrile. WBC normalized. Periop abx prophylaxis complete. Staph epidermidis UTI noted on preop UC, started on Macrobid. Trend CBC and fever curve.   Recent Labs   Lab 03/18/25 0434 03/17/25 0435 03/16/25 0411   WBC 4.7 8.9 8.6       - Heme: Acute blood loss anemia and thrombocytopenia due to surgery. Hgb and PLT 7.4/78. Trend CBC, transfuse PRN.   Recent Labs   Lab 03/18/25 0434 03/17/25 2035 03/17/25 0435 03/16/25 0411   HGB 7.6* 7.8* 7.4* 8.4*   PLT 86*  --  78* 101*       - Proph: SCD, subcutaneous heparin, PPI    - Other:  Clinically Significant Risk Factors          # Hyperchloremia: Highest Cl = 110 mmol/L in last 2 days, will monitor as appropriate      # Hypocalcemia: Lowest iCa = 4.3 mg/dL in last 2 days, will monitor and  "replace as appropriate       # Thrombocytopenia: Lowest platelets = 78 in last 2 days, will monitor for bleeding   # Hypertension: Noted on problem list     # Acute Hypercapnic Respiratory Failure: based on arterial blood gas results.  Continue supplemental oxygen and ventilatory support as indicated.         # Obesity: Estimated body mass index is 36.2 kg/m  as calculated from the following:    Height as of this encounter: 1.727 m (5' 8\").    Weight as of this encounter: 108 kg (238 lb 1.6 oz).      # Financial/Environmental Concerns: none   # History of CABG: noted on surgical history       - [place] [facility] [place]rapies recommending likely progress to home with assist when medically ready. Medically Ready for Discharge: Anticipated in 2-4 Days, dispo pending diuresis, oxygen wean.        Interval History  Slept well overnight. Working with therapies. Poor pulmonary toilet.       Medications  Current Facility-Administered Medications   Medication Dose Route Frequency Provider Last Rate Last Admin    acetaminophen (TYLENOL) tablet 975 mg  975 mg Oral Q8H Hunter, Jeanne C, PA-C   975 mg at 03/18/25 1441    acetylcysteine (MUCOMYST) 20 % nebulizer solution 2 mL  2 mL Nebulization 4x daily Hunter, Jeanne C, PA-C   2 mL at 03/18/25 1612    aspirin (ASA) chewable tablet 162 mg  162 mg Oral or NG Tube Daily Hunter, Jeanne C, PA-C   162 mg at 03/18/25 0848    atorvastatin (LIPITOR) tablet 40 mg  40 mg Oral Daily Hunter, Jeanne C, PA-C   40 mg at 03/18/25 0848    heparin ANTICOAGULANT injection 5,000 Units  5,000 Units Subcutaneous Q8H Hunter, Jeanne C, PA-C   5,000 Units at 03/18/25 1440    ipratropium - albuterol 0.5 mg/2.5 mg/3 mL (DUONEB) neb solution 3 mL  3 mL Nebulization 4x daily Hunter, Jeanne BERNAL, PA-C   3 mL at 03/18/25 1612    Lidocaine (LIDOCARE) 4 % Patch 1 patch  1 patch Transdermal Q24H Hunter, Jeanne BERNAL, PA-C   1 patch at 03/18/25 1440    metoprolol tartrate (LOPRESSOR) tablet 25 mg  25 mg Oral BID Hunter, " DAYNA QuiñonezC   25 mg at 03/18/25 2040    nitroFURantoin macrocrystal-monohydrate (MACROBID) capsule 100 mg  100 mg Oral Q12H [facility] (08/20) Aide Robert MD   100 mg at 03/18/25 2040    pantoprazole (PROTONIX) 2 mg/mL suspension 40 mg  40 mg Oral or NG Tube Daily Hunter, ASHOK Quiñonez-C   40 mg at 03/16/25 0811    Or    pantoprazole (PROTONIX) EC tablet 40 mg  40 mg Oral Daily Hunter, ASHOK Quiñonez-C   40 mg at 03/18/25 0848    polyethylene glycol (MIRALAX) Packet 17 g  17 g Oral Daily Hunter, ASHOK Quiñonez-C   17 g at 03/18/25 0848    QUEtiapine (SEROquel) half-tab 12.5 mg  12.5 mg Oral QPM Hunter, ASHOK Quiñonez-C   12.5 mg at 03/18/25 2046    senna-docusate (SENOKOT-S/PERICOLACE) 8.6-50 MG per tablet 1 tablet  1 tablet Oral BID HunterJeanne PA-C   1 tablet at 03/17/25 2106    sodium chloride (PF) 0.9% PF flush 3 mL  3 mL Intracatheter Q8H Jeanne Hunter PA-C   3 mL at 03/18/25 2042     Current Facility-Administered Medications   Medication Dose Route Frequency Provider Last Rate Last Admin    bisacodyl (DULCOLAX) suppository 10 mg  10 mg Rectal Daily PRN Jeanne Hunter PA-C        calcium carbonate (TUMS) chewable tablet 500 mg  500 mg Oral 4x Daily PRN HunterJeanne PA-C        calcium gluconate 1 g in 50 mL in sodium chloride intermittent infusion  1 g Intravenous Q6H PRN HunterJeanne handy PA-C   1 g at 03/17/25 0720    calcium gluconate 2 g in  mL intermittent infusion  2 g Intravenous Q6H PRN HunterJeanne handy PA-C        calcium gluconate 3 g in sodium chloride 0.9 % 100 mL intermittent infusion  3 g Intravenous Q6H PRN HunterJeanne handy PA-C        carboxymethylcellulose PF (REFRESH PLUS) 0.5 % ophthalmic solution 1 drop  1 drop Both Eyes Q1H PRN Hunter, Jeanne DOLORES PA-C        glucose gel 15-30 g  15-30 g Oral Q15 Min PRN Hunter, ASHOK Quiñonez-C        Or    dextrose 50 % injection 25-50 mL  25-50 mL Intravenous Q15 Min PRN Hunter, Jeanne DOLORES PA-C        Or    glucagon injection 1 mg  1 mg  Subcutaneous Q15 Min PRN Hunter, Jeanne C, PA-C        hydrALAZINE (APRESOLINE) injection 10 mg  10 mg Intravenous Q30 Min PRN Hunter, Jeanne C, PA-C        HYDROmorphone (DILAUDID) injection 0.2 mg  0.2 mg Intravenous Q2H PRN Hunter, Jeanne C, PA-C        Or    HYDROmorphone (DILAUDID) injection 0.4 mg  0.4 mg Intravenous Q2H PRN Hunter, Jeanne C, PA-C   0.4 mg at 03/16/25 2258    hydrOXYzine HCl (ATARAX) tablet 10 mg  10 mg Oral Q6H PRN Hunter, Jeanne C, PA-C   10 mg at 03/17/25 0048    lactated ringers BOLUS 250 mL  250 mL Intravenous Q10 Min PRN Hunter, Jeanne C, PA-C   Rate Verify at 03/15/25 1700    lidocaine (LMX4) cream   Topical Q1H PRN Hunter, Jeanne BERNAL, PA-C        lidocaine 1 % 0.1-1 mL  0.1-1 mL Other Q1H PRN Hunter, Jeanne C, PA-C        magnesium hydroxide (MILK OF MAGNESIA) suspension 30 mL  30 mL Oral Daily PRN Hunter, Jeanne C, PA-C        methocarbamol (ROBAXIN) half-tab 250 mg  250 mg Oral Q6H PRN Hunter, Jeanne C, PA-C   250 mg at 03/18/25 1559    naloxone (NARCAN) injection 0.2 mg  0.2 mg Intravenous Q2 Min PRN Dimitri John MD        Or    naloxone (NARCAN) injection 0.4 mg  0.4 mg Intravenous Q2 Min PRN Dimitri John MD        Or    naloxone (NARCAN) injection 0.2 mg  0.2 mg Intramuscular Q2 Min PRN Dimitri John MD        Or    naloxone (NARCAN) injection 0.4 mg  0.4 mg Intramuscular Q2 Min PRN Dimitri John MD        No lozenges or gum should be given while patient on BIPAP/AVAPS/AVAPS AE   Does not apply Continuous PRN Hunter, Jeanne BERNAL, PA-C        norepinephrine (LEVOPHED) 4 mg in  mL infusion PREMIX  0.01-0.15 mcg/kg/min Intravenous Continuous PRN Hunter, Jeanne BERNAL, PA-C   Stopped at 03/17/25 1000    ondansetron (ZOFRAN ODT) ODT tab 4 mg  4 mg Oral Q6H PRN Jeanne Hunter PA-C        Or    ondansetron (ZOFRAN) injection 4 mg  4 mg Intravenous Q6H PRN Jeanne Hunter PA-C   4 mg at 03/18/25 0944    oxyCODONE (ROXICODONE) tablet 5 mg  5 mg Oral Q4H PRN  "Jeanne Hunter PA-C   5 mg at 03/17/25 1446    Or    oxyCODONE (ROXICODONE) tablet 10 mg  10 mg Oral Q4H PRN Jeanne Hunter PA-C   10 mg at 03/18/25 0924    Patient may continue current oral medications   Does not apply Continuous PRN Jeanne Hunter PA-C        prochlorperazine (COMPAZINE) injection 5 mg  5 mg Intravenous Q6H PRN Jeanne Hunter PA-C        Or    prochlorperazine (COMPAZINE) tablet 5 mg  5 mg Oral Q6H PRN Jeanne Hunter PA-C        QUEtiapine (SEROquel) half-tab 12.5 mg  12.5 mg Oral At Bedtime PRN Jeanne Hunter PA-C        Reason beta blocker order not selected   Does not apply DOES NOT GO TO MAR Jeanne Hunter PA-C        sodium chloride (PF) 0.9% PF flush 3 mL  3 mL Intracatheter q1 min prn Jeanne Hunter PA-C             Physical Exam  Vitals were reviewed  Blood pressure (!) 155/72, pulse 89, temperature 98.7  F (37.1  C), temperature source Oral, resp. rate 16, height 1.727 m (5' 8\"), weight 108 kg (238 lb 1.6 oz), SpO2 99%.  Rhythm: NSR     Lungs: diminished bases     Cardiovascular: rrr, no m/r/g     Abdomen: soft, NT, ND, +BS     Extremeties: warm, 1+ LE edema     Incision: CDI     CT: serosang output 360 mL, no air leak    Weight:   Vitals:    03/13/25 1434 03/15/25 0431 03/16/25 0619 03/18/25 0600   Weight: 100.4 kg (221 lb 5.5 oz) 100.4 kg (221 lb 5.5 oz) 106 kg (233 lb 11 oz) 108 kg (238 lb 1.6 oz)         Data  Recent Labs   Lab 03/18/25  1545 03/18/25  1152 03/18/25  0434 03/17/25  2109 03/17/25  2035 03/17/25  0804 03/17/25  0435 03/16/25  0559 03/16/25  0411 03/15/25  1357 03/15/25  1217 03/15/25  1046   WBC  --   --  4.7  --   --   --  8.9  --  8.6   < > 26.3* 25.7*   HGB  --   --  7.6*  --  7.8*  --  7.4*  --  8.4*   < > 10.7* 9.6*  10.0*   MCV  --   --  97  --   --   --  96  --  91   < > 96 94   PLT  --   --  86*  --   --   --  78*  --  101*   < > 160 142*   INR  --   --   --   --   --   --   --   --   --   --  1.48* 1.57*   NA  --   --  142  --   --   --  145  " --  146*   < > 143 142  141   POTASSIUM 4.1  --  3.4  --  3.5  --  4.0  --  4.6   < > 3.1* 3.4  3.5   CHLORIDE  --   --  104  --   --   --  110*  --  109*   < > 105 107   CO2  --   --  30*  --   --   --  29  --  27   < > 17* 24   BUN  --   --  11.3  --   --   --  10.1  --  10.2   < > 12.2 13.0   CR  --   --  1.15  --   --   --  1.25*  --  1.43*   < > 1.32* 1.30*   ANIONGAP  --   --  8  --   --   --  6*  --  10   < > 21* 11   CANDICE  --   --  8.1*  --   --   --  8.0*  --  8.5*   < > 9.6 8.4*   GLC  --  100* 109* 112*  --    < > 131*   < > 104*   < > 198* 160*  167*   ALBUMIN  --   --   --   --   --   --   --   --  3.6  --  3.5  --    PROTTOTAL  --   --   --   --   --   --   --   --  5.4*  --  5.5*  --    BILITOTAL  --   --   --   --   --   --   --   --  0.7  --  0.9  --    ALKPHOS  --   --   --   --   --   --   --   --  57  --  72  --    ALT  --   --   --   --   --   --   --   --  11  --  16  --    AST  --   --   --   --   --   --   --   --  34  --  37  --     < > = values in this interval not displayed.       Imaging:  No results found for this or any previous visit (from the past 24 hours).      Patient seen and discussed with [place] Arielle Webb, PA-C  Cardiothoracic Surgery  Available for paging [phone] (personal pager or CV Surgery Rounding Pager)  Personal Pager: [phone]  CV Surgery Rounding Pager: [phone]  After hours please page surgeon on-call

## 2025-03-20 ENCOUNTER — APPOINTMENT (OUTPATIENT)
Dept: OCCUPATIONAL THERAPY | Facility: CLINIC | Age: 75
DRG: 233 | End: 2025-03-20
Payer: COMMERCIAL

## 2025-03-20 LAB
ANION GAP SERPL CALCULATED.3IONS-SCNC: 10 MMOL/L (ref 7–15)
BUN SERPL-MCNC: 11.7 MG/DL (ref 8–23)
CALCIUM SERPL-MCNC: 8.1 MG/DL (ref 8.8–10.4)
CHLORIDE SERPL-SCNC: 103 MMOL/L (ref 98–107)
CREAT SERPL-MCNC: 1.23 MG/DL (ref 0.67–1.17)
EGFRCR SERPLBLD CKD-EPI 2021: 61 ML/MIN/1.73M2
ERYTHROCYTE [DISTWIDTH] IN BLOOD BY AUTOMATED COUNT: 13.6 % (ref 10–15)
GLUCOSE SERPL-MCNC: 107 MG/DL (ref 70–99)
HCO3 SERPL-SCNC: 29 MMOL/L (ref 22–29)
HCT VFR BLD AUTO: 26.8 % (ref 40–53)
HGB BLD-MCNC: 8.9 G/DL (ref 13.3–17.7)
MAGNESIUM SERPL-MCNC: 2.2 MG/DL (ref 1.7–2.3)
MCH RBC QN AUTO: 31.7 PG (ref 26.5–33)
MCHC RBC AUTO-ENTMCNC: 33.2 G/DL (ref 31.5–36.5)
MCV RBC AUTO: 95 FL (ref 78–100)
PHOSPHATE SERPL-MCNC: 2.1 MG/DL (ref 2.5–4.5)
PLATELET # BLD AUTO: 115 10E3/UL (ref 150–450)
POTASSIUM SERPL-SCNC: 3.3 MMOL/L (ref 3.4–5.3)
POTASSIUM SERPL-SCNC: 4 MMOL/L (ref 3.4–5.3)
RBC # BLD AUTO: 2.81 10E6/UL (ref 4.4–5.9)
SODIUM SERPL-SCNC: 142 MMOL/L (ref 135–145)
WBC # BLD AUTO: 5.6 10E3/UL (ref 4–11)

## 2025-03-20 PROCEDURE — 250N000011 HC RX IP 250 OP 636: Performed by: PHYSICIAN ASSISTANT

## 2025-03-20 PROCEDURE — 250N000013 HC RX MED GY IP 250 OP 250 PS 637: Performed by: PHYSICIAN ASSISTANT

## 2025-03-20 PROCEDURE — 36415 COLL VENOUS BLD VENIPUNCTURE: CPT | Performed by: PHYSICIAN ASSISTANT

## 2025-03-20 PROCEDURE — 84132 ASSAY OF SERUM POTASSIUM: CPT | Performed by: PHYSICIAN ASSISTANT

## 2025-03-20 PROCEDURE — 97535 SELF CARE MNGMENT TRAINING: CPT | Mod: GO | Performed by: OCCUPATIONAL THERAPIST

## 2025-03-20 PROCEDURE — 80048 BASIC METABOLIC PNL TOTAL CA: CPT | Performed by: PHYSICIAN ASSISTANT

## 2025-03-20 PROCEDURE — 120N000001 HC R&B MED SURG/OB

## 2025-03-20 PROCEDURE — 999N000157 HC STATISTIC RCP TIME EA 10 MIN

## 2025-03-20 PROCEDURE — 94640 AIRWAY INHALATION TREATMENT: CPT

## 2025-03-20 PROCEDURE — 84100 ASSAY OF PHOSPHORUS: CPT | Performed by: PHYSICIAN ASSISTANT

## 2025-03-20 PROCEDURE — 83735 ASSAY OF MAGNESIUM: CPT | Performed by: PHYSICIAN ASSISTANT

## 2025-03-20 PROCEDURE — 85027 COMPLETE CBC AUTOMATED: CPT | Performed by: PHYSICIAN ASSISTANT

## 2025-03-20 PROCEDURE — 250N000009 HC RX 250: Performed by: PHYSICIAN ASSISTANT

## 2025-03-20 RX ORDER — IPRATROPIUM BROMIDE AND ALBUTEROL SULFATE 2.5; .5 MG/3ML; MG/3ML
3 SOLUTION RESPIRATORY (INHALATION) 4 TIMES DAILY PRN
Status: DISCONTINUED | OUTPATIENT
Start: 2025-03-20 | End: 2025-03-25 | Stop reason: HOSPADM

## 2025-03-20 RX ORDER — POTASSIUM CHLORIDE 1500 MG/1
40 TABLET, EXTENDED RELEASE ORAL ONCE
Status: COMPLETED | OUTPATIENT
Start: 2025-03-20 | End: 2025-03-20

## 2025-03-20 RX ORDER — FUROSEMIDE 10 MG/ML
40 INJECTION INTRAMUSCULAR; INTRAVENOUS ONCE
Status: COMPLETED | OUTPATIENT
Start: 2025-03-20 | End: 2025-03-20

## 2025-03-20 RX ADMIN — NITROFURANTOIN MONOHYDRATE/MACROCRYSTALLINE 100 MG: 25; 75 CAPSULE ORAL at 20:13

## 2025-03-20 RX ADMIN — ACETAMINOPHEN 975 MG: 325 TABLET, FILM COATED ORAL at 05:48

## 2025-03-20 RX ADMIN — METHOCARBAMOL 250 MG: 500 TABLET ORAL at 10:11

## 2025-03-20 RX ADMIN — PANTOPRAZOLE SODIUM 40 MG: 40 TABLET, DELAYED RELEASE ORAL at 08:01

## 2025-03-20 RX ADMIN — METOPROLOL TARTRATE 12.5 MG: 25 TABLET, FILM COATED ORAL at 20:13

## 2025-03-20 RX ADMIN — SIMETHICONE 80 MG: 80 TABLET, CHEWABLE ORAL at 21:23

## 2025-03-20 RX ADMIN — OXYCODONE HYDROCHLORIDE 5 MG: 5 TABLET ORAL at 20:16

## 2025-03-20 RX ADMIN — POTASSIUM CHLORIDE 40 MEQ: 1500 TABLET, EXTENDED RELEASE ORAL at 07:56

## 2025-03-20 RX ADMIN — OXYCODONE HYDROCHLORIDE 5 MG: 5 TABLET ORAL at 05:49

## 2025-03-20 RX ADMIN — LOSARTAN POTASSIUM 25 MG: 25 TABLET, FILM COATED ORAL at 08:01

## 2025-03-20 RX ADMIN — HEPARIN SODIUM 5000 UNITS: 5000 INJECTION, SOLUTION INTRAVENOUS; SUBCUTANEOUS at 21:20

## 2025-03-20 RX ADMIN — ACETAMINOPHEN 975 MG: 325 TABLET, FILM COATED ORAL at 13:38

## 2025-03-20 RX ADMIN — ATORVASTATIN CALCIUM 40 MG: 40 TABLET, FILM COATED ORAL at 07:56

## 2025-03-20 RX ADMIN — HEPARIN SODIUM 5000 UNITS: 5000 INJECTION, SOLUTION INTRAVENOUS; SUBCUTANEOUS at 05:48

## 2025-03-20 RX ADMIN — FUROSEMIDE 40 MG: 10 INJECTION, SOLUTION INTRAMUSCULAR; INTRAVENOUS at 11:56

## 2025-03-20 RX ADMIN — ASPIRIN 81 MG CHEWABLE TABLET 162 MG: 81 TABLET CHEWABLE at 08:01

## 2025-03-20 RX ADMIN — GABAPENTIN 400 MG: 300 CAPSULE ORAL at 21:23

## 2025-03-20 RX ADMIN — SIMETHICONE 80 MG: 80 TABLET, CHEWABLE ORAL at 14:25

## 2025-03-20 RX ADMIN — GABAPENTIN 400 MG: 300 CAPSULE ORAL at 08:00

## 2025-03-20 RX ADMIN — ACETAMINOPHEN 975 MG: 325 TABLET, FILM COATED ORAL at 21:24

## 2025-03-20 RX ADMIN — NITROFURANTOIN MONOHYDRATE/MACROCRYSTALLINE 100 MG: 25; 75 CAPSULE ORAL at 07:56

## 2025-03-20 RX ADMIN — POTASSIUM & SODIUM PHOSPHATES POWDER PACK 280-160-250 MG 1 PACKET: 280-160-250 PACK at 15:57

## 2025-03-20 RX ADMIN — IPRATROPIUM BROMIDE AND ALBUTEROL SULFATE 3 ML: .5; 3 SOLUTION RESPIRATORY (INHALATION) at 07:39

## 2025-03-20 RX ADMIN — HEPARIN SODIUM 5000 UNITS: 5000 INJECTION, SOLUTION INTRAVENOUS; SUBCUTANEOUS at 13:38

## 2025-03-20 RX ADMIN — SIMETHICONE 80 MG: 80 TABLET, CHEWABLE ORAL at 18:16

## 2025-03-20 RX ADMIN — METOPROLOL TARTRATE 12.5 MG: 25 TABLET, FILM COATED ORAL at 08:09

## 2025-03-20 RX ADMIN — POTASSIUM & SODIUM PHOSPHATES POWDER PACK 280-160-250 MG 1 PACKET: 280-160-250 PACK at 11:56

## 2025-03-20 RX ADMIN — POTASSIUM & SODIUM PHOSPHATES POWDER PACK 280-160-250 MG 1 PACKET: 280-160-250 PACK at 07:59

## 2025-03-20 RX ADMIN — SIMETHICONE 80 MG: 80 TABLET, CHEWABLE ORAL at 08:00

## 2025-03-20 RX ADMIN — GABAPENTIN 300 MG: 300 CAPSULE ORAL at 15:57

## 2025-03-20 ASSESSMENT — ACTIVITIES OF DAILY LIVING (ADL)
ADLS_ACUITY_SCORE: 68
ADLS_ACUITY_SCORE: 68
ADLS_ACUITY_SCORE: 65
ADLS_ACUITY_SCORE: 68
ADLS_ACUITY_SCORE: 68
ADLS_ACUITY_SCORE: 69
ADLS_ACUITY_SCORE: 65
ADLS_ACUITY_SCORE: 67
ADLS_ACUITY_SCORE: 68
ADLS_ACUITY_SCORE: 69
ADLS_ACUITY_SCORE: 65
ADLS_ACUITY_SCORE: 68
ADLS_ACUITY_SCORE: 68
ADLS_ACUITY_SCORE: 69
ADLS_ACUITY_SCORE: 65
ADLS_ACUITY_SCORE: 65
ADLS_ACUITY_SCORE: 69
ADLS_ACUITY_SCORE: 65
ADLS_ACUITY_SCORE: 69
ADLS_ACUITY_SCORE: 71
ADLS_ACUITY_SCORE: 69
ADLS_ACUITY_SCORE: 69
ADLS_ACUITY_SCORE: 68

## 2025-03-20 NOTE — PROGRESS NOTES
[facility]  Cardiovascular and Thoracic Surgery Daily Note      Assessment and Plan  Fermín Josue is a 75 year old male with a PMH of recently diagnosed non small cell lung cancer s/p VATS wedge resection of RUL lung nodule on 1/28/25 (PFTs done prior show mild restrictive lung disease), BPH, CKD stage IIIa, renal cancer s/p nephrectomy, JAMEE, hypertension, dyslipidemia, obesity, prediabetes, CAD s/p TRISH x 2 LAD and occluded RCA in 2007 who was admitted to [facility] 03/13 with 1-2 week history of chest pain. TTE 3/13/25 with preserved biventricular systolic function and early diastolic dysfunction. Coronary angiogram on 3/14/25 revealed multivessel CAD with 70% LMCA stenosis and mid RCA . CV surgery consulted for consideration of surgical revascularization. Underwent CABG x 2 (LIMA to LAD, SVG to RCA) on 03/15 with Dr. John.     POD #5 CABG x 2 (LIMA to LAD, SVG to RCA) with Dr. Dimitri John on 3/15/2025     - CVS:   Pre-op TTE with preserved biventricular systolic function and early diastolic dysfunction.   Profound postop vasoplegic shock with lactic acidosis, resolved. Pressors off POD2. CI robust off epi.   Hypertensive at times. Pt continues to have borderline HR 60-80. Lopressor 12.5 mg BID with parameters, losartan 25 mg daily initiated 3/19 for hypertension. Had some accelerated junctional rhythm which has resolved.   Repeat lasix 40 mg IV once today  Aspirin 162 mg daily, atorvastatin 40 mg daily.   Chest tubes/TPWs removed on 3/18     - Resp:   Postoperative mechanical ventilation, resolved. Extubated POD1 AM (agitated and non-redirectable with sedation wean POD0)  Recently diagnosed non small cell lung cancer s/p VATS wedge resection of RUL lung nodule on 1/28/25 (PFTs done prior show mild restrictive lung disease). Stage 1A1. Recent oncology note reports no further treatment indicated.   Improving effort pulmonary toilet. PRN nebs, saturating well on NC. Wean as able.     - Neuro:  Neuro grossly intact. Pain controlled. POD0 overnight when sedation weaned, given Zyprexa x1 to facilitate extubation. Recurrent agitation POD1 at night requiring resumption of Precedex. restarted pt's gabapentin 400 mg PO tid 3/19 (PTA)     - Renal: CKD stage 3, history of left nephrectomy 04/2021 for RCC. Baseline Cr ~ 1.2-1.5. Cr stable within baseline. Trend BMP/UOP. Volume management as above.  Recent Labs   Lab 03/20/25  0557 03/19/25  0557 03/18/25  0434   CR 1.23* 1.12 1.15       - GI: +BM, bowel regimen changed PRN due to loose stools. Liquid bowel movements      - : voiding without issues     - Endo: Pre-diabetes. sliding scale insulin.     Hemoglobin A1C   Date Value Ref Range Status   01/14/2025 5.8 (H) 0.0 - 5.6 % Final     Comment:     Normal <5.7%   Prediabetes 5.7-6.4%    Diabetes 6.5% or higher     Note: Adopted from ADA consensus guidelines.   10/15/2019 5.8 (H) 0 - 5.6 % Final     Comment:     Normal <5.7% Prediabetes 5.7-6.4%  Diabetes 6.5% or higher - adopted from ADA   consensus guidelines.          - FEN: Replace electrolytes as needed. Regular diet      - ID: Postop leukocytosis, likely reactive from surgery. Afebrile. WBC normalized. Periop abx prophylaxis complete. Staph epidermidis UTI noted on preop UC, started on Macrobid for 5 days (last dose to be 3/23 PM). Trend CBC and fever curve.   Recent Labs   Lab 03/20/25  0557 03/19/25  0557 03/18/25  0434   WBC 5.6 5.9 4.7       - Heme: Acute blood loss anemia and thrombocytopenia due to surgery. Trend CBC, transfuse PRN.   Recent Labs   Lab 03/20/25  0557 03/19/25  0557 03/18/25  0434   HGB 8.9* 8.5* 7.6*   * 124* 86*       - Proph: SCD, subcutaneous heparin, PPI    - Other:  Clinically Significant Risk Factors        # Hypokalemia: Lowest K = 3.3 mmol/L in last 2 days, will replace as needed    # Hypocalcemia: Lowest iCa = 4.3 mg/dL in last 2 days, will monitor and replace as appropriate       # Thrombocytopenia: Lowest platelets =  "115 in last 2 days, will monitor for bleeding   # Hypertension: Noted on problem list     # Acute Hypercapnic Respiratory Failure: based on arterial blood gas results.  Continue supplemental oxygen and ventilatory support as indicated.         # Obesity: Estimated body mass index is 35.2 kg/m  as calculated from the following:    Height as of this encounter: 1.727 m (5' 8\").    Weight as of this encounter: 105 kg (231 lb 8 oz).        # Financial/Environmental Concerns: none   # History of CABG: noted on surgical history       - Dispo: Encouraged IS/TCDB/amb. Sternal precautions. Repeat diuresis today 40 mg IV lasix once. May need to start amlodipine if HR remains low and remains hypertensive. Wean O2 as tolerated. Looking at likely discharge over weekend. Continue to monitor.     Interval History  States that pain is being controlled, fatigues easily with activity. Denies any hallucinations. Breathing is slightly improving. Continues to use IS and flutter valve. Appetite is improving, no nausea. +BMs, denies any popping/clicking in his breast bone. Ambulating halls with therapies      Medications  Current Facility-Administered Medications   Medication Dose Route Frequency Provider Last Rate Last Admin    acetaminophen (TYLENOL) tablet 975 mg  975 mg Oral Q8H Jeanne Hunter PA-C   975 mg at 03/20/25 0548    aspirin (ASA) chewable tablet 162 mg  162 mg Oral or NG Tube Daily Jeanne Hunter PA-C   162 mg at 03/20/25 0801    atorvastatin (LIPITOR) tablet 40 mg  40 mg Oral Daily Jeanne Hunter PA-C   40 mg at 03/20/25 0756    gabapentin (NEURONTIN) capsule 400 mg  400 mg Oral TID Rodriguez Lantigua PA-C   400 mg at 03/20/25 0800    heparin ANTICOAGULANT injection 5,000 Units  5,000 Units Subcutaneous Q8H Jeanne Hunter PA-C   5,000 Units at 03/20/25 0548    Lidocaine (LIDOCARE) 4 % Patch 1 patch  1 patch Transdermal Q24H Jeanne Hunter PA-C   1 patch at 03/18/25 1440    losartan (COZAAR) tablet 25 mg  25 mg Oral " Daily Rodriguez Lantigua PA-C   25 mg at 03/20/25 0801    metoprolol tartrate (LOPRESSOR) half-tab 12.5 mg  12.5 mg Oral BID Rodriguez Lantigua PA-C   12.5 mg at 03/20/25 0809    nitroFURantoin macrocrystal-monohydrate (MACROBID) capsule 100 mg  100 mg Oral Q12H RAMSEY (08/20) Jeanne Hunter PA-C   100 mg at 03/20/25 0756    pantoprazole (PROTONIX) EC tablet 40 mg  40 mg Oral Daily Jeanne Hunter PA-C   40 mg at 03/20/25 0801    potassium & sodium phosphates (NEUTRA-PHOS) Packet 1 packet  1 packet Oral or Feeding Tube Q4H Rodriguez Lantigua PA-C   1 packet at 03/20/25 0759    simethicone (MYLICON) chewable tablet 80 mg  80 mg Oral 4x Daily Rodriguez Lantigua PA-C   80 mg at 03/20/25 0800    sodium chloride (PF) 0.9% PF flush 3 mL  3 mL Intracatheter Q8H Jeanne Hunter PA-C   3 mL at 03/19/25 2050     Current Facility-Administered Medications   Medication Dose Route Frequency Provider Last Rate Last Admin    bisacodyl (DULCOLAX) suppository 10 mg  10 mg Rectal Daily PRN Jeanne Hunter PA-C        calcium carbonate (TUMS) chewable tablet 500 mg  500 mg Oral 4x Daily PRN Jeanne Hunter PA-C        carboxymethylcellulose PF (REFRESH PLUS) 0.5 % ophthalmic solution 1 drop  1 drop Both Eyes Q1H PRN Rodriguez Lantigua PA-C        glucose gel 15-30 g  15-30 g Oral Q15 Min PRN Jeanne Hunter PA-C        Or    dextrose 50 % injection 25-50 mL  25-50 mL Intravenous Q15 Min PRN Jeanne Hunter PA-C        Or    glucagon injection 1 mg  1 mg Subcutaneous Q15 Min PRN Jeanne Hunter PA-C        hydrALAZINE (APRESOLINE) injection 10 mg  10 mg Intravenous Q30 Min PRN Jeanne Hunter PA-C        HYDROmorphone (DILAUDID) injection 0.2 mg  0.2 mg Intravenous Q2H PRN Jeanne Hunter PA-C        Or    HYDROmorphone (DILAUDID) injection 0.4 mg  0.4 mg Intravenous Q2H PRN Jeanne Hunter PA-C   0.4 mg at 03/16/25 2998    hydrOXYzine HCl (ATARAX) tablet 25 mg  25 mg Oral Q6H PRN Rodriguez Lantigua PA-C   25 mg at 03/19/25 2049     "lidocaine (LMX4) cream   Topical Q1H PRN Hunter, Jeanne BERNAL PA-C        lidocaine 1 % 0.1-1 mL  0.1-1 mL Other Q1H PRN Hunter, ASHOK Quiñonez-C        magnesium hydroxide (MILK OF MAGNESIA) suspension 30 mL  30 mL Oral Daily PRN Hunter, Jeanne BERNAL PA-C        methocarbamol (ROBAXIN) half-tab 250 mg  250 mg Oral Q6H PRN Hunter, Jeanne BERNAL PA-C   250 mg at 03/20/25 1011    naloxone (NARCAN) injection 0.2 mg  0.2 mg Intravenous Q2 Min PRN Hunter, DAYNA QuiñonezC        Or    naloxone (NARCAN) injection 0.4 mg  0.4 mg Intravenous Q2 Min PRN Hunter, DAYNA QuiñonezC        Or    naloxone (NARCAN) injection 0.2 mg  0.2 mg Intramuscular Q2 Min PRN Hunter, Jeanne BERNAL PA-C        Or    naloxone (NARCAN) injection 0.4 mg  0.4 mg Intramuscular Q2 Min PRN Hunter, Jeanne BERNAL PA-C        ondansetron (ZOFRAN ODT) ODT tab 4 mg  4 mg Oral Q6H PRN Hunter, DAYNA QuiñonezC        Or    ondansetron (ZOFRAN) injection 4 mg  4 mg Intravenous Q6H PRN Hunter, Jeanne BERNAL PA-C   4 mg at 03/19/25 1148    oxyCODONE (ROXICODONE) tablet 5 mg  5 mg Oral Q4H PRN Hunter, Jeanne BERNAL PA-C   5 mg at 03/20/25 0549    Or    oxyCODONE (ROXICODONE) tablet 10 mg  10 mg Oral Q4H PRN Hunter, Jeanne BERNAL PA-C   10 mg at 03/19/25 1801    Patient may continue current oral medications   Does not apply Continuous PRN Rodriguez Lantigua PA-C        prochlorperazine (COMPAZINE) injection 5 mg  5 mg Intravenous Q6H PRN Hunter, DAYNA QuiñonezC        Or    prochlorperazine (COMPAZINE) tablet 5 mg  5 mg Oral Q6H PRN Hunter, DAYNA QuiñonezC        senna-docusate (SENOKOT-S/PERICOLACE) 8.6-50 MG per tablet 1 tablet  1 tablet Oral At Bedtime PRN Rodriguez Lantigua PA-C        sodium chloride (PF) 0.9% PF flush 3 mL  3 mL Intracatheter q1 min prn Jeanne Hunter PA-C             Physical Exam  Vitals were reviewed  Blood pressure 129/74, pulse 67, temperature 98.7  F (37.1  C), temperature source Oral, resp. rate 20, height 1.727 m (5' 8\"), weight 105 kg (231 lb 8 oz), SpO2 98%.  Gen: up in bed, " comfortable, +O2     Lungs: diminished bases, IS ~ 1000 ml     Cardiovascular: RRR, telemetry HR 60-70s, +edema LE     Abdomen: BS+, soft, mildly distended      Derm: sternal incision D/I  R LE incisions D/I  Chest tube sites CDI    Weight:   Vitals:    03/16/25 0619 03/18/25 0600 03/19/25 0400 03/19/25 1106   Weight: 106 kg (233 lb 11 oz) 108 kg (238 lb 1.6 oz) 105.8 kg (233 lb 4 oz) 105 kg (231 lb 7.7 oz)    03/20/25 0500   Weight: 105 kg (231 lb 8 oz)         Data  Recent Labs   Lab 03/20/25  0557 03/19/25  1117 03/19/25  0811 03/19/25  0557 03/18/25  1545 03/18/25  1152 03/18/25  0434 03/16/25  0559 03/16/25  0411 03/15/25  1357 03/15/25  1217 03/15/25  1046   WBC 5.6  --   --  5.9  --   --  4.7   < > 8.6   < > 26.3* 25.7*   HGB 8.9*  --   --  8.5*  --   --  7.6*   < > 8.4*   < > 10.7* 9.6*  10.0*   MCV 95  --   --  97  --   --  97   < > 91   < > 96 94   *  --   --  124*  --   --  86*   < > 101*   < > 160 142*   INR  --   --   --   --   --   --   --   --   --   --  1.48* 1.57*     --   --  141  --   --  142   < > 146*   < > 143 142  141   POTASSIUM 3.3*  --   --  3.8 4.1  --  3.4   < > 4.6   < > 3.1* 3.4  3.5   CHLORIDE 103  --   --  104  --   --  104   < > 109*   < > 105 107   CO2 29  --   --  31*  --   --  30*   < > 27   < > 17* 24   BUN 11.7  --   --  11.2  --   --  11.3   < > 10.2   < > 12.2 13.0   CR 1.23*  --   --  1.12  --   --  1.15   < > 1.43*   < > 1.32* 1.30*   ANIONGAP 10  --   --  6*  --   --  8   < > 10   < > 21* 11   CANDICE 8.1*  --   --  8.2*  --   --  8.1*   < > 8.5*   < > 9.6 8.4*   * 125* 119* 113*  --    < > 109*   < > 104*   < > 198* 160*  167*   ALBUMIN  --   --   --   --   --   --   --   --  3.6  --  3.5  --    PROTTOTAL  --   --   --   --   --   --   --   --  5.4*  --  5.5*  --    BILITOTAL  --   --   --   --   --   --   --   --  0.7  --  0.9  --    ALKPHOS  --   --   --   --   --   --   --   --  57  --  72  --    ALT  --   --   --   --   --   --   --   --  11  --  16   --    AST  --   --   --   --   --   --   --   --  34  --  37  --     < > = values in this interval not displayed.       Imaging:  No results found for this or any previous visit (from the past 24 hours).      Patient seen and discussed with Dr Anival Lantigua PAMihirC  Cardiothoracic Surgery

## 2025-03-20 NOTE — PLAN OF CARE
"Patient Name: Keerthi  MRN: [id number]  Date of Admission: 3/13/2025  Reason for Admission: Chest pain.  Level of Care: Heart.    Vitals:   BP Readings from Last 1 Encounters:   03/20/25 121/72     Pulse Readings from Last 1 Encounters:   03/20/25 89     Wt Readings from Last 1 Encounters:   03/20/25 105 kg (231 lb 8 oz)     Ht Readings from Last 1 Encounters:   03/19/25 1.727 m (5' 8\")     Estimated body mass index is 35.2 kg/m  as calculated from the following:    Height as of this encounter: 1.727 m (5' 8\").    Weight as of this encounter: 105 kg (231 lb 8 oz).  Temp Readings from Last 1 Encounters:   03/20/25 97.8  F (36.6  C) (Oral)     Pain: Pain goal 0. Pain Rating highest 6/10. Effective pain medication/regimen yes, sched gabapentin and tylenol, PRN robaxin x1.     CV Surgery Patient: Yes Post Op Day #: 5     General: Afebrile yes  Rhythm: sinus rhythm.  Blood Pressure Medications given/held: Amio not ordered. Beta blocker given.   Resp: Oxygen Status: NC, 1-2L. Attempted to wean to RA, however sats in low-mid 80s. Remains on 1 L NC.  Patient slept last night Yes Approx hours slept 7  Incentive Spirometry Q 1-2 hour when awake: yes Volume: 1000  Neuro: Alert yes Orientation: self, person, time, and place  GI/:          Bowel Activity: yes if yes indicate when: BM x3, passing flatus          Bowel Medications: no          Urinary Catheter: no  Skin:          Incision: Incision status: covered and healing well          Epicardial Pacing Wires: no  Chest Tubes              Pleural: no Draining: not applicable               Suction: not applicable              Mediastinal: no Draining: not applicable               Suction: not applicable              Dressing Change Daily: no If no, why? RYAN.     Resp: LS dim. Attempted to wean off O2, unsuccessful as pt sats 81-87% on RA and 1/2 L O2. Sats > 92% on 1 L. C/o int SOB w/ activity, SORIA. Denies chest pain.   Telemetry: SR. AVSS.   Neuro: A&O x4.   GI/: " Adequate urine output. BM x3, passing flatus. C/o int nausea, declines intervention. Regular diet.   Skin/Wounds: Midline sternal incision RYAN, WDL. R groin and knee harvest sites RYAN, WDL. Abrasion on lower abdomen. Old surgical scars on R upper flank. Peeling to bilat heels. Petechiae on chest.   Lines/Drains: PIV SL.   Activity: Ax1 gb/w. Up in chair and walking in halls throughout the day. Pt needs lots of encouragement to participate in activity.   Abnormal Labs: K/Mg/Phos protocols. K replacement given, recheck pending. Phos replacement given, recheck tomorrow AM. No Mg replacement needed, recheck tomorrow.     Aggression Stop Light: Green          Patient Care Plan: Wean oxygen as tolerated. Encourage activity and pulmonary toilet as tolerated. Pain management as needed.

## 2025-03-21 ENCOUNTER — APPOINTMENT (OUTPATIENT)
Dept: OCCUPATIONAL THERAPY | Facility: CLINIC | Age: 75
DRG: 233 | End: 2025-03-21
Payer: COMMERCIAL

## 2025-03-21 ENCOUNTER — APPOINTMENT (OUTPATIENT)
Dept: GENERAL RADIOLOGY | Facility: CLINIC | Age: 75
DRG: 233 | End: 2025-03-21
Attending: NURSE PRACTITIONER
Payer: COMMERCIAL

## 2025-03-21 VITALS
OXYGEN SATURATION: 93 % | BODY MASS INDEX: 35.09 KG/M2 | DIASTOLIC BLOOD PRESSURE: 60 MMHG | RESPIRATION RATE: 15 BRPM | HEIGHT: 68 IN | SYSTOLIC BLOOD PRESSURE: 112 MMHG | HEART RATE: 69 BPM | WEIGHT: 231.5 LBS | TEMPERATURE: 98.6 F

## 2025-03-21 LAB
ANION GAP SERPL CALCULATED.3IONS-SCNC: 10 MMOL/L (ref 7–15)
BUN SERPL-MCNC: 13.1 MG/DL (ref 8–23)
CALCIUM SERPL-MCNC: 8.3 MG/DL (ref 8.8–10.4)
CHLORIDE SERPL-SCNC: 100 MMOL/L (ref 98–107)
CREAT SERPL-MCNC: 1.2 MG/DL (ref 0.67–1.17)
EGFRCR SERPLBLD CKD-EPI 2021: 63 ML/MIN/1.73M2
ERYTHROCYTE [DISTWIDTH] IN BLOOD BY AUTOMATED COUNT: 13.8 % (ref 10–15)
GLUCOSE SERPL-MCNC: 149 MG/DL (ref 70–99)
HCO3 SERPL-SCNC: 30 MMOL/L (ref 22–29)
HCT VFR BLD AUTO: 29.5 % (ref 40–53)
HGB BLD-MCNC: 9.3 G/DL (ref 13.3–17.7)
MAGNESIUM SERPL-MCNC: 2.1 MG/DL (ref 1.7–2.3)
MCH RBC QN AUTO: 30.8 PG (ref 26.5–33)
MCHC RBC AUTO-ENTMCNC: 31.5 G/DL (ref 31.5–36.5)
MCV RBC AUTO: 98 FL (ref 78–100)
PHOSPHATE SERPL-MCNC: 1.7 MG/DL (ref 2.5–4.5)
PHOSPHATE SERPL-MCNC: 2.7 MG/DL (ref 2.5–4.5)
PLATELET # BLD AUTO: 180 10E3/UL (ref 150–450)
POTASSIUM SERPL-SCNC: 3.5 MMOL/L (ref 3.4–5.3)
RBC # BLD AUTO: 3.02 10E6/UL (ref 4.4–5.9)
SODIUM SERPL-SCNC: 140 MMOL/L (ref 135–145)
WBC # BLD AUTO: 7.6 10E3/UL (ref 4–11)

## 2025-03-21 PROCEDURE — 97535 SELF CARE MNGMENT TRAINING: CPT | Mod: GO

## 2025-03-21 PROCEDURE — 258N000003 HC RX IP 258 OP 636: Performed by: SURGERY

## 2025-03-21 PROCEDURE — 250N000013 HC RX MED GY IP 250 OP 250 PS 637: Performed by: PHYSICIAN ASSISTANT

## 2025-03-21 PROCEDURE — 250N000013 HC RX MED GY IP 250 OP 250 PS 637: Performed by: NURSE PRACTITIONER

## 2025-03-21 PROCEDURE — 97110 THERAPEUTIC EXERCISES: CPT | Mod: GO

## 2025-03-21 PROCEDURE — 94640 AIRWAY INHALATION TREATMENT: CPT

## 2025-03-21 PROCEDURE — 250N000009 HC RX 250: Performed by: NURSE PRACTITIONER

## 2025-03-21 PROCEDURE — 250N000009 HC RX 250: Performed by: SURGERY

## 2025-03-21 PROCEDURE — 83735 ASSAY OF MAGNESIUM: CPT | Performed by: PHYSICIAN ASSISTANT

## 2025-03-21 PROCEDURE — 250N000011 HC RX IP 250 OP 636: Performed by: PHYSICIAN ASSISTANT

## 2025-03-21 PROCEDURE — 80048 BASIC METABOLIC PNL TOTAL CA: CPT | Performed by: PHYSICIAN ASSISTANT

## 2025-03-21 PROCEDURE — 84100 ASSAY OF PHOSPHORUS: CPT | Performed by: PHYSICIAN ASSISTANT

## 2025-03-21 PROCEDURE — 84100 ASSAY OF PHOSPHORUS: CPT | Performed by: SURGERY

## 2025-03-21 PROCEDURE — 250N000013 HC RX MED GY IP 250 OP 250 PS 637: Performed by: SURGERY

## 2025-03-21 PROCEDURE — 120N000001 HC R&B MED SURG/OB

## 2025-03-21 PROCEDURE — 36415 COLL VENOUS BLD VENIPUNCTURE: CPT | Performed by: PHYSICIAN ASSISTANT

## 2025-03-21 PROCEDURE — 36415 COLL VENOUS BLD VENIPUNCTURE: CPT | Performed by: SURGERY

## 2025-03-21 PROCEDURE — 999N000157 HC STATISTIC RCP TIME EA 10 MIN

## 2025-03-21 PROCEDURE — 85027 COMPLETE CBC AUTOMATED: CPT | Performed by: PHYSICIAN ASSISTANT

## 2025-03-21 PROCEDURE — 71046 X-RAY EXAM CHEST 2 VIEWS: CPT

## 2025-03-21 PROCEDURE — 94640 AIRWAY INHALATION TREATMENT: CPT | Mod: 76

## 2025-03-21 RX ORDER — IPRATROPIUM BROMIDE AND ALBUTEROL SULFATE 2.5; .5 MG/3ML; MG/3ML
3 SOLUTION RESPIRATORY (INHALATION)
Status: DISCONTINUED | OUTPATIENT
Start: 2025-03-21 | End: 2025-03-23

## 2025-03-21 RX ORDER — FUROSEMIDE 10 MG/ML
40 INJECTION INTRAMUSCULAR; INTRAVENOUS ONCE
Status: CANCELLED | OUTPATIENT
Start: 2025-03-21 | End: 2025-03-21

## 2025-03-21 RX ORDER — FUROSEMIDE 40 MG/1
40 TABLET ORAL ONCE
Status: COMPLETED | OUTPATIENT
Start: 2025-03-21 | End: 2025-03-21

## 2025-03-21 RX ORDER — POTASSIUM CHLORIDE 1500 MG/1
20 TABLET, EXTENDED RELEASE ORAL ONCE
Status: COMPLETED | OUTPATIENT
Start: 2025-03-21 | End: 2025-03-21

## 2025-03-21 RX ADMIN — POTASSIUM CHLORIDE 20 MEQ: 1500 TABLET, EXTENDED RELEASE ORAL at 11:13

## 2025-03-21 RX ADMIN — HEPARIN SODIUM 5000 UNITS: 5000 INJECTION, SOLUTION INTRAVENOUS; SUBCUTANEOUS at 21:38

## 2025-03-21 RX ADMIN — ACETAMINOPHEN 975 MG: 325 TABLET, FILM COATED ORAL at 21:38

## 2025-03-21 RX ADMIN — SIMETHICONE 80 MG: 80 TABLET, CHEWABLE ORAL at 08:55

## 2025-03-21 RX ADMIN — ATORVASTATIN CALCIUM 40 MG: 40 TABLET, FILM COATED ORAL at 08:55

## 2025-03-21 RX ADMIN — GABAPENTIN 400 MG: 300 CAPSULE ORAL at 21:38

## 2025-03-21 RX ADMIN — FUROSEMIDE 40 MG: 40 TABLET ORAL at 17:14

## 2025-03-21 RX ADMIN — NITROFURANTOIN MONOHYDRATE/MACROCRYSTALLINE 100 MG: 25; 75 CAPSULE ORAL at 20:16

## 2025-03-21 RX ADMIN — IPRATROPIUM BROMIDE AND ALBUTEROL SULFATE 3 ML: .5; 3 SOLUTION RESPIRATORY (INHALATION) at 21:02

## 2025-03-21 RX ADMIN — METOPROLOL TARTRATE 12.5 MG: 25 TABLET, FILM COATED ORAL at 20:16

## 2025-03-21 RX ADMIN — ASPIRIN 81 MG CHEWABLE TABLET 162 MG: 81 TABLET CHEWABLE at 08:55

## 2025-03-21 RX ADMIN — NITROFURANTOIN MONOHYDRATE/MACROCRYSTALLINE 100 MG: 25; 75 CAPSULE ORAL at 08:55

## 2025-03-21 RX ADMIN — ACETAMINOPHEN 975 MG: 325 TABLET, FILM COATED ORAL at 13:25

## 2025-03-21 RX ADMIN — LIDOCAINE 1 PATCH: 560 PATCH PERCUTANEOUS; TOPICAL; TRANSDERMAL at 13:26

## 2025-03-21 RX ADMIN — METOPROLOL TARTRATE 12.5 MG: 25 TABLET, FILM COATED ORAL at 08:55

## 2025-03-21 RX ADMIN — POTASSIUM PHOSPHATE, MONOBASIC AND POTASSIUM PHOSPHATE, DIBASIC 15 MMOL: 224; 236 INJECTION, SOLUTION, CONCENTRATE INTRAVENOUS at 18:07

## 2025-03-21 RX ADMIN — LOSARTAN POTASSIUM 25 MG: 25 TABLET, FILM COATED ORAL at 08:55

## 2025-03-21 RX ADMIN — GABAPENTIN 400 MG: 300 CAPSULE ORAL at 17:14

## 2025-03-21 RX ADMIN — HEPARIN SODIUM 5000 UNITS: 5000 INJECTION, SOLUTION INTRAVENOUS; SUBCUTANEOUS at 05:41

## 2025-03-21 RX ADMIN — POTASSIUM PHOSPHATE, MONOBASIC AND POTASSIUM PHOSPHATE, DIBASIC 15 MMOL: 224; 236 INJECTION, SOLUTION, CONCENTRATE INTRAVENOUS at 11:13

## 2025-03-21 RX ADMIN — GABAPENTIN 400 MG: 300 CAPSULE ORAL at 08:55

## 2025-03-21 RX ADMIN — HEPARIN SODIUM 5000 UNITS: 5000 INJECTION, SOLUTION INTRAVENOUS; SUBCUTANEOUS at 13:25

## 2025-03-21 RX ADMIN — PANTOPRAZOLE SODIUM 40 MG: 40 TABLET, DELAYED RELEASE ORAL at 08:55

## 2025-03-21 RX ADMIN — ACETAMINOPHEN 975 MG: 325 TABLET, FILM COATED ORAL at 05:41

## 2025-03-21 ASSESSMENT — ACTIVITIES OF DAILY LIVING (ADL)
ADLS_ACUITY_SCORE: 69
ADLS_ACUITY_SCORE: 49
ADLS_ACUITY_SCORE: 69
ADLS_ACUITY_SCORE: 49
TOILETING_ISSUES: NO
ADLS_ACUITY_SCORE: 49
ADLS_ACUITY_SCORE: 49
WEAR_GLASSES_OR_BLIND: NO
ADLS_ACUITY_SCORE: 49
ADLS_ACUITY_SCORE: 69
ADLS_ACUITY_SCORE: 49
ADLS_ACUITY_SCORE: 49
ADLS_ACUITY_SCORE: 69
DRESSING/BATHING_DIFFICULTY: NO
WALKING_OR_CLIMBING_STAIRS_DIFFICULTY: NO
ADLS_ACUITY_SCORE: 69
ADLS_ACUITY_SCORE: 69
CHANGE_IN_FUNCTIONAL_STATUS_SINCE_ONSET_OF_CURRENT_ILLNESS/INJURY: NO
ADLS_ACUITY_SCORE: 69
ADLS_ACUITY_SCORE: 69
DIFFICULTY_EATING/SWALLOWING: NO
ADLS_ACUITY_SCORE: 69
CONCENTRATING,_REMEMBERING_OR_MAKING_DECISIONS_DIFFICULTY: NO
ADLS_ACUITY_SCORE: 49
DOING_ERRANDS_INDEPENDENTLY_DIFFICULTY: NO
ADLS_ACUITY_SCORE: 69
ADLS_ACUITY_SCORE: 69

## 2025-03-21 NOTE — PLAN OF CARE
Goal Outcome Evaluation:  Neuro: A&Ox4  Vitals/Pain: VSS on 1L NC. C/o pain from beard rubbing on chest incision, dressing placed for barrier.  Tele: SR 1st degree AVB  Diet: regular  Lungs: dim but clear, Pulmonary toilet encouraged.  Lines/Drains: PIV with int phos replacement  Skin/Wounds: sternal incision and RLE harvest sites well approximated. CT removal site CDI  GI/: no BM today, passing gas.Voids adequately, occ incontinence.  Labs/Abnormal/Trends/Electrolyte Replacement: Phos and Pot replaced, rechecks scheduled. Mag WNL, recheck in AM  Ambulation/Assist: Ax1 gb/walker.  Plan: encourage activity, replace and monitor electrolytes.

## 2025-03-21 NOTE — PLAN OF CARE
Neuro: A&O x4  Tele/cardiac: SR/SB with 1* AVB  Respiration:1 L NC  Activity: A1 GBW  Pain: Oxycodone for incisional pain  Drips: PIV SL  Drains/tubes: None  Skin: sternal incision, harvest and chest tube sites RYAN  GI/: Continent, tolerating diet  Aggression color: green  Isolation: none  Plan: wean off oxygen as able,encourage ambulation

## 2025-03-21 NOTE — PROGRESS NOTES
[facility]  Cardiovascular and Thoracic Surgery Daily Note      Assessment and Plan  Fermín Josue is a 75 year old male with a PMH of recently diagnosed non small cell lung cancer s/p VATS wedge resection of RUL lung nodule on 1/28/25 (PFTs done prior show mild restrictive lung disease), BPH, CKD stage IIIa, renal cancer s/p nephrectomy, JAMEE, hypertension, dyslipidemia, obesity, prediabetes, CAD s/p TRISH x 2 LAD and occluded RCA in 2007 who was admitted to [facility] 03/13 with 1-2 week history of chest pain. TTE 3/13/25 with preserved biventricular systolic function and early diastolic dysfunction. Coronary angiogram on 3/14/25 revealed multivessel CAD with 70% LMCA stenosis and mid RCA . CV surgery consulted for consideration of surgical revascularization. Underwent CABG x 2 (LIMA to LAD, SVG to RCA) on 03/15 with Dr. John.     POD #6 CABG x 2 (LIMA to LAD, SVG to RCA) with Dr. Dimitri John on 3/15/2025     - CVS:   Accelerated Junctional Rhythm, resolved   1st degree AV block  HD stable in SR/SB with 1st Degree AV Block  Pre-op TTE with preserved biventricular systolic function and early diastolic dysfunction.  Profound postop vasoplegic shock with lactic acidosis, resolved. Pressors off POD2.   Lopressor 12.5 mg BID with parameters, losartan 25 mg daily initiated 3/19 for hypertension.   Lasix 40 mg daily (PTA dose 20 mg daily)   Aspirin 162 mg daily, atorvastatin 40 mg daily.   Chest tubes/TPWs removed on 3/18     - Resp:   Postop Mechanical Ventilation, resolved  Recent Dx non small cell lung cancer  Using 1-2 L NC O2 while awake  Postoperative mechanical ventilation, resolved. Extubated POD1 AM (agitated and non-redirectable with sedation wean POD0)  Recently diagnosed non small cell lung cancer s/p VATS wedge resection of RUL lung nodule on 1/28/25 (PFTs done prior show mild restrictive lung disease). Stage 1A1. Recent oncology note reports no further treatment indicated.   Improving  effort pulmonary toilet. PRN nebs, saturating well on NC. Wean as able.  Overnight oximetry study 3/21     - Neuro:   Acute Postoperative Pain  Neuro grossly intact. Pain controlled with prns  -Tylenol, Robaxin, oxy available   -Gabapentin 400 mg PO tid (PTA)     - Renal:   Hx CKD stage 3,   Hxof left nephrectomy 04/2021 for RCC.   Baseline Cr ~ 1.2-1.5. Cr stable within baseline. Trend BMP/UOP. Volume management as above.  Recent Labs   Lab 03/21/25  0809 03/20/25  0557 03/19/25  0557   CR 1.20* 1.23* 1.12     - GI:   Loose stools  +BM  bowel regimen PRN due to loose stools.     - : voiding without issues     - Endo: Pre-diabetes.   sliding scale insulin d/sarah no need.     Hemoglobin A1C   Date Value Ref Range Status   01/14/2025 5.8 (H) 0.0 - 5.6 % Final     Comment:     Normal <5.7%   Prediabetes 5.7-6.4%    Diabetes 6.5% or higher     Note: Adopted from ADA consensus guidelines.   10/15/2019 5.8 (H) 0 - 5.6 % Final     Comment:     Normal <5.7% Prediabetes 5.7-6.4%  Diabetes 6.5% or higher - adopted from ADA   consensus guidelines.          - FEN: Replace electrolytes as needed. Regular diet      - ID:   Postop leukocytosis, likely reactive from surgery, resolved.   Staph epidermidis UTI noted on preop UC  Afebrile. WBC normalized.   Periop abx prophylaxis complete.   Macrobid for UTI 5 Days (last dose 3/23).   Trend CBC and fever curve.   Recent Labs   Lab 03/21/25  0809 03/20/25  0557 03/19/25  0557   WBC 7.6 5.6 5.9       - Heme: Acute blood loss anemia (stable) and thrombocytopenia (resolved) due to surgery.   Trend CBC, transfuse PRN.   Recent Labs   Lab 03/21/25  0809 03/20/25  0557 03/19/25  0557   HGB 9.3* 8.9* 8.5*    115* 124*       - Proph: SCD, subcutaneous heparin, PPI    - Other:  Clinically Significant Risk Factors        # Hypokalemia: Lowest K = 3.3 mmol/L in last 2 days, will replace as needed          # Thrombocytopenia: Lowest platelets = 115 in last 2 days, will monitor for bleeding  "  # Hypertension: Noted on problem list     # Acute Hypercapnic Respiratory Failure: based on arterial blood gas results.  Continue supplemental oxygen and ventilatory support as indicated.         # Obesity: Estimated body mass index is 33.3 kg/m  as calculated from the following:    Height as of this encounter: 1.727 m (5' 8\").    Weight as of this encounter: 99.3 kg (219 lb).        # Financial/Environmental Concerns: none   # History of CABG: noted on surgical history       - Dispo: Encouraged IS/TCDB/amb. Sternal precautions. Pt needs encouragement to move. Therapies recommending discharge to home with OP CR vs TCU    Interval History  States that pain is being controlled, fatigues easily with activity. Breathing is slightly improving, but feels he gets winded eaisly. Continues to use IS and flutter valve. Describes chest pain that is stable and does not remember that the chest tube removals helped much. Appetite is improving, no nausea. +BMs, denies any popping/clicking in his breast bone. Ambulating halls with therapies      Medications  Current Facility-Administered Medications   Medication Dose Route Frequency Provider Last Rate Last Admin    acetaminophen (TYLENOL) tablet 975 mg  975 mg Oral Q8H HunterJeanne handy PA-C   975 mg at 03/21/25 1325    aspirin (ASA) chewable tablet 162 mg  162 mg Oral or NG Tube Daily HunterJeanne handy PA-C   162 mg at 03/21/25 0855    atorvastatin (LIPITOR) tablet 40 mg  40 mg Oral Daily HunterJeanne handy PA-C   40 mg at 03/21/25 0855    gabapentin (NEURONTIN) capsule 400 mg  400 mg Oral TID Rodriguez Lantigua PA-C   400 mg at 03/21/25 0855    heparin ANTICOAGULANT injection 5,000 Units  5,000 Units Subcutaneous Q8H HunterJeanne handy PA-C   5,000 Units at 03/21/25 1325    Lidocaine (LIDOCARE) 4 % Patch 1 patch  1 patch Transdermal Q24H Jeanne Hunter PA-C   1 patch at 03/21/25 1326    losartan (COZAAR) tablet 25 mg  25 mg Oral Daily Rodriguez Lantigua PA-C   25 mg at 03/21/25 0855    " metoprolol tartrate (LOPRESSOR) half-tab 12.5 mg  12.5 mg Oral BID Rodriguez Lantigua PA-C   12.5 mg at 03/21/25 0855    nitroFURantoin macrocrystal-monohydrate (MACROBID) capsule 100 mg  100 mg Oral Q12H [facility] (08/20) Jeanne Hunter PA-C   100 mg at 03/21/25 0855    pantoprazole (PROTONIX) EC tablet 40 mg  40 mg Oral Daily Jeanne Hunter PA-C   40 mg at 03/21/25 0855    sodium chloride (PF) 0.9% PF flush 3 mL  3 mL Intracatheter Q8H Jeanne Hunter PA-C   3 mL at 03/21/25 0541     Current Facility-Administered Medications   Medication Dose Route Frequency Provider Last Rate Last Admin    bisacodyl (DULCOLAX) suppository 10 mg  10 mg Rectal Daily PRN Jeanne Hunter PA-C        calcium carbonate (TUMS) chewable tablet 500 mg  500 mg Oral 4x Daily PRN Jeanne Hunter PA-C        carboxymethylcellulose PF (REFRESH PLUS) 0.5 % ophthalmic solution 1 drop  1 drop Both Eyes Q1H PRN Rodriguez Lantigua PA-C        glucose gel 15-30 g  15-30 g Oral Q15 Min PRN Jeanne Hunter PA-C        Or    dextrose 50 % injection 25-50 mL  25-50 mL Intravenous Q15 Min PRN HunterJeanne handy PA-C        Or    glucagon injection 1 mg  1 mg Subcutaneous Q15 Min PRN HunterJeanne PA-C        hydrALAZINE (APRESOLINE) injection 10 mg  10 mg Intravenous Q30 Min PRN HunterJeanne PA-C        HYDROmorphone (DILAUDID) injection 0.2 mg  0.2 mg Intravenous Q2H PRN Jeanne Hunter PA-C        Or    HYDROmorphone (DILAUDID) injection 0.4 mg  0.4 mg Intravenous Q2H PRN HunterJeanne PA-C   0.4 mg at 03/16/25 2258    hydrOXYzine HCl (ATARAX) tablet 25 mg  25 mg Oral Q6H PRN Rodriguez Lantigua PA-C   25 mg at 03/19/25 2049    ipratropium - albuterol 0.5 mg/2.5 mg/3 mL (DUONEB) neb solution 3 mL  3 mL Nebulization 4x Daily PRN Rodriguez Lantigua PA-C        lidocaine (LMX4) cream   Topical Q1H PRN Jeanne Hunter PA-C        lidocaine 1 % 0.1-1 mL  0.1-1 mL Other Q1H PRN Jeanne Hunter PA-C        magnesium hydroxide (MILK OF MAGNESIA) suspension  "30 mL  30 mL Oral Daily PRN Hunter, ASHOK Quiñonez-C        methocarbamol (ROBAXIN) half-tab 250 mg  250 mg Oral Q6H PRN Hunter, Jeanne BERNAL PA-C   250 mg at 03/20/25 1011    naloxone (NARCAN) injection 0.2 mg  0.2 mg Intravenous Q2 Min PRN Hunter, Jeanne BERNAL PA-C        Or    naloxone (NARCAN) injection 0.4 mg  0.4 mg Intravenous Q2 Min PRN Hunter, Jeanne BERNAL PA-C        Or    naloxone (NARCAN) injection 0.2 mg  0.2 mg Intramuscular Q2 Min PRN Hunter, Jeanne BERNAL PA-C        Or    naloxone (NARCAN) injection 0.4 mg  0.4 mg Intramuscular Q2 Min PRN Hunter, Jeanne BERNAL PA-C        ondansetron (ZOFRAN ODT) ODT tab 4 mg  4 mg Oral Q6H PRN Hunter, Jeanne BERNAL PA-C        Or    ondansetron (ZOFRAN) injection 4 mg  4 mg Intravenous Q6H PRN Hunter, Jeanne BERNAL PA-C   4 mg at 03/19/25 1148    oxyCODONE (ROXICODONE) tablet 5 mg  5 mg Oral Q4H PRN Hunter, DAYNA QuiñonezC   5 mg at 03/20/25 2016    Or    oxyCODONE (ROXICODONE) tablet 10 mg  10 mg Oral Q4H PRN Hunter, Jeanne BERNAL PA-C   10 mg at 03/19/25 1801    Patient may continue current oral medications   Does not apply Continuous PRN Rodriguez Lantigua PA-C        prochlorperazine (COMPAZINE) injection 5 mg  5 mg Intravenous Q6H PRN HunterJeanne PA-C        Or    prochlorperazine (COMPAZINE) tablet 5 mg  5 mg Oral Q6H PRN Hunter, DAYNA QuiñonezC        senna-docusate (SENOKOT-S/PERICOLACE) 8.6-50 MG per tablet 1 tablet  1 tablet Oral At Bedtime PRN Rodriguez Lantigua PA-C        sodium chloride (PF) 0.9% PF flush 3 mL  3 mL Intracatheter q1 min prn Jeanne Hunter PA-C             Physical Exam  Vitals were reviewed  Blood pressure 115/73, pulse 65, temperature 98.7  F (37.1  C), temperature source Oral, resp. rate 16, height 1.727 m (5' 8\"), weight 99.3 kg (219 lb), SpO2 94%.  Gen: up in chair, comfortable, +O2     Lungs: diminished bases, IS ~ 1000 ml     Cardiovascular: RRR, telemetry HR 60-70s, +edema LE     Abdomen: BS+, soft, mildly distended      Derm: sternal incision D/I  R LE incisions " D/I  Chest tube sites CDI    Weight:   Vitals:    03/18/25 0600 03/19/25 0400 03/19/25 1106 03/20/25 0500   Weight: 108 kg (238 lb 1.6 oz) 105.8 kg (233 lb 4 oz) 105 kg (231 lb 7.7 oz) 105 kg (231 lb 8 oz)    03/21/25 0548   Weight: 99.3 kg (219 lb)         Data  Recent Labs   Lab 03/21/25  0809 03/20/25  1804 03/20/25  0557 03/19/25  1117 03/19/25  0811 03/19/25  0557 03/16/25  0559 03/16/25  0411 03/15/25  1357 03/15/25  1217 03/15/25  1046   WBC 7.6  --  5.6  --   --  5.9   < > 8.6   < > 26.3* 25.7*   HGB 9.3*  --  8.9*  --   --  8.5*   < > 8.4*   < > 10.7* 9.6*  10.0*   MCV 98  --  95  --   --  97   < > 91   < > 96 94     --  115*  --   --  124*   < > 101*   < > 160 142*   INR  --   --   --   --   --   --   --   --   --  1.48* 1.57*     --  142  --   --  141   < > 146*   < > 143 142  141   POTASSIUM 3.5 4.0 3.3*  --   --  3.8   < > 4.6   < > 3.1* 3.4  3.5   CHLORIDE 100  --  103  --   --  104   < > 109*   < > 105 107   CO2 30*  --  29  --   --  31*   < > 27   < > 17* 24   BUN 13.1  --  11.7  --   --  11.2   < > 10.2   < > 12.2 13.0   CR 1.20*  --  1.23*  --   --  1.12   < > 1.43*   < > 1.32* 1.30*   ANIONGAP 10  --  10  --   --  6*   < > 10   < > 21* 11   CANDICE 8.3*  --  8.1*  --   --  8.2*   < > 8.5*   < > 9.6 8.4*   *  --  107* 125*   < > 113*   < > 104*   < > 198* 160*  167*   ALBUMIN  --   --   --   --   --   --   --  3.6  --  3.5  --    PROTTOTAL  --   --   --   --   --   --   --  5.4*  --  5.5*  --    BILITOTAL  --   --   --   --   --   --   --  0.7  --  0.9  --    ALKPHOS  --   --   --   --   --   --   --  57  --  72  --    ALT  --   --   --   --   --   --   --  11  --  16  --    AST  --   --   --   --   --   --   --  34  --  37  --     < > = values in this interval not displayed.       Imaging:  No results found for this or any previous visit (from the past 24 hours).      Patient seen and discussed with Dr Anival Garvey, APRN, ACNPC-AG, CCRN  Nurse  Practitioner  Cardiothoracic Surgery  Pager: [phone]

## 2025-03-22 ENCOUNTER — APPOINTMENT (OUTPATIENT)
Dept: OCCUPATIONAL THERAPY | Facility: CLINIC | Age: 75
DRG: 233 | End: 2025-03-22
Payer: COMMERCIAL

## 2025-03-22 LAB
ANION GAP SERPL CALCULATED.3IONS-SCNC: 10 MMOL/L (ref 7–15)
BUN SERPL-MCNC: 13.8 MG/DL (ref 8–23)
CALCIUM SERPL-MCNC: 8.5 MG/DL (ref 8.8–10.4)
CHLORIDE SERPL-SCNC: 101 MMOL/L (ref 98–107)
CREAT SERPL-MCNC: 1.09 MG/DL (ref 0.67–1.17)
EGFRCR SERPLBLD CKD-EPI 2021: 70 ML/MIN/1.73M2
ERYTHROCYTE [DISTWIDTH] IN BLOOD BY AUTOMATED COUNT: 14.1 % (ref 10–15)
GLUCOSE SERPL-MCNC: 149 MG/DL (ref 70–99)
HCO3 SERPL-SCNC: 28 MMOL/L (ref 22–29)
HCT VFR BLD AUTO: 30 % (ref 40–53)
HGB BLD-MCNC: 9.4 G/DL (ref 13.3–17.7)
MAGNESIUM SERPL-MCNC: 2.1 MG/DL (ref 1.7–2.3)
MCH RBC QN AUTO: 30.9 PG (ref 26.5–33)
MCHC RBC AUTO-ENTMCNC: 31.3 G/DL (ref 31.5–36.5)
MCV RBC AUTO: 99 FL (ref 78–100)
PLATELET # BLD AUTO: 207 10E3/UL (ref 150–450)
POTASSIUM SERPL-SCNC: 3.6 MMOL/L (ref 3.4–5.3)
RBC # BLD AUTO: 3.04 10E6/UL (ref 4.4–5.9)
SODIUM SERPL-SCNC: 139 MMOL/L (ref 135–145)
WBC # BLD AUTO: 7.8 10E3/UL (ref 4–11)

## 2025-03-22 PROCEDURE — 250N000013 HC RX MED GY IP 250 OP 250 PS 637: Performed by: PHYSICIAN ASSISTANT

## 2025-03-22 PROCEDURE — 97530 THERAPEUTIC ACTIVITIES: CPT | Mod: GO | Performed by: OCCUPATIONAL THERAPIST

## 2025-03-22 PROCEDURE — 120N000001 HC R&B MED SURG/OB

## 2025-03-22 PROCEDURE — 94640 AIRWAY INHALATION TREATMENT: CPT

## 2025-03-22 PROCEDURE — 94762 N-INVAS EAR/PLS OXIMTRY CONT: CPT

## 2025-03-22 PROCEDURE — 83735 ASSAY OF MAGNESIUM: CPT | Performed by: NURSE PRACTITIONER

## 2025-03-22 PROCEDURE — 250N000009 HC RX 250: Performed by: NURSE PRACTITIONER

## 2025-03-22 PROCEDURE — 250N000013 HC RX MED GY IP 250 OP 250 PS 637: Performed by: SURGERY

## 2025-03-22 PROCEDURE — 97535 SELF CARE MNGMENT TRAINING: CPT | Mod: GO | Performed by: OCCUPATIONAL THERAPIST

## 2025-03-22 PROCEDURE — 999N000157 HC STATISTIC RCP TIME EA 10 MIN

## 2025-03-22 PROCEDURE — 85027 COMPLETE CBC AUTOMATED: CPT | Performed by: PHYSICIAN ASSISTANT

## 2025-03-22 PROCEDURE — 80048 BASIC METABOLIC PNL TOTAL CA: CPT | Performed by: NURSE PRACTITIONER

## 2025-03-22 PROCEDURE — 36415 COLL VENOUS BLD VENIPUNCTURE: CPT | Performed by: PHYSICIAN ASSISTANT

## 2025-03-22 PROCEDURE — 97110 THERAPEUTIC EXERCISES: CPT | Mod: GO | Performed by: OCCUPATIONAL THERAPIST

## 2025-03-22 PROCEDURE — 250N000011 HC RX IP 250 OP 636: Performed by: PHYSICIAN ASSISTANT

## 2025-03-22 RX ORDER — POTASSIUM CHLORIDE 1500 MG/1
20 TABLET, EXTENDED RELEASE ORAL ONCE
Status: COMPLETED | OUTPATIENT
Start: 2025-03-22 | End: 2025-03-22

## 2025-03-22 RX ORDER — METOPROLOL SUCCINATE 25 MG/1
25 TABLET, EXTENDED RELEASE ORAL DAILY
Status: DISCONTINUED | OUTPATIENT
Start: 2025-03-23 | End: 2025-03-25 | Stop reason: HOSPADM

## 2025-03-22 RX ADMIN — POTASSIUM & SODIUM PHOSPHATES POWDER PACK 280-160-250 MG 1 PACKET: 280-160-250 PACK at 08:58

## 2025-03-22 RX ADMIN — ACETAMINOPHEN 975 MG: 325 TABLET, FILM COATED ORAL at 21:57

## 2025-03-22 RX ADMIN — GABAPENTIN 400 MG: 300 CAPSULE ORAL at 08:58

## 2025-03-22 RX ADMIN — POTASSIUM & SODIUM PHOSPHATES POWDER PACK 280-160-250 MG 1 PACKET: 280-160-250 PACK at 13:35

## 2025-03-22 RX ADMIN — HEPARIN SODIUM 5000 UNITS: 5000 INJECTION, SOLUTION INTRAVENOUS; SUBCUTANEOUS at 05:12

## 2025-03-22 RX ADMIN — POTASSIUM CHLORIDE 20 MEQ: 1500 TABLET, EXTENDED RELEASE ORAL at 08:58

## 2025-03-22 RX ADMIN — NITROFURANTOIN MONOHYDRATE/MACROCRYSTALLINE 100 MG: 25; 75 CAPSULE ORAL at 20:05

## 2025-03-22 RX ADMIN — LOSARTAN POTASSIUM 25 MG: 25 TABLET, FILM COATED ORAL at 08:58

## 2025-03-22 RX ADMIN — ACETAMINOPHEN 975 MG: 325 TABLET, FILM COATED ORAL at 05:12

## 2025-03-22 RX ADMIN — IPRATROPIUM BROMIDE AND ALBUTEROL SULFATE 3 ML: .5; 3 SOLUTION RESPIRATORY (INHALATION) at 09:05

## 2025-03-22 RX ADMIN — NITROFURANTOIN MONOHYDRATE/MACROCRYSTALLINE 100 MG: 25; 75 CAPSULE ORAL at 08:58

## 2025-03-22 RX ADMIN — ACETAMINOPHEN 975 MG: 325 TABLET, FILM COATED ORAL at 13:34

## 2025-03-22 RX ADMIN — ASPIRIN 81 MG CHEWABLE TABLET 162 MG: 81 TABLET CHEWABLE at 08:58

## 2025-03-22 RX ADMIN — HEPARIN SODIUM 5000 UNITS: 5000 INJECTION, SOLUTION INTRAVENOUS; SUBCUTANEOUS at 21:57

## 2025-03-22 RX ADMIN — PANTOPRAZOLE SODIUM 40 MG: 40 TABLET, DELAYED RELEASE ORAL at 08:59

## 2025-03-22 RX ADMIN — OXYCODONE HYDROCHLORIDE 5 MG: 5 TABLET ORAL at 20:08

## 2025-03-22 RX ADMIN — GABAPENTIN 400 MG: 300 CAPSULE ORAL at 17:02

## 2025-03-22 RX ADMIN — HEPARIN SODIUM 5000 UNITS: 5000 INJECTION, SOLUTION INTRAVENOUS; SUBCUTANEOUS at 13:35

## 2025-03-22 RX ADMIN — METOPROLOL TARTRATE 12.5 MG: 25 TABLET, FILM COATED ORAL at 20:05

## 2025-03-22 RX ADMIN — ATORVASTATIN CALCIUM 40 MG: 40 TABLET, FILM COATED ORAL at 08:58

## 2025-03-22 RX ADMIN — LIDOCAINE 1 PATCH: 560 PATCH PERCUTANEOUS; TOPICAL; TRANSDERMAL at 13:34

## 2025-03-22 RX ADMIN — GABAPENTIN 400 MG: 300 CAPSULE ORAL at 21:57

## 2025-03-22 ASSESSMENT — ACTIVITIES OF DAILY LIVING (ADL)
ADLS_ACUITY_SCORE: 49
ADLS_ACUITY_SCORE: 55
ADLS_ACUITY_SCORE: 49
ADLS_ACUITY_SCORE: 55
ADLS_ACUITY_SCORE: 49
ADLS_ACUITY_SCORE: 55
ADLS_ACUITY_SCORE: 49

## 2025-03-22 NOTE — PROGRESS NOTES
[facility]  Cardiovascular and Thoracic Surgery Daily Note      Assessment and Plan  Fermín Josue is a 75 year old male with a PMH of recently diagnosed non small cell lung cancer s/p VATS wedge resection of RUL lung nodule on 1/28/25 (PFTs done prior show mild restrictive lung disease), BPH, CKD stage IIIa, renal cancer s/p nephrectomy, JAMEE, hypertension, dyslipidemia, obesity, prediabetes, CAD s/p TRISH x 2 LAD and occluded RCA in 2007 [facility] with 1-2 week history of chest pain. TTE 3/13/25 with preserved biventricular systolic function and early diastolic dysfunction. Coronary angiogram on 3/14/25 revealed multivessel CAD with 70% LMCA stenosis and mid RCA . CV surgery consulted for consideration of surgical revascularization. Underwent CABG x 2 (LIMA to LAD, SVG to RCA) on 03/15 with Dr. John.     POD #7 CABG x 2 (LIMA to LAD, SVG to RCA) with Dr. Dimitri John on 3/15/2025     - CVS:   Accelerated Junctional Rhythm, resolved   1st degree AV block  HD stable in SR/SB with 1st Degree AV Block  Pre-op TTE with preserved biventricular systolic function and early diastolic dysfunction.  Profound postop vasoplegic shock with lactic acidosis, resolved. Pressors off POD2.   Lopressor 12.5 mg BID with parameters- will transition to toprol 25mg daily on 3/23, losartan 25 mg daily initiated 3/19 for hypertension.   Lasix 40 mg daily (PTA dose 20 mg daily)   Aspirin 162 mg daily, atorvastatin 40 mg daily.   Chest tubes/TPWs removed on 3/18. CXR stable with small left pleural effusion.      - Resp:   Postop Mechanical Ventilation, resolved  Recent Dx non small cell lung cancer  Using 1-2 L NC O2 while awake  Postoperative mechanical ventilation, resolved. Extubated POD1 AM (agitated and non-redirectable with sedation wean POD0)  Recently diagnosed non small cell lung cancer s/p VATS wedge resection of RUL lung nodule on 1/28/25 (PFTs done prior show mild restrictive lung  disease). Stage 1A1. Recent oncology note reports no further treatment indicated.   Improving effort pulmonary toilet. PRN nebs, saturating well on NC. Wean as able.  Overnight oximetry study 3/21     - Neuro:   Acute Postoperative Pain  Neuro grossly intact. Pain controlled with prns  -Tylenol, Robaxin, oxy available   -Gabapentin 400 mg PO tid (PTA)     - Renal:   Hx CKD stage 3,   Hxof left nephrectomy 04/2021 for RCC.   Baseline Cr ~ 1.2-1.5. Cr stable within baseline. Trend BMP/UOP. Volume management as above.  Recent Labs   Lab 03/22/25  0641 03/21/25  0809 03/20/25  0557   CR 1.09 1.20* 1.23*     - GI:   Loose stools  +BM  bowel regimen PRN due to loose stools.     - : voiding without issues     - Endo: Pre-diabetes.   sliding scale insulin d/sarah no need.     Hemoglobin A1C   Date Value Ref Range Status   01/14/2025 5.8 (H) 0.0 - 5.6 % Final     Comment:     Normal <5.7%   Prediabetes 5.7-6.4%    Diabetes 6.5% or higher     Note: Adopted from ADA consensus guidelines.   10/15/2019 5.8 (H) 0 - 5.6 % Final     Comment:     Normal <5.7% Prediabetes 5.7-6.4%  Diabetes 6.5% or higher - adopted from ADA   consensus guidelines.          - FEN: Replace electrolytes as needed. Regular diet      - ID:   Postop leukocytosis, likely reactive from surgery, resolved.   Staph epidermidis UTI noted on preop UC  Afebrile. WBC normalized.   Periop abx prophylaxis complete.   Macrobid for UTI 5 Days (last dose 3/23).   Trend CBC and fever curve.   Recent Labs   Lab 03/22/25  0641 03/21/25  0809 03/20/25  0557   WBC 7.8 7.6 5.6       - Heme: Acute blood loss anemia (stable) and thrombocytopenia (resolved) due to surgery.   Trend CBC, transfuse PRN.   Recent Labs   Lab 03/22/25  0641 03/21/25  0809 03/20/25  0557   HGB 9.4* 9.3* 8.9*    180 115*       - Proph: SCD, subcutaneous heparin, PPI    - Other:  Clinically Significant Risk Factors                   # Hypertension: Noted on problem list     # Acute Hypercapnic  "Respiratory Failure: based on arterial blood gas results.  Continue supplemental oxygen and ventilatory support as indicated.         # Obesity: Estimated body mass index is 35.02 kg/m  (pended) as calculated from the following:    Height as of this encounter: 1.727 m (5' 8\").    Weight as of this encounter: (P) 104.5 kg (230 lb 4.8 oz).        # Financial/Environmental Concerns: none   # History of CABG: noted on surgical history       - Dispo: Encouraged IS/TCDB/amb. Sternal precautions. Pt needs encouragement to move. Therapies recommending discharge to TCU    Interval History  No acute events overnight. Saturating well on 1L. Pain mostly controlled except pain in his \"butt\" when sitting in chair which is why he wants to get back to bed. Tolerating diet but poor appetite. +BM since surgery  Reports from nursing that patient spends about 1hr in chair around meal time and then immediately wants back to bed.    Long discussion with patient and family that we want him to stay in the chair for majority of the day and to limit the amount of time he spends in the bed. His therapy recommendation was downgraded to TCU yesterday due to weakness.     Medications  Current Facility-Administered Medications   Medication Dose Route Frequency Provider Last Rate Last Admin    acetaminophen (TYLENOL) tablet 975 mg  975 mg Oral Q8H Jeanne Hunter PA-C   975 mg at 03/22/25 1334    aspirin (ASA) chewable tablet 162 mg  162 mg Oral or NG Tube Daily Jeanne Hunter PA-C   162 mg at 03/22/25 0858    atorvastatin (LIPITOR) tablet 40 mg  40 mg Oral Daily Jeanne Hunter PA-C   40 mg at 03/22/25 0858    gabapentin (NEURONTIN) capsule 400 mg  400 mg Oral TID Rodriguez Lantigua PA-C   400 mg at 03/22/25 0858    heparin ANTICOAGULANT injection 5,000 Units  5,000 Units Subcutaneous Q8H Jeanne Hunter PA-C   5,000 Units at 03/22/25 1335    ipratropium - albuterol 0.5 mg/2.5 mg/3 mL (DUONEB) neb solution 3 mL  3 mL Nebulization 3 times daily " Alesia Garvey, NP   3 mL at 03/22/25 0905    Lidocaine (LIDOCARE) 4 % Patch 1 patch  1 patch Transdermal Q24H Jeanne Hunter PA-C   1 patch at 03/22/25 1334    losartan (COZAAR) tablet 25 mg  25 mg Oral Daily Rodriguez Lantigua PA-C   25 mg at 03/22/25 0858    metoprolol tartrate (LOPRESSOR) half-tab 12.5 mg  12.5 mg Oral BID Rodriguez Lantigua PA-C   12.5 mg at 03/21/25 2016    nitroFURantoin macrocrystal-monohydrate (MACROBID) capsule 100 mg  100 mg Oral Q12H [facility] (08/20) Jeanne Hunter PA-C   100 mg at 03/22/25 0858    pantoprazole (PROTONIX) EC tablet 40 mg  40 mg Oral Daily Jeanne Hunter PA-C   40 mg at 03/22/25 0859    sodium chloride (PF) 0.9% PF flush 3 mL  3 mL Intracatheter Q8H Jeanne Hunter PA-C   3 mL at 03/22/25 1336     Current Facility-Administered Medications   Medication Dose Route Frequency Provider Last Rate Last Admin    bisacodyl (DULCOLAX) suppository 10 mg  10 mg Rectal Daily PRN Jeanne Hunter PA-C        calcium carbonate (TUMS) chewable tablet 500 mg  500 mg Oral 4x Daily PRN Jeanne Hunter PA-C        carboxymethylcellulose PF (REFRESH PLUS) 0.5 % ophthalmic solution 1 drop  1 drop Both Eyes Q1H PRN Rodriguez Lantigua PA-C        glucose gel 15-30 g  15-30 g Oral Q15 Min PRN HunterJeanne handy PA-C        Or    dextrose 50 % injection 25-50 mL  25-50 mL Intravenous Q15 Min PRN HunterJeanne PA-DOLORES        Or    glucagon injection 1 mg  1 mg Subcutaneous Q15 Min PRN HunterJeanne PA-C        hydrALAZINE (APRESOLINE) injection 10 mg  10 mg Intravenous Q30 Min PRN HunterJeanne PA-C        HYDROmorphone (DILAUDID) injection 0.2 mg  0.2 mg Intravenous Q2H PRN HunterJeanne handy PA-C        Or    HYDROmorphone (DILAUDID) injection 0.4 mg  0.4 mg Intravenous Q2H PRN Jeanne Hunter PA-C   0.4 mg at 03/16/25 2258    hydrOXYzine HCl (ATARAX) tablet 25 mg  25 mg Oral Q6H PRN Rodriguez Lantigua PA-C   25 mg at 03/19/25 2049    ipratropium - albuterol 0.5 mg/2.5 mg/3 mL (DUONEB) neb solution  "3 mL  3 mL Nebulization 4x Daily PRN Rodriguez Lantigua PA-C        lidocaine (LMX4) cream   Topical Q1H PRN Hunter, DAYNA QuiñonezC        lidocaine 1 % 0.1-1 mL  0.1-1 mL Other Q1H PRN Hunter, ASHOK Quiñonez-C        magnesium hydroxide (MILK OF MAGNESIA) suspension 30 mL  30 mL Oral Daily PRN Hunter, ASHOK Quiñonez-C        methocarbamol (ROBAXIN) half-tab 250 mg  250 mg Oral Q6H PRN Hunter, Jeanne BERNAL PA-C   250 mg at 03/20/25 1011    naloxone (NARCAN) injection 0.2 mg  0.2 mg Intravenous Q2 Min PRN HunterJeanne PA-C        Or    naloxone (NARCAN) injection 0.4 mg  0.4 mg Intravenous Q2 Min PRN Hunter, ADYNA QuiñonezC        Or    naloxone (NARCAN) injection 0.2 mg  0.2 mg Intramuscular Q2 Min PRN HunterJeanne PA-C        Or    naloxone (NARCAN) injection 0.4 mg  0.4 mg Intramuscular Q2 Min PRN Hunter, Jeanne BERNAL PA-C        ondansetron (ZOFRAN ODT) ODT tab 4 mg  4 mg Oral Q6H PRN Jeanne Hunter PA-C        Or    ondansetron (ZOFRAN) injection 4 mg  4 mg Intravenous Q6H PRN Hunter, Jeanne BERNAL PA-C   4 mg at 03/19/25 1148    oxyCODONE (ROXICODONE) tablet 5 mg  5 mg Oral Q4H PRN HunterJeanne PA-C   5 mg at 03/20/25 2016    Or    oxyCODONE (ROXICODONE) tablet 10 mg  10 mg Oral Q4H PRN Hunter, Jeanne BERNAL PA-C   10 mg at 03/19/25 1801    Patient may continue current oral medications   Does not apply Continuous PRN Rodriguez Lantigua PA-C        prochlorperazine (COMPAZINE) injection 5 mg  5 mg Intravenous Q6H PRN HunterJeanne handy PA-C        Or    prochlorperazine (COMPAZINE) tablet 5 mg  5 mg Oral Q6H PRN HunterJeanne PA-C        senna-docusate (SENOKOT-S/PERICOLACE) 8.6-50 MG per tablet 1 tablet  1 tablet Oral At Bedtime PRN Rodriguez Lantigua PA-C        sodium chloride (PF) 0.9% PF flush 3 mL  3 mL Intracatheter q1 min prn Jeanne Hunter PA-C             Physical Exam  Vitals were reviewed  Blood pressure 114/67, pulse 72, temperature 98.3  F (36.8  C), temperature source Oral, resp. rate 18, height 1.727 m (5' 8\"), " weight (P) 104.5 kg (230 lb 4.8 oz), SpO2 98%.  Gen: up in chair, comfortable, +O2     Lungs: diminished bases, IS ~ 1000 ml     Cardiovascular: RRR, telemetry HR 60-70s, +edema LE     Abdomen: BS+, soft, mildly distended      Derm: sternal incision D/I  R LE incisions D/I  Chest tube sites CDI    Weight:   Vitals:    03/19/25 0400 03/19/25 1106 03/20/25 0500 03/21/25 0548   Weight: 105.8 kg (233 lb 4 oz) 105 kg (231 lb 7.7 oz) 105 kg (231 lb 8 oz) 99.3 kg (219 lb)    03/22/25 0520   Weight: (P) 104.5 kg (230 lb 4.8 oz)         Data  Recent Labs   Lab 03/22/25  0641 03/21/25  0809 03/20/25  1804 03/20/25  0557 03/16/25  0559 03/16/25  0411   WBC 7.8 7.6  --  5.6   < > 8.6   HGB 9.4* 9.3*  --  8.9*   < > 8.4*   MCV 99 98  --  95   < > 91    180  --  115*   < > 101*    140  --  142   < > 146*   POTASSIUM 3.6 3.5 4.0 3.3*   < > 4.6   CHLORIDE 101 100  --  103   < > 109*   CO2 28 30*  --  29   < > 27   BUN 13.8 13.1  --  11.7   < > 10.2   CR 1.09 1.20*  --  1.23*   < > 1.43*   ANIONGAP 10 10  --  10   < > 10   CANDICE 8.5* 8.3*  --  8.1*   < > 8.5*   * 149*  --  107*   < > 104*   ALBUMIN  --   --   --   --   --  3.6   PROTTOTAL  --   --   --   --   --  5.4*   BILITOTAL  --   --   --   --   --  0.7   ALKPHOS  --   --   --   --   --  57   ALT  --   --   --   --   --  11   AST  --   --   --   --   --  34    < > = values in this interval not displayed.       Imaging:  Recent Results (from the past 24 hours)   XR Chest 2 Views    Narrative    EXAM: XR CHEST 2 VIEWS  LOCATION: [facility]  DATE: 3/21/2025    INDICATION: s p pleural CT removal  COMPARISON: 3/19/2025, 3/16/2025      Impression    IMPRESSION: No pneumothorax. Small left pleural effusion. No evidence for CHF or pneumonia. Mild cardiomegaly. Prior sternotomy. Postsurgical change lower thoracic upper lumbar spine.         Patient seen and discussed with Dr Anival Gifford PAMihirC  Cardiothoracic Surgery  Pager:  [phone]

## 2025-03-22 NOTE — PLAN OF CARE
Neuro: A&O x4  Tele/cardiac: SR/SB with 1* AVB  Respiration:1 L NC  Activity: A1 GBW  Pain: did not need PRN pain meds  Drips: PIV SL  Drains/tubes: None  Skin: sternal incision, harvest and chest tube sites RYAN  GI/: Incontinent at times, tolerating diet  Aggression color: green  Isolation: none  Plan: oximetry study completed overnight, needed 1-2L NC..........

## 2025-03-22 NOTE — PLAN OF CARE
Goal Outcome Evaluation:  Neuro: A&Ox4  Vitals/Pain: VSS on 1L NC. Pain 5/10  Tele: SR 1st degree AVB  Diet: regular  Lungs: dim but clear, Pulmonary toilet encouraged.  Lines/Drains: PIV S/L  Skin/Wounds: sternal incision and RLE harvest sites well approximated. CT removal site CDI  GI/: BM 3/21, passing gas.Voids adequately, occ incontinence.  Labs/Abnormal/Trends/Electrolyte Replacement: Phos and Pot replaced, rechecks scheduled. Mag WNL, recheck in AM  Ambulation/Assist: Ax1 gb/walker.  Plan: encourage activity, up in chair, replace and monitor electrolytes.

## 2025-03-23 ENCOUNTER — APPOINTMENT (OUTPATIENT)
Dept: OCCUPATIONAL THERAPY | Facility: CLINIC | Age: 75
DRG: 233 | End: 2025-03-23
Payer: COMMERCIAL

## 2025-03-23 LAB
ANION GAP SERPL CALCULATED.3IONS-SCNC: 8 MMOL/L (ref 7–15)
BUN SERPL-MCNC: 14.7 MG/DL (ref 8–23)
CALCIUM SERPL-MCNC: 8.4 MG/DL (ref 8.8–10.4)
CHLORIDE SERPL-SCNC: 105 MMOL/L (ref 98–107)
CREAT SERPL-MCNC: 1.07 MG/DL (ref 0.67–1.17)
EGFRCR SERPLBLD CKD-EPI 2021: 72 ML/MIN/1.73M2
ERYTHROCYTE [DISTWIDTH] IN BLOOD BY AUTOMATED COUNT: 14.5 % (ref 10–15)
GLUCOSE SERPL-MCNC: 107 MG/DL (ref 70–99)
HCO3 SERPL-SCNC: 29 MMOL/L (ref 22–29)
HCT VFR BLD AUTO: 29 % (ref 40–53)
HGB BLD-MCNC: 9.2 G/DL (ref 13.3–17.7)
MAGNESIUM SERPL-MCNC: 2.1 MG/DL (ref 1.7–2.3)
MCH RBC QN AUTO: 31.6 PG (ref 26.5–33)
MCHC RBC AUTO-ENTMCNC: 31.7 G/DL (ref 31.5–36.5)
MCV RBC AUTO: 100 FL (ref 78–100)
PHOSPHATE SERPL-MCNC: 2.3 MG/DL (ref 2.5–4.5)
PLATELET # BLD AUTO: 187 10E3/UL (ref 150–450)
POTASSIUM SERPL-SCNC: 4.2 MMOL/L (ref 3.4–5.3)
RBC # BLD AUTO: 2.91 10E6/UL (ref 4.4–5.9)
SODIUM SERPL-SCNC: 142 MMOL/L (ref 135–145)
WBC # BLD AUTO: 6.8 10E3/UL (ref 4–11)

## 2025-03-23 PROCEDURE — 83735 ASSAY OF MAGNESIUM: CPT | Performed by: SURGERY

## 2025-03-23 PROCEDURE — 250N000011 HC RX IP 250 OP 636: Performed by: PHYSICIAN ASSISTANT

## 2025-03-23 PROCEDURE — 250N000013 HC RX MED GY IP 250 OP 250 PS 637: Performed by: SURGERY

## 2025-03-23 PROCEDURE — 94640 AIRWAY INHALATION TREATMENT: CPT

## 2025-03-23 PROCEDURE — 999N000157 HC STATISTIC RCP TIME EA 10 MIN

## 2025-03-23 PROCEDURE — 250N000009 HC RX 250: Performed by: NURSE PRACTITIONER

## 2025-03-23 PROCEDURE — 250N000013 HC RX MED GY IP 250 OP 250 PS 637: Performed by: PHYSICIAN ASSISTANT

## 2025-03-23 PROCEDURE — 250N000009 HC RX 250: Performed by: SURGERY

## 2025-03-23 PROCEDURE — 94640 AIRWAY INHALATION TREATMENT: CPT | Mod: 76

## 2025-03-23 PROCEDURE — 80048 BASIC METABOLIC PNL TOTAL CA: CPT | Performed by: NURSE PRACTITIONER

## 2025-03-23 PROCEDURE — 97530 THERAPEUTIC ACTIVITIES: CPT | Mod: GO | Performed by: OCCUPATIONAL THERAPIST

## 2025-03-23 PROCEDURE — 97110 THERAPEUTIC EXERCISES: CPT | Mod: GO | Performed by: OCCUPATIONAL THERAPIST

## 2025-03-23 PROCEDURE — 36415 COLL VENOUS BLD VENIPUNCTURE: CPT | Performed by: PHYSICIAN ASSISTANT

## 2025-03-23 PROCEDURE — 120N000001 HC R&B MED SURG/OB

## 2025-03-23 PROCEDURE — 84100 ASSAY OF PHOSPHORUS: CPT | Performed by: SURGERY

## 2025-03-23 PROCEDURE — 85027 COMPLETE CBC AUTOMATED: CPT | Performed by: PHYSICIAN ASSISTANT

## 2025-03-23 PROCEDURE — 97535 SELF CARE MNGMENT TRAINING: CPT | Mod: GO | Performed by: OCCUPATIONAL THERAPIST

## 2025-03-23 RX ORDER — IPRATROPIUM BROMIDE AND ALBUTEROL SULFATE 2.5; .5 MG/3ML; MG/3ML
3 SOLUTION RESPIRATORY (INHALATION) 2 TIMES DAILY
Status: DISCONTINUED | OUTPATIENT
Start: 2025-03-23 | End: 2025-03-25 | Stop reason: HOSPADM

## 2025-03-23 RX ORDER — FUROSEMIDE 10 MG/ML
40 INJECTION INTRAMUSCULAR; INTRAVENOUS ONCE
Status: COMPLETED | OUTPATIENT
Start: 2025-03-23 | End: 2025-03-23

## 2025-03-23 RX ORDER — FUROSEMIDE 40 MG/1
40 TABLET ORAL DAILY
Status: DISCONTINUED | OUTPATIENT
Start: 2025-03-23 | End: 2025-03-25

## 2025-03-23 RX ADMIN — ACETAMINOPHEN 975 MG: 325 TABLET, FILM COATED ORAL at 06:49

## 2025-03-23 RX ADMIN — ACETAMINOPHEN 975 MG: 325 TABLET, FILM COATED ORAL at 20:47

## 2025-03-23 RX ADMIN — FUROSEMIDE 40 MG: 40 TABLET ORAL at 17:19

## 2025-03-23 RX ADMIN — HEPARIN SODIUM 5000 UNITS: 5000 INJECTION, SOLUTION INTRAVENOUS; SUBCUTANEOUS at 20:48

## 2025-03-23 RX ADMIN — GABAPENTIN 400 MG: 300 CAPSULE ORAL at 16:15

## 2025-03-23 RX ADMIN — PANTOPRAZOLE SODIUM 40 MG: 40 TABLET, DELAYED RELEASE ORAL at 08:55

## 2025-03-23 RX ADMIN — LOSARTAN POTASSIUM 25 MG: 25 TABLET, FILM COATED ORAL at 08:55

## 2025-03-23 RX ADMIN — IPRATROPIUM BROMIDE AND ALBUTEROL SULFATE 3 ML: .5; 3 SOLUTION RESPIRATORY (INHALATION) at 07:50

## 2025-03-23 RX ADMIN — HEPARIN SODIUM 5000 UNITS: 5000 INJECTION, SOLUTION INTRAVENOUS; SUBCUTANEOUS at 14:48

## 2025-03-23 RX ADMIN — ATORVASTATIN CALCIUM 40 MG: 40 TABLET, FILM COATED ORAL at 08:55

## 2025-03-23 RX ADMIN — POTASSIUM & SODIUM PHOSPHATES POWDER PACK 280-160-250 MG 1 PACKET: 280-160-250 PACK at 11:46

## 2025-03-23 RX ADMIN — POTASSIUM & SODIUM PHOSPHATES POWDER PACK 280-160-250 MG 1 PACKET: 280-160-250 PACK at 16:15

## 2025-03-23 RX ADMIN — GABAPENTIN 400 MG: 300 CAPSULE ORAL at 08:55

## 2025-03-23 RX ADMIN — IPRATROPIUM BROMIDE AND ALBUTEROL SULFATE 3 ML: .5; 3 SOLUTION RESPIRATORY (INHALATION) at 19:58

## 2025-03-23 RX ADMIN — ACETAMINOPHEN 975 MG: 325 TABLET, FILM COATED ORAL at 14:48

## 2025-03-23 RX ADMIN — METOPROLOL SUCCINATE 25 MG: 25 TABLET, EXTENDED RELEASE ORAL at 08:55

## 2025-03-23 RX ADMIN — ASPIRIN 81 MG CHEWABLE TABLET 162 MG: 81 TABLET CHEWABLE at 08:55

## 2025-03-23 RX ADMIN — GABAPENTIN 400 MG: 300 CAPSULE ORAL at 20:48

## 2025-03-23 RX ADMIN — NITROFURANTOIN MONOHYDRATE/MACROCRYSTALLINE 100 MG: 25; 75 CAPSULE ORAL at 08:55

## 2025-03-23 RX ADMIN — POTASSIUM & SODIUM PHOSPHATES POWDER PACK 280-160-250 MG 1 PACKET: 280-160-250 PACK at 08:55

## 2025-03-23 RX ADMIN — HEPARIN SODIUM 5000 UNITS: 5000 INJECTION, SOLUTION INTRAVENOUS; SUBCUTANEOUS at 06:49

## 2025-03-23 RX ADMIN — FUROSEMIDE 40 MG: 10 INJECTION, SOLUTION INTRAMUSCULAR; INTRAVENOUS at 08:55

## 2025-03-23 ASSESSMENT — ACTIVITIES OF DAILY LIVING (ADL)
ADLS_ACUITY_SCORE: 55
ADLS_ACUITY_SCORE: 49
ADLS_ACUITY_SCORE: 55
ADLS_ACUITY_SCORE: 55
ADLS_ACUITY_SCORE: 51
ADLS_ACUITY_SCORE: 55
ADLS_ACUITY_SCORE: 51
ADLS_ACUITY_SCORE: 55
ADLS_ACUITY_SCORE: 55
ADLS_ACUITY_SCORE: 47
ADLS_ACUITY_SCORE: 55
ADLS_ACUITY_SCORE: 51
ADLS_ACUITY_SCORE: 55
ADLS_ACUITY_SCORE: 51
ADLS_ACUITY_SCORE: 55
ADLS_ACUITY_SCORE: 51
ADLS_ACUITY_SCORE: 55

## 2025-03-23 NOTE — PROGRESS NOTES
Care Management Follow Up    Length of Stay (days): 9    Expected Discharge Date: 03/24/2025     Concerns to be Addressed: discharge planning     Patient plan of care discussed at interdisciplinary rounds: Yes    Anticipated Discharge Disposition: Home, Outpatient Rehab (PT, OT, SLP, Cardiac or Pulmonary), Skilled Nursing Facility  Anticipated Discharge Services: Home Care  Anticipated Discharge DME: Oxygen, Bed    Patient/family educated on Medicare website which has current facility and service quality ratings:    Education Provided on the Discharge Plan: Yes  Patient/Family in Agreement with the Plan: yes    Referrals Placed by CM/SW: Internal Clinic Care Coordination, External Care Coordination, Specialty Providers, Durable Medical Equipment (DME)  Private pay costs discussed:yes Transportation    Discussed  Partnership in Safe Discharge Planning  document with patient/family: No     Handoff Completed: No, handoff not indicated or clinically appropriate    Additional Information:  SW met with pt at bedside to discuss TCU options, pt indicated the would rather go home but did not have any preferences on locations.  SW asked if there was a certain area he would like to stay near and suggested [place] as that is his address. Pt again indicated he had no preferences.  Pt indicated he lives with his daughter. SW asked if he would prefer I call his daughter to see if she has any preferences. Pt was agreeable to this.     SW called Meliton Rosenthal [phone]- went over some TCU options. Meliton Rosenthal indicated she did not care for the [place] locations and would like to stay in the [place] area and Provided [facility] [place] as choices.   ** [place] is not an options as his dad passed when there and    daughter will not send him to that location.     KAITLIN discussed transport Meliton Rosenthal will transport: She can be here shortly after 9 am and can come anytime after that but would need to leave the hospital by about   as she needs to pick children up by 3 pm.  With pt needing to go straight from here to TCU discharge would be best in late morning.     KATILIN sent referrals to [facility].     Addendum 1400: KAITLIN called [facility] to initiate prior  authorization request, KAITLIN faxed in Clinicals for review to [phone]    Addendum 1624: SW received [facility] indicating [facility] are denying the request for TCU at this time. For Additional options Call  [phone]     Next Steps: [facility] denied TCU- call [facility] for additonal options.     Tori Chang BSW

## 2025-03-23 NOTE — PLAN OF CARE
Orientation: A/Ox4   Vitals: VSS on RA   Mobility: A1 GBW  Diet: tolerating regular diet  GI/: incontinent at times; voiding adequately; no BM  Pain: scheduled tylenol  Drains/Devices: PIV x1 saline locked  Skin: sternal incision; CT sites; R groin and knee harvest sites  Tele:  w/ 1AVB

## 2025-03-23 NOTE — PLAN OF CARE
Goal Outcome Evaluation:         A/Ox4, VSS, SR.  LS clear, requires 1/2L O2 while sleeping to maintain sats above 90.  IS and OOB encourged often.  +BS, BM last samuel, +void.  Up Ax1 GBW.  Mild to moderate pain, scheduled Tylenol and PRN oxy x1. Awaiting TCU placement.

## 2025-03-23 NOTE — PROGRESS NOTES
[facility]  Cardiovascular and Thoracic Surgery Daily Note      Assessment and Plan  Fermín Josue is a 75 year old male with a PMH of recently diagnosed non small cell lung cancer s/p VATS wedge resection of RUL lung nodule on 1/28/25 (PFTs done prior show mild restrictive lung disease), BPH, CKD stage IIIa, renal cancer s/p nephrectomy, JAMEE, hypertension, dyslipidemia, obesity, prediabetes, CAD s/p TRISH x 2 LAD and occluded RCA in 2007 who was admitted to [facility] 03/13 with 1-2 week history of chest pain. TTE 3/13/25 with preserved biventricular systolic function and early diastolic dysfunction. Coronary angiogram on 3/14/25 revealed multivessel CAD with 70% LMCA stenosis and mid RCA . CV surgery consulted for consideration of surgical revascularization. Underwent CABG x 2 (LIMA to LAD, SVG to RCA) on 03/15 with Dr. John.     POD #8 CABG x 2 (LIMA to LAD, SVG to RCA) with Dr. Dimitri John on 3/15/2025     - CVS:   Accelerated Junctional Rhythm, resolved   1st degree AV block  HD stable in SR/SB with 1st Degree AV Block  Pre-op TTE with preserved biventricular systolic function and early diastolic dysfunction.  Profound postop vasoplegic shock with lactic acidosis, resolved. Pressors off POD2.   Toprol 25mg daily on 3/23, losartan 25 mg daily initiated 3/19 for hypertension.   Lasix 40 mg daily (PTA dose 20 mg daily)   Aspirin 162 mg daily, atorvastatin 40 mg daily.   Chest tubes/TPWs removed on 3/18. CXR stable with small left pleural effusion.      - Resp:   Postop Mechanical Ventilation, resolved  Recent Dx non small cell lung cancer  Weaned off oxygen this am.   Postoperative mechanical ventilation, resolved. Extubated POD1 AM (agitated and non-redirectable with sedation wean POD0)  Recently diagnosed non small cell lung cancer s/p VATS wedge resection of RUL lung nodule on 1/28/25 (PFTs done prior show mild restrictive lung disease). Stage 1A1. Recent oncology note reports no further  treatment indicated.   Improving effort pulmonary toilet. PRN nebs, saturating well on NC. Wean as able.  Overnight oximetry study 3/21- required 1-2L oxygen.      - Neuro:   Acute Postoperative Pain  Neuro grossly intact. Pain controlled with prns  -Tylenol, Robaxin, oxy available   -Gabapentin 400 mg PO tid (PTA)     - Renal:   Hx CKD stage 3,   Hxof left nephrectomy 04/2021 for RCC.   Baseline Cr ~ 1.2-1.5. Cr stable within baseline. Trend BMP/UOP. Volume management as above.  Recent Labs   Lab 03/23/25  0604 03/22/25  0641 03/21/25  0809   CR 1.07 1.09 1.20*     - GI:   Loose stools  +BM  bowel regimen PRN due to loose stools.     - : voiding without issues     - Endo: Pre-diabetes.   sliding scale insulin d/sarah no need.     Hemoglobin A1C   Date Value Ref Range Status   01/14/2025 5.8 (H) 0.0 - 5.6 % Final     Comment:     Normal <5.7%   Prediabetes 5.7-6.4%    Diabetes 6.5% or higher     Note: Adopted from ADA consensus guidelines.   10/15/2019 5.8 (H) 0 - 5.6 % Final     Comment:     Normal <5.7% Prediabetes 5.7-6.4%  Diabetes 6.5% or higher - adopted from ADA   consensus guidelines.          - FEN: Replace electrolytes as needed. Regular diet      - ID:   Postop leukocytosis, likely reactive from surgery, resolved.   Staph epidermidis UTI noted on preop UC  Afebrile. WBC normalized.   Periop abx prophylaxis complete.   Macrobid for UTI 5 Days (last dose 3/23).   Trend CBC and fever curve.   Recent Labs   Lab 03/23/25  0604 03/22/25  0641 03/21/25  0809   WBC 6.8 7.8 7.6       - Heme: Acute blood loss anemia (stable) and thrombocytopenia (resolved) due to surgery.   Trend CBC, transfuse PRN.   Recent Labs   Lab 03/23/25  0604 03/22/25  0641 03/21/25  0809   HGB 9.2* 9.4* 9.3*    207 180       - Proph: SCD, subcutaneous heparin, PPI    - Other:  Clinically Significant Risk Factors                   # Hypertension: Noted on problem list     # Acute Hypercapnic Respiratory Failure: based on arterial  "blood gas results.  Continue supplemental oxygen and ventilatory support as indicated.         # Obesity: Estimated body mass index is 35.2 kg/m  as calculated from the following:    Height as of this encounter: 1.727 m (5' 8\").    Weight as of this encounter: 105 kg (231 lb 8 oz).        # Financial/Environmental Concerns: none   # History of CABG: noted on surgical history       - Dispo: Encouraged IS/TCDB/amb. Sternal precautions. Pt needs encouragement to move. Therapies recommending discharge to TCU. Referrals sent.     Interval History  No acute events overnight. Up in the chair for the majority of the day. Saturating well on room air. Pain controlled. Tolerating diet. +BM.     Medications  Current Facility-Administered Medications   Medication Dose Route Frequency Provider Last Rate Last Admin    acetaminophen (TYLENOL) tablet 975 mg  975 mg Oral Q8H Jeanne Hunter PA-C   975 mg at 03/23/25 1448    aspirin (ASA) chewable tablet 162 mg  162 mg Oral or NG Tube Daily Jeanne Hunter PA-C   162 mg at 03/23/25 0855    atorvastatin (LIPITOR) tablet 40 mg  40 mg Oral Daily Jeanne Hunter PA-C   40 mg at 03/23/25 0855    gabapentin (NEURONTIN) capsule 400 mg  400 mg Oral TID Rodriguez Lantigua PA-C   400 mg at 03/23/25 1615    heparin ANTICOAGULANT injection 5,000 Units  5,000 Units Subcutaneous Q8H Jeanne Hunter PA-C   5,000 Units at 03/23/25 1448    ipratropium - albuterol 0.5 mg/2.5 mg/3 mL (DUONEB) neb solution 3 mL  3 mL Nebulization BID Dimitri John MD        Lidocaine (LIDOCARE) 4 % Patch 1 patch  1 patch Transdermal Q24H Jeanne Hunter PA-C   1 patch at 03/22/25 1334    losartan (COZAAR) tablet 25 mg  25 mg Oral Daily Rodriguez Lantigua PA-C   25 mg at 03/23/25 0855    metoprolol succinate ER (TOPROL XL) 24 hr tablet 25 mg  25 mg Oral Daily Ellie Gifford PA-C   25 mg at 03/23/25 0855    pantoprazole (PROTONIX) EC tablet 40 mg  40 mg Oral Daily Hunter, Jeanne C, PA-C   40 mg at 03/23/25 " 0855    sodium chloride (PF) 0.9% PF flush 3 mL  3 mL Intracatheter Q8H Jeanne Hunter PA-C   3 mL at 03/23/25 1147     Current Facility-Administered Medications   Medication Dose Route Frequency Provider Last Rate Last Admin    bisacodyl (DULCOLAX) suppository 10 mg  10 mg Rectal Daily PRN Jeanne Hunter PA-C        calcium carbonate (TUMS) chewable tablet 500 mg  500 mg Oral 4x Daily PRN Hunter, Jeanne BERNAL PA-C        carboxymethylcellulose PF (REFRESH PLUS) 0.5 % ophthalmic solution 1 drop  1 drop Both Eyes Q1H PRN Rodriguez Lantigua PA-C        glucose gel 15-30 g  15-30 g Oral Q15 Min PRN Jeanne Hunter PA-C        Or    dextrose 50 % injection 25-50 mL  25-50 mL Intravenous Q15 Min PRN Jeanne Hunter PA-C        Or    glucagon injection 1 mg  1 mg Subcutaneous Q15 Min PRN HunterJeanne PA-C        hydrALAZINE (APRESOLINE) injection 10 mg  10 mg Intravenous Q30 Min PRN Hunter, Jeanne BERNAL PA-C        HYDROmorphone (DILAUDID) injection 0.2 mg  0.2 mg Intravenous Q2H PRN Jeanne Hunter PA-C        Or    HYDROmorphone (DILAUDID) injection 0.4 mg  0.4 mg Intravenous Q2H PRN HunterJeanne PA-C   0.4 mg at 03/16/25 2258    hydrOXYzine HCl (ATARAX) tablet 25 mg  25 mg Oral Q6H PRN Rodriguez Lantigua PA-C   25 mg at 03/19/25 2049    ipratropium - albuterol 0.5 mg/2.5 mg/3 mL (DUONEB) neb solution 3 mL  3 mL Nebulization 4x Daily PRN Rodriguez Lantigua PA-C        lidocaine (LMX4) cream   Topical Q1H PRN Jeanne Hunter PA-C        lidocaine 1 % 0.1-1 mL  0.1-1 mL Other Q1H PRN HunterJeanne PA-C        magnesium hydroxide (MILK OF MAGNESIA) suspension 30 mL  30 mL Oral Daily PRN Jeanne Hunter PA-C        methocarbamol (ROBAXIN) half-tab 250 mg  250 mg Oral Q6H PRN Jeanne Hunter PA-C   250 mg at 03/20/25 1011    naloxone (NARCAN) injection 0.2 mg  0.2 mg Intravenous Q2 Min PRN Jeanne Hunter PA-C        Or    naloxone (NARCAN) injection 0.4 mg  0.4 mg Intravenous Q2 Min PRN Dale, Jeanne BERNAL PA-C         "Or    naloxone (NARCAN) injection 0.2 mg  0.2 mg Intramuscular Q2 Min PRN Hunter, Jeanne BERNAL PA-C        Or    naloxone (NARCAN) injection 0.4 mg  0.4 mg Intramuscular Q2 Min PRN Hunter, Jeanne BERNAL PA-C        ondansetron (ZOFRAN ODT) ODT tab 4 mg  4 mg Oral Q6H PRN Hunter, Jeanne BERNAL PA-C        Or    ondansetron (ZOFRAN) injection 4 mg  4 mg Intravenous Q6H PRN Hunter, DAYNA QuiñonezC   4 mg at 03/19/25 1148    oxyCODONE (ROXICODONE) tablet 5 mg  5 mg Oral Q4H PRN Hunter, Jeanne BERNAL PA-C   5 mg at 03/22/25 2008    Or    oxyCODONE (ROXICODONE) tablet 10 mg  10 mg Oral Q4H PRN Hunter, DAYNA QuiñonezC   10 mg at 03/19/25 1801    Patient may continue current oral medications   Does not apply Continuous PRN Rodriguez Lantigua PA-C        prochlorperazine (COMPAZINE) injection 5 mg  5 mg Intravenous Q6H PRN Jeanne Hunter PA-C        Or    prochlorperazine (COMPAZINE) tablet 5 mg  5 mg Oral Q6H PRN HunterJeanne PA-C        senna-docusate (SENOKOT-S/PERICOLACE) 8.6-50 MG per tablet 1 tablet  1 tablet Oral At Bedtime PRN Rodriguez Lantigua PA-C        sodium chloride (PF) 0.9% PF flush 3 mL  3 mL Intracatheter q1 min prn HunterJeanne PA-C             Physical Exam  Vitals were reviewed  Blood pressure 122/62, pulse 73, temperature 98.2  F (36.8  C), temperature source Oral, resp. rate 16, height 1.727 m (5' 8\"), weight 105 kg (231 lb 8 oz), SpO2 96%.  Gen: up in chair, comfortable, -O2     Lungs: diminished bases, IS ~ 1000 ml     Cardiovascular: RRR, telemetry HR 60-70s, +edema LE     Abdomen: BS+, soft, mildly distended      Derm: sternal incision D/I  R LE incisions D/I  Chest tube sites CDI    Weight:   Vitals:    03/19/25 1106 03/20/25 0500 03/21/25 0548 03/22/25 0520   Weight: 105 kg (231 lb 7.7 oz) 105 kg (231 lb 8 oz) 99.3 kg (219 lb) (P) 104.5 kg (230 lb 4.8 oz)    03/23/25 0638   Weight: 105 kg (231 lb 8 oz)         Data  Recent Labs   Lab 03/23/25  0604 03/22/25  0641 03/21/25  0809   WBC 6.8 7.8 7.6   HGB 9.2* 9.4* " 9.3*    99 98    207 180    139 140   POTASSIUM 4.2 3.6 3.5   CHLORIDE 105 101 100   CO2 29 28 30*   BUN 14.7 13.8 13.1   CR 1.07 1.09 1.20*   ANIONGAP 8 10 10   CANDICE 8.4* 8.5* 8.3*   * 149* 149*       Imaging:  No results found for this or any previous visit (from the past 24 hours).        Patient seen and discussed with Dr Anival Gifford PA-C  Cardiothoracic Surgery  Pager: [phone]

## 2025-03-24 LAB
ANION GAP SERPL CALCULATED.3IONS-SCNC: 11 MMOL/L (ref 7–15)
BUN SERPL-MCNC: 13 MG/DL (ref 8–23)
CALCIUM SERPL-MCNC: 8.4 MG/DL (ref 8.8–10.4)
CHLORIDE SERPL-SCNC: 101 MMOL/L (ref 98–107)
CREAT SERPL-MCNC: 1.06 MG/DL (ref 0.67–1.17)
EGFRCR SERPLBLD CKD-EPI 2021: 73 ML/MIN/1.73M2
ERYTHROCYTE [DISTWIDTH] IN BLOOD BY AUTOMATED COUNT: 15 % (ref 10–15)
GLUCOSE SERPL-MCNC: 116 MG/DL (ref 70–99)
HCO3 SERPL-SCNC: 27 MMOL/L (ref 22–29)
HCT VFR BLD AUTO: 29.2 % (ref 40–53)
HGB BLD-MCNC: 9.4 G/DL (ref 13.3–17.7)
MAGNESIUM SERPL-MCNC: 2.1 MG/DL (ref 1.7–2.3)
MCH RBC QN AUTO: 31.9 PG (ref 26.5–33)
MCHC RBC AUTO-ENTMCNC: 32.2 G/DL (ref 31.5–36.5)
MCV RBC AUTO: 99 FL (ref 78–100)
PHOSPHATE SERPL-MCNC: 2.7 MG/DL (ref 2.5–4.5)
PLATELET # BLD AUTO: 230 10E3/UL (ref 150–450)
POTASSIUM SERPL-SCNC: 3.9 MMOL/L (ref 3.4–5.3)
RBC # BLD AUTO: 2.95 10E6/UL (ref 4.4–5.9)
SODIUM SERPL-SCNC: 139 MMOL/L (ref 135–145)
WBC # BLD AUTO: 7.4 10E3/UL (ref 4–11)

## 2025-03-24 PROCEDURE — 250N000011 HC RX IP 250 OP 636: Performed by: PHYSICIAN ASSISTANT

## 2025-03-24 PROCEDURE — 250N000013 HC RX MED GY IP 250 OP 250 PS 637: Performed by: PHYSICIAN ASSISTANT

## 2025-03-24 PROCEDURE — 94640 AIRWAY INHALATION TREATMENT: CPT

## 2025-03-24 PROCEDURE — 85027 COMPLETE CBC AUTOMATED: CPT | Performed by: PHYSICIAN ASSISTANT

## 2025-03-24 PROCEDURE — 999N000157 HC STATISTIC RCP TIME EA 10 MIN

## 2025-03-24 PROCEDURE — 83735 ASSAY OF MAGNESIUM: CPT | Performed by: SURGERY

## 2025-03-24 PROCEDURE — 94640 AIRWAY INHALATION TREATMENT: CPT | Mod: 76

## 2025-03-24 PROCEDURE — 120N000001 HC R&B MED SURG/OB

## 2025-03-24 PROCEDURE — 97535 SELF CARE MNGMENT TRAINING: CPT | Mod: GO

## 2025-03-24 PROCEDURE — 80048 BASIC METABOLIC PNL TOTAL CA: CPT | Performed by: NURSE PRACTITIONER

## 2025-03-24 PROCEDURE — 84100 ASSAY OF PHOSPHORUS: CPT | Performed by: SURGERY

## 2025-03-24 PROCEDURE — 36415 COLL VENOUS BLD VENIPUNCTURE: CPT | Performed by: PHYSICIAN ASSISTANT

## 2025-03-24 PROCEDURE — 97110 THERAPEUTIC EXERCISES: CPT | Mod: GO

## 2025-03-24 PROCEDURE — 97530 THERAPEUTIC ACTIVITIES: CPT | Mod: GO

## 2025-03-24 PROCEDURE — 250N000009 HC RX 250: Performed by: SURGERY

## 2025-03-24 RX ORDER — MAGNESIUM CARB/ALUMINUM HYDROX 105-160MG
148 TABLET,CHEWABLE ORAL ONCE
Status: COMPLETED | OUTPATIENT
Start: 2025-03-24 | End: 2025-03-24

## 2025-03-24 RX ADMIN — PANTOPRAZOLE SODIUM 40 MG: 40 TABLET, DELAYED RELEASE ORAL at 08:54

## 2025-03-24 RX ADMIN — IPRATROPIUM BROMIDE AND ALBUTEROL SULFATE 3 ML: .5; 3 SOLUTION RESPIRATORY (INHALATION) at 10:30

## 2025-03-24 RX ADMIN — POTASSIUM & SODIUM PHOSPHATES POWDER PACK 280-160-250 MG 1 PACKET: 280-160-250 PACK at 08:53

## 2025-03-24 RX ADMIN — ASPIRIN 81 MG CHEWABLE TABLET 162 MG: 81 TABLET CHEWABLE at 08:54

## 2025-03-24 RX ADMIN — HYDROXYZINE HYDROCHLORIDE 25 MG: 25 TABLET, FILM COATED ORAL at 08:58

## 2025-03-24 RX ADMIN — ACETAMINOPHEN 975 MG: 325 TABLET, FILM COATED ORAL at 06:44

## 2025-03-24 RX ADMIN — FUROSEMIDE 40 MG: 40 TABLET ORAL at 08:54

## 2025-03-24 RX ADMIN — SENNOSIDES AND DOCUSATE SODIUM 1 TABLET: 50; 8.6 TABLET ORAL at 12:44

## 2025-03-24 RX ADMIN — GABAPENTIN 400 MG: 300 CAPSULE ORAL at 08:53

## 2025-03-24 RX ADMIN — LOSARTAN POTASSIUM 25 MG: 25 TABLET, FILM COATED ORAL at 08:54

## 2025-03-24 RX ADMIN — GABAPENTIN 400 MG: 300 CAPSULE ORAL at 20:38

## 2025-03-24 RX ADMIN — GABAPENTIN 400 MG: 300 CAPSULE ORAL at 16:57

## 2025-03-24 RX ADMIN — ACETAMINOPHEN 975 MG: 325 TABLET, FILM COATED ORAL at 14:20

## 2025-03-24 RX ADMIN — METOPROLOL SUCCINATE 25 MG: 25 TABLET, EXTENDED RELEASE ORAL at 08:54

## 2025-03-24 RX ADMIN — HEPARIN SODIUM 5000 UNITS: 5000 INJECTION, SOLUTION INTRAVENOUS; SUBCUTANEOUS at 14:20

## 2025-03-24 RX ADMIN — HEPARIN SODIUM 5000 UNITS: 5000 INJECTION, SOLUTION INTRAVENOUS; SUBCUTANEOUS at 20:38

## 2025-03-24 RX ADMIN — POTASSIUM & SODIUM PHOSPHATES POWDER PACK 280-160-250 MG 1 PACKET: 280-160-250 PACK at 12:39

## 2025-03-24 RX ADMIN — ATORVASTATIN CALCIUM 40 MG: 40 TABLET, FILM COATED ORAL at 08:54

## 2025-03-24 RX ADMIN — ACETAMINOPHEN 975 MG: 325 TABLET, FILM COATED ORAL at 20:37

## 2025-03-24 RX ADMIN — ONDANSETRON 4 MG: 4 TABLET, ORALLY DISINTEGRATING ORAL at 08:59

## 2025-03-24 RX ADMIN — HEPARIN SODIUM 5000 UNITS: 5000 INJECTION, SOLUTION INTRAVENOUS; SUBCUTANEOUS at 06:44

## 2025-03-24 RX ADMIN — IPRATROPIUM BROMIDE AND ALBUTEROL SULFATE 3 ML: .5; 3 SOLUTION RESPIRATORY (INHALATION) at 20:10

## 2025-03-24 ASSESSMENT — ACTIVITIES OF DAILY LIVING (ADL)
ADLS_ACUITY_SCORE: 45
ADLS_ACUITY_SCORE: 47
ADLS_ACUITY_SCORE: 47
ADLS_ACUITY_SCORE: 44
ADLS_ACUITY_SCORE: 44
ADLS_ACUITY_SCORE: 45
ADLS_ACUITY_SCORE: 47
ADLS_ACUITY_SCORE: 44
ADLS_ACUITY_SCORE: 44
ADLS_ACUITY_SCORE: 45
ADLS_ACUITY_SCORE: 44
ADLS_ACUITY_SCORE: 47

## 2025-03-24 NOTE — PROGRESS NOTES
Care Management Follow Up    Length of Stay (days): 10    Expected Discharge Date: 03/25/2025     Concerns to be Addressed: discharge planning     Patient plan of care discussed at interdisciplinary rounds: Yes    Anticipated Discharge Disposition: Home, Outpatient Rehab (PT, OT, SLP, Cardiac or Pulmonary), Skilled Nursing Facility  Anticipated Discharge Services: Home Care  Anticipated Discharge DME: Oxygen, Bed    Patient/family educated on Medicare website which has current facility and service quality ratings:    Education Provided on the Discharge Plan: Yes  Patient/Family in Agreement with the Plan: yes    Referrals Placed by CM/SW: Internal Clinic Care Coordination, External Care Coordination, Specialty Providers, Durable Medical Equipment (DME)  Private pay costs discussed: Not applicable    Discussed  Partnership in Safe Discharge Planning  document with patient/family: No     Handoff Completed: No, handoff not indicated or clinically appropriate    Additional Information:  KAITLIN called [employer] [place] [phone] and spoke with Clinical nurse to update the notes for PT/OT that was done on the 22nd and 23rd, it appears this was not printed and faxed with the initial request.    They will update the physicians with this information.     Addendum 0900: KAITLIN called pt'd daughter Meliton Rosenthal to update on pending [employer] status.    KAITLIN should call her once we know about authorization and TCU opening dates. Or Denied and pt will be going home with Home care and an estimated discharge date/time.   Best time for daughter to transport is between 9:30-1 during the week days.     ADDENDUM 1008: TCU approved for 7 days     Addendum 1030: Private room available tomorrow at [place].     Addendum 1108; KAITLIN called daughter updated on approval and [facility] opening, Antonina indicated there is a private room open tomorrow and it is 48$ per day.  Pt Daughter thinks that he will not want to pay that - confirm with pt.    KAITLIN sent message to  Antonina in admissions to see if they have a shared room open.   Daughter can transport tomorrow at about 11 through the skyway.   Authorization number: [id number]  Next Steps: Update family, and arrange discharge.     IMMANUEL PotterW

## 2025-03-24 NOTE — PROGRESS NOTES
[facility]  Cardiovascular and Thoracic Surgery Daily Note      Assessment and Plan  Fermín Josue is a 75 year old male with a PMH of recently diagnosed non small cell lung cancer s/p VATS wedge resection of RUL lung nodule on 1/28/25 (PFTs done prior show mild restrictive lung disease), BPH, CKD stage IIIa, renal cancer s/p nephrectomy, JAMEE, hypertension, dyslipidemia, obesity, prediabetes, CAD s/p TRISH x 2 LAD and occluded RCA in 2007 who was admitted to [facility] 03/13 with 1-2 week history of chest pain. TTE 3/13/25 with preserved biventricular systolic function and early diastolic dysfunction. Coronary angiogram on 3/14/25 revealed multivessel CAD with 70% LMCA stenosis and mid RCA . CV surgery consulted for consideration of surgical revascularization. Underwent CABG x 2 (LIMA to LAD, SVG to RCA) on 03/15 with Dr. John.     POD #9 CABG x 2 (LIMA to LAD, SVG to RCA) with Dr. Dimitri John on 3/15/2025     - CVS:   Accelerated Junctional Rhythm, resolved   1st degree AV block  HD stable overnight in NSR  Pre-op TTE with preserved biventricular systolic function and early diastolic dysfunction.  Profound postop vasoplegic shock with lactic acidosis, resolved. Pressors off POD2.   Toprol 25mg daily, losartan 25 mg daily   Hypervolemia - Lasix 40 mg daily (PTA dose 20 mg daily)   Aspirin 162 mg daily, atorvastatin 40 mg daily.   Chest tubes/TPWs removed on 3/18. CXR stable with small left pleural effusion.      - Resp:   Postop Mechanical Ventilation, resolved  Recent Dx non small cell lung cancer  Weaned off oxygen this am. Still requiring 1/2-1 LPM overnight  Postoperative mechanical ventilation, resolved. Extubated POD1 AM (agitated and non-redirectable with sedation wean POD0)  Recently diagnosed non small cell lung cancer s/p VATS wedge resection of RUL lung nodule on 1/28/25 (PFTs done prior show mild restrictive lung disease). Stage 1A1. Recent oncology note reports no further treatment  indicated.   Improving effort pulmonary toilet. PRN nebs, saturating well on NC. Wean as able.  TID duonebs.  Overnight oximetry study 3/21- required 1-2L oxygen.      - Neuro:   Acute Postoperative Pain  Neuro grossly intact. Pain controlled with prns  -Tylenol, Robaxin, oxy available   -Gabapentin 400 mg PO tid (PTA)     - Renal:   Hx CKD stage 3,   Hxof left nephrectomy 04/2021 for RCC.   Baseline Cr ~ 1.2-1.5. Cr stable within baseline. Trend BMP/UOP. Volume management as above.  Recent Labs   Lab 03/24/25  0559 03/23/25  0604 03/22/25  0641   CR 1.06 1.07 1.09     - GI:   Feels constipated  +BM  bowel regimen to continue  1/2 dose of mag citrate    - : voiding without issues     - Endo: Pre-diabetes.   sliding scale insulin d/sarah given no needs    Hemoglobin A1C   Date Value Ref Range Status   01/14/2025 5.8 (H) 0.0 - 5.6 % Final     Comment:     Normal <5.7%   Prediabetes 5.7-6.4%    Diabetes 6.5% or higher     Note: Adopted from ADA consensus guidelines.   10/15/2019 5.8 (H) 0 - 5.6 % Final     Comment:     Normal <5.7% Prediabetes 5.7-6.4%  Diabetes 6.5% or higher - adopted from ADA   consensus guidelines.          - FEN: Replace electrolytes as needed. Tolerating regular diet      - ID:   Postop leukocytosis, likely reactive from surgery, resolved.   Staph epidermidis UTI noted on preop UC  Afebrile. WBC normalized.   Periop abx prophylaxis complete.   Macrobid for UTI 5 Days (last dose 3/23).   Trend CBC and fever curve.   Recent Labs   Lab 03/24/25  0559 03/23/25  0604 03/22/25  0641   WBC 7.4 6.8 7.8       - Heme: Acute blood loss anemia (stable) and thrombocytopenia (resolved) due to surgery.   Trend CBC, transfuse PRN.   Recent Labs   Lab 03/24/25  0559 03/23/25  0604 03/22/25  0641   HGB 9.4* 9.2* 9.4*    187 207       - Proph: SCD, subcutaneous heparin, PPI    - Other:  Clinically Significant Risk Factors                   # Hypertension: Noted on problem list     # Acute Hypercapnic  "Respiratory Failure: based on arterial blood gas results.  Continue supplemental oxygen and ventilatory support as indicated.         # Obesity: Estimated body mass index is 34.7 kg/m  as calculated from the following:    Height as of this encounter: 1.727 m (5' 8\").    Weight as of this encounter: 103.5 kg (228 lb 3.2 oz).        # Financial/Environmental Concerns: none   # History of CABG: noted on surgical history       - Dispo: Encouraged IS/TCDB/amb. Sternal precautions. Pt needs encouragement to move. Therapies recommending discharge to home with OP vs TCU. Referrals sent.     Interval History  No acute events overnight. States pain is well managed on current regimen, slept poorly. Breathing is stable on room air during day and still requiring 1/2-1 LPM overnight; working with IS. Still has cough. Tolerating diet, is passing flatus, +BM but feels constipated; no n/v. Ambulating in the halls with assistance. Denies chest pain, palpitations, dizziness, syncopal symptoms, chills, myalgias, sternal popping/clicking.    Medications  Current Facility-Administered Medications   Medication Dose Route Frequency Provider Last Rate Last Admin    acetaminophen (TYLENOL) tablet 975 mg  975 mg Oral Q8H Jeanne Hunter PA-C   975 mg at 03/24/25 0644    aspirin (ASA) chewable tablet 162 mg  162 mg Oral or NG Tube Daily Jeanne Hunter PA-C   162 mg at 03/24/25 0854    atorvastatin (LIPITOR) tablet 40 mg  40 mg Oral Daily Jeanne Hunter PA-C   40 mg at 03/24/25 0854    furosemide (LASIX) tablet 40 mg  40 mg Oral Daily Ellie Gifford PA-C   40 mg at 03/24/25 0854    gabapentin (NEURONTIN) capsule 400 mg  400 mg Oral TID Rodriguez Lantigua PA-C   400 mg at 03/24/25 0853    heparin ANTICOAGULANT injection 5,000 Units  5,000 Units Subcutaneous Q8H Jeanne Hunter PA-C   5,000 Units at 03/24/25 0644    ipratropium - albuterol 0.5 mg/2.5 mg/3 mL (DUONEB) neb solution 3 mL  3 mL Nebulization BID Dimitri John MD   3 " mL at 03/24/25 1030    Lidocaine (LIDOCARE) 4 % Patch 1 patch  1 patch Transdermal Q24H Jeanne Hunter PA-C   1 patch at 03/22/25 1334    losartan (COZAAR) tablet 25 mg  25 mg Oral Daily Rodriguez Lantigua PA-C   25 mg at 03/24/25 0854    metoprolol succinate ER (TOPROL XL) 24 hr tablet 25 mg  25 mg Oral Daily Ellie Gifford PA-C   25 mg at 03/24/25 0854    pantoprazole (PROTONIX) EC tablet 40 mg  40 mg Oral Daily Jeanne Hunter PA-C   40 mg at 03/24/25 0854    potassium & sodium phosphates (NEUTRA-PHOS) Packet 1 packet  1 packet Oral or Feeding Tube Q4H Rodriguez Lantigua PA-C   1 packet at 03/24/25 0853    sodium chloride (PF) 0.9% PF flush 3 mL  3 mL Intracatheter Q8H Jeanne Hunter PA-C   3 mL at 03/24/25 0338     Current Facility-Administered Medications   Medication Dose Route Frequency Provider Last Rate Last Admin    bisacodyl (DULCOLAX) suppository 10 mg  10 mg Rectal Daily PRN Jeanne Hunter PA-C        calcium carbonate (TUMS) chewable tablet 500 mg  500 mg Oral 4x Daily PRN Jeanne Hunter PA-C        carboxymethylcellulose PF (REFRESH PLUS) 0.5 % ophthalmic solution 1 drop  1 drop Both Eyes Q1H PRN Rodriguez Lantigua PA-C        glucose gel 15-30 g  15-30 g Oral Q15 Min PRN Jeanne Hunter PA-C        Or    dextrose 50 % injection 25-50 mL  25-50 mL Intravenous Q15 Min PRN HunterJeanne PA-C        Or    glucagon injection 1 mg  1 mg Subcutaneous Q15 Min PRN HunterJeanne handy PA-C        hydrALAZINE (APRESOLINE) injection 10 mg  10 mg Intravenous Q30 Min PRN HunterJeanne PA-C        HYDROmorphone (DILAUDID) injection 0.2 mg  0.2 mg Intravenous Q2H PRN HunterJeanne handy PA-C        Or    HYDROmorphone (DILAUDID) injection 0.4 mg  0.4 mg Intravenous Q2H PRN Jeanne Hunter PA-C   0.4 mg at 03/16/25 2258    hydrOXYzine HCl (ATARAX) tablet 25 mg  25 mg Oral Q6H PRN Rodriguez Lantigua PA-C   25 mg at 03/24/25 0858    ipratropium - albuterol 0.5 mg/2.5 mg/3 mL (DUONEB) neb solution 3 mL  3 mL  Nebulization 4x Daily PRN Rodriguez Lantigua PA-C        lidocaine (LMX4) cream   Topical Q1H PRN Jeanne Hunter PA-C        lidocaine 1 % 0.1-1 mL  0.1-1 mL Other Q1H PRN Jeanne Hunter PA-C        magnesium hydroxide (MILK OF MAGNESIA) suspension 30 mL  30 mL Oral Daily PRN Hunter, Jeanne BERNAL PA-C        methocarbamol (ROBAXIN) half-tab 250 mg  250 mg Oral Q6H PRN Hunter, Jeanne BERNAL PA-C   250 mg at 03/20/25 1011    naloxone (NARCAN) injection 0.2 mg  0.2 mg Intravenous Q2 Min PRN HunterJeanne PA-C        Or    naloxone (NARCAN) injection 0.4 mg  0.4 mg Intravenous Q2 Min PRN HunterJeanne PA-C        Or    naloxone (NARCAN) injection 0.2 mg  0.2 mg Intramuscular Q2 Min PRN HunterJeanne PA-C        Or    naloxone (NARCAN) injection 0.4 mg  0.4 mg Intramuscular Q2 Min PRN HunterJeanne PA-C        ondansetron (ZOFRAN ODT) ODT tab 4 mg  4 mg Oral Q6H PRN Jeanne Hunter PA-C   4 mg at 03/24/25 0859    Or    ondansetron (ZOFRAN) injection 4 mg  4 mg Intravenous Q6H PRN HunterJeanne PA-C   4 mg at 03/19/25 1148    oxyCODONE (ROXICODONE) tablet 5 mg  5 mg Oral Q4H PRN HunterJeanne PA-C   5 mg at 03/22/25 2008    Or    oxyCODONE (ROXICODONE) tablet 10 mg  10 mg Oral Q4H PRN Hunter, Jeanne BERNAL PA-C   10 mg at 03/19/25 1801    Patient may continue current oral medications   Does not apply Continuous PRN Rodriguez Lantigua PA-C        prochlorperazine (COMPAZINE) injection 5 mg  5 mg Intravenous Q6H PRN Jeanne Hunter PA-C        Or    prochlorperazine (COMPAZINE) tablet 5 mg  5 mg Oral Q6H PRN Hunter, Jeanne C, PA-C        senna-docusate (SENOKOT-S/PERICOLACE) 8.6-50 MG per tablet 1 tablet  1 tablet Oral At Bedtime PRN Rodriguez Lantigua PA-C        sodium chloride (PF) 0.9% PF flush 3 mL  3 mL Intracatheter q1 min prn Jeanne Hunter PA-C             Physical Exam  Vitals were reviewed  Blood pressure 124/62, pulse 75, temperature 97.3  F (36.3  C), temperature source Oral, resp. rate 20, height 1.727 m  "(5' 8\"), weight 103.5 kg (228 lb 3.2 oz), SpO2 94%.  Gen: In bed, NAD     Lungs: diminished throughout on room air     Cardiovascular: S1, S2, RRR, no m/r/g, +edema LE     Abdomen: BS+, soft, mildly distended      Derm: sternal incision D/I  R LE incisions D/I  Chest tube sites CDI    Weight:   Vitals:    03/20/25 0500 03/21/25 0548 03/22/25 0520 03/23/25 0638   Weight: 105 kg (231 lb 8 oz) 99.3 kg (219 lb) (P) 104.5 kg (230 lb 4.8 oz) 105 kg (231 lb 8 oz)    03/24/25 0500   Weight: 103.5 kg (228 lb 3.2 oz)         Data  Recent Labs   Lab 03/24/25  0559 03/23/25  0604 03/22/25  0641   WBC 7.4 6.8 7.8   HGB 9.4* 9.2* 9.4*   MCV 99 100 99    187 207    142 139   POTASSIUM 3.9 4.2 3.6   CHLORIDE 101 105 101   CO2 27 29 28   BUN 13.0 14.7 13.8   CR 1.06 1.07 1.09   ANIONGAP 11 8 10   CANDICE 8.4* 8.4* 8.5*   * 107* 149*       Imaging:  No results found for this or any previous visit (from the past 24 hours).    Patient seen and discussed with Dr Alvaro Garvey, APRN, ACNPC-AG, CCRN  Nurse Practitioner  Cardiothoracic Surgery  Pager: [phone]      "

## 2025-03-24 NOTE — PROGRESS NOTES
Care Management Follow Up    Length of Stay (days): 10    Expected Discharge Date: 03/25/2025     Concerns to be Addressed: discharge planning     Patient plan of care discussed at interdisciplinary rounds: Yes    Anticipated Discharge Disposition: Home, Outpatient Rehab (PT, OT, SLP, Cardiac or Pulmonary), Skilled Nursing Facility              Anticipated Discharge Services: Home Care  Anticipated Discharge DME: Oxygen, Bed    Patient/family educated on Medicare website which has current facility and service quality ratings:    Education Provided on the Discharge Plan: Yes  Patient/Family in Agreement with the Plan: yes    Referrals Placed by CM/SW: Internal Clinic Care Coordination, External Care Coordination, Specialty Providers, Durable Medical Equipment (DME)  Private pay costs discussed: Not applicable    Discussed  Partnership in Safe Discharge Planning  document with patient/family: No     Handoff Completed: No, handoff not indicated or clinically appropriate    Additional Information:  KAITLIN confirmed with patient that he does not want to pay the private room fee and he would like to be moved to a shared room when one opens up in the facility. KAITLIN communicated this to the Tk liaison. Liaison also states that the room will not be ready until 1300 tomorrow. KAITLIN let patient's daughter know and she is able to transport patient at 1300 instead of 1100.     Next Steps: patient will discharge tomorrow    TEX Farr

## 2025-03-24 NOTE — PLAN OF CARE
Orientations: A&O x4  Vitals/Pain: desating on RA when asleep, placed on 1-1.5L of O2 via nc, pain is managed with scheduled meds  Tele: SR with 1st degree AVB   Lines/Drains: L PIV-SL  Skin/Wounds: sternal incision, old CT sites, harvest sites: R groin and R knee WDL  GI/: WDL, passing gas  Labs: Abnormal/Trends, Electrolyte Replacement- K/Mg/Ph replacement protocol-recheck 3/24  Ambulation/Assist: A1-RW/GB  Sleep Quality: fair, intermittent, short intervals  Plan: pending TCU placement

## 2025-03-24 NOTE — PLAN OF CARE
"Patient Name: Keerthi  MRN: [id number]  Date of Admission: 3/13/2025  Reason for Admission: CABG.   Level of Care: Heart.    Vitals:   BP Readings from Last 1 Encounters:   03/24/25 (!) 140/75     Pulse Readings from Last 1 Encounters:   03/24/25 72     Wt Readings from Last 1 Encounters:   03/24/25 103.5 kg (228 lb 3.2 oz)     Ht Readings from Last 1 Encounters:   03/19/25 1.727 m (5' 8\")     Estimated body mass index is 34.7 kg/m  as calculated from the following:    Height as of this encounter: 1.727 m (5' 8\").    Weight as of this encounter: 103.5 kg (228 lb 3.2 oz).  Temp Readings from Last 1 Encounters:   03/24/25 98  F (36.7  C) (Oral)     Pain: Pain goal 0. Pain Rating highest 7/10. Effective pain medication/regimen yes sched tylenol and gabapentin, PRN atarax x1.     CV Surgery Patient: Yes Post Op Day #: 9    General: Afebrile yes  Rhythm: normal sinus rhythm  Blood Pressure Medications given/held: Amio not given. Beta blocker Given   Resp: Oxygen Status: RA  Patient slept last night Yes Approx hours slept 6  Incentive Spirometry Q 1-2 hour when awake: yes Volume: self administered  Neuro: Alert yes Orientation: self, person, time, and place  GI/:          Bowel Activity: no if yes indicate when: no BM this shift, passing flatus.          Bowel Medications: yes, pt refused           Urinary Catheter: no  Skin:          Incision: Incision status: healing well          Epicardial Pacing Wires: no  Chest Tubes   Pleural: no Draining: not applicable               Suction: not applicable              Mediastinal: no Draining: not applicable               Suction: not applicable   Dressing Change Daily: no If no, why? RYAN    Resp: LS clear. On RA. SORIA. Denies SOB and chest pain.   Telemetry: NSR. AVSS.   Neuro: A&O x4, can be frustrated at times. Needs lots of encouragement for walks and chair time.   GI/: Adequate urine output via urinal. No BM this shift, passing flatus. Pt refused magnesium citrate " that was ordered, senna given x1 instead. C/o int nausea, zofran x1. Tolerating regular diet.   Skin/Wounds: Midline sternal incision RYAN, WDL. R groin and knee harvest sites RYAN, WDL. Abrasion on lower abdomen. Old surgical scars on R upper flank. Peeling to bilat heels. Petechiae on chest.   Lines/Drains: PIV SL.   Activity: Ax1 gb/w. Up in chair and walks in franco throughout the day.   Abnormal Labs: K/Mg/Phos protocols. Phos replacement given, recheck tomorrow AM. K/Mg rechecks tomorrow AM.     Aggression Stop Light: Green          Patient Care Plan: Encourage activity as tolerated. Pain management as needed. Plan is for discharge to [facility] tomorrow, daughter providing transportation.

## 2025-03-25 ENCOUNTER — LAB REQUISITION (OUTPATIENT)
Dept: LAB | Facility: CLINIC | Age: 75
End: 2025-03-25

## 2025-03-25 ENCOUNTER — DOCUMENTATION ONLY (OUTPATIENT)
Dept: GERIATRICS | Facility: CLINIC | Age: 75
End: 2025-03-25
Payer: COMMERCIAL

## 2025-03-25 ENCOUNTER — MEDICAL CORRESPONDENCE (OUTPATIENT)
Dept: HEALTH INFORMATION MANAGEMENT | Facility: CLINIC | Age: 75
End: 2025-03-25

## 2025-03-25 ENCOUNTER — ANCILLARY ORDERS (OUTPATIENT)
Dept: CT IMAGING | Facility: CLINIC | Age: 75
End: 2025-03-25

## 2025-03-25 VITALS
SYSTOLIC BLOOD PRESSURE: 110 MMHG | OXYGEN SATURATION: 94 % | HEART RATE: 64 BPM | WEIGHT: 230.38 LBS | TEMPERATURE: 98.3 F | HEIGHT: 68 IN | DIASTOLIC BLOOD PRESSURE: 59 MMHG | RESPIRATION RATE: 18 BRPM | BODY MASS INDEX: 34.92 KG/M2

## 2025-03-25 DIAGNOSIS — Z11.1 ENCOUNTER FOR SCREENING FOR RESPIRATORY TUBERCULOSIS: ICD-10-CM

## 2025-03-25 DIAGNOSIS — C34.11 PRIMARY NON-SMALL CELL CARCINOMA OF UPPER LOBE OF RIGHT LUNG (H): Primary | ICD-10-CM

## 2025-03-25 LAB
ANION GAP SERPL CALCULATED.3IONS-SCNC: 10 MMOL/L (ref 7–15)
BUN SERPL-MCNC: 16.6 MG/DL (ref 8–23)
CALCIUM SERPL-MCNC: 8.7 MG/DL (ref 8.8–10.4)
CHLORIDE SERPL-SCNC: 105 MMOL/L (ref 98–107)
CREAT SERPL-MCNC: 1.23 MG/DL (ref 0.67–1.17)
EGFRCR SERPLBLD CKD-EPI 2021: 61 ML/MIN/1.73M2
ERYTHROCYTE [DISTWIDTH] IN BLOOD BY AUTOMATED COUNT: 15.1 % (ref 10–15)
GLUCOSE SERPL-MCNC: 134 MG/DL (ref 70–99)
HCO3 SERPL-SCNC: 28 MMOL/L (ref 22–29)
HCT VFR BLD AUTO: 28.8 % (ref 40–53)
HGB BLD-MCNC: 8.8 G/DL (ref 13.3–17.7)
MAGNESIUM SERPL-MCNC: 2 MG/DL (ref 1.7–2.3)
MCH RBC QN AUTO: 31.3 PG (ref 26.5–33)
MCHC RBC AUTO-ENTMCNC: 30.6 G/DL (ref 31.5–36.5)
MCV RBC AUTO: 103 FL (ref 78–100)
PHOSPHATE SERPL-MCNC: 2.4 MG/DL (ref 2.5–4.5)
PLATELET # BLD AUTO: 224 10E3/UL (ref 150–450)
POTASSIUM SERPL-SCNC: 4.1 MMOL/L (ref 3.4–5.3)
RBC # BLD AUTO: 2.81 10E6/UL (ref 4.4–5.9)
SODIUM SERPL-SCNC: 143 MMOL/L (ref 135–145)
WBC # BLD AUTO: 7 10E3/UL (ref 4–11)

## 2025-03-25 PROCEDURE — 250N000013 HC RX MED GY IP 250 OP 250 PS 637: Performed by: PHYSICIAN ASSISTANT

## 2025-03-25 PROCEDURE — 250N000013 HC RX MED GY IP 250 OP 250 PS 637: Performed by: SURGERY

## 2025-03-25 PROCEDURE — 82435 ASSAY OF BLOOD CHLORIDE: CPT | Performed by: NURSE PRACTITIONER

## 2025-03-25 PROCEDURE — 80048 BASIC METABOLIC PNL TOTAL CA: CPT | Performed by: NURSE PRACTITIONER

## 2025-03-25 PROCEDURE — 84100 ASSAY OF PHOSPHORUS: CPT | Performed by: PHYSICIAN ASSISTANT

## 2025-03-25 PROCEDURE — 82565 ASSAY OF CREATININE: CPT | Performed by: NURSE PRACTITIONER

## 2025-03-25 PROCEDURE — 250N000011 HC RX IP 250 OP 636: Performed by: PHYSICIAN ASSISTANT

## 2025-03-25 PROCEDURE — 250N000009 HC RX 250: Performed by: SURGERY

## 2025-03-25 PROCEDURE — 36415 COLL VENOUS BLD VENIPUNCTURE: CPT | Performed by: PHYSICIAN ASSISTANT

## 2025-03-25 PROCEDURE — 250N000013 HC RX MED GY IP 250 OP 250 PS 637: Performed by: NURSE PRACTITIONER

## 2025-03-25 PROCEDURE — 85027 COMPLETE CBC AUTOMATED: CPT | Performed by: PHYSICIAN ASSISTANT

## 2025-03-25 PROCEDURE — 83735 ASSAY OF MAGNESIUM: CPT | Performed by: NURSE PRACTITIONER

## 2025-03-25 PROCEDURE — 97535 SELF CARE MNGMENT TRAINING: CPT | Mod: GO | Performed by: OCCUPATIONAL THERAPIST

## 2025-03-25 RX ORDER — LOSARTAN POTASSIUM 25 MG/1
25 TABLET ORAL DAILY
DISCHARGE
Start: 2025-03-26

## 2025-03-25 RX ORDER — METHOCARBAMOL 500 MG/1
500 TABLET, FILM COATED ORAL EVERY 6 HOURS PRN
DISCHARGE
Start: 2025-03-25

## 2025-03-25 RX ORDER — OXYCODONE HYDROCHLORIDE 5 MG/1
5 TABLET ORAL EVERY 6 HOURS PRN
Qty: 10 TABLET | Refills: 0 | Status: SHIPPED | OUTPATIENT
Start: 2025-03-25

## 2025-03-25 RX ORDER — FUROSEMIDE 20 MG/1
20 TABLET ORAL DAILY
DISCHARGE
Start: 2025-03-30

## 2025-03-25 RX ORDER — FUROSEMIDE 40 MG/1
40 TABLET ORAL
Status: DISCONTINUED | OUTPATIENT
Start: 2025-03-25 | End: 2025-03-25 | Stop reason: HOSPADM

## 2025-03-25 RX ORDER — MAGNESIUM OXIDE 400 MG/1
400 TABLET ORAL EVERY 4 HOURS
Status: DISCONTINUED | OUTPATIENT
Start: 2025-03-25 | End: 2025-03-25 | Stop reason: HOSPADM

## 2025-03-25 RX ORDER — ASPIRIN 81 MG/1
162 TABLET, CHEWABLE ORAL DAILY
DISCHARGE
Start: 2025-03-26 | End: 2025-06-24

## 2025-03-25 RX ORDER — FUROSEMIDE 40 MG/1
40 TABLET ORAL
Refills: 0 | DISCHARGE
Start: 2025-03-25 | End: 2025-03-30

## 2025-03-25 RX ADMIN — ACETAMINOPHEN 975 MG: 325 TABLET, FILM COATED ORAL at 05:40

## 2025-03-25 RX ADMIN — Medication 400 MG: at 10:19

## 2025-03-25 RX ADMIN — LOSARTAN POTASSIUM 25 MG: 25 TABLET, FILM COATED ORAL at 08:35

## 2025-03-25 RX ADMIN — METOPROLOL SUCCINATE 25 MG: 25 TABLET, EXTENDED RELEASE ORAL at 08:35

## 2025-03-25 RX ADMIN — GABAPENTIN 400 MG: 300 CAPSULE ORAL at 08:35

## 2025-03-25 RX ADMIN — ATORVASTATIN CALCIUM 40 MG: 40 TABLET, FILM COATED ORAL at 08:35

## 2025-03-25 RX ADMIN — ASPIRIN 81 MG CHEWABLE TABLET 162 MG: 81 TABLET CHEWABLE at 08:35

## 2025-03-25 RX ADMIN — FUROSEMIDE 40 MG: 40 TABLET ORAL at 08:35

## 2025-03-25 RX ADMIN — IPRATROPIUM BROMIDE AND ALBUTEROL SULFATE 3 ML: .5; 3 SOLUTION RESPIRATORY (INHALATION) at 07:43

## 2025-03-25 RX ADMIN — PANTOPRAZOLE SODIUM 40 MG: 40 TABLET, DELAYED RELEASE ORAL at 08:35

## 2025-03-25 RX ADMIN — METHOCARBAMOL 250 MG: 500 TABLET ORAL at 00:19

## 2025-03-25 RX ADMIN — POTASSIUM & SODIUM PHOSPHATES POWDER PACK 280-160-250 MG 1 PACKET: 280-160-250 PACK at 10:19

## 2025-03-25 RX ADMIN — HEPARIN SODIUM 5000 UNITS: 5000 INJECTION, SOLUTION INTRAVENOUS; SUBCUTANEOUS at 05:40

## 2025-03-25 ASSESSMENT — ACTIVITIES OF DAILY LIVING (ADL)
ADLS_ACUITY_SCORE: 43
ADLS_ACUITY_SCORE: 43
ADLS_ACUITY_SCORE: 45
ADLS_ACUITY_SCORE: 45
ADLS_ACUITY_SCORE: 43
ADLS_ACUITY_SCORE: 45
ADLS_ACUITY_SCORE: 43
ADLS_ACUITY_SCORE: 45

## 2025-03-25 NOTE — PLAN OF CARE
Pt discharged to [facility] @ approx 1320 w/ daughters' assistance. AVS printed, reviewed, and sent with pt. All questions and concerns encouraged and addressed. Pt expressed understanding. All belongings sent with pt.

## 2025-03-25 NOTE — PROGRESS NOTES
Care Management Discharge Note    Discharge Date: 03/25/2025       Discharge Disposition: Home, Outpatient Rehab (PT, OT, SLP, Cardiac or Pulmonary), Skilled Nursing Facility    Discharge Services: Home Care    Discharge DME: Oxygen, Bed    Discharge Transportation: family or friend will provide    Private pay costs discussed: Not applicable    Does the patient's insurance plan have a 3 day qualifying hospital stay waiver?  No    PAS Confirmation Code: [id number]  Patient/family educated on Medicare website which has current facility and service quality ratings:      Education Provided on the Discharge Plan: Yes  Persons Notified of Discharge Plans: Patient/daughter  Patient/Family in Agreement with the Plan: yes    Handoff Referral Completed: No, handoff not indicated or clinically appropriate    Additional Information:  SW received discharge orders and paper scripts and faxed to [facility]CU. SW completed PAS. [facility]. Facility aware/in agreement with plans.    PAS-RR    D: Per DHS regulation, SW completed and submitted PAS-RR to [place] [employer] Direct Connect via the Senior LinkAge Line.  PAS-RR confirmation # is : [id number]    I: SW spoke with patient and they are aware a PAS-RR has been submitted.  SW reviewed with patient that they may be contacted for a follow up appointment within 10 days of hospital discharge if their SNF stay is < 30 days.  Contact information for [facility] LinkAge Line was also provided.    A: patient verbalized understanding.    P: Further questions may be directed to Senior LinkAge Line at #[phone], option #4 for PAS-RR staff.      PARAMJIT Giang    [facility]

## 2025-03-25 NOTE — PLAN OF CARE
"Patient Name: Keerthi  MRN: [id number]  Date of Admission: 3/13/2025  Reason for Admission: CABG.  Level of Care: Heart.    Vitals:   BP Readings from Last 1 Encounters:   03/25/25 110/59     Pulse Readings from Last 1 Encounters:   03/25/25 64     Wt Readings from Last 1 Encounters:   03/25/25 104.5 kg (230 lb 6.1 oz)     Ht Readings from Last 1 Encounters:   03/19/25 1.727 m (5' 8\")     Estimated body mass index is 35.03 kg/m  as calculated from the following:    Height as of this encounter: 1.727 m (5' 8\").    Weight as of this encounter: 104.5 kg (230 lb 6.1 oz).  Temp Readings from Last 1 Encounters:   03/25/25 98.3  F (36.8  C) (Oral)     Pain: Pain goal 0. Pain Rating highest 6/10. Effective pain medication/regimen yes sched tylenol and gabapentin.     CV Surgery Patient: Yes Post Op Day #: 10     General: Afebrile yes  Rhythm: normal sinus rhythm  Blood Pressure Medications given/held: Amio not given. Beta blocker Given   Resp: Oxygen Status: RA  Patient slept last night Yes Approx hours slept 6  Incentive Spirometry Q 1-2 hour when awake: yes Volume: self administered  Neuro: Alert yes Orientation: self, person, time, and place  GI/:          Bowel Activity: no if yes indicate when: no BM this shift, passing flatus.          Bowel Medications: no          Urinary Catheter: no  Skin:          Incision: Incision status: healing well          Epicardial Pacing Wires: no  Chest Tubes              Pleural: no Draining: not applicable               Suction: not applicable              Mediastinal: no Draining: not applicable               Suction: not applicable              Dressing Change Daily: no If no, why? RYAN     Resp: LS clear. On RA. SORIA. Denies SOB and chest pain.   Telemetry: NSR. AVSS.   Neuro: A&O x4, can be frustrated at times. Needs lots of encouragement for walks and chair time.   GI/: Adequate urine output via urinal. No BM this shift, passing flatus. Denies nausea. Tolerating regular " diet.   Skin/Wounds: Midline sternal incision RYAN, WDL. R groin and knee harvest sites RYAN, WDL. Abrasion on lower abdomen. Old surgical scars on R upper flank. Peeling to bilat heels. Petechiae on chest.   Lines/Drains: PIV SL.   Activity: Ax1 gb/w. Up in chair and walks in franco throughout the day.   Abnormal Labs: K/Mg/Phos protocols. Phos/Mg replacements given, rechecks tomorrow AM. No K replacement needed.      Aggression Stop Light: Green          Patient Care Plan: Encourage activity as tolerated. Pain management as needed. Plan is for discharge to [facility] today at 1300, daughter providing transportation.

## 2025-03-25 NOTE — PLAN OF CARE
"Patient Name: Keerthi  MRN: [id number]  Date of Admission: 3/13/2025  Reason for Admission: CABGx2  Level of Care: [facility]    Vitals:   BP Readings from Last 1 Encounters:   03/25/25 116/64     Pulse Readings from Last 1 Encounters:   03/25/25 75     Wt Readings from Last 1 Encounters:   03/25/25 104.5 kg (230 lb 6.1 oz)     Ht Readings from Last 1 Encounters:   03/19/25 1.727 m (5' 8\")     Estimated body mass index is 35.03 kg/m  as calculated from the following:    Height as of this encounter: 1.727 m (5' 8\").    Weight as of this encounter: 104.5 kg (230 lb 6.1 oz).  Temp Readings from Last 1 Encounters:   03/25/25 98.1  F (36.7  C) (Oral)       Pain: Pain goal 0 Pain Rating 5 Effective pain medication/regimen Robaxin, tylenol    CV Surgery Patient: Yes Post Op Day #: 10    Assessment    General: Afebrile yes  Rhythm: normal sinus rhythm  Blood Pressure Medications  Beta blocker Given   Resp: Oxygen Status: RA  Patient slept last night Yes Approx hours slept 4  Incentive Spirometry Q 1-2 hour when awake: yes   Neuro: Alert yes Orientation: self, person, time, and place  GI/:          Bowel Activity: yes if yes indicate when: today          Bowel Medications: yes          Urinary Catheter: no  Skin:          Incision: Incision status: healing well          Epicardial Pacing Wires: no  Chest Tubes   Pleural: no Draining: not applicable               Suction: no              Mediastinal: no Draining: not applicable               Suction: not applicable   Dressing Change Daily: no     Assessment    Resp: Lung sounds diminished, dyspnea on exertion.  Telemetry: NSR  Neuro: A+Ox4 neuros intact, baseline numbness in BLE  GI/: incontinent at times. Voiding adequately. BM+  Skin/Wounds: Sternotomy, RLE harvest sites  Lines/Drains: PIV SL  Activity: A1 GBW  Sleep: good  Abnormal Labs: hgb 9.4    Aggression Stop Light: Green          Patient Care Plan: Discharge to TCU   "

## 2025-03-25 NOTE — DISCHARGE INSTRUCTIONS
AFTER YOU GO HOME FROM YOUR HEART SURGERY    From the date of surgery: avoid lifting anything greater than ten pounds for 8 weeks after surgery and then less than 20 pounds for an additional 4 weeks.   Do not reach backwards or use arms to push out of chair.   Do not let people pull on your arms to assist with standing.   Avoid twisting or reaching too far across your body.    Avoid strenuous activities such as bowling, vacuuming, raking, shoveling, golf or tennis for 12 weeks after your surgery.   It is okay to resume sex if you feel comfortable in doing so. You may have to try different positions with your partner.    Splint your chest incision by hugging a pillow or bringing your arms across your chest when coughing or sneezing.     No driving for 4 weeks from the date of surgery or while on pain medication.    Shower or wash your incisions daily with soap and water (or as instructed), pat dry.   Keep wound clean and dry, showers are okay after discharge, but don't let spray hit directly on incision.   No baths or swimming for 1 month.   Cover chest tube sites with dry gauze until they stop draining, then leave open to air. It is not abnormal for chest tube sites to drain yellowish/clear fluid for up to 2-3 weeks after surgery.   Watch for signs of infection: increased redness, tenderness, warmth or any drainage that appears infected (pus like) or is persistent.  Also a temperature > 100.5 F or chills. Call your surgeon or primary care provider's office immediately.   Remove any skin glue left on incisions after 10-14 days. This will not affect your incision and can speed up healing.    Exercise is very important in your recovery. Please follow the guidelines set up for you in your cardiac rehab classes at the hospital. If outpatient cardiac rehab was ordered for you, we highly recommend you participate. If you have problems arranging your cardiac rehab, please call [phone] for all locations, with the  exception of [place], please call [phone] and [place], please call [phone].    Avoid sitting for prolonged periods of time, try to walk every hour during the day. If you have a leg incision, elevate your leg often when you are not walking.    Check your weight when you get home from the hospital and continue to check it daily through your recovery for at least a month. If you notice a weight gain of 2-3 pounds in a week, notify your primary care physician, cardiologist or surgeon.    Bowel activity may be slow after surgery. If necessary, you may take an over the counter laxative such as Milk of Magnesia or Miralax. You may have stool softeners prescribed (docusate sodium, Senokot). We recommend using stool softeners while using narcotics for pain (oxycodone/percocet, hydrocodone/vicodin, hydromorphone/dilaudid).      Wean OFF of narcotics (oxycodone, dilaudid, hydrocodone) as soon as possible. You should continue taking acetaminophen as long as you have any surgical pain as the first choice for pain control and add narcotics as necessary for pain to be tolerable.      DO NOT SMOKE.  IF YOU NEED HELP QUITTING, PLEASE TALK WITH YOUR CARDIOLOGIST OR PRIMARY DOCTOR.    REGARDING PRESCRIPTION REFILLS.  If you need a refill on your pain medication contact us to discuss your pain and a possible one time refill.   All other medications will be adjusted, discontinued and re-filled by your primary care physician and/or your cardiologist as they were prior to your surgery. We have given you enough for one to three month with possibly one refill.    POST-OPERATIVE CLINIC VISITS  You have a follow up visit with [facility] Advance Care Practitioners as scheduled.  You will then return to the care of your primary provider and your cardiologist. Future medication refills should come from your primary care provider or Cardiologist.   You should see your primary care provider in 2-4 weeks after discharge.   It is  important to see your cardiologist about 4-6 weeks after discharge.    If you do not hear from a  in 7 days, please call [phone] (choose option 1) and request to be seen with a general cardiologist or someone that you have seen in the past.   If there is a need to return to see CT Surgery please call our  at [phone].    SURGICAL QUESTIONS  Please call Harmony Novak, Jes Navas, Nayeli Cha or Sulema Muller with surgical recovery and medication questions. They will assist you with your needs and contact other surgery care team members as indicated.    On weekends or after hours, please call [phone] and ask the  to page the Cardiothoracic Surgery fellow on call.      Thank you,    Your Cardiothoracic Surgery Team   Jes Navas, RN Care Coordinator - [phone]  Nayeli Cha RN Care Coordinator - [phone]

## 2025-03-25 NOTE — DISCHARGE SUMMARY
[facility]  Cardiothoracic Surgery Hospital Discharge Summary     Fermín Josue MRN# [id number]   Age: 75 year old YOB: 1950     Admitting Physician:  Oj Sharma MD  Discharge Physician:  Alesia Garvey NP  Primary Care Physician:        Vernell Bucio     DATE OF ADMISSION: 3/13/2025      DATE OF DISCHARGE: March 25, 2025     Admit Wt: 221 lbs  Discharge Wt: 230 lbs          Primary Diagnoses:   Severe multivessel coronary artery disease s/p coronary artery bypass grafting x2          Secondary Diagnoses:   Acute postoperative pain, improving  Stress induced hyperglycemia, resolved  Stress induced leukocytosis, resolved  Acute blood loss anemia, stable  Acute blood loss thrombocytopenia, resolved  Hypervolemia, improving  Non small cell lung cancer  CKD stage 3  History of left nephrectomy  Staph Epi UTI, treated    PROCEDURES PERFORMED:   Date: 3/15/25.  Surgeon: Dr. Dimitri John  Coronary artery bypass grafting x 2 with left internal mammary artery (LIMA) to the left anterior descending (LAD), reverse saphenous vein graft to the posterior descending artery (PDA), endoscopic vein harvest from the left lower extremity, intraoperative LASHAY     INTRAOPERATIVE FINDINGS:    Patient had an overall normal LV systolic size and function.  His left internal mammary artery was a good quality conduit with good flow measuring 2.5 mm in diameter.  His left greater saphenous vein was a good quality conduit measuring 3 to 4 mm in diameter.  His posterior descending artery had mild disease and the probe size was 1.5 mm.  His mid to distal LAD had mild disease and the probe size was 1.5 mm as well.  His circumflex coronary artery was quite small and he had OM branches that were too small for bypass grafting.     PATHOLOGY RESULTS:    none     CULTURE RESULTS:    none    CONSULTS:    PT/OT  Intensivist    BRIEF HISTORY OF ILLNESS:  Fermín Josue is a 75 year old male with a PMH  of recently diagnosed non small cell lung cancer s/p VATS wedge resection of RUL lung nodule on 1/28/25 (PFTs done prior show mild restrictive lung disease), BPH, CKD stage IIIa, renal cancer s/p nephrectomy, JAMEE, hypertension, dyslipidemia, obesity, prediabetes, CAD s/p TRISH x 2 LAD and occluded RCA in 2007 who was admitted to [facility] 03/13 with 1-2 week history of chest pain. TTE 3/13/25 with preserved biventricular systolic function and early diastolic dysfunction. Coronary angiogram on 3/14/25 revealed multivessel CAD with 70% LMCA stenosis and mid RCA . CV surgery consulted for consideration of surgical revascularization. Underwent CABG x 2 (LIMA to LAD, SVG to RCA) on 03/15 with Dr. John. Of note: recently diagnosed non small cell lung cancer s/p VATS wedge resection of RUL lung nodule on 1/28/25 (PFTs done prior show mild restrictive lung disease). Stage 1A1. Recent oncology note reports no further treatment indicated.     HOSPITAL COURSE: Fermín Josue is a 75 year old male who on 3/13/2025 underwent the above-named procedures and tolerated the operation well.     Postoperatively was admitted to the ICU.  Patient was extubated within protocol on POD #1.  Blood pressure and cardiac index were managed with vasopressors and inotropic agents which were continuously weaned until no longer needed.  Patient was subsequently  transferred to the surgical telemetry floor.    While on the surgical unit, the patient continued to progress well. Chest tubes and temporary pacemaker wires were removed when deemed appropriate. Had transient accelerated junctional rhythm that resolved on it's own and is discharge in a NSR with 1* AV block.     Patient was fluid overloaded and treated with diuretics. They remain up about 9 lbs from preoperative weight and will discharge with 5 days of increased diuretic therapy then will return to his PTA furosemide dosing; will re-evaluate need in clinic follow-up.      Patient was  "transiently hyperglycemic and treated with insulin infusion then transitioned to sliding scale insulin per protocol. Blood sugars remained stable. No further glycemic control agents needed at this time.     Prior to discharge, his pain was controlled well, he was working well with therapies, able to perform most ADLs, ambulate without difficulty, and had full return of bowel and bladder function.  On March 25, 2025, he was discharged to TCU in stable condition. Follow up with cardiology and cardiac surgery have been arranged. Pt encouraged to follow up with PCP and cardiac rehab upon discharge.    Patient discharged on aspirin:  Yes 162 mg  Patient discharged on beta blocker: yes    Patient discharged on ACE Inhibitor/ARB: yes      Patient discharged on statin: yes          Discharge Disposition:     Discharged to [facility]            Condition on Discharge:     Discharge condition: Stable   Discharge vitals: Blood pressure 105/65, pulse 70, temperature 98.4  F (36.9  C), temperature source Oral, resp. rate 17, height 1.727 m (5' 8\"), weight 104.5 kg (230 lb 6.1 oz), SpO2 94%.   Code status on discharge: Full Code     Vitals:    03/23/25 0638 03/24/25 0500 03/25/25 0605   Weight: 105 kg (231 lb 8 oz) 103.5 kg (228 lb 3.2 oz) 104.5 kg (230 lb 6.1 oz)       DAY OF DISCHARGE PHYSICAL EXAM:    Gen: A&Ox4, NAD  Neuro: Intact with no focal deficits   CV: RRR, normal S1 S2, no murmurs, rubs or gallops. no JVD  Pulm: CTA, no wheezing or rhonchi, normal breathing on RA  Abd: nondistended, normal BS, soft, nontender  Ext: 2+ peripheral edema, no pitting  Incision: clean, dry, intact, no erythema, sternum stable  Tubes/drain sites: dressing clean and dry    LABS  Most Recent 3 CBC's:  Recent Labs   Lab Test 03/25/25  0836 03/24/25  0559 03/23/25  0604   WBC 7.0 7.4 6.8   HGB 8.8* 9.4* 9.2*   * 99 100    230 187      Most Recent 3 BMP's:  Recent Labs   Lab Test 03/24/25  0559 03/23/25  0604 03/22/25  0641   NA " 139 142 139   POTASSIUM 3.9 4.2 3.6   CHLORIDE 101 105 101   CO2 27 29 28   BUN 13.0 14.7 13.8   CR 1.06 1.07 1.09   ANIONGAP 11 8 10   CANDICE 8.4* 8.4* 8.5*   * 107* 149*     Most Recent 2 LFT's:  Recent Labs   Lab Test 03/16/25  0411 03/15/25  1217   AST 34 37   ALT 11 16   ALKPHOS 57 72   BILITOTAL 0.7 0.9     Most Recent INR's and Anticoagulation Dosing History:  Anticoagulation Dose History          Latest Ref Rng & Units 11/21/2007 1/28/2025 3/15/2025   Recent Dosing and Labs   INR 0.85 - 1.15 0.98  1.06  1.48  1.57       Details          Multiple values from one day are sorted in reverse-chronological order             Most Recent 3 Troponin's:No lab results found.  Most Recent Cholesterol Panel:  Recent Labs   Lab Test 07/25/24  0845   CHOL 129   LDL 54   HDL 36*   TRIG 194*     Most Recent 6 Bacteria Isolates From Any Culture (See EPIC Reports for Culture Details):No lab results found.  Most Recent TSH, T4 and A1c Labs:  Recent Labs   Lab Test 01/14/25  1056   A1C 5.8*      Recent Labs   Lab 03/24/25  0559 03/23/25  0604 03/22/25  0641 03/21/25  0809 03/20/25  0557 03/19/25  1117   * 107* 149* 149* 107* 125*       Imaging:  Results for orders placed or performed during the hospital encounter of 03/13/25   XR Chest 2 Views    Narrative    EXAM: XR CHEST 2 VIEWS  LOCATION: [facility]  DATE: 3/13/2025    INDICATION: Chest pain  COMPARISON: Chest x-rays, most recently 2/5/2025. CT dated 10/24/2024.    FINDINGS: The cardiomediastinal silhouette and pulmonary vasculature are within normal limits. Aortic calcification. Stable postoperative changes in the right lung with right apical suture line. The lungs are otherwise clear. No pleural effusion or   pneumothorax. Partially visualized thoracolumbar spinal fusion.      Impression    IMPRESSION: No acute pulmonary abnormality.   Abdomen XR 1 vw    Narrative    EXAM: XR ABDOMEN 1 VIEW  LOCATION: [facility]  HOSPITAL  DATE: 3/13/2025    INDICATION: constipation  COMPARISON: 01/03/2025      Impression    IMPRESSION: No distended air-filled loops of small bowel. An air-filled loop of colon over the lower midabdomen is indeterminate. No intraperitoneal free air. There is fusion hardware in the spine.   US Abdomen Limited    Narrative    EXAM: US ABDOMEN LIMITED  LOCATION: [facility]  DATE: 3/14/2025    INDICATION: R upper quadran pain  COMPARISON: 1/3/2025 and 10/4/2024  TECHNIQUE: Limited abdominal ultrasound.    FINDINGS:    GALLBLADDER: Normal. No gallstones, wall thickening, or pericholecystic fluid. Negative sonographic Rain's sign.    BILE DUCTS: No intrahepatic biliary ductal dilatation. The common duct is not seen, obscured by bowel gas    LIVER: Limited visualization of the liver with the left lobe being obscured due to high subcostal position. Steatosis in the right hepatic lobe limits evaluation. No definite focal mass. The portal vein is patent with flow in the normal direction.    RIGHT KIDNEY: No hydronephrosis.    PANCREAS: The visualized portions are normal.      Impression    IMPRESSION:  1.  Limited visualization due to technical factors discussed above. Consider a CT for better evaluation.  2.  Hepatic steatosis.       US Carotid Bilateral    Narrative    EXAM: US CAROTID BILATERAL  LOCATION: [facility]  DATE: 3/14/2025    INDICATION: pre op CABG  COMPARISON: None.  TECHNIQUE: Duplex exam performed utilizing 2D gray-scale imaging, Doppler interrogation with color-flow and spectral waveform analysis. The percent diameter stenosis is determined using Updated Recommendations for Carotid Stenosis Interpretation Criteria   from [employer].    FINDINGS:    RIGHT: Mild plaque at the bifurcation. The peak systolic velocity in the ICA is less than 180 cm/sec, consistent with less than 50% stenosis. Normal velocities in the ECA. Antegrade flow within  the vertebral artery.     LEFT: Mild plaque at the bifurcation. The peak systolic velocity in the ICA is less than 180 cm/sec, consistent with less than 50% stenosis. Normal velocities in the ECA. Antegrade flow within the vertebral artery.    VELOCITY CHART:  CCA   Right: 76 cm/s   Left: 114 cm/s  ICA   Right: 88 cm/s   Left: 94 cm/s  ECA   Right: 109 cm/s   Left: 95 cm/s  ICA/CCA PSV Ratio   Right: 1.2   Left: 0.8      Impression    IMPRESSION:  1.  Mild plaque formation, velocities consistent with less than 50% stenosis in the right internal carotid artery.  2.  Mild plaque formation, velocities consistent with less than 50% stenosis in the left internal carotid artery.  3.  Flow within the vertebral arteries is antegrade.     US Lower Extremity Venous Mapping Left    Narrative    EXAM: US LOWER EXTREMITY VENOUS MAPPING LEFT  LOCATION: [facility]  DATE: 3/14/2025    INDICATION: pre op CABG. Saphenous vein mapping prior to major cardiovascular surgery.  COMPARISON: None.  TECHNIQUE: Ultrasound examination of the left lower extremity veins was performed, including gray-scale and compression imaging.     LOWER  EXTREMITY FINDINGS:       VENOUS DIAMETERS  LEFT GREAT SAPHENOUS VEIN  SFJ: 5.3 mm  Upper Thigh: 4.1 mm  Mid Thigh: 4.0 mm  Lower Thigh: 3.9 mm  Knee: 3.3 mm  Upper Calf: 3.4 mm  Mid Calf: 2.8 mm  Lower Calf: 2.7 mm      Impression    IMPRESSION:  Left lower extremity saphenous vein mapping for preoperative planning purposes, with measurements as above.   XR Chest Port 1 View    Narrative    EXAM: XR CHEST PORT 1 VIEW  LOCATION: [facility]  DATE: 3/15/2025    INDICATION: Post Op CVTS Surgery  COMPARISON: 03/12/2025      Impression    IMPRESSION: Sternotomy. Enteric tube tip within the proximal stomach, the sidehole is not visualized due to extensive hardware. Sternotomy. Mediastinal drain and left chest tube. Endotracheal tube approximately 4.6 cm above the  brittany. Right IJ central   venous catheter tip within the mid SVC. Heart normal in size. Low lung volumes. Atelectasis noted at the left lung base. No appreciable pneumothorax.   XR Chest Port 1 View    Narrative    EXAM: XR CHEST PORTABLE 1 VIEW  LOCATION: [facility]  DATE: 03/16/2025    INDICATION: Postop CVTS surgery.  COMPARISON: Portable chest single view 03/15/2025 at 1241 hours.      Impression    IMPRESSION: Endotracheal tube tip 4.7 cm above the brittany. Enteric tube tip and side-port in the stomach. Right IJ catheter tip in the SVC. Sternotomy, CABG, mediastinal drain and left chest tube. Elevation of the right hemidiaphragm. Strands of   platelike atelectasis in the left lower lung with associated small effusion, relatively unchanged. No appreciable effusion on the right. Cardiac size approaches upper limits of normal with normal pulmonary vascularity. Coronary artery stent. Degenerative   changes both shoulders and the spine. Thoracolumbar spinal hardware, partially visualized. Surgical clips in the abdomen.     XR Chest Port 1 View    Narrative    EXAM: XR CHEST PORT 1 VIEW  LOCATION: [facility]  DATE: 3/19/2025    INDICATION: Chest tube removal.  COMPARISON: Multiple, most recent 3/16/2025      Impression    IMPRESSION: Interval endotracheal and enteric extubation. Interval removal of right internal jugular central venous catheter, left thoracostomy pleural drainage catheter and mediastinal surgical drain. Subtle opacity left lower hemithorax concerning for   subtle pleural effusion. No air bronchogram. The right lung is clear. No pneumothorax.   XR Chest 2 Views    Narrative    EXAM: XR CHEST 2 VIEWS  LOCATION: [facility]  DATE: 3/21/2025    INDICATION: s p pleural CT removal  COMPARISON: 3/19/2025, 3/16/2025      Impression    IMPRESSION: No pneumothorax. Small left pleural effusion. No evidence for CHF or pneumonia. Mild  cardiomegaly. Prior sternotomy. Postsurgical change lower thoracic upper lumbar spine.   Echocardiogram Complete     Value    LVEF  60%    [facility]    [id number]  [id number]  [id number]^AMELIE^BLAZE^QUE     [facility]  Echocardiography Laboratory  [address] [place] [id number]     Name: XU BURCH  MRN: [id number]  : 1950  Study Date: 2025 04:20 PM  Age: 75 yrs  Gender: Male  Patient Location: [facility]  Reason For Study: Chest Pain, Chest Pressure, Chest Tightness  Ordering Physician: BLAZE KINSEY  Referring Physician: BLAZE KINSEY  Performed By: Shon Rico     BSA: 2.1 m2  Height: 68 in  Weight: 221 lb  HR: 54  BP: 126/77 mmHg  ______________________________________________________________________________  Procedure  Limited Echocardiogram with two-dimensional, color and spectral Doppler.  Definity (NDC #40339-275) given intravenously. Technically difficult study.  ______________________________________________________________________________  Interpretation Summary     1. Normal left ventricular size and systolic function. Estimated ejection  fraction is 60%.  2. No regional wall motion abnormality; suboptimal endocardial border  definition reduces diagnostic accuracy.  3. Grade 1 left ventricular diastolic dysfunction consistent with normal left  ventricular filling pressure.  4. Normal right ventricular size and systolic function.  5. No significant valve disease.  6. Mild aortic sinus of Valsalva dilatation (4.3 cm). Normal ascending aorta  dimension.  7. No significant change compared to prior exam from 2015.  ______________________________________________________________________________  Left Ventricle  The left ventricle is normal in size. Moderate concentrically increased left  ventricular wall thickness. The visual ejection fraction is estimated at 60%.  Grade I or early diastolic dysfunction. No regional wall  motion abnormalities  noted.     Right Ventricle  The right ventricle is normal in size and function.     Atria  Normal left atrial size. Right atrial size is normal. Atrial septum not well  visualized.     Mitral Valve  The mitral valve leaflets are mildly thickened. There is trace mitral  regurgitation.     Tricuspid Valve  The tricuspid valve is not well visualized, but is grossly normal. There is  trace tricuspid regurgitation.     Aortic Valve  There is mild trileaflet aortic sclerosis. No aortic regurgitation is present.  No aortic stenosis is present.     Pulmonic Valve  The pulmonic valve is normal in structure and function.     Vessels  The aortic Sinus(es) of Valsalva are mildly dilated. Normal size ascending  aorta.     Pericardium  There is no pericardial effusion.     Rhythm  Sinus rhythm was noted.  ______________________________________________________________________________  MMode/2D Measurements & Calculations  IVSd: 1.4 cm     LVIDd: 5.0 cm  LVIDs: 3.5 cm  LVPWd: 1.6 cm  FS: 30.0 %  LV mass(C)d: 321.1 grams  LV mass(C)dI: 150.6 grams/m2  Ao root diam: 4.3 cm  LA dimension: 4.4 cm  asc Aorta Diam: 3.2 cm  LA/Ao: 1.0  LVOT diam: 2.2 cm  LVOT area: 3.8 cm2  Ao root diam index Ht(cm/m): 2.5  Ao root diam index BSA (cm/m2): 2.0  Asc Ao diam index BSA (cm/m2): 1.5  Asc Ao diam index Ht(cm/m): 1.9  LA Volume (BP): 30.1 ml     LA Volume Index (BP): 14.1 ml/m2  RV Base: 3.0 cm  RWT: 0.63  TAPSE: 2.0 cm     Doppler Measurements & Calculations  MV E max amanuel: 52.4 cm/sec  MV A max amanuel: 74.4 cm/sec  MV E/A: 0.70  MV dec time: 0.25 sec  Ao V2 max: 105.0 cm/sec  Ao max P.0 mmHg  Ao V2 mean: 70.1 cm/sec  Ao mean P.0 mmHg  Ao V2 VTI: 24.3 cm  AIDA(I,D): 3.1 cm2  AIAD(V,D): 3.0 cm2  LV V1 max P.7 mmHg  LV V1 max: 82.8 cm/sec  LV V1 VTI: 20.0 cm  SV(LVOT): 76.0 ml  SI(LVOT): 35.7 ml/m2  PA acc time: 0.07 sec  AV Amanuel Ratio (DI): 0.79  AIDA Index (cm2/m2): 1.5  E/E' av.0     Lateral E/e': 7.8  Medial  "E/e': 14.2  RV S Amanuel: 14.6 cm/sec     ______________________________________________________________________________  Report approved by: May Granados MD on 03/13/2025 04:58 PM         Cardiac Catheterization     Value    Cath EF Estimated 53    Narrative      Mid RCA to Dist RCA lesion is 100% stenosed.    Ost LM lesion is 70% stenosed.    The ejection fraction is 50-55% by visual estimate.    Left ventricular filling pressures are normal.    1, Known  of RCA again confirmed. There is a robust collateral from LAD   to R-PDA system (large distal RCA system)--This collateral has the same   diameter as the Lad and RCA suggesting good flow expected from Lad to RCA   system  2. Eccentric ostial LMA lesion up to 70% narrowed.  Given that in effect   ALL blood flow to Lad, Lcx and essentially the RCA (due to Lad/rca   collaterals) goes through this LMA lesion.    3. REC  CVS consult for cabg (possibly just LIMA to Lad may allow adequate   filling of Lad/Lcx/RCA). If not a candidate for CABG then stenting of   Ostial LMA is reasonable, this may require Impella backup (unprotected   \"super LMA lesion\" )  Concern for stenting is patient compliance history   with meds to keep LMA stent open if performed-if the LMA stent were to   restenose/thrombosis it may be a non survivable event     NM MPI Single Rest Only     Value    Target     Narrative      The study was not completed due to the decision of the referring   provider.  Isotope injected, however no scanning performed, test was   canceled.            PRE-ADMISSION MEDICATIONS:  Medications Prior to Admission   Medication Sig Dispense Refill Last Dose/Taking    acetaminophen (TYLENOL) 500 MG tablet Take 2 tablets (1,000 mg) by mouth 2 times daily as needed for mild pain 90 tablet 3 Taking As Needed    atorvastatin (LIPITOR) 40 MG tablet Take 1 tablet (40 mg) by mouth daily 90 tablet 4 3/12/2025    gabapentin (NEURONTIN) 400 MG capsule Take 1 capsule (400 mg) by " mouth 3 times daily 90 capsule 11 Taking    metoprolol succinate ER (TOPROL XL) 25 MG 24 hr tablet Take 1 tablet (25 mg) by mouth daily 90 tablet 4 3/12/2025    nitroGLYcerin (NITROSTAT) 0.4 MG sublingual tablet For chest pain place 1 tablet under the tongue every 5 minutes for 3 doses. If symptoms persist 5 minutes after 1st dose call 911. 9 tablet 3 Taking    senna-docusate (SENOKOT-S/PERICOLACE) 8.6-50 MG tablet Take 1 tablet by mouth 2 times daily as needed for constipation. 20 tablet 0 Taking As Needed    triamcinolone (KENALOG) 0.1 % external cream Apply topically to affected area 2 times daily. (Patient taking differently: 2 times daily as needed. Apply topically to affected area 2 times daily.) 15 g 3 Taking Differently    [DISCONTINUED] amLODIPine (NORVASC) 2.5 MG tablet Take 1 tablet (2.5 mg) by mouth daily 90 tablet 4 3/12/2025    [DISCONTINUED] aspirin 81 MG EC tablet Take 81 mg by mouth daily   3/12/2025    [DISCONTINUED] furosemide (LASIX) 20 MG tablet Take 1 tablet (20 mg) by mouth daily 90 tablet 4 3/12/2025    [DISCONTINUED] losartan (COZAAR) 50 MG tablet Take 1 tablet (50 mg) by mouth daily 90 tablet 4 3/12/2025       DISCHARGE MEDICATIONS:   Current Discharge Medication List        START taking these medications    Details   aspirin (ASA) 81 MG chewable tablet Take 2 tablets (162 mg) by mouth daily.    Associated Diagnoses: S/P CABG (coronary artery bypass graft)      methocarbamol (ROBAXIN) 500 MG tablet Take 1 tablet (500 mg) by mouth every 6 hours as needed for muscle spasms or other (pain).    Associated Diagnoses: S/P CABG (coronary artery bypass graft)      oxyCODONE (ROXICODONE) 5 MG tablet Take 1 tablet (5 mg) by mouth every 6 hours as needed for severe pain.  Qty: 10 tablet, Refills: 0    Associated Diagnoses: S/P CABG (coronary artery bypass graft)           CONTINUE these medications which have CHANGED    Details   !! furosemide (LASIX) 20 MG tablet Take 1 tablet (20 mg) by mouth daily.     Associated Diagnoses: Peripheral edema      !! furosemide (LASIX) 40 MG tablet Take 1 tablet (40 mg) by mouth 2 times daily for 5 days.  Refills: 0    Associated Diagnoses: S/P CABG (coronary artery bypass graft)      losartan (COZAAR) 25 MG tablet Take 1 tablet (25 mg) by mouth daily.    Associated Diagnoses: S/P CABG (coronary artery bypass graft)       !! - Potential duplicate medications found. Please discuss with provider.        CONTINUE these medications which have NOT CHANGED    Details   acetaminophen (TYLENOL) 500 MG tablet Take 2 tablets (1,000 mg) by mouth 2 times daily as needed for mild pain  Qty: 90 tablet, Refills: 3      atorvastatin (LIPITOR) 40 MG tablet Take 1 tablet (40 mg) by mouth daily  Qty: 90 tablet, Refills: 4    Associated Diagnoses: Hyperlipidemia, unspecified hyperlipidemia type      gabapentin (NEURONTIN) 400 MG capsule Take 1 capsule (400 mg) by mouth 3 times daily  Qty: 90 capsule, Refills: 11    Associated Diagnoses: Back muscle spasm      metoprolol succinate ER (TOPROL XL) 25 MG 24 hr tablet Take 1 tablet (25 mg) by mouth daily  Qty: 90 tablet, Refills: 4    Associated Diagnoses: Hypertension goal BP (blood pressure) < 140/90      nitroGLYcerin (NITROSTAT) 0.4 MG sublingual tablet For chest pain place 1 tablet under the tongue every 5 minutes for 3 doses. If symptoms persist 5 minutes after 1st dose call 911.  Qty: 9 tablet, Refills: 3    Associated Diagnoses: Coronary artery disease involving native coronary artery of native heart without angina pectoris      senna-docusate (SENOKOT-S/PERICOLACE) 8.6-50 MG tablet Take 1 tablet by mouth 2 times daily as needed for constipation.  Qty: 20 tablet, Refills: 0    Associated Diagnoses: Drug-induced constipation      triamcinolone (KENALOG) 0.1 % external cream Apply topically to affected area 2 times daily.  Qty: 15 g, Refills: 3    Associated Diagnoses: Dermatitis           STOP taking these medications       amLODIPine (NORVASC)  2.5 MG tablet Comments:   Reason for Stopping:         aspirin 81 MG EC tablet Comments:   Reason for Stopping:                CC:Vernell Beasley, APRN, ACNPC-AG, CCRN  Nurse Practitioner  Cardiothoracic Surgery  Pager: [phone]    Time spent in total on discharge including coordination of care: 55 minutes    [facility]   Cardiothoracic Surgery  Office phone: [phone]  Office fax: [phone]

## 2025-03-25 NOTE — PLAN OF CARE
Cardiac Rehab Discharge Summary    Reason for therapy discharge:    Discharged to transitional care facility.    Progress towards therapy goal(s). See goals on Care Plan in [facility] electronic health record for goal details.  Goals partially met.  Barriers to achieving goals:   discharge from facility.    Therapy recommendation(s):    Continued therapy is recommended.  Rationale/Recommendations:  TCU to increase strength, independence. OP CR once home for cardiac exercise, monitoring and education.

## 2025-03-26 ENCOUNTER — PATIENT OUTREACH (OUTPATIENT)
Dept: CARE COORDINATION | Facility: CLINIC | Age: 75
End: 2025-03-26

## 2025-03-26 PROCEDURE — 36415 COLL VENOUS BLD VENIPUNCTURE: CPT | Performed by: PHYSICIAN ASSISTANT

## 2025-03-26 PROCEDURE — 86481 TB AG RESPONSE T-CELL SUSP: CPT | Performed by: PHYSICIAN ASSISTANT

## 2025-03-26 PROCEDURE — P9604 ONE-WAY ALLOW PRORATED TRIP: HCPCS | Performed by: PHYSICIAN ASSISTANT

## 2025-03-26 NOTE — PROGRESS NOTES
Clinic Care Coordination Contact  Care Coordination Transition Communication    Clinical Data: Patient was hospitalized at [facility] from 3/13/25 to 3/25/25 with diagnosis of      Severe multivessel coronary artery disease s/p coronary artery bypass grafting x2.     Assessment: Patient has transitioned to TCU/ARU for short term rehabilitation:    [facility]  Transition Communication:  Notified facility of Primary Care- Care Coordination support via Epic In-Basket message.    Plan: Care Coordinator will await notification from facility staff informing of patient's discharge plans/needs. Care Coordinator will review chart and outreach to facility staff every 4 weeks and as needed.     Kelly Lam, BSN, RN, PHN   Care Coordinator-Ambulatory Care Management  [facility]  [phone]

## 2025-03-26 NOTE — LETTER
Ambulatory Care Coordination to TCU Hand In Communication:     Name: Fermín Josue is a patient of Vernell Bucio at [facility] and I am the care coordinator.   CC Contact Information: Email: [email or web address]   Phone: [phone]     I would like to collaborate with the TCU Care team during this patient's stay; please invite me to any care conferences.     Please feel free to contact me with questions or further collaboration in discharge planning.      IMMANUEL SadlerN, RN, PHN   Care Coordinator-Ambulatory Care Management  [facility]  [phone]

## 2025-03-27 ENCOUNTER — TRANSITIONAL CARE UNIT VISIT (OUTPATIENT)
Dept: GERIATRICS | Facility: CLINIC | Age: 75
End: 2025-03-27
Payer: COMMERCIAL

## 2025-03-27 VITALS
OXYGEN SATURATION: 95 % | WEIGHT: 229.2 LBS | SYSTOLIC BLOOD PRESSURE: 116 MMHG | RESPIRATION RATE: 18 BRPM | DIASTOLIC BLOOD PRESSURE: 91 MMHG | HEART RATE: 88 BPM | TEMPERATURE: 97.9 F | BODY MASS INDEX: 34.85 KG/M2

## 2025-03-27 DIAGNOSIS — N40.0 BENIGN PROSTATIC HYPERPLASIA, UNSPECIFIED WHETHER LOWER URINARY TRACT SYMPTOMS PRESENT: ICD-10-CM

## 2025-03-27 DIAGNOSIS — R53.81 PHYSICAL DECONDITIONING: ICD-10-CM

## 2025-03-27 DIAGNOSIS — Z74.09 IMPAIRED MOBILITY AND ACTIVITIES OF DAILY LIVING: ICD-10-CM

## 2025-03-27 DIAGNOSIS — I25.10 CORONARY ARTERY DISEASE INVOLVING NATIVE CORONARY ARTERY OF NATIVE HEART WITHOUT ANGINA PECTORIS: ICD-10-CM

## 2025-03-27 DIAGNOSIS — Z78.9 IMPAIRED MOBILITY AND ACTIVITIES OF DAILY LIVING: ICD-10-CM

## 2025-03-27 DIAGNOSIS — I10 HYPERTENSION GOAL BP (BLOOD PRESSURE) < 140/90: ICD-10-CM

## 2025-03-27 DIAGNOSIS — C34.91 NON-SMALL CELL CANCER OF RIGHT LUNG (H): Primary | ICD-10-CM

## 2025-03-27 DIAGNOSIS — E66.01 CLASS 2 SEVERE OBESITY WITH BODY MASS INDEX (BMI) OF 35 TO 39.9 WITH SERIOUS COMORBIDITY (H): ICD-10-CM

## 2025-03-27 DIAGNOSIS — E66.812 CLASS 2 SEVERE OBESITY WITH BODY MASS INDEX (BMI) OF 35 TO 39.9 WITH SERIOUS COMORBIDITY (H): ICD-10-CM

## 2025-03-27 DIAGNOSIS — E78.5 HYPERLIPIDEMIA, UNSPECIFIED HYPERLIPIDEMIA TYPE: ICD-10-CM

## 2025-03-27 DIAGNOSIS — N18.31 CKD STAGE 3A, GFR 45-59 ML/MIN (H): ICD-10-CM

## 2025-03-27 LAB
GAMMA INTERFERON BACKGROUND BLD IA-ACNC: 0.02 IU/ML
M TB IFN-G BLD-IMP: NEGATIVE
M TB IFN-G CD4+ BCKGRND COR BLD-ACNC: 2.81 IU/ML
MITOGEN IGNF BCKGRD COR BLD-ACNC: 0 IU/ML
MITOGEN IGNF BCKGRD COR BLD-ACNC: 0 IU/ML
QUANTIFERON MITOGEN: 2.83 IU/ML
QUANTIFERON NIL TUBE: 0.02 IU/ML
QUANTIFERON TB1 TUBE: 0.02 IU/ML
QUANTIFERON TB2 TUBE: 0.02

## 2025-03-27 NOTE — PROGRESS NOTES
[facility] GERIATRICS    PRIMARY CARE PROVIDER AND CLINIC:  Vernell Bucio MD, [address] / [place] [address]  Chief Complaint   Patient presents with    Hospital F/U      [facility] Medical Record Number:  [id number]  Place of Service where encounter took place:  [facility] (TCU) [[id number]]      HPI:    74yo male admitted at [facility] from 3/13 - 3/25, 2025 after presenting for evaluation of 1-2 week history of intermittent chest tightness, concern for unstable angina. Underwent coronary angiogram with severe left main disease. Seen by CV surgery and underwent CABGx2. Surgery was uncomplicated, postop with intermittent self-resolving junctional rhythm. Deemed medically stable, following therapy evaluations referred to TCU for rehab, med management.    Pt is seen as initial visit. Daughter at bedside, assists with history. Primary concern today is the television, appears to have parental controls inadvertently in place. Pt denies cp, sob. Admits to exercise fatigue with therapies, ambulating. Denies abdominal pain. Tolerating oral intake. Pain minimal. Glad to hear the diuretic is only scheduled twice daily for a short-term period as it is making him urinate much more frequently than normal. Feels he is functionally doing well, nearing baseline. Hopeful for short stay at U. Prior to hospitalization lives with daughter in [facility]. Full code.    CODE STATUS/ADVANCE DIRECTIVES DISCUSSION:  Prior  CPR/Full code   ALLERGIES: No Known Allergies   PAST MEDICAL HISTORY:   Past Medical History:   Diagnosis Date    CAD (coronary artery disease) 02/05/2015    3 stents    History of angina     History of skin graft     Right lower limb    Hypertension goal BP (blood pressure) < 140/90 02/05/2015    Kidney stone 02/17/2021    Malignant neoplasm of kidney excluding renal pelvis, left (H) 2021    Sleep apnea       PAST SURGICAL HISTORY:   has a past surgical history that includes  Kidney Stone Surgery (2003); Heart Cath, Angioplasty (11/2007); skin graft, each addn 100sqcm; Colonoscopy (N/A, 07/16/2020); Combined Cystoscopy, Retrogrades, Ureteroscopy, Insert Stent (Left, 02/19/2021); Cystoscopy, dilate ureter(s), combined (Left, 02/19/2021); nephrectomy rt/lt (Left, 04/2021); Lumbar Spine Surgery (N/A, 02/13/2023); Thoracoscopic wedge resection lung (Right, 1/28/2025); Coronary Angiogram (N/A, 3/14/2025); Left Heart Catheterization (N/A, 3/14/2025); Left Ventriculogram (N/A, 3/14/2025); and Bypass graft artery coronary (N/A, 3/15/2025).  FAMILY HISTORY: family history includes Alzheimer Disease in his father; Cancer in his maternal grandfather and maternal uncle; Dementia in his mother; Hypertension in his mother; Leukemia in his maternal grandmother; Pneumonia (age of onset: 91) in his mother.  SOCIAL HISTORY:   reports that he quit smoking about 17 years ago. His smoking use included cigarettes. He started smoking about 61 years ago. He has a 66 pack-year smoking history. He has never used smokeless tobacco. He reports current alcohol use. He reports current drug use. Drug: Marijuana.  Patient's living condition: lives with family, Daughter     Post Discharge Medication Reconciliation Status:   MED REC REQUIRED  Post Medication Reconciliation Status: discharge medications reconciled, continue medications without change       Current Outpatient Medications   Medication Sig Dispense Refill    acetaminophen (TYLENOL) 500 MG tablet Take 2 tablets (1,000 mg) by mouth 2 times daily as needed for mild pain 90 tablet 3    aspirin (ASA) 81 MG chewable tablet Take 2 tablets (162 mg) by mouth daily.      atorvastatin (LIPITOR) 40 MG tablet Take 1 tablet (40 mg) by mouth daily 90 tablet 4    [START ON 3/30/2025] furosemide (LASIX) 20 MG tablet Take 1 tablet (20 mg) by mouth daily.      furosemide (LASIX) 40 MG tablet Take 1 tablet (40 mg) by mouth 2 times daily for 5 days.  0    gabapentin (NEURONTIN)  400 MG capsule Take 1 capsule (400 mg) by mouth 3 times daily 90 capsule 11    losartan (COZAAR) 25 MG tablet Take 1 tablet (25 mg) by mouth daily.      methocarbamol (ROBAXIN) 500 MG tablet Take 1 tablet (500 mg) by mouth every 6 hours as needed for muscle spasms or other (pain).      metoprolol succinate ER (TOPROL XL) 25 MG 24 hr tablet Take 1 tablet (25 mg) by mouth daily 90 tablet 4    nitroGLYcerin (NITROSTAT) 0.4 MG sublingual tablet For chest pain place 1 tablet under the tongue every 5 minutes for 3 doses. If symptoms persist 5 minutes after 1st dose call 911. 9 tablet 3    oxyCODONE (ROXICODONE) 5 MG tablet Take 1 tablet (5 mg) by mouth every 6 hours as needed for severe pain. 10 tablet 0    senna-docusate (SENOKOT-S/PERICOLACE) 8.6-50 MG tablet Take 1 tablet by mouth 2 times daily as needed for constipation. 20 tablet 0    triamcinolone (KENALOG) 0.1 % external cream Apply topically to affected area 2 times daily. (Patient taking differently: 2 times daily as needed. Apply topically to affected area 2 times daily.) 15 g 3     No current facility-administered medications for this visit.       ROS:  4 point ROS including Respiratory, CV, GI and , other than that noted in the HPI,  is negative    Vitals:  BP (!) 116/91   Pulse 88   Temp 97.9  F (36.6  C)   Resp 18   Wt 104 kg (229 lb 3.2 oz)   SpO2 95%   BMI 34.85 kg/m    Exam:  GEN: well-developed, well-nourished, appears comfortable  HEENT: NCAT, EOM intact bilaterally, sclera clear, conjunctiva normal, nose & mouth patent, mucous membranes moist  CHEST: lungs CTA bilaterally, no increased work of breathing, no wheeze, crackles, rhonchi; midline incision c/d/I, chest tube sites healing without surrounding erythema/warmth/drainage  HEART: RRR, S1 & S2  ABD: soft, nontender, nondistended, no guarding or rigidity, +BS in all 4 quadrants  MSK: AROM bilateral UE/LE, pedal & radial pulses 2+ bilaterally  NEURO: awake, alert, oriented to name, place, and  time. CN II-XII grossly intact. Sensation grossly intact to light touch.   SKIN: warm & dry without rash, no pedal edema    Lab/Diagnostic data:  Recent labs in [facility] reviewed by me today.  and Most Recent 3 CBC's:  Recent Labs   Lab Test 03/25/25  0836 03/24/25  0559 03/23/25  0604   WBC 7.0 7.4 6.8   HGB 8.8* 9.4* 9.2*   * 99 100    230 187     Most Recent 3 BMP's:  Recent Labs   Lab Test 03/25/25  0836 03/24/25  0559 03/23/25  0604    139 142   POTASSIUM 4.1 3.9 4.2   CHLORIDE 105 101 105   CO2 28 27 29   BUN 16.6 13.0 14.7   CR 1.23* 1.06 1.07   ANIONGAP 10 11 8   CANDICE 8.7* 8.4* 8.4*   * 116* 107*     Most Recent 2 LFT's:  Recent Labs   Lab Test 03/16/25  0411 03/15/25  1217   AST 34 37   ALT 11 16   ALKPHOS 57 72   BILITOTAL 0.7 0.9     Most Recent 3 Troponin's:No lab results found.  Most Recent 3 BNP's:No lab results found.  7-Day Micro Results       Collected Updated Procedure Result Status      03/26/2025 0524 03/27/2025 1422 Quantiferon TB Gold Plus Grey Tube [[id number]]   Peripheral Blood    Final result Component Value Units   Quantiferon Nil Tube 0.02 IU/mL            03/26/2025 0524 03/27/2025 1421 Quantiferon TB Gold Plus Green Tube [[id number]]   Peripheral Blood    Final result Component Value Units   Quantiferon TB1 Tube 0.02 IU/mL            03/26/2025 0524 03/27/2025 1421 Quantiferon TB Gold Plus Yellow Tube [[id number]]   Peripheral Blood    Final result Component Value   Quantiferon TB2 Tube 0.02            03/26/2025 0524 03/27/2025 1421 Quantiferon TB Gold Plus Purple Tube [[id number]]   Peripheral Blood    Final result Component Value Units   Quantiferon Mitogen 2.83 IU/mL            03/26/2025 0524 03/27/2025 1536 Quantiferon TB Gold Plus [[id number]]   Peripheral Blood    Final result Component Value Units   Quantiferon-TB Gold Plus Negative    No interferon gamma response to M.tuberculosis antigens was detected. Infection with M.tuberculosis is unlikely,  however a single negative result does not exclude infection. In patients at high risk for infection, a second test should be considered in accordance with the 2017 ATS/IDSA/CDC Clinical Pract  ice Guidelines for Diagnosis of Tuberculosis in Adults and Children    TB1 Ag minus Nil Value 0.00 IU/mL   TB2 Ag minus Nil Value 0.00 IU/mL   Mitogen minus Nil Result 2.81 IU/mL   Nil Result 0.02 IU/mL                  Most Recent TSH and T4:No lab results found.  Most Recent Hemoglobin A1c:  Recent Labs   Lab Test 01/14/25  1056   A1C 5.8*     Most Recent Urinalysis:  Recent Labs   Lab Test 03/13/25  1828   COLOR Yellow   APPEARANCE Clear   URINEGLC Negative   URINEBILI Negative   URINEKETONE Negative   SG 1.022   UBLD Negative   URINEPH 5.5   PROTEIN 20*   NITRITE Negative   LEUKEST Small*   RBCU 2   WBCU 27*     Most Recent ESR & CRP:  Recent Labs   Lab Test 03/13/25  1106   SED 27*   CRPI <3.00       ASSESSMENT/PLAN:    CAD s/p CABGx2 (LIMA to LAD, SVG to RCA) 3/15/25  HTN/HLD  Physical deconditioning  Impaired mobility and ADLs  Presented with 2wk hx of chest pain, attempted stress imaging however with active chest pain proceeded with coronary angiogram with findings of severe left main disease, seen by CV surgery and underwent CABG as noted above. Surgery was uncomplicated, postop with borderline HRs with transient self-resolved junctional rhythm and postop vasoplegic shock requiring pressors. Remains 9# up from dry weight at hospital discharge.  -Continues on ASA, BB, ARB, statin  -Continues on lasix 40mg BID x5d then transition to home dose of 20mg/d  -Pain control with PRN tylenol, robaxin, oxycodone  -PT/OT evaluations  -SW for discharge planning  -Follow-up with CV surgery as outpatient    NSCLC s/p VATS, wedge resection of RUL nodule 01/2025  Follows with [place] [facility], surgical margins negative.  -Repeat CT in 3mo    CKD-3  Baseline Cr 1.3.  -BMP 3/31    BPH  -Continue tamsulosin    Total time spent with patient  visit at the skilled nursing facility was 45 min including patient visit and review of past records.     Electronically signed by:  Connor Talbert PA-C

## 2025-03-29 ENCOUNTER — HOSPITAL ENCOUNTER (INPATIENT)
Facility: CLINIC | Age: 75
LOS: 3 days | Discharge: NURSING, REHAB OR LONG-TERM CARE | DRG: 418 | End: 2025-04-02
Attending: EMERGENCY MEDICINE | Admitting: HOSPITALIST
Payer: COMMERCIAL

## 2025-03-29 ENCOUNTER — APPOINTMENT (OUTPATIENT)
Dept: CT IMAGING | Facility: CLINIC | Age: 75
DRG: 418 | End: 2025-03-29
Attending: EMERGENCY MEDICINE
Payer: COMMERCIAL

## 2025-03-29 DIAGNOSIS — R79.89 ELEVATED TROPONIN: ICD-10-CM

## 2025-03-29 DIAGNOSIS — E78.5 HYPERLIPIDEMIA, UNSPECIFIED HYPERLIPIDEMIA TYPE: ICD-10-CM

## 2025-03-29 DIAGNOSIS — L30.9 DERMATITIS: ICD-10-CM

## 2025-03-29 DIAGNOSIS — M62.830 BACK MUSCLE SPASM: ICD-10-CM

## 2025-03-29 DIAGNOSIS — R07.9 CHEST PAIN, UNSPECIFIED TYPE: ICD-10-CM

## 2025-03-29 DIAGNOSIS — K81.0 GANGRENOUS CHOLECYSTITIS: Primary | ICD-10-CM

## 2025-03-29 DIAGNOSIS — I25.10 CORONARY ARTERY DISEASE INVOLVING NATIVE CORONARY ARTERY OF NATIVE HEART WITHOUT ANGINA PECTORIS: ICD-10-CM

## 2025-03-29 DIAGNOSIS — I10 HYPERTENSION GOAL BP (BLOOD PRESSURE) < 140/90: ICD-10-CM

## 2025-03-29 DIAGNOSIS — K81.0 ACUTE CHOLECYSTITIS: ICD-10-CM

## 2025-03-29 LAB
ALBUMIN SERPL BCG-MCNC: 4.2 G/DL (ref 3.5–5.2)
ALP SERPL-CCNC: 179 U/L (ref 40–150)
ALT SERPL W P-5'-P-CCNC: 40 U/L (ref 0–70)
ANION GAP SERPL CALCULATED.3IONS-SCNC: 12 MMOL/L (ref 7–15)
AST SERPL W P-5'-P-CCNC: 54 U/L (ref 0–45)
ATRIAL RATE - MUSE: 81 BPM
BASOPHILS # BLD AUTO: 0 10E3/UL (ref 0–0.2)
BASOPHILS NFR BLD AUTO: 0 %
BILIRUB SERPL-MCNC: 0.7 MG/DL
BUN SERPL-MCNC: 17.6 MG/DL (ref 8–23)
CALCIUM SERPL-MCNC: 9.3 MG/DL (ref 8.8–10.4)
CHLORIDE SERPL-SCNC: 95 MMOL/L (ref 98–107)
CREAT SERPL-MCNC: 1.21 MG/DL (ref 0.67–1.17)
DIASTOLIC BLOOD PRESSURE - MUSE: NORMAL MMHG
EGFRCR SERPLBLD CKD-EPI 2021: 62 ML/MIN/1.73M2
EOSINOPHIL # BLD AUTO: 0.2 10E3/UL (ref 0–0.7)
EOSINOPHIL NFR BLD AUTO: 1 %
ERYTHROCYTE [DISTWIDTH] IN BLOOD BY AUTOMATED COUNT: 16 % (ref 10–15)
GLUCOSE SERPL-MCNC: 166 MG/DL (ref 70–99)
HCO3 SERPL-SCNC: 28 MMOL/L (ref 22–29)
HCT VFR BLD AUTO: 34.5 % (ref 40–53)
HGB BLD-MCNC: 11.1 G/DL (ref 13.3–17.7)
IMM GRANULOCYTES # BLD: 0.1 10E3/UL
IMM GRANULOCYTES NFR BLD: 1 %
INTERPRETATION ECG - MUSE: NORMAL
LIPASE SERPL-CCNC: 122 U/L (ref 13–60)
LYMPHOCYTES # BLD AUTO: 1.1 10E3/UL (ref 0.8–5.3)
LYMPHOCYTES NFR BLD AUTO: 10 %
MCH RBC QN AUTO: 31.8 PG (ref 26.5–33)
MCHC RBC AUTO-ENTMCNC: 32.2 G/DL (ref 31.5–36.5)
MCV RBC AUTO: 99 FL (ref 78–100)
MONOCYTES # BLD AUTO: 0.6 10E3/UL (ref 0–1.3)
MONOCYTES NFR BLD AUTO: 5 %
NEUTROPHILS # BLD AUTO: 9.7 10E3/UL (ref 1.6–8.3)
NEUTROPHILS NFR BLD AUTO: 83 %
NRBC # BLD AUTO: 0 10E3/UL
NRBC BLD AUTO-RTO: 0 /100
P AXIS - MUSE: NORMAL DEGREES
PLATELET # BLD AUTO: 325 10E3/UL (ref 150–450)
POTASSIUM SERPL-SCNC: 4 MMOL/L (ref 3.4–5.3)
PR INTERVAL - MUSE: 336 MS
PROT SERPL-MCNC: 8 G/DL (ref 6.4–8.3)
QRS DURATION - MUSE: 74 MS
QT - MUSE: 370 MS
QTC - MUSE: 429 MS
R AXIS - MUSE: -27 DEGREES
RBC # BLD AUTO: 3.49 10E6/UL (ref 4.4–5.9)
SODIUM SERPL-SCNC: 135 MMOL/L (ref 135–145)
SYSTOLIC BLOOD PRESSURE - MUSE: NORMAL MMHG
T AXIS - MUSE: 73 DEGREES
TROPONIN T SERPL HS-MCNC: 74 NG/L
VENTRICULAR RATE- MUSE: 81 BPM
WBC # BLD AUTO: 11.7 10E3/UL (ref 4–11)

## 2025-03-29 PROCEDURE — 250N000011 HC RX IP 250 OP 636: Performed by: EMERGENCY MEDICINE

## 2025-03-29 PROCEDURE — 84484 ASSAY OF TROPONIN QUANT: CPT | Performed by: EMERGENCY MEDICINE

## 2025-03-29 PROCEDURE — 85025 COMPLETE CBC W/AUTO DIFF WBC: CPT | Performed by: EMERGENCY MEDICINE

## 2025-03-29 PROCEDURE — 96372 THER/PROPH/DIAG INJ SC/IM: CPT | Performed by: EMERGENCY MEDICINE

## 2025-03-29 PROCEDURE — 96374 THER/PROPH/DIAG INJ IV PUSH: CPT | Mod: 59

## 2025-03-29 PROCEDURE — 80053 COMPREHEN METABOLIC PANEL: CPT | Performed by: EMERGENCY MEDICINE

## 2025-03-29 PROCEDURE — 250N000009 HC RX 250: Performed by: EMERGENCY MEDICINE

## 2025-03-29 PROCEDURE — 71260 CT THORAX DX C+: CPT

## 2025-03-29 PROCEDURE — 83690 ASSAY OF LIPASE: CPT | Performed by: EMERGENCY MEDICINE

## 2025-03-29 PROCEDURE — 36415 COLL VENOUS BLD VENIPUNCTURE: CPT | Performed by: EMERGENCY MEDICINE

## 2025-03-29 PROCEDURE — 99285 EMERGENCY DEPT VISIT HI MDM: CPT | Mod: 25

## 2025-03-29 PROCEDURE — 250N000011 HC RX IP 250 OP 636: Mod: JZ | Performed by: EMERGENCY MEDICINE

## 2025-03-29 PROCEDURE — 250N000013 HC RX MED GY IP 250 OP 250 PS 637: Performed by: EMERGENCY MEDICINE

## 2025-03-29 PROCEDURE — 93005 ELECTROCARDIOGRAM TRACING: CPT

## 2025-03-29 PROCEDURE — 74177 CT ABD & PELVIS W/CONTRAST: CPT

## 2025-03-29 RX ORDER — MAGNESIUM HYDROXIDE/ALUMINUM HYDROXICE/SIMETHICONE 120; 1200; 1200 MG/30ML; MG/30ML; MG/30ML
15 SUSPENSION ORAL ONCE
Status: COMPLETED | OUTPATIENT
Start: 2025-03-29 | End: 2025-03-29

## 2025-03-29 RX ORDER — IOPAMIDOL 755 MG/ML
115 INJECTION, SOLUTION INTRAVASCULAR ONCE
Status: COMPLETED | OUTPATIENT
Start: 2025-03-29 | End: 2025-03-29

## 2025-03-29 RX ORDER — HYDROMORPHONE HYDROCHLORIDE 1 MG/ML
0.5 INJECTION, SOLUTION INTRAMUSCULAR; INTRAVENOUS; SUBCUTANEOUS
Status: COMPLETED | OUTPATIENT
Start: 2025-03-29 | End: 2025-03-29

## 2025-03-29 RX ADMIN — IOPAMIDOL 115 ML: 755 INJECTION, SOLUTION INTRAVENOUS at 23:44

## 2025-03-29 RX ADMIN — SODIUM CHLORIDE 75 ML: 9 INJECTION, SOLUTION INTRAVENOUS at 23:44

## 2025-03-29 RX ADMIN — HYDROMORPHONE HYDROCHLORIDE 1 MG: 1 INJECTION, SOLUTION INTRAMUSCULAR; INTRAVENOUS; SUBCUTANEOUS at 23:07

## 2025-03-29 RX ADMIN — HYDROMORPHONE HYDROCHLORIDE 0.5 MG: 1 INJECTION, SOLUTION INTRAMUSCULAR; INTRAVENOUS; SUBCUTANEOUS at 23:55

## 2025-03-29 RX ADMIN — ALUMINUM HYDROXIDE, MAGNESIUM HYDROXIDE, AND SIMETHICONE 15 ML: 200; 200; 20 SUSPENSION ORAL at 22:39

## 2025-03-29 ASSESSMENT — COLUMBIA-SUICIDE SEVERITY RATING SCALE - C-SSRS
2. HAVE YOU ACTUALLY HAD ANY THOUGHTS OF KILLING YOURSELF IN THE PAST MONTH?: NO
6. HAVE YOU EVER DONE ANYTHING, STARTED TO DO ANYTHING, OR PREPARED TO DO ANYTHING TO END YOUR LIFE?: NO
1. IN THE PAST MONTH, HAVE YOU WISHED YOU WERE DEAD OR WISHED YOU COULD GO TO SLEEP AND NOT WAKE UP?: NO

## 2025-03-29 ASSESSMENT — ACTIVITIES OF DAILY LIVING (ADL)
ADLS_ACUITY_SCORE: 58
ADLS_ACUITY_SCORE: 58

## 2025-03-30 ENCOUNTER — ANESTHESIA (OUTPATIENT)
Dept: SURGERY | Facility: CLINIC | Age: 75
DRG: 418 | End: 2025-03-30
Payer: COMMERCIAL

## 2025-03-30 ENCOUNTER — ANESTHESIA EVENT (OUTPATIENT)
Dept: SURGERY | Facility: CLINIC | Age: 75
DRG: 418 | End: 2025-03-30
Payer: COMMERCIAL

## 2025-03-30 PROBLEM — R79.89 ELEVATED TROPONIN: Status: ACTIVE | Noted: 2025-03-30

## 2025-03-30 PROBLEM — K81.0 ACUTE CHOLECYSTITIS: Status: ACTIVE | Noted: 2025-03-30

## 2025-03-30 LAB
ALBUMIN SERPL BCG-MCNC: 3.9 G/DL (ref 3.5–5.2)
ALP SERPL-CCNC: 150 U/L (ref 40–150)
ALT SERPL W P-5'-P-CCNC: 32 U/L (ref 0–70)
ANION GAP SERPL CALCULATED.3IONS-SCNC: 13 MMOL/L (ref 7–15)
AST SERPL W P-5'-P-CCNC: 32 U/L (ref 0–45)
BILIRUB DIRECT SERPL-MCNC: 0.52 MG/DL (ref 0–0.3)
BILIRUB SERPL-MCNC: 1.5 MG/DL
BUN SERPL-MCNC: 15.4 MG/DL (ref 8–23)
CALCIUM SERPL-MCNC: 8.9 MG/DL (ref 8.8–10.4)
CHLORIDE SERPL-SCNC: 98 MMOL/L (ref 98–107)
CREAT SERPL-MCNC: 1.24 MG/DL (ref 0.67–1.17)
EGFRCR SERPLBLD CKD-EPI 2021: 60 ML/MIN/1.73M2
ERYTHROCYTE [DISTWIDTH] IN BLOOD BY AUTOMATED COUNT: 16.1 % (ref 10–15)
FOLATE SERPL-MCNC: 6.1 NG/ML (ref 4.6–34.8)
GLUCOSE SERPL-MCNC: 152 MG/DL (ref 70–99)
HCO3 SERPL-SCNC: 23 MMOL/L (ref 22–29)
HCT VFR BLD AUTO: 36.1 % (ref 40–53)
HGB BLD-MCNC: 11.4 G/DL (ref 13.3–17.7)
LIPASE SERPL-CCNC: 43 U/L (ref 13–60)
MCH RBC QN AUTO: 31.4 PG (ref 26.5–33)
MCHC RBC AUTO-ENTMCNC: 31.6 G/DL (ref 31.5–36.5)
MCV RBC AUTO: 99 FL (ref 78–100)
PLATELET # BLD AUTO: 332 10E3/UL (ref 150–450)
POTASSIUM SERPL-SCNC: 4.8 MMOL/L (ref 3.4–5.3)
PROT SERPL-MCNC: 7.6 G/DL (ref 6.4–8.3)
RBC # BLD AUTO: 3.63 10E6/UL (ref 4.4–5.9)
SODIUM SERPL-SCNC: 134 MMOL/L (ref 135–145)
TROPONIN T SERPL HS-MCNC: 61 NG/L
VIT B12 SERPL-MCNC: 440 PG/ML (ref 232–1245)
WBC # BLD AUTO: 21.6 10E3/UL (ref 4–11)

## 2025-03-30 PROCEDURE — 82607 VITAMIN B-12: CPT | Performed by: HOSPITALIST

## 2025-03-30 PROCEDURE — 99222 1ST HOSP IP/OBS MODERATE 55: CPT | Mod: 57 | Performed by: PHYSICIAN ASSISTANT

## 2025-03-30 PROCEDURE — 258N000003 HC RX IP 258 OP 636

## 2025-03-30 PROCEDURE — 88304 TISSUE EXAM BY PATHOLOGIST: CPT | Mod: TC | Performed by: STUDENT IN AN ORGANIZED HEALTH CARE EDUCATION/TRAINING PROGRAM

## 2025-03-30 PROCEDURE — 250N000009 HC RX 250

## 2025-03-30 PROCEDURE — 250N000011 HC RX IP 250 OP 636: Performed by: HOSPITALIST

## 2025-03-30 PROCEDURE — 250N000011 HC RX IP 250 OP 636: Performed by: EMERGENCY MEDICINE

## 2025-03-30 PROCEDURE — P9045 ALBUMIN (HUMAN), 5%, 250 ML: HCPCS

## 2025-03-30 PROCEDURE — 250N000013 HC RX MED GY IP 250 OP 250 PS 637: Performed by: STUDENT IN AN ORGANIZED HEALTH CARE EDUCATION/TRAINING PROGRAM

## 2025-03-30 PROCEDURE — 99207 PR NO BILLABLE SERVICE THIS VISIT: CPT | Performed by: STUDENT IN AN ORGANIZED HEALTH CARE EDUCATION/TRAINING PROGRAM

## 2025-03-30 PROCEDURE — 250N000009 HC RX 250: Performed by: STUDENT IN AN ORGANIZED HEALTH CARE EDUCATION/TRAINING PROGRAM

## 2025-03-30 PROCEDURE — 36415 COLL VENOUS BLD VENIPUNCTURE: CPT | Performed by: HOSPITALIST

## 2025-03-30 PROCEDURE — 272N000001 HC OR GENERAL SUPPLY STERILE: Performed by: STUDENT IN AN ORGANIZED HEALTH CARE EDUCATION/TRAINING PROGRAM

## 2025-03-30 PROCEDURE — 36415 COLL VENOUS BLD VENIPUNCTURE: CPT | Performed by: EMERGENCY MEDICINE

## 2025-03-30 PROCEDURE — 258N000003 HC RX IP 258 OP 636: Performed by: HOSPITALIST

## 2025-03-30 PROCEDURE — 710N000009 HC RECOVERY PHASE 1, LEVEL 1, PER MIN: Performed by: STUDENT IN AN ORGANIZED HEALTH CARE EDUCATION/TRAINING PROGRAM

## 2025-03-30 PROCEDURE — 85014 HEMATOCRIT: CPT | Performed by: HOSPITALIST

## 2025-03-30 PROCEDURE — 250N000009 HC RX 250: Performed by: NURSE ANESTHETIST, CERTIFIED REGISTERED

## 2025-03-30 PROCEDURE — 80053 COMPREHEN METABOLIC PANEL: CPT | Performed by: HOSPITALIST

## 2025-03-30 PROCEDURE — 250N000013 HC RX MED GY IP 250 OP 250 PS 637: Performed by: HOSPITALIST

## 2025-03-30 PROCEDURE — 250N000011 HC RX IP 250 OP 636

## 2025-03-30 PROCEDURE — 250N000025 HC SEVOFLURANE, PER MIN: Performed by: STUDENT IN AN ORGANIZED HEALTH CARE EDUCATION/TRAINING PROGRAM

## 2025-03-30 PROCEDURE — 47562 LAPAROSCOPIC CHOLECYSTECTOMY: CPT | Mod: 79 | Performed by: STUDENT IN AN ORGANIZED HEALTH CARE EDUCATION/TRAINING PROGRAM

## 2025-03-30 PROCEDURE — 250N000011 HC RX IP 250 OP 636: Performed by: NURSE ANESTHETIST, CERTIFIED REGISTERED

## 2025-03-30 PROCEDURE — 250N000013 HC RX MED GY IP 250 OP 250 PS 637: Performed by: PHYSICIAN ASSISTANT

## 2025-03-30 PROCEDURE — 258N000001 HC RX 258: Performed by: STUDENT IN AN ORGANIZED HEALTH CARE EDUCATION/TRAINING PROGRAM

## 2025-03-30 PROCEDURE — 82746 ASSAY OF FOLIC ACID SERUM: CPT | Performed by: HOSPITALIST

## 2025-03-30 PROCEDURE — 250N000011 HC RX IP 250 OP 636: Performed by: STUDENT IN AN ORGANIZED HEALTH CARE EDUCATION/TRAINING PROGRAM

## 2025-03-30 PROCEDURE — 258N000003 HC RX IP 258 OP 636: Performed by: PHYSICIAN ASSISTANT

## 2025-03-30 PROCEDURE — 8E0W4CZ ROBOTIC ASSISTED PROCEDURE OF TRUNK REGION, PERCUTANEOUS ENDOSCOPIC APPROACH: ICD-10-PCS | Performed by: STUDENT IN AN ORGANIZED HEALTH CARE EDUCATION/TRAINING PROGRAM

## 2025-03-30 PROCEDURE — BF53200 OTHER IMAGING OF GALLBLADDER AND BILE DUCTS USING FLUORESCING AGENT, INDOCYANINE GREEN DYE, INTRAOPERATIVE: ICD-10-PCS | Performed by: STUDENT IN AN ORGANIZED HEALTH CARE EDUCATION/TRAINING PROGRAM

## 2025-03-30 PROCEDURE — 0FT44ZZ RESECTION OF GALLBLADDER, PERCUTANEOUS ENDOSCOPIC APPROACH: ICD-10-PCS | Performed by: STUDENT IN AN ORGANIZED HEALTH CARE EDUCATION/TRAINING PROGRAM

## 2025-03-30 PROCEDURE — 250N000011 HC RX IP 250 OP 636: Mod: JZ | Performed by: STUDENT IN AN ORGANIZED HEALTH CARE EDUCATION/TRAINING PROGRAM

## 2025-03-30 PROCEDURE — 120N000001 HC R&B MED SURG/OB

## 2025-03-30 PROCEDURE — 83690 ASSAY OF LIPASE: CPT | Performed by: PHYSICIAN ASSISTANT

## 2025-03-30 PROCEDURE — 84484 ASSAY OF TROPONIN QUANT: CPT | Performed by: EMERGENCY MEDICINE

## 2025-03-30 PROCEDURE — 360N000080 HC SURGERY LEVEL 7, PER MIN: Performed by: STUDENT IN AN ORGANIZED HEALTH CARE EDUCATION/TRAINING PROGRAM

## 2025-03-30 PROCEDURE — 370N000017 HC ANESTHESIA TECHNICAL FEE, PER MIN: Performed by: STUDENT IN AN ORGANIZED HEALTH CARE EDUCATION/TRAINING PROGRAM

## 2025-03-30 PROCEDURE — S2900 ROBOTIC SURGICAL SYSTEM: HCPCS | Performed by: STUDENT IN AN ORGANIZED HEALTH CARE EDUCATION/TRAINING PROGRAM

## 2025-03-30 PROCEDURE — 250N000011 HC RX IP 250 OP 636: Performed by: PHYSICIAN ASSISTANT

## 2025-03-30 PROCEDURE — 88304 TISSUE EXAM BY PATHOLOGIST: CPT | Mod: 26 | Performed by: PATHOLOGY

## 2025-03-30 PROCEDURE — 99222 1ST HOSP IP/OBS MODERATE 55: CPT | Mod: 24 | Performed by: INTERNAL MEDICINE

## 2025-03-30 PROCEDURE — 999N000141 HC STATISTIC PRE-PROCEDURE NURSING ASSESSMENT: Performed by: STUDENT IN AN ORGANIZED HEALTH CARE EDUCATION/TRAINING PROGRAM

## 2025-03-30 PROCEDURE — 99223 1ST HOSP IP/OBS HIGH 75: CPT | Performed by: HOSPITALIST

## 2025-03-30 PROCEDURE — 82248 BILIRUBIN DIRECT: CPT | Performed by: HOSPITALIST

## 2025-03-30 RX ORDER — SODIUM CHLORIDE, SODIUM LACTATE, POTASSIUM CHLORIDE, CALCIUM CHLORIDE 600; 310; 30; 20 MG/100ML; MG/100ML; MG/100ML; MG/100ML
INJECTION, SOLUTION INTRAVENOUS CONTINUOUS PRN
Status: DISCONTINUED | OUTPATIENT
Start: 2025-03-30 | End: 2025-03-30

## 2025-03-30 RX ORDER — AMOXICILLIN 250 MG
1 CAPSULE ORAL 2 TIMES DAILY PRN
Status: DISCONTINUED | OUTPATIENT
Start: 2025-03-30 | End: 2025-03-30

## 2025-03-30 RX ORDER — PIPERACILLIN SODIUM, TAZOBACTAM SODIUM 4; .5 G/20ML; G/20ML
4.5 INJECTION, POWDER, LYOPHILIZED, FOR SOLUTION INTRAVENOUS EVERY 6 HOURS
Status: DISCONTINUED | OUTPATIENT
Start: 2025-03-30 | End: 2025-04-02

## 2025-03-30 RX ORDER — LOSARTAN POTASSIUM 25 MG/1
25 TABLET ORAL DAILY
Status: DISCONTINUED | OUTPATIENT
Start: 2025-03-30 | End: 2025-04-02 | Stop reason: HOSPADM

## 2025-03-30 RX ORDER — VASOPRESSIN IN 0.9 % NACL 2 UNIT/2ML
SYRINGE (ML) INTRAVENOUS PRN
Status: DISCONTINUED | OUTPATIENT
Start: 2025-03-30 | End: 2025-03-30

## 2025-03-30 RX ORDER — CEFAZOLIN SODIUM 2 G/50ML
2 SOLUTION INTRAVENOUS SEE ADMIN INSTRUCTIONS
Status: DISCONTINUED | OUTPATIENT
Start: 2025-03-30 | End: 2025-03-30 | Stop reason: HOSPADM

## 2025-03-30 RX ORDER — HYDROMORPHONE HCL IN WATER/PF 6 MG/30 ML
0.2 PATIENT CONTROLLED ANALGESIA SYRINGE INTRAVENOUS EVERY 5 MIN PRN
Status: DISCONTINUED | OUTPATIENT
Start: 2025-03-30 | End: 2025-03-30 | Stop reason: HOSPADM

## 2025-03-30 RX ORDER — METHOCARBAMOL 500 MG/1
500 TABLET, FILM COATED ORAL 4 TIMES DAILY
Status: DISCONTINUED | OUTPATIENT
Start: 2025-03-30 | End: 2025-04-02 | Stop reason: HOSPADM

## 2025-03-30 RX ORDER — PIPERACILLIN SODIUM, TAZOBACTAM SODIUM 4; .5 G/20ML; G/20ML
4.5 INJECTION, POWDER, LYOPHILIZED, FOR SOLUTION INTRAVENOUS EVERY 8 HOURS
Status: DISCONTINUED | OUTPATIENT
Start: 2025-03-30 | End: 2025-03-30

## 2025-03-30 RX ORDER — NALOXONE HYDROCHLORIDE 0.4 MG/ML
0.2 INJECTION, SOLUTION INTRAMUSCULAR; INTRAVENOUS; SUBCUTANEOUS
Status: DISCONTINUED | OUTPATIENT
Start: 2025-03-30 | End: 2025-04-02 | Stop reason: HOSPADM

## 2025-03-30 RX ORDER — LABETALOL HYDROCHLORIDE 5 MG/ML
10 INJECTION, SOLUTION INTRAVENOUS
Status: DISCONTINUED | OUTPATIENT
Start: 2025-03-30 | End: 2025-03-30 | Stop reason: HOSPADM

## 2025-03-30 RX ORDER — HYDROMORPHONE HYDROCHLORIDE 1 MG/ML
0.5 INJECTION, SOLUTION INTRAMUSCULAR; INTRAVENOUS; SUBCUTANEOUS ONCE
Status: COMPLETED | OUTPATIENT
Start: 2025-03-30 | End: 2025-03-30

## 2025-03-30 RX ORDER — HYDROMORPHONE HCL IN WATER/PF 6 MG/30 ML
0.2 PATIENT CONTROLLED ANALGESIA SYRINGE INTRAVENOUS
Status: DISCONTINUED | OUTPATIENT
Start: 2025-03-30 | End: 2025-04-02 | Stop reason: HOSPADM

## 2025-03-30 RX ORDER — NALOXONE HYDROCHLORIDE 0.4 MG/ML
0.4 INJECTION, SOLUTION INTRAMUSCULAR; INTRAVENOUS; SUBCUTANEOUS
Status: DISCONTINUED | OUTPATIENT
Start: 2025-03-30 | End: 2025-04-02 | Stop reason: HOSPADM

## 2025-03-30 RX ORDER — AMOXICILLIN 250 MG
2 CAPSULE ORAL 2 TIMES DAILY PRN
Status: DISCONTINUED | OUTPATIENT
Start: 2025-03-30 | End: 2025-03-30

## 2025-03-30 RX ORDER — HYDROMORPHONE HCL IN WATER/PF 6 MG/30 ML
0.4 PATIENT CONTROLLED ANALGESIA SYRINGE INTRAVENOUS
Status: DISCONTINUED | OUTPATIENT
Start: 2025-03-30 | End: 2025-03-30

## 2025-03-30 RX ORDER — PROPOFOL 10 MG/ML
INJECTION, EMULSION INTRAVENOUS PRN
Status: DISCONTINUED | OUTPATIENT
Start: 2025-03-30 | End: 2025-03-30

## 2025-03-30 RX ORDER — FENTANYL CITRATE 50 UG/ML
25 INJECTION, SOLUTION INTRAMUSCULAR; INTRAVENOUS EVERY 5 MIN PRN
Status: DISCONTINUED | OUTPATIENT
Start: 2025-03-30 | End: 2025-03-30 | Stop reason: HOSPADM

## 2025-03-30 RX ORDER — INDOCYANINE GREEN AND WATER 25 MG
KIT INJECTION PRN
Status: DISCONTINUED | OUTPATIENT
Start: 2025-03-30 | End: 2025-03-30

## 2025-03-30 RX ORDER — CEFAZOLIN SODIUM 2 G/50ML
2 SOLUTION INTRAVENOUS
Status: COMPLETED | OUTPATIENT
Start: 2025-03-30 | End: 2025-03-30

## 2025-03-30 RX ORDER — BUPIVACAINE HYDROCHLORIDE AND EPINEPHRINE 5; 5 MG/ML; UG/ML
INJECTION, SOLUTION PERINEURAL PRN
Status: DISCONTINUED | OUTPATIENT
Start: 2025-03-30 | End: 2025-03-30 | Stop reason: HOSPADM

## 2025-03-30 RX ORDER — ACETAMINOPHEN 325 MG/1
975 TABLET ORAL ONCE
Status: COMPLETED | OUTPATIENT
Start: 2025-03-30 | End: 2025-03-30

## 2025-03-30 RX ORDER — INDOCYANINE GREEN AND WATER 25 MG
2.5 KIT INJECTION ONCE
Status: DISCONTINUED | OUTPATIENT
Start: 2025-03-30 | End: 2025-03-30 | Stop reason: HOSPADM

## 2025-03-30 RX ORDER — FENTANYL CITRATE 50 UG/ML
50 INJECTION, SOLUTION INTRAMUSCULAR; INTRAVENOUS EVERY 5 MIN PRN
Status: DISCONTINUED | OUTPATIENT
Start: 2025-03-30 | End: 2025-03-30 | Stop reason: HOSPADM

## 2025-03-30 RX ORDER — ONDANSETRON 2 MG/ML
INJECTION INTRAMUSCULAR; INTRAVENOUS PRN
Status: DISCONTINUED | OUTPATIENT
Start: 2025-03-30 | End: 2025-03-30

## 2025-03-30 RX ORDER — DEXAMETHASONE SODIUM PHOSPHATE 4 MG/ML
4 INJECTION, SOLUTION INTRA-ARTICULAR; INTRALESIONAL; INTRAMUSCULAR; INTRAVENOUS; SOFT TISSUE
Status: DISCONTINUED | OUTPATIENT
Start: 2025-03-30 | End: 2025-03-30 | Stop reason: HOSPADM

## 2025-03-30 RX ORDER — HYDROMORPHONE HYDROCHLORIDE 1 MG/ML
0.5 INJECTION, SOLUTION INTRAMUSCULAR; INTRAVENOUS; SUBCUTANEOUS
Status: DISCONTINUED | OUTPATIENT
Start: 2025-03-30 | End: 2025-04-02 | Stop reason: HOSPADM

## 2025-03-30 RX ORDER — ONDANSETRON 4 MG/1
4 TABLET, ORALLY DISINTEGRATING ORAL EVERY 6 HOURS PRN
Status: DISCONTINUED | OUTPATIENT
Start: 2025-03-30 | End: 2025-04-02 | Stop reason: HOSPADM

## 2025-03-30 RX ORDER — AMOXICILLIN 250 MG
1 CAPSULE ORAL 2 TIMES DAILY
Status: DISCONTINUED | OUTPATIENT
Start: 2025-03-30 | End: 2025-04-02

## 2025-03-30 RX ORDER — SODIUM CHLORIDE 9 MG/ML
INJECTION, SOLUTION INTRAVENOUS CONTINUOUS
Status: DISCONTINUED | OUTPATIENT
Start: 2025-03-30 | End: 2025-04-02 | Stop reason: HOSPADM

## 2025-03-30 RX ORDER — ASPIRIN 81 MG/1
162 TABLET, CHEWABLE ORAL DAILY
Status: DISCONTINUED | OUTPATIENT
Start: 2025-03-30 | End: 2025-04-02 | Stop reason: HOSPADM

## 2025-03-30 RX ORDER — OXYCODONE HYDROCHLORIDE 5 MG/1
5-10 TABLET ORAL EVERY 4 HOURS PRN
Status: DISCONTINUED | OUTPATIENT
Start: 2025-03-30 | End: 2025-04-02 | Stop reason: HOSPADM

## 2025-03-30 RX ORDER — ONDANSETRON 2 MG/ML
4 INJECTION INTRAMUSCULAR; INTRAVENOUS EVERY 30 MIN PRN
Status: DISCONTINUED | OUTPATIENT
Start: 2025-03-30 | End: 2025-03-30 | Stop reason: HOSPADM

## 2025-03-30 RX ORDER — LIDOCAINE 40 MG/G
CREAM TOPICAL
Status: DISCONTINUED | OUTPATIENT
Start: 2025-03-30 | End: 2025-04-02 | Stop reason: HOSPADM

## 2025-03-30 RX ORDER — POLYETHYLENE GLYCOL 3350 17 G/17G
17 POWDER, FOR SOLUTION ORAL DAILY PRN
Status: DISCONTINUED | OUTPATIENT
Start: 2025-03-30 | End: 2025-04-02 | Stop reason: HOSPADM

## 2025-03-30 RX ORDER — METHOCARBAMOL 500 MG/1
500 TABLET, FILM COATED ORAL EVERY 6 HOURS PRN
Status: DISCONTINUED | OUTPATIENT
Start: 2025-03-30 | End: 2025-03-30

## 2025-03-30 RX ORDER — ACETAMINOPHEN 325 MG/1
650 TABLET ORAL EVERY 4 HOURS PRN
Status: DISCONTINUED | OUTPATIENT
Start: 2025-03-30 | End: 2025-03-31

## 2025-03-30 RX ORDER — PIPERACILLIN SODIUM, TAZOBACTAM SODIUM 4; .5 G/20ML; G/20ML
4.5 INJECTION, POWDER, LYOPHILIZED, FOR SOLUTION INTRAVENOUS ONCE
Status: COMPLETED | OUTPATIENT
Start: 2025-03-30 | End: 2025-03-30

## 2025-03-30 RX ORDER — MAGNESIUM HYDROXIDE 1200 MG/15ML
LIQUID ORAL PRN
Status: DISCONTINUED | OUTPATIENT
Start: 2025-03-30 | End: 2025-03-30 | Stop reason: HOSPADM

## 2025-03-30 RX ORDER — ATORVASTATIN CALCIUM 40 MG/1
40 TABLET, FILM COATED ORAL DAILY
Status: DISCONTINUED | OUTPATIENT
Start: 2025-03-30 | End: 2025-04-02 | Stop reason: HOSPADM

## 2025-03-30 RX ORDER — ONDANSETRON 2 MG/ML
4 INJECTION INTRAMUSCULAR; INTRAVENOUS EVERY 6 HOURS PRN
Status: DISCONTINUED | OUTPATIENT
Start: 2025-03-30 | End: 2025-04-02 | Stop reason: HOSPADM

## 2025-03-30 RX ORDER — DEXAMETHASONE SODIUM PHOSPHATE 4 MG/ML
INJECTION, SOLUTION INTRA-ARTICULAR; INTRALESIONAL; INTRAMUSCULAR; INTRAVENOUS; SOFT TISSUE PRN
Status: DISCONTINUED | OUTPATIENT
Start: 2025-03-30 | End: 2025-03-30

## 2025-03-30 RX ORDER — METOPROLOL SUCCINATE 25 MG/1
25 TABLET, EXTENDED RELEASE ORAL DAILY
Status: DISCONTINUED | OUTPATIENT
Start: 2025-03-30 | End: 2025-04-02 | Stop reason: HOSPADM

## 2025-03-30 RX ORDER — ONDANSETRON 4 MG/1
4 TABLET, ORALLY DISINTEGRATING ORAL EVERY 30 MIN PRN
Status: DISCONTINUED | OUTPATIENT
Start: 2025-03-30 | End: 2025-03-30 | Stop reason: HOSPADM

## 2025-03-30 RX ORDER — HYDROMORPHONE HCL IN WATER/PF 6 MG/30 ML
0.4 PATIENT CONTROLLED ANALGESIA SYRINGE INTRAVENOUS EVERY 5 MIN PRN
Status: DISCONTINUED | OUTPATIENT
Start: 2025-03-30 | End: 2025-03-30 | Stop reason: HOSPADM

## 2025-03-30 RX ORDER — LIDOCAINE HYDROCHLORIDE 20 MG/ML
INJECTION, SOLUTION INFILTRATION; PERINEURAL PRN
Status: DISCONTINUED | OUTPATIENT
Start: 2025-03-30 | End: 2025-03-30

## 2025-03-30 RX ORDER — SODIUM CHLORIDE, SODIUM LACTATE, POTASSIUM CHLORIDE, CALCIUM CHLORIDE 600; 310; 30; 20 MG/100ML; MG/100ML; MG/100ML; MG/100ML
INJECTION, SOLUTION INTRAVENOUS CONTINUOUS
Status: DISCONTINUED | OUTPATIENT
Start: 2025-03-30 | End: 2025-03-30 | Stop reason: HOSPADM

## 2025-03-30 RX ORDER — CALCIUM CARBONATE 500 MG/1
1000 TABLET, CHEWABLE ORAL 4 TIMES DAILY PRN
Status: DISCONTINUED | OUTPATIENT
Start: 2025-03-30 | End: 2025-04-02 | Stop reason: HOSPADM

## 2025-03-30 RX ORDER — NALOXONE HYDROCHLORIDE 0.4 MG/ML
0.1 INJECTION, SOLUTION INTRAMUSCULAR; INTRAVENOUS; SUBCUTANEOUS
Status: DISCONTINUED | OUTPATIENT
Start: 2025-03-30 | End: 2025-03-30 | Stop reason: HOSPADM

## 2025-03-30 RX ORDER — FUROSEMIDE 20 MG/1
20 TABLET ORAL DAILY
Status: DISCONTINUED | OUTPATIENT
Start: 2025-03-30 | End: 2025-04-02 | Stop reason: HOSPADM

## 2025-03-30 RX ORDER — ACETAMINOPHEN 650 MG/1
650 SUPPOSITORY RECTAL EVERY 4 HOURS PRN
Status: DISCONTINUED | OUTPATIENT
Start: 2025-03-30 | End: 2025-03-31

## 2025-03-30 RX ORDER — CALCIUM CARBONATE 500 MG/1
1 TABLET, CHEWABLE ORAL 4 TIMES DAILY PRN
COMMUNITY

## 2025-03-30 RX ORDER — AMOXICILLIN 250 MG
2 CAPSULE ORAL 2 TIMES DAILY
Status: DISCONTINUED | OUTPATIENT
Start: 2025-03-30 | End: 2025-04-02

## 2025-03-30 RX ORDER — FENTANYL CITRATE 50 UG/ML
INJECTION, SOLUTION INTRAMUSCULAR; INTRAVENOUS PRN
Status: DISCONTINUED | OUTPATIENT
Start: 2025-03-30 | End: 2025-03-30

## 2025-03-30 RX ADMIN — PHENYLEPHRINE HYDROCHLORIDE 0.5 MCG/KG/MIN: 10 INJECTION INTRAVENOUS at 15:48

## 2025-03-30 RX ADMIN — HYDROMORPHONE HYDROCHLORIDE 0.4 MG: 0.2 INJECTION, SOLUTION INTRAMUSCULAR; INTRAVENOUS; SUBCUTANEOUS at 07:05

## 2025-03-30 RX ADMIN — PIPERACILLIN AND TAZOBACTAM 4.5 G: 4; .5 INJECTION, POWDER, LYOPHILIZED, FOR SOLUTION INTRAVENOUS at 19:13

## 2025-03-30 RX ADMIN — PHENYLEPHRINE HYDROCHLORIDE 200 MCG: 10 INJECTION INTRAVENOUS at 15:40

## 2025-03-30 RX ADMIN — ROCURONIUM BROMIDE 20 MG: 50 INJECTION, SOLUTION INTRAVENOUS at 15:58

## 2025-03-30 RX ADMIN — ASPIRIN 81 MG CHEWABLE TABLET 162 MG: 81 TABLET CHEWABLE at 09:12

## 2025-03-30 RX ADMIN — ROCURONIUM BROMIDE 50 MG: 50 INJECTION, SOLUTION INTRAVENOUS at 15:37

## 2025-03-30 RX ADMIN — METHOCARBAMOL 500 MG: 500 TABLET ORAL at 21:04

## 2025-03-30 RX ADMIN — Medication 0.5 UNITS: at 15:51

## 2025-03-30 RX ADMIN — HYDROMORPHONE HYDROCHLORIDE 0.4 MG: 0.2 INJECTION, SOLUTION INTRAMUSCULAR; INTRAVENOUS; SUBCUTANEOUS at 04:00

## 2025-03-30 RX ADMIN — GABAPENTIN 400 MG: 400 CAPSULE ORAL at 09:13

## 2025-03-30 RX ADMIN — Medication 0.5 UNITS: at 15:53

## 2025-03-30 RX ADMIN — GABAPENTIN 400 MG: 400 CAPSULE ORAL at 21:04

## 2025-03-30 RX ADMIN — CEFAZOLIN SODIUM 2 G: 2 SOLUTION INTRAVENOUS at 15:37

## 2025-03-30 RX ADMIN — SODIUM CHLORIDE: 0.9 INJECTION, SOLUTION INTRAVENOUS at 19:08

## 2025-03-30 RX ADMIN — Medication 1 MG: at 23:47

## 2025-03-30 RX ADMIN — HYDROMORPHONE HYDROCHLORIDE 0.5 MG: 1 INJECTION, SOLUTION INTRAMUSCULAR; INTRAVENOUS; SUBCUTANEOUS at 09:00

## 2025-03-30 RX ADMIN — INDOCYANINE GREEN AND WATER 2.5 MG: KIT at 16:21

## 2025-03-30 RX ADMIN — OXYCODONE HYDROCHLORIDE 5 MG: 5 TABLET ORAL at 23:47

## 2025-03-30 RX ADMIN — FENTANYL CITRATE 50 MCG: 50 INJECTION INTRAMUSCULAR; INTRAVENOUS at 15:57

## 2025-03-30 RX ADMIN — ACETAMINOPHEN 650 MG: 325 TABLET ORAL at 07:44

## 2025-03-30 RX ADMIN — PIPERACILLIN AND TAZOBACTAM 4.5 G: 4; .5 INJECTION, POWDER, LYOPHILIZED, FOR SOLUTION INTRAVENOUS at 07:05

## 2025-03-30 RX ADMIN — PHENYLEPHRINE HYDROCHLORIDE 200 MCG: 10 INJECTION INTRAVENOUS at 15:44

## 2025-03-30 RX ADMIN — DEXAMETHASONE SODIUM PHOSPHATE 4 MG: 4 INJECTION, SOLUTION INTRA-ARTICULAR; INTRALESIONAL; INTRAMUSCULAR; INTRAVENOUS; SOFT TISSUE at 15:37

## 2025-03-30 RX ADMIN — HYDROMORPHONE HYDROCHLORIDE 0.5 MG: 1 INJECTION, SOLUTION INTRAMUSCULAR; INTRAVENOUS; SUBCUTANEOUS at 11:04

## 2025-03-30 RX ADMIN — ROCURONIUM BROMIDE 30 MG: 50 INJECTION, SOLUTION INTRAVENOUS at 16:25

## 2025-03-30 RX ADMIN — ATORVASTATIN CALCIUM 40 MG: 40 TABLET, FILM COATED ORAL at 09:12

## 2025-03-30 RX ADMIN — PROPOFOL 200 MG: 10 INJECTION, EMULSION INTRAVENOUS at 15:37

## 2025-03-30 RX ADMIN — ALBUMIN (HUMAN): 12.5 SOLUTION INTRAVENOUS at 15:53

## 2025-03-30 RX ADMIN — ACETAMINOPHEN 650 MG: 325 TABLET ORAL at 03:12

## 2025-03-30 RX ADMIN — PHENYLEPHRINE HYDROCHLORIDE 200 MCG: 10 INJECTION INTRAVENOUS at 15:42

## 2025-03-30 RX ADMIN — ONDANSETRON 4 MG: 2 INJECTION, SOLUTION INTRAMUSCULAR; INTRAVENOUS at 13:29

## 2025-03-30 RX ADMIN — SODIUM CHLORIDE: 0.9 INJECTION, SOLUTION INTRAVENOUS at 02:41

## 2025-03-30 RX ADMIN — HYDROMORPHONE HYDROCHLORIDE 0.2 MG: 0.2 INJECTION, SOLUTION INTRAMUSCULAR; INTRAVENOUS; SUBCUTANEOUS at 01:56

## 2025-03-30 RX ADMIN — METOPROLOL SUCCINATE 25 MG: 25 TABLET, EXTENDED RELEASE ORAL at 09:12

## 2025-03-30 RX ADMIN — SODIUM CHLORIDE, SODIUM LACTATE, POTASSIUM CHLORIDE, AND CALCIUM CHLORIDE: .6; .31; .03; .02 INJECTION, SOLUTION INTRAVENOUS at 15:32

## 2025-03-30 RX ADMIN — SODIUM CHLORIDE, SODIUM LACTATE, POTASSIUM CHLORIDE, AND CALCIUM CHLORIDE: .6; .31; .03; .02 INJECTION, SOLUTION INTRAVENOUS at 17:31

## 2025-03-30 RX ADMIN — PIPERACILLIN AND TAZOBACTAM 4.5 G: 4; .5 INJECTION, POWDER, FOR SOLUTION INTRAVENOUS at 01:00

## 2025-03-30 RX ADMIN — PIPERACILLIN AND TAZOBACTAM 4.5 G: 4; .5 INJECTION, POWDER, LYOPHILIZED, FOR SOLUTION INTRAVENOUS at 12:25

## 2025-03-30 RX ADMIN — Medication 200 MG: at 17:32

## 2025-03-30 RX ADMIN — ACETAMINOPHEN 650 MG: 325 TABLET ORAL at 11:58

## 2025-03-30 RX ADMIN — PHENYLEPHRINE HYDROCHLORIDE 100 MCG: 10 INJECTION INTRAVENOUS at 17:34

## 2025-03-30 RX ADMIN — SENNOSIDES AND DOCUSATE SODIUM 1 TABLET: 50; 8.6 TABLET ORAL at 21:04

## 2025-03-30 RX ADMIN — LIDOCAINE HYDROCHLORIDE 80 MG: 20 INJECTION, SOLUTION INFILTRATION; PERINEURAL at 15:37

## 2025-03-30 RX ADMIN — HYDROMORPHONE HYDROCHLORIDE 0.5 MG: 1 INJECTION, SOLUTION INTRAMUSCULAR; INTRAVENOUS; SUBCUTANEOUS at 13:23

## 2025-03-30 RX ADMIN — FENTANYL CITRATE 50 MCG: 50 INJECTION INTRAMUSCULAR; INTRAVENOUS at 15:37

## 2025-03-30 RX ADMIN — ONDANSETRON 4 MG: 2 INJECTION INTRAMUSCULAR; INTRAVENOUS at 17:27

## 2025-03-30 RX ADMIN — METHOCARBAMOL 500 MG: 500 TABLET ORAL at 10:24

## 2025-03-30 ASSESSMENT — ACTIVITIES OF DAILY LIVING (ADL)
ADLS_ACUITY_SCORE: 52
ADLS_ACUITY_SCORE: 53
ADLS_ACUITY_SCORE: 52
ADLS_ACUITY_SCORE: 53
ADLS_ACUITY_SCORE: 52
ADLS_ACUITY_SCORE: 58
ADLS_ACUITY_SCORE: 58
ADLS_ACUITY_SCORE: 52
ADLS_ACUITY_SCORE: 53
ADLS_ACUITY_SCORE: 52
ADLS_ACUITY_SCORE: 52
ADLS_ACUITY_SCORE: 53
ADLS_ACUITY_SCORE: 52
ADLS_ACUITY_SCORE: 52
ADLS_ACUITY_SCORE: 60

## 2025-03-30 ASSESSMENT — ENCOUNTER SYMPTOMS
DYSRHYTHMIAS: 0
SEIZURES: 0
ORTHOPNEA: 0

## 2025-03-30 ASSESSMENT — LIFESTYLE VARIABLES: TOBACCO_USE: 1

## 2025-03-30 NOTE — H&P
[facility]  Hospitalist History and Physical  Date of Admission:  3/29/2025    Primary Care Physician   Vernell Bucio    Chief Complaint  Chest Pain and Abdominal Pain    History obtained from the patient    History of Present Illness   Fermín Josue is a 75 year old male with a past medical history of cad s/p cabg 3/15/25, htn, hld, bph, ckd 3, jerri presents to the hospital with abdominal pain and chest pain. The patient underwent CABG on March 15 and was discharged from [facility] on March 25.  On March 29 after dinner he started experiencing abdominal pain which she describes as severe rated as a 7 out of 10.  Initially the pain was located in epigastrium and then started radiating to the chest.  He received oxycodone without improvement of symptoms and was then sent into the hospital after having ongoing symptoms for approximately 3 hours.  He denies any nausea, vomiting, fevers, constipation.  Of note during his last hospitalization on the 13th he had an episode of chest pain with a sensation of a queasy stomach, he underwent an abdominal ultrasound at that time which revealed a normal gallbladder with no stones or wall thickening at that time.    Past Medical History    I have reviewed this patient's medical history and updated it with pertinent information if needed.   Past Medical History:   Diagnosis Date    CAD (coronary artery disease) 02/05/2015    3 stents    History of angina     History of skin graft     Right lower limb    Hypertension goal BP (blood pressure) < 140/90 02/05/2015    Kidney stone 02/17/2021    Malignant neoplasm of kidney excluding renal pelvis, left (H) 2021    Sleep apnea        Past Surgical History   I have reviewed this patient's surgical history and updated it with pertinent information if needed.  Past Surgical History:   Procedure Laterality Date    BYPASS GRAFT ARTERY CORONARY N/A 3/15/2025    Procedure: CORONARY ARTERY BYPASS  GRAFT x 2 (LEFT INTERNAL MAMMARY ARTERY - LEFT ANTERIOR DESCENDING ARTERY; SAPHENOUS VEIN - POSTERIOR DESCENDING ARTERY) WITH ENDOSCOPIC SAPHENOUS VEIN HARVEST ON THE RIGHT LOWER EXTREMITY, AND ON CARDIOPULMONARY PUMP OXYGENATOR  (INTRAOPERATIVE TRANSESOPHAGEAL ECHOCARDIOGRAM BY ANESTHESIOLOGIST);  Surgeon: Dimitri John MD;  Location:  OR    COLONOSCOPY N/A 07/16/2020    Procedure: COLONOSCOPY;  Surgeon: Wenceslao Marie MD;  Location:  GI    COMBINED CYSTOSCOPY, RETROGRADES, URETEROSCOPY, INSERT STENT Left 02/19/2021    Procedure: CYSTOSCOPY LEFT URETEROSCOPY,  LEFT STENT PLACEMENT, LEFT RETROGRADE;  Surgeon: Phil Lin MD;  Location:  OR    CV CORONARY ANGIOGRAM N/A 3/14/2025    Procedure: Coronary Angiogram;  Surgeon: Andres Alexander MD;  Location:  HEART CARDIAC CATH LAB    CV LEFT HEART CATH N/A 3/14/2025    Procedure: Left Heart Catheterization;  Surgeon: Andres Alexander MD;  Location:  HEART CARDIAC CATH LAB    CV LEFT VENTRICULOGRAM N/A 3/14/2025    Procedure: Left Ventriculogram;  Surgeon: Andres Alexander MD;  Location:  HEART CARDIAC CATH LAB    CYSTOSCOPY, DILATE URETER(S), COMBINED Left 02/19/2021    Procedure: Cystoscopy, dilate ureter(s), combined;  Surgeon: Phil Lin MD;  Location:  OR    HEART CATH, ANGIOPLASTY  11/2007    mid to prox LAD drug-eluting stent x2; Occluded RCA with Collaterals    KIDNEY STONE SURGERY  2003    laser and ureteric stenting    LUMBAR SPINE SURGERY N/A 02/13/2023    T8-L1 minimally invasive posterolateral instrumentation with cement augmentation    NEPHRECTOMY RT/LT Left 04/2021    [facility]    SKIN GRAFT, EACH ADDN 100SQCM      THORACOSCOPIC WEDGE RESECTION LUNG Right 1/28/2025    Procedure: RIGHT VIDEO ASSISTED THORACOSCOPY WEDGE RESECTION, RIGHT UPPER LOBE LUNG NODULE;  Surgeon: Anmol Carrillo MD;  Location:  OR       Allergies   No Known Allergies    Social History   I have reviewed this patient's  social history and updated it with pertinent information if needed. Fermín Josue  reports that he quit smoking about 17 years ago. His smoking use included cigarettes. He started smoking about 61 years ago. He has a 66 pack-year smoking history. He has never used smokeless tobacco. He reports current alcohol use. He reports current drug use. Drug: Marijuana.    Family History   I have reviewed this patient's family history and updated it with pertinent information if needed.   Family History   Problem Relation Age of Onset    Hypertension Mother     Pneumonia Mother 91        covid related    Dementia Mother     Alzheimer Disease Father         d age 91    Leukemia Maternal Grandmother     Cancer Maternal Grandfather     Cancer Maternal Uncle         lung       Physical Exam   Temp: 97.9  F (36.6  C) Temp src: Oral BP: 118/52 Pulse: 80   Resp: 24 SpO2: 97 % O2 Device: None (Room air)    Vital Signs with Ranges  Temp:  [97.9  F (36.6  C)] 97.9  F (36.6  C)  Pulse:  [80] 80  Resp:  [24] 24  BP: (118)/(52) 118/52  SpO2:  [97 %] 97 %  0 lbs 0 oz  Physical Exam  Vitals reviewed.   Constitutional:       Appearance: Normal appearance.      Comments: Pleasant man seen resting in bed in no apparent distress   Eyes:      Extraocular Movements: Extraocular movements intact.      Conjunctiva/sclera: Conjunctivae normal.      Pupils: Pupils are equal, round, and reactive to light.   Cardiovascular:      Rate and Rhythm: Normal rate and regular rhythm.      Comments: Midline vertical surgical scar well healed.   Pulmonary:      Effort: Pulmonary effort is normal.      Breath sounds: Normal breath sounds.   Abdominal:      Palpations: Abdomen is soft.      Tenderness: There is no abdominal tenderness.      Comments: Hypoactive bowel sounds   Musculoskeletal:         General: No swelling.   Neurological:      General: No focal deficit present.      Mental Status: He is alert and oriented to person, place, and time. Mental  status is at baseline.         Assessment & Plan   Fermín Josue is a 75 year old male with a past medical history of cad s/p cabg 3/15/25, htn, hld, bph, ckd 3, jamee presents to the hospital with abdominal pain and chest pain.     Acute cholecystitis  Patient presenting with abdominal pain after eating on 3/29 found to have an elevated alp and ast. Ct significant for a distended gallbladder with pericholecystic fat stranding concerning for acute cholecystosis.  Lipase is elevated, but no pancreatitis seen on ct. situations complicated by his recent CABG.  Will need clearance from the cardiothoracic team.  -General surgery consult  -Zosyn  -Npo  -Ivf    Cad s/p cabg 3/15/25  HTN  HLD  Elevated troponin  Reports that his abdominal pain has now moved up to his chest.  EKG is sinus rhythm with T wave inversions in the anterior leads which appears to be a new finding.  His troponin was mildly elevated at 74 and trended down to 61 on repeat.  Given his chest pain and recent surgery will ask cardiothoracic to evaluate the patient.  Follow up Ct surgery consult  Resume pta meds once pharmacy med rec is complete  Monitor on telemetry    Macrocytic anemia  The patient's anemia is improving since surgery.  Of note MCV is 99 concerning for macrocytic anemia.  -Follow-up B12 and folate    Chronic medical conditions: resume pta meds as needed once med rec is complete  BPH  CKD stage 2 stable  JAMEE -Not on cpap  Obesity  Prediabetes  Renal cancer s/p nephrectomy  Non-small cell lung ca s/p vats wedge resection of RUL    DVT ppx: scd  Code Status: full code  Medically Ready for Discharge: Anticipated in 2-4 Days    Medical Decision Making       79 MINUTES SPENT BY ME on the date of service doing chart review, history, exam, documentation & further activities per the note.      Matthew Keith MD  Hospitalist Medicine Service  Pager# [phone]

## 2025-03-30 NOTE — PHARMACY-ADMISSION MEDICATION HISTORY
Pharmacy Intern Admission Medication History    Admission medication history is complete. The information provided in this note is only as accurate as the sources available at the time of the update.    Information Source(s): Facility (U/NH/) medication list/MAR via phone    Pertinent Information:   All information provided by [facility]    Changes made to PTA medication list:  Added: TUMS  Deleted: Furosemide 40mg   Changed: None    Allergies reviewed with patient and updates made in EHR: no    Medication History Completed By: Ashlie Resendiz 3/30/2025 8:00 AM    PTA Med List   Medication Sig Last Dose/Taking    acetaminophen (TYLENOL) 500 MG tablet Take 2 tablets (1,000 mg) by mouth 2 times daily as needed for mild pain Taking As Needed    aspirin (ASA) 81 MG chewable tablet Take 2 tablets (162 mg) by mouth daily. 3/29/2025    atorvastatin (LIPITOR) 40 MG tablet Take 1 tablet (40 mg) by mouth daily 3/29/2025    calcium carbonate (TUMS) 500 MG chewable tablet Take 1 chew tab by mouth 4 times daily as needed for heartburn. 3/29/2025 Evening    furosemide (LASIX) 20 MG tablet Take 1 tablet (20 mg) by mouth daily. 3/29/2025    gabapentin (NEURONTIN) 400 MG capsule Take 1 capsule (400 mg) by mouth 3 times daily 3/29/2025 Evening    losartan (COZAAR) 25 MG tablet Take 1 tablet (25 mg) by mouth daily. 3/29/2025    methocarbamol (ROBAXIN) 500 MG tablet Take 1 tablet (500 mg) by mouth every 6 hours as needed for muscle spasms or other (pain). Taking As Needed    metoprolol succinate ER (TOPROL XL) 25 MG 24 hr tablet Take 1 tablet (25 mg) by mouth daily 3/29/2025    nitroGLYcerin (NITROSTAT) 0.4 MG sublingual tablet For chest pain place 1 tablet under the tongue every 5 minutes for 3 doses. If symptoms persist 5 minutes after 1st dose call 911. Taking    oxyCODONE (ROXICODONE) 5 MG tablet Take 1 tablet (5 mg) by mouth every 6 hours as needed for severe pain. 3/29/2025 Evening    senna-docusate (SENOKOT-S/PERICOLACE)  8.6-50 MG tablet Take 1 tablet by mouth 2 times daily as needed for constipation. Taking As Needed    triamcinolone (KENALOG) 0.1 % external cream Apply topically to affected area 2 times daily. 3/29/2025

## 2025-03-30 NOTE — PROGRESS NOTES
RECEIVING UNIT ED HANDOFF REVIEW    ED Nurse Handoff Report was reviewed by: Ashlie Joshua RN on March 30, 2025 at 1:27 AM

## 2025-03-30 NOTE — CONSULTS
[facility]    Cardiology Consult Note-Cardiology      Date of Admission:  3/29/2025  Reason for Consult: Pre-operative clearance    Assessment & Plan: HVSL     #1 Acute cholecystitis, concern for choledocholithiasis/gallstone pancreatitis  #2  Medically optimized for surgery (robotic cholecystectomy)  #3  Coronary artery disease  - s/p TRISH x 2 LAD and occluded RCA in 2007  - 3/15/2025, CABG x 2 (LIMA to LAD, SVG to RCA)   #4 Non-small cell lung cancer s/p VATS wedge resection of a RUL lung nodule on 1/28/25   #5 BPH  #6 CKD stage IIIa  #7 Renal cancer s/p nephrectomy  #8 JAMEE  #9 Hypertension  #10 Dyslipidemia    Fermín Josue is a 75 year old male admitted on 3/29/2025. He underwent coronary artery bypass graft surgery 2 weeks ago after which he recovered well.  After discharging home he was asymptomatic from a cardiovascular perspective, was able to ambulate up and down the franco and exercise on a stationary bike for 10 to 20 minutes without significant limitations.  He now presents with acute cholecystitis, for which general surgery will take him for cholecystectomy later today which is an intermediate risk operation.    From a cardiovascular perspective he is optimized for surgery, and I would not recommend any further testing at this point.      He should continue with his current medical therapy including aspirin, atorvastatin and metoprolol.  When appropriate from a postoperative and renal function perspective, his home Lasix and losartan should be resumed.  His volume status should be carefully monitored in the perioperative period, and should he develop volume overload intravenous diuretic therapy would be reasonable.    Otherwise no other recommendations at this point.  Cardiology will sign off, please contact us if any further questions or concerns.    Audi Galeana [facility]    Clinically Significant Risk Factors Present on Admission          #  "Hypochloremia: Lowest Cl = 95 mmol/L in last 2 days, will monitor as appropriate        # Drug Induced Platelet Defect: home medication list includes an antiplatelet medication   # Hypertension: Noted on problem list           # Obesity: Estimated body mass index is 35 kg/m  as calculated from the following:    Height as of 3/19/25: 1.727 m (5' 8\").    Weight as of this encounter: 104.4 kg (230 lb 2.6 oz).       # Financial/Environmental Concerns:     # History of CABG: noted on surgical history    _____________________________________________________________________    Chief Complaint   Abdominal pain    History of Present Illness   Fermín Josue is a 75 year old male who has a medical history of recently diagnosed non-small cell lung cancer s/p VATS wedge resection of a RUL lung nodule on 1/28/25 (PFTs showed mild restrictive lung disease), BPH, CKD stage IIIa, renal cancer s/p nephrectomy, JAMEE, hypertension, dyslipidemia, obesity, prediabetes, CAD s/p TRISH x 2 LAD and occluded RCA in 2007.  He was admitted to [facility] on 03/13 with 1-2 week history of chest pain. TTE 3/13/25 showed preserved biventricular systolic function and early diastolic dysfunction. Coronary angiogram on 3/14/25 revealed multivessel CAD with 70% LMCA stenosis and mid RCA . CV surgery consulted and he underwent CABG x 2 (LIMA to LAD, SVG to RCA) on 03/15 with Dr. John.    His postoperative course was essentially unremarkable.  He received diuretic therapy as needed and was discharged on March 25 in stable condition.  He mentions that at home he felt well and did not report any chest pain, shortness of breath, lightheadedness or dizziness, palpitations, syncopal episodes, PND, orthopnea or peripheral edema.  He participated in cardiac rehabilitation and was able to perform 10 to 20 minutes uninterrupted on a stationary bike.  He has also been able to walk up and down the hallway without significant limitations and without any " symptoms.    He has been admitted on this occasion with right upper quadrant pain in the context of acute cholecystitis with concern for choledocholithiasis or gallstone pancreatitis.  Surgery planning robotic cholecystectomy today.    Apart from his abdominal discomfort he reports no cardiovascular symptoms.  His vital signs are within acceptable limits.  His ECG demonstrates normal sinus rhythm with first-degree AV block, and T wave inversions in V1-V3.  His ECG from March 18 demonstrates rhythm with T wave flattening in these leads.  His labs demonstrate a creatinine of 1.21 with troponins of 74 and 61.     Past Medical History    Past Medical History:   Diagnosis Date    CAD (coronary artery disease) 02/05/2015    3 stents    History of angina     History of skin graft     Right lower limb    Hypertension goal BP (blood pressure) < 140/90 02/05/2015    Kidney stone 02/17/2021    Malignant neoplasm of kidney excluding renal pelvis, left (H) 2021    Sleep apnea        Past Surgical History   Past Surgical History:   Procedure Laterality Date    BYPASS GRAFT ARTERY CORONARY N/A 3/15/2025    Procedure: CORONARY ARTERY BYPASS GRAFT x 2 (LEFT INTERNAL MAMMARY ARTERY - LEFT ANTERIOR DESCENDING ARTERY; SAPHENOUS VEIN - POSTERIOR DESCENDING ARTERY) WITH ENDOSCOPIC SAPHENOUS VEIN HARVEST ON THE RIGHT LOWER EXTREMITY, AND ON CARDIOPULMONARY PUMP OXYGENATOR  (INTRAOPERATIVE TRANSESOPHAGEAL ECHOCARDIOGRAM BY ANESTHESIOLOGIST);  Surgeon: Dimitri John MD;  Location:  OR    COLONOSCOPY N/A 07/16/2020    Procedure: COLONOSCOPY;  Surgeon: Wenceslao Marie MD;  Location:  GI    COMBINED CYSTOSCOPY, RETROGRADES, URETEROSCOPY, INSERT STENT Left 02/19/2021    Procedure: CYSTOSCOPY LEFT URETEROSCOPY,  LEFT STENT PLACEMENT, LEFT RETROGRADE;  Surgeon: Phil Lin MD;  Location:  OR    CV CORONARY ANGIOGRAM N/A 3/14/2025    Procedure: Coronary Angiogram;  Surgeon: Andres Alexander MD;  Location:   HEART CARDIAC CATH LAB    CV LEFT HEART CATH N/A 3/14/2025    Procedure: Left Heart Catheterization;  Surgeon: Andres Alexander MD;  Location:  HEART CARDIAC CATH LAB    CV LEFT VENTRICULOGRAM N/A 3/14/2025    Procedure: Left Ventriculogram;  Surgeon: Andres Alexander MD;  Location:  HEART CARDIAC CATH LAB    CYSTOSCOPY, DILATE URETER(S), COMBINED Left 02/19/2021    Procedure: Cystoscopy, dilate ureter(s), combined;  Surgeon: Phil Lin MD;  Location:  OR    HEART CATH, ANGIOPLASTY  11/2007    mid to prox LAD drug-eluting stent x2; Occluded RCA with Collaterals    KIDNEY STONE SURGERY  2003    laser and ureteric stenting    LUMBAR SPINE SURGERY N/A 02/13/2023    T8-L1 minimally invasive posterolateral instrumentation with cement augmentation    NEPHRECTOMY RT/LT Left 04/2021    Abbott    SKIN GRAFT, EACH ADDN 100SQCM      THORACOSCOPIC WEDGE RESECTION LUNG Right 1/28/2025    Procedure: RIGHT VIDEO ASSISTED THORACOSCOPY WEDGE RESECTION, RIGHT UPPER LOBE LUNG NODULE;  Surgeon: Anmol Carrillo MD;  Location:  OR       Medications   I have reviewed this patient's current medications      Review of Systems    The 10 point Review of Systems is negative other than noted in the HPI or here.      Physical Exam   Vital Signs: Temp: 99  F (37.2  C) Temp src: Oral BP: 123/69 Pulse: 76   Resp: 17 SpO2: 96 % O2 Device: None (Room air)    Weight: 230 lbs 2.56 oz    General Appearance: In some discomfort, alert, orientated x 3  Respiratory: Comfortable breathing pattern, clear lungs to auscultation  Cardiovascular: Regular rhythm, normal S1 and S2, no murmurs  GI: Soft, diffusely tender abdomen  Skin: Warm and well-perfused  Other: No edema      Medical Decision Making       50 MINUTES SPENT BY ME on the date of service doing chart review, history, exam, documentation & further activities per the note.      Data     I have personally reviewed the following data over the past 24 hrs:    11.7 (H)  \    11.1 (L)   / 325     135 95 (L) 17.6 /  166 (H)   4.0 28 1.21 (H) \     ALT: 40 AST: 54 (H) AP: 179 (H) TBILI: 0.7   ALB: 4.2 TOT PROTEIN: 8.0 LIPASE: 122 (H)     Trop: 61 (H) BNP: N/A       Imaging results reviewed over the past 24 hrs:   Recent Results (from the past 24 hours)   CT Chest/Abdomen/Pelvis w Contrast    Narrative    EXAM: CT CHEST/ABDOMEN/PELVIS W CONTRAST  LOCATION: [facility]  DATE: 3/30/2025    INDICATION: Epigastric pain, recent CABG.  COMPARISON: 10/24/2024  TECHNIQUE: CT scan of the chest, abdomen, and pelvis was performed following injection of IV contrast. Multiplanar reformats were obtained. Dose reduction techniques were used.   CONTRAST: 115 mL Isovue 370    FINDINGS:   LUNGS AND PLEURA: Interval resection of a right upper lobe nodule. Atelectasis/scarring is seen adjacent to the right upper lobe pulmonary wedge resection suture. Small left pleural effusion with mild dependent atelectasis in the lower lobes, left   greater than right. No right pleural effusion. No confluent pneumonic consolidation or pneumothorax.    MEDIASTINUM/AXILLAE present.: Small amount of substernal edema without loculated gas or fluid collections.    CORONARY ARTERY CALCIFICATION: Moderate. A stent is seen in the left anterior descending coronary artery. Post-CABG changes    HEPATOBILIARY: Normal liver. A too small to characterize cystic lesion in the left hepatic lobe is stable, compatible with a cyst. Gallbladder is distended with mild pericholecystic fat stranding.    PANCREAS: Normal.    SPLEEN: Normal.    ADRENAL GLANDS: Normal.    KIDNEYS/BLADDER: Left kidney is surgically absent. No abnormal enhancing soft tissues in the left nephrectomy bed. Right kidney is normal in size without hydronephrosis. A 3 mm nonobstructing stone is seen in the inferior pole of the right kidney.   Scattered cystic lesions in the right kidney are visually benign measuring up to 1.3 cm, and do not require  follow-up. Urinary bladder is normal.    BOWEL: No inflammatory bowel thickening or bowel obstruction. Appendix is normal. Multiple diverticula in the descending and sigmoid colon without acute diverticulitis. No free fluid or free air.    LYMPH NODES: Normal.    VASCULATURE: Moderate aortoiliac atherosclerosis. No abdominal aortic aneurysm.    PELVIC ORGANS: Normal.    MUSCULOSKELETAL: Recent sternal splitting procedure with intact sternotomy wires. No organized subcutaneous fluid collections. Bilateral gynecomastia there is increased from prior. Posterior spinal fusion instrumentation from T8 to L1 redemonstrated. No   suspicious osseous lesions or acute fractures.        Impression    IMPRESSION:    1.  Gallbladder distention with pericholecystic fat stranding, highly suspicious for acute cholecystitis.    2.  Postsurgical changes of recent CABG without acute complications.    3.  Interval partial wedge resection of right upper lobe pulmonary nodule with mild scarring/atelectasis adjacent to the pulmonary suture.    4.  3 mm nonobstructing right renal stone, left total nephrectomy and colonic diverticulosis.

## 2025-03-30 NOTE — ANESTHESIA PROCEDURE NOTES
Airway       Patient location during procedure: OR       Procedure Start/Stop Times: 3/30/2025 3:40 PM  Staff -        Anesthesiologist:  Charles Vasquez MD       CRNA: Daksha Elkins APRN CRNA       Performed By: CRNA  Consent for Airway        Urgency: elective  Indications and Patient Condition       Indications for airway management: millicent-procedural       Induction type:intravenous       Mask difficulty assessment: 2 - vent by mask + OA or adjuvant +/- NMBA    Final Airway Details       Final airway type: endotracheal airway       Successful airway: ETT - single  Endotracheal Airway Details        ETT size (mm): 8.0       Cuffed: yes       Successful intubation technique: video laryngoscopy       VL Blade Size: Benítez 4       Grade View of Cords: 1       Adjucts: stylet       Position: Left       Measured from: lips       Secured at (cm): 23       Bite block used: None    Post intubation assessment        Placement verified by: capnometry, equal breath sounds and chest rise        Number of attempts at approach: 1       Number of other approaches attempted: 0       Secured with: tape       Ease of procedure: easy       Dentition: Intact and Unchanged    Medication(s) Administered   Medication Administration Time: 3/30/2025 3:40 PM

## 2025-03-30 NOTE — ANESTHESIA PREPROCEDURE EVALUATION
Anesthesia Pre-Procedure Evaluation    Patient: Fermín Josue   MRN: [id number] : 1950        Procedure : Procedure(s):  CHOLECYSTECTOMY, ROBOT-ASSISTED, LAPAROSCOPIC, USING DA ANURAG XI          Past Medical History:   Diagnosis Date    CAD (coronary artery disease) 2015    3 stents    History of angina     History of skin graft     Right lower limb    Hypertension goal BP (blood pressure) < 140/90 2015    Kidney stone 2021    Malignant neoplasm of kidney excluding renal pelvis, left (H)     Sleep apnea       Past Surgical History:   Procedure Laterality Date    BYPASS GRAFT ARTERY CORONARY N/A 3/15/2025    Procedure: CORONARY ARTERY BYPASS GRAFT x 2 (LEFT INTERNAL MAMMARY ARTERY - LEFT ANTERIOR DESCENDING ARTERY; SAPHENOUS VEIN - POSTERIOR DESCENDING ARTERY) WITH ENDOSCOPIC SAPHENOUS VEIN HARVEST ON THE RIGHT LOWER EXTREMITY, AND ON CARDIOPULMONARY PUMP OXYGENATOR  (INTRAOPERATIVE TRANSESOPHAGEAL ECHOCARDIOGRAM BY ANESTHESIOLOGIST);  Surgeon: Dimitri John MD;  Location:  OR    COLONOSCOPY N/A 2020    Procedure: COLONOSCOPY;  Surgeon: Wenceslao Marie MD;  Location:  GI    COMBINED CYSTOSCOPY, RETROGRADES, URETEROSCOPY, INSERT STENT Left 2021    Procedure: CYSTOSCOPY LEFT URETEROSCOPY,  LEFT STENT PLACEMENT, LEFT RETROGRADE;  Surgeon: Phil Lin MD;  Location:  OR    CV CORONARY ANGIOGRAM N/A 3/14/2025    Procedure: Coronary Angiogram;  Surgeon: Andres Alexander MD;  Location:  HEART CARDIAC CATH LAB    CV LEFT HEART CATH N/A 3/14/2025    Procedure: Left Heart Catheterization;  Surgeon: Andres Alexander MD;  Location:  HEART CARDIAC CATH LAB    CV LEFT VENTRICULOGRAM N/A 3/14/2025    Procedure: Left Ventriculogram;  Surgeon: Andres Alexander MD;  Location:  HEART CARDIAC CATH LAB    CYSTOSCOPY, DILATE URETER(S), COMBINED Left 2021    Procedure: Cystoscopy, dilate ureter(s), combined;  Surgeon: Phil Lin MD;   Location:  OR    HEART CATH, ANGIOPLASTY  2007    mid to prox LAD drug-eluting stent x2; Occluded RCA with Collaterals    KIDNEY STONE SURGERY      laser and ureteric stenting    LUMBAR SPINE SURGERY N/A 2023    T8-L1 minimally invasive posterolateral instrumentation with cement augmentation    NEPHRECTOMY RT/LT Left 2021    Abbott    SKIN GRAFT, EACH ADDN 100SQCM      THORACOSCOPIC WEDGE RESECTION LUNG Right 2025    Procedure: RIGHT VIDEO ASSISTED THORACOSCOPY WEDGE RESECTION, RIGHT UPPER LOBE LUNG NODULE;  Surgeon: Anmol Carrillo MD;  Location:  OR      No Known Allergies   Social History     Tobacco Use    Smoking status: Former     Current packs/day: 0.00     Average packs/day: 1.5 packs/day for 44.0 years (66.0 ttl pk-yrs)     Types: Cigarettes     Start date: 1963     Quit date: 2007     Years since quittin.7    Smokeless tobacco: Never   Substance Use Topics    Alcohol use: Yes     Comment: rare      Wt Readings from Last 1 Encounters:   25 104.4 kg (230 lb 2.6 oz)        Anesthesia Evaluation   Pt has had prior anesthetic.     History of anesthetic complications   slow to wake.    ROS/MED HX  ENT/Pulmonary: Comment: 25 - s/p wedge resection for non-small cell lung cancer    (+) sleep apnea, doesn't use CPAP,              tobacco use, Past use,                    (-) recent URI   Neurologic:    (-) no seizures, no CVA and migraines   Cardiovascular: Comment: S/p CABG 3/15    Elevated trop to 74 trending down to 61    (+) Dyslipidemia hypertension- -  CAD (multivessel disease - including LM disease) angina-change in symptoms.  - stent (2 stents to LAD, one stent to RCA)- 3                                  (-) CHF, orthopnea/PND and arrhythmias   METS/Exercise Tolerance:     Hematologic: Comments: Thrombocytopenia       Musculoskeletal:  - neg musculoskeletal ROS     GI/Hepatic:  - neg GI/hepatic ROS   (+)          cholecystitis/cholelithiasis,    "liver disease (fatty liver by CT scan),    (-) GERD   Renal/Genitourinary: Comment:   Baseline creatinine 1.3 - 1.5    (+) renal disease (s/p nephrectomy for renal cell carcinoma), type: CRI,     Nephrolithiasis , BPH,      Endo: Comment: Pre diabetic     (+)               Obesity,    (-) Type II DM and thyroid disease   Psychiatric/Substance Use:  - neg psychiatric ROS   (+)     Recreational drug usage: Cannabis.    Infectious Disease:  - neg infectious disease ROS     Malignancy:  - neg malignancy ROS     Other:            Physical Exam    Airway  airway exam normal      Mallampati: III   TM distance: > 3 FB   Neck ROM: full   Mouth opening: > 3 cm    Respiratory Devices and Support         Dental     Comment: Bottom dentures    (+) Minor Abnormalities - some fillings, tiny chips and Removable bridges or other hardware      Cardiovascular   cardiovascular exam normal       Rhythm and rate: regular and normal     Pulmonary   pulmonary exam normal        breath sounds clear to auscultation           OUTSIDE LABS:  CBC:   Lab Results   Component Value Date    WBC 21.6 (H) 03/30/2025    WBC 11.7 (H) 03/29/2025    HGB 11.4 (L) 03/30/2025    HGB 11.1 (L) 03/29/2025    HCT 36.1 (L) 03/30/2025    HCT 34.5 (L) 03/29/2025     03/30/2025     03/29/2025     BMP:   Lab Results   Component Value Date     (L) 03/30/2025     03/29/2025    POTASSIUM 4.8 03/30/2025    POTASSIUM 4.0 03/29/2025    CHLORIDE 98 03/30/2025    CHLORIDE 95 (L) 03/29/2025    CO2 23 03/30/2025    CO2 28 03/29/2025    BUN 15.4 03/30/2025    BUN 17.6 03/29/2025    CR 1.24 (H) 03/30/2025    CR 1.21 (H) 03/29/2025     (H) 03/30/2025     (H) 03/29/2025     COAGS:   Lab Results   Component Value Date    PTT 69 (H) 03/15/2025    INR 1.48 (H) 03/15/2025    FIBR 275 03/15/2025     POC: No results found for: \"BGM\", \"HCG\", \"HCGS\"  HEPATIC:   Lab Results   Component Value Date    ALBUMIN 3.9 03/30/2025    PROTTOTAL 7.6 03/30/2025 " "   ALT 32 03/30/2025    AST 32 03/30/2025    ALKPHOS 150 03/30/2025    BILITOTAL 1.5 (H) 03/30/2025     OTHER:   Lab Results   Component Value Date    PH 7.37 03/17/2025    LACT 0.7 03/16/2025    A1C 5.8 (H) 01/14/2025    CANDICE 8.9 03/30/2025    PHOS 2.4 (L) 03/25/2025    MAG 2.0 03/25/2025    LIPASE 43 03/30/2025    SED 27 (H) 03/13/2025       Anesthesia Plan    ASA Status:  3, emergent    NPO Status:  NPO Appropriate    Anesthesia Type: General.     - Airway: ETT   Induction: Intravenous.   Maintenance: Balanced.   Techniques and Equipment:     - Airway: Video-Laryngoscope       Consents    Anesthesia Plan(s) and associated risks, benefits, and realistic alternatives discussed. Questions answered and patient/representative(s) expressed understanding.     - Discussed:     - Discussed with:  Patient            Postoperative Care    Pain management: IV analgesics, Oral pain medications.   PONV prophylaxis: Ondansetron (or other 5HT-3), Dexamethasone or Solumedrol, Background Propofol Infusion     Comments:    Other Comments: Emergent per surgeon given ongoing pain issues and recent CABG    No versed           Charles Vasquez MD    Clinically Significant Risk Factors Present on Admission         # Hyponatremia: Lowest Na = 134 mmol/L in last 2 days, will monitor as appropriate  # Hypochloremia: Lowest Cl = 95 mmol/L in last 2 days, will monitor as appropriate        # Drug Induced Platelet Defect: home medication list includes an antiplatelet medication   # Hypertension: Noted on problem list           # Obesity: Estimated body mass index is 35 kg/m  as calculated from the following:    Height as of 3/19/25: 1.727 m (5' 8\").    Weight as of this encounter: 104.4 kg (230 lb 2.6 oz).       # Financial/Environmental Concerns:     # History of CABG: noted on surgical history         "

## 2025-03-30 NOTE — CONSULTS
CVTS    S: Lying in bed, breathing stable, NPO, pain mostly controlled.     O: /69 (BP Location: Left arm)   Pulse 76   Temp 99  F (37.2  C) (Oral)   Resp 16   Wt 104.4 kg (230 lb 2.6 oz)   SpO2 96%   BMI 35.00 kg/m      A/P: Mr. Josue is well known to our service s/p cabg x2 on 3/15/25 by Dr. John now readmitted with RUQ pain and found to have cholecystitis.   Surgical incisions appear to be well healing, previously was tolerating diet, felt he was getting stronger with rehab at [facility].     Will continue to follow peripherally.     Eda Nixon PA-C  [phone]

## 2025-03-30 NOTE — BRIEF OP NOTE
[facility]    Brief Operative Note    Pre-operative diagnosis: Acute cholecystitis [K81.0]  Post-operative diagnosis Acute gangrenous cholecystitis    Procedure: CHOLECYSTECTOMY, ROBOT-ASSISTED, LAPAROSCOPIC, USING DA ANURAG XI, N/A - Abdomen    Surgeon: Surgeons and Role:     * Yosvany Almonte MD - Primary     * Medina Portillo PA-C - Assisting  Anesthesia: General   Estimated Blood Loss: 75 mL from 3/30/2025  3:33 PM to 3/30/2025  5:41 PM  Drains: Suman-Delgado  Specimens:   ID Type Source Tests Collected by Time Destination   1 : GALLBLADDER AND CONTENTS Tissue Gallbladder SURGICAL PATHOLOGY EXAM Yosvany Almonte MD 3/30/2025  5:06 PM      Findings:   Acute gangrenous cholecystitis with necrotic back wall and gallbladder neck to the point of cystic duct. Cystic duct clipped x2 and suture tied. SAVI drain left in gallbladder fossa .  Complications: None.  Implants: * No implants in log *

## 2025-03-30 NOTE — PROVIDER NOTIFICATION
MD Notification    Notified Person: MD    Notified Person Name: Dr. Diego MD    Notification Date/Time: 0810 on 3/30/2025    Notification Interaction: vocera page    Purpose of Notification: Hi! All PRN pain meds have been given & pt is restless still rating pain 10/10. Please advise, thanks!    Orders Received:  1x dose of IV Dilaudid 0.5 mg  Comments:    appt scheduled.  They are trying for surgery datre of 10/21/2019

## 2025-03-30 NOTE — ANESTHESIA CARE TRANSFER NOTE
Patient: Fermín Josue    Procedure: Procedure(s):  CHOLECYSTECTOMY, ROBOT-ASSISTED, LAPAROSCOPIC, USING DA ANURAG XI       Diagnosis: Acute cholecystitis [K81.0]  Diagnosis Additional Information: No value filed.    Anesthesia Type:   General     Note:    Oropharynx: oropharynx clear of all foreign objects and spontaneously breathing  Level of Consciousness: awake  Oxygen Supplementation: face mask  Level of Supplemental Oxygen (L/min / FiO2): 6  Independent Airway: airway patency satisfactory and stable  Dentition: dentition unchanged  Vital Signs Stable: post-procedure vital signs reviewed and stable  Report to RN Given: handoff report given  Patient transferred to: PACU    Handoff Report: Identifed the Patient, Identified the Reponsible Provider, Reviewed the pertinent medical history, Discussed the surgical course, Reviewed Intra-OP anesthesia mangement and issues during anesthesia, Set expectations for post-procedure period and Allowed opportunity for questions and acknowledgement of understanding      Vitals:  Vitals Value Taken Time   /82 03/30/25 1744   Temp 36.7  C (98.06  F) 03/30/25 1748   Pulse 77 03/30/25 1748   Resp 26 03/30/25 1748   SpO2 100 % 03/30/25 1748   Vitals shown include unfiled device data.    Electronically Signed By: LONNY Beatty CRNA  March 30, 2025  5:49 PM

## 2025-03-30 NOTE — CONSULTS
[facility] General Surgery Consultation    Fermín Josue MRN# [id number]   YOB: 1950 Age: 75 year old      Date of Admission:  3/29/2025  Date of Consult: 3/30/2025         Assessment and Plan:   Patient is a 75 year old male who had recent 2V CABG 3/15 who presents with acute cholecystitis. Recheck lipase today. May need GI involvement if concern for choledocholithiasis or gallstone pancreatitis. Of note, pain is worst in RUQ and this is the most likely etiology of his pain.    PLAN:  - Given recent CABG, chest pain, elevated troponins and abnormal EKG, patient needs cardiology clearance for robotic cholecystectomy. Patient in intractable pain and would bump our other surgeries today if cleared for surgery. Sent a message to Dr. Cortez who is reaching out for cardiology clearance.  - Also discussed IR biliary drainage tube with patient and wife. In circumstances where patient isn't cleared for surgery we would have IR place a chela tube for approximately 6 weeks. After that time consider continuation vs removal with IR vs cholecystectomy if patient's medical status improves to the point that he is an appropriate surgical candidate.    ADDENDUM  - Cardiology and CV surgery cleared for cholecystectomy today. Will proceed with robotic cholecystectomy this afternoon which is the soonest available timing. The benefits and risks of surgical intervention versus non-operative management including but not limited to infection, bleeding, conversion to open, bile leak, injuring neighboring structures, and anesthesia complications with the patient. He expressed understanding and would like to proceed with surgery. All questions were answered to the patient's satisfaction.         Requesting Physician:      Dr. Cortez        Chief Complaint:     Chief Complaint   Patient presents with    Chest Pain    Abdominal Pain          History of Present Illness:   Fermín Josue is a 75 year  old male with hypertension, hyperlipidemia, and coronary artery disease who had recent 2V CABG 3/15 and presented to the ED last night with right upper abdominal pain. He developed severe pain after eating yesterday. This pain does radiate to the right chest. He had a soft BM yesterday. He denies ever having symptoms like this in the past. He states no improvement in pain and is currently writhing in pain following IV analgesia and utilizing ice pack to right upper abdomen. Him and his wife are requesting the quickest available treatment given his pain. Labs were notable for leukocytosis 11.7, elevated alk phos 179, mildly elevated AST 54, normal ALT, normal bilirubin, and lipase mildly elevated 122. CT abdomen yesterday with gallbladder distention with pericholecystic fat stranding, highly suspicious for acute cholecystitis. Pancreas was normal in appearance.     Dom also has surgical history of left nephrectomy for malignant kidney neoplasm and thorascopic right lung wedge resection for non small cell lung cancer. He is on his aspirin but no other blood thinners. EKG is sinus rhythm with T wave inversions in the anterior leads which appears to be a new finding. His troponin was mildly elevated at 74 and trended down to 61 on repeat.           Physical Exam:   Blood pressure (!) 140/76, pulse 89, temperature 97.8  F (36.6  C), temperature source Oral, resp. rate 20, weight 104.4 kg (230 lb 2.6 oz), SpO2 95%.  230 lbs 2.56 oz  General: Vital signs reviewed, writhing in pain  Eyes: Anicteric  HENT: Normocephalic, atraumatic, trachea midline   Respiratory: Breathing nonlabored  Cardiovascular: Regular rate  GI: Abdomen soft, distended, exquisitely tender in RUQ  Musculoskeletal: No gross deformities  Neurologic: Grossly nonfocal exam  Psychiatric: Normal mood, affect and insight  Integumentary: Warm and dry         Past Medical History:     Past Medical History:   Diagnosis Date    CAD (coronary artery disease)  02/05/2015    3 stents    History of angina     History of skin graft     Right lower limb    Hypertension goal BP (blood pressure) < 140/90 02/05/2015    Kidney stone 02/17/2021    Malignant neoplasm of kidney excluding renal pelvis, left (H) 2021    Sleep apnea             Past Surgical History:     Past Surgical History:   Procedure Laterality Date    BYPASS GRAFT ARTERY CORONARY N/A 3/15/2025    Procedure: CORONARY ARTERY BYPASS GRAFT x 2 (LEFT INTERNAL MAMMARY ARTERY - LEFT ANTERIOR DESCENDING ARTERY; SAPHENOUS VEIN - POSTERIOR DESCENDING ARTERY) WITH ENDOSCOPIC SAPHENOUS VEIN HARVEST ON THE RIGHT LOWER EXTREMITY, AND ON CARDIOPULMONARY PUMP OXYGENATOR  (INTRAOPERATIVE TRANSESOPHAGEAL ECHOCARDIOGRAM BY ANESTHESIOLOGIST);  Surgeon: Dimitri John MD;  Location:  OR    COLONOSCOPY N/A 07/16/2020    Procedure: COLONOSCOPY;  Surgeon: Wenceslao Marie MD;  Location:  GI    COMBINED CYSTOSCOPY, RETROGRADES, URETEROSCOPY, INSERT STENT Left 02/19/2021    Procedure: CYSTOSCOPY LEFT URETEROSCOPY,  LEFT STENT PLACEMENT, LEFT RETROGRADE;  Surgeon: Phil Lin MD;  Location:  OR     CORONARY ANGIOGRAM N/A 3/14/2025    Procedure: Coronary Angiogram;  Surgeon: Andres Alexander MD;  Location:  HEART CARDIAC CATH LAB    CV LEFT HEART CATH N/A 3/14/2025    Procedure: Left Heart Catheterization;  Surgeon: Andres Alexander MD;  Location:  HEART CARDIAC CATH LAB    CV LEFT VENTRICULOGRAM N/A 3/14/2025    Procedure: Left Ventriculogram;  Surgeon: Andres Alexander MD;  Location:  HEART CARDIAC CATH LAB    CYSTOSCOPY, DILATE URETER(S), COMBINED Left 02/19/2021    Procedure: Cystoscopy, dilate ureter(s), combined;  Surgeon: Phil Lin MD;  Location:  OR    HEART CATH, ANGIOPLASTY  11/2007    mid to prox LAD drug-eluting stent x2; Occluded RCA with Collaterals    KIDNEY STONE SURGERY  2003    laser and ureteric stenting    LUMBAR SPINE SURGERY N/A 02/13/2023    T8-L1 minimally  invasive posterolateral instrumentation with cement augmentation    NEPHRECTOMY RT/LT Left 04/2021    [facility]    SKIN GRAFT, EACH ADDN 100SQCM      THORACOSCOPIC WEDGE RESECTION LUNG Right 1/28/2025    Procedure: RIGHT VIDEO ASSISTED THORACOSCOPY WEDGE RESECTION, RIGHT UPPER LOBE LUNG NODULE;  Surgeon: Anmol Carrillo MD;  Location:  OR            Current Medications:         Current Facility-Administered Medications   Medication Dose Route Frequency Provider Last Rate Last Admin    piperacillin-tazobactam (ZOSYN) 4.5 g vial to attach to  mL bag  4.5 g Intravenous Q6H Matthew Keith  mL/hr at 03/30/25 0705 4.5 g at 03/30/25 0705    sodium chloride (PF) 0.9% PF flush 3 mL  3 mL Intracatheter Q8H Matthew Keith MD   3 mL at 03/30/25 0241    sodium chloride 0.9% BOLUS 500 mL  500 mL Intravenous Once Phil Cook MD           Current Facility-Administered Medications   Medication Dose Route Frequency Provider Last Rate Last Admin    acetaminophen (TYLENOL) tablet 650 mg  650 mg Oral Q4H PRN Matthew Keith MD   650 mg at 03/30/25 0312    Or    acetaminophen (TYLENOL) Suppository 650 mg  650 mg Rectal Q4H PRN Matthew Keith MD        calcium carbonate (TUMS) chewable tablet 1,000 mg  1,000 mg Oral 4x Daily PRN Matthew Keith MD        HYDROmorphone (DILAUDID) injection 0.2 mg  0.2 mg Intravenous Q2H PRN Matthew Keith MD   0.2 mg at 03/30/25 0156    HYDROmorphone (DILAUDID) injection 0.4 mg  0.4 mg Intravenous Q2H PRN Matthew Keith MD   0.4 mg at 03/30/25 0705    lidocaine (LMX4) cream   Topical Q1H PRN Matthew Keith MD        lidocaine 1 % 0.1-1 mL  0.1-1 mL Other Q1H PRN Matthew Keith MD        melatonin tablet 1 mg  1 mg Oral At Bedtime PRN Matthew Keith MD        naloxone (NARCAN) injection 0.2 mg  0.2 mg Intravenous Q2 Min PRN Matthew Keith MD        Or    naloxone  (NARCAN) injection 0.4 mg  0.4 mg Intravenous Q2 Min PRN Matthew Keith MD        Or    naloxone (NARCAN) injection 0.2 mg  0.2 mg Intramuscular Q2 Min PRN Matthew Keith MD        Or    naloxone (NARCAN) injection 0.4 mg  0.4 mg Intramuscular Q2 Min PRN Matthew Keith MD        ondansetron (ZOFRAN ODT) ODT tab 4 mg  4 mg Oral Q6H PRN Matthew Keith MD        Or    ondansetron (ZOFRAN) injection 4 mg  4 mg Intravenous Q6H PRN Matthew Keith MD        senna-docusate (SENOKOT-S/PERICOLACE) 8.6-50 MG per tablet 1 tablet  1 tablet Oral BID PRN Matthew Keith MD        Or    senna-docusate (SENOKOT-S/PERICOLACE) 8.6-50 MG per tablet 2 tablet  2 tablet Oral BID PRN Matthew Keith MD        sodium chloride (PF) 0.9% PF flush 3 mL  3 mL Intracatheter q1 min prn Matthew Keith MD                Home Medications:     Prior to Admission medications    Medication Sig Last Dose Taking? Auth Provider Long Term End Date   acetaminophen (TYLENOL) 500 MG tablet Take 2 tablets (1,000 mg) by mouth 2 times daily as needed for mild pain   Vernell Bucio MD     aspirin (ASA) 81 MG chewable tablet Take 2 tablets (162 mg) by mouth daily.   Alesia Garvey NP No 6/24/25   atorvastatin (LIPITOR) 40 MG tablet Take 1 tablet (40 mg) by mouth daily   Cheryl Puente, APRN CNP Yes    furosemide (LASIX) 20 MG tablet Take 1 tablet (20 mg) by mouth daily.   Alesia Garvey NP Yes    furosemide (LASIX) 40 MG tablet Take 1 tablet (40 mg) by mouth 2 times daily for 5 days.   Alesia Garvey NP Yes 3/30/25   gabapentin (NEURONTIN) 400 MG capsule Take 1 capsule (400 mg) by mouth 3 times daily   Vernell Bucio MD Yes    losartan (COZAAR) 25 MG tablet Take 1 tablet (25 mg) by mouth daily.   Alesia Garvey, NP Yes    methocarbamol (ROBAXIN) 500 MG tablet Take 1 tablet (500 mg) by mouth every 6 hours as needed for muscle spasms or other (pain).   Guru  Alesia SHER NP     metoprolol succinate ER (TOPROL XL) 25 MG 24 hr tablet Take 1 tablet (25 mg) by mouth daily   Cheryl Puente APRN CNP Yes    nitroGLYcerin (NITROSTAT) 0.4 MG sublingual tablet For chest pain place 1 tablet under the tongue every 5 minutes for 3 doses. If symptoms persist 5 minutes after 1st dose call 911.   Cheryl Puente APRN CNP Yes    oxyCODONE (ROXICODONE) 5 MG tablet Take 1 tablet (5 mg) by mouth every 6 hours as needed for severe pain.   Alesia Garvey NP     senna-docusate (SENOKOT-S/PERICOLACE) 8.6-50 MG tablet Take 1 tablet by mouth 2 times daily as needed for constipation.   Evelyn Omalley PA-C     triamcinolone (KENALOG) 0.1 % external cream Apply topically to affected area 2 times daily.  Patient taking differently: 2 times daily as needed. Apply topically to affected area 2 times daily.   Vernell Bucio MD              Allergies:   No Known Allergies         Family History:     Family History   Problem Relation Age of Onset    Hypertension Mother     Pneumonia Mother 91        covid related    Dementia Mother     Alzheimer Disease Father         d age 91    Leukemia Maternal Grandmother     Cancer Maternal Grandfather     Cancer Maternal Uncle         lung           Social History:   Fermín Josue  reports that he quit smoking about 17 years ago. His smoking use included cigarettes. He started smoking about 61 years ago. He has a 66 pack-year smoking history. He has never used smokeless tobacco. He reports current alcohol use. He reports current drug use. Drug: Marijuana.          Review of Systems:   The 12 point Review of Systems is negative other than noted in the HPI.         Labs/Imaging   All new lab and imaging data was reviewed.   Recent Labs   Lab 03/29/25 2250 03/25/25  0836 03/24/25  0559   WBC 11.7* 7.0 7.4   HGB 11.1* 8.8* 9.4*   HCT 34.5* 28.8* 29.2*   MCV 99 103* 99    224 230     Recent Labs   Lab 03/29/25  2250 03/25/25  0836  03/24/25  0559    143 139   POTASSIUM 4.0 4.1 3.9   CHLORIDE 95* 105 101   CO2 28 28 27   ANIONGAP 12 10 11   * 134* 116*   BUN 17.6 16.6 13.0   CR 1.21* 1.23* 1.06   GFRESTIMATED 62 61 73   CANDICE 9.3 8.7* 8.4*   MAG  --  2.0 2.1   PHOS  --  2.4* 2.7   PROTTOTAL 8.0  --   --    ALBUMIN 4.2  --   --    BILITOTAL 0.7  --   --    ALKPHOS 179*  --   --    AST 54*  --   --    ALT 40  --   --        I have personally reviewed the imaging studies-     CT CHEST/ABDOMEN/PELVIS  IMPRESSION:  1.  Gallbladder distention with pericholecystic fat stranding, highly suspicious for acute cholecystitis.  2.  Postsurgical changes of recent CABG without acute complications.  3.  Interval partial wedge resection of right upper lobe pulmonary nodule with mild scarring/atelectasis adjacent to the pulmonary suture.  4.  3 mm nonobstructing right renal stone, left total nephrectomy and colonic diverticulosis.    Medina Portillo PA-C    70 minutes spent on date of the encounter doing patient visit, chart review, and documentation.

## 2025-03-30 NOTE — PLAN OF CARE
Date & Time: 3/30/2025 1113-1818  Surgery/POD#: ED admit for acute cholecystitis  Behavior & Aggression: Green  Fall Risk: Yes  Orientation:A&O X4  ABNL VS/O2:VSS RA  ABNL Labs: Elevated troponin, see results  Pain Management:IV dilaudid given 1X  Bowel/Bladder: Continent of B&B, +BS, +gas, +BM (3/29), Using urinal at bedside  Drains: PIV infusing NS @ 75 mL/hr  Wounds/incisions: Surgical scars on chest from recent CABG procedure. Other old scars on abdomen and back.  Diet:NPO except Ice and meds  Activity Level: A1 GBW  Tests/Procedures: none  Anticipated  DC Date: Pending  Significant Information:

## 2025-03-30 NOTE — OP NOTE
OPERATIVE REPORT    Procedure Date: 03/30/25     PREOPERATIVE DIAGNOSIS:  Acute cholecystitis     POSTOPERATIVE DIAGNOSIS:  Gangrenous Cholecystitis     PROCEDURES PERFORMED:  Robotic cholecystectomy.     STAFF SURGEON:  MD Medina Bernabe PA-C assisted in the above procedure. They provided assistance with pre-operative positioning, prepping, and draping of the patient. The assistant provided vital operative assistance with retraction using instruments thus providing the necessary exposure and visualization for the case, manipulation of tissues to achieve hemostasis, suction for visualization and assisted in closure of the wound. Post-operatively they assisted in transfer of the patient off the operative table and transition to the PACU physicians.      INDICATIONS:  Fermín Josue is a 75-year-old male who presented with severe right abdominal pain.  The patient was evaluated and CT scan demonstrated pericholecystic fluid with inflammation concerning for acute cholecystitis.  LFT's were within normal limits.  Due to the persistence of the patient's symptoms, the decision was made for the patient to proceed to the operating room for robotic cholecystectomy.  The risks and benefits of the procedure were discussed with the patient and consent for the procedure was obtained.      ANESTHESIA:  General.      DESCRIPTION OF PROCEDURE:  The patient was brought to the preoperative area and preoperative antibiotics were administered.  The patient also received preoperative indocyanine green injection.  The patient was then taken back to the operating room and placed in the supine position on the operating table.  Anesthesia was induced and the patient was intubated.  The patient was secured to the operating table and her pressure points were padded.  The patient's abdomen was prepped and draped in a sterile fashion.   A timeout was completed to confirm patient, procedure, site of surgery, and any  special equipment needed for the procedure.     Following infiltration with local anesthetic, the 11 blade scalpel was used to make a small left upper quadrant incision.  Entrance into the abdomen was then gained under direct visualization using the 5 mm Visiport and the 5 mm laparoscope.  When the patient's abdomen had been safely entered, it was fully insufflated.  The laparoscope was then reinserted into the abdominal cavity and initial inspection revealed no evidence of injury at the port entry site.  Under direct visualization, 3 additional 8 mm robotic ports were placed extending in a line towards the patient's right mid abdomen.  The patient's left upper quadrant 5 mm port was then upsized to an 8 mm robotic port.  The patient was then placed into the reverse Trendelenburg position with a slight left tilt.  The robot was then brought onto the field and docked.  The robotic prograsper, robotic fenestrated bipolar grasper, and the robotic scissors were introduced into the abdominal cavity under direct visualization.     The gallbladder appeared inflamed, distended, with obvious areas of necrosis indicating a gangrenous gallbladder. The prograsper was used to grasp the dome of the gallbladder which subsequently caused the gallbladder to perforate and have bile spillage. This is inherent to the procedure given the nature of the gallbladder. It was then reflected cephalad.  The fenestrated grasper was used to grasp the infundibulum of the gallbladder. Alternating between the robotic scissors and then suction/,  both were used to carefully dissect out the patient's cystic duct and cystic artery.  Firefly visualization was utilized throughout the dissection to confirm our anatomy.  The cystic artery was visualized anterior to the duct and double clipped proximally and divided. The duct was then carefully dissected out with suction/ tip. The duct was very short and almost necrotic. It was double  clipped and then divided. The stump was reinforced with  suture ligation with a 3-0 vicryl suture. The gallbladder was then carefully  free from its attachments to the liver bed utilizing the robotic scissors, electrocautery, and even suction/. Along the cystic bed, the gallbladder was so gangrenous it just peeled off.  When the gallbladder had been completely dissected free, it was placed into an Endocatch bag and removed through the patient's left upper quadrant port site.  The patient's right upper quadrant was again surveyed.  The cystic duct and cystic artery clips were found to be intact.  There was no significant bleeding from the liver bed.  Hemostasis was ensured with electrocautery and Surgical NuKnit  The right upper quadrant was irrigated with normal saline solution.  A 19Fr SAVI round channel drain was placed into the RUQ and gallbladder fossa and secured to skin with 3-0 nylon suture.     We then turned our attention to the patient's left upper quadrant port site.  The port was removed and the fascia reapproximated using the Felice-Jackelin fascial closure device.  The Felice-Jackelin fascial closure device was then utilized to place a figure-of-eight 0 Vicryl suture to reapproximate the fascia at the left upper quadrant port site.  The remaining robotic equipment was removed from the abdominal cavity and the robot was undocked.  The patient's abdomen was allowed to desufflate fully.  The robotic ports were removed and the port sites inspected and hemostasis was ensured.  The port sites were then reapproximated using 4-0 Monocryl subcuticular stitches.  The incisions were washed and dried and steristrips were applied.  Lap, needle and instrument counts were correct at the end of the procedure.  The patient tolerated the procedure well and was taken to the recovery area in stable condition.        ESTIMATED BLOOD LOSS:  75 mL      FINDINGS:  Inflamed and distended gallbladder with  necrotic portions of the gallbladder wall consistent with gangrenous cholecystitis. The cystic stump was reinforced with suture ligation. A 19Fr SAVI  drain was placed into the RUQ and gallbladder fossa.      COMPLICATIONS:  None.      SPECIMENS:  Gallbladder and contents.      DRAINS:  19Fr SAVI drain     CONDITION:  Stable        Yosvany Almonte MD   \

## 2025-03-30 NOTE — ED NOTES
"[facility]  ED Nurse Handoff Report    ED Chief complaint: Chest Pain and Abdominal Pain      ED Diagnosis:   Final diagnoses:   Acute cholecystitis   Elevated troponin       Code Status: Inpatient team to discuss code status with patient    Allergies: No Known Allergies    Patient Story: BIBA from [place] for chest and abd pain rated 10/10, pt states pain is \"everywhere\". Pain management at site did not help symptoms. CABG done 3 days ago.      Triage Assessment (Adult)       Row Name 03/29/25 7959          Triage Assessment    Airway WDL WDL        Respiratory WDL    Respiratory WDL WDL        Cardiac WDL    Cardiac WDL X;chest pain        Peripheral/Neurovascular WDL    Peripheral Neurovascular WDL WDL        Cognitive/Neuro/Behavioral WDL    Cognitive/Neuro/Behavioral WDL WDL                         Focused Assessment:   Abnormal Labs Resulted from Time of ED Arrival to Time of ED Departure   COMPREHENSIVE METABOLIC PANEL - Abnormal       Result Value    Sodium 135      Potassium 4.0      Carbon Dioxide (CO2) 28      Anion Gap 12      Urea Nitrogen 17.6      Creatinine 1.21 (*)     GFR Estimate 62      Calcium 9.3      Chloride 95 (*)     Glucose 166 (*)     Alkaline Phosphatase 179 (*)     AST 54 (*)     ALT 40      Protein Total 8.0      Albumin 4.2      Bilirubin Total 0.7     LIPASE - Abnormal    Lipase 122 (*)    TROPONIN T, HIGH SENSITIVITY - Abnormal    Troponin T, High Sensitivity 74 (*)    CBC WITH PLATELETS AND DIFFERENTIAL - Abnormal    WBC Count 11.7 (*)     RBC Count 3.49 (*)     Hemoglobin 11.1 (*)     Hematocrit 34.5 (*)     MCV 99      MCH 31.8      MCHC 32.2      RDW 16.0 (*)     Platelet Count 325      % Neutrophils 83      % Lymphocytes 10      % Monocytes 5      % Eosinophils 1      % Basophils 0      % Immature Granulocytes 1      NRBCs per 100 WBC 0      Absolute Neutrophils 9.7 (*)     Absolute Lymphocytes 1.1      Absolute Monocytes 0.6      Absolute Eosinophils 0.2      " Absolute Basophils 0.0      Absolute Immature Granulocytes 0.1      Absolute NRBCs 0.0         CT Chest/Abdomen/Pelvis w Contrast   Final Result   IMPRESSION:      1.  Gallbladder distention with pericholecystic fat stranding, highly suspicious for acute cholecystitis.      2.  Postsurgical changes of recent CABG without acute complications.      3.  Interval partial wedge resection of right upper lobe pulmonary nodule with mild scarring/atelectasis adjacent to the pulmonary suture.      4.  3 mm nonobstructing right renal stone, left total nephrectomy and colonic diverticulosis.          Treatments and/or interventions provided:   Medications   piperacillin-tazobactam (ZOSYN) 4.5 g vial to attach to  mL bag (has no administration in time range)   sodium chloride 0.9% BOLUS 500 mL (has no administration in time range)   alum & mag hydroxide-simethicone (MAALOX) suspension 15 mL (15 mLs Oral $Given 3/29/25 2153)   HYDROmorphone (PF) (DILAUDID) injection 0.5 mg (0.5 mg Intravenous $Given 3/29/25 3921)   HYDROmorphone (DILAUDID) injection 1 mg (1 mg Intramuscular $Given 3/29/25 2307)   iopamidol (ISOVUE-370) solution 115 mL (115 mLs Intravenous $Given 3/29/25 2349)   sodium chloride 0.9 % bag for CT scan flush (75 mLs Intravenous $Given 3/29/25 2344)       Patient's response to treatments and/or interventions: Improving    To be done/followed up on inpatient unit:  Follow Plan of Care    Does this patient have any cognitive concerns?: A&Ox4    Activity level - Baseline/Home:  Stand with Assist  Activity Level - Current:   Stand with assist x2    Patient's Preferred language: English   Needed?: No    Isolation: None  Infection: Not Applicable  Patient tested for COVID 19 prior to admission: NO  Bariatric?: No    Vital Signs:   Vitals:    03/29/25 2215   BP: 118/52   Pulse: 80   Resp: 24   Temp: 97.9  F (36.6  C)   TempSrc: Oral   SpO2: 97%       Cardiac Rhythm:     Was the PSS-3 completed:   Yes  What  interventions are required if any?               Family Comments: None at Bedside  OBS brochure/video discussed/provided to patient/family: N/A    For the majority of the shift this patient's behavior was Green.   Behavioral interventions performed were None.    ED NURSE PHONE NUMBER: *[phone]

## 2025-03-30 NOTE — PROGRESS NOTES
[facility]    Medicine Progress Note - Hospitalist Service    Date of Admission:  3/29/2025    Assessment & Plan   Fermín Josue is a 75 year old male with a past medical history of CAD s/p CABG 3/15/25, HTN, HLD, CKD II, JAMEE presents to the hospital with abdominal pain and chest pain. He was found to have acute cholecystitis.      Acute cholecystitis  Patient presenting with abdominal pain after eating on 3/29 found to have an elevated Alk Phos and AST. CT abdomen significant for a distended gallbladder with pericholecystic fat stranding concerning for acute cholecystosis.  Lipase is elevated, but no pancreatitis seen on CT. His situation is complicated by his recent CABG.    -CV Surgery consult  -Cardiology consult for clearance  -General surgery consult  -Zosyn  -Npo  -IVF  -follow LFTs  -if unable to get cholecystectomy, will need cholecystostomy tube 3/30.      CAD s/p 2V CABG 3/15/25  HTN  HLD  Elevated troponin  Reports that his abdominal pain has now moved up to his chest.  EKG is sinus rhythm with T wave inversions in the anterior leads which appears to be a new finding.  His troponin was mildly elevated at 74 and trended down to 61 on repeat.    - CV surgery consult  - continue PTA ASA, metoprolol, atorvastatin   - Hold lasix, and losartan given plan for OR  - Tele     Macrocytic anemia  The patient's anemia is improving since surgery.  Of note MCV is 99 concerning for macrocytic anemia.  -Follow-up B12 and folate     Chronic medical conditions: resume pta meds as needed once med rec is complete  BPH  CKD stage 2 stable  JAMEE -Not on cpap  Obesity  Prediabetes  Renal cancer s/p nephrectomy  Non-small cell lung ca s/p vats wedge resection of RUL          Diet: NPO for Medical/Clinical Reasons Except for: Meds, Ice Chips    DVT Prophylaxis: Pneumatic Compression Devices  [place] Catheter: Not present  Lines: None     Cardiac Monitoring: ACTIVE order. Indication: AMI (NSTEMI/ STEMI)  "(48 hours)  Code Status: Full Code      Clinically Significant Risk Factors Present on Admission          # Hypochloremia: Lowest Cl = 95 mmol/L in last 2 days, will monitor as appropriate        # Drug Induced Platelet Defect: home medication list includes an antiplatelet medication   # Hypertension: Noted on problem list           # Obesity: Estimated body mass index is 35 kg/m  as calculated from the following:    Height as of 3/19/25: 1.727 m (5' 8\").    Weight as of this encounter: 104.4 kg (230 lb 2.6 oz).       # Financial/Environmental Concerns:     # History of CABG: noted on surgical history       Social Drivers of Health    Tobacco Use: Medium Risk (3/13/2025)    Patient History     Smoking Tobacco Use: Former     Smokeless Tobacco Use: Never   Physical Activity: Unknown (9/26/2024)    Exercise Vital Sign     Days of Exercise per Week: 1 day   Social Connections: Unknown (9/26/2024)    Social Connection and Isolation Panel [NHANES]     Frequency of Social Gatherings with Friends and Family: More than three times a week          Disposition Plan     Medically Ready for Discharge: Anticipated in 2-4 Days  Given post op CABG suspect will require longer stay to recover.            Fly Cortez MD  Hospitalist Service  [facility]  Securely message with [email or web address] (more info)  Text page via [employer] Paging/Directory   ______________________________________________________________________    Interval History   Seen in AM  Discussed with gen surgery, cardiology, and CV surgery teams. Possible plan for OR around 12-2.   He is having severe pain. Maxing out his pain meds. Will increase PRNs and add some other modalities.   No f/c/r.    Physical Exam   Vital Signs: Temp: 97.8  F (36.6  C) Temp src: Oral BP: (!) 140/76 Pulse: 89   Resp: 20 SpO2: 95 % O2 Device: None (Room air)    Weight: 230 lbs 2.56 oz  Constitutional: Awake, alert, cooperative, no apparent distress  Respiratory: Clear to " auscultation bilaterally, no crackles or wheezing  Cardiovascular: Regular rate and rhythm, normal S1 and S2, and no murmur noted  GI: Normal bowel sounds, soft, distended, RUQ exquisitely tender  Skin/Integumen: No rashes, no cyanosis, no edema  Other: Sternal incision is cdi      Medical Decision Making       ------------------ MEDICAL DECISION MAKING ------------------------------------------------------------------------------------------------------  MANAGEMENT DISCUSSED with the following over the past 24 hours: patient, daughter, cv surg, gen surg, cardiology, RN, charge   NOTE(S)/MEDICAL RECORDS REVIEWED over the past 24 hours: Op note, prior discontinue summary, H&P, ED notes, RN notes  Tests ORDERED & REVIEWED in the past 24 hours:  --------- COMMON DAILY TESTS ------------------------------------------  - BMP  - CBC  - Lactic Acid  - LFTs  Tests personally interpreted in the past 24 hours:  - CHEST CT showing post op changes  - ABDOMINAL CT showing inflamed, distended gallbladder  SUPPLEMENTAL HISTORY, in addition to the patient's history, over the past 24 hours obtained from:   - daughter  Medical complexity over the past 24 hours:  -------------------------- HIGH RISK FOR MORBIDITY -------------------------------------------------------------  - Parenteral (IV) CONTROLLED SUBSTANCES ordered      Data   ------------------------- PAST 24 HR DATA REVIEWED -----------------------------------------------    I have personally reviewed the following data over the past 24 hrs:    11.7 (H)  \   11.1 (L)   / 325     135 95 (L) 17.6 /  166 (H)   4.0 28 1.21 (H) \     ALT: 40 AST: 54 (H) AP: 179 (H) TBILI: 0.7   ALB: 4.2 TOT PROTEIN: 8.0 LIPASE: 122 (H)     Trop: 61 (H) BNP: N/A       Imaging results reviewed over the past 24 hrs:   Recent Results (from the past 24 hours)   CT Chest/Abdomen/Pelvis w Contrast    Narrative    EXAM: CT CHEST/ABDOMEN/PELVIS W CONTRAST  LOCATION: [facility]  HOSPITAL  DATE: 3/30/2025    INDICATION: Epigastric pain, recent CABG.  COMPARISON: 10/24/2024  TECHNIQUE: CT scan of the chest, abdomen, and pelvis was performed following injection of IV contrast. Multiplanar reformats were obtained. Dose reduction techniques were used.   CONTRAST: 115 mL Isovue 370    FINDINGS:   LUNGS AND PLEURA: Interval resection of a right upper lobe nodule. Atelectasis/scarring is seen adjacent to the right upper lobe pulmonary wedge resection suture. Small left pleural effusion with mild dependent atelectasis in the lower lobes, left   greater than right. No right pleural effusion. No confluent pneumonic consolidation or pneumothorax.    MEDIASTINUM/AXILLAE present.: Small amount of substernal edema without loculated gas or fluid collections.    CORONARY ARTERY CALCIFICATION: Moderate. A stent is seen in the left anterior descending coronary artery. Post-CABG changes    HEPATOBILIARY: Normal liver. A too small to characterize cystic lesion in the left hepatic lobe is stable, compatible with a cyst. Gallbladder is distended with mild pericholecystic fat stranding.    PANCREAS: Normal.    SPLEEN: Normal.    ADRENAL GLANDS: Normal.    KIDNEYS/BLADDER: Left kidney is surgically absent. No abnormal enhancing soft tissues in the left nephrectomy bed. Right kidney is normal in size without hydronephrosis. A 3 mm nonobstructing stone is seen in the inferior pole of the right kidney.   Scattered cystic lesions in the right kidney are visually benign measuring up to 1.3 cm, and do not require follow-up. Urinary bladder is normal.    BOWEL: No inflammatory bowel thickening or bowel obstruction. Appendix is normal. Multiple diverticula in the descending and sigmoid colon without acute diverticulitis. No free fluid or free air.    LYMPH NODES: Normal.    VASCULATURE: Moderate aortoiliac atherosclerosis. No abdominal aortic aneurysm.    PELVIC ORGANS: Normal.    MUSCULOSKELETAL: Recent sternal splitting  procedure with intact sternotomy wires. No organized subcutaneous fluid collections. Bilateral gynecomastia there is increased from prior. Posterior spinal fusion instrumentation from T8 to L1 redemonstrated. No   suspicious osseous lesions or acute fractures.        Impression    IMPRESSION:    1.  Gallbladder distention with pericholecystic fat stranding, highly suspicious for acute cholecystitis.    2.  Postsurgical changes of recent CABG without acute complications.    3.  Interval partial wedge resection of right upper lobe pulmonary nodule with mild scarring/atelectasis adjacent to the pulmonary suture.    4.  3 mm nonobstructing right renal stone, left total nephrectomy and colonic diverticulosis.

## 2025-03-30 NOTE — ED NOTES
Bed: ED28  Expected date: 3/29/25  Expected time: 9:55 PM  Means of arrival: Ambulance  Comments:  Nancy 1 75M abd pain

## 2025-03-30 NOTE — PLAN OF CARE
Goal Outcome Evaluation:      Plan of Care Reviewed With: patient, family    Overall Patient Progress: improvingOverall Patient Progress: improving    Date & Time: 3/30/2025 [id number]  Summary:   -Here with Acute Cholecystitis   -3/15/2025: CABG  Behavior & Aggression: Green  Fall Risk: Yes  Orientation:A&O x4  ABNL VS/O2:VSS on RA  Tele: Sinus Rhythm with 1st Degree AV Block  ABNL Labs: WBC 21.6; Hbg 11.4; & Troponin 61  Pain Management: Scheduled Gabapentin, PRN Tylenol, IV Dilaudid, Robaxin & ice packs  Bowel/Bladder: Voiding adequately in bathroom. BS audible- passing gas/ no stools  IV: PIV infusing NS at 75 mL/hr with int abx  Wounds/incisions: Old incisional sites from recent heart procedure on upper chest, R groin & RLE  Diet:NPO except Ice and meds. Intermittently nauseous- improved with PRN Zofran x1  Activity Level: Ax1 with gaitbelt & IV pole  Tests/Procedures: Sent to Cholecystectomy procedure around 1500 today.   Anticipated  DC Date: Pending

## 2025-03-30 NOTE — ED PROVIDER NOTES
Emergency Department Note      History of Present Illness     Chief Complaint   Chest Pain and Abdominal Pain      HPI   Fermín Josue is a 75 year old male with history of non small cell lung cancer, hypertension, hyperlipidemia, coronary artery disease, and CABG x 2 status post 3/15/25 who presents to the ED via EMS for chest pain and abdominal pain. The patient was hospitalized from 3/13/25-3/25/25 for coronary artery disease and underwent a coronary bypass. After his stay in the hospital, the patient was discharged to [facility] where he was recovering well. Then approximately 2.5 hours ago, the patient developed epigastric abdominal pain radiating into his chest after he finished eating. He tried taking the 5 mg oxycodone he was prescribed which provided no relief. Staff sent the patient here for evaluation. Denies vomiting, diarrhea, fever, cough, and sore throat. The patient's last bowl movement was earlier this afternoon and it was normal stool.     Independent Historian   None    Review of External Notes   I reviewed the discharge summary from 3/25/25.     Past Medical History     Medical History and Problem List   Coronary artery disease   Kidney stones  Hyperlipidemia   Hypertension   Benign prostatic hyperplasia   Chronic kidney disease  Sleep apnea  Malignant neoplasm of kidney   Pulmonary nodule  Non-small cell lung cancer    Medications   Aspirin  Atorvastatin   Lasix   Gabapentin  Losartan   Metoprolol  Oxycodone     Surgical History   Coronary angioplasty   Cystoscopy   Nephrectomy   Skin graft   Coronary artery bypass graft x 2  Left ventriculogram   Lung wedge resection    Physical Exam     Patient Vitals for the past 24 hrs:   BP Temp Temp src Pulse Resp SpO2   03/29/25 2215 118/52 97.9  F (36.6  C) Oral 80 24 97 %     Physical Exam  Gen: well appearing, in no acute distress  Oral : Mucous membranes moist,   Nose: No rhinorhea  Ears: External near normal, without drainage  Eyes: periorbital  tissues and sclera normal   Neck: supple, no abnormal swelling  Lungs: Clear bilaterally, no tachypnea or distress, speaks full sentences  CV: Regular rate, regular rhythm  Abd: mildly distended without focal tenderness, bowl sounds quite.  Ext: no lower extremity edema  Skin: warm, dry, well perfused, no rashes/bruising/lesions on exposed skin, well healing sternotomy without erythema or drainage  Neuro: alert, no gross motor or sensory deficits,   Psych: pleasant mood, normal affect    Diagnostics     Lab Results   Labs Ordered and Resulted from Time of ED Arrival to Time of ED Departure   COMPREHENSIVE METABOLIC PANEL - Abnormal       Result Value    Sodium 135      Potassium 4.0      Carbon Dioxide (CO2) 28      Anion Gap 12      Urea Nitrogen 17.6      Creatinine 1.21 (*)     GFR Estimate 62      Calcium 9.3      Chloride 95 (*)     Glucose 166 (*)     Alkaline Phosphatase 179 (*)     AST 54 (*)     ALT 40      Protein Total 8.0      Albumin 4.2      Bilirubin Total 0.7     LIPASE - Abnormal    Lipase 122 (*)    TROPONIN T, HIGH SENSITIVITY - Abnormal    Troponin T, High Sensitivity 74 (*)    CBC WITH PLATELETS AND DIFFERENTIAL - Abnormal    WBC Count 11.7 (*)     RBC Count 3.49 (*)     Hemoglobin 11.1 (*)     Hematocrit 34.5 (*)     MCV 99      MCH 31.8      MCHC 32.2      RDW 16.0 (*)     Platelet Count 325      % Neutrophils 83      % Lymphocytes 10      % Monocytes 5      % Eosinophils 1      % Basophils 0      % Immature Granulocytes 1      NRBCs per 100 WBC 0      Absolute Neutrophils 9.7 (*)     Absolute Lymphocytes 1.1      Absolute Monocytes 0.6      Absolute Eosinophils 0.2      Absolute Basophils 0.0      Absolute Immature Granulocytes 0.1      Absolute NRBCs 0.0     TROPONIN T, HIGH SENSITIVITY       Imaging   CT Chest/Abdomen/Pelvis w Contrast   Final Result   IMPRESSION:      1.  Gallbladder distention with pericholecystic fat stranding, highly suspicious for acute cholecystitis.      2.   Postsurgical changes of recent CABG without acute complications.      3.  Interval partial wedge resection of right upper lobe pulmonary nodule with mild scarring/atelectasis adjacent to the pulmonary suture.      4.  3 mm nonobstructing right renal stone, left total nephrectomy and colonic diverticulosis.          EKG   ECG taken at 2223, ECG read at 2227  Sinus rhythm with 1st degree AV block  T wave abnormality, consider anterior ischemia    Rate 81 bpm. MN interval 336 ms. QRS duration 74 ms. QT/QTc 370/429 ms. P-R-T axes * -27 73.    Independent Interpretation   None    ED Course      Medications Administered   Medications   piperacillin-tazobactam (ZOSYN) 4.5 g vial to attach to  mL bag (has no administration in time range)   sodium chloride 0.9% BOLUS 500 mL (has no administration in time range)   alum & mag hydroxide-simethicone (MAALOX) suspension 15 mL (15 mLs Oral $Given 3/29/25 2239)   HYDROmorphone (PF) (DILAUDID) injection 0.5 mg (0.5 mg Intravenous $Given 3/29/25 2355)   HYDROmorphone (DILAUDID) injection 1 mg (1 mg Intramuscular $Given 3/29/25 2307)   iopamidol (ISOVUE-370) solution 115 mL (115 mLs Intravenous $Given 3/29/25 2344)   sodium chloride 0.9 % bag for CT scan flush (75 mLs Intravenous $Given 3/29/25 2344)       Procedures   Procedures     Discussion of Management   Admitting Hospitalist,      ED Course   ED Course as of 03/30/25 0105   Sat Mar 29, 2025   2211 I obtained history and performed physical exam.    Sun Mar 30, 2025   0043 I updated the patient.       Additional Documentation  None    Medical Decision Making / Diagnosis     CMS Diagnoses: IV Antibiotics given and/or elevated Lactate of 0 and no sepsis note found - Delete this reminder and enter the sepsis note or '.edcms' before signing chart.>>>None    MIPS       None    [facility]   Fermín Josue is a 75 year old male presents with epigastric pain that started after eating earlier this evening.  Had a normal bowel movement  previously.  Is just 2 weeks out from coronary artery bypass, differential was broad.  Initial EKG did not show obvious acute ischemic changes.  Troponin is mid range will need a repeat on that.  GI cocktail did not alleviate his pain, Dilaudid ordered which did seem to help.  CT scan shows a distended gallbladder with evidence of potential cholecystitis.  Will begin antibiotic coverage in the ED.  Troponin will need to be trended out, repeat pending.  Contacted hospitalist who is in agreement for admission.    Disposition   The patient was admitted to the hospital.     Diagnosis     ICD-10-CM    1. Acute cholecystitis  K81.0       2. Elevated troponin  R79.89            Discharge Medications   New Prescriptions    No medications on file         Scribe Disclosure:  I, Nevin Stauffer, am serving as a scribe at 10:24 PM on 3/29/2025 to document services personally performed by Phil Cook MD based on my observations and the provider's statements to me.        Phil Cook MD  03/30/25 0105

## 2025-03-30 NOTE — ED TRIAGE NOTES
"BIBA from [facility] for chest and abd pain rated 10/10, pt states pain is \"everywhere\". Pain management at site did not help symptoms. CABG done 3 days ago.     Triage Assessment (Adult)       Row Name 03/29/25 6868          Triage Assessment    Airway WDL WDL        Respiratory WDL    Respiratory WDL WDL        Cardiac WDL    Cardiac WDL X;chest pain        Peripheral/Neurovascular WDL    Peripheral Neurovascular WDL WDL        Cognitive/Neuro/Behavioral WDL    Cognitive/Neuro/Behavioral WDL WDL                     "

## 2025-03-31 LAB
ALBUMIN SERPL BCG-MCNC: 3.4 G/DL (ref 3.5–5.2)
ALP SERPL-CCNC: 100 U/L (ref 40–150)
ALT SERPL W P-5'-P-CCNC: 25 U/L (ref 0–70)
AST SERPL W P-5'-P-CCNC: 43 U/L (ref 0–45)
BASOPHILS # BLD AUTO: 0 10E3/UL (ref 0–0.2)
BASOPHILS NFR BLD AUTO: 0 %
BILIRUB DIRECT SERPL-MCNC: 0.38 MG/DL (ref 0–0.3)
BILIRUB SERPL-MCNC: 1 MG/DL
EOSINOPHIL # BLD AUTO: 0 10E3/UL (ref 0–0.7)
EOSINOPHIL NFR BLD AUTO: 0 %
ERYTHROCYTE [DISTWIDTH] IN BLOOD BY AUTOMATED COUNT: 16.1 % (ref 10–15)
HCT VFR BLD AUTO: 28.4 % (ref 40–53)
HGB BLD-MCNC: 8.7 G/DL (ref 13.3–17.7)
IMM GRANULOCYTES # BLD: 0.2 10E3/UL
IMM GRANULOCYTES NFR BLD: 1 %
LIPASE SERPL-CCNC: 31 U/L (ref 13–60)
LYMPHOCYTES # BLD AUTO: 0.8 10E3/UL (ref 0.8–5.3)
LYMPHOCYTES NFR BLD AUTO: 5 %
MCH RBC QN AUTO: 31.3 PG (ref 26.5–33)
MCHC RBC AUTO-ENTMCNC: 30.6 G/DL (ref 31.5–36.5)
MCV RBC AUTO: 102 FL (ref 78–100)
MONOCYTES # BLD AUTO: 0.9 10E3/UL (ref 0–1.3)
MONOCYTES NFR BLD AUTO: 6 %
NEUTROPHILS # BLD AUTO: 13.9 10E3/UL (ref 1.6–8.3)
NEUTROPHILS NFR BLD AUTO: 88 %
NRBC # BLD AUTO: 0 10E3/UL
NRBC BLD AUTO-RTO: 0 /100
PLATELET # BLD AUTO: 282 10E3/UL (ref 150–450)
PROT SERPL-MCNC: 6.5 G/DL (ref 6.4–8.3)
RBC # BLD AUTO: 2.78 10E6/UL (ref 4.4–5.9)
WBC # BLD AUTO: 15.7 10E3/UL (ref 4–11)

## 2025-03-31 PROCEDURE — 250N000011 HC RX IP 250 OP 636: Mod: JZ | Performed by: PHYSICIAN ASSISTANT

## 2025-03-31 PROCEDURE — 36415 COLL VENOUS BLD VENIPUNCTURE: CPT | Performed by: PHYSICIAN ASSISTANT

## 2025-03-31 PROCEDURE — 83690 ASSAY OF LIPASE: CPT | Performed by: PHYSICIAN ASSISTANT

## 2025-03-31 PROCEDURE — 99024 POSTOP FOLLOW-UP VISIT: CPT | Performed by: STUDENT IN AN ORGANIZED HEALTH CARE EDUCATION/TRAINING PROGRAM

## 2025-03-31 PROCEDURE — 120N000001 HC R&B MED SURG/OB

## 2025-03-31 PROCEDURE — 250N000013 HC RX MED GY IP 250 OP 250 PS 637: Performed by: PHYSICIAN ASSISTANT

## 2025-03-31 PROCEDURE — 250N000013 HC RX MED GY IP 250 OP 250 PS 637: Performed by: STUDENT IN AN ORGANIZED HEALTH CARE EDUCATION/TRAINING PROGRAM

## 2025-03-31 PROCEDURE — 80076 HEPATIC FUNCTION PANEL: CPT | Performed by: PHYSICIAN ASSISTANT

## 2025-03-31 PROCEDURE — 85004 AUTOMATED DIFF WBC COUNT: CPT | Performed by: PHYSICIAN ASSISTANT

## 2025-03-31 PROCEDURE — 99232 SBSQ HOSP IP/OBS MODERATE 35: CPT | Performed by: STUDENT IN AN ORGANIZED HEALTH CARE EDUCATION/TRAINING PROGRAM

## 2025-03-31 PROCEDURE — 258N000003 HC RX IP 258 OP 636: Performed by: PHYSICIAN ASSISTANT

## 2025-03-31 PROCEDURE — 85018 HEMOGLOBIN: CPT | Performed by: PHYSICIAN ASSISTANT

## 2025-03-31 RX ORDER — ACETAMINOPHEN 650 MG/1
650 SUPPOSITORY RECTAL 3 TIMES DAILY
Status: DISCONTINUED | OUTPATIENT
Start: 2025-03-31 | End: 2025-04-02 | Stop reason: HOSPADM

## 2025-03-31 RX ORDER — ACETAMINOPHEN 325 MG/1
975 TABLET ORAL 3 TIMES DAILY
Status: DISCONTINUED | OUTPATIENT
Start: 2025-03-31 | End: 2025-04-02 | Stop reason: HOSPADM

## 2025-03-31 RX ADMIN — SODIUM CHLORIDE: 0.9 INJECTION, SOLUTION INTRAVENOUS at 10:01

## 2025-03-31 RX ADMIN — ASPIRIN 81 MG CHEWABLE TABLET 162 MG: 81 TABLET CHEWABLE at 09:49

## 2025-03-31 RX ADMIN — GABAPENTIN 400 MG: 400 CAPSULE ORAL at 16:02

## 2025-03-31 RX ADMIN — PIPERACILLIN AND TAZOBACTAM 4.5 G: 4; .5 INJECTION, POWDER, LYOPHILIZED, FOR SOLUTION INTRAVENOUS at 12:20

## 2025-03-31 RX ADMIN — SENNOSIDES AND DOCUSATE SODIUM 2 TABLET: 50; 8.6 TABLET ORAL at 22:21

## 2025-03-31 RX ADMIN — GABAPENTIN 400 MG: 400 CAPSULE ORAL at 09:45

## 2025-03-31 RX ADMIN — GABAPENTIN 400 MG: 400 CAPSULE ORAL at 22:19

## 2025-03-31 RX ADMIN — ACETAMINOPHEN 975 MG: 325 TABLET ORAL at 09:45

## 2025-03-31 RX ADMIN — METHOCARBAMOL 500 MG: 500 TABLET ORAL at 09:45

## 2025-03-31 RX ADMIN — METHOCARBAMOL 500 MG: 500 TABLET ORAL at 22:21

## 2025-03-31 RX ADMIN — OXYCODONE HYDROCHLORIDE 10 MG: 5 TABLET ORAL at 03:52

## 2025-03-31 RX ADMIN — PIPERACILLIN AND TAZOBACTAM 4.5 G: 4; .5 INJECTION, POWDER, LYOPHILIZED, FOR SOLUTION INTRAVENOUS at 06:01

## 2025-03-31 RX ADMIN — SENNOSIDES AND DOCUSATE SODIUM 2 TABLET: 50; 8.6 TABLET ORAL at 09:45

## 2025-03-31 RX ADMIN — ATORVASTATIN CALCIUM 40 MG: 40 TABLET, FILM COATED ORAL at 09:45

## 2025-03-31 RX ADMIN — ACETAMINOPHEN 975 MG: 325 TABLET ORAL at 16:03

## 2025-03-31 RX ADMIN — OXYCODONE HYDROCHLORIDE 5 MG: 5 TABLET ORAL at 14:13

## 2025-03-31 RX ADMIN — PIPERACILLIN AND TAZOBACTAM 4.5 G: 4; .5 INJECTION, POWDER, LYOPHILIZED, FOR SOLUTION INTRAVENOUS at 17:53

## 2025-03-31 RX ADMIN — METHOCARBAMOL 500 MG: 500 TABLET ORAL at 12:21

## 2025-03-31 RX ADMIN — OXYCODONE HYDROCHLORIDE 5 MG: 5 TABLET ORAL at 09:59

## 2025-03-31 RX ADMIN — METHOCARBAMOL 500 MG: 500 TABLET ORAL at 17:49

## 2025-03-31 RX ADMIN — METOPROLOL SUCCINATE 25 MG: 25 TABLET, EXTENDED RELEASE ORAL at 09:45

## 2025-03-31 RX ADMIN — HYDROMORPHONE HYDROCHLORIDE 0.2 MG: 0.2 INJECTION, SOLUTION INTRAMUSCULAR; INTRAVENOUS; SUBCUTANEOUS at 02:25

## 2025-03-31 RX ADMIN — PIPERACILLIN AND TAZOBACTAM 4.5 G: 4; .5 INJECTION, POWDER, LYOPHILIZED, FOR SOLUTION INTRAVENOUS at 01:28

## 2025-03-31 RX ADMIN — ACETAMINOPHEN 975 MG: 325 TABLET ORAL at 22:19

## 2025-03-31 ASSESSMENT — ACTIVITIES OF DAILY LIVING (ADL)
ADLS_ACUITY_SCORE: 61
ADLS_ACUITY_SCORE: 55
ADLS_ACUITY_SCORE: 53
DEPENDENT_IADLS:: COOKING
ADLS_ACUITY_SCORE: 56
ADLS_ACUITY_SCORE: 61
ADLS_ACUITY_SCORE: 53
ADLS_ACUITY_SCORE: 55
ADLS_ACUITY_SCORE: 61
ADLS_ACUITY_SCORE: 61
ADLS_ACUITY_SCORE: 53
ADLS_ACUITY_SCORE: 61
ADLS_ACUITY_SCORE: 53
ADLS_ACUITY_SCORE: 55
ADLS_ACUITY_SCORE: 53
ADLS_ACUITY_SCORE: 55
ADLS_ACUITY_SCORE: 61
ADLS_ACUITY_SCORE: 55

## 2025-03-31 NOTE — PLAN OF CARE
Pt arrived from general surgery @ 1530.  Surgery/POD#: POD 1 from a CHOLECYSTECTOMY, ROBOT-ASSISTED, LAPAROSCOPIC, USING DA ANURAG XI  Behavior & Aggression: Green  Fall Risk: Yes  Orientation: A&O x4  ABNL VS/O2: VSS on RA  Tele: SR w/ 1st degree AV block  Pain Management: Scheduled gabapentin, robaxin, and tylenol. PRN Oxy & dilaudid available.  Bowel/Bladder: incont; Ex cath in place; Flatus+, no BM this shift.  Drains: RUQ SAVI drain (strip q4hrs)- sanguinous output  Lines: PIV-SL w/ int abx  Skin: Old incisional sites from recent heart procedure on upper chest, R groin, & RLE. Abdominal incisions x2 CDI with band aids. SAVI drain site leaking, surgery team aware  Diet: Full liquid diet tolerated.  Advance as tolerated. Activity Level: A1-GB/W.   Tests/Procedures: N/A  Anticipated  DC Date: TBD  Significant Information: PT consulted. Surgery/SW/CM following. Productive cough, IS encouraged, pt doing oral suctioning w/sofia.  CTM.

## 2025-03-31 NOTE — PLAN OF CARE
Goal Outcome Evaluation:    Shift: [id number]  Surgery/POD#: POD 1 from a CHOLECYSTECTOMY, ROBOT-ASSISTED, LAPAROSCOPIC, USING DA ANURAG XI  Behavior & Aggression: Green  Fall Risk: Yes  Orientation: A&O x4  ABNL VS/O2: VSS on RA  Tele: SR w/ 1st degree AV block  ABNL Labs: wbc 15.7, HGB 8.7  Pain Management: Scheduled gabapentin, robaxin, and tylenol. PRN Oxy x2  Bowel/Bladder: incont; Ex cath in place; bowel sounds hypoactive, not passing flatus, no BM this shift  Drains: RUQ SAVI drain (strip q4hrs)- sanguinous output  Lines: PIV-SL w/ int abx  Skin: Old incisional sites from recent heart procedure on upper chest, R groin, & RLE. Abdominal incisions x2 CDI with band aids. SAVI drain site leaking, surgery team aware  Diet: Full liquid- ADAT to regular diet; denies nausea   Activity Level: A1-GB/W.   Tests/Procedures: N/A  Anticipated  DC Date: TBD  Significant Information: PT consulted. Surgery/SW/CM following. Productive cough, IS encouraged, pt doing oral suctioning    [place] [facility] [place] report given to nurse

## 2025-03-31 NOTE — PROGRESS NOTES
[facility]    General Surgery  Daily Progress Note       Assessment and Plan:   Fermín Josue is a 75 year old male who is POD#1 s/p Robo Franca for Gangrenous cholecystitis. Doing much better. Discussed intraoperative findings together.    Plan:  Pain control as scheduled and needed, single kidney, no Ibuprofen or Toradol  Clears. Go very slow.   SAVI drain with I/Os, sanguinous output  Continue abx for gangrenous gallbladder  Awaiting today's labs  Ambulate  Pulm toiletry      Yosvany Almonte MD         Interval History:   Pain much improved from RUQ. Now just some pain in his upper abdomen. Has nausea with broth.            Physical Exam:   Temp: 97.9  F (36.6  C) Temp src: Oral BP: 100/61 Pulse: 66   Resp: 18 SpO2: 98 % O2 Device: Nasal cannula with humidification Oxygen Delivery: 1 LPM    I/O last 3 completed shifts:  In: 3350 [P.O.:320; I.V.:2580; IV Piggyback:200]  Out: 1045 [Urine:900; Drains:70; Blood:75]      Constitutional: healthy, alert, and no distress   Cardiovascular: RRR  Respiratory: breathing comfortably on NC 1L, clear speech, very talkative  Abdomen: distended, protuberant, soft, appropriately tender to palpation. No rebound. No peritonitis. SAVI drain in RUQ with sanguinous output (40+30cc). Incisions c/d/i      Data   Recent Labs   Lab 03/30/25  1201 03/29/25  2250 03/25/25  0836   WBC 21.6* 11.7* 7.0   HGB 11.4* 11.1* 8.8*   MCV 99 99 103*    325 224   * 135 143   POTASSIUM 4.8 4.0 4.1   CHLORIDE 98 95* 105   CO2 23 28 28   BUN 15.4 17.6 16.6   CR 1.24* 1.21* 1.23*   ANIONGAP 13 12 10   CANDICE 8.9 9.3 8.7*   * 166* 134*   ALBUMIN 3.9 4.2  --    PROTTOTAL 7.6 8.0  --    BILITOTAL 1.5* 0.7  --    ALKPHOS 150 179*  --    ALT 32 40  --    AST 32 54*  --

## 2025-03-31 NOTE — ANESTHESIA POSTPROCEDURE EVALUATION
Patient: Fermín Josue    Procedure: Procedure(s):  CHOLECYSTECTOMY, ROBOT-ASSISTED, LAPAROSCOPIC, USING DA ANURAG XI       Anesthesia Type:  General    Note:  Disposition: Inpatient   Postop Pain Control: Uneventful            Sign Out: Well controlled pain   PONV: No   Neuro/Psych: Uneventful            Sign Out: Acceptable/Baseline neuro status   Airway/Respiratory: Uneventful            Sign Out: Acceptable/Baseline resp. status   CV/Hemodynamics: Uneventful            Sign Out: Acceptable CV status; No obvious hypovolemia; No obvious fluid overload   Other NRE: NONE   DID A NON-ROUTINE EVENT OCCUR? No           Last vitals:  Vitals Value Taken Time   /62 03/30/25 1839   Temp 36.7  C (98  F) 03/30/25 1839   Pulse 72 03/30/25 1839   Resp 17 03/30/25 1839   SpO2 89 % 03/30/25 1839       Electronically Signed By: AGNIESZKA RICHARDSON MD  March 30, 2025  9:31 PM

## 2025-03-31 NOTE — PLAN OF CARE
Goal Outcome Evaluation:      Plan of Care Reviewed With: patient    Overall Patient Progress: improvingOverall Patient Progress: improving    Date & Time: 3/30/2025 1237-0170  Summary:   Here with Acute Cholecystitis   3/15/2025: CABG  POD #0 CHOLECYSTECTOMY, ROBOT-ASSISTED, LAPAROSCOPIC, USING DA ANURAG XI  Behavior & Aggression: Green  Fall Risk: Yes  Orientation:A&O x4  ABNL VS/O2:VSS on 2L NC  Tele: Sinus Rhythm with 1st Degree AV Block  ABNL Labs: WBC 21.6; Hbg 11.4; & Troponin 61  Pain Management: Denies pain at this time  Bowel/Bladder: Voided x1 via urinal. BS hypoactive- reports not passing gas  IV: PIV infusing NS at 75 mL/hr with int abx  Wounds/incisions: Abdominal incisions CDI with band aids. RUQ SAVI drain with bloody output- dressing CDI. Old incisional sites from recent heart procedure on upper chest, R groin, & RLE.  Diet: Clear Liquids. Denies N/V  Activity Level: Due to dangle from surgery   Tests/Procedures: N/A   Anticipated  DC Date: Pending

## 2025-03-31 NOTE — PLAN OF CARE
Goal Outcome Evaluation:  Shift: 1900-0730   Surgery/POD#: POD 1 from a CHOLECYSTECTOMY, ROBOT-ASSISTED, LAPAROSCOPIC, USING DA ANURAG XI  Behavior & Aggression: Green  Fall Risk: Yes  Orientation: A&O x4  ABNL VS/O2: VSS on 1 L NC   Tele: N/A  ABNL Labs: See chart  Pain Management: Scheduled gabapentin & robaxin. Oxy x2, dilaudid x1  Bowel/Bladder: Ex cath in overnight, bowel sounds , hypoactive, no passing flatus, no BM this shift  Drains: RUQ SAVI drain  Lines: PIV infusing NS 75 ml/hr  Skin: Old incisional sites from recent heart procedure on upper chest, R groin, & RLE. Abdominal incisions CDI with band aids.  Diet: Clears  Activity Level: Dangled and stood bedside.   Tests/Procedures: N/A  Anticipated  DC Date: TBD  Significant Information:

## 2025-03-31 NOTE — CONSULTS
Care Management Initial Consult    General Information  Assessment completed with: Patient, Family,  (Dom)  Type of CM/SW Visit: Initial Assessment    Primary Care Provider verified and updated as needed: Yes   Readmission within the last 30 days:        Reason for Consult: discharge planning  Advance Care Planning: Advance Care Planning Reviewed: present on chart          Communication Assessment  Patient's communication style: spoken language (English or Bilingual)    Hearing Difficulty or Deaf: no   Wear Glasses or Blind: yes    Cognitive  Cognitive/Neuro/Behavioral: WDL                      Living Environment:   People in home: child(julio), adult  Meliton Fernandes  Current living Arrangements: house      Able to return to prior arrangements: no  Living Arrangement Comments:  (Stays on one level)    Family/Social Support:  Care provided by: child(julio)  Provides care for: no one  Marital Status:   Support system: Children          Description of Support System: Supportive, Involved    Support Assessment: Adequate family and caregiver support    Current Resources:   Patient receiving home care services: No        Community Resources: None  Equipment currently used at home: grab bar, toilet, grab bar, tub/shower, shower chair, walker, standard, commode chair, cane, straight  Supplies currently used at home:      Employment/Financial:  Employment Status: retired        Financial Concerns: none   Referral to Financial Worker: No       Does the patient's insurance plan have a 3 day qualifying hospital stay waiver?  Yes     Which insurance plan 3 day waiver is available? Alternative insurance waiver    Will the waiver be used for post-acute placement? Undetermined at this time    Lifestyle & Psychosocial Needs:  Social Drivers of Health     Food Insecurity: Low Risk  (3/30/2025)    Food Insecurity     Within the past 12 months, did you worry that your food would run out before you got money to buy more?: No      Within the past 12 months, did the food you bought just not last and you didn t have money to get more?: No   Depression: Not at risk (1/14/2025)    PHQ-2     PHQ-2 Score: 0   Housing Stability: Low Risk  (3/30/2025)    Housing Stability     Do you have housing? : Yes     Are you worried about losing your housing?: No   Tobacco Use: Medium Risk (3/30/2025)    Patient History     Smoking Tobacco Use: Former     Smokeless Tobacco Use: Never     Passive Exposure: Not on file   Financial Resource Strain: Low Risk  (3/30/2025)    Financial Resource Strain     Within the past 12 months, have you or your family members you live with been unable to get utilities (heat, electricity) when it was really needed?: No   Alcohol Use: Not on file   Transportation Needs: Low Risk  (3/30/2025)    Transportation Needs     Within the past 12 months, has lack of transportation kept you from medical appointments, getting your medicines, non-medical meetings or appointments, work, or from getting things that you need?: No   Physical Activity: Unknown (9/26/2024)    Exercise Vital Sign     Days of Exercise per Week: 1 day     Minutes of Exercise per Session: Not on file   Interpersonal Safety: Low Risk  (3/30/2025)    Interpersonal Safety     Do you feel physically and emotionally safe where you currently live?: Yes     Within the past 12 months, have you been hit, slapped, kicked or otherwise physically hurt by someone?: No     Within the past 12 months, have you been humiliated or emotionally abused in other ways by your partner or ex-partner?: No   Stress: No Stress Concern Present (9/26/2024)    [employer] [employer] - Occupational Stress Questionnaire     Feeling of Stress : Not at all   Social Connections: Unknown (9/26/2024)    Social Connection and Isolation Panel [NHANES]     Frequency of Communication with Friends and Family: Not on file     Frequency of Social Gatherings with Friends and Family: More than  three times a week     Attends Muslim Services: Not on file     Active Member of Clubs or Organizations: Not on file     Attends Club or Organization Meetings: Not on file     Marital Status: Not on file   Health Literacy: Not on file       Functional Status:  Prior to admission patient needed assistance:   Dependent ADLs:: Ambulation-walker, Ambulation-cane  Dependent IADLs:: Cooking       Mental Health Status:  Mental Health Status: No Current Concerns       Chemical Dependency Status:  Chemical Dependency Status: No Current Concerns             Values/Beliefs:  Spiritual, Cultural Beliefs, Muslim Practices, Values that affect care:                 Discussed  Partnership in Safe Discharge Planning  document with patient/family: Yes:    Additional Information:  Consult for discharge planning.     75 year old male with a past medical history of CAD s/p CABG 3/15/25, HTN, HLD, CKD II, JAMEE presents to the hospital with abdominal pain and chest pain. He was found to have acute cholecystitis.     Writer reviewed chart and recommendations at discharge. Writer met with patient at bedside and introduced self and role. Writer confirmed patient's primary doctor and home address as accurate. Patient admitted [facility] but did not hold the bed. Patient is fine with returning to TCU at [facility] or open to [facility] or other places. Has BCBS and will need insurance auth and to re do pas unless [facility] admission. Patient has a supportive adult daughter he lives with and eqipment and supplies at home.     Next Steps: follow for discharge planning     PARAMJIT Ramsey

## 2025-03-31 NOTE — PROGRESS NOTES
[facility]    Medicine Progress Note - Hospitalist Service    Date of Admission:  3/29/2025    Assessment & Plan   Fermín Josue is a 75 year old male with a past medical history of CAD s/p CABG 3/15/25, HTN, HLD, CKD II, JAMEE presents to the hospital with abdominal pain and chest pain. He was found to have acute cholecystitis.      Acute gangrenous cholecystitis  S/p cholecystectomy 3/30/25  Patient presenting with abdominal pain after eating on 3/29 found to have an elevated Alk Phos and AST. CT abdomen significant for a distended gallbladder with pericholecystic fat stranding concerning for acute cholecystosis.  Lipase is elevated, but no pancreatitis seen on CT. His situation is complicated by his recent CABG.    Cholecystectomy 3/30/25 showed gangrenous cholecystitis with necrotic back wall. Drain was left in place post procedure  -CV Surgery consult  -Cardiology consult 3/30 for clearance, patient was cleared and they signed off  -General surgery consult  -Zosyn  -Diet per surgery  -drain care per surger  -IVF  -follow LFTs     CAD s/p 2V CABG 3/15/25  HTN  HLD  Elevated troponin  Reports that his abdominal pain has now moved up to his chest.  EKG is sinus rhythm with T wave inversions in the anterior leads which appears to be a new finding.  His troponin was mildly elevated at 74 and trended down to 61 on repeat.    - CV surgery consult  - continue PTA ASA, metoprolol, atorvastatin   - Hold lasix, and losartan given BP, will consider resumption 4/1 or evening of 3/31 pending BP and labs trend  - Tele     Macrocytic anemia  The patient's anemia is improving since surgery.  Of note MCV is 99 concerning for macrocytic anemia.  -Follow-up B12 and folate     Chronic medical conditions: resume pta meds as needed once med rec is complete  BPH  CKD stage 2 stable  JAMEE -Not on cpap  Obesity  Prediabetes  Renal cancer s/p nephrectomy  Non-small cell lung ca s/p vats wedge resection of  "RUL          Diet: Clear Liquid Diet    DVT Prophylaxis: Pneumatic Compression Devices  Whitmore Catheter: Not present  Lines: None     Cardiac Monitoring: None  Code Status: Full Code      Clinically Significant Risk Factors         # Hyponatremia: Lowest Na = 134 mmol/L in last 2 days, will monitor as appropriate  # Hypochloremia: Lowest Cl = 95 mmol/L in last 2 days, will monitor as appropriate          # Hypertension: Noted on problem list            # Obesity: Estimated body mass index is 35.4 kg/m  as calculated from the following:    Height as of 3/19/25: 1.727 m (5' 8\").    Weight as of this encounter: 105.6 kg (232 lb 12.9 oz)., PRESENT ON ADMISSION       # Financial/Environmental Concerns:     # History of CABG: noted on surgical history       Social Drivers of Health    Tobacco Use: Medium Risk (3/30/2025)    Patient History     Smoking Tobacco Use: Former     Smokeless Tobacco Use: Never   Physical Activity: Unknown (9/26/2024)    Exercise Vital Sign     Days of Exercise per Week: 1 day   Social Connections: Unknown (9/26/2024)    Social Connection and Isolation Panel [NHANES]     Frequency of Social Gatherings with Friends and Family: More than three times a week          Disposition Plan     Medically Ready for Discharge: Anticipated in 2-4 Days  Given post op CABG suspect will require longer stay to recover.            Fly Cortez MD  Hospitalist Service  [facility]  Securely message with [employer] (more info)  Text page via [facility] Paging/Directory   ______________________________________________________________________    Interval History   Seen in AM. Pain is much better controlled today. He has been sleeping on and off since surgery. Tolerating clears, advancing will be per surgery.   Having abdominal fullness and discomfort, but no chest pain.  SAVI with serosang. Abd incision dressed. Chest is cdi  Discussed with RN  Discussed with charge RN on gen surg and 33. Will try to " get him to 33 due to post cabg.    Physical Exam   Vital Signs: Temp: 98.9  F (37.2  C) Temp src: Oral BP: 98/50 Pulse: 66   Resp: 15 SpO2: 96 % O2 Device: Nasal cannula Oxygen Delivery: 1 LPM  Weight: 232 lbs 12.89 oz  Constitutional: Awake, alert, cooperative, no apparent distress  Respiratory: Clear to auscultation bilaterally, no crackles or wheezing  Cardiovascular: Regular rate and rhythm, normal S1 and S2, and no murmur noted  GI: Normal bowel sounds, soft, mildly distended, RUQ drain with serosang, incisions cdi   Skin/Integumen: No rashes, no cyanosis, no edema  Other: Sternal incision is cdi      Medical Decision Making       40 MINUTES SPENT BY ME on the date of service doing chart review, history, exam, documentation & further activities per the note.      Data   ------------------------- PAST 24 HR DATA REVIEWED -----------------------------------------------    I have personally reviewed the following data over the past 24 hrs:    21.6 (H)  \   11.4 (L)   / 332     134 (L) 98 15.4 /  152 (H)   4.8 23 1.24 (H) \     ALT: 32 AST: 32 AP: 150 TBILI: 1.5 (H)   ALB: 3.9 TOT PROTEIN: 7.6 LIPASE: 43       Imaging results reviewed over the past 24 hrs:   No results found for this or any previous visit (from the past 24 hours).

## 2025-04-01 LAB
ALBUMIN SERPL BCG-MCNC: 3.3 G/DL (ref 3.5–5.2)
ALP SERPL-CCNC: 101 U/L (ref 40–150)
ALT SERPL W P-5'-P-CCNC: 34 U/L (ref 0–70)
ANION GAP SERPL CALCULATED.3IONS-SCNC: 8 MMOL/L (ref 7–15)
AST SERPL W P-5'-P-CCNC: 55 U/L (ref 0–45)
BILIRUB DIRECT SERPL-MCNC: 0.22 MG/DL (ref 0–0.3)
BILIRUB SERPL-MCNC: 0.5 MG/DL
BUN SERPL-MCNC: 19.6 MG/DL (ref 8–23)
CALCIUM SERPL-MCNC: 8.6 MG/DL (ref 8.8–10.4)
CHLORIDE SERPL-SCNC: 103 MMOL/L (ref 98–107)
CREAT SERPL-MCNC: 1.43 MG/DL (ref 0.67–1.17)
EGFRCR SERPLBLD CKD-EPI 2021: 51 ML/MIN/1.73M2
ERYTHROCYTE [DISTWIDTH] IN BLOOD BY AUTOMATED COUNT: 15.9 % (ref 10–15)
GLUCOSE SERPL-MCNC: 105 MG/DL (ref 70–99)
HCO3 SERPL-SCNC: 27 MMOL/L (ref 22–29)
HCT VFR BLD AUTO: 29.2 % (ref 40–53)
HGB BLD-MCNC: 8.6 G/DL (ref 13.3–17.7)
MCH RBC QN AUTO: 30.8 PG (ref 26.5–33)
MCHC RBC AUTO-ENTMCNC: 29.5 G/DL (ref 31.5–36.5)
MCV RBC AUTO: 105 FL (ref 78–100)
PATH REPORT.COMMENTS IMP SPEC: NORMAL
PATH REPORT.COMMENTS IMP SPEC: NORMAL
PATH REPORT.FINAL DX SPEC: NORMAL
PATH REPORT.GROSS SPEC: NORMAL
PATH REPORT.MICROSCOPIC SPEC OTHER STN: NORMAL
PATH REPORT.RELEVANT HX SPEC: NORMAL
PHOTO IMAGE: NORMAL
PLATELET # BLD AUTO: 271 10E3/UL (ref 150–450)
POTASSIUM SERPL-SCNC: 4.4 MMOL/L (ref 3.4–5.3)
PROT SERPL-MCNC: 6.5 G/DL (ref 6.4–8.3)
RBC # BLD AUTO: 2.79 10E6/UL (ref 4.4–5.9)
SODIUM SERPL-SCNC: 138 MMOL/L (ref 135–145)
WBC # BLD AUTO: 8.5 10E3/UL (ref 4–11)

## 2025-04-01 PROCEDURE — 36415 COLL VENOUS BLD VENIPUNCTURE: CPT | Performed by: STUDENT IN AN ORGANIZED HEALTH CARE EDUCATION/TRAINING PROGRAM

## 2025-04-01 PROCEDURE — 250N000013 HC RX MED GY IP 250 OP 250 PS 637: Performed by: PHYSICIAN ASSISTANT

## 2025-04-01 PROCEDURE — 82040 ASSAY OF SERUM ALBUMIN: CPT | Performed by: STUDENT IN AN ORGANIZED HEALTH CARE EDUCATION/TRAINING PROGRAM

## 2025-04-01 PROCEDURE — 97530 THERAPEUTIC ACTIVITIES: CPT | Mod: GP | Performed by: PHYSICAL THERAPIST

## 2025-04-01 PROCEDURE — 80048 BASIC METABOLIC PNL TOTAL CA: CPT | Performed by: STUDENT IN AN ORGANIZED HEALTH CARE EDUCATION/TRAINING PROGRAM

## 2025-04-01 PROCEDURE — 250N000011 HC RX IP 250 OP 636: Performed by: PHYSICIAN ASSISTANT

## 2025-04-01 PROCEDURE — 82310 ASSAY OF CALCIUM: CPT | Performed by: STUDENT IN AN ORGANIZED HEALTH CARE EDUCATION/TRAINING PROGRAM

## 2025-04-01 PROCEDURE — 99232 SBSQ HOSP IP/OBS MODERATE 35: CPT | Performed by: STUDENT IN AN ORGANIZED HEALTH CARE EDUCATION/TRAINING PROGRAM

## 2025-04-01 PROCEDURE — 97161 PT EVAL LOW COMPLEX 20 MIN: CPT | Mod: GP | Performed by: PHYSICAL THERAPIST

## 2025-04-01 PROCEDURE — 97110 THERAPEUTIC EXERCISES: CPT | Mod: GP | Performed by: PHYSICAL THERAPIST

## 2025-04-01 PROCEDURE — 250N000013 HC RX MED GY IP 250 OP 250 PS 637: Performed by: STUDENT IN AN ORGANIZED HEALTH CARE EDUCATION/TRAINING PROGRAM

## 2025-04-01 PROCEDURE — 120N000001 HC R&B MED SURG/OB

## 2025-04-01 PROCEDURE — 85027 COMPLETE CBC AUTOMATED: CPT | Performed by: STUDENT IN AN ORGANIZED HEALTH CARE EDUCATION/TRAINING PROGRAM

## 2025-04-01 PROCEDURE — 84155 ASSAY OF PROTEIN SERUM: CPT | Performed by: STUDENT IN AN ORGANIZED HEALTH CARE EDUCATION/TRAINING PROGRAM

## 2025-04-01 PROCEDURE — 80053 COMPREHEN METABOLIC PANEL: CPT | Performed by: STUDENT IN AN ORGANIZED HEALTH CARE EDUCATION/TRAINING PROGRAM

## 2025-04-01 RX ADMIN — ASPIRIN 81 MG CHEWABLE TABLET 162 MG: 81 TABLET CHEWABLE at 09:11

## 2025-04-01 RX ADMIN — METHOCARBAMOL 500 MG: 500 TABLET ORAL at 09:11

## 2025-04-01 RX ADMIN — METOPROLOL SUCCINATE 25 MG: 25 TABLET, EXTENDED RELEASE ORAL at 09:12

## 2025-04-01 RX ADMIN — ATORVASTATIN CALCIUM 40 MG: 40 TABLET, FILM COATED ORAL at 09:11

## 2025-04-01 RX ADMIN — METHOCARBAMOL 500 MG: 500 TABLET ORAL at 18:51

## 2025-04-01 RX ADMIN — SENNOSIDES AND DOCUSATE SODIUM 1 TABLET: 50; 8.6 TABLET ORAL at 09:11

## 2025-04-01 RX ADMIN — PIPERACILLIN AND TAZOBACTAM 4.5 G: 4; .5 INJECTION, POWDER, LYOPHILIZED, FOR SOLUTION INTRAVENOUS at 21:27

## 2025-04-01 RX ADMIN — OXYCODONE HYDROCHLORIDE 5 MG: 5 TABLET ORAL at 23:43

## 2025-04-01 RX ADMIN — ACETAMINOPHEN 975 MG: 325 TABLET ORAL at 21:27

## 2025-04-01 RX ADMIN — GABAPENTIN 400 MG: 400 CAPSULE ORAL at 09:11

## 2025-04-01 RX ADMIN — GABAPENTIN 400 MG: 400 CAPSULE ORAL at 17:04

## 2025-04-01 RX ADMIN — ACETAMINOPHEN 975 MG: 325 TABLET ORAL at 17:04

## 2025-04-01 RX ADMIN — PIPERACILLIN AND TAZOBACTAM 4.5 G: 4; .5 INJECTION, POWDER, LYOPHILIZED, FOR SOLUTION INTRAVENOUS at 00:18

## 2025-04-01 RX ADMIN — PIPERACILLIN AND TAZOBACTAM 4.5 G: 4; .5 INJECTION, POWDER, LYOPHILIZED, FOR SOLUTION INTRAVENOUS at 12:58

## 2025-04-01 RX ADMIN — GABAPENTIN 400 MG: 400 CAPSULE ORAL at 21:27

## 2025-04-01 RX ADMIN — METHOCARBAMOL 500 MG: 500 TABLET ORAL at 21:27

## 2025-04-01 RX ADMIN — ACETAMINOPHEN 975 MG: 325 TABLET ORAL at 09:10

## 2025-04-01 RX ADMIN — PIPERACILLIN AND TAZOBACTAM 4.5 G: 4; .5 INJECTION, POWDER, LYOPHILIZED, FOR SOLUTION INTRAVENOUS at 06:30

## 2025-04-01 RX ADMIN — OXYCODONE HYDROCHLORIDE 5 MG: 5 TABLET ORAL at 12:33

## 2025-04-01 ASSESSMENT — ACTIVITIES OF DAILY LIVING (ADL)
ADLS_ACUITY_SCORE: 57
ADLS_ACUITY_SCORE: 55
ADLS_ACUITY_SCORE: 57
ADLS_ACUITY_SCORE: 57
ADLS_ACUITY_SCORE: 55
ADLS_ACUITY_SCORE: 57
ADLS_ACUITY_SCORE: 55
ADLS_ACUITY_SCORE: 58
ADLS_ACUITY_SCORE: 55
ADLS_ACUITY_SCORE: 57
ADLS_ACUITY_SCORE: 55
ADLS_ACUITY_SCORE: 55
ADLS_ACUITY_SCORE: 57
ADLS_ACUITY_SCORE: 55
ADLS_ACUITY_SCORE: 57

## 2025-04-01 NOTE — PROGRESS NOTES
Care Management Follow Up    Length of Stay (days): 2    Expected Discharge Date: 04/02/2025     Concerns to be Addressed: discharge planning     Patient plan of care discussed at interdisciplinary rounds: Yes    Anticipated Discharge Disposition: Skilled Nursing Facility              Anticipated Discharge Services:    Anticipated Discharge DME:      Patient/family educated on Medicare website which has current facility and service quality ratings:    Education Provided on the Discharge Plan:    Patient/Family in Agreement with the Plan: yes    Referrals Placed by CM/SW:    Private pay costs discussed: Not applicable    Discussed  Partnership in Safe Discharge Planning  document with patient/family: No     Handoff Completed: No, handoff not indicated or clinically appropriate    Additional Information:  Writer spoke with Antonina at [facility] who states they can take pt tomorrow after 2pm. Antonina states a new prior authorization would be needed through pt's insurance company as pt did not hold his bed.   Writer spoke with  to see if he feels pt stable for discharge tomorrow. Doctor states possibly. Doctor mentions that pt is hopeful to go to his daughters home but doctor still feels that TCU may be best. Doctor states he has ordered a PT consult. Care management will await PT recommendation.   Addendum: Bedside Rn states that pt's daughter Ania is contact moving forward as pt's other daughter cristhian is very ill right now.     Next Steps: awaiting medical stability.     Alida Hunt, IMMANUELW  Social Work  [facility]

## 2025-04-01 NOTE — PROGRESS NOTES
[facility]    Medicine Progress Note - Hospitalist Service    Date of Admission:  3/29/2025    Assessment & Plan   Fermín Josue is a 75 year old male with a past medical history of CAD s/p CABG 3/15/25, HTN, HLD, CKD II, JAMEE presents to the hospital with abdominal pain and chest pain. He was found to have acute cholecystitis.      Acute gangrenous cholecystitis  S/p cholecystectomy 3/30/25  Patient presenting with abdominal pain after eating on 3/29 found to have an elevated Alk Phos and AST. CT abdomen significant for a distended gallbladder with pericholecystic fat stranding concerning for acute cholecystosis.  Lipase is elevated, but no pancreatitis seen on CT. His situation is complicated by his recent CABG.    Cholecystectomy 3/30/25 showed gangrenous cholecystitis with necrotic back wall. Drain was left in place post procedure  -CV Surgery consult  -Cardiology consult 3/30 for clearance, patient was cleared and they signed off  -General surgery consult  -Zosyn  -Diet per surgery  -drain care per surgery     CAD s/p 2V CABG 3/15/25  HTN  HLD  Elevated troponin due to non-ischemic myocardial injury  Reports that his abdominal pain has now moved up to his chest.  EKG is sinus rhythm with T wave inversions in the anterior leads which appears to be a new finding.  His troponin was mildly elevated at 74 and trended down to 61 on repeat.    - CV surgery consult  - continue PTA ASA, metoprolol, atorvastatin   - Hold lasix  - Resume losartan  - Tele     Macrocytic anemia  The patient's anemia is improving since surgery.  Of note MCV is 99 concerning for macrocytic anemia.  -Follow-up B12 and folate     Chronic medical conditions: resume pta meds as needed once med rec is complete  BPH  CKD stage 2 stable  JAMEE -Not on cpap  Obesity  Prediabetes  Renal cancer s/p nephrectomy  Non-small cell lung ca s/p vats wedge resection of RUL          Diet: Advance Diet as Tolerated: Full Liquid Diet;  "Regular Diet Adult    DVT Prophylaxis: Pneumatic Compression Devices  Whitmore Catheter: Not present  Lines: None     Cardiac Monitoring: ACTIVE order. Indication: Tachyarrhythmias, acute (48 hours)  Code Status: Full Code      Clinically Significant Risk Factors         # Hyponatremia: Lowest Na = 134 mmol/L in last 2 days, will monitor as appropriate       # Hypoalbuminemia: Lowest albumin = 3.4 g/dL at 3/31/2025  8:57 AM, will monitor as appropriate     # Hypertension: Noted on problem list            # Obesity: Estimated body mass index is 35.4 kg/m  as calculated from the following:    Height as of 3/19/25: 1.727 m (5' 8\").    Weight as of this encounter: 105.6 kg (232 lb 12.9 oz)., PRESENT ON ADMISSION       # Financial/Environmental Concerns: none   # History of CABG: noted on surgical history       Social Drivers of Health    Tobacco Use: Medium Risk (3/30/2025)    Patient History     Smoking Tobacco Use: Former     Smokeless Tobacco Use: Never   Physical Activity: Unknown (9/26/2024)    Exercise Vital Sign     Days of Exercise per Week: 1 day   Social Connections: Unknown (9/26/2024)    Social Connection and Isolation Panel [NHANES]     Frequency of Social Gatherings with Friends and Family: More than three times a week          Disposition Plan     Medically Ready for Discharge: Anticipated Tomorrow             Fly Cortez MD  Hospitalist Service  [facility]  Securely message with [employer] (more info)  Text page via [facility] Paging/Directory   ______________________________________________________________________    Interval History   Seen in AM. Feels okay. Hasn't moved much. Discussed he needs to work on that. He is hopeful that he will go home after discharge. Still waiting on therapies. Also, his daughter who he lives with is ill. Unclear with what. Discussed that we absolutely should not send him to live with someone is ill this soon after CABG.  Discussed with surgery and CV " surgery teams.   Discussed with RN and SW teams.     Physical Exam   Vital Signs: Temp: 97.9  F (36.6  C) Temp src: Oral BP: 101/60 Pulse: 65   Resp: 16 SpO2: 92 % O2 Device: None (Room air) Oxygen Delivery: 1 LPM  Weight: 232 lbs 12.89 oz  Constitutional: Awake, alert, cooperative, no apparent distress  Respiratory: Clear to auscultation bilaterally, no crackles or wheezing  Cardiovascular: Regular rate and rhythm, normal S1 and S2, and no murmur noted  GI: Normal bowel sounds, soft, mildly distended, RUQ drain with serosang, incisions cdi   Skin/Integumen: No rashes, no cyanosis, no edema  Other: Sternal incision is cdi      Medical Decision Making       44 MINUTES SPENT BY ME on the date of service doing chart review, history, exam, documentation & further activities per the note.      Data   ------------------------- PAST 24 HR DATA REVIEWED -----------------------------------------------    I have personally reviewed the following data over the past 24 hrs:    15.7 (H)  \   8.7 (L)   / 282     N/A N/A N/A /  N/A   N/A N/A N/A \     ALT: 25 AST: 43 AP: 100 TBILI: 1.0   ALB: 3.4 (L) TOT PROTEIN: 6.5 LIPASE: 31       Imaging results reviewed over the past 24 hrs:   No results found for this or any previous visit (from the past 24 hours).

## 2025-04-01 NOTE — PROGRESS NOTES
[facility]    General Surgery  Daily Progress Note       Assessment and Plan:   Fermín Josue is a 75 year old male who is POD#2 s/p Robo Franca for Gangrenous cholecystitis. Doing well. Passing gas. Tolerating FLD.    Plan:  Pain control as scheduled and needed, single kidney, no Ibuprofen or Toradol  FLD. ADAT  SAVI drain with I/Os, sanguinous output. Output has improved in color and quality. Will likely be removed prior to discharge  Continue abx for gangrenous gallbladder. WBC has normalized.   Can discontinue abx after 5 days, ok to transition to augmentin  Ambulate. Will need to wean oxygen.  Pulm toiletry  Likely ok to discharge from surgical perspective in next 1-2 days after weaning off O2, tolerating more food, ambulating and pain control.    Yosvany Almonte MD         Interval History:   Pain much improved from RUQ. Pain in LUQ. Walked once.           Physical Exam:   Temp: 98.1  F (36.7  C) Temp src: Oral BP: 115/81 Pulse: 62   Resp: 18 SpO2: 92 % O2 Device: None (Room air)      I/O last 3 completed shifts:  In: 1490 [P.O.:640; I.V.:850]  Out: 1055 [Urine:950; Drains:105]      Constitutional: healthy, alert, and no distress   Cardiovascular: RRR  Respiratory: breathing comfortably on NC 1L, clear speech, very talkative  Abdomen: distended, protuberant, soft, appropriately tender to palpation. No rebound. No peritonitis. SAVI drain in RUQ with serosang output (80+50cc). Incisions c/d/i      Data   Recent Labs   Lab 04/01/25  0636 03/31/25  0857 03/30/25  1201 03/29/25  2250   WBC 8.5 15.7* 21.6* 11.7*   HGB 8.6* 8.7* 11.4* 11.1*   * 102* 99 99    282 332 325     --  134* 135   POTASSIUM 4.4  --  4.8 4.0   CHLORIDE 103  --  98 95*   CO2 27  --  23 28   BUN 19.6  --  15.4 17.6   CR 1.43*  --  1.24* 1.21*   ANIONGAP 8  --  13 12   CANDICE 8.6*  --  8.9 9.3   *  --  152* 166*   ALBUMIN 3.3* 3.4* 3.9 4.2   PROTTOTAL 6.5 6.5 7.6 8.0   BILITOTAL 0.5 1.0 1.5* 0.7    ALKPHOS 101 100 150 179*   ALT 34 25 32 40   AST 55* 43 32 54*

## 2025-04-01 NOTE — PROGRESS NOTES
04/01/25 1400   Appointment Info   Signing Clinician's Name / Credentials (PT) Abril Godwin PT   Living Environment   People in Home child(julio), adult   Current Living Arrangements house   Home Accessibility stairs to enter home   Number of Stairs, Main Entrance 3   Stair Railings, Main Entrance railings on both sides of stairs;railings safe and in good condition   Transportation Anticipated family or friend will provide   Living Environment Comments lives w daughter who presently has the flu;   Self-Care   Usual Activity Tolerance good   Current Activity Tolerance moderate   Regular Exercise No   Equipment Currently Used at Home grab bar, toilet;grab bar, tub/shower;shower chair;walker, standard;commode chair;cane, straight   Fall history within last six months no   Activity/Exercise/Self-Care Comment admitted from TCU, indep w ADLs and mobility but limited activity prior to that admit; has WW to use if needed   General Information   Onset of Illness/Injury or Date of Surgery 04/01/25   Pertinent History of Current Problem (include personal factors and/or comorbidities that impact the POC) 75 year old male who is POD#1 s/p Robo Franca for Gangrenous cholecystitis.   Existing Precautions/Restrictions fall;abdominal;sternal   Cognition   Affect/Mental Status (Cognition) WFL   Orientation Status (Cognition) oriented x 4   Follows Commands (Cognition) WFL   Pain Assessment   Patient Currently in Pain Yes, see Vital Sign flowsheet   Range of Motion (ROM)   Range of Motion ROM deficits secondary to surgical procedure;ROM deficits secondary to pain   Strength (Manual Muscle Testing)   Strength (Manual Muscle Testing) Deficits observed during functional mobility   Bed Mobility   Comment, (Bed Mobility) rolling to R w CGA   Transfers   Comment, (Transfers) min A sit to stand w WW, no UE assiste per precautions   Gait/Stairs (Locomotion)   Powell Level (Gait) contact guard   Assistive Device (Gait) walker,  front-wheeled   Distance in Feet (Gait) 8   Pattern (Gait) step-through   Deviations/Abnormal Patterns (Gait) aquiles decreased;gait speed decreased;stride length decreased   Balance   Balance Comments good in sitting and static standing, demonstrates stability with ambulation using WW   Clinical Impression   Criteria for Skilled Therapeutic Intervention Yes, treatment indicated   PT Diagnosis (PT) Difficulty walking   Influenced by the following impairments dec strength impaired gait dec indep transfers pain   Functional limitations due to impairments impaired mobility   Clinical Presentation (PT Evaluation Complexity) evolving   Clinical Presentation Rationale clincial judgemetn   Clinical Decision Making (Complexity) moderate complexity   Planned Therapy Interventions (PT) gait training;strengthening;transfer training;progressive activity/exercise;bed mobility training   Risk & Benefits of therapy have been explained evaluation/treatment results reviewed;care plan/treatment goals reviewed;risks/benefits reviewed   PT Discharge Planning   PT Discharge Recommendation (DC Rec) Transitional Care Facility   PT Rationale for DC Rec Pt admitted from TCU s/p chela req A x 1 for mobility, limited by deconditioning continued sternal precautions from CABg on 3/15/25 will benefit from PT during stay and is appropriate to return to TCU for continued therapy.

## 2025-04-01 NOTE — PLAN OF CARE
Goal Outcome Evaluation:    Orientation: A&O x4    Vitals/Tele: VSS on RA, SR 1st degree AV block.    Pain: Denies, scheduled tylenol, robaxin, gabapentin.    Labs: WBC 15.7 down from 21.6    IV Access/drains: PIV SL, SAVI drain    Diet: Full liquid, advance as tolerated     Mobility: Ax1 GBW    GI/: Ex cath, no BM this shift, passing gas    Wound/Skin: SAVI site, 3 lap sites, scattered scabs and bruises, old incision sites.    Other Info: productive cough, pt oral suctioning w/yonker       Discharge Plan: TBD, pt from TCU, bed wasn't held, possibly returning upon discharge.      See Flow sheets for assessment

## 2025-04-02 ENCOUNTER — DOCUMENTATION ONLY (OUTPATIENT)
Dept: GERIATRICS | Facility: CLINIC | Age: 75
End: 2025-04-02
Payer: COMMERCIAL

## 2025-04-02 VITALS
HEART RATE: 74 BPM | RESPIRATION RATE: 16 BRPM | WEIGHT: 232.81 LBS | OXYGEN SATURATION: 93 % | DIASTOLIC BLOOD PRESSURE: 68 MMHG | SYSTOLIC BLOOD PRESSURE: 122 MMHG | BODY MASS INDEX: 35.4 KG/M2 | TEMPERATURE: 98.2 F

## 2025-04-02 LAB
ALBUMIN SERPL BCG-MCNC: 3.4 G/DL (ref 3.5–5.2)
ALP SERPL-CCNC: 112 U/L (ref 40–150)
ALT SERPL W P-5'-P-CCNC: 59 U/L (ref 0–70)
ANION GAP SERPL CALCULATED.3IONS-SCNC: 11 MMOL/L (ref 7–15)
AST SERPL W P-5'-P-CCNC: 79 U/L (ref 0–45)
BILIRUB DIRECT SERPL-MCNC: 0.25 MG/DL (ref 0–0.3)
BILIRUB SERPL-MCNC: 0.5 MG/DL
BUN SERPL-MCNC: 15.5 MG/DL (ref 8–23)
CALCIUM SERPL-MCNC: 8.6 MG/DL (ref 8.8–10.4)
CHLORIDE SERPL-SCNC: 103 MMOL/L (ref 98–107)
CREAT SERPL-MCNC: 1.27 MG/DL (ref 0.67–1.17)
EGFRCR SERPLBLD CKD-EPI 2021: 59 ML/MIN/1.73M2
ERYTHROCYTE [DISTWIDTH] IN BLOOD BY AUTOMATED COUNT: 15.7 % (ref 10–15)
GLUCOSE SERPL-MCNC: 105 MG/DL (ref 70–99)
HCO3 SERPL-SCNC: 26 MMOL/L (ref 22–29)
HCT VFR BLD AUTO: 28.8 % (ref 40–53)
HGB BLD-MCNC: 9.1 G/DL (ref 13.3–17.7)
MCH RBC QN AUTO: 31.2 PG (ref 26.5–33)
MCHC RBC AUTO-ENTMCNC: 31.6 G/DL (ref 31.5–36.5)
MCV RBC AUTO: 99 FL (ref 78–100)
PLATELET # BLD AUTO: 300 10E3/UL (ref 150–450)
POTASSIUM SERPL-SCNC: 3.8 MMOL/L (ref 3.4–5.3)
PROT SERPL-MCNC: 6.5 G/DL (ref 6.4–8.3)
RBC # BLD AUTO: 2.92 10E6/UL (ref 4.4–5.9)
SODIUM SERPL-SCNC: 140 MMOL/L (ref 135–145)
WBC # BLD AUTO: 6.1 10E3/UL (ref 4–11)

## 2025-04-02 PROCEDURE — 99239 HOSP IP/OBS DSCHRG MGMT >30: CPT | Performed by: STUDENT IN AN ORGANIZED HEALTH CARE EDUCATION/TRAINING PROGRAM

## 2025-04-02 PROCEDURE — 999N000111 HC STATISTIC OT IP EVAL DEFER

## 2025-04-02 PROCEDURE — 80053 COMPREHEN METABOLIC PANEL: CPT | Performed by: STUDENT IN AN ORGANIZED HEALTH CARE EDUCATION/TRAINING PROGRAM

## 2025-04-02 PROCEDURE — 82248 BILIRUBIN DIRECT: CPT | Performed by: STUDENT IN AN ORGANIZED HEALTH CARE EDUCATION/TRAINING PROGRAM

## 2025-04-02 PROCEDURE — 250N000013 HC RX MED GY IP 250 OP 250 PS 637: Performed by: PHYSICIAN ASSISTANT

## 2025-04-02 PROCEDURE — 85041 AUTOMATED RBC COUNT: CPT | Performed by: STUDENT IN AN ORGANIZED HEALTH CARE EDUCATION/TRAINING PROGRAM

## 2025-04-02 PROCEDURE — 36415 COLL VENOUS BLD VENIPUNCTURE: CPT | Performed by: STUDENT IN AN ORGANIZED HEALTH CARE EDUCATION/TRAINING PROGRAM

## 2025-04-02 PROCEDURE — 80048 BASIC METABOLIC PNL TOTAL CA: CPT | Performed by: STUDENT IN AN ORGANIZED HEALTH CARE EDUCATION/TRAINING PROGRAM

## 2025-04-02 PROCEDURE — 250N000013 HC RX MED GY IP 250 OP 250 PS 637: Performed by: STUDENT IN AN ORGANIZED HEALTH CARE EDUCATION/TRAINING PROGRAM

## 2025-04-02 PROCEDURE — 85018 HEMOGLOBIN: CPT | Performed by: STUDENT IN AN ORGANIZED HEALTH CARE EDUCATION/TRAINING PROGRAM

## 2025-04-02 PROCEDURE — 250N000011 HC RX IP 250 OP 636: Performed by: PHYSICIAN ASSISTANT

## 2025-04-02 RX ORDER — AMOXICILLIN 250 MG
1 CAPSULE ORAL 2 TIMES DAILY PRN
Status: DISCONTINUED | OUTPATIENT
Start: 2025-04-02 | End: 2025-04-02 | Stop reason: HOSPADM

## 2025-04-02 RX ORDER — SENNOSIDES A AND B 8.6 MG/1
1 TABLET, FILM COATED ORAL 2 TIMES DAILY PRN
DISCHARGE
Start: 2025-04-02

## 2025-04-02 RX ORDER — ACETAMINOPHEN 325 MG/1
975 TABLET ORAL 3 TIMES DAILY
DISCHARGE
Start: 2025-04-02

## 2025-04-02 RX ORDER — SENNOSIDES A AND B 8.6 MG/1
1 TABLET, FILM COATED ORAL 2 TIMES DAILY PRN
Qty: 20 TABLET | Refills: 0 | Status: SHIPPED | OUTPATIENT
Start: 2025-04-02 | End: 2025-04-02

## 2025-04-02 RX ORDER — OXYCODONE HYDROCHLORIDE 5 MG/1
2.5-5 TABLET ORAL EVERY 4 HOURS PRN
Qty: 8 TABLET | Refills: 0 | Status: SHIPPED | OUTPATIENT
Start: 2025-04-02 | End: 2025-04-02

## 2025-04-02 RX ORDER — METOPROLOL SUCCINATE 25 MG/1
25 TABLET, EXTENDED RELEASE ORAL DAILY
DISCHARGE
Start: 2025-04-02

## 2025-04-02 RX ORDER — TRIAMCINOLONE ACETONIDE 1 MG/G
CREAM TOPICAL
DISCHARGE
Start: 2025-04-02

## 2025-04-02 RX ORDER — POLYETHYLENE GLYCOL 3350 17 G/17G
17 POWDER, FOR SOLUTION ORAL DAILY
DISCHARGE
Start: 2025-04-02

## 2025-04-02 RX ORDER — ATORVASTATIN CALCIUM 40 MG/1
40 TABLET, FILM COATED ORAL DAILY
DISCHARGE
Start: 2025-04-02

## 2025-04-02 RX ORDER — OXYCODONE HYDROCHLORIDE 5 MG/1
2.5-5 TABLET ORAL EVERY 4 HOURS PRN
Qty: 10 TABLET | Refills: 0 | Status: SHIPPED | OUTPATIENT
Start: 2025-04-02

## 2025-04-02 RX ORDER — AMOXICILLIN 250 MG
2 CAPSULE ORAL 2 TIMES DAILY PRN
Status: DISCONTINUED | OUTPATIENT
Start: 2025-04-02 | End: 2025-04-02 | Stop reason: HOSPADM

## 2025-04-02 RX ORDER — GUAIFENESIN 600 MG/1
600 TABLET, EXTENDED RELEASE ORAL 2 TIMES DAILY
Status: DISCONTINUED | OUTPATIENT
Start: 2025-04-02 | End: 2025-04-02 | Stop reason: HOSPADM

## 2025-04-02 RX ORDER — NITROGLYCERIN 0.4 MG/1
TABLET SUBLINGUAL
DISCHARGE
Start: 2025-04-02

## 2025-04-02 RX ORDER — GABAPENTIN 400 MG/1
400 CAPSULE ORAL 3 TIMES DAILY
DISCHARGE
Start: 2025-04-02

## 2025-04-02 RX ORDER — GUAIFENESIN 600 MG/1
600 TABLET, EXTENDED RELEASE ORAL 2 TIMES DAILY
DISCHARGE
Start: 2025-04-02

## 2025-04-02 RX ADMIN — AMOXICILLIN AND CLAVULANATE POTASSIUM 1 TABLET: 875; 125 TABLET ORAL at 11:05

## 2025-04-02 RX ADMIN — METHOCARBAMOL 500 MG: 500 TABLET ORAL at 14:30

## 2025-04-02 RX ADMIN — METOPROLOL SUCCINATE 25 MG: 25 TABLET, EXTENDED RELEASE ORAL at 09:16

## 2025-04-02 RX ADMIN — METHOCARBAMOL 500 MG: 500 TABLET ORAL at 09:16

## 2025-04-02 RX ADMIN — ATORVASTATIN CALCIUM 40 MG: 40 TABLET, FILM COATED ORAL at 09:16

## 2025-04-02 RX ADMIN — OXYCODONE HYDROCHLORIDE 5 MG: 5 TABLET ORAL at 04:14

## 2025-04-02 RX ADMIN — FUROSEMIDE 20 MG: 20 TABLET ORAL at 09:16

## 2025-04-02 RX ADMIN — PIPERACILLIN AND TAZOBACTAM 4.5 G: 4; .5 INJECTION, POWDER, LYOPHILIZED, FOR SOLUTION INTRAVENOUS at 03:15

## 2025-04-02 RX ADMIN — ACETAMINOPHEN 975 MG: 325 TABLET ORAL at 09:15

## 2025-04-02 RX ADMIN — ASPIRIN 81 MG CHEWABLE TABLET 162 MG: 81 TABLET CHEWABLE at 09:15

## 2025-04-02 RX ADMIN — ONDANSETRON 4 MG: 4 TABLET, ORALLY DISINTEGRATING ORAL at 11:40

## 2025-04-02 RX ADMIN — GUAIFENESIN 600 MG: 600 TABLET, EXTENDED RELEASE ORAL at 11:05

## 2025-04-02 RX ADMIN — GABAPENTIN 400 MG: 400 CAPSULE ORAL at 09:15

## 2025-04-02 ASSESSMENT — ACTIVITIES OF DAILY LIVING (ADL)
ADLS_ACUITY_SCORE: 58
ADLS_ACUITY_SCORE: 62
ADLS_ACUITY_SCORE: 57
ADLS_ACUITY_SCORE: 62
ADLS_ACUITY_SCORE: 58
ADLS_ACUITY_SCORE: 58
ADLS_ACUITY_SCORE: 62
ADLS_ACUITY_SCORE: 58

## 2025-04-02 NOTE — PLAN OF CARE
"Patient Name: Keerthi  MRN: [id number]  Date of Admission: 3/29/2025  Reason for Admission: POD 3 lap chela  Level of Care: Med-surg    Vitals:   BP Readings from Last 1 Encounters:   04/02/25 132/71     Pulse Readings from Last 1 Encounters:   04/02/25 69     Wt Readings from Last 1 Encounters:   03/31/25 105.6 kg (232 lb 12.9 oz)     Ht Readings from Last 1 Encounters:   03/19/25 1.727 m (5' 8\")     Estimated body mass index is 35.4 kg/m  as calculated from the following:    Height as of 3/19/25: 1.727 m (5' 8\").    Weight as of this encounter: 105.6 kg (232 lb 12.9 oz).  Temp Readings from Last 1 Encounters:   04/02/25 97.9  F (36.6  C) (Oral)       Pain: Pain goal 0 Pain Rating 0-7 Effective pain medication/regimen PRN oxycodone x2 and scheduled robaxin, tylenol    CV Surgery Patient: Yes CABG x3 on 3/15, readmit for lap chela    Assessment    Resp: SORIA, otherwise VSS on RA  Telemetry: SR  Neuro: A&Ox4, repeats himself at times-pt reports this is baseline  GI/: Regular diet, tolerating well. Incontinent, voiding adequately w/external catheter in place, +1 BM  Skin/Wounds: 3 lap sites on abdomen covered w/steri-strips, SAVI drain on RLQ w/dressing CDI, old sternal incision, old CT sites RYAN/WNL  Lines/Drains: PIV SL infusing intermittent abx. RLQ SAVI drain to bulb suction  Activity: Assist x1 w/GBW  Sleep: Slept comfortably between cares  Abnormal Labs:     Aggression Stop Light: Green          Patient Care Plan: Pain management, encourage ambulation, SAVI drain to bulb suction-stripped q4h per orders.       Goal Outcome Evaluation:      Plan of Care Reviewed With: patient    Overall Patient Progress: improvingOverall Patient Progress: improving           "

## 2025-04-02 NOTE — DISCHARGE INSTRUCTIONS
[facility] - [facility]  Discharge Instructions: Post-Operative Cholecystectomy    ACTIVITY  Expect to feel tired after your surgery.  This will gradually resolve.    Take frequent, short walks and increase your activity gradually.    Avoid strenuous physical activity or heavy lifting greater than 15-20 lbs. for 2-3 weeks.  You may climb stairs.  You may drive without restrictions when you are not using any prescription pain medication and feel comfortable in a car.  You may return to work/school when you are comfortable without any prescription pain medication.    WOUND CARE  You may remove your outer dressing or Band-Aids and shower 48 hours after the surgery.  Pat your incisions dry and leave them open to air.  Re-apply dressing (Band-Aids or gauze/tape) as needed for comfort or drainage.  You may have steri-strips (looks like white tape) on your incision.  You may peel off the steri-strips 2 weeks after your surgery if they have not peeled off on their own.  Do not soak your incisions in a tub or pool for 2 weeks.   Do not apply any lotions, creams, or ointments to your incisions.  A ridge under your incisions is normal and will gradually resolve.  After your drain is removed, you are left with a small wound that you should allow to heal on its own.  It is normal to have clear-yellowish drainage over the next 3-5 days before it scabs over.  Cover this wound with gauze or band-aid as needed for drainage.  Surrounding redness from the suture/drain irritation should improve over the next week, if it gets worse you should call our clinic.    DIET  Start with liquids, then gradually resume your regular diet as tolerated.  Avoid heavy, spicy, and greasy meals for 2-3 days.  Drink plenty of fluids to stay hydrated.  It is not uncommon to experience some loose stools or diarrhea after surgery.  This is your body's way of adapting to the bile which will slowly drain into your intestine.  A low fat diet  may help with this.  This should improve over 1-2 months.    PAIN  Expect some tenderness and discomfort at the incision sites.  You should take your muscle relaxant (robaxin) four times a day for the first week.  You may use the prescribed narcotic pain medication at your discretion.  Expect gradual resolution of your pain over several days.  You may take ibuprofen with food (unless you have been told not to) or acetaminophen/Tylenol instead of or in addition to your prescribed pain medication.   You may take Tylenol 500 - 1000 mg every 6 hours as needed for pain - do not exceed 4,000 mg of tylenol (acetaminophen) from all sources in 24 hours.  You may take Ibuprofen 400 - 600 mg every 6 hours as needed for pain - do not exceed 2,400 mg of ibuprofen from all sources in 24 hours. Do not use Ibuprofen for any longer than necessary or take if you have been told not to by another medical provider.  You may alternate Ibuprofen and Tylenol every 3 hours as needed for pain. Reserve the narcotic prescription for refractory pain.  Do not drink alcohol or drive while you are taking pain medications.  You may apply ice to your incisions in 20 minute intervals as needed for the next 48 hours.  After that time, consider switching to heat if you prefer.    EXPECTATIONS  Pain medications can cause constipation.  Limit use when possible.  Take an over the counter or prescribed stool softener/stimulant, such as Colace or Senna, 1-2 times a day with plenty of water.  You may take a mild over the counter laxative, such as Miralax or a suppository, as needed.  You may discontinue these medications once you are having regular bowel movements and/or are no longer taking your narcotic pain medication.    You may have shoulder or upper back discomfort due to the gas used in surgery.  This is temporary and should resolve in 48-72 hours.  Short, frequent walks may help with this.  If you are unable to urinate for 8 hours or feel as though  you are not emptying your bladder adequately, we recommend you seek care at an ER or Urgent Care facility for possible catheter placement.     FOLLOW UP  Our office will contact you in approximately 2-3 weeks to check on your progress and answer any questions you may have.  If you are doing well, you will not need to return for a follow up appointment.  If any concerns are identified over the phone, we will help you make an appointment to see a provider.   If you have not received a phone call, have any questions or concerns, or would like to be seen, please call us at [phone] and ask to speak with our nurse.  We are located at [address] [place] [address].    CALL OUR OFFICE -[phone] IF YOU HAVE:   Chills or fever above 101 F.  Increased redness, warmth, or drainage at your incisions.  Significant bleeding.  Pain not relieved by your pain medication or rest.  Increasing pain after the first 48 hours.  Any other concerns or questions.

## 2025-04-02 NOTE — PLAN OF CARE
Orientations: A/OX4. But is forgetful and frequently repeats himself.  Vitals/Pain: VSS except pressures can be soft. On room air. Abdominal pain managed with robaxin, gabapentin and tyelnol. Oxy for breakthrough pain  Tele: SR with 1st degree AV block  Lines/Drains: PIV saline locked, SAVI to bulb suction stripping Q4hrs  Skin/Wounds: old sternal wounds 3 lap sites and SAVI drain to bulb suction  GI/: voiding adequately incontinent of loose stools.  Labs: Abnormal/Trends, Electrolyte Replacement- WDL  Ambulation/Assist: Up with AX1 GB walker. Walked the halls x2  Plan: plan to increase mobility and pain management.

## 2025-04-02 NOTE — PLAN OF CARE
Physical Therapy Discharge Summary    Reason for therapy discharge:    Discharged to transitional care facility.    Progress towards therapy goal(s). See goals on Care Plan in [facility] electronic health record for goal details.  Goals partially met.  Barriers to achieving goals:   discharge from facility.    Therapy recommendation(s):    Continued therapy is recommended.  Rationale/Recommendations:   . Pt admitted from TCU s/p chela req A x 1 for mobility, limited by deconditioning continued sternal precautions from CABg on 3/15/25 will benefit from PT during stay and is appropriate to return to TCU for continued therapy.  PT Brief overview of current status: Ax1  PT Total Distance Amb During Session (feet): 150      Recommendation above provided by last treating therapist.

## 2025-04-02 NOTE — PROGRESS NOTES
Care Management Discharge Note    Discharge Date: 04/02/2025       Discharge Disposition: Transitional Care    Discharge Services:      Discharge DME:      Discharge Transportation: family or friend will provide    Private pay costs discussed: Not applicable    Does the patient's insurance plan have a 3 day qualifying hospital stay waiver?  No    PAS Confirmation Code:  as previous  Patient/family educated on Medicare website which has current facility and service quality ratings: no    Education Provided on the Discharge Plan: Yes  Persons Notified of Discharge Plans: Dtr, RN, MD, facility  Patient/Family in Agreement with the Plan: yes    Handoff Referral Completed: Yes, [facility] PCP: Internal handoff referral completed    Additional Information:  Pt will discharge today back to [facility] via son in law at 1500. Orders faxed and facility updated. Prior-auth received from [facility], [id number]. SW updated pt's daughter, Ania. Ania initially stated pt/family did not want TCU, however, pt's caretaker/dtr, Ariadna is at home ill with the flu and contagious until Friday. Also, family not available to pick pt up today. SW offered to arrange other modes of transportation, if needed. Ania wanted to talk to pt's surgeon about discharge. KAITLIN spoke with pt's bedside nurse about communication with MD. After further communication with pt's family, son in law Meliton will transport to TCU and family now in agreement.      JOSE MANUEL Mora, [facility] Hospital[facility]  [phone] Desk phone  [phone] Cell/text (Preferred)  [facility]

## 2025-04-02 NOTE — PLAN OF CARE
Occupational Therapy: Orders received. Chart reviewed and discussed with care team, including IP PT. Chart indicates plans for TCU. All inpatient therapy needs are being met with IP PT. Will defer therapy recommendations to IP PT. Will defer OT to next level of care. Thank you for this consult and please re-consult if needed. Will complete orders.

## 2025-04-02 NOTE — PROGRESS NOTES
[facility]    General Surgery  Daily Progress Note       Assessment and Plan:   Fermín Josue is a 75 year old male who had recent CABG 3/15, admitted with acute gangrenous cholecystitis now 3 days s/p robotic assisted cholecystectomy.    PLAN:  - Diet as tolerated.  - Pain controlled with oral analgesia.  - Senokot BID prn and miralax daily prn.  - Zosyn while inpatient, discharge with Augmentin.  - SAVI drain to be removed prior to discharge.  **ADDENDUM**  Discussed with Dr. Cortez and patient to discharge back to TCU today. SAVI drain removed and site covered with 4x4 gauze and tape.  - Ambulate QID to assist with bowel function, PCDs for DVT prophylaxis.   - Acute blood loss anemia, Hgb now stable. Okay for DVT chemoprophylaxis from our standpoint, back on PTA aspirin.  - Encourage deep breathe and IS.   - Medical management per primary team.    DISPOSITION:  - Discharge home when medically appropriate. Doing well from surgical standpoint.  - Discharge with robaxin, oxycodone, senokot, and to complete 10 days total days of antibiotics.  - Instructions reviewed and surgical discharge orders in. Telephone follow up in approximately 2 weeks.        Interval History:   Fermín Josue is seen on surgical rounds. States gagging with foods due to phlegm. Having bowel movements, getting cleaned up in bed this morning.  Pain primarily at fascial closure site and SAVI drain site. Worse with movement, on oral analgesia. Hypertensive this morning, previously soft BP's. Afebrile.         Physical Exam:   Temp: 98.4  F (36.9  C) Temp src: Oral BP: (!) 153/79 Pulse: 74   Resp: 18 SpO2: 98 % O2 Device: Nasal cannula Oxygen Delivery: 1 LPM    I/O last 3 completed shifts:  In: -   Out: 720 [Urine:600; Drains:120]    GENERAL: VS reviewed, alert, oriented, no acute distress  LUNGS: Normal respiratory effort, no wheezing  ABDOMEN:  Soft, appropriately tender, non-distended, SAVI drain 120 ml/24 hours  serosanguinous  INCISION: Steris in place. Incisions are clean, dry, and intact. No surrounding erythema.  EXTREMITIES: Moving all extremities, no gross deformities  NEUROLOGICAL: Grossly non-focal, mood & affect appropriate    Data   Recent Labs   Lab 04/02/25  0550 04/01/25  0636 03/31/25  0857 03/30/25  1201   WBC 6.1 8.5 15.7* 21.6*   HGB 9.1* 8.6* 8.7* 11.4*   MCV 99 105* 102* 99    271 282 332    138  --  134*   POTASSIUM 3.8 4.4  --  4.8   CHLORIDE 103 103  --  98   CO2 26 27  --  23   BUN 15.5 19.6  --  15.4   CR 1.27* 1.43*  --  1.24*   ANIONGAP 11 8  --  13   CANDICE 8.6* 8.6*  --  8.9   * 105*  --  152*   ALBUMIN 3.4* 3.3* 3.4* 3.9   PROTTOTAL 6.5 6.5 6.5 7.6   BILITOTAL 0.5 0.5 1.0 1.5*   ALKPHOS 112 101 100 150   ALT 59 34 25 32   AST 79* 55* 43 32       Medina Portillo PA-C    Please use [facility] to page 7:30am-4pm, page on-call surgeon after 4pm  Office number: [phone]

## 2025-04-02 NOTE — DISCHARGE SUMMARY
"[facility]  Hospitalist Discharge Summary      Date of Admission:  3/29/2025  Date of Discharge:  4/2/2025  Discharging Provider: Fly Cortez MD  Discharge Service: Hospitalist Service    Discharge Diagnoses   Acute gangrenous cholecystitis  S/p cholecystectomy 3/30/25  CAD s/p 2V CABG 3/15/25  HTN  HLD  Elevated troponin due to non-ischemic myocardial injury  Macrocytic anemia  BPH  CKD stage 2 stable  JAMEE -Not on cpap  Obesity  Prediabetes  Renal cancer s/p nephrectomy  Non-small cell lung ca s/p vats wedge resection of RUL    Clinically Significant Risk Factors     # Obesity: Estimated body mass index is 35.4 kg/m  as calculated from the following:    Height as of 3/19/25: 1.727 m (5' 8\").    Weight as of this encounter: 105.6 kg (232 lb 12.9 oz).       Follow-ups Needed After Discharge   Follow-up Appointments       Follow Up      Dr. Almonte's surgical office will contact you in approximately 2-3 weeks to check on your progress and answer any questions you may have. If you are doing well, you will not need to return for a follow up appointment. If any concerns are identified over the phone, we will help you make an appointment to see a provider.  If you have not received a phone call, have any questions or concerns, or would like to be seen, please call us at [phone] and ask to speak with our nurse. We are located at [address] [place] [address].        Follow Up and recommended labs and tests      Follow up with care home physician.  The following labs/tests are recommended: BMP in 3-5 days.                Unresulted Labs Ordered in the Past 30 Days of this Admission       Date and Time Order Name Status Description    3/15/2025  7:12 AM Prepare red blood cells (unit) Preliminary     3/15/2025  7:10 AM Prepare red blood cells (unit) Preliminary         These results will be followed up by n/a    Discharge Disposition   Discharged to rehabilitation " facility  Condition at discharge: Stable    Hospital Course   Fermín Josue is a 75 year old male with a past medical history of CAD s/p CABG 3/15/25, HTN, HLD, CKD II, JAMEE presents to the hospital with abdominal pain and chest pain. He was found to have acute gangrenous cholecystitis and underwent cholecystectomy on 3/30/25. A SAVI drain was in post procedure and removed on 4/2. He will discharge on Augmentin to complete a 7 days course of antibiotics post cholecystectomy. He is going to TCU due to PT recommendations. Additionally his daughter has influenza so he is unable to discharge to stay with her.      Acute gangrenous cholecystitis  S/p cholecystectomy 3/30/25  Patient presenting with abdominal pain after eating on 3/29 found to have an elevated Alk Phos and AST. CT abdomen significant for a distended gallbladder with pericholecystic fat stranding concerning for acute cholecystosis.  Lipase is elevated, but no pancreatitis seen on CT. His situation is complicated by his recent CABG.    Cholecystectomy 3/30/25 showed gangrenous cholecystitis with necrotic back wall. Drain was left in place post procedure and removed 4/2  -General surgery follow up   -Discharge with augmentin to complete 7 day course       CAD s/p 2V CABG 3/15/25  HTN  HLD  Elevated troponin due to non-ischemic myocardial injury  Reports that his abdominal pain has now moved up to his chest.  EKG is sinus rhythm with T wave inversions in the anterior leads which appears to be a new finding.  His troponin was mildly elevated at 74 and trended down to 61 on repeat.    - continue PTA ASA, metoprolol, atorvastatin, lasix, losartan  - continue follow up with cardiology and [facility] as previously recommended.      Macrocytic anemia  The patient's anemia is improving since surgery.       Chronic medical conditions: resume pta meds as needed once med rec is complete  BPH  CKD stage 2 stable  JAMEE -Not on cpap  Obesity  Prediabetes  Renal cancer s/p  nephrectomy  Non-small cell lung ca s/p vats wedge resection of RUL    Consultations This Hospital Stay   CARDIOTHORACIC SURGERY IP CONSULT  SURGERY GENERAL IP CONSULT  CARE MANAGEMENT / SOCIAL WORK IP CONSULT  CARDIOLOGY IP CONSULT  PHYSICAL THERAPY ADULT IP CONSULT  OCCUPATIONAL THERAPY ADULT IP CONSULT  VASCULAR ACCESS ADULT IP CONSULT  PHYSICAL THERAPY ADULT IP CONSULT  OCCUPATIONAL THERAPY ADULT IP CONSULT    Code Status   Full Code    Time Spent on this Encounter   I, Fly Cortez MD, personally saw the patient today and spent greater than 30 minutes discharging this patient.       Fly Cortez MD  [place] [address] [place] [address]  Phone: [phone]  ______________________________________________________________________    Physical Exam   Vital Signs: Temp: 98.4  F (36.9  C) Temp src: Oral BP: (!) 153/79 Pulse: 74   Resp: 18 SpO2: 98 % O2 Device: Nasal cannula Oxygen Delivery: 1 LPM  Weight: 232 lbs 12.89 oz  Constitutional: Awake, alert, cooperative, no apparent distress  Respiratory: Clear to auscultation bilaterally, no crackles or wheezing  Cardiovascular: Regular rate and rhythm, normal S1 and S2, and no murmur noted  GI: Normal bowel sounds, soft, non-distended, non-tender  Skin/Integumen: No rashes, no cyanosis, no edema  Other:  Sternal incision cdi. Abd dressing with bandaids       Primary Care Physician   Vernell Bucio    Discharge Orders      Primary Care - Care Coordination Referral      Activity    Please see attached discharge instructions.     Follow Up    Dr. Almonte's surgical office will contact you in approximately 2-3 weeks to check on your progress and answer any questions you may have. If you are doing well, you will not need to return for a follow up appointment. If any concerns are identified over the phone, we will help you make an appointment to see a provider.  If you have not received a phone call, have any  questions or concerns, or would like to be seen, please call us at [phone] and ask to speak with our nurse. We are located at [address] [place] [address].     Reason for your hospital stay    You were in the hospital due to acute gangrenous cholecystitis     Follow Up and recommended labs and tests    Follow up with [facility] physician.  The following labs/tests are recommended: BMP in 3-5 days.     Physical Therapy Adult Consult    Evaluate and treat as clinically indicated.    Reason:  post cabg and post chela     Occupational Therapy Adult Consult    Evaluate and treat as clinically indicated.    Reason:  post cabg     Diet    Please see attached discharge instructions.       Significant Results and Procedures   Most Recent 3 CBC's:  Recent Labs   Lab Test 04/02/25  0550 04/01/25  0636 03/31/25  0857   WBC 6.1 8.5 15.7*   HGB 9.1* 8.6* 8.7*   MCV 99 105* 102*    271 282     Most Recent 3 BMP's:  Recent Labs   Lab Test 04/02/25  0550 04/01/25  0636 03/30/25  1201    138 134*   POTASSIUM 3.8 4.4 4.8   CHLORIDE 103 103 98   CO2 26 27 23   BUN 15.5 19.6 15.4   CR 1.27* 1.43* 1.24*   ANIONGAP 11 8 13   CANDICE 8.6* 8.6* 8.9   * 105* 152*     Most Recent 2 LFT's:  Recent Labs   Lab Test 04/02/25  0550 04/01/25  0636   AST 79* 55*   ALT 59 34   ALKPHOS 112 101   BILITOTAL 0.5 0.5   ,   Results for orders placed or performed during the hospital encounter of 03/29/25   CT Chest/Abdomen/Pelvis w Contrast    Narrative    EXAM: CT CHEST/ABDOMEN/PELVIS W CONTRAST  LOCATION: [facility]  DATE: 3/30/2025    INDICATION: Epigastric pain, recent CABG.  COMPARISON: 10/24/2024  TECHNIQUE: CT scan of the chest, abdomen, and pelvis was performed following injection of IV contrast. Multiplanar reformats were obtained. Dose reduction techniques were used.   CONTRAST: 115 mL Isovue 370    FINDINGS:   LUNGS AND PLEURA: Interval resection of a right upper lobe nodule.  Atelectasis/scarring is seen adjacent to the right upper lobe pulmonary wedge resection suture. Small left pleural effusion with mild dependent atelectasis in the lower lobes, left   greater than right. No right pleural effusion. No confluent pneumonic consolidation or pneumothorax.    MEDIASTINUM/AXILLAE present.: Small amount of substernal edema without loculated gas or fluid collections.    CORONARY ARTERY CALCIFICATION: Moderate. A stent is seen in the left anterior descending coronary artery. Post-CABG changes    HEPATOBILIARY: Normal liver. A too small to characterize cystic lesion in the left hepatic lobe is stable, compatible with a cyst. Gallbladder is distended with mild pericholecystic fat stranding.    PANCREAS: Normal.    SPLEEN: Normal.    ADRENAL GLANDS: Normal.    KIDNEYS/BLADDER: Left kidney is surgically absent. No abnormal enhancing soft tissues in the left nephrectomy bed. Right kidney is normal in size without hydronephrosis. A 3 mm nonobstructing stone is seen in the inferior pole of the right kidney.   Scattered cystic lesions in the right kidney are visually benign measuring up to 1.3 cm, and do not require follow-up. Urinary bladder is normal.    BOWEL: No inflammatory bowel thickening or bowel obstruction. Appendix is normal. Multiple diverticula in the descending and sigmoid colon without acute diverticulitis. No free fluid or free air.    LYMPH NODES: Normal.    VASCULATURE: Moderate aortoiliac atherosclerosis. No abdominal aortic aneurysm.    PELVIC ORGANS: Normal.    MUSCULOSKELETAL: Recent sternal splitting procedure with intact sternotomy wires. No organized subcutaneous fluid collections. Bilateral gynecomastia there is increased from prior. Posterior spinal fusion instrumentation from T8 to L1 redemonstrated. No   suspicious osseous lesions or acute fractures.        Impression    IMPRESSION:    1.  Gallbladder distention with pericholecystic fat stranding, highly suspicious for  acute cholecystitis.    2.  Postsurgical changes of recent CABG without acute complications.    3.  Interval partial wedge resection of right upper lobe pulmonary nodule with mild scarring/atelectasis adjacent to the pulmonary suture.    4.  3 mm nonobstructing right renal stone, left total nephrectomy and colonic diverticulosis.       Discharge Medications   Current Discharge Medication List        START taking these medications    Details   amoxicillin-clavulanate (AUGMENTIN) 875-125 MG tablet Take 1 tablet by mouth 2 times daily for 5 days.    Associated Diagnoses: Gangrenous cholecystitis      guaiFENesin (MUCINEX) 600 MG 12 hr tablet Take 1 tablet (600 mg) by mouth 2 times daily.    Associated Diagnoses: Gangrenous cholecystitis      polyethylene glycol (MIRALAX) 17 GM/Dose powder Take 17 g by mouth daily.    Associated Diagnoses: Gangrenous cholecystitis      senna (SENOKOT) 8.6 MG tablet Take 1 tablet by mouth 2 times daily as needed for constipation (while taking oxycodone).    Associated Diagnoses: Gangrenous cholecystitis           CONTINUE these medications which have CHANGED    Details   acetaminophen (TYLENOL) 325 MG tablet Take 3 tablets (975 mg) by mouth 3 times daily.    Associated Diagnoses: Gangrenous cholecystitis      oxyCODONE (ROXICODONE) 5 MG tablet Take 0.5-1 tablets (2.5-5 mg) by mouth every 4 hours as needed for moderate pain or severe pain (2.5 mg for moderate pain, 5 mg for severe pain).  Qty: 8 tablet, Refills: 0    Associated Diagnoses: Gangrenous cholecystitis           CONTINUE these medications which have NOT CHANGED    Details   aspirin (ASA) 81 MG chewable tablet Take 2 tablets (162 mg) by mouth daily.    Associated Diagnoses: S/P CABG (coronary artery bypass graft)      atorvastatin (LIPITOR) 40 MG tablet Take 1 tablet (40 mg) by mouth daily  Qty: 90 tablet, Refills: 4    Associated Diagnoses: Hyperlipidemia, unspecified hyperlipidemia type      calcium carbonate (TUMS) 500 MG  chewable tablet Take 1 chew tab by mouth 4 times daily as needed for heartburn.      furosemide (LASIX) 20 MG tablet Take 1 tablet (20 mg) by mouth daily.    Associated Diagnoses: Peripheral edema      gabapentin (NEURONTIN) 400 MG capsule Take 1 capsule (400 mg) by mouth 3 times daily  Qty: 90 capsule, Refills: 11    Associated Diagnoses: Back muscle spasm      losartan (COZAAR) 25 MG tablet Take 1 tablet (25 mg) by mouth daily.    Associated Diagnoses: S/P CABG (coronary artery bypass graft)      methocarbamol (ROBAXIN) 500 MG tablet Take 1 tablet (500 mg) by mouth every 6 hours as needed for muscle spasms or other (pain).    Associated Diagnoses: S/P CABG (coronary artery bypass graft)      metoprolol succinate ER (TOPROL XL) 25 MG 24 hr tablet Take 1 tablet (25 mg) by mouth daily  Qty: 90 tablet, Refills: 4    Associated Diagnoses: Hypertension goal BP (blood pressure) < 140/90      nitroGLYcerin (NITROSTAT) 0.4 MG sublingual tablet For chest pain place 1 tablet under the tongue every 5 minutes for 3 doses. If symptoms persist 5 minutes after 1st dose call 911.  Qty: 9 tablet, Refills: 3    Associated Diagnoses: Coronary artery disease involving native coronary artery of native heart without angina pectoris      senna-docusate (SENOKOT-S/PERICOLACE) 8.6-50 MG tablet Take 1 tablet by mouth 2 times daily as needed for constipation.  Qty: 20 tablet, Refills: 0    Associated Diagnoses: Drug-induced constipation      triamcinolone (KENALOG) 0.1 % external cream Apply topically to affected area 2 times daily.  Qty: 15 g, Refills: 3    Associated Diagnoses: Dermatitis           Allergies   No Known Allergies

## 2025-04-03 DIAGNOSIS — Z09 HOSPITAL DISCHARGE FOLLOW-UP: ICD-10-CM

## 2025-04-03 NOTE — CARE PLAN
Orientations: A/OX4.  Vitals/Pain: VSS  On room air. Abdominal pain managed with robaxin, gabapentin and tyelnol. Oxy for breakthrough pain  Tele: SR with 1st degree AV block  Lines/Drains: PIV saline locked, SAVI removed this morning  Skin/Wounds: old sternal wounds 3 lap sites and old SAVI site.  GI/: voiding adequately incontinent of loose stools.  Labs: Abnormal/Trends, Electrolyte Replacement- WDL  Ambulation/Assist: Up with AX1 GB walker. Walked the halls x1  Plan: patient discharged to TCU this afternoon. Family assisted with transferred with family. Medications and orders sent to [place].

## 2025-04-04 ENCOUNTER — TRANSITIONAL CARE UNIT VISIT (OUTPATIENT)
Dept: GERIATRICS | Facility: CLINIC | Age: 75
End: 2025-04-04
Payer: COMMERCIAL

## 2025-04-04 VITALS
BODY MASS INDEX: 34.39 KG/M2 | DIASTOLIC BLOOD PRESSURE: 77 MMHG | HEIGHT: 68 IN | RESPIRATION RATE: 18 BRPM | HEART RATE: 65 BPM | WEIGHT: 226.9 LBS | OXYGEN SATURATION: 95 % | TEMPERATURE: 96.5 F | SYSTOLIC BLOOD PRESSURE: 123 MMHG

## 2025-04-04 DIAGNOSIS — E78.5 HYPERLIPIDEMIA, UNSPECIFIED HYPERLIPIDEMIA TYPE: ICD-10-CM

## 2025-04-04 DIAGNOSIS — D62 ABLA (ACUTE BLOOD LOSS ANEMIA): ICD-10-CM

## 2025-04-04 DIAGNOSIS — R53.81 PHYSICAL DECONDITIONING: ICD-10-CM

## 2025-04-04 DIAGNOSIS — Z78.9 IMPAIRED MOBILITY AND ACTIVITIES OF DAILY LIVING: ICD-10-CM

## 2025-04-04 DIAGNOSIS — N18.31 CKD STAGE 3A, GFR 45-59 ML/MIN (H): ICD-10-CM

## 2025-04-04 DIAGNOSIS — K81.0 ACUTE CHOLECYSTITIS: Primary | ICD-10-CM

## 2025-04-04 DIAGNOSIS — I10 HYPERTENSION GOAL BP (BLOOD PRESSURE) < 140/90: ICD-10-CM

## 2025-04-04 DIAGNOSIS — N40.0 BENIGN PROSTATIC HYPERPLASIA, UNSPECIFIED WHETHER LOWER URINARY TRACT SYMPTOMS PRESENT: ICD-10-CM

## 2025-04-04 DIAGNOSIS — C34.91 NON-SMALL CELL CANCER OF RIGHT LUNG (H): ICD-10-CM

## 2025-04-04 DIAGNOSIS — I25.10 CORONARY ARTERY DISEASE INVOLVING NATIVE CORONARY ARTERY OF NATIVE HEART WITHOUT ANGINA PECTORIS: ICD-10-CM

## 2025-04-04 DIAGNOSIS — Z74.09 IMPAIRED MOBILITY AND ACTIVITIES OF DAILY LIVING: ICD-10-CM

## 2025-04-04 DIAGNOSIS — R19.7 DIARRHEA, UNSPECIFIED TYPE: ICD-10-CM

## 2025-04-04 PROCEDURE — 99309 SBSQ NF CARE MODERATE MDM 30: CPT | Performed by: NURSE PRACTITIONER

## 2025-04-04 RX ORDER — LOPERAMIDE HYDROCHLORIDE 2 MG/1
2 CAPSULE ORAL 4 TIMES DAILY PRN
COMMUNITY

## 2025-04-04 NOTE — PROGRESS NOTES
[facility] GERIATRICS    PRIMARY CARE PROVIDER AND CLINIC:  Vernell Bucio MD, [address] / [place] [address]  Chief Complaint   Patient presents with    Hospital F/U      Likely Medical Record Number:  [id number]  Place of Service where encounter took place:  [facility] ([facility]) [[id number]]    Fermín Josue  is a 75 year old  (1950), admitted to the above facility from  [facility]. Hospital stay 3/29/35 through 4/2/25.  HPI:    74 yo male PMHx CAD s/p CABG 3/15/25, HTN, HLD, CKD II, JAMEE, BPH, prediabetes, renal cancer s/p nephrectomy, non-small cell lung cancer s/p vats, wedge resection RUL initially hospitalized 3/13 - 3/25, 2025 after presenting for evaluation of 1-2 week history of intermittent chest tightness, concern for unstable angina. Underwent coronary angiogram with severe left main disease. Seen by CV surgery and underwent CABGx2. Surgery was uncomplicated, postop with intermittent self-resolving junctional rhythm. Deemed medically stable, following therapy evaluations referred to TCU for rehab, med management.  ----re-hospitalized 3/29-4/2/25 due to abdominal pain, found to have acute gangrenous cholecystitis and underwent cholecystectomy on 3/30/25. A SAVI drain was in post procedure and removed on 4/2. Discharged on Augmentin to complete a 7 days course. On admission troponin elevated at 74, trended down to 61. Remains on asa, Lipitor, metoprolol and losartan. Anemia: Hgb 9.1 on discharge 4/2. Back to TCU.     Seen for hospital follow-up visit. Remains Full Code per previous orders. Complains of diarrhea with multiple stools daily. Chest and abdominal pain is managed well. No headaches, dizziness, dyspnea, bladder issues. BP range 118-147/54-84 and sats 94% room air. Patient and daughter report he would like to discharge home at some time over the weekend.     CODE STATUS/ADVANCE DIRECTIVES DISCUSSION:  Prior  CPR/Full code   ALLERGIES:  No Known Allergies   PAST MEDICAL HISTORY:   Past Medical History:   Diagnosis Date    CAD (coronary artery disease) 02/05/2015    3 stents    History of angina     History of skin graft     Right lower limb    Hypertension goal BP (blood pressure) < 140/90 02/05/2015    Kidney stone 02/17/2021    Malignant neoplasm of kidney excluding renal pelvis, left (H) 2021    Sleep apnea       PAST SURGICAL HISTORY:   has a past surgical history that includes Kidney Stone Surgery (2003); Heart Cath, Angioplasty (11/2007); skin graft, each addn 100sqcm; Colonoscopy (N/A, 07/16/2020); Combined Cystoscopy, Retrogrades, Ureteroscopy, Insert Stent (Left, 02/19/2021); Cystoscopy, dilate ureter(s), combined (Left, 02/19/2021); nephrectomy rt/lt (Left, 04/2021); Lumbar Spine Surgery (N/A, 02/13/2023); Thoracoscopic wedge resection lung (Right, 1/28/2025); Coronary Angiogram (N/A, 3/14/2025); Left Heart Catheterization (N/A, 3/14/2025); Left Ventriculogram (N/A, 3/14/2025); Bypass graft artery coronary (N/A, 3/15/2025); and Laparoscopic cholecystectomy (N/A, 3/30/2025).  FAMILY HISTORY: family history includes Alzheimer Disease in his father; Cancer in his maternal grandfather and maternal uncle; Dementia in his mother; Hypertension in his mother; Leukemia in his maternal grandmother; Pneumonia (age of onset: 91) in his mother.  SOCIAL HISTORY:   reports that he quit smoking about 17 years ago. His smoking use included cigarettes. He started smoking about 61 years ago. He has a 66 pack-year smoking history. He has never used smokeless tobacco. He reports current alcohol use. He reports current drug use. Drug: Marijuana.  Patient's living condition: lives with family, Daughter     Post Discharge Medication Reconciliation Status:   MED REC REQUIRED  Post Medication Reconciliation Status: discharge medications reconciled and changed, per note/orders       Current Outpatient Medications   Medication Sig Dispense Refill    acetaminophen  (TYLENOL) 325 MG tablet Take 3 tablets (975 mg) by mouth 3 times daily.      amoxicillin-clavulanate (AUGMENTIN) 875-125 MG tablet Take 1 tablet by mouth 2 times daily for 5 days.      aspirin (ASA) 81 MG chewable tablet Take 2 tablets (162 mg) by mouth daily.      atorvastatin (LIPITOR) 40 MG tablet Take 1 tablet (40 mg) by mouth daily.      calcium carbonate (TUMS) 500 MG chewable tablet Take 1 chew tab by mouth 4 times daily as needed for heartburn.      furosemide (LASIX) 20 MG tablet Take 1 tablet (20 mg) by mouth daily.      gabapentin (NEURONTIN) 400 MG capsule Take 1 capsule (400 mg) by mouth 3 times daily.      guaiFENesin (MUCINEX) 600 MG 12 hr tablet Take 1 tablet (600 mg) by mouth 2 times daily.      losartan (COZAAR) 25 MG tablet Take 1 tablet (25 mg) by mouth daily.      methocarbamol (ROBAXIN) 500 MG tablet Take 1 tablet (500 mg) by mouth every 6 hours as needed for muscle spasms or other (pain).      metoprolol succinate ER (TOPROL XL) 25 MG 24 hr tablet Take 1 tablet (25 mg) by mouth daily.      nitroGLYcerin (NITROSTAT) 0.4 MG sublingual tablet For chest pain place 1 tablet under the tongue every 5 minutes for 3 doses. If symptoms persist 5 minutes after 1st dose call 911.      oxyCODONE (ROXICODONE) 5 MG tablet Take 0.5-1 tablets (2.5-5 mg) by mouth every 4 hours as needed for moderate pain or severe pain (2.5 mg for moderate pain, 5 mg for severe pain). 10 tablet 0    polyethylene glycol (MIRALAX) 17 GM/Dose powder Take 17 g by mouth daily.      senna (SENOKOT) 8.6 MG tablet Take 1 tablet by mouth 2 times daily as needed for constipation (while taking oxycodone).      senna-docusate (SENOKOT-S/PERICOLACE) 8.6-50 MG tablet Take 1 tablet by mouth 2 times daily as needed for constipation. 20 tablet 0    triamcinolone (KENALOG) 0.1 % external cream Apply topically to affected area 2 times daily.       No current facility-administered medications for this visit.       ROS:  10 point ROS of systems  "including Constitutional, Eyes, Respiratory, Cardiovascular, Gastroenterology, Genitourinary, Integumentary, Musculoskeletal, Psychiatric were all negative except for pertinent positives noted in my HPI.    Vitals:  /77   Pulse 65   Temp (!) 96.5  F (35.8  C)   Resp 18   Ht 1.727 m (5' 8\")   Wt 102.9 kg (226 lb 14.4 oz)   SpO2 95%   BMI 34.50 kg/m    Exam:  GENERAL APPEARANCE:  Alert, in no distress, pleasant, cooperative, oriented x 4  EYES:  EOM, lids, pupils and irises normal, sclera clear and conjunctiva normal, no discharge or mattering on lids or lashes noted  ENT:  Mouth normal, moist mucous membranes, nose normal without drainage or crusting, external ears without lesions, hearing acuity intact  NECK: supple, symmetrical, trachea midline  RESP:  respiratory effort normal, no chest wall tenderness, no respiratory distress, Lung sounds clear, patient is on room air  CV:  Auscultation of heart done, rate and rhythm controlled and regular, no murmur, no rub or gallop. Edema none bilateral lower extremities.   ABDOMEN:  normal bowel sounds, soft, nontender, no palpable masses.  M/S:   Gait and station walks with assist , no tenderness or swelling of the joints; able to move all extremities, digits normal  SKIN:  Inspection and palpation of skin and subcutaneous tissue: chest wall incision open to air, no redness or drainage. Lap sites covered, no drainage  NEURO: cranial nerves 2-12 grossly intact, no facial asymmetry, no speech deficits and able to follow directions, moves all extremities symmetrically  PSYCH:  insight and judgement and memory appear intact, affect and mood normal     Lab/Diagnostic data:  Most Recent 3 CBC's:  Recent Labs   Lab Test 04/02/25  0550 04/01/25  0636 03/31/25  0857   WBC 6.1 8.5 15.7*   HGB 9.1* 8.6* 8.7*   MCV 99 105* 102*    271 282     Most Recent 3 BMP's:  Recent Labs   Lab Test 04/02/25  0550 04/01/25  0636 03/30/25  1201    138 134*   POTASSIUM 3.8 " 4.4 4.8   CHLORIDE 103 103 98   CO2 26 27 23   BUN 15.5 19.6 15.4   CR 1.27* 1.43* 1.24*   ANIONGAP 11 8 13   CANDICE 8.6* 8.6* 8.9   * 105* 152*     Most Recent 2 LFT's:  Recent Labs   Lab Test 04/02/25  0550 04/01/25  0636   AST 79* 55*   ALT 59 34   ALKPHOS 112 101   BILITOTAL 0.5 0.5     Most Recent Hemoglobin A1c:  Recent Labs   Lab Test 01/14/25  1056   A1C 5.8*       ASSESSMENT/PLAN:    Acute cholecystitis  Acute, s/p surgery. Complete Augmentin course. Monitor lap sites. Follow-up surgeon as planned.     ABLA (acute blood loss anemia)  Acute with surgeries. Hgb 9.1 on 4/2. Check CBC on 4/7    CAD s/p CABGx2 (LIMA to LAD, SVG to RCA) 3/15/25  HTN/HLD  Physical deconditioning  Impaired mobility and ADLs  Acute on chronic, s/p surgery. Continues on ASA, BB, ARB, statin and lasix 20 mg daily. Pain control with PRN tylenol, robaxin, oxycodone. Therapies eval and treat. Follow-up cardiology as recommended.     NSCLC s/p VATS, wedge resection of RUL nodule 01/2025  Follows with [place] oncology, surgical margins negative. Follow-up as outpatient.     CKD-3  Baseline Cr 1.3. check BMP on 4/7. Last Cr 1.27 on 4/2.     BPH  Continue tamsulosin    Diarrhea  New complaint. Stop senna and senna s. Change Miralax to PRN. May use TUCKS pads PRN pain.     Orders:  Stop senna, senna s  Change Miralax to 17 gm PO daily PRN constipation  CBC and BMP on 4/7 diagnosis anemia, CKD  TUCKS pads to rectum QID PRN pain  May discharge home when ready with current meds/orders. Follow-up PCP 2-3 weeks and surgeons as planned. Send script for oxycodone 5 mg tabs #15 no refills to [facility] pharmacy    Electronically signed by:  Fior Sethi, LONNY CNP

## 2025-04-06 ENCOUNTER — LAB REQUISITION (OUTPATIENT)
Dept: LAB | Facility: CLINIC | Age: 75
End: 2025-04-06

## 2025-04-06 DIAGNOSIS — I50.32 CHRONIC DIASTOLIC (CONGESTIVE) HEART FAILURE (H): ICD-10-CM

## 2025-04-06 DIAGNOSIS — D64.9 ANEMIA, UNSPECIFIED: ICD-10-CM

## 2025-04-07 ENCOUNTER — DOCUMENTATION ONLY (OUTPATIENT)
Dept: OTHER | Facility: CLINIC | Age: 75
End: 2025-04-07

## 2025-04-07 ENCOUNTER — LAB REQUISITION (OUTPATIENT)
Dept: LAB | Facility: CLINIC | Age: 75
End: 2025-04-07

## 2025-04-07 ENCOUNTER — PATIENT OUTREACH (OUTPATIENT)
Dept: CARE COORDINATION | Facility: CLINIC | Age: 75
End: 2025-04-07
Payer: COMMERCIAL

## 2025-04-07 DIAGNOSIS — E87.6 HYPOKALEMIA: ICD-10-CM

## 2025-04-07 DIAGNOSIS — K81.0 GANGRENOUS CHOLECYSTITIS: ICD-10-CM

## 2025-04-07 LAB
ANION GAP SERPL CALCULATED.3IONS-SCNC: 11 MMOL/L (ref 7–15)
BUN SERPL-MCNC: 9.4 MG/DL (ref 8–23)
CALCIUM SERPL-MCNC: 8.8 MG/DL (ref 8.8–10.4)
CHLORIDE SERPL-SCNC: 104 MMOL/L (ref 98–107)
CREAT SERPL-MCNC: 0.92 MG/DL (ref 0.67–1.17)
EGFRCR SERPLBLD CKD-EPI 2021: 86 ML/MIN/1.73M2
ERYTHROCYTE [DISTWIDTH] IN BLOOD BY AUTOMATED COUNT: 16.4 % (ref 10–15)
GLUCOSE SERPL-MCNC: 140 MG/DL (ref 70–99)
HCO3 SERPL-SCNC: 27 MMOL/L (ref 22–29)
HCT VFR BLD AUTO: 34.4 % (ref 40–53)
HGB BLD-MCNC: 10.4 G/DL (ref 13.3–17.7)
MCH RBC QN AUTO: 31 PG (ref 26.5–33)
MCHC RBC AUTO-ENTMCNC: 30.2 G/DL (ref 31.5–36.5)
MCV RBC AUTO: 103 FL (ref 78–100)
PLATELET # BLD AUTO: 270 10E3/UL (ref 150–450)
POTASSIUM SERPL-SCNC: 3.3 MMOL/L (ref 3.4–5.3)
RBC # BLD AUTO: 3.35 10E6/UL (ref 4.4–5.9)
SODIUM SERPL-SCNC: 142 MMOL/L (ref 135–145)
WBC # BLD AUTO: 6.3 10E3/UL (ref 4–11)

## 2025-04-07 PROCEDURE — 80048 BASIC METABOLIC PNL TOTAL CA: CPT | Performed by: NURSE PRACTITIONER

## 2025-04-07 PROCEDURE — 36415 COLL VENOUS BLD VENIPUNCTURE: CPT | Performed by: NURSE PRACTITIONER

## 2025-04-07 PROCEDURE — 85048 AUTOMATED LEUKOCYTE COUNT: CPT | Performed by: NURSE PRACTITIONER

## 2025-04-07 PROCEDURE — 82310 ASSAY OF CALCIUM: CPT | Performed by: NURSE PRACTITIONER

## 2025-04-07 PROCEDURE — 85014 HEMATOCRIT: CPT | Performed by: NURSE PRACTITIONER

## 2025-04-07 PROCEDURE — 82374 ASSAY BLOOD CARBON DIOXIDE: CPT | Performed by: NURSE PRACTITIONER

## 2025-04-07 PROCEDURE — 84295 ASSAY OF SERUM SODIUM: CPT | Performed by: NURSE PRACTITIONER

## 2025-04-07 PROCEDURE — P9604 ONE-WAY ALLOW PRORATED TRIP: HCPCS | Performed by: NURSE PRACTITIONER

## 2025-04-07 RX ORDER — OXYCODONE HYDROCHLORIDE 5 MG/1
2.5-5 TABLET ORAL EVERY 4 HOURS PRN
Qty: 20 TABLET | Refills: 0 | Status: SHIPPED | OUTPATIENT
Start: 2025-04-07

## 2025-04-07 NOTE — LETTER
Ambulatory Care Coordination to TCU Hand In Communication:     Name: Fermín Josue is a patient of Vernell Bucio at [facility] and I am the care coordinator.   CC Contact Information: Email: [email or web address]   Phone: [phone]     I would like to collaborate with the TCU Care team during this patient's stay; please invite me to any care conferences.     Please feel free to contact me with questions or further collaboration in discharge planning.      IMMANUEL SadlerN, RN, PHN   Care Coordinator-Ambulatory Care Management  [facility]  [phone]

## 2025-04-07 NOTE — PROGRESS NOTES
Clinic Care Coordination Contact  Care Coordination Transition Communication    Clinical Data: Patient was hospitalized at [facility] from 3/29/25 to 4/2/25 with diagnosis of non-small cell lung cancer s/p vats, wedge resection RUL initially hospitalized 3/13 - 3/25, .     Assessment: Patient has transitioned to TCU/ARU for short term rehabilitation:    Facility Name: [facility]   Transition Communication:  Notified facility of Primary Care- Care Coordination support via Epic In-Basket message.    Plan: Care Coordinator will await notification from facility staff informing of patient's discharge plans/needs. Care Coordinator will review chart and outreach to facility staff every 4 weeks and as needed.     Kelly Lam, IMMANUELN, RN, PHN   Care Coordinator-Ambulatory Care Management  [facility]  [phone]

## 2025-04-08 ENCOUNTER — DISCHARGE SUMMARY NURSING HOME (OUTPATIENT)
Dept: GERIATRICS | Facility: CLINIC | Age: 75
End: 2025-04-08
Payer: COMMERCIAL

## 2025-04-08 VITALS
TEMPERATURE: 98 F | RESPIRATION RATE: 18 BRPM | WEIGHT: 230 LBS | DIASTOLIC BLOOD PRESSURE: 78 MMHG | SYSTOLIC BLOOD PRESSURE: 133 MMHG | BODY MASS INDEX: 34.86 KG/M2 | HEART RATE: 61 BPM | HEIGHT: 68 IN | OXYGEN SATURATION: 100 %

## 2025-04-08 DIAGNOSIS — I10 HYPERTENSION GOAL BP (BLOOD PRESSURE) < 140/90: ICD-10-CM

## 2025-04-08 DIAGNOSIS — Z78.9 IMPAIRED MOBILITY AND ACTIVITIES OF DAILY LIVING: ICD-10-CM

## 2025-04-08 DIAGNOSIS — N18.31 CKD STAGE 3A, GFR 45-59 ML/MIN (H): ICD-10-CM

## 2025-04-08 DIAGNOSIS — E78.5 HYPERLIPIDEMIA, UNSPECIFIED HYPERLIPIDEMIA TYPE: ICD-10-CM

## 2025-04-08 DIAGNOSIS — C34.91 NON-SMALL CELL CANCER OF RIGHT LUNG (H): ICD-10-CM

## 2025-04-08 DIAGNOSIS — R53.81 PHYSICAL DECONDITIONING: ICD-10-CM

## 2025-04-08 DIAGNOSIS — I25.10 CORONARY ARTERY DISEASE INVOLVING NATIVE CORONARY ARTERY OF NATIVE HEART WITHOUT ANGINA PECTORIS: ICD-10-CM

## 2025-04-08 DIAGNOSIS — N40.0 BENIGN PROSTATIC HYPERPLASIA, UNSPECIFIED WHETHER LOWER URINARY TRACT SYMPTOMS PRESENT: ICD-10-CM

## 2025-04-08 DIAGNOSIS — D62 ABLA (ACUTE BLOOD LOSS ANEMIA): ICD-10-CM

## 2025-04-08 DIAGNOSIS — Z74.09 IMPAIRED MOBILITY AND ACTIVITIES OF DAILY LIVING: ICD-10-CM

## 2025-04-08 DIAGNOSIS — R19.7 DIARRHEA, UNSPECIFIED TYPE: ICD-10-CM

## 2025-04-08 DIAGNOSIS — K81.0 ACUTE CHOLECYSTITIS: Primary | ICD-10-CM

## 2025-04-08 LAB
ANION GAP SERPL CALCULATED.3IONS-SCNC: 13 MMOL/L (ref 7–15)
BUN SERPL-MCNC: 10.7 MG/DL (ref 8–23)
CALCIUM SERPL-MCNC: 8.8 MG/DL (ref 8.8–10.4)
CHLORIDE SERPL-SCNC: 103 MMOL/L (ref 98–107)
CREAT SERPL-MCNC: 0.85 MG/DL (ref 0.67–1.17)
EGFRCR SERPLBLD CKD-EPI 2021: 90 ML/MIN/1.73M2
GLUCOSE SERPL-MCNC: 109 MG/DL (ref 70–99)
HCO3 SERPL-SCNC: 26 MMOL/L (ref 22–29)
POTASSIUM SERPL-SCNC: 3.5 MMOL/L (ref 3.4–5.3)
SODIUM SERPL-SCNC: 142 MMOL/L (ref 135–145)

## 2025-04-08 PROCEDURE — 99316 NF DSCHRG MGMT 30 MIN+: CPT | Performed by: NURSE PRACTITIONER

## 2025-04-08 PROCEDURE — 84132 ASSAY OF SERUM POTASSIUM: CPT | Performed by: NURSE PRACTITIONER

## 2025-04-08 PROCEDURE — 36415 COLL VENOUS BLD VENIPUNCTURE: CPT | Performed by: NURSE PRACTITIONER

## 2025-04-08 PROCEDURE — 82565 ASSAY OF CREATININE: CPT | Performed by: NURSE PRACTITIONER

## 2025-04-08 PROCEDURE — P9604 ONE-WAY ALLOW PRORATED TRIP: HCPCS | Performed by: NURSE PRACTITIONER

## 2025-04-08 PROCEDURE — 80048 BASIC METABOLIC PNL TOTAL CA: CPT | Performed by: NURSE PRACTITIONER

## 2025-04-08 NOTE — PROGRESS NOTES
[facility] GERIATRICS DISCHARGE SUMMARY  PATIENT'S NAME: Fermín Josue  YOB: 1950  MEDICAL RECORD NUMBER:  [id number]  Place of Service where encounter took place:  [facility] (TCU) [[id number]]    PRIMARY CARE PROVIDER AND CLINIC RESPONSIBLE AFTER TRANSFER:   Vernell Bucio MD, [address] [place] [address] ***   [facility] [place]     Transferring providers: LONNY Ribeiro CNP, Dr. Dipak MD  Recent Hospitalization/ED:  [facility] stay 3/29/25 to 4/2/25.  Date of SNF Admission:  4/2/25  Date of SNF (anticipated) Discharge:  4/9/25  Discharged to: previous independent home  Cognitive Scores: {fgscog1:752879}  Physical Function: {fgsphysicalfunction:332770}  DME: {fgsdmedc:856895}    CODE STATUS/ADVANCE DIRECTIVES DISCUSSION:  Prior {Provider, verify code status is accurate as an order in Epic}  ALLERGIES: Patient has no known allergies.    NURSING FACILITY COURSE   Medication Changes/Rationale:   Gave KCL 20 meq daily x 2 days due to K 3.3 on 4/7.   Changed miralax to PRN due to diarrhea  Stopped senna and senna s  Added TUCKs pads PRN rectal discomfort    Per recent TCU provider progress notes:   74 yo male PMHx CAD s/p CABG 3/15/25, HTN, HLD, CKD II, JAMEE, BPH, prediabetes, renal cancer s/p nephrectomy, non-small cell lung cancer s/p vats, wedge resection RUL initially hospitalized 3/13 - 3/25, 2025 after presenting for evaluation of 1-2 week history of intermittent chest tightness, concern for unstable angina. Underwent coronary angiogram with severe left main disease. Seen by CV surgery and underwent CABGx2. Surgery was uncomplicated, postop with intermittent self-resolving junctional rhythm. Deemed medically stable, following therapy evaluations referred to TCU for rehab, med management.  ----re-hospitalized 3/29-4/2/25 due to abdominal pain, found to have acute gangrenous cholecystitis and underwent cholecystectomy  on 3/30/25. A SAVI drain was in post procedure and removed on 4/2. Discharged on Augmentin to complete a 7 days course. On admission troponin elevated at 74, trended down to 61. Remains on asa, lipitor, metoprolol and losartan. Anemia: Hgb 9.1 on discharge 4/2. Back to TCU.     Summary of nursing facility stay:   {FGS DX2:744691}    Discharge Medications:  MED REC REQUIRED  Post Medication Reconciliation Status: discharge medications reconciled and changed, per note/orders    Current Outpatient Medications   Medication Sig Dispense Refill    acetaminophen (TYLENOL) 325 MG tablet Take 3 tablets (975 mg) by mouth 3 times daily.      aspirin (ASA) 81 MG chewable tablet Take 2 tablets (162 mg) by mouth daily. 60 tablet 0    atorvastatin (LIPITOR) 40 MG tablet Take 1 tablet (40 mg) by mouth daily.      calcium carbonate (TUMS) 500 MG chewable tablet Take 1 chew tab by mouth 4 times daily as needed for heartburn.      furosemide (LASIX) 20 MG tablet Take 1 tablet (20 mg) by mouth daily. 30 tablet 0    gabapentin (NEURONTIN) 400 MG capsule Take 1 capsule (400 mg) by mouth 3 times daily.      guaiFENesin (MUCINEX) 600 MG 12 hr tablet Take 1 tablet (600 mg) by mouth 2 times daily. 60 tablet 0    loperamide (IMODIUM) 2 MG capsule Take 2 mg by mouth 4 times daily as needed for diarrhea.      losartan (COZAAR) 25 MG tablet Take 1 tablet (25 mg) by mouth daily. 30 tablet 0    methocarbamol (ROBAXIN) 500 MG tablet Take 1 tablet (500 mg) by mouth every 6 hours as needed for muscle spasms or other (pain). 30 tablet 0    metoprolol succinate ER (TOPROL XL) 25 MG 24 hr tablet Take 1 tablet (25 mg) by mouth daily.      nitroGLYcerin (NITROSTAT) 0.4 MG sublingual tablet For chest pain place 1 tablet under the tongue every 5 minutes for 3 doses. If symptoms persist 5 minutes after 1st dose call 911.      oxyCODONE (ROXICODONE) 5 MG tablet Take 0.5-1 tablets (2.5-5 mg) by mouth every 4 hours as needed for moderate pain or severe pain (2.5  "mg for moderate pain, 5 mg for severe pain). 20 tablet 0    polyethylene glycol (MIRALAX) 17 GM/Dose powder Take 17 g by mouth daily. (Patient taking differently: Take 17 g by mouth daily as needed.)      triamcinolone (KENALOG) 0.1 % external cream Apply topically to affected area 2 times daily.      witch hazel-glycerin (TUCKS) pad Apply topically 4 times daily as needed for hemorrhoids.        ***    Controlled medications:    Sent script for oxycodone 5 mg tabs #15 no refills to [facility] pharmacy     Past Medical History:   Past Medical History:   Diagnosis Date    CAD (coronary artery disease) 02/05/2015    3 stents    History of angina     History of skin graft     Right lower limb    Hypertension goal BP (blood pressure) < 140/90 02/05/2015    Kidney stone 02/17/2021    Malignant neoplasm of kidney excluding renal pelvis, left (H) 2021    Sleep apnea      Physical Exam:   Vitals: /78   Pulse 61   Temp 98  F (36.7  C)   Resp 18   Ht 1.727 m (5' 8\")   Wt 104.3 kg (230 lb)   SpO2 100%   BMI 34.97 kg/m    BMI: Body mass index is 34.97 kg/m .  {NURSING HOME PHYSICAL EXAM:462031}     SNF labs: Most Recent 3 CBC's:  Recent Labs   Lab Test 04/07/25  0918 04/02/25  0550 04/01/25  0636   WBC 6.3 6.1 8.5   HGB 10.4* 9.1* 8.6*   * 99 105*    300 271     Most Recent 3 BMP's:  Recent Labs   Lab Test 04/07/25  0918 04/02/25  0550 04/01/25  0636    140 138   POTASSIUM 3.3* 3.8 4.4   CHLORIDE 104 103 103   CO2 27 26 27   BUN 9.4 15.5 19.6   CR 0.92 1.27* 1.43*   ANIONGAP 11 11 8   CANDICE 8.8 8.6* 8.6*   * 105* 105*       DISCHARGE PLAN:  Follow up labs: No labs orders/due  Medical Follow Up:      {fgsdischargefollowup:193308}  Current [place] scheduled appointments:  Appointments in Next Year      Apr 10, 2025 1:40 PM  (Arrive by 1:20 PM)  ED/Hospital Follow Up with Desmond Liu PA-C  [facility] ([facility] ) [phone]     Apr " 14, 2025 3:45 PM  (Arrive by 3:35 PM)  Return Cardiology with Issac Freeman MD  [facility] ([facility]) [phone]     May 19, 2025 8:40 AM  (Arrive by 8:30 AM)  Return Cardiology with LONNY Corbin CNP  [facility] ([facility]) [phone]     May 30, 2025 10:40 AM  (Arrive by 10:20 AM)  Provider Visit with Vernell Bucio MD  [facility] ([facility] ) [phone]     Aug 14, 2025 9:15 AM  (Arrive by 9:05 AM)  Return Cardiology with Christiano Toro MD  [facility] ([facility]) [phone]     Discharge Services: No therapy or home care recommended.   Discharge Instructions Verbalized to Patient at Discharge:   Weigh yourself daily in the morning and keep a record. Call your primary clinic: a) if you are more short of breath, or b) if your weight changes more than 3 pounds in one day or more than 5 pounds in one week.   Wound care ***.   DO NOT DRIVE while taking narcotic pain medications.     TOTAL DISCHARGE TIME:   Greater than 30 minutes  Electronically signed by:  LONNY Ribeiro CNP            * 140* 105*       DISCHARGE PLAN:  Follow up labs: No labs orders/due  Medical Follow Up:      Follow up with primary care provider in 3-4 weeks  Follow up with specialist cardiology, surgery as planned   [facility] scheduled appointments:  Appointments in Next Year      Apr 10, 2025 1:40 PM  (Arrive by 1:20 PM)  ED/Hospital Follow Up with Desmond Liu PA-C  [facility] ([facility] ) [phone]     Apr 14, 2025 3:45 PM  (Arrive by 3:35 PM)  Return Cardiology with Issac Freeman MD  [facility] ([facility]) [phone]     May 19, 2025 8:40 AM  (Arrive by 8:30 AM)  Return Cardiology with LONNY Corbin CNP  [facility] ([facility]) [phone]     May 30, 2025 10:40 AM  (Arrive by 10:20 AM)  Provider Visit with Vernell Bucio MD  [facility] ([facility] ) [phone]     Aug 14, 2025 9:15 AM  (Arrive by 9:05 AM)  Return Cardiology with Christiano Toro MD  [facility] ([facility]) [phone]     Discharge Services: No therapy or home care recommended.   Discharge Instructions Verbalized to Patient at Discharge:   Weigh yourself daily in the morning and keep a record. Call your primary clinic: a) if you are more short of breath, or b) if your weight changes more than 3 pounds in one day or more than 5 pounds in one week.   Wound care: leave incisions open to air  DO NOT DRIVE while taking narcotic pain medications.     TOTAL DISCHARGE TIME:   Greater than 30 minutes  Electronically signed by:  LONNY Ribeiro CNP

## 2025-04-10 DIAGNOSIS — Z09 HOSPITAL DISCHARGE FOLLOW-UP: ICD-10-CM

## 2025-04-13 DIAGNOSIS — Z95.1 S/P CABG (CORONARY ARTERY BYPASS GRAFT): ICD-10-CM

## 2025-04-14 ENCOUNTER — TELEPHONE (OUTPATIENT)
Dept: FAMILY MEDICINE | Facility: CLINIC | Age: 75
End: 2025-04-14

## 2025-04-14 RX ORDER — METHOCARBAMOL 500 MG/1
500 TABLET, FILM COATED ORAL EVERY 6 HOURS PRN
Qty: 30 TABLET | Refills: 0 | OUTPATIENT
Start: 2025-04-14

## 2025-04-22 ENCOUNTER — TELEPHONE (OUTPATIENT)
Dept: SURGERY | Facility: CLINIC | Age: 75
End: 2025-04-22
Payer: COMMERCIAL

## 2025-04-22 NOTE — TELEPHONE ENCOUNTER
[facility]  Post op call note - ROBOTIC CHOLECYSTECTOMY  April 22, 2025       Fermín Josue was called for an update regarding his recovery.  He underwent a laparoscopic cholecystectomy by Dr. Almonte on 3/30.  Today he tells me he is doing well and improving each day.  Following hospital discharge he went to TCU and has since returned home.  He has some pain at the largest incision yet but currently does not need any pain medications.  He is eating a normal diet and his bowels are regular.  He did have constipation initially.  The patient states he is slowly resuming normal activity and trying to walk more.  He states his wounds are healing well and the steri strips are on.  He denies any erythema or drainage at his wounds.  He saw his PCP last week and things were overall going well at that visit.    His pathology was discussed.  He was instructed to remove steri strips when wet or he may allow them to fall off on their own if he prefers.  He was advised to continue to advance his activity as tolerated.  The patient states all of his questions were answered and he understands our discussion.  He agrees to follow up as needed and to call our office with any concerns.    Medina Portillo PA-C  [facility]  [phone]      Please route or send letter to:  Primary Care Provider (PCP)

## 2025-04-23 ENCOUNTER — HOSPITAL ENCOUNTER (OUTPATIENT)
Dept: CARDIAC REHAB | Facility: CLINIC | Age: 75
Discharge: HOME | End: 2025-04-23
Attending: SURGERY
Payer: COMMERCIAL

## 2025-04-23 DIAGNOSIS — Z95.1 S/P CABG (CORONARY ARTERY BYPASS GRAFT): ICD-10-CM

## 2025-04-23 PROCEDURE — 93798 PHYS/QHP OP CAR RHAB W/ECG: CPT

## 2025-05-01 ENCOUNTER — OFFICE VISIT (OUTPATIENT)
Dept: FAMILY MEDICINE | Facility: CLINIC | Age: 75
End: 2025-05-01
Payer: COMMERCIAL

## 2025-05-01 VITALS
BODY MASS INDEX: 32.26 KG/M2 | WEIGHT: 217.8 LBS | HEART RATE: 59 BPM | TEMPERATURE: 97 F | SYSTOLIC BLOOD PRESSURE: 128 MMHG | RESPIRATION RATE: 16 BRPM | DIASTOLIC BLOOD PRESSURE: 70 MMHG | HEIGHT: 69 IN | OXYGEN SATURATION: 98 %

## 2025-05-01 DIAGNOSIS — D53.9 MACROCYTIC ANEMIA: ICD-10-CM

## 2025-05-01 DIAGNOSIS — Z09 HOSPITAL DISCHARGE FOLLOW-UP: Primary | ICD-10-CM

## 2025-05-01 ASSESSMENT — PAIN SCALES - GENERAL: PAINLEVEL_OUTOF10: SEVERE PAIN (7)

## 2025-05-01 NOTE — PROGRESS NOTES
"  {PROVIDER CHARTING PREFERENCE:038259}    Cheikh Fernandes is a 75 year old, presenting for the following health issues:  Hospital F/U      5/1/2025     1:15 PM   Additional Questions   Roomed by Juana BERNAL   Accompanied by Daughter     HPI      {MA/LPN/RN Pre-Provider Visit Orders- hCG/UA/Strep (Optional):491133}    Hospital Follow-up Visit:    Hospital/Nursing Home/IP Rehab Facility: [facility]  Most Recent Admission Date: 3/29/2025   Most Recent Admission Diagnosis: Acute cholecystitis - K81.0  Elevated troponin - R79.89     Most Recent Discharge Date: 4/2/2025   Most Recent Discharge Diagnosis: Acute cholecystitis - K81.0  Elevated troponin - R79.89  Gangrenous cholecystitis - K81.0  Hypertension goal BP (blood pressure) < 140/90 - I10  Coronary artery disease involving native coronary artery of native heart without angina pectoris - I25.10  Chest pain, unspecified type - R07.9  Dermatitis - L30.9  Hyperlipidemia, unspecified hyperlipidemia type - E78.5  Back muscle spasm - M62.830   Was the patient in the ICU or did the patient experience delirium during hospitalization?  No  Do you have any other stressors you would like to discuss with your provider? Health Concerns    Problems taking medications regularly:  None  Medication changes since discharge: None  Problems adhering to non-medication therapy:  None    Summary of hospitalization:  {:710105}  Diagnostic Tests/Treatments reviewed.  Follow up needed: {:856879:}  Other Healthcare Providers Involved in Patient s Care:         {those currently involved after discharge:590568::\"None\"}  Update since discharge: {:111474} {TIP  Include information from family/caregivers, SNF, Care Coordination :407650}        Plan of care communicated with {:219783}     {Reference  Coding guidelines- Moderate Complexity F2F/Video within 7 - 14 days of discharge 1565972, High Complexity F2F/Video within 7 days 5095383 or ftngqc48 days 7538076 " ":482183}      {additonal problems for provider to add (Optional):610566}  {additonal problems for provider to add (Optional):375942}    {ROS Picklists (Optional):851364}      Objective    /70 (BP Location: Right arm, Patient Position: Sitting, Cuff Size: Adult Regular)   Pulse 59   Temp 97  F (36.1  C) (Temporal)   Resp 16   Ht 1.74 m (5' 8.5\")   Wt 98.8 kg (217 lb 12.8 oz)   SpO2 98%   BMI 32.63 kg/m    Body mass index is 32.63 kg/m .  Physical Exam   {Exam List (Optional):011882}    {Diagnostic Test Results (Optional):839880}        Signed Electronically by: Desmond Liu PA-C  {Email feedback regarding this note to [email or web address]   :801853}  "

## 2025-05-01 NOTE — PROGRESS NOTES
Assessment & Plan     Hospital discharge follow-up  Here for hospital follow-up.     Acute gangrenous cholecystitis  S/p cholecystectomy 3/30/25  Doing well post-surgery with expected wound healing with minimal soreness and no infection symptoms. No further action needed.     CAD s/p 2V CABG 3/15/25  HTN  HLD  Elevated troponin due to non-ischemic myocardial injury  Incision healing well with some mild to moderate soreness and no signs of infection. No angina symptoms. Continue following with Cardiology as planned.     Macrocytic anemia  Anemia continued to improve when in TCU. He feels good with good energy. No signs or symptoms of bleeding. He sees Dr. Bucio in several weeks for follow-up and can re-draw CBC  for further trending of anemia then.         MED REC REQUIRED  Post Medication Reconciliation Status: discharge medications reconciled, continue medications without change      Cheikh Fernandes is a 75 year old, presenting for the following health issues:  Hospital F/U      5/1/2025     1:15 PM   Additional Questions   Roomed by Juana BERNAL   Accompanied by Daughter     HPI    Here for post-hospital follow-up discharge visit.     76 yo male PMHx CAD s/p CABG 3/15/25, HTN, HLD, CKD II, JAMEE, BPH, prediabetes, renal cancer s/p nephrectomy, non-small cell lung cancer s/p vats, wedge resection RUL initially hospitalized 3/13 - 3/25, 2025 after presenting for evaluation of 1-2 week history of intermittent chest tightness, concern for unstable angina. Underwent coronary angiogram with severe left main disease. Seen by CV surgery and underwent CABGx2. Surgery was uncomplicated, postop with intermittent self-resolving junctional rhythm. Deemed medically stable, following therapy evaluations referred to TCU for rehab, med management. Then re-hospitalized 3/29-4/2/25 due to abdominal pain, found to have acute gangrenous cholecystitis and underwent cholecystectomy on 3/30/25. A SAVI drain was in post procedure and  "removed on 4/2. Discharged on Augmentin to complete a 7 days course. On admission troponin elevated at 74, trended down to 61. Remains on asa, Lipitor, metoprolol and losartan. Anemia: Hgb 9.1 on discharge 4/2, improved to 10.4 on 4/7 check.  Creatinine and low potasium also normalized on 4/7 check. Discharged from TCU on 4/9/25. Originally scheduled to see me on 4/10 but rescheduled to today. Since discharge, he has seen the Cardiology and Surgery teams - see their note for more details. Incisions are healing well with no surrounding redness, drainage, or fever. Pain is under good control, just slightly sore over sternal incision. No headaches, dizziness, chest pain, dyspnea, bowel or bladder issues. No other concerns or questions.                      Objective    /70 (BP Location: Right arm, Patient Position: Sitting, Cuff Size: Adult Regular)   Pulse 59   Temp 97  F (36.1  C) (Temporal)   Resp 16   Ht 1.74 m (5' 8.5\")   Wt 98.8 kg (217 lb 12.8 oz)   SpO2 98%   BMI 32.63 kg/m    Body mass index is 32.63 kg/m .  Physical Exam   GENERAL: healthy, alert and no distress  EYES: Eyes grossly normal to inspection, EOM intact and conjunctivae normal  RESP: breathing comfortably on room air  PSYCH: mentation appears normal, affect normal/bright              Signed Electronically by: Desmond Liu PA-C    "

## 2025-05-15 ENCOUNTER — RESULTS FOLLOW-UP (OUTPATIENT)
Dept: CARDIOLOGY | Facility: CLINIC | Age: 75
End: 2025-05-15

## 2025-05-15 ENCOUNTER — LAB (OUTPATIENT)
Dept: LAB | Facility: CLINIC | Age: 75
End: 2025-05-15
Payer: COMMERCIAL

## 2025-05-15 DIAGNOSIS — I25.10 CORONARY ARTERY DISEASE INVOLVING NATIVE CORONARY ARTERY OF NATIVE HEART WITHOUT ANGINA PECTORIS: ICD-10-CM

## 2025-05-15 DIAGNOSIS — I10 HYPERTENSION GOAL BP (BLOOD PRESSURE) < 140/90: ICD-10-CM

## 2025-05-15 LAB
ALT SERPL W P-5'-P-CCNC: 22 U/L (ref 0–70)
CHOLEST SERPL-MCNC: 113 MG/DL
FASTING STATUS PATIENT QL REPORTED: YES
HDLC SERPL-MCNC: 41 MG/DL
LDLC SERPL CALC-MCNC: 47 MG/DL
NONHDLC SERPL-MCNC: 72 MG/DL
TRIGL SERPL-MCNC: 127 MG/DL

## 2025-05-15 RX ORDER — METOPROLOL SUCCINATE 25 MG/1
25 TABLET, EXTENDED RELEASE ORAL DAILY
Qty: 90 TABLET | Refills: 3 | Status: SHIPPED | OUTPATIENT
Start: 2025-05-15

## 2025-05-19 ENCOUNTER — OFFICE VISIT (OUTPATIENT)
Dept: CARDIOLOGY | Facility: CLINIC | Age: 75
End: 2025-05-19
Payer: COMMERCIAL

## 2025-05-19 VITALS
OXYGEN SATURATION: 97 % | HEART RATE: 54 BPM | WEIGHT: 216 LBS | DIASTOLIC BLOOD PRESSURE: 84 MMHG | HEIGHT: 69 IN | BODY MASS INDEX: 31.99 KG/M2 | SYSTOLIC BLOOD PRESSURE: 146 MMHG

## 2025-05-19 DIAGNOSIS — I25.10 CORONARY ARTERY DISEASE INVOLVING NATIVE CORONARY ARTERY OF NATIVE HEART WITHOUT ANGINA PECTORIS: ICD-10-CM

## 2025-05-19 PROCEDURE — 99215 OFFICE O/P EST HI 40 MIN: CPT | Mod: 24

## 2025-05-19 PROCEDURE — 3077F SYST BP >= 140 MM HG: CPT

## 2025-05-19 PROCEDURE — 3079F DIAST BP 80-89 MM HG: CPT

## 2025-05-19 NOTE — PROGRESS NOTES
~Cardiology Clinic Visit~    Primary Cardiologist: Dr. Toro  Reason for visit: 1 month cardiology follow-up      Fermín Josue MRN# [id number]   YOB: 1950 Age: 75 year old       HPI:    Fermín Josue is a delightful 75 year old patient with past medical history significant for:    Coronary artery disease s/p 2V CABG (LIMA to LAD and SVG to PDA) 3/15/2025  History of non-small cell lung cancer s/p resection 1/28/2025  Recent history of gangrenous cholecystitis/P cholecystectomy   Hypertension  Hyperlipidemia  History of renal cell carcinoma s/p nephrectomy    I had the opportunity to see Fermín Josue for cardiology follow up.  In review, the patient was recently seen by Dr. Toro on 4/18/2025.  At that time patient recently recovered from a resection of a non-small cell carcinoma in the right upper lung later this year.  He subsequently developed chest discomfort and underwent coronary artery bypass grafting on 3/15/2025.  Developed gangrenous cholecystitis and underwent a cholecystectomy.     When seen last the patient reported some sternal discomfort.  He did note some weakness as though he is getting stronger day by day.  He had no anginal symptoms.     An echocardiogram from 5/15/2025 shows LVEF 55-60%, no regional wall motion abnormalities, mild concentric LVH, normal RV function, and no significant valvular disease.    His lipid panel from 5/15: Total cholesterol 113, LDL 47 and HDL 41.  ALT 22.    Mr Josue reports continued sternal pain, but no chest pain, pressure, or tightness. Mild dyspnea on exertion with walking that coincides with sternal pain. No lower extremity swelling, orthopnea, or PND. Occasional lightheadedness and dizziness. No syncope. Some palpitations with activity. No blood in urine or stool. Activity is limited due to pain.       Assessment:  Coronary artery disease s/p 2V CABG (LIMA to LAD and SVG to PDA) 3/15/2025  No anginal symptoms    Atorvastatin 162 mg daily, Lipitor 40 mg, metoprolol succinate 25 mg daily  As needed sublingual nitroglycerin  Echocardiogram 5/15/2025: LVEF 55 to 60% with no regional wall motion abnormalities  History of non-small cell lung cancer s/p resection 1/28/2025  Recent history of gangrenous cholecystitis/P cholecystectomy   Hypertension  Adequately controlled on prior check with losartan 25 mg, metoprolol succinate 25 mg  Hyperlipidemia  Lipid panel 5/15/2025: cholesterol 113, HDL, 41, LDL 47, . ALT 22  Atorvastatin 40 mg  History of renal cell carcinoma s/p nephrectomy  BUN 10.7/Cr 0.85/GFR 90 (4/8/2025)    Plan:   It was my pleasure to see Mr. Josue for cardiology follow up today.Recent echocardiogram shows preserved LV function with no regional wall motion abnormalities. Overall, stable from a   cardiovascular standpoint but with a number of other health concerns today, namely constipation and ongoing post surgical site/chronic back pain (cholecystectomy, back, and sternal pain).  Thankfully no concerning anginal or heart failure symptoms.  I have encouraged him to follow-up with his PCP regarding constipation and chronic pain.  Dom notes some intermittent lightheadedness- will try holding Lasix for 1 week.  I have asked that he resume this if any recurrence of lower extremity edema.  Blood pressure is elevated today however he has not taken his morning medications.  Recent blood pressure from 5/1 shows adequate control with blood pressure 128/78.  Follow-up with CV surgery team  Would highly recommend participating in cardiac rehab, I have provided him with their contact information  Cholesterol well-controlled on recent check, continue atorvastatin 40 mg daily  Continue aspirin 162 mg daily  Follow-up with Dr. Toro as previously scheduled on 8/14/2025    Thank you for the opportunity to participate in this pleasant patient's care.    We would be happy to see this patient sooner for any concerns  in the meantime.    LONNY Bean, CNP   Nurse Practitioner  [facility]    A total of 40 minutes was spent with patient, on chart review and documentation today.         No orders of the defined types were placed in this encounter.    No orders of the defined types were placed in this encounter.    There are no discontinued medications.      Encounter Diagnosis   Name Primary?    Coronary artery disease involving native coronary artery of native heart without angina pectoris        CURRENT MEDICATIONS:  Current Outpatient Medications   Medication Sig Dispense Refill    acetaminophen (TYLENOL) 325 MG tablet Take 3 tablets (975 mg) by mouth 3 times daily.      aspirin (ASA) 81 MG chewable tablet Take 2 tablets (162 mg) by mouth daily. 60 tablet 0    atorvastatin (LIPITOR) 40 MG tablet Take 1 tablet (40 mg) by mouth daily.      calcium carbonate (TUMS) 500 MG chewable tablet Take 1 chew tab by mouth 4 times daily as needed for heartburn.      furosemide (LASIX) 20 MG tablet Take 1 tablet (20 mg) by mouth daily. 30 tablet 0    gabapentin (NEURONTIN) 400 MG capsule Take 1 capsule (400 mg) by mouth 3 times daily.      losartan (COZAAR) 25 MG tablet Take 1 tablet (25 mg) by mouth daily. 30 tablet 0    metoprolol succinate ER (TOPROL XL) 25 MG 24 hr tablet Take 1 tablet (25 mg) by mouth daily. 90 tablet 3    nitroGLYcerin (NITROSTAT) 0.4 MG sublingual tablet For chest pain place 1 tablet under the tongue every 5 minutes for 3 doses. If symptoms persist 5 minutes after 1st dose call 911.      triamcinolone (KENALOG) 0.1 % external cream Apply topically to affected area 2 times daily.      guaiFENesin (MUCINEX) 600 MG 12 hr tablet Take 1 tablet (600 mg) by mouth 2 times daily. (Patient not taking: Reported on 5/19/2025) 60 tablet 0    loperamide (IMODIUM) 2 MG capsule Take 2 mg by mouth 4 times daily as needed for diarrhea. (Patient not taking: Reported on 5/19/2025)      methocarbamol (ROBAXIN)  500 MG tablet Take 1 tablet (500 mg) by mouth every 6 hours as needed for muscle spasms or other (pain). (Patient not taking: Reported on 5/19/2025) 30 tablet 0    oxyCODONE (ROXICODONE) 5 MG tablet Take 0.5-1 tablets (2.5-5 mg) by mouth every 4 hours as needed for moderate pain or severe pain (2.5 mg for moderate pain, 5 mg for severe pain). (Patient not taking: Reported on 5/19/2025) 20 tablet 0    polyethylene glycol (MIRALAX) 17 GM/Dose powder Take 17 g by mouth daily. (Patient not taking: Reported on 5/19/2025)      witch hazel-glycerin (TUCKS) pad Apply topically 4 times daily as needed for hemorrhoids. (Patient not taking: Reported on 5/19/2025)         ALLERGIES   No Known Allergies    PAST MEDICAL HISTORY:  Past Medical History:   Diagnosis Date    CAD (coronary artery disease) 02/05/2015    3 stents    History of angina     History of skin graft     Right lower limb    Hypertension goal BP (blood pressure) < 140/90 02/05/2015    Kidney stone 02/17/2021    Malignant neoplasm of kidney excluding renal pelvis, left (H) 2021    Sleep apnea        PAST SURGICAL HISTORY:  Past Surgical History:   Procedure Laterality Date    BYPASS GRAFT ARTERY CORONARY N/A 3/15/2025    Procedure: CORONARY ARTERY BYPASS GRAFT x 2 (LEFT INTERNAL MAMMARY ARTERY - LEFT ANTERIOR DESCENDING ARTERY; SAPHENOUS VEIN - POSTERIOR DESCENDING ARTERY) WITH ENDOSCOPIC SAPHENOUS VEIN HARVEST ON THE RIGHT LOWER EXTREMITY, AND ON CARDIOPULMONARY PUMP OXYGENATOR  (INTRAOPERATIVE TRANSESOPHAGEAL ECHOCARDIOGRAM BY ANESTHESIOLOGIST);  Surgeon: Dimitri John MD;  Location:  OR    COLONOSCOPY N/A 07/16/2020    Procedure: COLONOSCOPY;  Surgeon: Wenceslao Marie MD;  Location:  GI    COMBINED CYSTOSCOPY, RETROGRADES, URETEROSCOPY, INSERT STENT Left 02/19/2021    Procedure: CYSTOSCOPY LEFT URETEROSCOPY,  LEFT STENT PLACEMENT, LEFT RETROGRADE;  Surgeon: Phil Lin MD;  Location:  OR    CV CORONARY ANGIOGRAM N/A 3/14/2025     Procedure: Coronary Angiogram;  Surgeon: Andres Alexander MD;  Location:  HEART CARDIAC CATH LAB    CV LEFT HEART CATH N/A 3/14/2025    Procedure: Left Heart Catheterization;  Surgeon: Andres Alexander MD;  Location:  HEART CARDIAC CATH LAB    CV LEFT VENTRICULOGRAM N/A 3/14/2025    Procedure: Left Ventriculogram;  Surgeon: Andres Alexander MD;  Location:  HEART CARDIAC CATH LAB    CYSTOSCOPY, DILATE URETER(S), COMBINED Left 02/19/2021    Procedure: Cystoscopy, dilate ureter(s), combined;  Surgeon: Phil Lin MD;  Location:  OR    HEART CATH, ANGIOPLASTY  11/2007    mid to prox LAD drug-eluting stent x2; Occluded RCA with Collaterals    KIDNEY STONE SURGERY  2003    laser and ureteric stenting    LAPAROSCOPIC CHOLECYSTECTOMY N/A 3/30/2025    Procedure: CHOLECYSTECTOMY, ROBOT-ASSISTED, LAPAROSCOPIC, USING DA ANURAG XI;  Surgeon: Yosvany Almonte MD;  Location:  OR    LUMBAR SPINE SURGERY N/A 02/13/2023    T8-L1 minimally invasive posterolateral instrumentation with cement augmentation    NEPHRECTOMY RT/LT Left 04/2021    [facility]    SKIN GRAFT, EACH ADDN 100SQCM      THORACOSCOPIC WEDGE RESECTION LUNG Right 1/28/2025    Procedure: RIGHT VIDEO ASSISTED THORACOSCOPY WEDGE RESECTION, RIGHT UPPER LOBE LUNG NODULE;  Surgeon: Anmol Carrillo MD;  [facility]: [facility]       FAMILY HISTORY:  Family History   Problem Relation Age of Onset    Hypertension Mother     Pneumonia Mother 91        covid related    Dementia Mother     Alzheimer Disease Father         d age 91    Leukemia Maternal Grandmother     Cancer Maternal Grandfather     Cancer Maternal Uncle         lung       SOCIAL HISTORY:  Social History     Socioeconomic History    Marital status:      Spouse name: None    Number of children: None    Years of education: None    Highest education level: None   Tobacco Use    Smoking status: Former     Current packs/day: 0.00     Average packs/day: 1.5 packs/day for 44.0 years (66.0 ttl  pk-yrs)     Types: Cigarettes     Start date: 1963     Quit date: 2007     Years since quittin.9    Smokeless tobacco: Never   Vaping Use    Vaping status: Never Used   Substance and Sexual Activity    Alcohol use: Yes     Comment: rare    Drug use: Yes     Types: Marijuana    Sexual activity: Not Currently     Partners: Female   Other Topics Concern    Caffeine Concern Yes     Comment: 1 pop daily     Special Diet Yes     Comment: fruits     Exercise Yes     Comment: walking 1 hr daily     Parent/sibling w/ CABG, MI or angioplasty before 65F 55M? No     Social Drivers of Health     Financial Resource Strain: Low Risk  (3/30/2025)    Financial Resource Strain     Within the past 12 months, have you or your family members you live with been unable to get utilities (heat, electricity) when it was really needed?: No   Food Insecurity: Low Risk  (3/30/2025)    Food Insecurity     Within the past 12 months, did you worry that your food would run out before you got money to buy more?: No     Within the past 12 months, did the food you bought just not last and you didn t have money to get more?: No   Transportation Needs: Low Risk  (3/30/2025)    Transportation Needs     Within the past 12 months, has lack of transportation kept you from medical appointments, getting your medicines, non-medical meetings or appointments, work, or from getting things that you need?: No   Physical Activity: Unknown (2024)    Exercise Vital Sign     Days of Exercise per Week: 1 day   Stress: No Stress Concern Present (2024)    [employer] - Occupational Stress Questionnaire     Feeling of Stress : Not at all   Social Connections: Unknown (2024)    Social Connection and Isolation Panel [NHANES]     Frequency of Social Gatherings with Friends and Family: More than three times a week   Interpersonal Safety: Low Risk  (3/30/2025)    Interpersonal Safety     Do you feel physically and  "emotionally safe where you currently live?: Yes     Within the past 12 months, have you been hit, slapped, kicked or otherwise physically hurt by someone?: No     Within the past 12 months, have you been humiliated or emotionally abused in other ways by your partner or ex-partner?: No   Housing Stability: Low Risk  (3/30/2025)    Housing Stability     Do you have housing? : Yes     Are you worried about losing your housing?: No       Review of Systems:  Skin:        Eyes:       ENT:       Respiratory:       Cardiovascular:       Gastroenterology:      Genitourinary:       Musculoskeletal:       Neurologic:       Psychiatric:       Heme/Lymph/Imm:       Endocrine:         Physical Exam:    Vitals: BP (!) 146/84 (BP Location: Left arm, Patient Position: Sitting)   Pulse 54   Ht 1.74 m (5' 8.5\")   Wt 98 kg (216 lb)   SpO2 97%   BMI 32.36 kg/m    Constitutional: Well nourished and in no apparent distress.  Neck: Supple.   Respiratory: Breathing non-labored. Lungs clear to auscultation bilaterally. No crackles, wheezes, rhonchi, or rales.  Cardiovascular:  Regular rate and rhythm, normal S1 and S2. No murmur, rub, or gallop.  Skin: Warm, dry. Midline sternal incision is CDI.   Extremities: No edema.  Neurologic: No gross motor deficits. Alert, awake, and oriented to person, place and time.  Psychiatric: Affect appropriate.        Recent Lab Results:  LIPID RESULTS:  Lab Results   Component Value Date    CHOL 113 05/15/2025    CHOL 109 07/10/2020    HDL 41 05/15/2025    HDL 43 07/10/2020    LDL 47 05/15/2025    LDL 47 07/10/2020    TRIG 127 05/15/2025    TRIG 95 07/10/2020    CHOLHDLRATIO 2.4 05/27/2015       LIVER ENZYME RESULTS:  Lab Results   Component Value Date    AST 79 (H) 04/02/2025    AST 22 02/17/2021    ALT 22 05/15/2025    ALT 28 02/17/2021       CBC RESULTS:  Lab Results   Component Value Date    WBC 6.3 04/07/2025    WBC 7.4 03/01/2021    RBC 3.35 (L) 04/07/2025    RBC 4.46 03/01/2021    HGB 10.4 (L) " 04/07/2025    HGB 14.2 03/01/2021    HCT 34.4 (L) 04/07/2025    HCT 42.7 03/01/2021     (H) 04/07/2025    MCV 96 03/01/2021    MCH 31.0 04/07/2025    MCH 31.8 03/01/2021    MCHC 30.2 (L) 04/07/2025    MCHC 33.3 03/01/2021    RDW 16.4 (H) 04/07/2025    RDW 12.8 03/01/2021     04/07/2025     03/01/2021       BMP RESULTS:  Lab Results   Component Value Date     04/08/2025     03/01/2021    POTASSIUM 3.5 04/08/2025    POTASSIUM 3.4 03/15/2025    POTASSIUM 4.7 07/13/2021    POTASSIUM 3.7 03/01/2021    CHLORIDE 103 04/08/2025    CHLORIDE 108 07/13/2021    CHLORIDE 108 03/01/2021    CO2 26 04/08/2025    CO2 28 07/13/2021    CO2 31 03/01/2021    ANIONGAP 13 04/08/2025    ANIONGAP 4 07/13/2021    ANIONGAP 3 03/01/2021     (H) 04/08/2025     (H) 03/19/2025     (H) 07/13/2021    GLC 84 03/01/2021    BUN 10.7 04/08/2025    BUN 23 07/13/2021    BUN 19 03/01/2021    CR 0.85 04/08/2025    CR 0.99 03/01/2021    GFRESTIMATED 90 04/08/2025    GFRESTIMATED 52 (L) 10/24/2024    GFRESTIMATED 76 03/01/2021    GFRESTBLACK 88 03/01/2021    CANDICE 8.8 04/08/2025    ACNDICE 9.8 03/01/2021        A1C RESULTS:  Lab Results   Component Value Date    A1C 5.8 (H) 01/14/2025    A1C 5.8 (H) 10/15/2019       INR RESULTS:  Lab Results   Component Value Date    INR 1.48 (H) 03/15/2025    INR 1.57 (H) 03/15/2025    INR 0.98 11/21/2007           CC  Christiano Toro MD  [id number]  [place] [id number]

## 2025-05-19 NOTE — LETTER
5/19/2025    Vernell Bucio MD  [address]  [place] [address]    RE: Fermín Josue       Dear Colleague,     I had the pleasure of seeing Fermín Josue in the [facility].          ~Cardiology Clinic Visit~    Primary Cardiologist: Dr. Toro  Reason for visit: 1 month cardiology follow-up      Fermín Josue MRN# [id number]   YOB: 1950 Age: 75 year old       HPI:    Fermín Josue is a delightful 75 year old patient with past medical history significant for:    Coronary artery disease s/p 2V CABG (LIMA to LAD and SVG to PDA) 3/15/2025  History of non-small cell lung cancer s/p resection 1/28/2025  Recent history of gangrenous cholecystitis/P cholecystectomy   Hypertension  Hyperlipidemia  History of renal cell carcinoma s/p nephrectomy    I had the opportunity to see Fermín Josue for cardiology follow up.  In review, the patient was recently seen by Dr. Toro on 4/18/2025.  At that time patient recently recovered from a resection of a non-small cell carcinoma in the right upper lung later this year.  He subsequently developed chest discomfort and underwent coronary artery bypass grafting on 3/15/2025.  Developed gangrenous cholecystitis and underwent a cholecystectomy.     When seen last the patient reported some sternal discomfort.  He did note some weakness as though he is getting stronger day by day.  He had no anginal symptoms.     An echocardiogram from 5/15/2025 shows LVEF 55-60%, no regional wall motion abnormalities, mild concentric LVH, normal RV function, and no significant valvular disease.    His lipid panel from 5/15: Total cholesterol 113, LDL 47 and HDL 41.  ALT 22.    Mr Josue reports continued sternal pain, but no chest pain, pressure, or tightness. Mild dyspnea on exertion with walking that coincides with sternal pain. No lower extremity swelling, orthopnea, or PND. Occasional lightheadedness and dizziness.  No syncope. Some palpitations with activity. No blood in urine or stool. Activity is limited due to pain.       Assessment:  Coronary artery disease s/p 2V CABG (LIMA to LAD and SVG to PDA) 3/15/2025  No anginal symptoms   Atorvastatin 162 mg daily, Lipitor 40 mg, metoprolol succinate 25 mg daily  As needed sublingual nitroglycerin  Echocardiogram 5/15/2025: LVEF 55 to 60% with no regional wall motion abnormalities  History of non-small cell lung cancer s/p resection 1/28/2025  Recent history of gangrenous cholecystitis/P cholecystectomy   Hypertension  Adequately controlled on prior check with losartan 25 mg, metoprolol succinate 25 mg  Hyperlipidemia  Lipid panel 5/15/2025: cholesterol 113, HDL, 41, LDL 47, . ALT 22  Atorvastatin 40 mg  History of renal cell carcinoma s/p nephrectomy  BUN 10.7/Cr 0.85/GFR 90 (4/8/2025)    Plan:   It was my pleasure to see Mr. Josue for cardiology follow up today.Recent echocardiogram shows preserved LV function with no regional wall motion abnormalities. Overall, stable from a   cardiovascular standpoint but with a number of other health concerns today, namely constipation and ongoing post surgical site/chronic back pain (cholecystectomy, back, and sternal pain).  Thankfully no concerning anginal or heart failure symptoms.  I have encouraged him to follow-up with his PCP regarding constipation and chronic pain.  oDm notes some intermittent lightheadedness- will try holding Lasix for 1 week.  I have asked that he resume this if any recurrence of lower extremity edema.  Blood pressure is elevated today however he has not taken his morning medications.  Recent blood pressure from 5/1 shows adequate control with blood pressure 128/78.  Follow-up with CV surgery team  Would highly recommend participating in cardiac rehab, I have provided him with their contact information  Cholesterol well-controlled on recent check, continue atorvastatin 40 mg daily  Continue aspirin 162 mg  daily  Follow-up with Dr. Toro as previously scheduled on 8/14/2025    Thank you for the opportunity to participate in this pleasant patient's care.    We would be happy to see this patient sooner for any concerns in the meantime.    LONNY Bean, CNP   Nurse Practitioner  [facility]    A total of 40 minutes was spent with patient, on chart review and documentation today.         No orders of the defined types were placed in this encounter.    No orders of the defined types were placed in this encounter.    There are no discontinued medications.      Encounter Diagnosis   Name Primary?     Coronary artery disease involving native coronary artery of native heart without angina pectoris        CURRENT MEDICATIONS:  Current Outpatient Medications   Medication Sig Dispense Refill     acetaminophen (TYLENOL) 325 MG tablet Take 3 tablets (975 mg) by mouth 3 times daily.       aspirin (ASA) 81 MG chewable tablet Take 2 tablets (162 mg) by mouth daily. 60 tablet 0     atorvastatin (LIPITOR) 40 MG tablet Take 1 tablet (40 mg) by mouth daily.       calcium carbonate (TUMS) 500 MG chewable tablet Take 1 chew tab by mouth 4 times daily as needed for heartburn.       furosemide (LASIX) 20 MG tablet Take 1 tablet (20 mg) by mouth daily. 30 tablet 0     gabapentin (NEURONTIN) 400 MG capsule Take 1 capsule (400 mg) by mouth 3 times daily.       losartan (COZAAR) 25 MG tablet Take 1 tablet (25 mg) by mouth daily. 30 tablet 0     metoprolol succinate ER (TOPROL XL) 25 MG 24 hr tablet Take 1 tablet (25 mg) by mouth daily. 90 tablet 3     nitroGLYcerin (NITROSTAT) 0.4 MG sublingual tablet For chest pain place 1 tablet under the tongue every 5 minutes for 3 doses. If symptoms persist 5 minutes after 1st dose call 911.       triamcinolone (KENALOG) 0.1 % external cream Apply topically to affected area 2 times daily.       guaiFENesin (MUCINEX) 600 MG 12 hr tablet Take 1 tablet (600 mg) by mouth 2  times daily. (Patient not taking: Reported on 5/19/2025) 60 tablet 0     loperamide (IMODIUM) 2 MG capsule Take 2 mg by mouth 4 times daily as needed for diarrhea. (Patient not taking: Reported on 5/19/2025)       methocarbamol (ROBAXIN) 500 MG tablet Take 1 tablet (500 mg) by mouth every 6 hours as needed for muscle spasms or other (pain). (Patient not taking: Reported on 5/19/2025) 30 tablet 0     oxyCODONE (ROXICODONE) 5 MG tablet Take 0.5-1 tablets (2.5-5 mg) by mouth every 4 hours as needed for moderate pain or severe pain (2.5 mg for moderate pain, 5 mg for severe pain). (Patient not taking: Reported on 5/19/2025) 20 tablet 0     polyethylene glycol (MIRALAX) 17 GM/Dose powder Take 17 g by mouth daily. (Patient not taking: Reported on 5/19/2025)       witch hazel-glycerin (TUCKS) pad Apply topically 4 times daily as needed for hemorrhoids. (Patient not taking: Reported on 5/19/2025)         ALLERGIES   No Known Allergies    PAST MEDICAL HISTORY:  Past Medical History:   Diagnosis Date     CAD (coronary artery disease) 02/05/2015    3 stents     History of angina      History of skin graft     Right lower limb     Hypertension goal BP (blood pressure) < 140/90 02/05/2015     Kidney stone 02/17/2021     Malignant neoplasm of kidney excluding renal pelvis, left (H) 2021     Sleep apnea        PAST SURGICAL HISTORY:  Past Surgical History:   Procedure Laterality Date     BYPASS GRAFT ARTERY CORONARY N/A 3/15/2025    Procedure: CORONARY ARTERY BYPASS GRAFT x 2 (LEFT INTERNAL MAMMARY ARTERY - LEFT ANTERIOR DESCENDING ARTERY; SAPHENOUS VEIN - POSTERIOR DESCENDING ARTERY) WITH ENDOSCOPIC SAPHENOUS VEIN HARVEST ON THE RIGHT LOWER EXTREMITY, AND ON CARDIOPULMONARY PUMP OXYGENATOR  (INTRAOPERATIVE TRANSESOPHAGEAL ECHOCARDIOGRAM BY ANESTHESIOLOGIST);  Surgeon: Dimitri John MD;  Location:  OR     COLONOSCOPY N/A 07/16/2020    Procedure: COLONOSCOPY;  Surgeon: Wenceslao Marie MD;  Location:  GI      COMBINED CYSTOSCOPY, RETROGRADES, URETEROSCOPY, INSERT STENT Left 02/19/2021    Procedure: CYSTOSCOPY LEFT URETEROSCOPY,  LEFT STENT PLACEMENT, LEFT RETROGRADE;  Surgeon: Phil Lin MD;  Location:  OR     CV CORONARY ANGIOGRAM N/A 3/14/2025    Procedure: Coronary Angiogram;  Surgeon: Andres Alexander MD;  Location:  HEART CARDIAC CATH LAB     CV LEFT HEART CATH N/A 3/14/2025    Procedure: Left Heart Catheterization;  Surgeon: Andres Alexander MD;  Location:  HEART CARDIAC CATH LAB     CV LEFT VENTRICULOGRAM N/A 3/14/2025    Procedure: Left Ventriculogram;  Surgeon: Andres Alexander MD;  Location:  HEART CARDIAC CATH LAB     CYSTOSCOPY, DILATE URETER(S), COMBINED Left 02/19/2021    Procedure: Cystoscopy, dilate ureter(s), combined;  Surgeon: Phil Lin MD;  Location:  OR     HEART CATH, ANGIOPLASTY  11/2007    mid to prox LAD drug-eluting stent x2; Occluded RCA with Collaterals     KIDNEY STONE SURGERY  2003    laser and ureteric stenting     LAPAROSCOPIC CHOLECYSTECTOMY N/A 3/30/2025    Procedure: CHOLECYSTECTOMY, ROBOT-ASSISTED, LAPAROSCOPIC, USING DA ANURAG XI;  Surgeon: Yosvany Almonte MD;  Location:  OR     LUMBAR SPINE SURGERY N/A 02/13/2023    T8-L1 minimally invasive posterolateral instrumentation with cement augmentation     NEPHRECTOMY RT/LT Left 04/2021    [facility]     SKIN GRAFT, EACH ADDN 100SQCM       THORACOSCOPIC WEDGE RESECTION LUNG Right 1/28/2025    Procedure: RIGHT VIDEO ASSISTED THORACOSCOPY WEDGE RESECTION, RIGHT UPPER LOBE LUNG NODULE;  Surgeon: Anmol Carrillo MD;  Location:  OR       FAMILY HISTORY:  Family History   Problem Relation Age of Onset     Hypertension Mother      Pneumonia Mother 91        covid related     Dementia Mother      Alzheimer Disease Father         d age 91     Leukemia Maternal Grandmother      Cancer Maternal Grandfather      Cancer Maternal Uncle         lung       SOCIAL HISTORY:  Social History     Socioeconomic History      Marital status:      Spouse name: None     Number of children: None     Years of education: None     Highest education level: None   Tobacco Use     Smoking status: Former     Current packs/day: 0.00     Average packs/day: 1.5 packs/day for 44.0 years (66.0 ttl pk-yrs)     Types: Cigarettes     Start date: 1963     Quit date: 2007     Years since quittin.9     Smokeless tobacco: Never   Vaping Use     Vaping status: Never Used   Substance and Sexual Activity     Alcohol use: Yes     Comment: rare     Drug use: Yes     Types: Marijuana     Sexual activity: Not Currently     Partners: Female   Other Topics Concern     Caffeine Concern Yes     Comment: 1 pop daily      Special Diet Yes     Comment: fruits      Exercise Yes     Comment: walking 1 hr daily      Parent/sibling w/ CABG, MI or angioplasty before 65F 55M? No     Social Drivers of Health     Financial Resource Strain: Low Risk  (3/30/2025)    Financial Resource Strain      Within the past 12 months, have you or your family members you live with been unable to get utilities (heat, electricity) when it was really needed?: No   Food Insecurity: Low Risk  (3/30/2025)    Food Insecurity      Within the past 12 months, did you worry that your food would run out before you got money to buy more?: No      Within the past 12 months, did the food you bought just not last and you didn t have money to get more?: No   Transportation Needs: Low Risk  (3/30/2025)    Transportation Needs      Within the past 12 months, has lack of transportation kept you from medical appointments, getting your medicines, non-medical meetings or appointments, work, or from getting things that you need?: No   Physical Activity: Unknown (2024)    Exercise Vital Sign      Days of Exercise per Week: 1 day   Stress: No Stress Concern Present (2024)    [employer] [place] [employer] - Occupational Stress Questionnaire      Feeling of Stress : Not at all  "  Social Connections: Unknown (9/26/2024)    Social Connection and Isolation Panel [NHANES]      Frequency of Social Gatherings with Friends and Family: More than three times a week   Interpersonal Safety: Low Risk  (3/30/2025)    Interpersonal Safety      Do you feel physically and emotionally safe where you currently live?: Yes      Within the past 12 months, have you been hit, slapped, kicked or otherwise physically hurt by someone?: No      Within the past 12 months, have you been humiliated or emotionally abused in other ways by your partner or ex-partner?: No   Housing Stability: Low Risk  (3/30/2025)    Housing Stability      Do you have housing? : Yes      Are you worried about losing your housing?: No       Review of Systems:  Skin:        Eyes:       ENT:       Respiratory:       Cardiovascular:       Gastroenterology:      Genitourinary:       Musculoskeletal:       Neurologic:       Psychiatric:       Heme/Lymph/Imm:       Endocrine:         Physical Exam:    Vitals: BP (!) 146/84 (BP Location: Left arm, Patient Position: Sitting)   Pulse 54   Ht 1.74 m (5' 8.5\")   Wt 98 kg (216 lb)   SpO2 97%   BMI 32.36 kg/m    Constitutional: Well nourished and in no apparent distress.  Neck: Supple.   Respiratory: Breathing non-labored. Lungs clear to auscultation bilaterally. No crackles, wheezes, rhonchi, or rales.  Cardiovascular:  Regular rate and rhythm, normal S1 and S2. No murmur, rub, or gallop.  Skin: Warm, dry. Midline sternal incision is CDI.   Extremities: No edema.  Neurologic: No gross motor deficits. Alert, awake, and oriented to person, place and time.  Psychiatric: Affect appropriate.        Recent Lab Results:  LIPID RESULTS:  Lab Results   Component Value Date    CHOL 113 05/15/2025    CHOL 109 07/10/2020    HDL 41 05/15/2025    HDL 43 07/10/2020    LDL 47 05/15/2025    LDL 47 07/10/2020    TRIG 127 05/15/2025    TRIG 95 07/10/2020    CHOLHDLRATIO 2.4 05/27/2015       LIVER ENZYME " RESULTS:  Lab Results   Component Value Date    AST 79 (H) 04/02/2025    AST 22 02/17/2021    ALT 22 05/15/2025    ALT 28 02/17/2021       CBC RESULTS:  Lab Results   Component Value Date    WBC 6.3 04/07/2025    WBC 7.4 03/01/2021    RBC 3.35 (L) 04/07/2025    RBC 4.46 03/01/2021    HGB 10.4 (L) 04/07/2025    HGB 14.2 03/01/2021    HCT 34.4 (L) 04/07/2025    HCT 42.7 03/01/2021     (H) 04/07/2025    MCV 96 03/01/2021    MCH 31.0 04/07/2025    MCH 31.8 03/01/2021    MCHC 30.2 (L) 04/07/2025    MCHC 33.3 03/01/2021    RDW 16.4 (H) 04/07/2025    RDW 12.8 03/01/2021     04/07/2025     03/01/2021       BMP RESULTS:  Lab Results   Component Value Date     04/08/2025     03/01/2021    POTASSIUM 3.5 04/08/2025    POTASSIUM 3.4 03/15/2025    POTASSIUM 4.7 07/13/2021    POTASSIUM 3.7 03/01/2021    CHLORIDE 103 04/08/2025    CHLORIDE 108 07/13/2021    CHLORIDE 108 03/01/2021    CO2 26 04/08/2025    CO2 28 07/13/2021    CO2 31 03/01/2021    ANIONGAP 13 04/08/2025    ANIONGAP 4 07/13/2021    ANIONGAP 3 03/01/2021     (H) 04/08/2025     (H) 03/19/2025     (H) 07/13/2021    GLC 84 03/01/2021    BUN 10.7 04/08/2025    BUN 23 07/13/2021    BUN 19 03/01/2021    CR 0.85 04/08/2025    CR 0.99 03/01/2021    GFRESTIMATED 90 04/08/2025    GFRESTIMATED 52 (L) 10/24/2024    GFRESTIMATED 76 03/01/2021    GFRESTBLACK 88 03/01/2021    CANDICE 8.8 04/08/2025    CANDICE 9.8 03/01/2021        A1C RESULTS:  Lab Results   Component Value Date    A1C 5.8 (H) 01/14/2025    A1C 5.8 (H) 10/15/2019       INR RESULTS:  Lab Results   Component Value Date    INR 1.48 (H) 03/15/2025    INR 1.57 (H) 03/15/2025    INR 0.98 11/21/2007           CC  Christiano Toro MD  [address]  [place] [address]                  Thank you for allowing me to participate in the care of your patient.      Sincerely,     LONNY Bean [facility]  Heart Care  cc:   Christiano Toro MD  [address]  [place] [id number]

## 2025-05-19 NOTE — PATIENT INSTRUCTIONS
Thank you for your visit with the [facility] today.    Today's Summary:    I would follow up with your PCP, Dr. Bucio, regarding constipation and to follow up given all of your recent hospitalizations  The CV surgery team nurse phone number: [phone]  Cardiac rehab: [phone]  Let's try holding the Lasix for one week- if you notice any worsening swelling in the legs I want you to restart the Lasix  Take 162 mg of aspirin daily (2 baby aspirin)   I'll have my nursing start the paperwork for handicap paperwork.     Follow-up:  Cardiology follow up at [facility]: Dr. Toro in 3 months on August 14th.     Cardiology Scheduling ~[phone]  Cardiology Clinic RN ~ [phone]    It was a pleasure seeing you today.     LONNY Bean, CNP  Certified Nurse Practitioner  [facility]  May 19, 2025  ________________________________________________________

## 2025-05-22 ENCOUNTER — TELEPHONE (OUTPATIENT)
Dept: CARDIOLOGY | Facility: CLINIC | Age: 75
End: 2025-05-22
Payer: COMMERCIAL

## 2025-05-22 DIAGNOSIS — L30.9 DERMATITIS: ICD-10-CM

## 2025-05-22 RX ORDER — TRIAMCINOLONE ACETONIDE 1 MG/G
CREAM TOPICAL
Qty: 30 G | Refills: 3 | Status: SHIPPED | OUTPATIENT
Start: 2025-05-22

## 2025-05-22 NOTE — TELEPHONE ENCOUNTER
Medication Question or Refill        What medication are you calling about (include dose and sig)?: triamcinolone 0.1%    Preferred Pharmacy:   [id number] - [place], [place] - [address] [place] [address]  Phone: [phone] Fax: [phone]      Controlled Substance Agreement on file:   CSA -- Patient Level:    CSA: None found at the patient level.       Who prescribed the medication?: Fly Cortez MD     Do you need a refill? Yes    When did you use the medication last? Today    Patient offered an appointment? No    Do you have any questions or concerns?  Yes: pt is heading out of town this weekend and would like to get a refill before then      Okay to leave a detailed message?: Yes at Home number on file [phone] (home)

## 2025-05-22 NOTE — TELEPHONE ENCOUNTER
Disability parking permit application signed by HITESH Colbert. Called pt, he stated his daughter will come pick this up from the [facility]. Discussed pt will have to fill out the first page with his information. Application left at .

## 2025-05-29 DIAGNOSIS — E78.5 HYPERLIPIDEMIA, UNSPECIFIED HYPERLIPIDEMIA TYPE: ICD-10-CM

## 2025-05-29 RX ORDER — ATORVASTATIN CALCIUM 40 MG/1
40 TABLET, FILM COATED ORAL DAILY
Qty: 90 TABLET | Refills: 3 | Status: SHIPPED | OUTPATIENT
Start: 2025-05-29

## 2025-06-02 ENCOUNTER — TELEPHONE (OUTPATIENT)
Dept: FAMILY MEDICINE | Facility: CLINIC | Age: 75
End: 2025-06-02
Payer: COMMERCIAL

## 2025-06-02 DIAGNOSIS — I10 ESSENTIAL HYPERTENSION: ICD-10-CM

## 2025-06-02 NOTE — TELEPHONE ENCOUNTER
PCP and Cardiology -    Patient calling to request amlodipine refill. Upon writer review, this was discontinued at discharge from IP cards in March 2025 and is not noted within recent 5/19/25 visit plan. Patient feels certain he should still be taking this and is out of the medication. Med pended below if appropriate. Routing to PCP and cardiology for review and advisement.    IMMANUEL NavasN, PHN, AMB-BC (she/her)  [facility] [place] [facility] RN

## 2025-06-03 RX ORDER — AMLODIPINE BESYLATE 2.5 MG/1
2.5 TABLET ORAL DAILY
Qty: 90 TABLET | Refills: 3 | Status: SHIPPED | OUTPATIENT
Start: 2025-06-03

## 2025-06-03 NOTE — TELEPHONE ENCOUNTER
Per HITESH Colbert:     Sayra Nixon APRN CNP to Edward Hunt RN Sparks, Carolyn Denise, MD  AM      6/2/25  9:08 AM  Good morning,      It looks like the amlodipine was discontinued during the hospital stay as you mentioned. When I saw him on 5/19 he had not taken his morning meds and his blood pressure was elevated. I see he was on amlodipine 2.5 mg daily prior to CABG. If he's been taking the amlodipine since he was discharged and this is not new, then from my standpoint OK to continue as long as he has no lightheadedness/dizziness. Does he have access to a blood pressure cuff?      Thank you, Sayra     Called pt and reviewed recommendations. Pt stated he has been taking amlodipine and feeling well, denied any dizziness or lightheadedness. Pt is now out of medication. Pt stated he does not have a BP cuff at home to check BP. Pt stated he can check BP at the pharmacy if needed. Requested pt check when able and let Cardiology know if he is noticing that BP is elevated after medications. Prescription sent to pharmacy for amlodipine 2.5 mg daily.

## 2025-06-03 NOTE — TELEPHONE ENCOUNTER
Patient called wondering if this has been filled,please fill, thanks!  He is still taking this and has no dizziness or lightheadedness, would have to go to Pharmacy to do blood pressure checks.  He is completely out so please refill ASAP.  Please call once sent to the Pharmacy, he can't get a hold of them.        [id number] - [place], [place] - [address]

## 2025-06-26 ENCOUNTER — RESULTS FOLLOW-UP (OUTPATIENT)
Dept: FAMILY MEDICINE | Facility: CLINIC | Age: 75
End: 2025-06-26

## 2025-06-26 ENCOUNTER — OFFICE VISIT (OUTPATIENT)
Dept: FAMILY MEDICINE | Facility: CLINIC | Age: 75
End: 2025-06-26
Payer: COMMERCIAL

## 2025-06-26 VITALS
BODY MASS INDEX: 32.74 KG/M2 | OXYGEN SATURATION: 98 % | DIASTOLIC BLOOD PRESSURE: 89 MMHG | RESPIRATION RATE: 18 BRPM | HEART RATE: 49 BPM | HEIGHT: 68 IN | TEMPERATURE: 97.2 F | SYSTOLIC BLOOD PRESSURE: 153 MMHG | WEIGHT: 216 LBS

## 2025-06-26 DIAGNOSIS — Z12.11 SCREEN FOR COLON CANCER: ICD-10-CM

## 2025-06-26 DIAGNOSIS — N40.0 BENIGN PROSTATIC HYPERPLASIA, UNSPECIFIED WHETHER LOWER URINARY TRACT SYMPTOMS PRESENT: ICD-10-CM

## 2025-06-26 DIAGNOSIS — R73.03 PRE-DIABETES: ICD-10-CM

## 2025-06-26 DIAGNOSIS — Z85.528 HISTORY OF MALIGNANT NEOPLASM OF KIDNEY: ICD-10-CM

## 2025-06-26 DIAGNOSIS — M48.02 CERVICAL STENOSIS OF SPINAL CANAL: ICD-10-CM

## 2025-06-26 DIAGNOSIS — Z90.5 S/P NEPHRECTOMY: ICD-10-CM

## 2025-06-26 DIAGNOSIS — M48.061 SPINAL STENOSIS OF LUMBAR REGION WITHOUT NEUROGENIC CLAUDICATION: Primary | ICD-10-CM

## 2025-06-26 DIAGNOSIS — I10 HYPERTENSION GOAL BP (BLOOD PRESSURE) < 140/90: ICD-10-CM

## 2025-06-26 DIAGNOSIS — N18.31 CKD STAGE 3A, GFR 45-59 ML/MIN (H): ICD-10-CM

## 2025-06-26 PROBLEM — E66.812 CLASS 2 SEVERE OBESITY WITH BODY MASS INDEX (BMI) OF 35 TO 39.9 WITH SERIOUS COMORBIDITY (H): Status: RESOLVED | Noted: 2025-03-27 | Resolved: 2025-06-26

## 2025-06-26 PROBLEM — E66.01 CLASS 2 SEVERE OBESITY WITH BODY MASS INDEX (BMI) OF 35 TO 39.9 WITH SERIOUS COMORBIDITY (H): Status: RESOLVED | Noted: 2025-03-27 | Resolved: 2025-06-26

## 2025-06-26 LAB
ALBUMIN SERPL BCG-MCNC: 4 G/DL (ref 3.5–5.2)
ALP SERPL-CCNC: 124 U/L (ref 40–150)
ALT SERPL W P-5'-P-CCNC: 19 U/L (ref 0–70)
ANION GAP SERPL CALCULATED.3IONS-SCNC: 11 MMOL/L (ref 7–15)
AST SERPL W P-5'-P-CCNC: 25 U/L (ref 0–45)
BASOPHILS # BLD AUTO: 0 10E3/UL (ref 0–0.2)
BASOPHILS NFR BLD AUTO: 0 %
BILIRUB SERPL-MCNC: 1.6 MG/DL
BUN SERPL-MCNC: 8.8 MG/DL (ref 8–23)
CALCIUM SERPL-MCNC: 9.4 MG/DL (ref 8.8–10.4)
CHLORIDE SERPL-SCNC: 107 MMOL/L (ref 98–107)
CREAT SERPL-MCNC: 1.05 MG/DL (ref 0.67–1.17)
CREAT UR-MCNC: 246 MG/DL
EGFRCR SERPLBLD CKD-EPI 2021: 74 ML/MIN/1.73M2
EOSINOPHIL # BLD AUTO: 0.1 10E3/UL (ref 0–0.7)
EOSINOPHIL NFR BLD AUTO: 1 %
ERYTHROCYTE [DISTWIDTH] IN BLOOD BY AUTOMATED COUNT: 14.1 % (ref 10–15)
EST. AVERAGE GLUCOSE BLD GHB EST-MCNC: 114 MG/DL
GLUCOSE SERPL-MCNC: 109 MG/DL (ref 70–99)
HBA1C MFR BLD: 5.6 % (ref 0–5.6)
HCO3 SERPL-SCNC: 24 MMOL/L (ref 22–29)
HCT VFR BLD AUTO: 45.2 % (ref 40–53)
HGB BLD-MCNC: 14.2 G/DL (ref 13.3–17.7)
IMM GRANULOCYTES # BLD: 0 10E3/UL
IMM GRANULOCYTES NFR BLD: 0 %
LYMPHOCYTES # BLD AUTO: 1.2 10E3/UL (ref 0.8–5.3)
LYMPHOCYTES NFR BLD AUTO: 16 %
MCH RBC QN AUTO: 29 PG (ref 26.5–33)
MCHC RBC AUTO-ENTMCNC: 31.4 G/DL (ref 31.5–36.5)
MCV RBC AUTO: 92 FL (ref 78–100)
MICROALBUMIN UR-MCNC: 35.5 MG/L
MICROALBUMIN/CREAT UR: 14.43 MG/G CR (ref 0–17)
MONOCYTES # BLD AUTO: 0.4 10E3/UL (ref 0–1.3)
MONOCYTES NFR BLD AUTO: 6 %
NEUTROPHILS # BLD AUTO: 5.7 10E3/UL (ref 1.6–8.3)
NEUTROPHILS NFR BLD AUTO: 77 %
PLATELET # BLD AUTO: 170 10E3/UL (ref 150–450)
POTASSIUM SERPL-SCNC: 4 MMOL/L (ref 3.4–5.3)
PROT SERPL-MCNC: 7.3 G/DL (ref 6.4–8.3)
RBC # BLD AUTO: 4.89 10E6/UL (ref 4.4–5.9)
SODIUM SERPL-SCNC: 142 MMOL/L (ref 135–145)
TSH SERPL DL<=0.005 MIU/L-ACNC: 0.77 UIU/ML (ref 0.3–4.2)
WBC # BLD AUTO: 7.5 10E3/UL (ref 4–11)

## 2025-06-26 RX ORDER — LORAZEPAM 0.5 MG/1
0.5 TABLET ORAL
Qty: 30 TABLET | Refills: 0 | Status: SHIPPED | OUTPATIENT
Start: 2025-06-26 | End: 2025-06-26

## 2025-06-26 RX ORDER — TAMSULOSIN HYDROCHLORIDE 0.4 MG/1
0.4 CAPSULE ORAL DAILY
Qty: 90 CAPSULE | Refills: 1 | Status: SHIPPED | OUTPATIENT
Start: 2025-06-26

## 2025-06-26 ASSESSMENT — PAIN SCALES - GENERAL: PAINLEVEL_OUTOF10: SEVERE PAIN (10)

## 2025-06-27 PROBLEM — L30.9 DERMATITIS: Status: RESOLVED | Noted: 2021-08-20 | Resolved: 2025-06-27

## 2025-06-27 PROBLEM — K81.0 ACUTE CHOLECYSTITIS: Status: RESOLVED | Noted: 2025-03-30 | Resolved: 2025-06-27

## 2025-06-30 ENCOUNTER — ANCILLARY PROCEDURE (OUTPATIENT)
Dept: CT IMAGING | Facility: CLINIC | Age: 75
End: 2025-06-30
Attending: PHYSICIAN ASSISTANT
Payer: COMMERCIAL

## 2025-06-30 ENCOUNTER — PATIENT OUTREACH (OUTPATIENT)
Dept: CARE COORDINATION | Facility: CLINIC | Age: 75
End: 2025-06-30
Payer: COMMERCIAL

## 2025-06-30 DIAGNOSIS — C34.11 PRIMARY NON-SMALL CELL CARCINOMA OF UPPER LOBE OF RIGHT LUNG (H): ICD-10-CM

## 2025-06-30 PROCEDURE — 71250 CT THORAX DX C-: CPT

## 2025-07-01 ENCOUNTER — TELEPHONE (OUTPATIENT)
Dept: FAMILY MEDICINE | Facility: CLINIC | Age: 75
End: 2025-07-01
Payer: COMMERCIAL

## 2025-07-01 NOTE — TELEPHONE ENCOUNTER
Hi, that's great, I'm glad that the amitriptyline is working well, he can definitely stop taking diazepam. I took that off his medication list. Please let him know, thanks!    Sincerely,  Dr. Vernell Bucio MD  7/1/2025

## 2025-07-01 NOTE — TELEPHONE ENCOUNTER
Reviewed this provider response with pt.  He said the diazepam really didn't do much for him. He is trying to eliminate meds that aren't helping.  Jackson, Triage RN  [facility]/ [phone]

## 2025-07-01 NOTE — TELEPHONE ENCOUNTER
Dr. Bucio--- please review notes and advise. Ok for pt to discontinue taking diazepam? If yes, please discontinue med in list. Thank you!    Pt called and reported that he is on both the amitriptyline and diazepam to help with back pain/spinal canal. He said that the amitriptyline works better for him and does not want to be on or continue taking the diazepam. Pt does not think he needs to be on both meds.     Bryce Cruz, BSN, PHN, RN-[facility]

## 2025-07-08 ENCOUNTER — TELEPHONE (OUTPATIENT)
Dept: FAMILY MEDICINE | Facility: CLINIC | Age: 75
End: 2025-07-08
Payer: COMMERCIAL

## 2025-07-08 NOTE — TELEPHONE ENCOUNTER
Medication Question or Refill        What medication are you calling about (include dose and sig)?: Diazepam    Please add this medication back to patients mediation list per patient.  He does want to take this.    Preferred Pharmacy:   [facility][id number] - [place], [place] - [address]  [address] [place] [address]  Phone: [phone] Fax: [phone]    [facility] - [place] [address] [place] [address] [address]  Phone: [phone] Fax: [phone]      Controlled Substance Agreement on file:   CSA -- Patient Level:    CSA: None found at the patient level.       Who prescribed the medication?: Dr Bucio at [facility]     Do you need a refill? No    When did you use the medication last? current    Patient offered an appointment? No    Do you have any questions or concerns?  No      Okay to leave a detailed message?: Yes at Home number on file [phone] (home)

## 2025-07-08 NOTE — TELEPHONE ENCOUNTER
-- looks like this was recently prescribed and then patient had decided not to take and now changed their mind. RN's cannot add medications to the med list    Vidhya Reed RN  [facility]

## 2025-07-23 ENCOUNTER — TELEPHONE (OUTPATIENT)
Dept: CARDIOLOGY | Facility: CLINIC | Age: 75
End: 2025-07-23
Payer: COMMERCIAL

## 2025-07-23 DIAGNOSIS — R60.0 PERIPHERAL EDEMA: ICD-10-CM

## 2025-07-23 RX ORDER — FUROSEMIDE 20 MG/1
20 TABLET ORAL DAILY
Qty: 90 TABLET | Refills: 3 | Status: SHIPPED | OUTPATIENT
Start: 2025-07-23

## 2025-07-23 NOTE — TELEPHONE ENCOUNTER
Health Call Center    Phone Message    May a detailed message be left on voicemail: yes     Reason for Call: Medication Refill Request    Has the patient contacted the pharmacy for the refill? Yes   Name of medication being requested: furosemide (LASIX) 20 MG tablet   Provider who prescribed the medication: Fior Sethi  Pharmacy:    [id number] [place], [place] - [address]    Date medication is needed: ASAP    Refill requests to ordering provider are prohibited and pt states Dr. Toro was filling this prescription in the past and pt states he only has enough medication left for tomorrow. Please fill ASAP. Thank you!    Action Taken: Other: Cardiology    Travel Screening: Not Applicable    Thank you!  [facility]       Date of Service:

## 2025-07-24 ENCOUNTER — OFFICE VISIT (OUTPATIENT)
Dept: FAMILY MEDICINE | Facility: CLINIC | Age: 75
End: 2025-07-24
Payer: COMMERCIAL

## 2025-07-24 VITALS
BODY MASS INDEX: 32.81 KG/M2 | RESPIRATION RATE: 16 BRPM | SYSTOLIC BLOOD PRESSURE: 137 MMHG | HEART RATE: 71 BPM | DIASTOLIC BLOOD PRESSURE: 85 MMHG | WEIGHT: 214 LBS | OXYGEN SATURATION: 97 % | TEMPERATURE: 97 F

## 2025-07-24 DIAGNOSIS — M48.061 SPINAL STENOSIS OF LUMBAR REGION WITHOUT NEUROGENIC CLAUDICATION: ICD-10-CM

## 2025-07-24 DIAGNOSIS — M48.02 CERVICAL STENOSIS OF SPINAL CANAL: Primary | ICD-10-CM

## 2025-07-24 RX ORDER — METHOCARBAMOL 750 MG/1
750 TABLET, FILM COATED ORAL 4 TIMES DAILY PRN
Qty: 60 TABLET | Refills: 1 | Status: SHIPPED | OUTPATIENT
Start: 2025-07-24

## 2025-07-24 ASSESSMENT — PAIN SCALES - GENERAL: PAINLEVEL_OUTOF10: NO PAIN (0)

## 2025-07-24 NOTE — PROGRESS NOTES
Assessment & Plan     Cervical stenosis of spinal canal  Spinal stenosis of lumbar region without neurogenic claudication  Discussed I would defer further refills of diazepam to PCP as I do not have experience of prescribing this for MSK related pain and would like him to follow closely with prescribing provider as this is a controlled substance that can create dependence and side effects especially in older adults.   Willing to try increased dose of robaxin as he has been previously prescribed as below, 750 mg take up to 4 x daily as needed.   Recommend seeing spine specialist as scheduled 8/22.   - methocarbamol (ROBAXIN) 750 MG tablet  Dispense: 60 tablet; Refill: 1    Cheikh Fernandes is a 75 year old, presenting for the following health issues:  Med Check      7/24/2025     1:05 PM   Additional Questions   Roomed by Nona Balderas         7/24/2025     1:07 PM   Patient Reported Additional Medications   Patient reports taking the following new medications diazepam (VALIUM) 2 MG tablet     HPI      Pt having continued upper back pain, seen by PCP in June who prescribed amitriptyline and benzodiazepine for pain     He reports these have been helping to knock him out at night to be able to get sleep    Not seeing a spine specialist or pain management specialist currently.     Taking diazepam just about every night.         Objective    /85 (BP Location: Right arm, Patient Position: Sitting, Cuff Size: Adult Regular)   Pulse 71   Temp 97  F (36.1  C) (Temporal)   Resp 16   Wt 97.1 kg (214 lb)   SpO2 97%   BMI 32.81 kg/m    Body mass index is 32.81 kg/m .  Physical Exam   GENERAL: alert and no distress  MS: neck exam shows ROM is normal, and using cane for ambulation assistive device.   NEURO:  mentation intact and speech normal  PSYCH: mentation appears normal, affect normal/bright            Signed Electronically by: LONNY De Leon CNP

## 2025-07-24 NOTE — TELEPHONE ENCOUNTER
Hi, he needs to make an in person follow up appointment if he is still having severe pain and to discuss pain management options, please help him schedule an in person appointment in my next available, or with one of my colleagues if he wants to be seen sooner, thank you!    Sincerely,  Dr. Vernell Bucio MD  7/24/2025

## 2025-08-14 ENCOUNTER — OFFICE VISIT (OUTPATIENT)
Dept: CARDIOLOGY | Facility: CLINIC | Age: 75
End: 2025-08-14
Payer: COMMERCIAL

## 2025-08-14 ENCOUNTER — TELEPHONE (OUTPATIENT)
Dept: FAMILY MEDICINE | Facility: CLINIC | Age: 75
End: 2025-08-14

## 2025-08-14 VITALS
BODY MASS INDEX: 32.28 KG/M2 | HEIGHT: 68 IN | SYSTOLIC BLOOD PRESSURE: 105 MMHG | OXYGEN SATURATION: 98 % | WEIGHT: 213 LBS | DIASTOLIC BLOOD PRESSURE: 70 MMHG | HEART RATE: 53 BPM

## 2025-08-14 DIAGNOSIS — I25.10 CORONARY ARTERY DISEASE INVOLVING NATIVE CORONARY ARTERY OF NATIVE HEART WITHOUT ANGINA PECTORIS: ICD-10-CM

## 2025-08-14 DIAGNOSIS — C34.92 MALIGNANT NEOPLASM OF LEFT LUNG, UNSPECIFIED PART OF LUNG (H): ICD-10-CM

## 2025-08-14 DIAGNOSIS — I77.810 AORTIC ROOT DILATATION: ICD-10-CM

## 2025-08-14 DIAGNOSIS — E78.5 HYPERLIPIDEMIA, UNSPECIFIED HYPERLIPIDEMIA TYPE: ICD-10-CM

## 2025-08-14 DIAGNOSIS — I10 HYPERTENSION GOAL BP (BLOOD PRESSURE) < 140/90: ICD-10-CM

## 2025-08-14 DIAGNOSIS — E78.5 HYPERLIPIDEMIA WITH TARGET LDL LESS THAN 70: ICD-10-CM

## 2025-08-14 DIAGNOSIS — Z95.1 S/P CABG (CORONARY ARTERY BYPASS GRAFT): Primary | ICD-10-CM

## 2025-08-14 DIAGNOSIS — I10 ESSENTIAL HYPERTENSION: ICD-10-CM

## 2025-08-14 DIAGNOSIS — Z90.2 S/P PNEUMONECTOMY: ICD-10-CM

## 2025-08-27 ENCOUNTER — PATIENT OUTREACH (OUTPATIENT)
Dept: CARE COORDINATION | Facility: CLINIC | Age: 75
End: 2025-08-27
Payer: COMMERCIAL

## (undated) DEVICE — Device

## (undated) DEVICE — LINEN TOWEL PACK X5 5464

## (undated) DEVICE — PREP CHLORAPREP W/ORANGE TINT 10.5ML 930715

## (undated) DEVICE — KIT ENDO VASOVIEW HEMOPRO 2 VH-4000

## (undated) DEVICE — SURGICEL HEMOSTAT 3X4" NUKNIT 1943

## (undated) DEVICE — SUCTION CANISTER MEDIVAC LINER 3000ML W/LID 65651-530

## (undated) DEVICE — SU MONOCRYL 3-0 PS-2 27" Y427H

## (undated) DEVICE — CONNECTOR PREFUSION REDUCER 3/8X1/2" W/O LL 6013

## (undated) DEVICE — SU PROLENE 7-0 BV175-6 24' M8737

## (undated) DEVICE — NDL PERC ENTRY THINWALL 18GA 7.0" G00166

## (undated) DEVICE — LINEN LEG ROLL 5489

## (undated) DEVICE — TUBING SUCTION MEDI-VAC SOFT 3/16"X20' N520A

## (undated) DEVICE — SU PLEDGET SOFT TFE 3/8"X3/26"X1/16" PCP40

## (undated) DEVICE — SU NDL CUT REV MED 3/8 209014

## (undated) DEVICE — ESU GROUND PAD UNIVERSAL W/O CORD

## (undated) DEVICE — DRAPE BREAST/CHEST 29420

## (undated) DEVICE — CATH URETERAL OPEN END 6FR AXXCESS

## (undated) DEVICE — EVAC SYSTEM CLEAR FLOW SC082500

## (undated) DEVICE — PUMP CP37 QUANTUM CENTRIFUGAL BLOOD CP37V-V0

## (undated) DEVICE — ENDO TROCAR FIRST ENTRY KII FIOS Z-THRD 05X100MM CTF03

## (undated) DEVICE — COVER TABLE POLY 65X90" 8186

## (undated) DEVICE — CLIP SPRING FOGARTY SOFTJAW CSOFT6

## (undated) DEVICE — SU PROLENE 4-0 SHDA 36" 8521H

## (undated) DEVICE — DILATATION SYSTEM URETERAL 6-16FR M0062401000

## (undated) DEVICE — SURGICEL NU-KIT ABSORBABLE HEMOSTAT 1943S

## (undated) DEVICE — SU SILK 1 TIE 60" SA7H

## (undated) DEVICE — PACK OPEN HEART PV12OH524

## (undated) DEVICE — CATH ANGIO INFINITI 3DRC 4FRX100CM 538476

## (undated) DEVICE — SOL WATER IRRIG 1000ML BOTTLE 2F7114

## (undated) DEVICE — GUIDEWIRE SENSOR DUAL FLEX STR 0.035"X150CM M0066703080

## (undated) DEVICE — DRAPE SHEET REV FOLD 3/4 9349

## (undated) DEVICE — DRAPE NEW SLUSH/WARMER HUSHSLUSH 2.0 ESD340 ESD340

## (undated) DEVICE — NEEDLE HYPO MONOJECT STANDARD 22GA 1 1/2IN BLUE 1188822112

## (undated) DEVICE — PROTECTOR ARM ONE-STEP TRENDELENBURG 40418

## (undated) DEVICE — DAVINCI XI CAUTERY HOOK 470183

## (undated) DEVICE — ANTIFOG SOLUTION SEE SHARP 150M TROCAR SWABS 30978 (COI)

## (undated) DEVICE — DAVINCI XI SUCTION IRRIGATOR ENDOWRIST 480299

## (undated) DEVICE — DRSG KERLIX 4 1/2"X4YDS ROLL 6715

## (undated) DEVICE — ENDO TROCAR THORACIC 10/12MM TT012

## (undated) DEVICE — SU PROLENE 7-0 BV-1DA 24" 8702H

## (undated) DEVICE — GLOVE BIOGEL PI ULTRATOUCH G SZ 6.5 42165

## (undated) DEVICE — TAPE DURAPORE 3" SILK 1538-3

## (undated) DEVICE — BAG DECANTER STERILE WHITE DYNJDEC09

## (undated) DEVICE — CLIP HORIZON MULTI SM YELLOW 001204

## (undated) DEVICE — DAVINCI XI GRASPER FENESTRATED TIP UP 8MM 470347

## (undated) DEVICE — RESERVOIR CELL SAVING BLOOD COLLECTION EL2120

## (undated) DEVICE — BLOWER/MISTER CLEARVIEW 22150

## (undated) DEVICE — DRAPE IOBAN INCISE 23X17" 6650EZ

## (undated) DEVICE — CATH DIAG 4FR ANG PIG 538453S

## (undated) DEVICE — CANNULA VENOUS 2 STAGE 32-40FR

## (undated) DEVICE — DAVINCI XI MONOPOLAR SCISSORS HOT SHEARS 8MM 470179

## (undated) DEVICE — OXYGENATOR PERFUSION AFFINITY FUSION BB841

## (undated) DEVICE — MANIFOLD KIT ANGIO AUTOMATED 014613

## (undated) DEVICE — CLIP HORIZON MED BLUE 002200

## (undated) DEVICE — SU ETHILON 3-0 FS-1 18" 663G

## (undated) DEVICE — DEFIB PRO-PADZ LVP LQD GEL ADULT 8900-2105-01

## (undated) DEVICE — PACK CYSTOSCOPY SBA15CYFSI

## (undated) DEVICE — ESU ELEC BLADE 6" COATED/INSULATED E1455-6

## (undated) DEVICE — SU VICRYL 3-0 SH 27" J316H

## (undated) DEVICE — SU VICRYL 0 CTX 36" J370H

## (undated) DEVICE — SU ETHIBOND 3-0 BBDA 36" X588H

## (undated) DEVICE — KIT WASH CELL SAVING ATL2001

## (undated) DEVICE — VIAL DECANTER STERILE WHITE DYNJDEC06

## (undated) DEVICE — GLOVE BIOGEL PI MICRO SZ 6.5 48565

## (undated) DEVICE — CANNULA CORONARY IMA 31001

## (undated) DEVICE — SYR EAR 3OZ BULB IRR STRL DISP BLU PVC 4173

## (undated) DEVICE — SU PROLENE 4-0 RB-1DA 36" 8557H

## (undated) DEVICE — DEVICE ASSEMBLY SUCTION/ANTI COAG BTC93

## (undated) DEVICE — KIT LG BORE TOUHY ACCESS PLUS MAP152

## (undated) DEVICE — DRAPE POUCH INSTRUMENT 1018

## (undated) DEVICE — CLEANER INST PRE-KLENZ SOAK SHIELD TUBE 6 ML MEDIUM 2D66J4

## (undated) DEVICE — SU SILK 0 24" TIE SA76G

## (undated) DEVICE — SOL NACL 0.9% INJ 1000ML BAG 2B1324X

## (undated) DEVICE — KIT HAND CONTROL ANGIOTOUCH ACIST 65CM AT-P65

## (undated) DEVICE — PACK SURGICAL PROCEDURE CUSTOM 3/8IN X 3/8IN BB11W21R

## (undated) DEVICE — STPL ENDO ARTICULATING 60MM EC60A

## (undated) DEVICE — SU VICRYL+ 0 27 UR6 VLT VCP603H

## (undated) DEVICE — PUNCH AORTIC 4.0MMX8" RCB40

## (undated) DEVICE — BLADE KNIFE SURG 10 371110

## (undated) DEVICE — PREP CHLORAPREP 26ML TINTED HI-LITE ORANGE 930815

## (undated) DEVICE — CATH DIAG 4FR JL 4.5 538417

## (undated) DEVICE — GOWN IMPERVIOUS ZONED LG

## (undated) DEVICE — CANNULA PERFUSION AORTIC ROOT 22FR 20012

## (undated) DEVICE — SUCTION DRY CHEST DRAIN OASIS 3600-100

## (undated) DEVICE — DRAIN JACKSON PRATT RESERVOIR 100ML SU130-1305

## (undated) DEVICE — DAVINCI XI FCP BIPOLAR FENESTRATED 470205

## (undated) DEVICE — DRAIN CHEST TUBE 32FR STR 8032

## (undated) DEVICE — TOTE ANGIO CORP PC15AT SAN32CC83O

## (undated) DEVICE — SU MONOCRYL 4-0 PS-2 18" UND Y496G

## (undated) DEVICE — SYR BULB IRRIG DOVER 60 ML LATEX FREE 67000

## (undated) DEVICE — DAVINCI XI OBTURATOR BLADELESS 8MM 470359

## (undated) DEVICE — DRSG GAUZE 4X4" 3033

## (undated) DEVICE — GLOVE PROTEXIS W/NEU-THERA 7.5  2D73TE75

## (undated) DEVICE — DAVINCI XI DRAPE ARM 470015

## (undated) DEVICE — BAG CYSTO TABLE DRAIN

## (undated) DEVICE — LINEN TOWEL PACK X6 WHITE 5487

## (undated) DEVICE — SYR 30ML LL W/O NDL 302832

## (undated) DEVICE — CATH URETERAL DL 6FR FLEX-TIP 10FRX50CM G17323 AQ-022610

## (undated) DEVICE — SOL NACL 0.9% IRRIG 3000ML BAG 2B7477

## (undated) DEVICE — SU VICRYL 2-0 CT-1 27" UND J259H

## (undated) DEVICE — SU SILK 1 TIE 10X30" SA87G

## (undated) DEVICE — BONE WAX OSTENE 3.5GM CT410

## (undated) DEVICE — SU PROLENE 3-0 SHDA 36" 8522H

## (undated) DEVICE — SOL NACL 0.9% IRRIG 1000ML BOTTLE 2F7124

## (undated) DEVICE — ESU CORD MONOPOLAR 10'  E0510

## (undated) DEVICE — CANNULA PERFUSION ARTERIAL 20FR 12" 77420

## (undated) DEVICE — LINEN TOWEL PACK X30 5481

## (undated) DEVICE — SU STEEL 6 CCS 4X18" M654G

## (undated) DEVICE — BLADE KNIFE BEAVER MINI STR BEAVER6900

## (undated) DEVICE — POSITIONER ASSIST ESTECH 3S T401210S

## (undated) DEVICE — CLIP ENDO HEMO-LOC GREEN MED/LG 544230

## (undated) DEVICE — SU VICRYL 2-0 CT-2 27" J333H

## (undated) DEVICE — GLOVE BIOGEL PI MICRO INDICATOR UNDERGLOVE SZ 6.5 48965

## (undated) DEVICE — DAVINCI XI SEAL UNIVERSAL 5-12MM 470500

## (undated) DEVICE — SU VICRYL 1 CT-2 27" J335H

## (undated) DEVICE — SYR 10ML FINGER CONTROL W/O NDL 309695

## (undated) DEVICE — ENDO SCOPE WARMER LF TM500

## (undated) DEVICE — LINEN GOWN OVERSIZE 5408

## (undated) DEVICE — LIGHT HANDLE X2

## (undated) DEVICE — INTRO SHEATH 4FRX10CM PINNACLE RSS402

## (undated) DEVICE — ANTIFOG SOLUTION W/FOAM PAD 31142527

## (undated) DEVICE — SU VICRYL+ 3-0 FS1 27IN UND VCP442H

## (undated) DEVICE — DAVINCI HOT SHEARS TIP COVER  400180

## (undated) DEVICE — SU ETHIBOND 2-0 SHDA 30" X563H

## (undated) DEVICE — GLOVE BIOGEL PI ULTRATOUCH G SZ 7.5 42175

## (undated) DEVICE — LEAD PACER MYOCARDIAL BIPOLAR TEMPORARY 53CM 6495F

## (undated) DEVICE — WIRE GUIDE AMPLATZ SUPER STIFF 0.035"X145CM 46-524

## (undated) DEVICE — GOWN XLG DISP 9545

## (undated) DEVICE — POUCH TISSUE RETRIEVAL ROBOTIC 8MM 5.1" INTRO TRS-ROBO-8

## (undated) DEVICE — DEVICE TISSUE STABILIZATION OCTOBASE 28707

## (undated) DEVICE — SOMASENSOR CEREBRAL OXIMETER ADULT SAFB-SM

## (undated) DEVICE — CABLE MYO/LEAD PACING WHITE DISP 019-530

## (undated) DEVICE — PACK MINOR SBA15MIFSE

## (undated) DEVICE — SU VICRYL 4-0 PS-2 18" UND J496H

## (undated) DEVICE — PAD CHUX UNDERPAD 23X24" 7136

## (undated) DEVICE — DRAIN CHEST TUBE 28FR STR 8028

## (undated) DEVICE — SPONGE RAY-TEC 4X8" 7318

## (undated) DEVICE — DAVINCI XI CLIP APPLIER MED HEM-O-LOK GREEN 470327

## (undated) DEVICE — SHEATH URETERAL ACCESS NAVIGATOR 11/13FRX36CM 250-203

## (undated) DEVICE — ENDO POUCH UNIV RETRIEVAL SYSTEM INZII 10MM CD001

## (undated) DEVICE — SUCTION MANIFOLD NEPTUNE 2 SYS 4 PORT 0702-020-000

## (undated) DEVICE — BLADE KNIFE SURG 15 371115

## (undated) DEVICE — ADPT PERFUSION MULTIPLE

## (undated) DEVICE — WIRE GUIDE 0.035"X260CM SAFE-T-J EXCHANGE G00517

## (undated) DEVICE — RAD RX ISOVUE 300 (50ML) 61% IOPAMIDOL CHARGE PER ML

## (undated) DEVICE — PACK LAP CHOLE SLC15LCFSD

## (undated) DEVICE — DRAIN CHEST TUBE RIGHT ANGLED 32FR 8132

## (undated) DEVICE — SU PROLENE 6-0 C-1DA 30" M8706

## (undated) DEVICE — DAVINCI XI DRAPE COLUMN 470341

## (undated) DEVICE — DRAIN JACKSON PRATT CHANNEL 19FR ROUND HUBLESS SIL JP-2230

## (undated) DEVICE — STPL RELOAD REG/THK TISSUE ECHELON 60 X 3.8MM GOLD GST60D

## (undated) DEVICE — BLADE SAW STERNAL 20X30MM KM-32

## (undated) DEVICE — DEVICE SUTURE PASSER 14GA WECK EFX EFXSP2

## (undated) DEVICE — SU ETHIBOND 0 CT-1 CR 8X18" CX21D

## (undated) DEVICE — LUBRICANT INST ELECTROLUBE EL101

## (undated) DEVICE — CATH TRAY FOLEY SURESTEP 16FR W/TMP PRB STLK LATEX A319416AM

## (undated) DEVICE — SU PROLENE 7-0 BV-1DA 4X30" M8703

## (undated) RX ORDER — FENTANYL CITRATE 0.05 MG/ML
INJECTION, SOLUTION INTRAMUSCULAR; INTRAVENOUS
Status: DISPENSED
Start: 2025-01-28

## (undated) RX ORDER — CEFAZOLIN SODIUM 1 G/3ML
INJECTION, POWDER, FOR SOLUTION INTRAMUSCULAR; INTRAVENOUS
Status: DISPENSED
Start: 2025-03-15

## (undated) RX ORDER — HEPARIN SODIUM 1000 [USP'U]/ML
INJECTION, SOLUTION INTRAVENOUS; SUBCUTANEOUS
Status: DISPENSED
Start: 2025-03-14

## (undated) RX ORDER — ONDANSETRON 2 MG/ML
INJECTION INTRAMUSCULAR; INTRAVENOUS
Status: DISPENSED
Start: 2021-02-19

## (undated) RX ORDER — HYDROMORPHONE HCL IN WATER/PF 6 MG/30 ML
PATIENT CONTROLLED ANALGESIA SYRINGE INTRAVENOUS
Status: DISPENSED
Start: 2025-01-28

## (undated) RX ORDER — ONDANSETRON 2 MG/ML
INJECTION INTRAMUSCULAR; INTRAVENOUS
Status: DISPENSED
Start: 2025-01-28

## (undated) RX ORDER — HEPARIN SODIUM 1000 [USP'U]/ML
INJECTION, SOLUTION INTRAVENOUS; SUBCUTANEOUS
Status: DISPENSED
Start: 2025-03-15

## (undated) RX ORDER — LIDOCAINE HYDROCHLORIDE 10 MG/ML
INJECTION, SOLUTION EPIDURAL; INFILTRATION; INTRACAUDAL; PERINEURAL
Status: DISPENSED
Start: 2025-03-14

## (undated) RX ORDER — ONDANSETRON 2 MG/ML
INJECTION INTRAMUSCULAR; INTRAVENOUS
Status: DISPENSED
Start: 2025-03-30

## (undated) RX ORDER — BUPIVACAINE HYDROCHLORIDE 5 MG/ML
INJECTION, SOLUTION EPIDURAL; INTRACAUDAL
Status: DISPENSED
Start: 2025-01-28

## (undated) RX ORDER — VECURONIUM BROMIDE 1 MG/ML
INJECTION, POWDER, LYOPHILIZED, FOR SOLUTION INTRAVENOUS
Status: DISPENSED
Start: 2025-03-15

## (undated) RX ORDER — PROPOFOL 10 MG/ML
INJECTION, EMULSION INTRAVENOUS
Status: DISPENSED
Start: 2021-02-19

## (undated) RX ORDER — MORPHINE SULFATE 2 MG/ML
INJECTION, SOLUTION INTRAMUSCULAR; INTRAVENOUS
Status: DISPENSED
Start: 2025-03-15

## (undated) RX ORDER — FENTANYL CITRATE 50 UG/ML
INJECTION, SOLUTION INTRAMUSCULAR; INTRAVENOUS
Status: DISPENSED
Start: 2021-02-19

## (undated) RX ORDER — PROTAMINE SULFATE 10 MG/ML
INJECTION, SOLUTION INTRAVENOUS
Status: DISPENSED
Start: 2025-03-15

## (undated) RX ORDER — FENTANYL CITRATE 50 UG/ML
INJECTION, SOLUTION INTRAMUSCULAR; INTRAVENOUS
Status: DISPENSED
Start: 2025-03-30

## (undated) RX ORDER — REGADENOSON 0.08 MG/ML
INJECTION, SOLUTION INTRAVENOUS
Status: DISPENSED
Start: 2021-03-09

## (undated) RX ORDER — FENTANYL CITRATE 50 UG/ML
INJECTION, SOLUTION INTRAMUSCULAR; INTRAVENOUS
Status: DISPENSED
Start: 2020-07-16

## (undated) RX ORDER — PAPAVERINE HYDROCHLORIDE 30 MG/ML
INJECTION INTRAMUSCULAR; INTRAVENOUS
Status: DISPENSED
Start: 2025-03-15

## (undated) RX ORDER — LIDOCAINE HYDROCHLORIDE 20 MG/ML
INJECTION, SOLUTION EPIDURAL; INFILTRATION; INTRACAUDAL; PERINEURAL
Status: DISPENSED
Start: 2021-02-19

## (undated) RX ORDER — ONDANSETRON 2 MG/ML
INJECTION INTRAMUSCULAR; INTRAVENOUS
Status: DISPENSED
Start: 2025-03-15

## (undated) RX ORDER — FENTANYL CITRATE 0.05 MG/ML
INJECTION, SOLUTION INTRAMUSCULAR; INTRAVENOUS
Status: DISPENSED
Start: 2025-03-15

## (undated) RX ORDER — SODIUM CHLORIDE, SODIUM GLUCONATE, SODIUM ACETATE, POTASSIUM CHLORIDE AND MAGNESIUM CHLORIDE 526; 502; 368; 37; 30 MG/100ML; MG/100ML; MG/100ML; MG/100ML; MG/100ML
INJECTION, SOLUTION INTRAVENOUS
Status: DISPENSED
Start: 2025-03-15

## (undated) RX ORDER — HEPARIN SODIUM 200 [USP'U]/100ML
INJECTION, SOLUTION INTRAVENOUS
Status: DISPENSED
Start: 2025-03-14

## (undated) RX ORDER — FENTANYL CITRATE 50 UG/ML
INJECTION, SOLUTION INTRAMUSCULAR; INTRAVENOUS
Status: DISPENSED
Start: 2025-01-28

## (undated) RX ORDER — BUPIVACAINE HYDROCHLORIDE AND EPINEPHRINE 5; 5 MG/ML; UG/ML
INJECTION, SOLUTION EPIDURAL; INTRACAUDAL; PERINEURAL
Status: DISPENSED
Start: 2025-03-30

## (undated) RX ORDER — FENTANYL CITRATE 50 UG/ML
INJECTION, SOLUTION INTRAMUSCULAR; INTRAVENOUS
Status: DISPENSED
Start: 2025-03-14

## (undated) RX ORDER — DEXAMETHASONE SODIUM PHOSPHATE 4 MG/ML
INJECTION, SOLUTION INTRA-ARTICULAR; INTRALESIONAL; INTRAMUSCULAR; INTRAVENOUS; SOFT TISSUE
Status: DISPENSED
Start: 2021-02-19

## (undated) RX ORDER — PROPOFOL 10 MG/ML
INJECTION, EMULSION INTRAVENOUS
Status: DISPENSED
Start: 2025-03-30

## (undated) RX ORDER — HYDROMORPHONE HYDROCHLORIDE 1 MG/ML
INJECTION, SOLUTION INTRAMUSCULAR; INTRAVENOUS; SUBCUTANEOUS
Status: DISPENSED
Start: 2025-01-28

## (undated) RX ORDER — INDOCYANINE GREEN AND WATER 25 MG
KIT INJECTION
Status: DISPENSED
Start: 2025-03-30

## (undated) RX ORDER — DEXAMETHASONE SODIUM PHOSPHATE 4 MG/ML
INJECTION, SOLUTION INTRA-ARTICULAR; INTRALESIONAL; INTRAMUSCULAR; INTRAVENOUS; SOFT TISSUE
Status: DISPENSED
Start: 2025-01-28

## (undated) RX ORDER — PROPOFOL 10 MG/ML
INJECTION, EMULSION INTRAVENOUS
Status: DISPENSED
Start: 2025-03-15

## (undated) RX ORDER — PROPOFOL 10 MG/ML
INJECTION, EMULSION INTRAVENOUS
Status: DISPENSED
Start: 2025-01-28

## (undated) RX ORDER — DEXAMETHASONE SODIUM PHOSPHATE 4 MG/ML
INJECTION, SOLUTION INTRA-ARTICULAR; INTRALESIONAL; INTRAMUSCULAR; INTRAVENOUS; SOFT TISSUE
Status: DISPENSED
Start: 2025-03-30